# Patient Record
Sex: MALE | Race: WHITE | Employment: OTHER | URBAN - NONMETROPOLITAN AREA
[De-identification: names, ages, dates, MRNs, and addresses within clinical notes are randomized per-mention and may not be internally consistent; named-entity substitution may affect disease eponyms.]

---

## 2017-01-23 ENCOUNTER — OFFICE VISIT (OUTPATIENT)
Dept: PULMONOLOGY | Age: 57
End: 2017-01-23

## 2017-01-23 VITALS
BODY MASS INDEX: 34.66 KG/M2 | HEIGHT: 71 IN | HEART RATE: 85 BPM | DIASTOLIC BLOOD PRESSURE: 80 MMHG | OXYGEN SATURATION: 90 % | WEIGHT: 247.6 LBS | SYSTOLIC BLOOD PRESSURE: 100 MMHG

## 2017-01-23 DIAGNOSIS — Z99.89 OSA ON CPAP: Primary | ICD-10-CM

## 2017-01-23 DIAGNOSIS — G47.33 OSA ON CPAP: Primary | ICD-10-CM

## 2017-01-23 PROCEDURE — 99213 OFFICE O/P EST LOW 20 MIN: CPT | Performed by: INTERNAL MEDICINE

## 2017-01-31 ENCOUNTER — CARE COORDINATION (OUTPATIENT)
Dept: CARE COORDINATION | Age: 57
End: 2017-01-31

## 2017-02-06 ENCOUNTER — CARE COORDINATION (OUTPATIENT)
Dept: CARE COORDINATION | Age: 57
End: 2017-02-06

## 2017-02-06 ENCOUNTER — OFFICE VISIT (OUTPATIENT)
Dept: FAMILY MEDICINE CLINIC | Age: 57
End: 2017-02-06

## 2017-02-06 VITALS
RESPIRATION RATE: 14 BRPM | HEART RATE: 72 BPM | SYSTOLIC BLOOD PRESSURE: 132 MMHG | DIASTOLIC BLOOD PRESSURE: 74 MMHG | WEIGHT: 249.5 LBS | HEIGHT: 71 IN | BODY MASS INDEX: 34.93 KG/M2

## 2017-02-06 DIAGNOSIS — J01.20 ACUTE NON-RECURRENT ETHMOIDAL SINUSITIS: Primary | ICD-10-CM

## 2017-02-06 DIAGNOSIS — I10 ESSENTIAL HYPERTENSION: ICD-10-CM

## 2017-02-06 PROCEDURE — 99213 OFFICE O/P EST LOW 20 MIN: CPT | Performed by: FAMILY MEDICINE

## 2017-02-06 RX ORDER — CEPHALEXIN 500 MG/1
500 CAPSULE ORAL 3 TIMES DAILY
Qty: 30 CAPSULE | Refills: 0 | Status: SHIPPED | OUTPATIENT
Start: 2017-02-06 | End: 2017-02-16

## 2017-02-06 RX ORDER — IPRATROPIUM BROMIDE 42 UG/1
2 SPRAY, METERED NASAL 3 TIMES DAILY
Qty: 1 BOTTLE | Refills: 3 | Status: SHIPPED | OUTPATIENT
Start: 2017-02-06 | End: 2017-02-20

## 2017-02-06 ASSESSMENT — ENCOUNTER SYMPTOMS
COUGH: 1
EYE PAIN: 0
CHEST TIGHTNESS: 0
ABDOMINAL PAIN: 0
CONSTIPATION: 0
SHORTNESS OF BREATH: 0
NAUSEA: 0
BACK PAIN: 0
SORE THROAT: 0
TROUBLE SWALLOWING: 0
BLOOD IN STOOL: 0
SINUS PRESSURE: 1
HOARSE VOICE: 0

## 2017-02-20 ENCOUNTER — OFFICE VISIT (OUTPATIENT)
Dept: FAMILY MEDICINE CLINIC | Age: 57
End: 2017-02-20

## 2017-02-20 VITALS
WEIGHT: 250 LBS | SYSTOLIC BLOOD PRESSURE: 114 MMHG | HEIGHT: 71 IN | RESPIRATION RATE: 16 BRPM | BODY MASS INDEX: 35 KG/M2 | HEART RATE: 80 BPM | DIASTOLIC BLOOD PRESSURE: 64 MMHG

## 2017-02-20 DIAGNOSIS — Z12.5 PROSTATE CANCER SCREENING: ICD-10-CM

## 2017-02-20 DIAGNOSIS — R05.9 COUGH: ICD-10-CM

## 2017-02-20 DIAGNOSIS — J01.20 ACUTE NON-RECURRENT ETHMOIDAL SINUSITIS: ICD-10-CM

## 2017-02-20 PROCEDURE — 99213 OFFICE O/P EST LOW 20 MIN: CPT | Performed by: FAMILY MEDICINE

## 2017-02-20 RX ORDER — AMOXICILLIN AND CLAVULANATE POTASSIUM 875; 125 MG/1; MG/1
1 TABLET, FILM COATED ORAL 2 TIMES DAILY
Qty: 20 TABLET | Refills: 0 | Status: SHIPPED | OUTPATIENT
Start: 2017-02-20 | End: 2017-03-02

## 2017-02-20 ASSESSMENT — PATIENT HEALTH QUESTIONNAIRE - PHQ9
SUM OF ALL RESPONSES TO PHQ QUESTIONS 1-9: 0
1. LITTLE INTEREST OR PLEASURE IN DOING THINGS: 0
2. FEELING DOWN, DEPRESSED OR HOPELESS: 0
SUM OF ALL RESPONSES TO PHQ9 QUESTIONS 1 & 2: 0

## 2017-02-20 ASSESSMENT — ENCOUNTER SYMPTOMS
COUGH: 1
EYES NEGATIVE: 1
ABDOMINAL PAIN: 0
VOMITING: 0
CHEST TIGHTNESS: 0
BLOOD IN STOOL: 0
CONSTIPATION: 0
NAUSEA: 0
RHINORRHEA: 1
SORE THROAT: 1
DIARRHEA: 0
SHORTNESS OF BREATH: 0

## 2017-02-21 ENCOUNTER — TELEPHONE (OUTPATIENT)
Dept: FAMILY MEDICINE CLINIC | Age: 57
End: 2017-02-21

## 2017-03-31 ENCOUNTER — OFFICE VISIT (OUTPATIENT)
Dept: CARDIOLOGY | Age: 57
End: 2017-03-31

## 2017-03-31 VITALS
WEIGHT: 253 LBS | BODY MASS INDEX: 35.42 KG/M2 | DIASTOLIC BLOOD PRESSURE: 78 MMHG | HEART RATE: 80 BPM | HEIGHT: 71 IN | SYSTOLIC BLOOD PRESSURE: 126 MMHG

## 2017-03-31 DIAGNOSIS — I10 ESSENTIAL HYPERTENSION: ICD-10-CM

## 2017-03-31 DIAGNOSIS — I25.810 CORONARY ARTERY DISEASE INVOLVING CORONARY BYPASS GRAFT OF NATIVE HEART WITHOUT ANGINA PECTORIS: Primary | ICD-10-CM

## 2017-03-31 DIAGNOSIS — E78.01 FAMILIAL HYPERCHOLESTEROLEMIA: ICD-10-CM

## 2017-03-31 PROCEDURE — 99213 OFFICE O/P EST LOW 20 MIN: CPT | Performed by: NUCLEAR MEDICINE

## 2017-03-31 RX ORDER — BUMETANIDE 1 MG/1
1 TABLET ORAL DAILY
Qty: 90 TABLET | Refills: 3 | Status: SHIPPED | OUTPATIENT
Start: 2017-03-31 | End: 2017-12-12 | Stop reason: ALTCHOICE

## 2017-03-31 RX ORDER — BUMETANIDE 1 MG/1
1 TABLET ORAL DAILY
COMMUNITY
End: 2017-03-31 | Stop reason: SDUPTHER

## 2017-04-17 RX ORDER — ATORVASTATIN CALCIUM 40 MG/1
40 TABLET, FILM COATED ORAL NIGHTLY
Qty: 90 TABLET | Refills: 4 | Status: SHIPPED | OUTPATIENT
Start: 2017-04-17 | End: 2017-04-18 | Stop reason: SDUPTHER

## 2017-04-18 RX ORDER — ATORVASTATIN CALCIUM 40 MG/1
40 TABLET, FILM COATED ORAL NIGHTLY
Qty: 90 TABLET | Refills: 4 | Status: SHIPPED | OUTPATIENT
Start: 2017-04-18 | End: 2018-05-22 | Stop reason: SDUPTHER

## 2017-04-28 ENCOUNTER — OFFICE VISIT (OUTPATIENT)
Dept: FAMILY MEDICINE CLINIC | Age: 57
End: 2017-04-28

## 2017-04-28 VITALS
RESPIRATION RATE: 16 BRPM | HEART RATE: 64 BPM | WEIGHT: 257.4 LBS | DIASTOLIC BLOOD PRESSURE: 72 MMHG | SYSTOLIC BLOOD PRESSURE: 130 MMHG | BODY MASS INDEX: 36.03 KG/M2 | HEIGHT: 71 IN

## 2017-04-28 DIAGNOSIS — Z99.89 OBSTRUCTIVE SLEEP APNEA ON CPAP: ICD-10-CM

## 2017-04-28 DIAGNOSIS — M15.9 PRIMARY OSTEOARTHRITIS INVOLVING MULTIPLE JOINTS: ICD-10-CM

## 2017-04-28 DIAGNOSIS — I25.10 ASCVD (ARTERIOSCLEROTIC CARDIOVASCULAR DISEASE): ICD-10-CM

## 2017-04-28 DIAGNOSIS — E78.5 HYPERLIPIDEMIA, UNSPECIFIED HYPERLIPIDEMIA TYPE: ICD-10-CM

## 2017-04-28 DIAGNOSIS — Z13.9 SCREENING: ICD-10-CM

## 2017-04-28 DIAGNOSIS — I10 ESSENTIAL HYPERTENSION: Primary | ICD-10-CM

## 2017-04-28 DIAGNOSIS — K21.9 GASTROESOPHAGEAL REFLUX DISEASE, ESOPHAGITIS PRESENCE NOT SPECIFIED: ICD-10-CM

## 2017-04-28 DIAGNOSIS — G47.33 OBSTRUCTIVE SLEEP APNEA ON CPAP: ICD-10-CM

## 2017-04-28 PROCEDURE — 99213 OFFICE O/P EST LOW 20 MIN: CPT | Performed by: FAMILY MEDICINE

## 2017-04-28 RX ORDER — METOPROLOL SUCCINATE 25 MG/1
50 TABLET, EXTENDED RELEASE ORAL DAILY
Qty: 90 TABLET | Refills: 1 | Status: ON HOLD | OUTPATIENT
Start: 2017-04-28 | End: 2018-01-05 | Stop reason: ALTCHOICE

## 2017-04-28 RX ORDER — LISINOPRIL 10 MG/1
TABLET ORAL
Qty: 90 TABLET | Refills: 1 | Status: SHIPPED | OUTPATIENT
Start: 2017-04-28 | End: 2017-10-25 | Stop reason: SDUPTHER

## 2017-04-28 RX ORDER — OMEPRAZOLE 20 MG/1
20 CAPSULE, DELAYED RELEASE ORAL DAILY
Qty: 90 CAPSULE | Refills: 1 | Status: SHIPPED | OUTPATIENT
Start: 2017-04-28 | End: 2017-10-25 | Stop reason: SDUPTHER

## 2017-04-28 ASSESSMENT — ENCOUNTER SYMPTOMS
CONSTIPATION: 0
SHORTNESS OF BREATH: 0
DIARRHEA: 0
VOMITING: 0
BLOOD IN STOOL: 0
COUGH: 0
ABDOMINAL PAIN: 0
EYES NEGATIVE: 1
CHEST TIGHTNESS: 0
NAUSEA: 0

## 2017-05-26 ENCOUNTER — EMPLOYEE WELLNESS (OUTPATIENT)
Dept: OTHER | Age: 57
End: 2017-05-26

## 2017-05-26 LAB
CHOLESTEROL, TOTAL: 197 MG/DL (ref 0–199)
FASTING: YES
GLUCOSE BLD-MCNC: 124 MG/DL (ref 74–109)
HDLC SERPL-MCNC: 42 MG/DL (ref 40–90)
LDL CHOLESTEROL CALCULATED: 120 MG/DL
TRIGL SERPL-MCNC: 177 MG/DL (ref 0–199)

## 2017-06-02 ENCOUNTER — TELEPHONE (OUTPATIENT)
Dept: FAMILY MEDICINE CLINIC | Age: 57
End: 2017-06-02

## 2017-06-07 ENCOUNTER — OFFICE VISIT (OUTPATIENT)
Dept: FAMILY MEDICINE CLINIC | Age: 57
End: 2017-06-07

## 2017-06-07 VITALS
HEART RATE: 76 BPM | WEIGHT: 254.38 LBS | RESPIRATION RATE: 16 BRPM | DIASTOLIC BLOOD PRESSURE: 78 MMHG | SYSTOLIC BLOOD PRESSURE: 136 MMHG | BODY MASS INDEX: 35.61 KG/M2 | HEIGHT: 71 IN

## 2017-06-07 DIAGNOSIS — L72.9 INFECTED CYST OF SKIN: ICD-10-CM

## 2017-06-07 DIAGNOSIS — R63.5 WEIGHT GAIN: ICD-10-CM

## 2017-06-07 DIAGNOSIS — I10 ESSENTIAL HYPERTENSION: Primary | ICD-10-CM

## 2017-06-07 DIAGNOSIS — L08.9 INFECTED CYST OF SKIN: ICD-10-CM

## 2017-06-07 PROCEDURE — 99214 OFFICE O/P EST MOD 30 MIN: CPT | Performed by: FAMILY MEDICINE

## 2017-06-07 RX ORDER — AMOXICILLIN AND CLAVULANATE POTASSIUM 875; 125 MG/1; MG/1
1 TABLET, FILM COATED ORAL 2 TIMES DAILY
Qty: 20 TABLET | Refills: 0 | Status: SHIPPED | OUTPATIENT
Start: 2017-06-07 | End: 2017-06-17

## 2017-06-07 ASSESSMENT — ENCOUNTER SYMPTOMS
SHORTNESS OF BREATH: 0
EYE WATERING: 0
CONSTIPATION: 0
COUGH: 0
VOMITING: 0
NAUSEA: 0
CHEST TIGHTNESS: 0
BLURRED VISION: 0
VISUAL CHANGE: 0
EYE PAIN: 0
PHOTOPHOBIA: 1
BLOOD IN STOOL: 0
DIARRHEA: 0
EYE REDNESS: 0
ABDOMINAL PAIN: 0

## 2017-06-21 ENCOUNTER — OFFICE VISIT (OUTPATIENT)
Dept: FAMILY MEDICINE CLINIC | Age: 57
End: 2017-06-21

## 2017-06-21 VITALS
HEART RATE: 70 BPM | RESPIRATION RATE: 16 BRPM | BODY MASS INDEX: 35.48 KG/M2 | DIASTOLIC BLOOD PRESSURE: 82 MMHG | WEIGHT: 253.4 LBS | SYSTOLIC BLOOD PRESSURE: 124 MMHG | HEIGHT: 71 IN

## 2017-06-21 DIAGNOSIS — R73.9 HYPERGLYCEMIA: Primary | ICD-10-CM

## 2017-06-21 PROCEDURE — 99213 OFFICE O/P EST LOW 20 MIN: CPT | Performed by: FAMILY MEDICINE

## 2017-08-29 ASSESSMENT — ENCOUNTER SYMPTOMS
NAUSEA: 0
DIARRHEA: 0
SHORTNESS OF BREATH: 0
EYES NEGATIVE: 1
CONSTIPATION: 0
ABDOMINAL PAIN: 0
VOMITING: 0
CHEST TIGHTNESS: 0
BLOOD IN STOOL: 0
COUGH: 0

## 2017-09-01 ENCOUNTER — OFFICE VISIT (OUTPATIENT)
Dept: FAMILY MEDICINE CLINIC | Age: 57
End: 2017-09-01

## 2017-09-01 VITALS
BODY MASS INDEX: 35.59 KG/M2 | DIASTOLIC BLOOD PRESSURE: 74 MMHG | HEART RATE: 62 BPM | RESPIRATION RATE: 16 BRPM | WEIGHT: 254.2 LBS | HEIGHT: 71 IN | SYSTOLIC BLOOD PRESSURE: 116 MMHG

## 2017-09-01 DIAGNOSIS — E78.5 HYPERLIPIDEMIA, UNSPECIFIED HYPERLIPIDEMIA TYPE: ICD-10-CM

## 2017-09-01 DIAGNOSIS — I10 ESSENTIAL HYPERTENSION: Primary | ICD-10-CM

## 2017-09-01 DIAGNOSIS — K21.9 GASTROESOPHAGEAL REFLUX DISEASE, ESOPHAGITIS PRESENCE NOT SPECIFIED: ICD-10-CM

## 2017-09-01 DIAGNOSIS — G47.33 OBSTRUCTIVE SLEEP APNEA ON CPAP: ICD-10-CM

## 2017-09-01 DIAGNOSIS — R73.9 HYPERGLYCEMIA: ICD-10-CM

## 2017-09-01 DIAGNOSIS — Z99.89 OBSTRUCTIVE SLEEP APNEA ON CPAP: ICD-10-CM

## 2017-09-01 DIAGNOSIS — I25.10 CORONARY ARTERY DISEASE INVOLVING NATIVE CORONARY ARTERY OF NATIVE HEART WITHOUT ANGINA PECTORIS: ICD-10-CM

## 2017-09-01 PROCEDURE — 99213 OFFICE O/P EST LOW 20 MIN: CPT | Performed by: FAMILY MEDICINE

## 2017-09-01 ASSESSMENT — ENCOUNTER SYMPTOMS
VOMITING: 0
ABDOMINAL PAIN: 0
COUGH: 0
CHEST TIGHTNESS: 0
BLOOD IN STOOL: 0
CONSTIPATION: 0
EYES NEGATIVE: 1
NAUSEA: 0
DIARRHEA: 0
SHORTNESS OF BREATH: 0

## 2017-09-07 ENCOUNTER — HOSPITAL ENCOUNTER (OUTPATIENT)
Age: 57
Discharge: HOME | End: 2017-09-07
Payer: COMMERCIAL

## 2017-09-07 DIAGNOSIS — R73.9 HYPERGLYCEMIA: ICD-10-CM

## 2017-09-07 LAB
AVERAGE GLUCOSE: 132 MG/DL (ref 70–126)
GLUCOSE BLD-MCNC: 138 MG/DL (ref 70–108)
HBA1C MFR BLD: 6.4 % (ref 4.4–6.4)

## 2017-09-07 PROCEDURE — 83036 HEMOGLOBIN GLYCOSYLATED A1C: CPT

## 2017-09-07 PROCEDURE — 36415 COLL VENOUS BLD VENIPUNCTURE: CPT

## 2017-09-07 PROCEDURE — 82947 ASSAY GLUCOSE BLOOD QUANT: CPT

## 2017-09-27 ENCOUNTER — HOSPITAL ENCOUNTER (OUTPATIENT)
Dept: MRI IMAGING | Age: 57
Discharge: HOME | End: 2017-09-27
Payer: COMMERCIAL

## 2017-09-27 DIAGNOSIS — M25.511 ACUTE PAIN OF RIGHT SHOULDER: ICD-10-CM

## 2017-09-27 PROCEDURE — 73221 MRI JOINT UPR EXTREM W/O DYE: CPT

## 2017-10-19 ENCOUNTER — OFFICE VISIT (OUTPATIENT)
Dept: CARDIOLOGY CLINIC | Age: 57
End: 2017-10-19
Payer: COMMERCIAL

## 2017-10-19 VITALS
SYSTOLIC BLOOD PRESSURE: 128 MMHG | WEIGHT: 257 LBS | BODY MASS INDEX: 35.98 KG/M2 | DIASTOLIC BLOOD PRESSURE: 76 MMHG | HEIGHT: 71 IN | HEART RATE: 73 BPM

## 2017-10-19 DIAGNOSIS — I10 ESSENTIAL HYPERTENSION: Primary | ICD-10-CM

## 2017-10-19 DIAGNOSIS — E78.01 FAMILIAL HYPERCHOLESTEROLEMIA: ICD-10-CM

## 2017-10-19 DIAGNOSIS — R00.1 BRADYCARDIA: ICD-10-CM

## 2017-10-19 DIAGNOSIS — I25.10 CORONARY ARTERY DISEASE INVOLVING NATIVE CORONARY ARTERY OF NATIVE HEART WITHOUT ANGINA PECTORIS: ICD-10-CM

## 2017-10-19 PROCEDURE — 99213 OFFICE O/P EST LOW 20 MIN: CPT | Performed by: NUCLEAR MEDICINE

## 2017-10-19 PROCEDURE — 93000 ELECTROCARDIOGRAM COMPLETE: CPT | Performed by: NUCLEAR MEDICINE

## 2017-10-19 NOTE — PROGRESS NOTES
Pt here for cardiac clearance for right shoulder with Dr. Jeovany De Souza 11/19/17    Denies chest pain, dizziness  Does get fatigue and SOB on exertion

## 2017-10-19 NOTE — PROGRESS NOTES
[facility]  [facility]  [address] [facility]  [address] [address]  Dept: [phone]  Dept Fax: [phone]  Loc: [phone]    Visit Date: 10/19/2017    Senthil Rendon is a 59 y.o. male who presents today for:  Chief Complaint   Patient presents with    Check-Up     cardiac clearance    Coronary Artery Disease    Hypertension    Hyperlipidemia     CABg last year   No chest pain  Going for shoulder surgery   No chest pain  No changes in breathing  Active   Stable BP is stable     HPI:  HPI  Past Medical History:   Diagnosis Date    Addiction, opium ([address]     Coronary artery disease involving native coronary artery of native heart without angina pectoris     GERD (gastroesophageal reflux disease) 3/21/2012    Hernia     Hyperglycemia 09/09    Hyperlipidemia     Hypertension     Osteoarthritis 11/07    S/P CABG x 2 07/06/2016    Thumb injury 08/2015    Right thumb cut with table saw, no treatment needed    Unspecified sleep apnea       Past Surgical History:   Procedure Laterality Date    CARDIAC CATHETERIZATION  06/27/2016    CATARACT REMOVAL Right 2013    COLONOSCOPY  04/08    CORONARY ARTERY BYPASS GRAFT  07/06/2016    Double bypass, Dr. Vanessa Weinstein Right 02/2007    Neck    EYE SURGERY Right     Retinal surgery x 3    HERNIA REPAIR Right     Inguinal    TONSILLECTOMY  child    TOTAL KNEE ARTHROPLASTY Left 10/24/2016    Dr. Lizeth Benson     Family History   Problem Relation Age of Onset    Heart Disease Mother     Heart Disease Father     Cancer Father      colon/prostate    Colon Cancer Father     Prostate Cancer Father      Social History   Substance Use Topics    Smoking status: Former Smoker     Packs/day: 2.00     Years: 25.00     Types: Cigars     Quit date: 11/11/2012    Smokeless tobacco: Never Used    Alcohol use No      Comment: Quit      Current Outpatient Prescriptions   Medication Sig Dispense Refill    omeprazole deformities    Assessment:     1. Essential hypertension  EKG 12 Lead   2. Coronary artery disease involving native coronary artery of native heart without angina pectoris  EKG 12 Lead   3. Bradycardia  EKG 12 Lead   4. Familial hypercholesterolemia     cardiac stable for now  ECG in office was done today. I reviewed the ECG. No acute findings      Plan:  No Follow-up on file. Stable for surgery   Continue risk factor modification and medical management  Thank you for allowing me to participate in the care of your patient. Please don't hesitate to contact me regarding any further issues related to the patient care    Orders Placed:  Orders Placed This Encounter   Procedures    EKG 12 Lead     Order Specific Question:   Reason for Exam?     Answer: Other       Medications Prescribed:  No orders of the defined types were placed in this encounter. Discussed use, benefit, and side effects of prescribed medications. All patient questions answered. Pt voiced understanding. Instructed to continue current medications, diet and exercise. Continue risk factor modification and medical management. Patient agreed with treatment plan. Follow up as directed.     Electronically signed by Shilpa Adrian MD on 10/19/2017 at 3:46 PM

## 2017-10-25 RX ORDER — LISINOPRIL 10 MG/1
TABLET ORAL
Qty: 90 TABLET | Refills: 1 | Status: SHIPPED | OUTPATIENT
Start: 2017-10-25 | End: 2018-04-19 | Stop reason: SDUPTHER

## 2017-10-25 RX ORDER — OMEPRAZOLE 20 MG/1
20 CAPSULE, DELAYED RELEASE ORAL DAILY
Qty: 90 CAPSULE | Refills: 1 | Status: SHIPPED | OUTPATIENT
Start: 2017-10-25 | End: 2018-04-19 | Stop reason: SDUPTHER

## 2017-10-25 NOTE — TELEPHONE ENCOUNTER
Pt called and req 90 day refill:    Omeprazole 20 mg I po qd  Lisinopril 10 mg I po qd    Please send to :    [facility]

## 2017-10-30 ENCOUNTER — TELEPHONE (OUTPATIENT)
Dept: CARDIOLOGY CLINIC | Age: 57
End: 2017-10-30

## 2017-10-30 NOTE — TELEPHONE ENCOUNTER
Discussed PAD questionnaire with patient   He has c/o pain in his knees when walking  Declined PITER screening

## 2017-11-03 ENCOUNTER — OFFICE VISIT (OUTPATIENT)
Dept: FAMILY MEDICINE CLINIC | Age: 57
End: 2017-11-03

## 2017-11-03 VITALS
HEIGHT: 71 IN | SYSTOLIC BLOOD PRESSURE: 112 MMHG | BODY MASS INDEX: 36 KG/M2 | HEART RATE: 60 BPM | RESPIRATION RATE: 16 BRPM | WEIGHT: 257.13 LBS | DIASTOLIC BLOOD PRESSURE: 64 MMHG

## 2017-11-03 DIAGNOSIS — M54.5 ACUTE RIGHT-SIDED LOW BACK PAIN, WITH SCIATICA PRESENCE UNSPECIFIED: Primary | ICD-10-CM

## 2017-11-03 DIAGNOSIS — Z23 NEED FOR IMMUNIZATION AGAINST INFLUENZA: ICD-10-CM

## 2017-11-03 LAB
BACTERIA URINE, POC: ABNORMAL
BILIRUBIN URINE: 0 MG/DL
BLOOD, URINE: POSITIVE
CASTS URINE, POC: ABNORMAL
CLARITY: CLEAR
COLOR: YELLOW
CRYSTALS URINE, POC: ABNORMAL
EPI CELLS URINE, POC: ABNORMAL
GLUCOSE URINE: ABNORMAL
KETONES, URINE: POSITIVE
LEUKOCYTE EST, POC: ABNORMAL
NITRITE, URINE: NEGATIVE
PH UA: 6 (ref 4.5–8)
PROTEIN UA: POSITIVE
RBC URINE, POC: ABNORMAL
SPECIFIC GRAVITY UA: 1.01 (ref 1–1.03)
UROBILINOGEN, URINE: NORMAL
WBC URINE, POC: ABNORMAL
YEAST URINE, POC: ABNORMAL

## 2017-11-03 PROCEDURE — 99213 OFFICE O/P EST LOW 20 MIN: CPT | Performed by: FAMILY MEDICINE

## 2017-11-03 PROCEDURE — 90471 IMMUNIZATION ADMIN: CPT | Performed by: FAMILY MEDICINE

## 2017-11-03 PROCEDURE — 90688 IIV4 VACCINE SPLT 0.5 ML IM: CPT | Performed by: FAMILY MEDICINE

## 2017-11-03 PROCEDURE — 81000 URINALYSIS NONAUTO W/SCOPE: CPT | Performed by: FAMILY MEDICINE

## 2017-11-03 RX ORDER — MELOXICAM 15 MG/1
15 TABLET ORAL DAILY
Qty: 20 TABLET | Refills: 0 | Status: SHIPPED | OUTPATIENT
Start: 2017-11-03 | End: 2017-11-13

## 2017-11-03 ASSESSMENT — ENCOUNTER SYMPTOMS
BACK PAIN: 1
SHORTNESS OF BREATH: 0
CONSTIPATION: 0
SINUS PRESSURE: 0

## 2017-11-03 NOTE — PROGRESS NOTES
Administered Afluria Flu Vaccine, IM to right deltoid. Patient informed and educated on medication. Tolerated well.
(1.803 m), weight 257 lb 2 oz (116.6 kg). Results for orders placed or performed in visit on 11/03/17   POC URINE with Microscopic   Result Value Ref Range    Color, UA Yellow     Clarity, UA Clear Clear    Glucose, Ur neg     Bilirubin Urine 0 mg/dL    Ketones, Urine Positive     Specific Gravity, UA 1.010 1.005 - 1.030    Blood, Urine Positive     pH, UA 6 4.5 - 8.0    Protein, UA Positive (A) Negative    Nitrite, Urine Negative     Leukocytes, UA neg     Urobilinogen, Urine Normal     rbc urine, poc rare     wbc urine, poc none     bacteria urine, poc none     yeast urine, poc      casts urine, poc      epi cells urine, poc none     crystals urine, poc             Assessment:      1. Acute right-sided low back pain, with sciatica presence unspecified  POC URINE with Microscopic    meloxicam (MOBIC) 15 MG tablet   2.  Need for immunization against influenza             Plan:      Stop in for a follow up urine check in a week  Continue the heating pad as needed

## 2017-11-08 ENCOUNTER — NURSE ONLY (OUTPATIENT)
Dept: FAMILY MEDICINE CLINIC | Age: 57
End: 2017-11-08

## 2017-11-08 DIAGNOSIS — R35.0 URINARY FREQUENCY: Primary | ICD-10-CM

## 2017-11-09 LAB
APPEARANCE: CLEAR
BILIRUBIN: NEGATIVE
COLOR: YELLOW
GLUCOSE BLD-MCNC: NEGATIVE MG/DL
KETONES, URINE: NEGATIVE
LEUKOCYTE ESTERASE, URINE: NEGATIVE
NITRITE, URINE: NEGATIVE
OCCULT BLOOD,URINE: NEGATIVE
PH: 5.5 (ref 5–9)
PROTEIN, URINE: NEGATIVE
SP GRAVITY MISCELLANEOUS: 1.02 (ref 1–1.03)
UROBILINOGEN, URINE: NORMAL

## 2017-11-13 ENCOUNTER — OFFICE VISIT (OUTPATIENT)
Dept: FAMILY MEDICINE CLINIC | Age: 57
End: 2017-11-13

## 2017-11-13 VITALS
TEMPERATURE: 97.8 F | HEIGHT: 71 IN | RESPIRATION RATE: 16 BRPM | SYSTOLIC BLOOD PRESSURE: 132 MMHG | HEART RATE: 68 BPM | DIASTOLIC BLOOD PRESSURE: 88 MMHG | WEIGHT: 258.5 LBS | BODY MASS INDEX: 36.19 KG/M2

## 2017-11-13 DIAGNOSIS — R10.9 ACUTE RIGHT FLANK PAIN: Primary | ICD-10-CM

## 2017-11-13 PROCEDURE — 99214 OFFICE O/P EST MOD 30 MIN: CPT | Performed by: FAMILY MEDICINE

## 2017-11-13 ASSESSMENT — ENCOUNTER SYMPTOMS
CONSTIPATION: 0
BLOOD IN STOOL: 0
VOMITING: 0
BACK PAIN: 1
COUGH: 0
NAUSEA: 0
EYES NEGATIVE: 1
CHEST TIGHTNESS: 0
ABDOMINAL PAIN: 0
SHORTNESS OF BREATH: 0
DIARRHEA: 0

## 2017-11-13 NOTE — PROGRESS NOTES
Visit Date: 11/13/2017    Guillermina Mccain is a 59 y.o. male who presents today for:  Chief Complaint   Patient presents with    Back Pain     Right for about 2 weeks, today he states that the pain is making him kind of nauseated, no vomiting. HPI:     HPI    has a current medication list which includes the following prescription(s): omeprazole, lisinopril, metoprolol succinate, atorvastatin, bumetanide, aspirin, and fluticasone. No Known Allergies    Social History   Substance Use Topics    Smoking status: Former Smoker     Packs/day: 2.00     Years: 25.00     Types: Cigars     Quit date: 11/11/2012    Smokeless tobacco: Never Used    Alcohol use No      Comment: Quit       Past Medical History:   Diagnosis Date    Addiction, opium ([address]     Coronary artery disease involving native coronary artery of native heart without angina pectoris     GERD (gastroesophageal reflux disease) 3/21/2012    Hernia     Hyperglycemia 09/09    Hyperlipidemia     Hypertension     Osteoarthritis 11/07    S/P CABG x 2 07/06/2016    Thumb injury 08/2015    Right thumb cut with table saw, no treatment needed    Unspecified sleep apnea        Past Surgical History:   Procedure Laterality Date    CARDIAC CATHETERIZATION  06/27/2016    CATARACT REMOVAL Right 2013    COLONOSCOPY  04/08    CORONARY ARTERY BYPASS GRAFT  07/06/2016    Double bypass, Dr. Karlee Gutierrez Right 02/2007    Neck    EYE SURGERY Right     Retinal surgery x 3    HERNIA REPAIR Right     Inguinal    TONSILLECTOMY  child    TOTAL KNEE ARTHROPLASTY Left 10/24/2016    Dr. Sekou Byers       Family History   Problem Relation Age of Onset    Heart Disease Mother     Heart Disease Father     Cancer Father      colon/prostate    Colon Cancer Father     Prostate Cancer Father      Subjective:      Review of Systems   Constitutional: Positive for fever (he felt warm this AM ).  Negative for activity change, appetite change, chills and

## 2017-11-14 ENCOUNTER — HOSPITAL ENCOUNTER (OUTPATIENT)
Dept: CT IMAGING | Age: 57
Discharge: HOME | End: 2017-11-14
Payer: COMMERCIAL

## 2017-11-14 DIAGNOSIS — R10.9 ACUTE RIGHT FLANK PAIN: ICD-10-CM

## 2017-11-14 PROCEDURE — 74176 CT ABD & PELVIS W/O CONTRAST: CPT

## 2017-11-15 ENCOUNTER — TELEPHONE (OUTPATIENT)
Dept: FAMILY MEDICINE CLINIC | Age: 57
End: 2017-11-15

## 2017-11-22 ENCOUNTER — HOSPITAL ENCOUNTER (OUTPATIENT)
Dept: OCCUPATIONAL THERAPY | Age: 57
Setting detail: THERAPIES SERIES
Discharge: HOME | End: 2017-11-22
Payer: COMMERCIAL

## 2017-11-22 ENCOUNTER — OFFICE VISIT (OUTPATIENT)
Dept: FAMILY MEDICINE CLINIC | Age: 57
End: 2017-11-22

## 2017-11-22 VITALS
RESPIRATION RATE: 16 BRPM | WEIGHT: 252 LBS | SYSTOLIC BLOOD PRESSURE: 112 MMHG | HEART RATE: 76 BPM | HEIGHT: 71 IN | BODY MASS INDEX: 35.28 KG/M2 | DIASTOLIC BLOOD PRESSURE: 70 MMHG

## 2017-11-22 DIAGNOSIS — K82.8 GALLBLADDER SLUDGE: ICD-10-CM

## 2017-11-22 DIAGNOSIS — R60.0 BILATERAL LEG EDEMA: ICD-10-CM

## 2017-11-22 DIAGNOSIS — K76.0 FATTY LIVER: Primary | ICD-10-CM

## 2017-11-22 DIAGNOSIS — I10 ESSENTIAL HYPERTENSION: ICD-10-CM

## 2017-11-22 PROCEDURE — 97165 OT EVAL LOW COMPLEX 30 MIN: CPT

## 2017-11-22 PROCEDURE — G0283 ELEC STIM OTHER THAN WOUND: HCPCS

## 2017-11-22 PROCEDURE — 99213 OFFICE O/P EST LOW 20 MIN: CPT | Performed by: FAMILY MEDICINE

## 2017-11-22 PROCEDURE — 97110 THERAPEUTIC EXERCISES: CPT

## 2017-11-22 RX ORDER — POTASSIUM CHLORIDE 20 MEQ/1
20 TABLET, EXTENDED RELEASE ORAL DAILY
Qty: 30 TABLET | Refills: 1 | Status: SHIPPED | OUTPATIENT
Start: 2017-11-22 | End: 2018-01-21 | Stop reason: SDUPTHER

## 2017-11-22 RX ORDER — OXYCODONE HYDROCHLORIDE AND ACETAMINOPHEN 5; 325 MG/1; MG/1
1 TABLET ORAL EVERY 4 HOURS PRN
COMMUNITY
End: 2018-03-05 | Stop reason: ALTCHOICE

## 2017-11-22 ASSESSMENT — ENCOUNTER SYMPTOMS
CHEST TIGHTNESS: 0
COUGH: 0
VOMITING: 0
EYES NEGATIVE: 1
NAUSEA: 0
ABDOMINAL PAIN: 0
BLOOD IN STOOL: 0
SHORTNESS OF BREATH: 1
DIARRHEA: 0
CONSTIPATION: 0

## 2017-11-22 ASSESSMENT — PAIN DESCRIPTION - ORIENTATION: ORIENTATION: RIGHT

## 2017-11-22 ASSESSMENT — PAIN SCALES - GENERAL: PAINLEVEL_OUTOF10: 4

## 2017-11-22 ASSESSMENT — PAIN DESCRIPTION - LOCATION: LOCATION: SHOULDER

## 2017-11-22 NOTE — PROGRESS NOTES
without difficulty. OT G-codes  Functional Assessment Tool Used: Upper Extremity Functional Scale  Score: 19       Evaluation Complexity: Based on the findings of patient history, examination, clinical presentation, and decision making during this evaluation, this patient is of low complexity.     KEENA Stuart, OTR/L [id number]

## 2017-11-28 ENCOUNTER — HOSPITAL ENCOUNTER (OUTPATIENT)
Dept: OCCUPATIONAL THERAPY | Age: 57
Setting detail: THERAPIES SERIES
Discharge: HOME | End: 2017-11-28
Payer: COMMERCIAL

## 2017-11-28 ENCOUNTER — HOSPITAL ENCOUNTER (OUTPATIENT)
Age: 57
Discharge: HOME | End: 2017-11-28
Payer: COMMERCIAL

## 2017-11-28 DIAGNOSIS — I10 ESSENTIAL HYPERTENSION: ICD-10-CM

## 2017-11-28 DIAGNOSIS — K82.8 GALLBLADDER SLUDGE: ICD-10-CM

## 2017-11-28 DIAGNOSIS — K76.0 FATTY LIVER: ICD-10-CM

## 2017-11-28 LAB
ALBUMIN SERPL-MCNC: 4.5 G/DL (ref 3.5–5.1)
ALP BLD-CCNC: 100 U/L (ref 38–126)
ALT SERPL-CCNC: 47 U/L (ref 11–66)
ANION GAP SERPL CALCULATED.3IONS-SCNC: 15 MEQ/L (ref 8–16)
AST SERPL-CCNC: 26 U/L (ref 5–40)
BILIRUB SERPL-MCNC: 0.4 MG/DL (ref 0.3–1.2)
BUN BLDV-MCNC: 19 MG/DL (ref 7–22)
CALCIUM SERPL-MCNC: 9.9 MG/DL (ref 8.5–10.5)
CHLORIDE BLD-SCNC: 93 MEQ/L (ref 98–111)
CO2: 30 MEQ/L (ref 23–33)
CREAT SERPL-MCNC: 0.9 MG/DL (ref 0.4–1.2)
GFR SERPL CREATININE-BSD FRML MDRD: 86 ML/MIN/1.73M2
GLUCOSE BLD-MCNC: 115 MG/DL (ref 70–108)
POTASSIUM SERPL-SCNC: 4.8 MEQ/L (ref 3.5–5.2)
SODIUM BLD-SCNC: 138 MEQ/L (ref 135–145)
TOTAL PROTEIN: 7.7 G/DL (ref 6.1–8)

## 2017-11-28 PROCEDURE — 80053 COMPREHEN METABOLIC PANEL: CPT

## 2017-11-28 PROCEDURE — 97110 THERAPEUTIC EXERCISES: CPT

## 2017-11-28 PROCEDURE — 36415 COLL VENOUS BLD VENIPUNCTURE: CPT

## 2017-11-28 PROCEDURE — G0283 ELEC STIM OTHER THAN WOUND: HCPCS

## 2017-11-28 ASSESSMENT — PAIN DESCRIPTION - LOCATION: LOCATION: SHOULDER

## 2017-11-28 ASSESSMENT — PAIN SCALES - GENERAL: PAINLEVEL_OUTOF10: 2

## 2017-11-28 ASSESSMENT — PAIN DESCRIPTION - ORIENTATION: ORIENTATION: RIGHT

## 2017-11-28 NOTE — PROGRESS NOTES
[address] [address]  OUTPATIENT OCCUPATIONAL THERAPY  Daily Note  [address]    Time In: 0128  Time Out: 1600  Minutes: 45  Timed Code Treatment Minutes: 30 Minutes     Date: 2017  Patient Name: Wily Jain        CSN: [id number]   : 1960  (59 y.o.)  Gender: male   Referring Practitioner: Dr. Montana Borja  Diagnosis: M75.121, M75.41 R RCT, impingement, AC arthroplasty          General:  OT Visit Information  Onset Date: 17  OT Insurance Information: Medical [place] - no visit limit, modalities are covered  Total # of Visits to Date: 2  Certification Period Expiration Date: 18  Progress Note Counter: 2/10 for PN  Comments: return to referring physician 2017       Restrictions/Precautions:       Position Activity Restriction  Other position/activity restrictions: s/p on 17         Subjective:  Subjective: Patient reports he tolerated evaluation well, no questions regarding home exercise program.           Pain:  Patient Currently in Pain: Yes  Pain Assessment: 0-10  Pain Level: 2  Pain Location: Shoulder  Pain Orientation: Right       Objective:     Upper Extremity Function  UE AROM: Scapular pinches x10  UE PROM: Table slides with cues for passive motion only x10, pendulums side to side x10, supine PROM to R shoulder flexion and ER within protocol. UE AAROM: Supine distal ROM R elbow flexion/extension, wrist flexion/extension. Electrical Stimulation  Electrical Stimulation Location: Right, Shoulder  Electrical Stimulation Action: Right shoulder  Electrical Stimulation Parameters: IFC to R shoulder with cold pack over at end of session x15 minutes intensity 7. Electrical Stimulation Goals: Pain     Activity Tolerance: Additional Comments: Patient tolerated treatment well. Assessment:  Assessment: Patient is progressing towards goals. Patient Education:  Patient Education: Reviewed plan of care.      Plan:  Plan Comment: Continue per established POC.         KONSTANTIN QUINONES/HANS #[id number]

## 2017-12-01 ENCOUNTER — HOSPITAL ENCOUNTER (OUTPATIENT)
Dept: OCCUPATIONAL THERAPY | Age: 57
Setting detail: THERAPIES SERIES
Discharge: HOME | End: 2017-12-01
Payer: COMMERCIAL

## 2017-12-01 PROCEDURE — 97110 THERAPEUTIC EXERCISES: CPT

## 2017-12-01 PROCEDURE — G0283 ELEC STIM OTHER THAN WOUND: HCPCS

## 2017-12-01 ASSESSMENT — PAIN DESCRIPTION - ORIENTATION: ORIENTATION: RIGHT

## 2017-12-01 ASSESSMENT — PAIN DESCRIPTION - LOCATION: LOCATION: SHOULDER

## 2017-12-01 ASSESSMENT — PAIN SCALES - GENERAL: PAINLEVEL_OUTOF10: 1

## 2017-12-01 NOTE — PROGRESS NOTES
did take pain medication prior to coming to therapy. Blood pressure 173/49, after RB patient able to return to mat to complete ROM without issues. Assessment:  Assessment: Patient is progressing towards goals. Patient Education:  Patient Education: Reviewed next weeks schedule. Plan:  Plan Comment: Continue per established POC.         KONSTANTIN Wen [facility] QUINONES/L #[id number]

## 2017-12-05 ENCOUNTER — HOSPITAL ENCOUNTER (OUTPATIENT)
Dept: OCCUPATIONAL THERAPY | Age: 57
Setting detail: THERAPIES SERIES
Discharge: HOME | End: 2017-12-05
Payer: COMMERCIAL

## 2017-12-05 PROCEDURE — G0283 ELEC STIM OTHER THAN WOUND: HCPCS

## 2017-12-05 PROCEDURE — 97110 THERAPEUTIC EXERCISES: CPT

## 2017-12-05 ASSESSMENT — PAIN DESCRIPTION - LOCATION: LOCATION: SHOULDER

## 2017-12-05 ASSESSMENT — PAIN SCALES - GENERAL: PAINLEVEL_OUTOF10: 2

## 2017-12-05 ASSESSMENT — PAIN DESCRIPTION - ORIENTATION: ORIENTATION: RIGHT

## 2017-12-05 NOTE — PROGRESS NOTES
[address] [address]  OUTPATIENT OCCUPATIONAL THERAPY  Daily Note  [address]    Time In: 08  Time Out: 9655  Minutes: 40  Timed Code Treatment Minutes: 25 Minutes     Date: 2017  Patient Name: Kumar Bennett        CSN: [id number]   : 1960  (59 y.o.)  Gender: male   Referring Practitioner: Dr. Rome Goode  Diagnosis: M75.121, M75.41 R RCT, impingement, AC arthroplasty          General:  OT Visit Information  Onset Date: 17  OT Insurance Information: Medical [place] - no visit limit, modalities are covered  Total # of Visits to Date: 4  Certification Period Expiration Date: 18  Progress Note Counter: 4/10 for PN  Comments: return to referring physician in 4 weeks, unsure of date at this time. Restrictions/Precautions:       Position Activity Restriction  Other position/activity restrictions: s/p on 17 massive tear protocol         Subjective:  Subjective: Patient reports that he has not been feeling to bad, reports that sleeping is not going very well yet. Pain:  Patient Currently in Pain: Yes  Pain Assessment: 0-10  Pain Level: 2  Pain Location: Shoulder  Pain Orientation: Right       Objective:     Upper Extremity Function  UE AROM: 8  UE PROM: Table top flexion slides with hands clasped x10, pendulums side to side x10, supine PROM to R shoulder flexion and ER within protocol. UE AAROM: Supine distal ROM R elbow flexion/extension, wrist flexion/extension. Electrical Stimulation  Electrical Stimulation Action: Right shoulder  Electrical Stimulation Parameters: IFC to R shoulder with cold pack over at end of session x15 minutes intensity 7. Electrical Stimulation Goals: Pain          Activity Tolerance: Additional Comments: Patient tolerated treatment well. Moderate cues to relax and to avoid active use of the right arm. Assessment:  Assessment: Patient is progressing towards goals.      Patient Education:  Patient Education: No active use of R UE, allow the shoulder to relax and drop            Plan:  Current Treatment Recommendations: ROM  Plan Comment: Continue per established POC.    Specific instructions for Next Treatment: ROM as per protocol: s/p arthroscopic RCR, SAD, DCR, ED, follow protocol for massive RCR          Varsha Mcduffie Victoria Maria A, OTR/L [id number]

## 2017-12-08 ENCOUNTER — HOSPITAL ENCOUNTER (OUTPATIENT)
Dept: OCCUPATIONAL THERAPY | Age: 57
Setting detail: THERAPIES SERIES
Discharge: HOME | End: 2017-12-08
Payer: COMMERCIAL

## 2017-12-08 PROCEDURE — 97110 THERAPEUTIC EXERCISES: CPT

## 2017-12-08 PROCEDURE — G0283 ELEC STIM OTHER THAN WOUND: HCPCS

## 2017-12-08 ASSESSMENT — PAIN DESCRIPTION - LOCATION: LOCATION: SHOULDER

## 2017-12-08 ASSESSMENT — PAIN SCALES - GENERAL: PAINLEVEL_OUTOF10: 3

## 2017-12-08 ASSESSMENT — PAIN DESCRIPTION - ORIENTATION: ORIENTATION: RIGHT

## 2017-12-08 NOTE — PROGRESS NOTES
Tolerance: Additional Comments: Patient tolerated treatment well. Continues to requrie moderate cues to relax and to avoid active use of the right arm. Assessment:  Assessment: Patient is progressing towards goals. Patient Education:  Patient Education: no changes to HEP            Plan:  Current Treatment Recommendations: ROM  Plan Comment: Continue per established POC.    Specific instructions for Next Treatment: ROM as per protocol: s/p arthroscopic RCR, SAD, DCR, ED, follow protocol for massive RCR          Tay Menjivar, OTR/L [id number]

## 2017-12-11 DIAGNOSIS — I25.10 CORONARY ARTERY DISEASE INVOLVING NATIVE CORONARY ARTERY OF NATIVE HEART WITHOUT ANGINA PECTORIS: Primary | ICD-10-CM

## 2017-12-11 DIAGNOSIS — M79.89 SWELLING OF BOTH LOWER EXTREMITIES: ICD-10-CM

## 2017-12-12 ENCOUNTER — HOSPITAL ENCOUNTER (OUTPATIENT)
Dept: OCCUPATIONAL THERAPY | Age: 57
Setting detail: THERAPIES SERIES
Discharge: HOME | End: 2017-12-12
Payer: COMMERCIAL

## 2017-12-12 PROCEDURE — 97110 THERAPEUTIC EXERCISES: CPT

## 2017-12-12 RX ORDER — BUMETANIDE 2 MG/1
2 TABLET ORAL DAILY
COMMUNITY
End: 2017-12-12 | Stop reason: SDUPTHER

## 2017-12-12 RX ORDER — BUMETANIDE 2 MG/1
2 TABLET ORAL DAILY
Qty: 90 TABLET | Refills: 3 | Status: SHIPPED | OUTPATIENT
Start: 2017-12-12 | End: 2018-06-01 | Stop reason: SDUPTHER

## 2017-12-12 NOTE — TELEPHONE ENCOUNTER
Okay to do that   Needs a doppler to rule out DVT   If increasing bumex make sure he is on K supplement   BMP in 2 weeks

## 2017-12-12 NOTE — TELEPHONE ENCOUNTER
Patient's wife had called in stating that the he is more SOB. Patient is going to start increased dose of Bumex to see if it helps symptoms.

## 2017-12-12 NOTE — PROGRESS NOTES
[address] [facility]  OUTPATIENT OCCUPATIONAL THERAPY  Daily Note  [address]    Time In: 4771  Time Out: Robles Martinez.  Minutes: 27  Timed Code Treatment Minutes: 27 Minutes     Date: 2017  Patient Name: Senthil Rendon        CSN: [id number]   : 1960  (59 y.o.)  Gender: male   Referring Practitioner: Dr. Melvin Felty  Diagnosis: M75.121, M75.41 R RCT, impingement, AC arthroplasty          General:  OT Visit Information  Onset Date: 17  OT Insurance Information: Medical [place] - no visit limit, modalities are covered  Total # of Visits to Date: 6  Certification Period Expiration Date: 18  Progress Note Counter: 6/10 for PN       Restrictions/Precautions:       Position Activity Restriction  Other position/activity restrictions: s/p on 17 massive tear protocol         Subjective:  Subjective: Patient reports his shoulder seems to be getting better, is still being careful how he moves. No continuous pain. Pain:  Patient Currently in Pain: No (No pain at rest today. )       Objective:     Upper Extremity Function  UE AROM: Scapular pinches x10, supine elbow flexion/extension with elbow supported on towel roll thumb up x10.   UE PROM: Table top flexion slides with hands clasped x10, pendulums side to side x10, supine PROM to R shoulder flexion and ER within protocol. R shoulder ER with dowel clarisa and elbow at side x10.   UE AAROM: Forearm pronation/supination x10. Activity Tolerance: Additional Comments: Patient tolerated treatment well. Assessment:  Assessment: Patient is progressing towards goals. Patient Education:  Patient Education: Reviewed protocol and plan of care. Plan:  Plan Comment: Continue per established POC.         KONSTANTIN Wen [employer] QUINONES/L #[id number]

## 2017-12-13 ENCOUNTER — HOSPITAL ENCOUNTER (OUTPATIENT)
Dept: INTERVENTIONAL RADIOLOGY/VASCULAR | Age: 57
Discharge: HOME | End: 2017-12-13
Payer: COMMERCIAL

## 2017-12-13 DIAGNOSIS — I25.10 CORONARY ARTERY DISEASE INVOLVING NATIVE CORONARY ARTERY OF NATIVE HEART WITHOUT ANGINA PECTORIS: ICD-10-CM

## 2017-12-13 DIAGNOSIS — M79.89 SWELLING OF BOTH LOWER EXTREMITIES: ICD-10-CM

## 2017-12-13 PROCEDURE — 93970 EXTREMITY STUDY: CPT

## 2017-12-14 ENCOUNTER — TELEPHONE (OUTPATIENT)
Dept: CARDIOLOGY CLINIC | Age: 57
End: 2017-12-14

## 2017-12-14 ENCOUNTER — NURSE TRIAGE (OUTPATIENT)
Dept: ADMINISTRATIVE | Age: 57
End: 2017-12-14

## 2017-12-14 NOTE — TELEPHONE ENCOUNTER
Patient's wife called in stating patient is retaining fluid, is sob and tired all the time. Patient had shoulder surgery on 11- and has not had a lot of activity to make him sob. Any recommendations?

## 2017-12-14 NOTE — TELEPHONE ENCOUNTER
Wife, Anjali Pierce stated she talked to a nurse in Dr. King Handing office early today and was asked if Maren Trujillo had gain any weight. At the time she did not know his weight. She went home and weight him and wanted to let the office know he had a six pound weight gain in a week. Caller stated they are monitoring him very well, a doppler was done and labs are ordered for tomorrow. Caller declined triage stating that Dr. Lily Carranza, Cardiologist will call her tomorrow.

## 2017-12-14 NOTE — TELEPHONE ENCOUNTER
Tracie Aguilar called back to tell Carol that yes, Jenn Zarate has had some weight gain. She said approx. 8lbs in a week. I told her I would let the office know but due to the other symptoms she listed I also had her speak with the call a nurse.

## 2017-12-15 ENCOUNTER — HOSPITAL ENCOUNTER (OUTPATIENT)
Dept: OCCUPATIONAL THERAPY | Age: 57
Setting detail: THERAPIES SERIES
Discharge: HOME | End: 2017-12-15
Payer: COMMERCIAL

## 2017-12-15 ENCOUNTER — HOSPITAL ENCOUNTER (OUTPATIENT)
Age: 57
Discharge: HOME | End: 2017-12-15
Payer: COMMERCIAL

## 2017-12-15 ENCOUNTER — HOSPITAL ENCOUNTER (OUTPATIENT)
Dept: GENERAL RADIOLOGY | Age: 57
Discharge: HOME | End: 2017-12-15
Payer: COMMERCIAL

## 2017-12-15 ENCOUNTER — OFFICE VISIT (OUTPATIENT)
Dept: CARDIOLOGY CLINIC | Age: 57
End: 2017-12-15
Payer: COMMERCIAL

## 2017-12-15 VITALS
BODY MASS INDEX: 36.57 KG/M2 | HEIGHT: 71 IN | HEART RATE: 79 BPM | SYSTOLIC BLOOD PRESSURE: 132 MMHG | DIASTOLIC BLOOD PRESSURE: 80 MMHG | WEIGHT: 261.2 LBS

## 2017-12-15 DIAGNOSIS — I25.10 CORONARY ARTERY DISEASE INVOLVING NATIVE CORONARY ARTERY OF NATIVE HEART WITHOUT ANGINA PECTORIS: ICD-10-CM

## 2017-12-15 DIAGNOSIS — I25.10 CORONARY ARTERY DISEASE INVOLVING NATIVE CORONARY ARTERY OF NATIVE HEART WITHOUT ANGINA PECTORIS: Primary | ICD-10-CM

## 2017-12-15 DIAGNOSIS — I10 ESSENTIAL HYPERTENSION: ICD-10-CM

## 2017-12-15 DIAGNOSIS — E78.01 FAMILIAL HYPERCHOLESTEROLEMIA: ICD-10-CM

## 2017-12-15 DIAGNOSIS — R06.02 SOB (SHORTNESS OF BREATH): ICD-10-CM

## 2017-12-15 DIAGNOSIS — M79.89 SWELLING OF BOTH LOWER EXTREMITIES: ICD-10-CM

## 2017-12-15 LAB
ANION GAP SERPL CALCULATED.3IONS-SCNC: 17 MEQ/L (ref 8–16)
BUN BLDV-MCNC: 16 MG/DL (ref 7–22)
CALCIUM SERPL-MCNC: 10.1 MG/DL (ref 8.5–10.5)
CHLORIDE BLD-SCNC: 98 MEQ/L (ref 98–111)
CO2: 26 MEQ/L (ref 23–33)
CREAT SERPL-MCNC: 0.8 MG/DL (ref 0.4–1.2)
GFR SERPL CREATININE-BSD FRML MDRD: > 90 ML/MIN/1.73M2
GLUCOSE BLD-MCNC: 117 MG/DL (ref 70–108)
POTASSIUM SERPL-SCNC: 4.4 MEQ/L (ref 3.5–5.2)
SODIUM BLD-SCNC: 141 MEQ/L (ref 135–145)

## 2017-12-15 PROCEDURE — 93000 ELECTROCARDIOGRAM COMPLETE: CPT | Performed by: NUCLEAR MEDICINE

## 2017-12-15 PROCEDURE — 36415 COLL VENOUS BLD VENIPUNCTURE: CPT

## 2017-12-15 PROCEDURE — 97110 THERAPEUTIC EXERCISES: CPT

## 2017-12-15 PROCEDURE — 71020 XR CHEST STANDARD TWO VW: CPT

## 2017-12-15 PROCEDURE — 99214 OFFICE O/P EST MOD 30 MIN: CPT | Performed by: NUCLEAR MEDICINE

## 2017-12-15 PROCEDURE — 80048 BASIC METABOLIC PNL TOTAL CA: CPT

## 2017-12-15 NOTE — PROGRESS NOTES
11/11/2012    Smokeless tobacco: Never Used    Alcohol use No      Comment: Quit      Current Outpatient Prescriptions   Medication Sig Dispense Refill    bumetanide (BUMEX) 2 MG tablet Take 1 tablet by mouth daily 90 tablet 3    oxyCODONE-acetaminophen (PERCOCET) 5-325 MG per tablet Take 1 tablet by mouth every 4 hours as needed for Pain .  potassium chloride (KLOR-CON M) 20 MEQ extended release tablet Take 1 tablet by mouth daily 30 tablet 1    omeprazole (PRILOSEC) 20 MG delayed release capsule Take 1 capsule by mouth daily 90 capsule 1    lisinopril (PRINIVIL;ZESTRIL) 10 MG tablet TAKE 1 TABLET DAILY 90 tablet 1    metoprolol succinate (TOPROL XL) 25 MG extended release tablet Take 2 tablets by mouth daily (Patient taking differently: Take 25 mg by mouth daily ) 90 tablet 1    atorvastatin (LIPITOR) 40 MG tablet Take 1 tablet by mouth nightly 90 tablet 4    aspirin 81 MG tablet Take 81 mg by mouth daily      fluticasone (FLONASE) 50 MCG/ACT nasal spray 2 sprays by Nasal route daily as needed for Rhinitis or Allergies Apply daily to each nare 3 Bottle 1     No current facility-administered medications for this visit.       No Known Allergies  Health Maintenance   Topic Date Due    HIV screen  02/23/1973    Smoker: low dose lung CT screening  09/10/2017    Diabetes screen  09/07/2020    Lipid screen  06/19/2022    DTaP/Tdap/Td vaccine (2 - Td) 12/17/2022    Colon cancer screen colonoscopy  08/01/2023    Flu vaccine  Completed    Hepatitis C screen  Completed       Subjective:  Review of Systems  General:   No fever, no chills, more fatigue or weight loss  Pulmonary:    some more dyspnea, no wheezing  Cardiac:    Denies recent chest pain,   GI:     No nausea or vomiting, no abdominal pain  Neuro:     No dizziness or light headedness,   Musculoskeletal:  No recent active issues  Extremities:   No edema, good peripheral pulses      Objective:  Physical Exam  /80   Pulse 79   Ht 5' 11\"

## 2017-12-19 ENCOUNTER — HOSPITAL ENCOUNTER (OUTPATIENT)
Dept: NON INVASIVE DIAGNOSTICS | Age: 57
Discharge: HOME | End: 2017-12-19
Payer: COMMERCIAL

## 2017-12-19 ENCOUNTER — HOSPITAL ENCOUNTER (OUTPATIENT)
Dept: OCCUPATIONAL THERAPY | Age: 57
Setting detail: THERAPIES SERIES
Discharge: HOME | End: 2017-12-19
Payer: COMMERCIAL

## 2017-12-19 VITALS — HEIGHT: 71 IN | WEIGHT: 255 LBS | BODY MASS INDEX: 35.7 KG/M2

## 2017-12-19 DIAGNOSIS — R06.02 SOB (SHORTNESS OF BREATH): ICD-10-CM

## 2017-12-19 DIAGNOSIS — I25.10 CORONARY ARTERY DISEASE INVOLVING NATIVE CORONARY ARTERY OF NATIVE HEART WITHOUT ANGINA PECTORIS: ICD-10-CM

## 2017-12-19 PROCEDURE — 78452 HT MUSCLE IMAGE SPECT MULT: CPT

## 2017-12-19 PROCEDURE — 3430000000 HC RX DIAGNOSTIC RADIOPHARMACEUTICAL: Performed by: NUCLEAR MEDICINE

## 2017-12-19 PROCEDURE — 93017 CV STRESS TEST TRACING ONLY: CPT

## 2017-12-19 PROCEDURE — 97110 THERAPEUTIC EXERCISES: CPT

## 2017-12-19 PROCEDURE — 6360000002 HC RX W HCPCS

## 2017-12-19 PROCEDURE — A9500 TC99M SESTAMIBI: HCPCS | Performed by: NUCLEAR MEDICINE

## 2017-12-19 RX ADMIN — Medication 34.1 MILLICURIE: at 09:00

## 2017-12-19 RX ADMIN — Medication 10.4 MILLICURIE: at 07:54

## 2017-12-20 ENCOUNTER — TELEPHONE (OUTPATIENT)
Dept: CARDIOLOGY CLINIC | Age: 57
End: 2017-12-20

## 2017-12-20 DIAGNOSIS — R94.39 ABNORMAL STRESS TEST: Primary | ICD-10-CM

## 2017-12-20 DIAGNOSIS — I25.10 CORONARY ARTERY DISEASE INVOLVING NATIVE CORONARY ARTERY OF NATIVE HEART WITHOUT ANGINA PECTORIS: ICD-10-CM

## 2017-12-22 ENCOUNTER — HOSPITAL ENCOUNTER (OUTPATIENT)
Dept: OCCUPATIONAL THERAPY | Age: 57
Setting detail: THERAPIES SERIES
Discharge: HOME | End: 2017-12-22
Payer: COMMERCIAL

## 2017-12-22 PROCEDURE — 97110 THERAPEUTIC EXERCISES: CPT

## 2017-12-27 ENCOUNTER — HOSPITAL ENCOUNTER (OUTPATIENT)
Dept: OCCUPATIONAL THERAPY | Age: 57
Setting detail: THERAPIES SERIES
Discharge: HOME | End: 2017-12-27
Payer: COMMERCIAL

## 2017-12-27 PROCEDURE — 97110 THERAPEUTIC EXERCISES: CPT

## 2017-12-27 ASSESSMENT — PAIN DESCRIPTION - LOCATION: LOCATION: SHOULDER

## 2017-12-27 ASSESSMENT — PAIN DESCRIPTION - ORIENTATION: ORIENTATION: RIGHT

## 2017-12-27 ASSESSMENT — PAIN SCALES - GENERAL: PAINLEVEL_OUTOF10: 1

## 2017-12-27 NOTE — PROGRESS NOTES
[facility]  OUTPATIENT OCCUPATIONAL THERAPY  Progress Note  [address]    Time In: 1300  Time Out: 3245  Minutes: 45  Timed Code Treatment Minutes: 45 Minutes     Date: 2017  Patient Name: Kelly Burleson        CSN: [id number]   : 1960  (59 y.o.)  Gender: male   Referring Practitioner: Dr. Alena Kemp  Diagnosis: M75.121, M75.41 R RCT, impingement, AC arthroplasty          General:  OT Visit Information  Onset Date: 17  OT Insurance Information: Medical [place] - no visit limit, modalities are covered  Total # of Visits to Date: 10  Certification Period Expiration Date: 18  Progress Note Counter: PN completed 17  Comments: return to referring physician 2017. Restrictions/Precautions:       Position Activity Restriction  Other position/activity restrictions: s/p on 17 massive tear protocol         Subjective:  Subjective: Patient reports that his appointment with the referring physician went well. Brought in a script for additional 6 weeks of therapy, to continue to follow the protocol. Pain:  Patient Currently in Pain: Yes (No pain at rest. )  Pain Assessment: 0-10  Pain Level: 1  Pain Location: Shoulder  Pain Orientation: Right       Objective:     Upper Extremity Function  UE AROM: Sidelying scapular pinches and depression 10 reps with a 3 second hold. Gathered information for progress note:  150 degrees flexion, 150 degrees abduction, 63 degrees ER with the arm at the side and thumb tip 3 inches above the waistband for IR. UE PROM: pulleys in flexion with palm up 10 reps with a 10 second hold, supine PROM right shoulder all planes to tolerance, IR in scaption. Gathered information for progress note: 150 degrees flexion and 50 degrees ER  UE AAROM: supine dowel flexion from lap to overhead 10 reps. UE Strengthing: supine ceiling circles CW and CCW; supine bench press 10 reps.

## 2017-12-29 ENCOUNTER — HOSPITAL ENCOUNTER (OUTPATIENT)
Dept: OCCUPATIONAL THERAPY | Age: 57
Setting detail: THERAPIES SERIES
End: 2017-12-29
Payer: COMMERCIAL

## 2017-12-29 NOTE — PROGRESS NOTES
[facility]  OCCUPATIONAL THERAPY MISSED TREATMENT NOTE  [facility] OCCUPATIONAL THERAPY      Date: 2017  Patient Name: Fadi Tadeo        CSN: [id number]   : 1960  (59 y.o.)  Gender: male   Referring Practitioner: Dr. Ivis Wallis  Diagnosis: M75.121, M75.41 R RCT, impingement, AC arthroplasty    OT Visit Information  Canceled Appointment: 1    REASON FOR MISSED TREATMENT:  Patient called and cancelled his appointment this date, no reason provided.   Gustave Gowers, MOT, OTR/L [id number]

## 2018-01-02 ENCOUNTER — HOSPITAL ENCOUNTER (OUTPATIENT)
Dept: OCCUPATIONAL THERAPY | Age: 58
Setting detail: THERAPIES SERIES
Discharge: HOME | End: 2018-01-02
Payer: COMMERCIAL

## 2018-01-02 ENCOUNTER — TELEPHONE (OUTPATIENT)
Dept: CARDIOLOGY CLINIC | Age: 58
End: 2018-01-02

## 2018-01-02 NOTE — PROGRESS NOTES
[facility] 58  OCCUPATIONAL THERAPY MISSED TREATMENT NOTE  [facility]      Date: 2018  Patient Name: Karol Coe        CSN: [id number]   : 1960  (59 y.o.)  Gender: male           OT Visit Information  Canceled Appointment: 2    REASON FOR MISSED TREATMENT:  Cancelled due to illness. Attempted to call to make up this appointment for later in the week, however, reports he is scheduled for a heart cath on Friday but will call if he is able to make it on Friday morning.       KONSTANTIN QUINONES/L #[id number]

## 2018-01-02 NOTE — TELEPHONE ENCOUNTER
Pt left a message states that he is scheduled for a heart cath and echo on 1-5-18 this Friday. Over the holidays he picked up a cold or the flu. He is getting better when he keeps up with the medications, but states that he still has a fever. Please advise.

## 2018-01-03 ENCOUNTER — OFFICE VISIT (OUTPATIENT)
Dept: FAMILY MEDICINE CLINIC | Age: 58
End: 2018-01-03

## 2018-01-03 VITALS
DIASTOLIC BLOOD PRESSURE: 72 MMHG | RESPIRATION RATE: 16 BRPM | WEIGHT: 262.6 LBS | HEIGHT: 71 IN | HEART RATE: 80 BPM | SYSTOLIC BLOOD PRESSURE: 128 MMHG | BODY MASS INDEX: 36.76 KG/M2

## 2018-01-03 DIAGNOSIS — I10 ESSENTIAL HYPERTENSION: ICD-10-CM

## 2018-01-03 DIAGNOSIS — R73.9 HYPERGLYCEMIA: Primary | ICD-10-CM

## 2018-01-03 DIAGNOSIS — E78.5 HYPERLIPIDEMIA, UNSPECIFIED HYPERLIPIDEMIA TYPE: ICD-10-CM

## 2018-01-03 DIAGNOSIS — K21.9 GASTROESOPHAGEAL REFLUX DISEASE, ESOPHAGITIS PRESENCE NOT SPECIFIED: ICD-10-CM

## 2018-01-03 DIAGNOSIS — I25.10 CORONARY ARTERY DISEASE INVOLVING NATIVE CORONARY ARTERY OF NATIVE HEART WITHOUT ANGINA PECTORIS: ICD-10-CM

## 2018-01-03 LAB
GLUCOSE BLD-MCNC: 159 MG/DL
HBA1C MFR BLD: 6.7 %

## 2018-01-03 PROCEDURE — 99213 OFFICE O/P EST LOW 20 MIN: CPT | Performed by: FAMILY MEDICINE

## 2018-01-03 PROCEDURE — 83036 HEMOGLOBIN GLYCOSYLATED A1C: CPT | Performed by: FAMILY MEDICINE

## 2018-01-03 PROCEDURE — 82962 GLUCOSE BLOOD TEST: CPT | Performed by: FAMILY MEDICINE

## 2018-01-03 ASSESSMENT — ENCOUNTER SYMPTOMS
EYES NEGATIVE: 1
ABDOMINAL PAIN: 0
SHORTNESS OF BREATH: 1
DIARRHEA: 0
COUGH: 0
CHEST TIGHTNESS: 0
CONSTIPATION: 0
BLOOD IN STOOL: 0
VOMITING: 0
NAUSEA: 0

## 2018-01-03 NOTE — PROGRESS NOTES
Visit Date: 1/3/2018    Luisa Lennox is a 59 y.o. male who presents today for:  Chief Complaint   Patient presents with    Hypertension stable    Hyperlipidemia  He is having SOB with little activity and is having a cardiac cath this Friday. HPI:     HPI    has a current medication list which includes the following prescription(s): bumetanide, oxycodone-acetaminophen, potassium chloride, omeprazole, lisinopril, metoprolol succinate, atorvastatin, aspirin, and fluticasone.     No Known Allergies    Social History   Substance Use Topics    Smoking status: Former Smoker     Packs/day: 2.00     Years: 25.00     Types: Cigars     Quit date: 11/11/2012    Smokeless tobacco: Never Used    Alcohol use 0.0 oz/week      Comment: beer daily       Past Medical History:   Diagnosis Date    Coronary artery disease involving native coronary artery of native heart without angina pectoris     GERD (gastroesophageal reflux disease) 3/21/2012    Hernia     Hx of blood clots 1990's    right knee due to injury    Hx of cocaine abuse     Hyperglycemia 09/09    Hyperlipidemia     Hypertension     FAISAL on CPAP     Osteoarthritis 11/07    S/P CABG x 2 07/06/2016    Thumb injury 08/2015    Right thumb cut with table saw, no treatment needed    Unspecified sleep apnea        Past Surgical History:   Procedure Laterality Date    CARDIAC CATHETERIZATION  06/27/2016    CATARACT REMOVAL Right 2013    COLONOSCOPY  04/08    CORONARY ARTERY BYPASS GRAFT  07/06/2016    Double bypass, Dr. Jose Manuel Cummings Right 02/2007    Neck    EYE SURGERY Right     Retinal surgery x 3    HERNIA REPAIR Right     Inguinal    ROTATOR CUFF REPAIR Right 11/16/2017    TONSILLECTOMY  child    TOTAL KNEE ARTHROPLASTY Left 10/24/2016    Dr. Leonard Morgan       Family History   Problem Relation Age of Onset    Heart Disease Mother     Heart Disease Father     Cancer Father      colon/prostate    Colon Cancer Father     Prostate Expiration Date:   1/3/2019     Order Specific Question:   Is Patient Fasting?/# of Hours     Answer:   yes 12 hours    AST(SGOT) & ALT(SGPT)     Standing Status:   Future     Standing Expiration Date:   1/3/2019    POCT Glucose    POCT glycosylated hemoglobin (Hb A1C)     Lab Results   Component Value Date    LABA1C 6.7 01/03/2018    LABA1C 6.4 09/07/2017    LABA1C 6.2 12/31/2016     No results found for: Denton Raygoza  Results for POC orders placed in visit on 01/03/18   POCT glycosylated hemoglobin (Hb A1C)   Result Value Ref Range    Hemoglobin A1C 6.7 %   POCT Glucose   Result Value Ref Range    Glucose 159 mg/dL     Continue current medications. Continue to monitor blood sugars 1 times a day. Continue diabetic diet. Return in about 3 months (around 4/3/2018). Discussed use, benefit, and side effects of prescribed medications. All patient questions answered. Pt voiced understanding. Instructed to continue current medications, diet and exercise. Patient agreed with treatment plan.

## 2018-01-04 ENCOUNTER — HOSPITAL ENCOUNTER (OUTPATIENT)
Dept: OCCUPATIONAL THERAPY | Age: 58
Setting detail: THERAPIES SERIES
Discharge: HOME | End: 2018-01-04
Payer: COMMERCIAL

## 2018-01-04 ENCOUNTER — PREP FOR PROCEDURE (OUTPATIENT)
Dept: CARDIOLOGY | Age: 58
End: 2018-01-04

## 2018-01-04 PROCEDURE — 97110 THERAPEUTIC EXERCISES: CPT

## 2018-01-04 RX ORDER — DIPHENHYDRAMINE HYDROCHLORIDE 50 MG/ML
25 INJECTION INTRAMUSCULAR; INTRAVENOUS ONCE
Status: CANCELLED | OUTPATIENT
Start: 2018-01-04 | End: 2018-01-04

## 2018-01-04 RX ORDER — SODIUM CHLORIDE 9 MG/ML
INJECTION, SOLUTION INTRAVENOUS CONTINUOUS
Status: CANCELLED | OUTPATIENT
Start: 2018-01-04

## 2018-01-04 RX ORDER — SODIUM CHLORIDE 0.9 % (FLUSH) 0.9 %
10 SYRINGE (ML) INJECTION PRN
Status: CANCELLED | OUTPATIENT
Start: 2018-01-04

## 2018-01-04 RX ORDER — NITROGLYCERIN 0.4 MG/1
0.4 TABLET SUBLINGUAL EVERY 5 MIN PRN
Status: CANCELLED | OUTPATIENT
Start: 2018-01-04

## 2018-01-04 RX ORDER — SODIUM CHLORIDE 0.9 % (FLUSH) 0.9 %
10 SYRINGE (ML) INJECTION EVERY 12 HOURS SCHEDULED
Status: CANCELLED | OUTPATIENT
Start: 2018-01-04

## 2018-01-04 NOTE — PROGRESS NOTES
[address] [address]  OUTPATIENT OCCUPATIONAL THERAPY  Daily Note  [address]    Time In: 9702  Time Out: [address]  Minutes: 40  Timed Code Treatment Minutes: 40 Minutes     Date: 2018  Patient Name: Radha Roman        CSN: [id number]   : 1960  (59 y.o.)  Gender: male   Referring Practitioner: Dr. Shon Yepez  Diagnosis: M75.121, M75.41 R RCT, impingement, AC arthroplasty          General:  OT Visit Information  Onset Date: 17  OT Insurance Information: Medical [employer] - no visit limit, modalities are covered  Total # of Visits to Date: 6  Certification Period Expiration Date: 18  Canceled Appointment: 1  Progress Note Counter: PN completed 17, 1/10 for PN  Comments: return to referring physician 2018       Restrictions/Precautions:       Position Activity Restriction  Other position/activity restrictions: s/p on 17 massive tear protocol         Subjective:  Subjective: Patient is scheduled to have a heart cath and an echo tomorrow, is unsure if this will lead to open heart surgery. Reports his shoulder has been feeling well, \"has not done much\" HEP due to feeling ill the past few days. Pain:  Patient Currently in Pain: No       Objective:     Upper Extremity Function  UE AROM: Seated scapular pinches x10, shoulder depressors x10.   UE PROM: Pulleys for shoulder flexion to warm up, supine PROM to R shoulder flexion and ER to tolerance, IR in scapular plane. Supine dowel clarisa ER with elbow at side to tolerance. UE AAROM: Supine dowel clarisa chest press x10 reps, flexion 90 degrees to over head x10 reps. Activity Tolerance: Additional Comments: Patient tolerated treatment well. Assessment:  Assessment: Patient is progressing towards goals. Patient Education:  Patient Education: Discussed POC - patient is scheduled to have a heart cath on 2018, educated him to bring a written release to next scheduled appointment.

## 2018-01-05 ENCOUNTER — HOSPITAL ENCOUNTER (OUTPATIENT)
Age: 58
Discharge: HOME | End: 2018-01-05
Payer: COMMERCIAL

## 2018-01-05 ENCOUNTER — APPOINTMENT (OUTPATIENT)
Dept: CARDIAC CATH/INVASIVE PROCEDURES | Age: 58
End: 2018-01-05
Attending: NUCLEAR MEDICINE
Payer: COMMERCIAL

## 2018-01-05 ENCOUNTER — HOSPITAL ENCOUNTER (OUTPATIENT)
Dept: INPATIENT UNIT | Age: 58
Discharge: HOME | End: 2018-01-05
Attending: NUCLEAR MEDICINE | Admitting: NUCLEAR MEDICINE
Payer: COMMERCIAL

## 2018-01-05 VITALS
OXYGEN SATURATION: 94 % | HEART RATE: 64 BPM | WEIGHT: 262 LBS | HEIGHT: 71 IN | SYSTOLIC BLOOD PRESSURE: 138 MMHG | BODY MASS INDEX: 36.68 KG/M2 | DIASTOLIC BLOOD PRESSURE: 97 MMHG | TEMPERATURE: 97.8 F | RESPIRATION RATE: 21 BRPM

## 2018-01-05 DIAGNOSIS — E78.5 HYPERLIPIDEMIA, UNSPECIFIED HYPERLIPIDEMIA TYPE: ICD-10-CM

## 2018-01-05 LAB
ABO: NORMAL
ALT SERPL-CCNC: 59 U/L (ref 11–66)
ANION GAP SERPL CALCULATED.3IONS-SCNC: 14 MEQ/L (ref 8–16)
ANTIBODY SCREEN: NORMAL
AST SERPL-CCNC: 33 U/L (ref 5–40)
BUN BLDV-MCNC: 17 MG/DL (ref 7–22)
CALCIUM SERPL-MCNC: 9.4 MG/DL (ref 8.5–10.5)
CHLORIDE BLD-SCNC: 99 MEQ/L (ref 98–111)
CHOLESTEROL, TOTAL: 170 MG/DL (ref 100–199)
CHOLESTEROL, TOTAL: 177 MG/DL (ref 100–199)
CO2: 28 MEQ/L (ref 23–33)
CREAT SERPL-MCNC: 0.8 MG/DL (ref 0.4–1.2)
EKG ATRIAL RATE: 71 BPM
EKG P AXIS: -13 DEGREES
EKG P-R INTERVAL: 176 MS
EKG Q-T INTERVAL: 420 MS
EKG QRS DURATION: 102 MS
EKG QTC CALCULATION (BAZETT): 456 MS
EKG R AXIS: -10 DEGREES
EKG T AXIS: 19 DEGREES
EKG VENTRICULAR RATE: 71 BPM
GFR SERPL CREATININE-BSD FRML MDRD: > 90 ML/MIN/1.73M2
GLUCOSE BLD-MCNC: 148 MG/DL (ref 70–108)
HCT VFR BLD CALC: 48 % (ref 42–52)
HDLC SERPL-MCNC: 46 MG/DL
HDLC SERPL-MCNC: 48 MG/DL
HEMOGLOBIN: 16.1 GM/DL (ref 14–18)
INR BLD: 0.99 (ref 0.85–1.13)
LDL CHOLESTEROL CALCULATED: 79 MG/DL
LDL CHOLESTEROL CALCULATED: 89 MG/DL
MCH RBC QN AUTO: 31.6 PG (ref 27–31)
MCHC RBC AUTO-ENTMCNC: 33.6 GM/DL (ref 33–37)
MCV RBC AUTO: 93.9 FL (ref 80–94)
PDW BLD-RTO: 13.4 % (ref 11.5–14.5)
PLATELET # BLD: 220 THOU/MM3 (ref 130–400)
PMV BLD AUTO: 9.1 MCM (ref 7.4–10.4)
POTASSIUM SERPL-SCNC: 4 MEQ/L (ref 3.5–5.2)
RBC # BLD: 5.1 MILL/MM3 (ref 4.7–6.1)
RH FACTOR: NORMAL
SODIUM BLD-SCNC: 141 MEQ/L (ref 135–145)
TRIGL SERPL-MCNC: 209 MG/DL (ref 0–199)
TRIGL SERPL-MCNC: 215 MG/DL (ref 0–199)
WBC # BLD: 6.7 THOU/MM3 (ref 4.8–10.8)

## 2018-01-05 PROCEDURE — 86850 RBC ANTIBODY SCREEN: CPT

## 2018-01-05 PROCEDURE — 80048 BASIC METABOLIC PNL TOTAL CA: CPT

## 2018-01-05 PROCEDURE — 84460 ALANINE AMINO (ALT) (SGPT): CPT

## 2018-01-05 PROCEDURE — 93459 L HRT ART/GRFT ANGIO: CPT | Performed by: NUCLEAR MEDICINE

## 2018-01-05 PROCEDURE — C1769 GUIDE WIRE: HCPCS

## 2018-01-05 PROCEDURE — 86901 BLOOD TYPING SEROLOGIC RH(D): CPT

## 2018-01-05 PROCEDURE — 93005 ELECTROCARDIOGRAM TRACING: CPT

## 2018-01-05 PROCEDURE — 80061 LIPID PANEL: CPT

## 2018-01-05 PROCEDURE — C1894 INTRO/SHEATH, NON-LASER: HCPCS

## 2018-01-05 PROCEDURE — 36415 COLL VENOUS BLD VENIPUNCTURE: CPT

## 2018-01-05 PROCEDURE — 6360000002 HC RX W HCPCS

## 2018-01-05 PROCEDURE — 2580000003 HC RX 258: Performed by: PHYSICIAN ASSISTANT

## 2018-01-05 PROCEDURE — 2720000010 HC SURG SUPPLY STERILE

## 2018-01-05 PROCEDURE — C1725 CATH, TRANSLUMIN NON-LASER: HCPCS

## 2018-01-05 PROCEDURE — A6258 TRANSPARENT FILM >16<=48 IN: HCPCS

## 2018-01-05 PROCEDURE — 2500000003 HC RX 250 WO HCPCS

## 2018-01-05 PROCEDURE — 85027 COMPLETE CBC AUTOMATED: CPT

## 2018-01-05 PROCEDURE — 86900 BLOOD TYPING SEROLOGIC ABO: CPT

## 2018-01-05 PROCEDURE — 84450 TRANSFERASE (AST) (SGOT): CPT

## 2018-01-05 PROCEDURE — 2780000010 HC IMPLANT OTHER

## 2018-01-05 PROCEDURE — 85610 PROTHROMBIN TIME: CPT

## 2018-01-05 RX ORDER — SODIUM CHLORIDE 0.9 % (FLUSH) 0.9 %
10 SYRINGE (ML) INJECTION PRN
Status: DISCONTINUED | OUTPATIENT
Start: 2018-01-05 | End: 2018-01-05 | Stop reason: SDUPTHER

## 2018-01-05 RX ORDER — ISOSORBIDE MONONITRATE 30 MG/1
30 TABLET, EXTENDED RELEASE ORAL DAILY
Qty: 30 TABLET | Refills: 3 | Status: SHIPPED | OUTPATIENT
Start: 2018-01-05 | End: 2018-04-19 | Stop reason: SDUPTHER

## 2018-01-05 RX ORDER — SODIUM CHLORIDE 0.9 % (FLUSH) 0.9 %
10 SYRINGE (ML) INJECTION PRN
Status: DISCONTINUED | OUTPATIENT
Start: 2018-01-05 | End: 2018-01-05 | Stop reason: HOSPADM

## 2018-01-05 RX ORDER — METOPROLOL SUCCINATE 25 MG/1
25 TABLET, EXTENDED RELEASE ORAL DAILY
COMMUNITY
End: 2018-05-23 | Stop reason: SDUPTHER

## 2018-01-05 RX ORDER — SODIUM CHLORIDE 9 MG/ML
INJECTION, SOLUTION INTRAVENOUS CONTINUOUS
Status: DISCONTINUED | OUTPATIENT
Start: 2018-01-05 | End: 2018-01-05 | Stop reason: HOSPADM

## 2018-01-05 RX ORDER — SODIUM CHLORIDE 0.9 % (FLUSH) 0.9 %
10 SYRINGE (ML) INJECTION EVERY 12 HOURS SCHEDULED
Status: DISCONTINUED | OUTPATIENT
Start: 2018-01-05 | End: 2018-01-05 | Stop reason: HOSPADM

## 2018-01-05 RX ORDER — ONDANSETRON 2 MG/ML
4 INJECTION INTRAMUSCULAR; INTRAVENOUS EVERY 6 HOURS PRN
Status: DISCONTINUED | OUTPATIENT
Start: 2018-01-05 | End: 2018-01-05 | Stop reason: HOSPADM

## 2018-01-05 RX ORDER — ATROPINE SULFATE 0.4 MG/ML
0.5 AMPUL (ML) INJECTION
Status: DISCONTINUED | OUTPATIENT
Start: 2018-01-05 | End: 2018-01-05 | Stop reason: HOSPADM

## 2018-01-05 RX ORDER — SODIUM CHLORIDE 9 MG/ML
INJECTION, SOLUTION INTRAVENOUS CONTINUOUS
Status: DISCONTINUED | OUTPATIENT
Start: 2018-01-05 | End: 2018-01-05 | Stop reason: SDUPTHER

## 2018-01-05 RX ORDER — ACETAMINOPHEN 325 MG/1
650 TABLET ORAL EVERY 4 HOURS PRN
Status: DISCONTINUED | OUTPATIENT
Start: 2018-01-05 | End: 2018-01-05 | Stop reason: HOSPADM

## 2018-01-05 RX ADMIN — SODIUM CHLORIDE: 9 INJECTION, SOLUTION INTRAVENOUS at 11:54

## 2018-01-05 NOTE — CONSULTS
drug therapy use last 5 days  []No []Yes  Coumadin Use Last 5 Days []No []Yes  Other anticoagulant use last 5 days  []No []Yes    Current Facility-Administered Medications:     0.9 % sodium chloride infusion, , Intravenous, Continuous, [facility], Last Rate: 75 mL/hr at 01/05/18 1154    sodium chloride flush 0.9 % injection 10 mL, 10 mL, Intravenous, PRN, [facility]  Prior to Admission medications    Medication Sig Start Date End Date Taking? Authorizing Provider   metoprolol succinate (TOPROL XL) 25 MG extended release tablet Take 25 mg by mouth daily   Yes Historical Provider, MD   bumetanide (BUMEX) 2 MG tablet Take 1 tablet by mouth daily 12/12/17  Yes Richard Velázquez MD   oxyCODONE-acetaminophen (PERCOCET) 5-325 MG per tablet Take 1 tablet by mouth every 4 hours as needed for Pain .    Yes Historical Provider, MD   potassium chloride (KLOR-CON M) 20 MEQ extended release tablet Take 1 tablet by mouth daily 11/22/17  Yes Caitlyn Harvey MD   omeprazole (PRILOSEC) 20 MG delayed release capsule Take 1 capsule by mouth daily 10/25/17  Yes Caitlyn Harvey MD   lisinopril (PRINIVIL;ZESTRIL) 10 MG tablet TAKE 1 TABLET DAILY 10/25/17  Yes Caitlyn Harvey MD   atorvastatin (LIPITOR) 40 MG tablet Take 1 tablet by mouth nightly 4/18/17  Yes Richard Velázquez MD   aspirin 81 MG tablet Take 81 mg by mouth daily   Yes Historical Provider, MD   fluticasone (FLONASE) 50 MCG/ACT nasal spray 2 sprays by Nasal route daily as needed for Rhinitis or Allergies Apply daily to each nare 10/18/16  Yes Lisy Marrero MD     Additional information:       VITAL SIGNS   Vitals:    01/05/18 1110   BP: 129/72   Pulse: 82   Resp: 20   Temp: 97.8 °F (36.6 °C)   SpO2: 92%       PHYSICAL:   General: No acute distress  HEENT:  Unremarkable for age  Neck: without increased JVD, carotid pulses 2+ bilaterally without bruits  Heart: RRR, S1 & S2 WNL, S4 gallop, without murmurs or rubs    Lungs: Clear to

## 2018-01-06 NOTE — PLAN OF CARE
Problem: Discharge Planning:  Goal: Discharged to appropriate level of care  Discharged to appropriate level of care   Outcome: Completed Date Met: 01/05/18  Discharge to home    Problem: Tissue Perfusion - Peripheral, Altered:  Goal: Absence of hematoma at arterial access site  Absence of hematoma at arterial access site   Outcome: Completed Date Met: 01/05/18  No hematoma  Goal: Circulatory function of lower extremities is within specified parameters  Circulatory function of lower extremities is within specified parameters   Outcome: Completed Date Met: 01/05/18  Circulation checks good.

## 2018-01-06 NOTE — PROGRESS NOTES
1430  Care taken over from cath lab, right groin stable . Patient reinstructed on bedrest, instructed to keep legs straight, not to cross legs, not to lift head off of pillow, not to laugh hard, if coughs to guard site with hand, voices understanding, taking water without difficulty, menu given to patient. 1915 Discharge instructions given, voices understanding. 1930 Discharged per wheelchair, stable condition.

## 2018-01-08 ENCOUNTER — TELEPHONE (OUTPATIENT)
Dept: CARDIOLOGY CLINIC | Age: 58
End: 2018-01-08

## 2018-01-08 ENCOUNTER — HOSPITAL ENCOUNTER (OUTPATIENT)
Dept: OCCUPATIONAL THERAPY | Age: 58
Setting detail: THERAPIES SERIES
Discharge: HOME | End: 2018-01-08
Payer: COMMERCIAL

## 2018-01-08 DIAGNOSIS — I25.119 CORONARY ARTERY DISEASE INVOLVING NATIVE HEART WITH ANGINA PECTORIS, UNSPECIFIED VESSEL OR LESION TYPE (HCC): Primary | ICD-10-CM

## 2018-01-08 NOTE — PROGRESS NOTES
[facility]  OCCUPATIONAL THERAPY MISSED TREATMENT NOTE  [facility] OCCUPATIONAL THERAPY      Date: 2018  Patient Name: Benjamin Herrmann        CSN: [id number]   : 1960  (59 y.o.)  Gender: male              REASON FOR MISSED TREATMENT:  Cancelled due to illness. Patient cancelled today following doctor's orders to avoid driving after having a heart cath last week. Called and spoke with patient who reports approximately 50% of his arteries leading to his heart are blocked, is planning to have a stent put in possibly this week. Patient is waiting to hear from Dr. Yoselin Shook and plans to ask if he can return to therapy. Shipman notified patient he will need a release to return to therapy, would like to keep his appointment for Thursday at this time.       KONSTANTIN SHIPMAN/HANS #[id number]

## 2018-01-08 NOTE — TELEPHONE ENCOUNTER
Verbal Order- Please schedule patient for PCI with Dr. Ning Mendosa. Order given to scheduling. Please agree to verbal order.

## 2018-01-11 ENCOUNTER — APPOINTMENT (OUTPATIENT)
Dept: OCCUPATIONAL THERAPY | Age: 58
End: 2018-01-11
Payer: COMMERCIAL

## 2018-01-15 ENCOUNTER — HOSPITAL ENCOUNTER (OUTPATIENT)
Dept: INPATIENT UNIT | Age: 58
Discharge: HOME | End: 2018-01-15
Attending: INTERNAL MEDICINE | Admitting: INTERNAL MEDICINE
Payer: COMMERCIAL

## 2018-01-15 ENCOUNTER — APPOINTMENT (OUTPATIENT)
Dept: CARDIAC CATH/INVASIVE PROCEDURES | Age: 58
End: 2018-01-15
Attending: INTERNAL MEDICINE
Payer: COMMERCIAL

## 2018-01-15 ENCOUNTER — PREP FOR PROCEDURE (OUTPATIENT)
Dept: CARDIOLOGY | Age: 58
End: 2018-01-15

## 2018-01-15 VITALS
RESPIRATION RATE: 16 BRPM | HEART RATE: 78 BPM | SYSTOLIC BLOOD PRESSURE: 146 MMHG | OXYGEN SATURATION: 96 % | TEMPERATURE: 98 F | HEIGHT: 71 IN | WEIGHT: 250 LBS | DIASTOLIC BLOOD PRESSURE: 85 MMHG | BODY MASS INDEX: 35 KG/M2

## 2018-01-15 PROBLEM — Z98.61 S/P PTCA (PERCUTANEOUS TRANSLUMINAL CORONARY ANGIOPLASTY): Status: ACTIVE | Noted: 2018-01-15

## 2018-01-15 LAB
ABO: NORMAL
ALBUMIN SERPL-MCNC: 4.5 G/DL (ref 3.5–5.1)
ALP BLD-CCNC: 87 U/L (ref 38–126)
ALT SERPL-CCNC: 62 U/L (ref 11–66)
ANION GAP SERPL CALCULATED.3IONS-SCNC: 14 MEQ/L (ref 8–16)
ANTIBODY SCREEN: NORMAL
APTT: 26.6 SECONDS (ref 22–38)
AST SERPL-CCNC: 29 U/L (ref 5–40)
BILIRUB SERPL-MCNC: 0.3 MG/DL (ref 0.3–1.2)
BUN BLDV-MCNC: 20 MG/DL (ref 7–22)
CALCIUM SERPL-MCNC: 9.7 MG/DL (ref 8.5–10.5)
CHLORIDE BLD-SCNC: 100 MEQ/L (ref 98–111)
CO2: 28 MEQ/L (ref 23–33)
CREAT SERPL-MCNC: 0.8 MG/DL (ref 0.4–1.2)
EKG ATRIAL RATE: 60 BPM
EKG ATRIAL RATE: 72 BPM
EKG P AXIS: -18 DEGREES
EKG P AXIS: -22 DEGREES
EKG P-R INTERVAL: 168 MS
EKG P-R INTERVAL: 188 MS
EKG Q-T INTERVAL: 390 MS
EKG Q-T INTERVAL: 436 MS
EKG QRS DURATION: 96 MS
EKG QRS DURATION: 96 MS
EKG QTC CALCULATION (BAZETT): 427 MS
EKG QTC CALCULATION (BAZETT): 436 MS
EKG R AXIS: -11 DEGREES
EKG R AXIS: -20 DEGREES
EKG T AXIS: -7 DEGREES
EKG T AXIS: 3 DEGREES
EKG VENTRICULAR RATE: 60 BPM
EKG VENTRICULAR RATE: 72 BPM
GFR SERPL CREATININE-BSD FRML MDRD: > 90 ML/MIN/1.73M2
GLUCOSE BLD-MCNC: 131 MG/DL (ref 70–108)
HCT VFR BLD CALC: 47.9 % (ref 42–52)
HEMOGLOBIN: 16.5 GM/DL (ref 14–18)
INR BLD: 0.92 (ref 0.85–1.13)
MCH RBC QN AUTO: 33.1 PG (ref 27–31)
MCHC RBC AUTO-ENTMCNC: 34.4 GM/DL (ref 33–37)
MCV RBC AUTO: 96.3 FL (ref 80–94)
PDW BLD-RTO: 12.8 % (ref 11.5–14.5)
PLATELET # BLD: 296 THOU/MM3 (ref 130–400)
PMV BLD AUTO: 9.1 MCM (ref 7.4–10.4)
POTASSIUM SERPL-SCNC: 4.4 MEQ/L (ref 3.5–5.2)
RBC # BLD: 4.98 MILL/MM3 (ref 4.7–6.1)
RH FACTOR: NORMAL
SODIUM BLD-SCNC: 142 MEQ/L (ref 135–145)
TOTAL PROTEIN: 7.6 G/DL (ref 6.1–8)
WBC # BLD: 9.4 THOU/MM3 (ref 4.8–10.8)

## 2018-01-15 PROCEDURE — C1874 STENT, COATED/COV W/DEL SYS: HCPCS

## 2018-01-15 PROCEDURE — C1773 RET DEV, INSERTABLE: HCPCS

## 2018-01-15 PROCEDURE — C1725 CATH, TRANSLUMIN NON-LASER: HCPCS

## 2018-01-15 PROCEDURE — 80053 COMPREHEN METABOLIC PANEL: CPT

## 2018-01-15 PROCEDURE — 86850 RBC ANTIBODY SCREEN: CPT

## 2018-01-15 PROCEDURE — C1887 CATHETER, GUIDING: HCPCS

## 2018-01-15 PROCEDURE — 93005 ELECTROCARDIOGRAM TRACING: CPT

## 2018-01-15 PROCEDURE — 6370000000 HC RX 637 (ALT 250 FOR IP)

## 2018-01-15 PROCEDURE — 85730 THROMBOPLASTIN TIME PARTIAL: CPT

## 2018-01-15 PROCEDURE — 92928 PRQ TCAT PLMT NTRAC ST 1 LES: CPT | Performed by: INTERNAL MEDICINE

## 2018-01-15 PROCEDURE — 36415 COLL VENOUS BLD VENIPUNCTURE: CPT

## 2018-01-15 PROCEDURE — C1894 INTRO/SHEATH, NON-LASER: HCPCS

## 2018-01-15 PROCEDURE — 2500000003 HC RX 250 WO HCPCS

## 2018-01-15 PROCEDURE — 6360000002 HC RX W HCPCS

## 2018-01-15 PROCEDURE — 85027 COMPLETE CBC AUTOMATED: CPT

## 2018-01-15 PROCEDURE — 2780000010 HC IMPLANT OTHER

## 2018-01-15 PROCEDURE — C1769 GUIDE WIRE: HCPCS

## 2018-01-15 PROCEDURE — 86900 BLOOD TYPING SEROLOGIC ABO: CPT

## 2018-01-15 PROCEDURE — 85610 PROTHROMBIN TIME: CPT

## 2018-01-15 PROCEDURE — 2580000003 HC RX 258: Performed by: NURSE PRACTITIONER

## 2018-01-15 PROCEDURE — 86901 BLOOD TYPING SEROLOGIC RH(D): CPT

## 2018-01-15 RX ORDER — FENTANYL CITRATE 50 UG/ML
25 INJECTION, SOLUTION INTRAMUSCULAR; INTRAVENOUS
Status: DISCONTINUED | OUTPATIENT
Start: 2018-01-15 | End: 2018-01-15 | Stop reason: HOSPADM

## 2018-01-15 RX ORDER — ASPIRIN 325 MG
325 TABLET ORAL ONCE
Status: CANCELLED | OUTPATIENT
Start: 2018-01-15 | End: 2018-01-15

## 2018-01-15 RX ORDER — SODIUM CHLORIDE 0.9 % (FLUSH) 0.9 %
10 SYRINGE (ML) INJECTION PRN
Status: CANCELLED | OUTPATIENT
Start: 2018-01-15

## 2018-01-15 RX ORDER — ASPIRIN 81 MG/1
81 TABLET, CHEWABLE ORAL DAILY
Status: DISCONTINUED | OUTPATIENT
Start: 2018-01-15 | End: 2018-01-15 | Stop reason: CLARIF

## 2018-01-15 RX ORDER — SODIUM CHLORIDE 0.9 % (FLUSH) 0.9 %
10 SYRINGE (ML) INJECTION EVERY 12 HOURS SCHEDULED
Status: CANCELLED | OUTPATIENT
Start: 2018-01-15

## 2018-01-15 RX ORDER — SODIUM CHLORIDE 9 MG/ML
INJECTION, SOLUTION INTRAVENOUS CONTINUOUS
Status: CANCELLED | OUTPATIENT
Start: 2018-01-15

## 2018-01-15 RX ORDER — ATROPINE SULFATE 0.4 MG/ML
0.5 AMPUL (ML) INJECTION
Status: DISCONTINUED | OUTPATIENT
Start: 2018-01-15 | End: 2018-01-15 | Stop reason: HOSPADM

## 2018-01-15 RX ORDER — ASPIRIN 81 MG/1
81 TABLET, CHEWABLE ORAL DAILY
Qty: 30 TABLET | Refills: 3 | Status: SHIPPED | OUTPATIENT
Start: 2018-01-15 | End: 2018-11-05 | Stop reason: SDUPTHER

## 2018-01-15 RX ORDER — ACETAMINOPHEN 325 MG/1
650 TABLET ORAL EVERY 4 HOURS PRN
Status: DISCONTINUED | OUTPATIENT
Start: 2018-01-15 | End: 2018-01-15 | Stop reason: HOSPADM

## 2018-01-15 RX ORDER — DIPHENHYDRAMINE HYDROCHLORIDE 50 MG/ML
50 INJECTION INTRAMUSCULAR; INTRAVENOUS ONCE
Status: DISCONTINUED | OUTPATIENT
Start: 2018-01-15 | End: 2018-01-15 | Stop reason: HOSPADM

## 2018-01-15 RX ORDER — SODIUM CHLORIDE 9 MG/ML
INJECTION, SOLUTION INTRAVENOUS CONTINUOUS
Status: DISCONTINUED | OUTPATIENT
Start: 2018-01-15 | End: 2018-01-15 | Stop reason: HOSPADM

## 2018-01-15 RX ORDER — SODIUM CHLORIDE 0.9 % (FLUSH) 0.9 %
10 SYRINGE (ML) INJECTION EVERY 12 HOURS SCHEDULED
Status: DISCONTINUED | OUTPATIENT
Start: 2018-01-15 | End: 2018-01-15 | Stop reason: SDUPTHER

## 2018-01-15 RX ORDER — MIDAZOLAM HYDROCHLORIDE 1 MG/ML
2 INJECTION INTRAMUSCULAR; INTRAVENOUS
Status: DISCONTINUED | OUTPATIENT
Start: 2018-01-15 | End: 2018-01-15 | Stop reason: HOSPADM

## 2018-01-15 RX ORDER — ASPIRIN 81 MG/1
81 TABLET ORAL DAILY
Status: DISCONTINUED | OUTPATIENT
Start: 2018-01-16 | End: 2018-01-15 | Stop reason: HOSPADM

## 2018-01-15 RX ORDER — DIPHENHYDRAMINE HYDROCHLORIDE 50 MG/ML
50 INJECTION INTRAMUSCULAR; INTRAVENOUS ONCE
Status: CANCELLED | OUTPATIENT
Start: 2018-01-15 | End: 2018-01-15

## 2018-01-15 RX ORDER — SODIUM CHLORIDE 0.9 % (FLUSH) 0.9 %
10 SYRINGE (ML) INJECTION PRN
Status: DISCONTINUED | OUTPATIENT
Start: 2018-01-15 | End: 2018-01-15 | Stop reason: HOSPADM

## 2018-01-15 RX ORDER — ONDANSETRON 2 MG/ML
4 INJECTION INTRAMUSCULAR; INTRAVENOUS EVERY 6 HOURS PRN
Status: DISCONTINUED | OUTPATIENT
Start: 2018-01-15 | End: 2018-01-15 | Stop reason: HOSPADM

## 2018-01-15 RX ORDER — ASPIRIN 325 MG
325 TABLET ORAL ONCE
Status: DISCONTINUED | OUTPATIENT
Start: 2018-01-15 | End: 2018-01-15 | Stop reason: HOSPADM

## 2018-01-15 RX ORDER — SODIUM CHLORIDE 0.9 % (FLUSH) 0.9 %
10 SYRINGE (ML) INJECTION PRN
Status: DISCONTINUED | OUTPATIENT
Start: 2018-01-15 | End: 2018-01-15 | Stop reason: SDUPTHER

## 2018-01-15 RX ORDER — SODIUM CHLORIDE 0.9 % (FLUSH) 0.9 %
10 SYRINGE (ML) INJECTION EVERY 12 HOURS SCHEDULED
Status: DISCONTINUED | OUTPATIENT
Start: 2018-01-15 | End: 2018-01-15 | Stop reason: HOSPADM

## 2018-01-15 RX ORDER — SODIUM CHLORIDE 9 MG/ML
INJECTION, SOLUTION INTRAVENOUS CONTINUOUS
Status: DISCONTINUED | OUTPATIENT
Start: 2018-01-15 | End: 2018-01-15 | Stop reason: SDUPTHER

## 2018-01-15 RX ADMIN — SODIUM CHLORIDE: 9 INJECTION, SOLUTION INTRAVENOUS at 09:43

## 2018-01-15 NOTE — PROGRESS NOTES
Pt admitted to  2E ambulatory for PTCA. Pt NPO. Patient accompanied by wife. Vital signs obtained. Assessment and data collection initiated. Oriented to room. Policies and procedures for  explained   All questions answered with no further questions at this time. Right groin healed since last admission. No complaints.

## 2018-01-16 ENCOUNTER — APPOINTMENT (OUTPATIENT)
Dept: OCCUPATIONAL THERAPY | Age: 58
End: 2018-01-16
Payer: COMMERCIAL

## 2018-01-16 RX ORDER — NITROGLYCERIN 0.4 MG/1
0.4 TABLET SUBLINGUAL EVERY 5 MIN PRN
Qty: 25 TABLET | Refills: 3 | Status: SHIPPED | OUTPATIENT
Start: 2018-01-16 | End: 2019-06-04 | Stop reason: SDUPTHER

## 2018-01-16 NOTE — PLAN OF CARE
Problem: Discharge Planning:  Goal: Discharged to appropriate level of care  Discharged to appropriate level of care   2100 discharge instructions given to pt and spouse, both \"voiced understanding\"      Problem: Tissue Perfusion - Peripheral, Altered:  Goal: Absence of hematoma at arterial access site  Absence of hematoma at arterial access site   Right radial site looks good with no bleeding or hematoma

## 2018-01-18 ENCOUNTER — APPOINTMENT (OUTPATIENT)
Dept: OCCUPATIONAL THERAPY | Age: 58
End: 2018-01-18
Payer: COMMERCIAL

## 2018-01-22 ENCOUNTER — TELEPHONE (OUTPATIENT)
Dept: CARDIOLOGY CLINIC | Age: 58
End: 2018-01-22

## 2018-01-22 DIAGNOSIS — I25.10 CORONARY ARTERY DISEASE INVOLVING NATIVE CORONARY ARTERY OF NATIVE HEART WITHOUT ANGINA PECTORIS: ICD-10-CM

## 2018-01-22 DIAGNOSIS — I25.708 CORONARY ARTERY DISEASE OF BYPASS GRAFT OF NATIVE HEART WITH STABLE ANGINA PECTORIS (HCC): ICD-10-CM

## 2018-01-22 DIAGNOSIS — I10 ESSENTIAL HYPERTENSION: ICD-10-CM

## 2018-01-22 DIAGNOSIS — Z98.61 S/P PTCA (PERCUTANEOUS TRANSLUMINAL CORONARY ANGIOPLASTY): Primary | ICD-10-CM

## 2018-01-22 DIAGNOSIS — R06.02 SOB (SHORTNESS OF BREATH): ICD-10-CM

## 2018-01-22 DIAGNOSIS — Z95.1 S/P CABG X 2: ICD-10-CM

## 2018-01-22 RX ORDER — POTASSIUM CHLORIDE 20 MEQ/1
TABLET, EXTENDED RELEASE ORAL
Qty: 30 TABLET | Refills: 2 | Status: SHIPPED | OUTPATIENT
Start: 2018-01-22 | End: 2018-03-05 | Stop reason: ALTCHOICE

## 2018-01-22 NOTE — TELEPHONE ENCOUNTER
Date of last visit:  1/3/2018  Date of next visit:  4/4/2018    Requested Prescriptions     Pending Prescriptions Disp Refills    potassium chloride (KLOR-CON M) 20 MEQ extended release tablet [Pharmacy Med Name: POTASSIUM CHLORIDE ALEJANDRO ER 20 TBCR] 30 tablet 1     Sig: TAKE ONE TABLET BY MOUTH ONE TIME A DAY

## 2018-01-23 ENCOUNTER — APPOINTMENT (OUTPATIENT)
Dept: OCCUPATIONAL THERAPY | Age: 58
End: 2018-01-23
Payer: COMMERCIAL

## 2018-01-25 ENCOUNTER — HOSPITAL ENCOUNTER (OUTPATIENT)
Dept: OCCUPATIONAL THERAPY | Age: 58
Setting detail: THERAPIES SERIES
Discharge: HOME | End: 2018-01-25
Payer: COMMERCIAL

## 2018-01-25 PROCEDURE — 97110 THERAPEUTIC EXERCISES: CPT

## 2018-01-25 NOTE — PROGRESS NOTES
Patient has progressed nicely toward goals. Patient underwent stent placement on 1-15-17 and is resuming therapy this date. On last progress note dated 12-27-17 patient was able to demonstrate 150 degrees flexion, 150 degrees abduction, 63 degrees ER with the arm at the side and thumb tip 3 inches above the waistband for IR. This date, patient was able to demonstrate 158 degrees flexion, 160 degrees abduction, and thumb tip 4.5 inches above the waistband for IR. Decreased ROM for ER and IR limiting patient at this time. Plus, additional strengthening is required to return to previous functional level. Plan to continue to strengthen to tolerance and continue with ROM. Patient Education:  Patient Education: plan to add florina to HEP next visit if tolerated well today            Plan:  Times per week: 2x  Plan weeks: 4 weeks  Current Treatment Recommendations: ROM, Strengthening, Manual Therapy:  STM, Manual Therapy:  Jt Manip  Plan Comment: Continue with additional 6 weeks as prescribed with the script this date. Cert date expires 5-01-22. Specific instructions for Next Treatment: ROM as per protocol: s/p arthroscopic RCR, SAD, DCR, ED, follow protocol for massive RCR    Patient goals : to return to previously functional level    Short term goals  Time Frame for Short term goals: 4 weeks  Short term goal 1: Patient will increase right shoulder PROM greater than 160 degrees flexion, 65 degrees ER and 60 degrees IR to increase ease and ability to wash his back. GOAL  degrees flexion, 75 degrees ER in scaption, 85 degrees ER in abduction, 65 degrees IR   Short term goal 2: Patient will report pain with activity, including strengthening activity, no greater than 1/10 to increase ease and ability with tucking in his shirt. GOAL NOT MET reports that his pain increased to 2-3/10, just uncomfortable.    CONTINUE  Short term goal 3: Patinent will be independent with HEP to increase ease and ability to

## 2018-01-29 ENCOUNTER — OFFICE VISIT (OUTPATIENT)
Dept: PULMONOLOGY | Age: 58
End: 2018-01-29
Payer: COMMERCIAL

## 2018-01-29 VITALS
SYSTOLIC BLOOD PRESSURE: 108 MMHG | OXYGEN SATURATION: 94 % | BODY MASS INDEX: 36.65 KG/M2 | WEIGHT: 261.8 LBS | HEIGHT: 71 IN | DIASTOLIC BLOOD PRESSURE: 68 MMHG | HEART RATE: 81 BPM

## 2018-01-29 DIAGNOSIS — Z99.89 OBSTRUCTIVE SLEEP APNEA ON CPAP: Primary | ICD-10-CM

## 2018-01-29 DIAGNOSIS — G47.33 OBSTRUCTIVE SLEEP APNEA ON CPAP: Primary | ICD-10-CM

## 2018-01-29 DIAGNOSIS — Z98.61 S/P PTCA (PERCUTANEOUS TRANSLUMINAL CORONARY ANGIOPLASTY): ICD-10-CM

## 2018-01-29 PROCEDURE — 99213 OFFICE O/P EST LOW 20 MIN: CPT | Performed by: PHYSICIAN ASSISTANT

## 2018-01-29 NOTE — PROGRESS NOTES
Right thumb cut with table saw, no treatment needed    Unspecified sleep apnea        Past Surgical History:   Procedure Laterality Date    CARDIAC CATHETERIZATION  06/27/2016    CATARACT REMOVAL Right 2013    COLONOSCOPY  04/08    CORONARY ARTERY BYPASS GRAFT  07/06/2016    Double bypass, Dr. Michael Gastelum Right 02/2007    Neck    EYE SURGERY Right     Retinal surgery x 3    HERNIA REPAIR Right     Inguinal    ROTATOR CUFF REPAIR Right 11/16/2017    TONSILLECTOMY  child    TOTAL KNEE ARTHROPLASTY Left 10/24/2016    Dr. Shiva Jay       Social History   Substance Use Topics    Smoking status: Former Smoker     Packs/day: 2.00     Years: 25.00     Types: Cigars     Quit date: 11/11/2012    Smokeless tobacco: Never Used    Alcohol use 0.0 oz/week      Comment: beer daily       No Known Allergies    Current Outpatient Prescriptions   Medication Sig Dispense Refill    potassium chloride (KLOR-CON M) 20 MEQ extended release tablet TAKE ONE TABLET BY MOUTH ONE TIME A DAY 30 tablet 2    nitroGLYCERIN (NITROSTAT) 0.4 MG SL tablet Place 1 tablet under the tongue every 5 minutes as needed for Chest pain 25 tablet 3    ticagrelor (BRILINTA) 90 MG TABS tablet Take 1 tablet by mouth 2 times daily 60 tablet 5    aspirin 81 MG chewable tablet Take 1 tablet by mouth daily 30 tablet 3    metoprolol succinate (TOPROL XL) 25 MG extended release tablet Take 25 mg by mouth daily      isosorbide mononitrate (IMDUR) 30 MG extended release tablet Take 1 tablet by mouth daily 30 tablet 3    bumetanide (BUMEX) 2 MG tablet Take 1 tablet by mouth daily 90 tablet 3    oxyCODONE-acetaminophen (PERCOCET) 5-325 MG per tablet Take 1 tablet by mouth every 4 hours as needed for Pain .       omeprazole (PRILOSEC) 20 MG delayed release capsule Take 1 capsule by mouth daily 90 capsule 1    lisinopril (PRINIVIL;ZESTRIL) 10 MG tablet TAKE 1 TABLET DAILY 90 tablet 1    atorvastatin (LIPITOR) 40 MG tablet Take 1 tablet by mouth nightly 90 tablet 4    fluticasone (FLONASE) 50 MCG/ACT nasal spray 2 sprays by Nasal route daily as needed for Rhinitis or Allergies Apply daily to each nare 3 Bottle 1     No current facility-administered medications for this visit. Family History   Problem Relation Age of Onset    Heart Disease Mother     Heart Disease Father     Cancer Father      colon/prostate    Colon Cancer Father     Prostate Cancer Father         Review of Systems -   General ROS: gained 15 lbs over 1 year  ENT ROS: negative for - nasal congestion, oral lesions or sore throat  Hematological and Lymphatic ROS: negative  Endocrine ROS: negative  Respiratory ROS: +shortness of breath  Cardiovascular ROS: no chest pain   Gastrointestinal ROS: no abdominal pain, change in bowel habits, or black or bloody stools  Musculoskeletal ROS: negative  Neurological ROS: negative    Physical Exam:    BMI:  Body mass index is 36.51 kg/m². Wt Readings from Last 3 Encounters:   01/29/18 261 lb 12.8 oz (118.8 kg)   01/15/18 250 lb (113.4 kg)   01/05/18 262 lb (118.8 kg)     Vitals: /68   Pulse 81   Ht 5' 11\" (1.803 m)   Wt 261 lb 12.8 oz (118.8 kg)   SpO2 94% Comment: room air at rest  BMI 36.51 kg/m²       General appearance: alert and oriented to person, place and time, well-developed and well-nourished, in no acute distress  Nose: Nares normal. Septum midline. Mucosa normal. No drainage or sinus tenderness. Oropharynx:  negative  Lungs: clear to auscultation bilaterally  Heart: regular rate and rhythm, S1, S2 normal, no murmur, click, rub or gallop  Extremities: extremities normal, atraumatic, no cyanosis or edema  Neurologic: Mental status: Alert, oriented, thought content appropriate      ASSESSMENT/DIAGNOSIS    1. Obstructive sleep apnea on CPAP     2. S/P PTCA: 1/15/2018: Stent diagonal branch Xience 3.0 x 12 mm. Plan   Do you need any equipment today?  Yes update    - He  was advised to continue current

## 2018-01-30 ENCOUNTER — HOSPITAL ENCOUNTER (OUTPATIENT)
Dept: OCCUPATIONAL THERAPY | Age: 58
Setting detail: THERAPIES SERIES
Discharge: HOME | End: 2018-01-30
Payer: COMMERCIAL

## 2018-01-30 PROCEDURE — 97110 THERAPEUTIC EXERCISES: CPT

## 2018-01-30 NOTE — PROGRESS NOTES
[address] [address]  OUTPATIENT OCCUPATIONAL THERAPY  Daily Note  [address]    Time In: 1212  Time Out: 1540  Minutes: 42  Timed Code Treatment Minutes: 42 Minutes     Date: 2018  Patient Name: Christopher Mercado        CSN: [id number]   : 1960  (59 y.o.)  Gender: male   Referring Practitioner: Dr. Jose Mccarty  Diagnosis: M75.121, M75.41 R RCT, impingement, AC arthroplasty          General:  OT Visit Information  Onset Date: 17  OT Insurance Information: Medical [employer] - no visit limit, modalities are covered  Total # of Visits to Date: 15  Certification Period Expiration Date: 18  Progress Note Counter: PN completed 17, PN 18, 1/10 for PN  Comments: return to referring physician 2018       Restrictions/Precautions:       Position Activity Restriction  Other position/activity restrictions: s/p on 17 massive tear protocol         Subjective:  Subjective: Patient reports he tolerated return to therapy well, no increased soreness following last session. Pain:  Patient Currently in Pain: No       Objective:     Upper Extremity Function  UE AROM: Seated scapular pinches x10, shoulder depressors x10. Sidelying ER with elbow at side x10 reps. UE PROM: Pulleys for shoulder flexion to warm up, supine PROM to R shoulder flexion, ER, IR, and abduction to tolerance. Supine dowel clarisa ER in 45 degrees abduction x10 reps. UE AAROM: Supine dowel clarisa chest press x10, flexion 90 degrees to over head x10 reps thumb up. UE Stretching: Sidelying sleeper stretch 10 second hold x5 reps, IR towel stretch behind the back 10 second hold x5 reps. UE Strengthing: Red theraband florina x10 reps each. Activity Tolerance: Additional Comments: Patient tolerated treatment well. Assessment:  Assessment: Patient is progressing towards goals.      Patient Education:  Patient Education: plan to add florina to HEP next visit if tolerated well

## 2018-02-01 ENCOUNTER — HOSPITAL ENCOUNTER (OUTPATIENT)
Dept: CARDIAC REHAB | Age: 58
Setting detail: THERAPIES SERIES
Discharge: HOME | End: 2018-02-01
Payer: COMMERCIAL

## 2018-02-01 ENCOUNTER — HOSPITAL ENCOUNTER (OUTPATIENT)
Dept: OCCUPATIONAL THERAPY | Age: 58
Setting detail: THERAPIES SERIES
Discharge: HOME | End: 2018-02-01
Payer: COMMERCIAL

## 2018-02-01 VITALS — HEART RATE: 69 BPM | BODY MASS INDEX: 36.71 KG/M2 | OXYGEN SATURATION: 94 % | WEIGHT: 262.25 LBS | HEIGHT: 71 IN

## 2018-02-01 PROCEDURE — G0423 INTENS CARDIAC REHAB NO EXER: HCPCS

## 2018-02-01 PROCEDURE — G0422 INTENS CARDIAC REHAB W/EXERC: HCPCS

## 2018-02-01 PROCEDURE — 97110 THERAPEUTIC EXERCISES: CPT

## 2018-02-01 NOTE — PROGRESS NOTES
x10 reps each. Supine red theraband horizontal abduction/adduction 10 reps. Supine D2 flexion and extension 10 reps. Supine red theraband extension 10 reps. Activity Tolerance: Additional Comments: Patient tolerated treatment well. Assessment:  Assessment: Patient is progressing towards goals. No complaints with HEP    Patient Education:  Patient Education: added florina to HEP this date - handout provided            Plan:  Current Treatment Recommendations: ROM, Strengthening, Manual Therapy:  STM, Manual Therapy:  Jt Manip  Plan Comment: Continue with additional 6 weeks as prescribed with the script this date. Cert date expires 1-57-85.    Specific instructions for Next Treatment: ROM as per protocol: s/p arthroscopic RCR, SAD, DCR, ED, follow protocol for massive RCR          Tay Rascon, OTR/L [id number]

## 2018-02-01 NOTE — PLAN OF CARE
Prescription  (per physician & CR staff) Exercise Prescription  (per physician & CR staff) Exercise Prescription  (per physician & CR staff) Exercise Prescription  (per physician & CR staff) Exercise Prescription  (per physician & CR staff)   Cardiovascular Cardiovascular Cardiovascular Cardiovascular Cardiovascular   Mode:    [x] Treadmill (TM)  [x] Schwinn Airdyne (AD)  [x] Arms Ergometer (AE)  [x] NuStep  [] Elliptical (E) MODE:    [] Treadmill (TM)  [] Schwinn Airdyne (AD)  [] Arms Ergometer (AE)  [] NuStep  [] Elliptical (E) MODE:    [] Treadmill (TM)  [] Schwinn Airdyne (AD)  [] Arms Ergometer (AE)  [] NuStep  [] Elliptical (E) MODE:    [] Treadmill (TM)  [] Schwinn Airdyne (AD)  [] Arms Ergometer (AE)  [] NuStep  [] Elliptical (E) MODE:    [] Treadmill (TM)  [] Schwinn Airdyne (AD)  [] Arms Ergometer (AE)  [] NuStep  [] Elliptical (E)   Initial Workloads  TM: [email or web address] 2.2 METs  AD: 1.0 level = 2.2 METs  NS: 44  Bell= 2.2 METs  AE: 0.5 level = 1.6 METs Current Workloads  TM:  @ %=  METs  AD:  level =  METs  NS:   Bell=  METs  AE:  level =  METs Current Workloads  TM:  @ %=  METs  AD:  level =  METs  NS:   Bell=  METs  AE:  level =  METs Current Workloads  TM:  @ %=  METs  AD:  level =  METs  NS:   Bell=  METs  AE:  level =  METs Current Workloads  TM:  @ %=  METs  AD:  level =  METs  NS:   Bell=  METs  AE:  level =  METs     Frequency:    ICR: 3x/week  Home: 2-3x/wk Frequency:   ICR: 3x/week  Home: 3x/wk Frequency:  ICR: 3x/week  Home: 3-4x/wk Frequency:  ICR: 3x/week  Home: 3-4x/wk Frequency:  Li Schulte will continue exercise at  5-7 days/week   Duration:   Total aerobic exercise = 25 min    8 min/mode Duration:  Total aerobic exercises = ** min     **min/mode Duration:  Total aerobic exercises = ** min     **min/mode Duration:  Total aerobic exercises = ** min     **min/mode Duration:  Total erobic exercise =  60-90 min   Intensity:   MET Level = 2.1-2.2  RPE = 12-15 Intensity:  Max MET Level = **  RPE = Attend Nutrition Workshops  [] Attend 1:1   [] Attend Cooking Classes  [] ** Andrei achieved nutritional goals   [] Yes    [] No  If no, why? Use knowledge gained to continue Pritikin eating plan at home       Individual Cardiac Treatment Plan  Psychosocial  PSYCHOSOCIAL  ASSESSMENT/PLAN PSYCHOSOCIAL  REASSESSMENT PSYCHOSOCIAL   REASSESSMENT PSYCHOSOCIAL   REASSESSMENT PSYCHOSOCIAL  DISCHARGE/FOLLOW-UP   Stages of Change Stages of Change Stages of Change Stages of Change Stages of Change   [] Pre Contemplation  [x] Contemplation  [] Preparation  [] Action  [] Maintenance  [] Relapse [] Pre Contemplation  [] Contemplation  [] Preparation  [] Action  [] Maintenance  [] Relapse [] Pre Contemplation  [] Contemplation  [] Preparation  [] Action  [] Maintenance  [] Relapse [] Pre Contemplation  [] Contemplation  [] Preparation  [] Action  [] Maintenance  [] Relapse [] Pre Contemplation  [] Contemplation  [] Preparation  [] Action  [] Maintenance  [] Relapse   PSYCHOSOCIAL ASSESSMENT PSYCHOSOCIAL ASSESSMENT PSYCHOSOCIAL ASSESSMENT PSYCHOSOCIAL ASSESSMENT PSYCHOSOCIAL ASSESSMENT   Behavioral Outcomes Behavioral Outcomes Behavioral Outcomes Behavioral Outcomes Behavioral Outcomes   Tool Used:  Connor & Saucedo, Quality of Life Index, Cardiac Version IV  *Given to patient to complete. Tool Used:    Connor & Sheryl, Quality of Life Index, Cardiac Version IV   QOL Index Score: **    HF:**  S&E:**  P&S: **  Family: **   Tool Used:     Connor & Saucedo, Quality of Life Index, Cardiac Version IV  QOL Index Score: **    HF:**  S&E:**  P&S: **  Family: **   PHQ-9 score 6  Depression Severity  []Minimal  [x]Mild   []Moderate  []Moderately Severe  []Severe    PHQ-9 score **  Depression Severity  []Minimal  []Mild   []Moderate  []Moderately Severe []Severe   Does patient have Family Support?   [x] Yes      [] No  No signs of marital/family distress       Within the Past Month:  *Have you wished you were dead or wished you could go to sleep and not wake up? [] Yes      [x] No  *Have you had any thoughts of killing yourself? [] Yes      [x] No         Using a scale of 0-10, 0=none, 10=very:   Rate your depression: 3  Rate your anxiety:  3  Using a scale of 0-10, 0=none, 10=very:   Rate your depression: **  Rate your anxiety:  ** Using a scale of 0-10, 0=none, 10=very:   Rate your depression: **  Rate your anxiety:  ** Using a scale of 0-10, 0=none, 10=very:   Rate your depression: **  Rate your anxiety:  ** Using a scale of 0-10, 0=none, 10=very:   Rate your depression: **  Rate your anxiety:  **   Signs and Symptoms of Depression Present? [] Yes      [x] No   Signs and Symptoms of Depression Present? [] Yes      [] No  If yes, please explain:  ** Signs and Symptoms of Depression Present? [] Yes      [] No  If yes, please explain:  ** Signs and Symptoms of Depression Present? [] Yes      [] No  If yes, please explain:  ** Signs and Symptoms of Depression Present? [] Yes      [] No  If yes, please explain:  **   Signs and Symptoms of Anxiety Present? [] Yes      [x] No   Signs and Symptoms of Anxiety Present? [] Yes      [] No  If yes, please explain:  ** Signs and Symptoms of Anxiety Present? [] Yes      [] No  If yes, please explain:  ** Signs and Symptoms of Anxiety Present? [] Yes      [] No  If yes, please explain:  ** Signs and Symptoms of Anxiety Present? [] Yes      [] No  If yes, please explain:  **   [] Patient has poor eye contact   [] Flat affect present. [] Signs of anxiety, anger or hostility    [] Signs social isolation present.    []  Signs of alcohol or substance abuse       PSYCHOSOCIAL PLAN PSYCHOSOCIAL PLAN PSYCHOSOCIAL PLAN PSYCHOSOCIAL PLAN PSYCHOSOCIAL PLAN   *Interventions* *Interventions* *Interventions* *Interventions* *Interventions*   *Please check if needed  [] Psych Consult  [] Physician Referral  [x] Stress Management Skills *Please check if needed  [] Psych Consult  [] Physician Changes  [] Yes      [] No   Diabetes  Most Recent BS:  BS have been in range  [] Yes      [] No  Medication Changes  [] Yes      [] No     Diabetes  Most Recent BS:  BS have been in range  [] Yes      [] No  Medication Changes  [] Yes      [] No     Diabetes  Most Recent BS:  BS have been in range  [] Yes      [] No  Medication Changes  [] Yes      [] No       Tobacco Use  [] Current  [x] Former  [] Never    Years smoked: 28:    Date Quit: 2014    # cigarettes smoked/day: 2 pks  Smokeless Tobacco use:   [] Yes      [x] No  Uses mariajuana few times per week   Tobacco Use  Change in smoking status   [] Yes      [] No    Quit date: **   Tobacco Use  Change in smoking status   [] Yes      [] No    Quit date: **   Tobacco Use  Change in smoking status   [] Yes      [] No    Quit date: ** Tobacco Use  Change in smoking status   [] Yes      [] No    Quit date: **            Learning Barriers  Please select one:  [] Speech  [] Literacy  [] Hearing  [] Cognitive  [] Vision  [x] Ready to Learn Learning Barriers Addressed:   [] Yes      [] No   Learning Barriers Addressed:   [] Yes      [] No   Learning Barriers Addressed:  [] Yes      [] No Learning Barriers Addressed:  [] Yes      [] No     RISK FACTOR/EDUCATION PLAN RISK FACTOR/EDUCATION PLAN RISK FACTOR/EDUCATION PLAN RISK FACTOR/EDUCATION PLAN RISK FACTOR/EDUCATION PLAN   *Interventions* *Interventions* *Interventions* *Interventions* *Interventions*   Recommended Educational Videos    [x] Intake & The Pritikin Solution Program Overview  [x] Overview of The Pritikin Eating Plan  [x] Becoming A Pritikin   [x] Diseases of Our Time-Part 1  [x] Calorie Density  [x] Label Reading-Part 1  [x] Move It!   [x] Healthy Minds, Bodies, Hearts  [x] Dining Out-Part 1  [x] Heart Disease Risk Reduction  [x] Metabolic Syndrome & Belly Fat  [x] Facts on Fat  [x] Diseases of Our Times-Part 2  [x] Biology of Weight Control  [x] Biomechanical Limitations  [x] Nurtition Action Plan Other  Electronically signed by Robin Ng on 2/1/2018 at 3:15 PM   Physician Response    [x] Cardiac rehab is reasonably and medically necessary for continuous cardiac monitoring surveillance  of patient's cardiac activity  [x] Continue continuous telemerty monitoring and notify me with any concerns  [] Other     Physician Response      [x] Cardiac rehab is reasonably and medically necessary for continuous cardiac monitoring surveillance  of patient's cardiac activity  [x] Continue continuous telemerty monitoring and notify me with any concerns   [] Other     Physician Response    [x] Cardiac rehab is reasonably and medically necessary for continuous cardiac monitoring surveillance  of patient's cardiac activity  [x] Continue continuous telemerty monitoring and notify me with any concerns   [] Other

## 2018-02-06 ENCOUNTER — HOSPITAL ENCOUNTER (OUTPATIENT)
Dept: OCCUPATIONAL THERAPY | Age: 58
Setting detail: THERAPIES SERIES
Discharge: HOME | End: 2018-02-06
Payer: COMMERCIAL

## 2018-02-06 ENCOUNTER — HOSPITAL ENCOUNTER (OUTPATIENT)
Dept: CARDIAC REHAB | Age: 58
Setting detail: THERAPIES SERIES
Discharge: HOME | End: 2018-02-06
Payer: COMMERCIAL

## 2018-02-06 PROCEDURE — 97110 THERAPEUTIC EXERCISES: CPT

## 2018-02-06 PROCEDURE — G0423 INTENS CARDIAC REHAB NO EXER: HCPCS

## 2018-02-06 PROCEDURE — G0422 INTENS CARDIAC REHAB W/EXERC: HCPCS

## 2018-02-08 ENCOUNTER — HOSPITAL ENCOUNTER (OUTPATIENT)
Dept: OCCUPATIONAL THERAPY | Age: 58
Setting detail: THERAPIES SERIES
Discharge: HOME | End: 2018-02-08
Payer: COMMERCIAL

## 2018-02-08 ENCOUNTER — HOSPITAL ENCOUNTER (OUTPATIENT)
Dept: CARDIAC REHAB | Age: 58
Setting detail: THERAPIES SERIES
Discharge: HOME | End: 2018-02-08
Payer: COMMERCIAL

## 2018-02-08 PROCEDURE — 97110 THERAPEUTIC EXERCISES: CPT

## 2018-02-08 PROCEDURE — G0422 INTENS CARDIAC REHAB W/EXERC: HCPCS

## 2018-02-08 PROCEDURE — G0423 INTENS CARDIAC REHAB NO EXER: HCPCS

## 2018-02-08 NOTE — PROGRESS NOTES
inches above the waistband for IR  Long term goals  Time Frame for Long term goals : 12 weeks  Long term goal 1: Patient will shave his head without difficulty. GOAL MET   Long term goal 2: Patient will reach behind his back to pull up his pants without difficulty.   GOAL MET    OT G-codes  Functional Assessment Tool Used: Upper Extremity Functional Scale  Score: [address], [place], OTR/L 7967

## 2018-02-09 ENCOUNTER — HOSPITAL ENCOUNTER (OUTPATIENT)
Dept: CARDIAC REHAB | Age: 58
Setting detail: THERAPIES SERIES
Discharge: HOME | End: 2018-02-09
Payer: COMMERCIAL

## 2018-02-09 PROCEDURE — G0423 INTENS CARDIAC REHAB NO EXER: HCPCS

## 2018-02-09 PROCEDURE — G0422 INTENS CARDIAC REHAB W/EXERC: HCPCS

## 2018-02-13 ENCOUNTER — HOSPITAL ENCOUNTER (OUTPATIENT)
Dept: CARDIAC REHAB | Age: 58
Setting detail: THERAPIES SERIES
Discharge: HOME | End: 2018-02-13
Payer: COMMERCIAL

## 2018-02-13 PROCEDURE — G0422 INTENS CARDIAC REHAB W/EXERC: HCPCS

## 2018-02-13 PROCEDURE — G0423 INTENS CARDIAC REHAB NO EXER: HCPCS

## 2018-02-14 ENCOUNTER — HOSPITAL ENCOUNTER (OUTPATIENT)
Age: 58
Discharge: HOME | End: 2018-02-14
Payer: COMMERCIAL

## 2018-02-14 ENCOUNTER — OFFICE VISIT (OUTPATIENT)
Dept: CARDIOLOGY CLINIC | Age: 58
End: 2018-02-14
Payer: COMMERCIAL

## 2018-02-14 ENCOUNTER — TELEPHONE (OUTPATIENT)
Dept: CARDIOLOGY CLINIC | Age: 58
End: 2018-02-14

## 2018-02-14 VITALS
HEIGHT: 71 IN | SYSTOLIC BLOOD PRESSURE: 118 MMHG | DIASTOLIC BLOOD PRESSURE: 70 MMHG | HEART RATE: 73 BPM | WEIGHT: 260.4 LBS | BODY MASS INDEX: 36.46 KG/M2

## 2018-02-14 DIAGNOSIS — Z95.1 S/P CABG X 2: ICD-10-CM

## 2018-02-14 DIAGNOSIS — Z98.61 S/P PTCA (PERCUTANEOUS TRANSLUMINAL CORONARY ANGIOPLASTY): ICD-10-CM

## 2018-02-14 DIAGNOSIS — E78.00 PURE HYPERCHOLESTEROLEMIA: ICD-10-CM

## 2018-02-14 DIAGNOSIS — I25.10 CORONARY ARTERY DISEASE INVOLVING NATIVE CORONARY ARTERY OF NATIVE HEART WITHOUT ANGINA PECTORIS: ICD-10-CM

## 2018-02-14 DIAGNOSIS — I10 ESSENTIAL HYPERTENSION: ICD-10-CM

## 2018-02-14 DIAGNOSIS — Z98.61 S/P PTCA (PERCUTANEOUS TRANSLUMINAL CORONARY ANGIOPLASTY): Primary | ICD-10-CM

## 2018-02-14 LAB
ANION GAP SERPL CALCULATED.3IONS-SCNC: 14 MEQ/L (ref 8–16)
BUN BLDV-MCNC: 15 MG/DL (ref 7–22)
CALCIUM SERPL-MCNC: 9.5 MG/DL (ref 8.5–10.5)
CHLORIDE BLD-SCNC: 97 MEQ/L (ref 98–111)
CO2: 29 MEQ/L (ref 23–33)
CREAT SERPL-MCNC: 0.8 MG/DL (ref 0.4–1.2)
GFR SERPL CREATININE-BSD FRML MDRD: > 90 ML/MIN/1.73M2
GLUCOSE BLD-MCNC: 149 MG/DL (ref 70–108)
POTASSIUM SERPL-SCNC: 4.7 MEQ/L (ref 3.5–5.2)
SODIUM BLD-SCNC: 140 MEQ/L (ref 135–145)

## 2018-02-14 PROCEDURE — 36415 COLL VENOUS BLD VENIPUNCTURE: CPT

## 2018-02-14 PROCEDURE — 93000 ELECTROCARDIOGRAM COMPLETE: CPT | Performed by: PHYSICIAN ASSISTANT

## 2018-02-14 PROCEDURE — 99213 OFFICE O/P EST LOW 20 MIN: CPT | Performed by: PHYSICIAN ASSISTANT

## 2018-02-14 PROCEDURE — 80048 BASIC METABOLIC PNL TOTAL CA: CPT

## 2018-02-14 NOTE — PROGRESS NOTES
Prostate Cancer Father        Blood pressure 118/70, pulse 73, height 5' 10.98\" (1.803 m), weight 260 lb 6.4 oz (118.1 kg). General:   Well developed, well nourished  Lungs:   Clear to auscultation  Heart:    Normal S1 S2, No murmur, rubs, or gallops  Abdomen:   Soft, non tender, no organomegalies, positive bowel sounds  Extremities:   No edema, no cyanosis, good peripheral pulses  Neurological:   Awake, alert, oriented. No obvious focal deficits  Musculoskeletal:  No obvious deformities    EKG: NSR    Cardiac catheterization 11/15/2018  PCI of the diagonal    1. S/P PTCA: 1/15/2018: Stent diagonal branch Xience 3.0 x 12 mm. EKG 12 Lead    Basic Metabolic Panel   2. Coronary artery disease involving native coronary artery of native heart without angina pectoris     3. S/P CABG x 2     4. Essential hypertension     5. Pure hypercholesterolemia         Orders Placed This Encounter   Procedures    Basic Metabolic Panel     Standing Status:   Future     Number of Occurrences:   1     Standing Expiration Date:   2/14/2019    EKG 12 Lead     Order Specific Question:   Reason for Exam?     Answer: Other     Continue Dr Ant Cardoso current treatment plan    We will check a BMP. Continue same current medications and with constant vigilance to changes in symptoms and also any potential side effects. Return for care if become worse or seek medical attention immediately. The patient is educated on symptoms of heart disease that include chest pain and passing out, dizziness, etc and to report them if there is any change or go to emergency room.         Follow up with Dr Caryl Beatty as scheduled or sooner if needed  (Please note that portions of this note were completed with a voice recognition program.  Efforts were made to edit the dictation but occasionally words are mis-transcribed.)      Oriana Bermudez PA-C

## 2018-02-15 ENCOUNTER — HOSPITAL ENCOUNTER (OUTPATIENT)
Dept: CARDIAC REHAB | Age: 58
Setting detail: THERAPIES SERIES
Discharge: HOME | End: 2018-02-15
Payer: COMMERCIAL

## 2018-02-15 PROCEDURE — G0422 INTENS CARDIAC REHAB W/EXERC: HCPCS

## 2018-02-15 PROCEDURE — G0423 INTENS CARDIAC REHAB NO EXER: HCPCS

## 2018-02-16 ENCOUNTER — HOSPITAL ENCOUNTER (OUTPATIENT)
Dept: CARDIAC REHAB | Age: 58
Setting detail: THERAPIES SERIES
Discharge: HOME | End: 2018-02-16
Payer: COMMERCIAL

## 2018-02-16 PROCEDURE — G0422 INTENS CARDIAC REHAB W/EXERC: HCPCS

## 2018-02-16 PROCEDURE — G0423 INTENS CARDIAC REHAB NO EXER: HCPCS

## 2018-02-16 NOTE — PROGRESS NOTES
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 Samples were offered. Recipes were given and questions answered. The patient above was in the [employer] for 45 minutes.       Electronically signed by Rhonda Solorio on 2018 at 11:03 AM

## 2018-02-20 ENCOUNTER — HOSPITAL ENCOUNTER (OUTPATIENT)
Dept: CARDIAC REHAB | Age: 58
Setting detail: THERAPIES SERIES
Discharge: HOME | End: 2018-02-20
Payer: COMMERCIAL

## 2018-02-20 PROCEDURE — G0422 INTENS CARDIAC REHAB W/EXERC: HCPCS

## 2018-02-20 PROCEDURE — G0423 INTENS CARDIAC REHAB NO EXER: HCPCS

## 2018-02-20 NOTE — PROGRESS NOTES
Hospital Facility-Based Program  Phase 2 Cardiac Rehab Weekly Progress Report      Patient prescribed exercise:  9:00 class. 3 times per week in rehab, 1-4 times per week at home for the amount of sessions/weeks specified by insurance. Current Levels: Treadmill:  Mph/ % for   minutes, Schwinn Airdyne: Level 1.0 for 12 minutes, NuStep:  60 Bell for 12 minutes, UBE: Level 38W for 5 minutes. Progression Discussion: Maintain/Increase Aerobic exercise 6 minutes to work on endurance. Attempt to increase intensity by 5-20% for each modality this week. Try to increase intensities until Andrei rates the exercises a 13-17 on Anuj RPE.

## 2018-02-20 NOTE — PROGRESS NOTES
efforts and 1:1 ratio of sodium to calories to help limit sodium intake to <1300 mg sodium/day.   2. () Label Reading          3. () Menus      4. () Targeting Nutrition Priorities        5. () Fueling a Healthy Body    Linard Ganser was in the Workshop with the Dietitian for 35 minutes. The content was presented via PowerPoint, lecture, and patient participation based format. Patient voiced understanding.      Electronically signed by Hector Bundy RD, LD on 2/20/2018 at 9:50 AM

## 2018-02-22 ENCOUNTER — HOSPITAL ENCOUNTER (OUTPATIENT)
Dept: CARDIAC REHAB | Age: 58
Setting detail: THERAPIES SERIES
Discharge: HOME | End: 2018-02-22
Payer: COMMERCIAL

## 2018-02-22 PROCEDURE — G0422 INTENS CARDIAC REHAB W/EXERC: HCPCS

## 2018-02-22 PROCEDURE — G0423 INTENS CARDIAC REHAB NO EXER: HCPCS

## 2018-02-23 ENCOUNTER — HOSPITAL ENCOUNTER (OUTPATIENT)
Dept: CARDIAC REHAB | Age: 58
Setting detail: THERAPIES SERIES
Discharge: HOME | End: 2018-02-23
Payer: COMMERCIAL

## 2018-02-23 PROCEDURE — G0423 INTENS CARDIAC REHAB NO EXER: HCPCS

## 2018-02-23 PROCEDURE — G0422 INTENS CARDIAC REHAB W/EXERC: HCPCS

## 2018-02-26 ENCOUNTER — TELEPHONE (OUTPATIENT)
Dept: CARDIOLOGY CLINIC | Age: 58
End: 2018-02-26

## 2018-02-26 DIAGNOSIS — I25.10 CORONARY ARTERY DISEASE INVOLVING NATIVE CORONARY ARTERY OF NATIVE HEART WITHOUT ANGINA PECTORIS: Primary | ICD-10-CM

## 2018-02-26 NOTE — TELEPHONE ENCOUNTER
Pt called today saying he was offered a free calcium screening, MRI of heart  to be done at [address] [facility] state they need the ok from Dr. Adeline Brown   Please advise

## 2018-02-27 ENCOUNTER — HOSPITAL ENCOUNTER (OUTPATIENT)
Dept: CARDIAC REHAB | Age: 58
Setting detail: THERAPIES SERIES
Discharge: HOME | End: 2018-02-27
Payer: COMMERCIAL

## 2018-02-27 PROCEDURE — G0423 INTENS CARDIAC REHAB NO EXER: HCPCS

## 2018-02-27 PROCEDURE — G0422 INTENS CARDIAC REHAB W/EXERC: HCPCS

## 2018-03-01 ENCOUNTER — HOSPITAL ENCOUNTER (OUTPATIENT)
Dept: CARDIAC REHAB | Age: 58
Setting detail: THERAPIES SERIES
Discharge: HOME | End: 2018-03-01
Payer: COMMERCIAL

## 2018-03-01 PROCEDURE — G0423 INTENS CARDIAC REHAB NO EXER: HCPCS

## 2018-03-01 PROCEDURE — G0422 INTENS CARDIAC REHAB W/EXERC: HCPCS

## 2018-03-01 NOTE — PLAN OF CARE
Intensity:  Max MET Level = **  RPE = 12-15 Intensity:  Max MET Level = **  RPE = 12-15 Intensity:  Max MET Level = ** RPE = 12-15   Progression: increase aerobic activity up to 30 min over next 4 weeks by increasing time 2-3 min/week. Progression:  increase aerobic activity up to 30 min over next 4 weeks by increasing time 2-3 min/week. Progression:   Progression: Progression:  Increase time/intensity when RPE <13, and HR is in [facility]   [x] Yes      [] No  Upper and Lower body strength training 2x/wk    Wt: 2#       Reps:  8-15    *Increase wt. after completing 15 reps with an RPE of <12/13. [x] Yes      [] No  Upper and Lower body strength training 2x/wk    Wt: 4#       Reps:  8-15    *Increase wt. after completing 15 reps with an RPE of <12/13. [] Yes      [] No  Upper and Lower body strength training 2x/wk    Wt: **#       Reps:  8-15    *Increase wt. after completing 15 reps with an RPE of <12/13. [] Yes      [] No  Upper and Lower body strength training 2x/wk    Wt: **#       Reps:  8-15    *Increase wt. after completing 15 reps with an RPE of <12/13. Continue Strength Training at home   [] Exercise Log & Strength training handout given     Wt: **#       Reps:  8-15    *Increase wt. after completing 15 reps with an RPE of <12/13. Flexibility Flexibility Flexibility Flexibility Flexibility   [x] Yes      [] No  25 min session of Core Strength & Flexibility 1x/per week  Attends Core Strength & Flexibility   [x] Yes      [] No Attends Core Strength & Flexibility   [] Yes      [] No Attends Core Strength & Flexibility   [] Yes      [] No Continue Core Strength & Flexibility at home   [address] verbalizes planning to bike 3-4 days/week for 20-30 min on days not in rehab. Home Exercise  *Andrei verbalizes planning to  bike 3-4 days/week for 30 min on days not in rehab.  Jacey Connors 36 planning to ** ** days/week for ** min on days not in rehab. Home Exercise  *Andrei verbalizes planning to ** ** days/week for ** min on days not in rehab. Home Exercise  *Andrei verbalizes his/her plan to ** ** days/week for ** min @ **   *Education* *Education* *Education* *Education* *Education*   RPE Scale  [x] Yes      [] No  Exercise Safety  [x] Yes      [] No  Equipment Orientation  [x] Yes      [] No  S/S to Report  [x] Yes      [] No  Warm Up/Cool Down  [x] Yes      [] No  Home Exercise  [x] Yes      [] No    All Exercise Education Completed  [] Yes      [] No   *Goals* *Goals* *Goals* *Goals* *Goals*   Initial Exercise Goals Exercise Goals  Exercise Goals   Exercise Goals  Exercise Goals   Andrei plans to:  [x] Attend exercise sessions 3x/wk  [x] initiate home exercise 2-3x/wk for 10-20 min  [x] Increase 6 min walk distance by 10%  [] ** Andrei plans to:  [x] Attend exercise sessions 3x/wk  [x] continue home exercise 2-3x/wk for 20-30 min  [] ** Andrei's plans to:  [x] Attend exercise sessions 3x/wk  [x] continue home exercise 3-4x/wk for 30-45 min  [x] Determine plan of exercise following rehab  [] ** Andrei's plans to:  Allrenatemissael Ministerio achieved exercise goals?    Yes    [] No  If no, why?  **  [] Increased 6 min walk distance by 10%  [] Currently exercising 30-60 min/day, 5-7days/wk   [] Plans to continue exercise on own  [] Plans to join a local fitness center to continue exercise  [] Does not plan to continue to exercise after rehab   Return to ADL or Hobbies:  Dianelys Galdamez would like to improve strength and endurance so he is able to return to playing guitar,  hockey, walking dog Return to ADL or Hobbies:  Dianelys Galdamez would like to improve strength and endurance so he is able to return to playing guitar,  hockey, walking dog Return to ADL or Hobbies:  Dianelys Galdamez would like to improve strength and endurance so he/she is able to return to ** Return to ADL or Hobbies:  Dianelys Galdamez would like to PLAN NUTRITION PLAN   *Interventions* *Interventions* *Interventions* *Interventions* *Interventions*   Initial Survey given Goal Setting Discussion:   [x] Yes      [] No       Follow Up Survey Reviewed & Goals Updated:     Professional Referral  Please check if needed. [] Dietician Consult   [] Wt. Management Referral  [] Other:  Professional Referral  Please check if needed. [] Dietician Consult   [] Wt. Management Referral  [] Other: Professional Referral  Please check if needed. [] Dietician Consult   [] Wt. Management Referral  [] Other: Professional Referral  Please check if needed. [] Dietician Consult   [] Wt. Management Referral  [] Other: Professional Referral  Please check if needed. [] Dietician Consult   [] Wt. Management Referral  [] Other:   *Education* *Education* *Education* *Education* *Education*   Nutritional Education Recommended    [x] 1:1 Registered Dietitian    Workshops  [x] Label Reading   [x] Menu  [x] Targeting Nutrition Priorities  [x] Fueling a Healthy Body   Nutritional Education Attended/Date    [x] 1:1 Registered Dietitian - 2/20/18    Workshops  [x] Label Reading -2/6/18  [x] Menu-2/27/18 Nutritional Education Attended/Date Nutritional Education Attended/Date All Sessions Completed?     [] Yes  [] No   Cooking School    [x] 11 sessions recommended    Cooking School  Sessions Completed  []Adding Flavor  []Fast & Healthy     Breakfasts  []Salads & Dressings  []Soups & Simple     Sauces  []Simple Sides  []Appetizers &     Snacks  []Delicious Desserts  [x]Plant Proteins -2/9/18  [x]Fast Evening Meals -2/16/18  [x] Weekend Breakfasts -2/23/18  []Cook once, Eat       twice Cooking School  Sessions Completed [address]  Sessions Completed     [address]    # of sessions completed:  **   *Goals* *Goals* *Goals* *Goals* *Goals*   Andrei's nutritional goals are as follows:  Complete and return diet survey Andrei's nutritional goals are as follows:  [x] Attend Nutrition Workshops  [] Attend 1:1   [x] Attend Cooking Classes  [] ** Andrei's nutritional goals are as follows:  [] Attend Nutrition Workshops  [] Attend 1:1   [] Attend Cooking Classes  [] Complete and return diet survey  [] ** Andrei's nutritional goals are as follows:  [] Attend Nutrition Workshops  [] Attend 1:1   [] Attend Cooking Classes  [] ** Andrei achieved nutritional goals   [] Yes    [] No  If no, why? Use knowledge gained to continue Pritikin eating plan at home       Individual Cardiac Treatment Plan  Psychosocial  PSYCHOSOCIAL  ASSESSMENT/PLAN PSYCHOSOCIAL  REASSESSMENT PSYCHOSOCIAL   REASSESSMENT PSYCHOSOCIAL   REASSESSMENT PSYCHOSOCIAL  DISCHARGE/FOLLOW-UP   Stages of Change Stages of Change Stages of Change Stages of Change Stages of Change   [] Pre Contemplation  [x] Contemplation  [] Preparation  [] Action  [] Maintenance  [] Relapse [] Pre Contemplation  [] Contemplation  [x] Preparation  [] Action  [] Maintenance  [] Relapse [] Pre Contemplation  [] Contemplation  [] Preparation  [] Action  [] Maintenance  [] Relapse [] Pre Contemplation  [] Contemplation  [] Preparation  [] Action  [] Maintenance  [] Relapse [] Pre Contemplation  [] Contemplation  [] Preparation  [] Action  [] Maintenance  [] Relapse   PSYCHOSOCIAL ASSESSMENT PSYCHOSOCIAL ASSESSMENT PSYCHOSOCIAL ASSESSMENT PSYCHOSOCIAL ASSESSMENT PSYCHOSOCIAL ASSESSMENT   Behavioral Outcomes Behavioral Outcomes Behavioral Outcomes Behavioral Outcomes Behavioral Outcomes   Tool Used:  Connor & Sheryl, Quality of Life Index, Cardiac Version IV  *Given to patient to complete.  Tool Used:    Connor & Sheryl, Quality of Life Index, Cardiac Version IV  - na - pt did not return    QOL Index Score: **  HF:**  S&E:**  P&S: **  Family: **   Tool Used:     Connor & Sheryl, Quality of Life Index, Cardiac Version IV  QOL Index Score: **    HF:**  S&E:**  P&S: **  Family: **   PHQ-9 score 6  Depression Severity  []Minimal  [x]Mild []Moderate  []Moderately Severe  []Severe    PHQ-9 score **  Depression Severity  []Minimal  []Mild   []Moderate  []Moderately Severe []Severe   Does patient have Family Support? [x] Yes      [] No  No signs of marital/family distress       Within the Past Month:  *Have you wished you were dead or wished you could go to sleep and not wake up? [] Yes      [x] No  *Have you had any thoughts of killing yourself? [] Yes      [x] No         Using a scale of 0-10, 0=none, 10=very:   Rate your depression: 3  Rate your anxiety:  3  Using a scale of 0-10, 0=none, 10=very:   Rate your depression: 4  Rate your anxiety: 4 Using a scale of 0-10, 0=none, 10=very:   Rate your depression: **  Rate your anxiety:  ** Using a scale of 0-10, 0=none, 10=very:   Rate your depression: **  Rate your anxiety:  ** Using a scale of 0-10, 0=none, 10=very:   Rate your depression: **  Rate your anxiety:  **   Signs and Symptoms of Depression Present? [] Yes      [x] No   Signs and Symptoms of Depression Present? [] Yes      [x] No   Signs and Symptoms of Depression Present? [] Yes      [] No  If yes, please explain:  ** Signs and Symptoms of Depression Present? [] Yes      [] No  If yes, please explain:  ** Signs and Symptoms of Depression Present? [] Yes      [] No  If yes, please explain:  **   Signs and Symptoms of Anxiety Present? [] Yes      [x] No   Signs and Symptoms of Anxiety Present? [] Yes      [x] No   Signs and Symptoms of Anxiety Present? [] Yes      [] No  If yes, please explain:  ** Signs and Symptoms of Anxiety Present? [] Yes      [] No  If yes, please explain:  ** Signs and Symptoms of Anxiety Present? [] Yes      [] No  If yes, please explain:  **   [] Patient has poor eye contact   [] Flat affect present. [] Signs of anxiety, anger or hostility    [] Signs social isolation present.    []  Signs of alcohol or substance abuse       PSYCHOSOCIAL PLAN PSYCHOSOCIAL PLAN PSYCHOSOCIAL PLAN PSYCHOSOCIAL PLAN PSYCHOSOCIAL PLAN   *Interventions* *Interventions* *Interventions* *Interventions* *Interventions*   *Please check if needed  [] Psych Consult  [] Physician Referral  [x] Stress Management Skills *Please check if needed  [] Psych Consult  [] Physician Referral  [x] Stress Management Skills *Please check if needed  [] Psych Consult  [] Physician Referral  [] Stress Management Skills *Please check if needed  [] Psych Consult  [] Physician Referral  [] Stress Management Skills *Please check if needed  [] Psych Consult  [] Physician Referral  [] Stress Management Skills   Is patient currently taking anti-depressant or anti-anxiety medications? [] Yes      [x] No   Change in anti-depressant or anti-anxiety medications? [] Yes      [x] No   Change in anti-depressant or anti-anxiety medications? [] Yes      [] No  If yes, please list medications:  ** Change in anti-depressant or anti-anxiety medications? [] Yes      [] No  If yes, please list medications:  ** Change in anti-depressant or anti-anxiety medications?   [] Yes      [] No  If yes, please list medications:  **   *Education* *Education* *Education* *Education* *Education*   Healthy Mind-Set Workshops Recommended  [x] Stress & Health  [x] Taking Charge of Stress  [x] New Thoughts, New Behaviors  [x] Managing Moods & Relationships Healthy Mind-Set Workshops Attended/Date    [x] New Thoughts, New Behaviors - 2/13/18 Healthy Mind-Set Workshops Attended/Date Healthy Mind-Set Workshops Attended/Date Healthy Mind-Set Workshops  Completed  [] Yes      [] No   *Goals* *Goals* *Goals* *Goals* *Goals*   Andrei's psychosocial goals are as follows:  Complete HADS & Connor & Sheryl, Quality of Life Index, Cardiac Version IV Andrei's psychosocial goals are as follows:  [x] Attend Healthy Mind-Set Workshops  [x] Reduce depression symptom severity by 1 level  [] ** Andrei's psychosocial goals are as follows:  [] Attend Healthy Mind-Set Workshops  [] Changes:  [] Yes      [] No     Lipids  Medication Changes:  [] Yes      [] No     Lipids    TOTAL CHOL: **  HDL:  **  LDL:  **  TRIG:  **  Medication Changes:  [] Yes      [] No   Diabetes  [] Yes      [x] No   Diabetes  na   Diabetes  Most Recent BS:  BS have been in range  [] Yes      [] No  Medication Changes  [] Yes      [] No     Diabetes  Most Recent BS:  BS have been in range  [] Yes      [] No  Medication Changes  [] Yes      [] No     Diabetes  Most Recent BS:  BS have been in range  [] Yes      [] No  Medication Changes  [] Yes      [] No       Tobacco Use  [] Current  [x] Former  [] Never    Years smoked: 28:    Date Quit: 2014    # cigarettes smoked/day: 2 pks  Smokeless Tobacco use:   [] Yes      [x] No  Uses mariajuana few times per week   Tobacco Use  Change in smoking status   [] Yes      [x] No       Tobacco Use  Change in smoking status   [] Yes      [] No    Quit date: **   Tobacco Use  Change in smoking status   [] Yes      [] No    Quit date: ** Tobacco Use  Change in smoking status   [] Yes      [] No    Quit date: **            Learning Barriers  Please select one:  [] Speech  [] Literacy  [] Hearing  [] Cognitive  [] Vision  [x] Ready to Learn Learning Barriers Addressed:   [x] Yes      [] No   Learning Barriers Addressed:   [] Yes      [] No   Learning Barriers Addressed:  [] Yes      [] No Learning Barriers Addressed:  [] Yes      [] No     RISK FACTOR/EDUCATION PLAN RISK FACTOR/EDUCATION PLAN RISK FACTOR/EDUCATION PLAN RISK FACTOR/EDUCATION PLAN RISK FACTOR/EDUCATION PLAN   *Interventions* *Interventions* *Interventions* *Interventions* *Interventions*   Recommended Educational Videos    [x] Intake & The Pritikin Solution Program Overview  [x] Overview of The Pritikin Eating Plan  [x] Becoming A Pritikin   [x] Diseases of Our Time-Part 1  [x] Calorie Density  [x] Label Reading-Part 1  [x] Move It!   [x] Healthy Minds, Bodies, Hearts  [x] Dining Out-Part 1  [x] Heart Disease Risk How Our Thoughts Can Heal Our Hearts  [] Smoking Cessation  Nutrition  [] Planning Your Eating Strategy  [] Lable Reading- Part 2  [] Dining Out - Part 2  [] Targeting Your Nutrition Priorities  [] Fueling a Healthy Body  [] Menu Workshop  [employer] [] Breakfast & Snacks  [] [employer] Salads & Dressing  [] Dinner & Sides  [] Soups & Desserts   Additional Educational Videos Completed Additional Educational Videos Completed Additional Educational Videos Completed Additional Educational Videos Completed    [] Yes    [] No   *Goals* *Goals* *Goals* *Goals* *Goals*   Andrei's risk factor/education goals are as follows:    [x] Optimal BP <140/90  [] Blood Sugar <120  [x] Attend recommended video education sessions  [x] Takes medications as prescribed 100% of the time   [] ** Andrei's risk factor/education goals are as follows:    [x] Optimal BP <140/90  [] Blood Sugar <120  [x] Attend recommended video education sessions  [x] Takes medications as prescribed 100% of the time   [] ** Andrei's risk factor/education goals are as follows:    [x] Optimal BP <140/90  [] Blood Sugar <120  [x] Attend recommended video education sessions  [x] Takes medications as prescribed 100% of the time   [] ** Andrei's risk factor/education goals are as follows:    [x] Optimal BP <140/90  [] Blood Sugar <120  [x] Attend recommended video education sessions  [x] Takes medications as prescribed 100% of the time   [] ** Andrei achieved risk factor goals?   [] Yes    [] No  If no, why?  **     Monitored telemetry has revealed :NSR   Monitored telemetry has revealed NSR  [] documented arrhythmia at increasing workloads  [] associated symptoms  Monitored telemetry has revealed  [] documented arrhythmia at increasing workloads  [] associated symptoms ** Monitored telemetry has revealed **  [] documented arrhythmia at increasing workloads  [] associated symptoms ** Monitored telemetry has revealed **  [] documented arrhythmia at increasing workloads  [] associated symptoms **   Physician Response    [x] Cardiac rehab is reasonably and medically necessary for continuous cardiac monitoring surveillance  of patient's cardiac activity  [x] Initiate continuous telemerty monitoring and notify me with any concerns  [] Other  Electronically signed by Renetta Brandon on 2/1/2018 at 3:15 PM Physician Response    [x] Cardiac rehab is reasonably and medically necessary for continuous cardiac monitoring surveillance  of patient's cardiac activity  [x] Continue continuous telemerty monitoring and notify me with any concerns  [] Other     Physician Response      [x] Cardiac rehab is reasonably and medically necessary for continuous cardiac monitoring surveillance  of patient's cardiac activity  [x] Continue continuous telemerty monitoring and notify me with any concerns   [] Other     Physician Response    [x] Cardiac rehab is reasonably and medically necessary for continuous cardiac monitoring surveillance  of patient's cardiac activity  [x] Continue continuous telemerty monitoring and notify me with any concerns   [] Other        Cosigned by: Rajesh Urrutia DO at 2/1/2018  6:59 PM   Revision History      Date/Time User Provider Type Action   2/1/2018  6:59 PM Rajesh Urrutia DO Physician Cosign   2/1/2018  3:15 PM Renetta Brandon Exercise Physiologist Sign

## 2018-03-02 ENCOUNTER — HOSPITAL ENCOUNTER (OUTPATIENT)
Dept: CARDIAC REHAB | Age: 58
Setting detail: THERAPIES SERIES
Discharge: HOME | End: 2018-03-02
Payer: COMMERCIAL

## 2018-03-02 ENCOUNTER — TELEPHONE (OUTPATIENT)
Dept: CARDIOLOGY CLINIC | Age: 58
End: 2018-03-02

## 2018-03-02 DIAGNOSIS — R06.02 SOB (SHORTNESS OF BREATH): ICD-10-CM

## 2018-03-02 DIAGNOSIS — R63.5 WEIGHT GAIN: ICD-10-CM

## 2018-03-02 DIAGNOSIS — I25.708 CORONARY ARTERY DISEASE OF BYPASS GRAFT OF NATIVE HEART WITH STABLE ANGINA PECTORIS (HCC): Primary | ICD-10-CM

## 2018-03-02 PROCEDURE — G0423 INTENS CARDIAC REHAB NO EXER: HCPCS

## 2018-03-02 PROCEDURE — G0422 INTENS CARDIAC REHAB W/EXERC: HCPCS

## 2018-03-05 ENCOUNTER — HOSPITAL ENCOUNTER (OUTPATIENT)
Age: 58
Discharge: HOME | End: 2018-03-05
Payer: COMMERCIAL

## 2018-03-05 ENCOUNTER — OFFICE VISIT (OUTPATIENT)
Dept: CARDIOLOGY CLINIC | Age: 58
End: 2018-03-05
Payer: COMMERCIAL

## 2018-03-05 VITALS
BODY MASS INDEX: 36.96 KG/M2 | OXYGEN SATURATION: 94 % | HEIGHT: 71 IN | WEIGHT: 264 LBS | HEART RATE: 73 BPM | DIASTOLIC BLOOD PRESSURE: 78 MMHG | SYSTOLIC BLOOD PRESSURE: 126 MMHG

## 2018-03-05 DIAGNOSIS — I50.32 CHF (CONGESTIVE HEART FAILURE), NYHA CLASS II, CHRONIC, DIASTOLIC (HCC): Primary | ICD-10-CM

## 2018-03-05 DIAGNOSIS — I50.32 CHF (CONGESTIVE HEART FAILURE), NYHA CLASS II, CHRONIC, DIASTOLIC (HCC): ICD-10-CM

## 2018-03-05 LAB
HCT VFR BLD CALC: 45.7 % (ref 42–52)
HEMOGLOBIN: 15.7 GM/DL (ref 14–18)
MCH RBC QN AUTO: 33.2 PG (ref 27–31)
MCHC RBC AUTO-ENTMCNC: 34.4 GM/DL (ref 33–37)
MCV RBC AUTO: 96.3 FL (ref 80–94)
PDW BLD-RTO: 13.1 % (ref 11.5–14.5)
PLATELET # BLD: 229 THOU/MM3 (ref 130–400)
PMV BLD AUTO: 9.2 FL (ref 7.4–10.4)
RBC # BLD: 4.75 MILL/MM3 (ref 4.7–6.1)
WBC # BLD: 8.3 THOU/MM3 (ref 4.8–10.8)

## 2018-03-05 PROCEDURE — 36415 COLL VENOUS BLD VENIPUNCTURE: CPT

## 2018-03-05 PROCEDURE — 85027 COMPLETE CBC AUTOMATED: CPT

## 2018-03-05 PROCEDURE — 99213 OFFICE O/P EST LOW 20 MIN: CPT | Performed by: NURSE PRACTITIONER

## 2018-03-05 ASSESSMENT — ENCOUNTER SYMPTOMS
COUGH: 0
ABDOMINAL DISTENTION: 1
APNEA: 0
NAUSEA: 0
WHEEZING: 0
CHEST TIGHTNESS: 0
COLOR CHANGE: 0
SHORTNESS OF BREATH: 1
ABDOMINAL PAIN: 0

## 2018-03-05 NOTE — TELEPHONE ENCOUNTER
Patient notified orders placed. Joie notified. Requested to see patient today, appt made, patient notified.

## 2018-03-05 NOTE — PROGRESS NOTES
Heart Failure Clinic       Visit Date: 3/5/2018  Cardiologist:  Dr. Bob Prieto  Primary Care Physician: Dr. Sofía Duvall MD    Jenni Kraus is a 59 y.o. male who presents today for:  Chief Complaint   Patient presents with    Congestive Heart Failure     new patient        HPI:   Jenni Kraus is a 59 y.o. male who presents to the office for a new patient visit in the heart failure clinic. He has been noticing more SOB, weight gain and fatigue over the past week or so. He can not walk up a flight of stairs without feeling SOB. He is currently in cardiac rehab and does fatigue but can complete the exercises. He has changed the way he eats, limiting sodium and eating more whole foods. He denies cough, orthopnea. He sleeps in bed with 2 pillows. He drinks a beer a few nights a week. He drinks water, 5 or 6 bottles daily. He is compliant with taking his medications. He has seen some darker stools. He did see blood a few weeks ago but states he sometimes does this.      Patient has:  Last hospital admission related to Heart Failure:  > 1 year  Chest Pain: no  Worsening SOB/orthopnea/PND: yes see hpi   Edema: no  Any extra diuretic use since last visit: no  Weight gain: yes  Compliant checking home weight: yes  Fatigue: yes  Abdominal bloating: yes  Appetite: good  Difficulty sleeping: FAISAL compliant with CPAP  Cough: no  Compliant checking blood pressure: no  Any refills on CHF medications needed at this time: no    Past Medical History:   Diagnosis Date    Coronary artery disease involving native coronary artery of native heart without angina pectoris     GERD (gastroesophageal reflux disease) 3/21/2012    Hernia     Hx of blood clots 1990's    right knee due to injury    Hx of cocaine abuse     Hyperglycemia 09/09    Hyperlipidemia     Hypertension     FAISAL on CPAP     Osteoarthritis 11/07    S/P CABG x 2 07/06/2016    S/P PTCA: 1/15/2018: Stent diagonal branch Xience 3.0 x 12 mm. 1/15/2018 1/15/2018: Stent diagonal branch Xience 3.0 x 12 mm.  Dr. Jennifer Rees injury 08/2015    Right thumb cut with table saw, no treatment needed    Unspecified sleep apnea      Past Surgical History:   Procedure Laterality Date    CARDIAC CATHETERIZATION  06/27/2016    CATARACT REMOVAL Right 2013    COLONOSCOPY  04/08    CORONARY ARTERY BYPASS GRAFT  07/06/2016    Double bypass, Dr. Adriel Ascencio Right 02/2007    Neck    DIAGNOSTIC CARDIAC CATH LAB PROCEDURE      EYE SURGERY Right     Retinal surgery x 3    HERNIA REPAIR Right     Inguinal    PTCA  01/15/2018    ROTATOR CUFF REPAIR Right 11/16/2017    TONSILLECTOMY  child    TOTAL KNEE ARTHROPLASTY Left 10/24/2016    Dr. Екатерина Morales     Family History   Problem Relation Age of Onset    Heart Disease Mother     Heart Disease Father     Cancer Father      colon/prostate    Colon Cancer Father     Prostate Cancer Father      Social History   Substance Use Topics    Smoking status: Former Smoker     Packs/day: 2.00     Years: 25.00     Types: Cigars     Quit date: 11/11/2014    Smokeless tobacco: Never Used    Alcohol use 1.8 oz/week     3 Cans of beer per week     Current Outpatient Prescriptions   Medication Sig Dispense Refill    nitroGLYCERIN (NITROSTAT) 0.4 MG SL tablet Place 1 tablet under the tongue every 5 minutes as needed for Chest pain 25 tablet 3    ticagrelor (BRILINTA) 90 MG TABS tablet Take 1 tablet by mouth 2 times daily 60 tablet 5    aspirin 81 MG chewable tablet Take 1 tablet by mouth daily 30 tablet 3    metoprolol succinate (TOPROL XL) 25 MG extended release tablet Take 25 mg by mouth daily      isosorbide mononitrate (IMDUR) 30 MG extended release tablet Take 1 tablet by mouth daily 30 tablet 3    bumetanide (BUMEX) 2 MG tablet Take 1 tablet by mouth daily 90 tablet 3    omeprazole (PRILOSEC) 20 MG delayed release capsule Take 1 capsule by mouth daily 90 capsule 1    lisinopril (PRINIVIL;ZESTRIL) 10 MG tablet TAKE

## 2018-03-06 ENCOUNTER — HOSPITAL ENCOUNTER (OUTPATIENT)
Dept: CARDIAC REHAB | Age: 58
Setting detail: THERAPIES SERIES
Discharge: HOME | End: 2018-03-06
Payer: COMMERCIAL

## 2018-03-06 PROCEDURE — G0423 INTENS CARDIAC REHAB NO EXER: HCPCS

## 2018-03-06 PROCEDURE — G0422 INTENS CARDIAC REHAB W/EXERC: HCPCS

## 2018-03-06 NOTE — PROGRESS NOTES
Ria FARRIS.:  1960    Acct Number: [de-identified]   MRN:  [id number]                             [facility] HEALTHY MIND-SET WORKSHOP             Date: 3/6/2018    Session # __________    Alfredo Molina class covered:    ( )  Stress and Health Workshop:  [facility] patient will learn about the body's adaptive response that is triggered by a variety of stressors. Patient will gain insight into the toll that chronic stress takes on their health, both emotionally and physically. ( ) Taking Charge of Stress Workshop:  [facility] patient will learn and practice a variety of stress management techniques. Patient will be able to effectively apply coping mechanisms in perceived stressful situations. ( ) New Thoughts New Behaviors Workshop: [facility] patient will learn and practice techniques for developing effective health and lifestyle goals. Patient will be able to effectively apply the goal setting process learned to develop at least one new personal goal.     (x Managing Moods & Relationships Workshop:  [facility] patient will learn how emotional and chronic stress factors can impact their hearts. They will learn healthy ways to handle stress and utilize positive coping mechanisms. In addition, [facility] patient will learn ways to improve communication skills. Christiana Hills actively participated and verbalized understanding. Total time in the Healthy Mind-Set class was 60 minutes.     Electronically signed by TING Morel on 3/6/2018 at 12:24 PM

## 2018-03-06 NOTE — PROGRESS NOTES
Hospital Facility-Based Program  Phase 2 Cardiac Rehab Weekly Progress Report      Patient prescribed exercise:  9:00 class. 3 times per week in rehab, 1-4 times per week at home for the amount of sessions/weeks specified by insurance. Current Levels: Treadmill:  Mph/ % for   minutes, Schwinn Airdyne: Level 1.4 for 15 minutes, NuStep:  82 Bell for 15 minutes, UBE: Level 1.1 for 5 minutes. Progression Discussion: Maintain/Increase Aerobic exercise to work on endurance. Attempt to increase intensity by 5-20% for each modality this week. Try to increase intensities until Andrei rates the exercises a 13-17 on Anuj RPE.

## 2018-03-08 ENCOUNTER — HOSPITAL ENCOUNTER (OUTPATIENT)
Dept: CARDIAC REHAB | Age: 58
Setting detail: THERAPIES SERIES
Discharge: HOME | End: 2018-03-08
Payer: COMMERCIAL

## 2018-03-08 PROCEDURE — G0422 INTENS CARDIAC REHAB W/EXERC: HCPCS

## 2018-03-08 PROCEDURE — G0423 INTENS CARDIAC REHAB NO EXER: HCPCS

## 2018-03-09 ENCOUNTER — HOSPITAL ENCOUNTER (OUTPATIENT)
Dept: CARDIAC REHAB | Age: 58
Setting detail: THERAPIES SERIES
Discharge: HOME | End: 2018-03-09
Payer: COMMERCIAL

## 2018-03-09 PROCEDURE — G0422 INTENS CARDIAC REHAB W/EXERC: HCPCS

## 2018-03-09 PROCEDURE — G0423 INTENS CARDIAC REHAB NO EXER: HCPCS

## 2018-03-09 NOTE — PROGRESS NOTES
Viridiana FARRIS.:  1960  Acct Number: [de-identified]  MRN:  [id number]                         [facility] COOKING SCHOOL WORKSHOP             Date: 3/9/2018    Session # ________    [facility] class covered:      () Adding Flavor     () Fast Breakfasts     () Salads and Dressings     () Soups and Sauces     () Simple Sides     () Appetizers and Snacks     () Delicious Desserts     () Plant Based Proteins     () Fast Evening Meals     () Weekend Breakfasts     () Efficiency Cooking     (x) Meat Alternatives     Patients were shown how to choose, prep, and cook; substitutions and other options were given. Samples were offered. Recipes were given and questions answered. The patient above was in the [employer] for 45 minutes.       Electronically signed by Pacheco Martinez on 3/9/2018 at 11:21 AM

## 2018-03-13 ENCOUNTER — HOSPITAL ENCOUNTER (OUTPATIENT)
Dept: CARDIAC REHAB | Age: 58
Setting detail: THERAPIES SERIES
Discharge: HOME | End: 2018-03-13
Payer: COMMERCIAL

## 2018-03-13 PROCEDURE — G0423 INTENS CARDIAC REHAB NO EXER: HCPCS

## 2018-03-13 PROCEDURE — G0422 INTENS CARDIAC REHAB W/EXERC: HCPCS

## 2018-03-15 ENCOUNTER — APPOINTMENT (OUTPATIENT)
Dept: CARDIAC REHAB | Age: 58
End: 2018-03-15
Payer: COMMERCIAL

## 2018-03-15 ENCOUNTER — HOSPITAL ENCOUNTER (OUTPATIENT)
Dept: CARDIAC REHAB | Age: 58
Setting detail: THERAPIES SERIES
Discharge: HOME | End: 2018-03-15
Payer: COMMERCIAL

## 2018-03-16 ENCOUNTER — APPOINTMENT (OUTPATIENT)
Dept: CARDIAC REHAB | Age: 58
End: 2018-03-16
Payer: COMMERCIAL

## 2018-03-16 ENCOUNTER — HOSPITAL ENCOUNTER (OUTPATIENT)
Dept: CARDIAC REHAB | Age: 58
Setting detail: THERAPIES SERIES
End: 2018-03-16
Payer: COMMERCIAL

## 2018-03-20 ENCOUNTER — HOSPITAL ENCOUNTER (OUTPATIENT)
Dept: CARDIAC REHAB | Age: 58
Setting detail: THERAPIES SERIES
Discharge: HOME | End: 2018-03-20
Payer: COMMERCIAL

## 2018-03-20 VITALS — WEIGHT: 254 LBS | BODY MASS INDEX: 35.43 KG/M2

## 2018-03-20 PROCEDURE — G0422 INTENS CARDIAC REHAB W/EXERC: HCPCS

## 2018-03-20 PROCEDURE — G0423 INTENS CARDIAC REHAB NO EXER: HCPCS

## 2018-03-22 ENCOUNTER — HOSPITAL ENCOUNTER (OUTPATIENT)
Dept: CARDIAC REHAB | Age: 58
Setting detail: THERAPIES SERIES
Discharge: HOME | End: 2018-03-22
Payer: COMMERCIAL

## 2018-03-22 PROCEDURE — G0423 INTENS CARDIAC REHAB NO EXER: HCPCS

## 2018-03-22 PROCEDURE — G0422 INTENS CARDIAC REHAB W/EXERC: HCPCS

## 2018-03-22 NOTE — PROGRESS NOTES
Video Education Report - ICR/CR    Name:  Colleen Vizcarra     Date:  3/22/2018  MRN: [id number]     Session #:    Session Length: 45 min    Recommended Videos        []01 [employer] - Program Overview   34:22    []02 Overview of Pritikin Eating Plan   34:10    []03 Becoming a Pritikin    33:08     []04 Diseases of Our Time - Part 1   34:22    []05 Calorie Density     33:39   []06 Label Reading - Part 1    32:15   []07 Move it      32.54   []08 Healthy Minds, Bodies, Hearts   32:14   [x]09 Dining Out - Part 1    32:28   []10 Heart Disease Risk Reduction   76:18   []42 Metabolic Syndrome and Belly Fat  31:52   []12 Facts on Fat     35:29   []13 Diseases of Our Time - Part 2   33:07   []14 Biology of Weight Control   32:36   []15 Biomechanical Limitations   35:20   []16 Nutrition Action Plan    34:23      Comments:  Video completed,

## 2018-03-23 ENCOUNTER — HOSPITAL ENCOUNTER (OUTPATIENT)
Dept: CARDIAC REHAB | Age: 58
Setting detail: THERAPIES SERIES
Discharge: HOME | End: 2018-03-23
Payer: COMMERCIAL

## 2018-03-23 PROCEDURE — G0422 INTENS CARDIAC REHAB W/EXERC: HCPCS

## 2018-03-23 PROCEDURE — G0423 INTENS CARDIAC REHAB NO EXER: HCPCS

## 2018-03-27 ENCOUNTER — HOSPITAL ENCOUNTER (OUTPATIENT)
Dept: CARDIAC REHAB | Age: 58
Setting detail: THERAPIES SERIES
Discharge: HOME | End: 2018-03-27
Payer: COMMERCIAL

## 2018-03-27 PROCEDURE — G0423 INTENS CARDIAC REHAB NO EXER: HCPCS

## 2018-03-27 PROCEDURE — G0422 INTENS CARDIAC REHAB W/EXERC: HCPCS

## 2018-03-27 NOTE — PROGRESS NOTES
[facility]ospital Facility-Based Program  Phase 2 Cardiac Rehab Weekly Progress Report      Patient prescribed exercise:  9:00 class. 3 times per week in rehab, 1-4 times per week at home for the amount of sessions/weeks specified by insurance. Current Levels:  Schwinn Airdyne: Level 1.6 for 15 minutes, NuStep:  84 Bell for 15 minutes, UBE: Level 1.3 for 5 minutes. Progression Discussion: Maintain/Increase Aerobic exercise 15 minutes to work on endurance. Attempt to increase intensity by 5-20% for each modality this week. Try to increase intensities until Andrei rates the exercises a 13-17 on Anuj RPE.

## 2018-03-27 NOTE — PROGRESS NOTES
Colleen FARRIS.:  1960    Acct Number: [de-identified]   MRN:  [id number]                             [facility] HEALTHY MIND-SET WORKSHOP             Date: 3/27/2018    Session # __________    Sylvain Ureña class covered:    ( X )  Stress and Health Workshop:  [facility] patient will learn about the body's adaptive response that is triggered by a variety of stressors. Patient will gain insight into the toll that chronic stress takes on their health, both emotionally and physically. ( ) Taking Charge of Stress Workshop:  [facility] patient will learn and practice a variety of stress management techniques. Patient will be able to effectively apply coping mechanisms in perceived stressful situations. ( ) New Thoughts New Behaviors Workshop: [facility] patient will learn and practice techniques for developing effective health and lifestyle goals. Patient will be able to effectively apply the goal setting process learned to develop at least one new personal goal.     ( ) Managing Moods & Relationships Workshop:  [facility] patient will learn how emotional and chronic stress factors can impact their hearts. They will learn healthy ways to handle stress and utilize positive coping mechanisms. In addition, [facility] patient will learn ways to improve communication skills. Mango Rodriguez actively participated and verbalized understanding. Total time in the Healthy Mind-Set class was 60minutes.     Electronically signed by TING Rubio on 3/27/2018 at 12:00 PM

## 2018-03-29 ENCOUNTER — HOSPITAL ENCOUNTER (OUTPATIENT)
Dept: CARDIAC REHAB | Age: 58
Setting detail: THERAPIES SERIES
Discharge: HOME | End: 2018-03-29
Payer: COMMERCIAL

## 2018-03-29 PROCEDURE — G0422 INTENS CARDIAC REHAB W/EXERC: HCPCS

## 2018-03-29 PROCEDURE — G0423 INTENS CARDIAC REHAB NO EXER: HCPCS

## 2018-03-29 NOTE — PLAN OF CARE
[address] [place] [address] Facility-Based Program  Individualized Cardiac Treatment Plan    Patient Name:  Fadi Tadeo  :  1960  Age:  59 y.o. MRN:  [id number]  Diagnosis: PCI of Diag  Date of Event: 1/15/18   Physician:  Luis Watson  Next Office Visit:  ?  Date Entered Program: 18  Risk Stratifications: [] Low [x] Intermediate [] High  Allergies: No Known Allergies    Individual Cardiac Treatment Plan EXERCISE  INITIAL 30 DAY 60 DAY 90 DAY FINAL DAY   EXERCISE  ASSESSMENT/PLAN EXERCISE  REASSESSMENT EXERCISE   REASSESSMENT EXERCISE   REASSESSMENT EXERCISE  DISCHARGE/FOLLOW-UP   Date: 18 Date: 3/1/18 Date: 3/29/18 Date: Date:   Session #1 Session # 25 Session # 40 Session # ** Session # **  Last session completed on **   Stages of Change Stages of Change Stages of Change Stages of Change Stages of Change   [] Pre Contemplation  [x] Contemplation  [] Preparation  [] Action  [] Maintenance  [] Relapse [] Pre Contemplation  [] Contemplation  [x] Preparation  [] Action  [] Maintenance  [] Relapse [] Pre Contemplation  [] Contemplation  [] Preparation  [x] Action  [] Maintenance  [] Relapse [] Pre Contemplation  [] Contemplation  [] Preparation  [] Action  [] Maintenance  [] Relapse [] Pre Contemplation  [] Contemplation  [] Preparation  [] Action  [] Maintenance  [] Relapse   EXERCISE ASSESSMENT EXERCISE ASSESSMENT EXERCISE ASSESSMENT EXERCISE ASSESSMENT EXERCISE ASSESSMENT   6 Min Walk Test  Distance walked:   0.14 miles  739.2 ft.  2.1 METs  Max HR:86 BPM      RPE:  12    THR:  115-124  Rhythm:  NSR    6 Min Walk Test  Distance walked:   ** miles  ** ft  ** METs  Max HR:** BPM      RPE:  **  %Change ft= **    Rhythm:  **   DASI: 7.3 METS DASI: 7.25 METS DASI: 7.9 METS DASI: ** METS DASI: ** METS   Return to Work  [employer] on returning to work? [] Yes              [] No   [x] Disabled     [] Retired     Return to work:   Has Alessio Monte returned to work?  [] Yes    [x] No - disabled Return to work: Has Michelle Young returned to work? [] Yes    [x] No- disabled     Return to work: Has Michelle Young returned to work? [] Yes    [] No    Return to work date set? [] Yes, **    [] No    Michelle Young is doing ** at work. Return to work: Has Michelle Young returned to work? [] Yes    [] No    Return to work? [] Yes, **    [] No    *Required MET Level achieved for job duties? [] Yes    [] No   Orthopedic Limitations/  [x] Yes    [] No  If yes please list:  Knee pain, arthritis, shoulder surgery     Orthopedic Limitations  *If patient has orthopedic issue:   Actions/  accomodations needed to make Michelle Young successful : NuStep instead on TM  Orthopedic Limitations    NuStep instead on TM  Orthopedic Limitations   Orthopedic Limitations     Fall Risk  Fall risk assessed? [x] Yes      [] No    Balance Issues? [] Yes      [x] No     [] Walker [] Cane    [x] Safety issues reviewed      Fall Risk  *If patient is a fall risk, action needed to accommodate: na Fall Risk  NA Fall Risk Fall Risk   Home Exercise  [x] Yes    [] No  Type: bike  Frequency: 2 x   Duration: 20 min Home Exercise  [x] Yes    [] No  Type: bike  Frequency: 3x/wk  Duration: 30 min Home Exercise  [x] Yes    [] No  Type: bike, walking  Frequency: 3d/wk  Duration: 30mins Home Exercise  [] Yes    [] No  Type: **  Frequency: **  Duration: ** Home Exercise  [] Yes    [] No  Type: **  Frequency: **  Duration: **   Angina with Activity? [] Yes    [x] No  Angina Management: na Angina with Activity? [] Yes    [x] No  Angina Management: na Angina with Activity? [] Yes    [x] No  Angina Management: na Angina with Activity? [] Yes    [] No  Angina Management: ** Angina with Activity?   [] Yes    [] No  Angina Management: **   EXERCISE PLAN EXERCISE PLAN EXERCISE PLAN EXERCISE PLAN EXERCISE PLAN   *Interventions* *Interventions* *Interventions* *Interventions* *Interventions*   Exercise Prescription  (per physician & CR staff) School    # of sessions completed:  **   *Goals* *Goals* *Goals* *Goals* *Goals*   Andrei's nutritional goals are as follows:  Complete and return diet survey Andrei's nutritional goals are as follows:  [x] Attend Nutrition Workshops  [] Attend 1:1   [x] Attend Cooking Classes  [] ** Andrei's nutritional goals are as follows:  [x] Attend Nutrition Workshops  [] Attend 1:1   [x] Attend Cooking Classes  [x] Complete and return diet survey  [] ** Andrei's nutritional goals are as follows:  [] Attend Nutrition Workshops  [] Attend 1:1   [] Attend Cooking Classes  [] ** Andrei achieved nutritional goals   [] Yes    [] No  If no, why? Use knowledge gained to continue Pritikin eating plan at home       Individual Cardiac Treatment Plan  Psychosocial  PSYCHOSOCIAL  ASSESSMENT/PLAN PSYCHOSOCIAL  REASSESSMENT PSYCHOSOCIAL   REASSESSMENT PSYCHOSOCIAL   REASSESSMENT PSYCHOSOCIAL  DISCHARGE/FOLLOW-UP   Stages of Change Stages of Change Stages of Change Stages of Change Stages of Change   [] Pre Contemplation  [x] Contemplation  [] Preparation  [] Action  [] Maintenance  [] Relapse [] Pre Contemplation  [] Contemplation  [x] Preparation  [] Action  [] Maintenance  [] Relapse [] Pre Contemplation  [] Contemplation  [] Preparation  [x] Action  [] Maintenance  [] Relapse [] Pre Contemplation  [] Contemplation  [] Preparation  [] Action  [] Maintenance  [] Relapse [] Pre Contemplation  [] Contemplation  [] Preparation  [] Action  [] Maintenance  [] Relapse   PSYCHOSOCIAL ASSESSMENT PSYCHOSOCIAL ASSESSMENT PSYCHOSOCIAL ASSESSMENT PSYCHOSOCIAL ASSESSMENT PSYCHOSOCIAL ASSESSMENT   Behavioral Outcomes Behavioral Outcomes Behavioral Outcomes Behavioral Outcomes Behavioral Outcomes   Tool Used:  Connor & Sheryl, Quality of Life Index, Cardiac Version IV  *Given to patient to complete.  Tool Used:    Connor Saucedo, Quality of Life Index, Cardiac Version IV  - na - pt did not return    QOL Index Score: **  HF:**  S&E:**  P&S: **  Family: ** QOL Index Score: 20.95  HF:19.77  S&E:20.08  P&S: 20.14  Family: 26.2  Tool Used:     Connor Saucedo, Quality of Life Index, Cardiac Version IV  QOL Index Score: **    HF:**  S&E:**  P&S: **  Family: **   PHQ-9 score 6  Depression Severity  []Minimal  [x]Mild   []Moderate  []Moderately Severe  []Severe    PHQ-9 score **  Depression Severity  []Minimal  []Mild   []Moderate  []Moderately Severe []Severe   Does patient have Family Support? [x] Yes      [] No  No signs of marital/family distress       Within the Past Month:  *Have you wished you were dead or wished you could go to sleep and not wake up? [] Yes      [x] No  *Have you had any thoughts of killing yourself? [] Yes      [x] No         Using a scale of 0-10, 0=none, 10=very:   Rate your depression: 3  Rate your anxiety:  3  Using a scale of 0-10, 0=none, 10=very:   Rate your depression: 4  Rate your anxiety: 4 Using a scale of 0-10, 0=none, 10=very:   Rate your depression: 4  Rate your anxiety:  4 Using a scale of 0-10, 0=none, 10=very:   Rate your depression: **  Rate your anxiety:  ** Using a scale of 0-10, 0=none, 10=very:   Rate your depression: **  Rate your anxiety:  **   Signs and Symptoms of Depression Present? [] Yes      [x] No   Signs and Symptoms of Depression Present? [] Yes      [x] No   Signs and Symptoms of Depression Present? [] Yes      [x] No   Signs and Symptoms of Depression Present? [] Yes      [] No  If yes, please explain:  ** Signs and Symptoms of Depression Present? [] Yes      [] No  If yes, please explain:  **   Signs and Symptoms of Anxiety Present? [] Yes      [x] No   Signs and Symptoms of Anxiety Present? [] Yes      [x] No   Signs and Symptoms of Anxiety Present? [] Yes      [x] No   Signs and Symptoms of Anxiety Present? [] Yes      [] No  If yes, please explain:  ** Signs and Symptoms of Anxiety Present?     [] Yes      [] No  If yes, please explain:  **   [] Patient has poor eye contact   [] Flat affect present. [] Signs of anxiety, anger or hostility    [] Signs social isolation present. []  Signs of alcohol or substance abuse       PSYCHOSOCIAL PLAN PSYCHOSOCIAL PLAN PSYCHOSOCIAL PLAN PSYCHOSOCIAL PLAN PSYCHOSOCIAL PLAN   *Interventions* *Interventions* *Interventions* *Interventions* *Interventions*   *Please check if needed  [] Psych Consult  [] Physician Referral  [x] Stress Management Skills *Please check if needed  [] Psych Consult  [] Physician Referral  [x] Stress Management Skills *Please check if needed  [] Psych Consult  [] Physician Referral  [x] Stress Management Skills *Please check if needed  [] Psych Consult  [] Physician Referral  [] Stress Management Skills *Please check if needed  [] Psych Consult  [] Physician Referral  [] Stress Management Skills   Is patient currently taking anti-depressant or anti-anxiety medications? [] Yes      [x] No   Change in anti-depressant or anti-anxiety medications? [] Yes      [x] No   Change in anti-depressant or anti-anxiety medications? [] Yes      [x] No   Change in anti-depressant or anti-anxiety medications? [] Yes      [] No  If yes, please list medications:  ** Change in anti-depressant or anti-anxiety medications?   [] Yes      [] No  If yes, please list medications:  **   *Education* *Education* *Education* *Education* *Education*   Healthy Mind-Set Workshops Recommended  [x] Stress & Health  [x] Taking Charge of Stress  [x] New Thoughts, New Behaviors  [x] Managing Moods & Relationships Healthy Mind-Set Workshops Attended/Date    [x] New Thoughts, New Behaviors - 2/13/18 Healthy Mind-Set Workshops Attended/Date    [x] [place]- 3/27/18    [x] Managing Moods & Relationships- 3/6/18 Healthy Mind-Set Workshops Attended/Date Healthy Mind-Set Workshops  Completed  [] Yes      [] No   *Goals* *Goals* *Goals* *Goals* *Goals*   Andrei's psychosocial goals are as follows:  Complete HADS & Ferrcorrie & Saucedo, Quality of Life

## 2018-03-30 ENCOUNTER — HOSPITAL ENCOUNTER (OUTPATIENT)
Dept: CARDIAC REHAB | Age: 58
Setting detail: THERAPIES SERIES
Discharge: HOME | End: 2018-03-30
Payer: COMMERCIAL

## 2018-03-30 PROCEDURE — G0423 INTENS CARDIAC REHAB NO EXER: HCPCS

## 2018-03-30 PROCEDURE — G0422 INTENS CARDIAC REHAB W/EXERC: HCPCS

## 2018-03-30 NOTE — PROGRESS NOTES
Colleen FARRIS.:  1960  Acct Number: [de-identified]  MRN:  [id number]                      [facility] COOKING SCHOOL WORKSHOP             Date: 3/30/2018    Session # ________    Sylvain [facility] covered:      () Adding Flavor     () Fast Breakfasts     (x) Salads and Dressings     () Soups and Sauces     () Simple Sides     () Appetizers and Snacks     () Delicious Desserts     () Plant Based Proteins     () Fast Evening Meals     () Weekend Breakfasts     () Efficiency Cooking     () Meat Alternatives     Patients were shown how to choose, prep, and cook; substitutions and other options were given. Samples were offered. Recipes were given and questions answered. The patient above was in the [employer] for 45 minutes.       Electronically signed by Connie Modi on 3/30/2018 at 11:12 AM

## 2018-04-03 ENCOUNTER — HOSPITAL ENCOUNTER (OUTPATIENT)
Dept: CARDIAC REHAB | Age: 58
Setting detail: THERAPIES SERIES
Discharge: HOME | End: 2018-04-03
Payer: COMMERCIAL

## 2018-04-03 PROCEDURE — G0423 INTENS CARDIAC REHAB NO EXER: HCPCS

## 2018-04-03 PROCEDURE — G0422 INTENS CARDIAC REHAB W/EXERC: HCPCS

## 2018-04-05 ENCOUNTER — HOSPITAL ENCOUNTER (OUTPATIENT)
Dept: CARDIAC REHAB | Age: 58
Setting detail: THERAPIES SERIES
Discharge: HOME | End: 2018-04-05
Payer: COMMERCIAL

## 2018-04-05 PROCEDURE — G0422 INTENS CARDIAC REHAB W/EXERC: HCPCS

## 2018-04-05 PROCEDURE — G0423 INTENS CARDIAC REHAB NO EXER: HCPCS

## 2018-04-06 ENCOUNTER — APPOINTMENT (OUTPATIENT)
Dept: CARDIAC REHAB | Age: 58
End: 2018-04-06
Payer: COMMERCIAL

## 2018-04-06 ENCOUNTER — HOSPITAL ENCOUNTER (OUTPATIENT)
Dept: CARDIAC REHAB | Age: 58
Setting detail: THERAPIES SERIES
End: 2018-04-06
Payer: COMMERCIAL

## 2018-04-10 ENCOUNTER — HOSPITAL ENCOUNTER (OUTPATIENT)
Dept: CARDIAC REHAB | Age: 58
Setting detail: THERAPIES SERIES
Discharge: HOME | End: 2018-04-10
Payer: COMMERCIAL

## 2018-04-10 PROCEDURE — G0423 INTENS CARDIAC REHAB NO EXER: HCPCS

## 2018-04-10 PROCEDURE — G0422 INTENS CARDIAC REHAB W/EXERC: HCPCS

## 2018-04-11 RX ORDER — TICAGRELOR 90 MG/1
TABLET ORAL
Qty: 60 TABLET | Refills: 5 | Status: SHIPPED | OUTPATIENT
Start: 2018-04-11 | End: 2018-11-27 | Stop reason: SDUPTHER

## 2018-04-12 ENCOUNTER — HOSPITAL ENCOUNTER (OUTPATIENT)
Dept: CARDIAC REHAB | Age: 58
Setting detail: THERAPIES SERIES
Discharge: HOME | End: 2018-04-12
Payer: COMMERCIAL

## 2018-04-12 ENCOUNTER — OFFICE VISIT (OUTPATIENT)
Dept: PULMONOLOGY | Age: 58
End: 2018-04-12
Payer: COMMERCIAL

## 2018-04-12 VITALS
SYSTOLIC BLOOD PRESSURE: 98 MMHG | OXYGEN SATURATION: 94 % | WEIGHT: 246 LBS | BODY MASS INDEX: 34.44 KG/M2 | HEART RATE: 82 BPM | DIASTOLIC BLOOD PRESSURE: 68 MMHG | TEMPERATURE: 98.6 F | HEIGHT: 71 IN

## 2018-04-12 DIAGNOSIS — R06.02 SOB (SHORTNESS OF BREATH): Primary | ICD-10-CM

## 2018-04-12 PROCEDURE — G0423 INTENS CARDIAC REHAB NO EXER: HCPCS

## 2018-04-12 PROCEDURE — G0422 INTENS CARDIAC REHAB W/EXERC: HCPCS

## 2018-04-12 PROCEDURE — 99214 OFFICE O/P EST MOD 30 MIN: CPT | Performed by: INTERNAL MEDICINE

## 2018-04-12 PROCEDURE — 94618 PULMONARY STRESS TESTING: CPT | Performed by: INTERNAL MEDICINE

## 2018-04-12 RX ORDER — ALBUTEROL SULFATE 90 UG/1
2 AEROSOL, METERED RESPIRATORY (INHALATION) EVERY 6 HOURS PRN
Qty: 1 INHALER | Refills: 3 | Status: SHIPPED | OUTPATIENT
Start: 2018-04-12 | End: 2019-05-17 | Stop reason: SDUPTHER

## 2018-04-12 ASSESSMENT — ENCOUNTER SYMPTOMS
COUGH: 1
CHEST TIGHTNESS: 0
TROUBLE SWALLOWING: 0
ABDOMINAL PAIN: 0
FACIAL SWELLING: 0
SORE THROAT: 0
RECTAL PAIN: 0
ABDOMINAL DISTENTION: 0
WHEEZING: 1
VOICE CHANGE: 0
RHINORRHEA: 0
COLOR CHANGE: 0
CONSTIPATION: 0
SHORTNESS OF BREATH: 1

## 2018-04-13 ENCOUNTER — HOSPITAL ENCOUNTER (OUTPATIENT)
Dept: CARDIAC REHAB | Age: 58
Setting detail: THERAPIES SERIES
Discharge: HOME | End: 2018-04-13
Payer: COMMERCIAL

## 2018-04-13 PROCEDURE — G0423 INTENS CARDIAC REHAB NO EXER: HCPCS

## 2018-04-13 PROCEDURE — G0422 INTENS CARDIAC REHAB W/EXERC: HCPCS

## 2018-04-17 ENCOUNTER — HOSPITAL ENCOUNTER (OUTPATIENT)
Dept: CARDIAC REHAB | Age: 58
Setting detail: THERAPIES SERIES
Discharge: HOME | End: 2018-04-17
Payer: COMMERCIAL

## 2018-04-17 PROCEDURE — G0422 INTENS CARDIAC REHAB W/EXERC: HCPCS

## 2018-04-17 PROCEDURE — G0423 INTENS CARDIAC REHAB NO EXER: HCPCS

## 2018-04-19 ENCOUNTER — HOSPITAL ENCOUNTER (OUTPATIENT)
Dept: CARDIAC REHAB | Age: 58
Setting detail: THERAPIES SERIES
Discharge: HOME | End: 2018-04-19
Payer: COMMERCIAL

## 2018-04-19 PROCEDURE — G0423 INTENS CARDIAC REHAB NO EXER: HCPCS

## 2018-04-19 PROCEDURE — G0422 INTENS CARDIAC REHAB W/EXERC: HCPCS

## 2018-04-19 RX ORDER — ISOSORBIDE MONONITRATE 30 MG/1
TABLET, EXTENDED RELEASE ORAL
Qty: 30 TABLET | Refills: 2 | Status: SHIPPED | OUTPATIENT
Start: 2018-04-19 | End: 2018-07-07 | Stop reason: SDUPTHER

## 2018-04-20 ENCOUNTER — HOSPITAL ENCOUNTER (OUTPATIENT)
Dept: CARDIAC REHAB | Age: 58
Setting detail: THERAPIES SERIES
Discharge: HOME | End: 2018-04-20
Payer: COMMERCIAL

## 2018-04-20 PROCEDURE — G0422 INTENS CARDIAC REHAB W/EXERC: HCPCS

## 2018-04-20 PROCEDURE — G0423 INTENS CARDIAC REHAB NO EXER: HCPCS

## 2018-04-20 RX ORDER — POTASSIUM CHLORIDE 20 MEQ/1
TABLET, EXTENDED RELEASE ORAL
Qty: 90 TABLET | Refills: 1 | Status: SHIPPED | OUTPATIENT
Start: 2018-04-20 | End: 2018-06-14

## 2018-04-20 RX ORDER — LISINOPRIL 10 MG/1
TABLET ORAL
Qty: 90 TABLET | Refills: 1 | Status: SHIPPED | OUTPATIENT
Start: 2018-04-20 | End: 2018-08-13 | Stop reason: SDUPTHER

## 2018-04-20 RX ORDER — OMEPRAZOLE 20 MG/1
CAPSULE, DELAYED RELEASE ORAL
Qty: 90 CAPSULE | Refills: 1 | Status: SHIPPED | OUTPATIENT
Start: 2018-04-20 | End: 2018-10-03 | Stop reason: SDUPTHER

## 2018-04-24 ENCOUNTER — HOSPITAL ENCOUNTER (OUTPATIENT)
Dept: CARDIAC REHAB | Age: 58
Setting detail: THERAPIES SERIES
Discharge: HOME | End: 2018-04-24
Payer: COMMERCIAL

## 2018-04-24 PROCEDURE — G0422 INTENS CARDIAC REHAB W/EXERC: HCPCS

## 2018-04-24 PROCEDURE — G0423 INTENS CARDIAC REHAB NO EXER: HCPCS

## 2018-04-26 ENCOUNTER — APPOINTMENT (OUTPATIENT)
Dept: CARDIAC REHAB | Age: 58
End: 2018-04-26
Payer: COMMERCIAL

## 2018-04-26 ENCOUNTER — HOSPITAL ENCOUNTER (OUTPATIENT)
Dept: CARDIAC REHAB | Age: 58
Setting detail: THERAPIES SERIES
End: 2018-04-26
Payer: COMMERCIAL

## 2018-04-27 ENCOUNTER — HOSPITAL ENCOUNTER (OUTPATIENT)
Dept: CARDIAC REHAB | Age: 58
Setting detail: THERAPIES SERIES
Discharge: HOME | End: 2018-04-27
Payer: COMMERCIAL

## 2018-04-27 PROCEDURE — G0423 INTENS CARDIAC REHAB NO EXER: HCPCS

## 2018-04-27 PROCEDURE — G0422 INTENS CARDIAC REHAB W/EXERC: HCPCS

## 2018-05-01 ENCOUNTER — HOSPITAL ENCOUNTER (OUTPATIENT)
Dept: CARDIAC REHAB | Age: 58
Setting detail: THERAPIES SERIES
Discharge: HOME | End: 2018-05-01
Payer: COMMERCIAL

## 2018-05-01 PROCEDURE — G0422 INTENS CARDIAC REHAB W/EXERC: HCPCS

## 2018-05-01 PROCEDURE — G0423 INTENS CARDIAC REHAB NO EXER: HCPCS

## 2018-05-03 ENCOUNTER — HOSPITAL ENCOUNTER (OUTPATIENT)
Dept: CARDIAC REHAB | Age: 58
Setting detail: THERAPIES SERIES
Discharge: HOME | End: 2018-05-03
Payer: COMMERCIAL

## 2018-05-03 ENCOUNTER — APPOINTMENT (OUTPATIENT)
Dept: CARDIAC REHAB | Age: 58
End: 2018-05-03
Payer: COMMERCIAL

## 2018-05-03 PROCEDURE — G0422 INTENS CARDIAC REHAB W/EXERC: HCPCS

## 2018-05-03 PROCEDURE — G0423 INTENS CARDIAC REHAB NO EXER: HCPCS

## 2018-05-04 ENCOUNTER — HOSPITAL ENCOUNTER (OUTPATIENT)
Dept: CARDIAC REHAB | Age: 58
Setting detail: THERAPIES SERIES
Discharge: HOME | End: 2018-05-04
Payer: COMMERCIAL

## 2018-05-04 ENCOUNTER — APPOINTMENT (OUTPATIENT)
Dept: CARDIAC REHAB | Age: 58
End: 2018-05-04
Payer: COMMERCIAL

## 2018-05-04 PROCEDURE — G0423 INTENS CARDIAC REHAB NO EXER: HCPCS

## 2018-05-04 PROCEDURE — G0422 INTENS CARDIAC REHAB W/EXERC: HCPCS

## 2018-05-15 ENCOUNTER — HOSPITAL ENCOUNTER (OUTPATIENT)
Dept: PULMONOLOGY | Age: 58
Discharge: HOME | End: 2018-05-15
Payer: COMMERCIAL

## 2018-05-15 DIAGNOSIS — R06.02 SOB (SHORTNESS OF BREATH): ICD-10-CM

## 2018-05-15 PROCEDURE — 94729 DIFFUSING CAPACITY: CPT

## 2018-05-15 PROCEDURE — 94060 EVALUATION OF WHEEZING: CPT

## 2018-05-15 PROCEDURE — 94726 PLETHYSMOGRAPHY LUNG VOLUMES: CPT

## 2018-05-17 ENCOUNTER — OFFICE VISIT (OUTPATIENT)
Dept: CARDIOLOGY CLINIC | Age: 58
End: 2018-05-17
Payer: COMMERCIAL

## 2018-05-17 VITALS
HEART RATE: 73 BPM | OXYGEN SATURATION: 95 % | WEIGHT: 246 LBS | DIASTOLIC BLOOD PRESSURE: 75 MMHG | SYSTOLIC BLOOD PRESSURE: 120 MMHG | HEIGHT: 70 IN | BODY MASS INDEX: 35.22 KG/M2

## 2018-05-17 DIAGNOSIS — I50.32 CHF (CONGESTIVE HEART FAILURE), NYHA CLASS I, CHRONIC, DIASTOLIC (HCC): Primary | ICD-10-CM

## 2018-05-17 PROCEDURE — 99213 OFFICE O/P EST LOW 20 MIN: CPT | Performed by: NURSE PRACTITIONER

## 2018-05-17 ASSESSMENT — ENCOUNTER SYMPTOMS
COLOR CHANGE: 0
SHORTNESS OF BREATH: 0
ABDOMINAL PAIN: 0
WHEEZING: 0
ABDOMINAL DISTENTION: 0
CHEST TIGHTNESS: 0
APNEA: 0
COUGH: 0
NAUSEA: 0

## 2018-05-21 ENCOUNTER — OFFICE VISIT (OUTPATIENT)
Dept: PULMONOLOGY | Age: 58
End: 2018-05-21
Payer: COMMERCIAL

## 2018-05-21 VITALS
DIASTOLIC BLOOD PRESSURE: 62 MMHG | SYSTOLIC BLOOD PRESSURE: 102 MMHG | HEART RATE: 61 BPM | TEMPERATURE: 97.6 F | OXYGEN SATURATION: 96 % | BODY MASS INDEX: 34.38 KG/M2 | HEIGHT: 71 IN | WEIGHT: 245.6 LBS

## 2018-05-21 DIAGNOSIS — I25.10 CORONARY ARTERY DISEASE INVOLVING NATIVE HEART WITHOUT ANGINA PECTORIS, UNSPECIFIED VESSEL OR LESION TYPE: ICD-10-CM

## 2018-05-21 DIAGNOSIS — J44.9 CHRONIC OBSTRUCTIVE PULMONARY DISEASE, UNSPECIFIED COPD TYPE (HCC): Primary | ICD-10-CM

## 2018-05-21 DIAGNOSIS — Z99.89 OSA ON CPAP: ICD-10-CM

## 2018-05-21 DIAGNOSIS — R06.02 SHORTNESS OF BREATH: ICD-10-CM

## 2018-05-21 DIAGNOSIS — I10 ESSENTIAL HYPERTENSION: ICD-10-CM

## 2018-05-21 DIAGNOSIS — F17.211 NICOTINE DEPENDENCE, CIGARETTES, IN REMISSION: ICD-10-CM

## 2018-05-21 DIAGNOSIS — G47.33 OSA ON CPAP: ICD-10-CM

## 2018-05-21 PROCEDURE — 99214 OFFICE O/P EST MOD 30 MIN: CPT | Performed by: NURSE PRACTITIONER

## 2018-05-21 PROCEDURE — G0296 VISIT TO DETERM LDCT ELIG: HCPCS | Performed by: NURSE PRACTITIONER

## 2018-05-22 RX ORDER — ATORVASTATIN CALCIUM 40 MG/1
40 TABLET, FILM COATED ORAL NIGHTLY
Qty: 90 TABLET | Refills: 4 | Status: SHIPPED | OUTPATIENT
Start: 2018-05-22 | End: 2019-07-30 | Stop reason: SDUPTHER

## 2018-06-01 ENCOUNTER — OFFICE VISIT (OUTPATIENT)
Dept: FAMILY MEDICINE CLINIC | Age: 58
End: 2018-06-01

## 2018-06-01 VITALS
DIASTOLIC BLOOD PRESSURE: 68 MMHG | WEIGHT: 243 LBS | HEART RATE: 78 BPM | HEIGHT: 71 IN | RESPIRATION RATE: 16 BRPM | BODY MASS INDEX: 34.02 KG/M2 | SYSTOLIC BLOOD PRESSURE: 106 MMHG

## 2018-06-01 DIAGNOSIS — E11.9 DIABETES MELLITUS TYPE 2, DIET-CONTROLLED (HCC): ICD-10-CM

## 2018-06-01 DIAGNOSIS — I10 ESSENTIAL HYPERTENSION: ICD-10-CM

## 2018-06-01 DIAGNOSIS — I25.10 CORONARY ARTERY DISEASE INVOLVING NATIVE CORONARY ARTERY OF NATIVE HEART WITHOUT ANGINA PECTORIS: ICD-10-CM

## 2018-06-01 DIAGNOSIS — E78.5 HYPERLIPIDEMIA, UNSPECIFIED HYPERLIPIDEMIA TYPE: ICD-10-CM

## 2018-06-01 LAB
GLUCOSE BLD-MCNC: 125 MG/DL
HBA1C MFR BLD: 6.4 %

## 2018-06-01 PROCEDURE — 99213 OFFICE O/P EST LOW 20 MIN: CPT | Performed by: FAMILY MEDICINE

## 2018-06-01 PROCEDURE — 82962 GLUCOSE BLOOD TEST: CPT | Performed by: FAMILY MEDICINE

## 2018-06-01 PROCEDURE — 83036 HEMOGLOBIN GLYCOSYLATED A1C: CPT | Performed by: FAMILY MEDICINE

## 2018-06-01 RX ORDER — BUMETANIDE 2 MG/1
2 TABLET ORAL DAILY
Qty: 90 TABLET | Refills: 1 | Status: SHIPPED | OUTPATIENT
Start: 2018-06-01 | End: 2018-11-19 | Stop reason: SDUPTHER

## 2018-06-01 ASSESSMENT — ENCOUNTER SYMPTOMS
ABDOMINAL PAIN: 0
NAUSEA: 0
DIARRHEA: 0
CHEST TIGHTNESS: 0
BLOOD IN STOOL: 0
COUGH: 0
CONSTIPATION: 0
VOMITING: 0
SHORTNESS OF BREATH: 0
EYES NEGATIVE: 1
BACK PAIN: 1

## 2018-06-01 ASSESSMENT — PATIENT HEALTH QUESTIONNAIRE - PHQ9
SUM OF ALL RESPONSES TO PHQ9 QUESTIONS 1 & 2: 0
SUM OF ALL RESPONSES TO PHQ QUESTIONS 1-9: 0
1. LITTLE INTEREST OR PLEASURE IN DOING THINGS: 0
2. FEELING DOWN, DEPRESSED OR HOPELESS: 0

## 2018-06-12 ENCOUNTER — HOSPITAL ENCOUNTER (OUTPATIENT)
Age: 58
Discharge: HOME | End: 2018-06-12
Payer: COMMERCIAL

## 2018-06-12 DIAGNOSIS — I50.32 CHF (CONGESTIVE HEART FAILURE), NYHA CLASS I, CHRONIC, DIASTOLIC (HCC): ICD-10-CM

## 2018-06-12 DIAGNOSIS — I10 ESSENTIAL HYPERTENSION: ICD-10-CM

## 2018-06-12 DIAGNOSIS — E78.5 HYPERLIPIDEMIA, UNSPECIFIED HYPERLIPIDEMIA TYPE: ICD-10-CM

## 2018-06-12 LAB
ALBUMIN SERPL-MCNC: 4.5 G/DL (ref 3.5–5.1)
ALP BLD-CCNC: 78 U/L (ref 38–126)
ALT SERPL-CCNC: 32 U/L (ref 11–66)
ANION GAP SERPL CALCULATED.3IONS-SCNC: 15 MEQ/L (ref 8–16)
AST SERPL-CCNC: 22 U/L (ref 5–40)
BILIRUB SERPL-MCNC: 0.5 MG/DL (ref 0.3–1.2)
BUN BLDV-MCNC: 20 MG/DL (ref 7–22)
CALCIUM SERPL-MCNC: 9.4 MG/DL (ref 8.5–10.5)
CHLORIDE BLD-SCNC: 98 MEQ/L (ref 98–111)
CHOLESTEROL, TOTAL: 129 MG/DL (ref 100–199)
CO2: 24 MEQ/L (ref 23–33)
CREAT SERPL-MCNC: 0.8 MG/DL (ref 0.4–1.2)
GFR SERPL CREATININE-BSD FRML MDRD: > 90 ML/MIN/1.73M2
GLUCOSE BLD-MCNC: 134 MG/DL (ref 70–108)
HDLC SERPL-MCNC: 52 MG/DL
LDL CHOLESTEROL CALCULATED: 57 MG/DL
POTASSIUM SERPL-SCNC: 4.5 MEQ/L (ref 3.5–5.2)
SODIUM BLD-SCNC: 137 MEQ/L (ref 135–145)
TOTAL PROTEIN: 7.3 G/DL (ref 6.1–8)
TRIGL SERPL-MCNC: 101 MG/DL (ref 0–199)

## 2018-06-12 PROCEDURE — 80053 COMPREHEN METABOLIC PANEL: CPT

## 2018-06-12 PROCEDURE — 36415 COLL VENOUS BLD VENIPUNCTURE: CPT

## 2018-06-12 PROCEDURE — 80061 LIPID PANEL: CPT

## 2018-06-13 RX ORDER — TICAGRELOR 90 MG/1
TABLET ORAL
Qty: 60 TABLET | Refills: 5 | Status: SHIPPED | OUTPATIENT
Start: 2018-06-13 | End: 2018-06-14

## 2018-06-14 ENCOUNTER — OFFICE VISIT (OUTPATIENT)
Dept: CARDIOLOGY CLINIC | Age: 58
End: 2018-06-14
Payer: COMMERCIAL

## 2018-06-14 VITALS
DIASTOLIC BLOOD PRESSURE: 60 MMHG | WEIGHT: 241 LBS | SYSTOLIC BLOOD PRESSURE: 100 MMHG | HEART RATE: 72 BPM | BODY MASS INDEX: 33.74 KG/M2 | HEIGHT: 71 IN

## 2018-06-14 DIAGNOSIS — E78.01 FAMILIAL HYPERCHOLESTEROLEMIA: ICD-10-CM

## 2018-06-14 DIAGNOSIS — I25.810 CORONARY ARTERY DISEASE INVOLVING CORONARY BYPASS GRAFT OF NATIVE HEART WITHOUT ANGINA PECTORIS: Primary | ICD-10-CM

## 2018-06-14 DIAGNOSIS — I10 ESSENTIAL HYPERTENSION: ICD-10-CM

## 2018-06-14 PROCEDURE — 99213 OFFICE O/P EST LOW 20 MIN: CPT | Performed by: NUCLEAR MEDICINE

## 2018-06-18 ENCOUNTER — OFFICE VISIT (OUTPATIENT)
Dept: FAMILY MEDICINE CLINIC | Age: 58
End: 2018-06-18

## 2018-06-18 ENCOUNTER — TELEPHONE (OUTPATIENT)
Dept: FAMILY MEDICINE CLINIC | Age: 58
End: 2018-06-18

## 2018-06-18 ENCOUNTER — HOSPITAL ENCOUNTER (OUTPATIENT)
Age: 58
Discharge: HOME | End: 2018-06-18
Payer: COMMERCIAL

## 2018-06-18 ENCOUNTER — HOSPITAL ENCOUNTER (OUTPATIENT)
Dept: GENERAL RADIOLOGY | Age: 58
Discharge: HOME | End: 2018-06-18
Payer: COMMERCIAL

## 2018-06-18 VITALS
SYSTOLIC BLOOD PRESSURE: 124 MMHG | WEIGHT: 247 LBS | RESPIRATION RATE: 16 BRPM | BODY MASS INDEX: 34.58 KG/M2 | HEART RATE: 60 BPM | DIASTOLIC BLOOD PRESSURE: 72 MMHG | HEIGHT: 71 IN

## 2018-06-18 DIAGNOSIS — M54.50 CHRONIC RIGHT-SIDED LOW BACK PAIN WITHOUT SCIATICA: Primary | ICD-10-CM

## 2018-06-18 DIAGNOSIS — G89.29 CHRONIC RIGHT-SIDED LOW BACK PAIN WITHOUT SCIATICA: ICD-10-CM

## 2018-06-18 DIAGNOSIS — I10 ESSENTIAL HYPERTENSION: ICD-10-CM

## 2018-06-18 DIAGNOSIS — M51.36 DDD (DEGENERATIVE DISC DISEASE), LUMBAR: Primary | ICD-10-CM

## 2018-06-18 DIAGNOSIS — G89.29 CHRONIC RIGHT-SIDED LOW BACK PAIN WITHOUT SCIATICA: Primary | ICD-10-CM

## 2018-06-18 DIAGNOSIS — M54.50 CHRONIC RIGHT-SIDED LOW BACK PAIN WITHOUT SCIATICA: ICD-10-CM

## 2018-06-18 PROCEDURE — 72110 X-RAY EXAM L-2 SPINE 4/>VWS: CPT

## 2018-06-18 PROCEDURE — 99213 OFFICE O/P EST LOW 20 MIN: CPT | Performed by: FAMILY MEDICINE

## 2018-06-18 RX ORDER — ORPHENADRINE CITRATE 100 MG/1
100 TABLET, EXTENDED RELEASE ORAL 2 TIMES DAILY
Qty: 20 TABLET | Refills: 0 | Status: SHIPPED | OUTPATIENT
Start: 2018-06-18 | End: 2018-06-28

## 2018-06-18 ASSESSMENT — ENCOUNTER SYMPTOMS
BACK PAIN: 1
ABDOMINAL PAIN: 0
NAUSEA: 0
BLOOD IN STOOL: 0
VOMITING: 0
CONSTIPATION: 0
SHORTNESS OF BREATH: 0
COUGH: 0
EYES NEGATIVE: 1
CHEST TIGHTNESS: 0
DIARRHEA: 0

## 2018-06-21 ENCOUNTER — HOSPITAL ENCOUNTER (OUTPATIENT)
Dept: PHYSICAL THERAPY | Age: 58
Setting detail: THERAPIES SERIES
Discharge: HOME | End: 2018-06-21
Payer: COMMERCIAL

## 2018-06-21 PROCEDURE — 97110 THERAPEUTIC EXERCISES: CPT

## 2018-06-21 PROCEDURE — 97161 PT EVAL LOW COMPLEX 20 MIN: CPT

## 2018-06-21 ASSESSMENT — PAIN DESCRIPTION - DESCRIPTORS: DESCRIPTORS: STABBING

## 2018-06-21 ASSESSMENT — PAIN DESCRIPTION - LOCATION: LOCATION: BACK

## 2018-06-21 ASSESSMENT — PAIN SCALES - GENERAL: PAINLEVEL_OUTOF10: 3

## 2018-06-21 ASSESSMENT — PAIN DESCRIPTION - ORIENTATION: ORIENTATION: LOWER;RIGHT

## 2018-06-21 ASSESSMENT — PAIN DESCRIPTION - PAIN TYPE: TYPE: CHRONIC PAIN

## 2018-06-22 ENCOUNTER — HOSPITAL ENCOUNTER (OUTPATIENT)
Dept: PHYSICAL THERAPY | Age: 58
Setting detail: THERAPIES SERIES
Discharge: HOME | End: 2018-06-22
Payer: COMMERCIAL

## 2018-06-22 PROCEDURE — 97110 THERAPEUTIC EXERCISES: CPT

## 2018-06-22 ASSESSMENT — PAIN SCALES - GENERAL: PAINLEVEL_OUTOF10: 3

## 2018-06-22 ASSESSMENT — PAIN DESCRIPTION - PAIN TYPE: TYPE: CHRONIC PAIN

## 2018-06-22 ASSESSMENT — PAIN DESCRIPTION - ORIENTATION: ORIENTATION: RIGHT;LOWER

## 2018-06-22 ASSESSMENT — PAIN DESCRIPTION - LOCATION: LOCATION: BACK

## 2018-06-25 ENCOUNTER — HOSPITAL ENCOUNTER (OUTPATIENT)
Dept: PHYSICAL THERAPY | Age: 58
Setting detail: THERAPIES SERIES
Discharge: HOME | End: 2018-06-25
Payer: COMMERCIAL

## 2018-06-25 PROCEDURE — 97110 THERAPEUTIC EXERCISES: CPT

## 2018-06-25 ASSESSMENT — PAIN SCALES - GENERAL: PAINLEVEL_OUTOF10: 3

## 2018-06-25 ASSESSMENT — PAIN DESCRIPTION - PAIN TYPE: TYPE: CHRONIC PAIN

## 2018-06-25 ASSESSMENT — PAIN DESCRIPTION - LOCATION: LOCATION: BACK

## 2018-06-25 ASSESSMENT — PAIN DESCRIPTION - ORIENTATION: ORIENTATION: LOWER;RIGHT

## 2018-06-27 ENCOUNTER — NURSE TRIAGE (OUTPATIENT)
Dept: ADMINISTRATIVE | Age: 58
End: 2018-06-27

## 2018-06-28 ENCOUNTER — APPOINTMENT (OUTPATIENT)
Dept: PHYSICAL THERAPY | Age: 58
End: 2018-06-28
Payer: COMMERCIAL

## 2018-06-29 ENCOUNTER — HOSPITAL ENCOUNTER (OUTPATIENT)
Dept: PHYSICAL THERAPY | Age: 58
Setting detail: THERAPIES SERIES
Discharge: HOME | End: 2018-06-29
Payer: COMMERCIAL

## 2018-06-29 PROCEDURE — 97110 THERAPEUTIC EXERCISES: CPT

## 2018-06-29 ASSESSMENT — PAIN DESCRIPTION - PAIN TYPE: TYPE: CHRONIC PAIN

## 2018-06-29 ASSESSMENT — PAIN SCALES - GENERAL: PAINLEVEL_OUTOF10: 2

## 2018-06-29 ASSESSMENT — PAIN DESCRIPTION - LOCATION: LOCATION: BACK

## 2018-06-29 ASSESSMENT — PAIN DESCRIPTION - ORIENTATION: ORIENTATION: RIGHT;LOWER

## 2018-06-30 ENCOUNTER — HOSPITAL ENCOUNTER (EMERGENCY)
Age: 58
Discharge: HOME | End: 2018-06-30
Attending: FAMILY MEDICINE
Payer: COMMERCIAL

## 2018-06-30 ENCOUNTER — NURSE TRIAGE (OUTPATIENT)
Dept: ADMINISTRATIVE | Age: 58
End: 2018-06-30

## 2018-06-30 ENCOUNTER — APPOINTMENT (OUTPATIENT)
Dept: CT IMAGING | Age: 58
End: 2018-06-30
Payer: COMMERCIAL

## 2018-06-30 VITALS
SYSTOLIC BLOOD PRESSURE: 111 MMHG | TEMPERATURE: 98.7 F | HEART RATE: 67 BPM | OXYGEN SATURATION: 98 % | RESPIRATION RATE: 16 BRPM | DIASTOLIC BLOOD PRESSURE: 82 MMHG

## 2018-06-30 DIAGNOSIS — R10.31 RIGHT GROIN PAIN: Primary | ICD-10-CM

## 2018-06-30 LAB
ALBUMIN SERPL-MCNC: 4.5 G/DL (ref 3.5–5.1)
ALP BLD-CCNC: 76 U/L (ref 38–126)
ALT SERPL-CCNC: 28 U/L (ref 11–66)
ANION GAP SERPL CALCULATED.3IONS-SCNC: 14 MEQ/L (ref 8–16)
AST SERPL-CCNC: 25 U/L (ref 5–40)
BASOPHILS # BLD: 0.7 %
BASOPHILS ABSOLUTE: 0.1 THOU/MM3 (ref 0–0.1)
BILIRUB SERPL-MCNC: 0.6 MG/DL (ref 0.3–1.2)
BILIRUBIN DIRECT: < 0.2 MG/DL (ref 0–0.3)
BILIRUBIN URINE: NEGATIVE
BLOOD, URINE: NEGATIVE
BUN BLDV-MCNC: 19 MG/DL (ref 7–22)
CALCIUM SERPL-MCNC: 9.7 MG/DL (ref 8.5–10.5)
CHARACTER, URINE: CLEAR
CHLORIDE BLD-SCNC: 103 MEQ/L (ref 98–111)
CO2: 24 MEQ/L (ref 23–33)
COLOR: YELLOW
CREAT SERPL-MCNC: 0.9 MG/DL (ref 0.4–1.2)
EKG ATRIAL RATE: 65 BPM
EKG P AXIS: -10 DEGREES
EKG P-R INTERVAL: 168 MS
EKG Q-T INTERVAL: 454 MS
EKG QRS DURATION: 98 MS
EKG QTC CALCULATION (BAZETT): 472 MS
EKG R AXIS: -11 DEGREES
EKG T AXIS: 9 DEGREES
EKG VENTRICULAR RATE: 65 BPM
EOSINOPHIL # BLD: 0.7 %
EOSINOPHILS ABSOLUTE: 0.1 THOU/MM3 (ref 0–0.4)
ERYTHROCYTE [DISTWIDTH] IN BLOOD BY AUTOMATED COUNT: 12.5 % (ref 11.5–14.5)
ERYTHROCYTE [DISTWIDTH] IN BLOOD BY AUTOMATED COUNT: 43.3 FL (ref 35–45)
GFR SERPL CREATININE-BSD FRML MDRD: 86 ML/MIN/1.73M2
GLUCOSE BLD-MCNC: 109 MG/DL (ref 70–108)
GLUCOSE URINE: NEGATIVE MG/DL
HCT VFR BLD CALC: 45.5 % (ref 42–52)
HEMOGLOBIN: 16 GM/DL (ref 14–18)
IMMATURE GRANS (ABS): 0.05 THOU/MM3 (ref 0–0.07)
IMMATURE GRANULOCYTES: 0.5 %
KETONES, URINE: NEGATIVE
LEUKOCYTE ESTERASE, URINE: NEGATIVE
LIPASE: 25.5 U/L (ref 5.6–51.3)
LYMPHOCYTES # BLD: 17.9 %
LYMPHOCYTES ABSOLUTE: 1.8 THOU/MM3 (ref 1–4.8)
MCH RBC QN AUTO: 32.9 PG (ref 26–33)
MCHC RBC AUTO-ENTMCNC: 35.2 GM/DL (ref 32.2–35.5)
MCV RBC AUTO: 93.6 FL (ref 80–94)
MONOCYTES # BLD: 11.1 %
MONOCYTES ABSOLUTE: 1.1 THOU/MM3 (ref 0.4–1.3)
NITRITE, URINE: NEGATIVE
NUCLEATED RED BLOOD CELLS: 0 /100 WBC
OSMOLALITY CALCULATION: 284.1 MOSMOL/KG (ref 275–300)
PH UA: 6.5
PLATELET # BLD: 265 THOU/MM3 (ref 130–400)
PMV BLD AUTO: 10.6 FL (ref 9.4–12.4)
POTASSIUM REFLEX MAGNESIUM: 4.5 MEQ/L (ref 3.5–5.2)
PROTEIN UA: NEGATIVE
RBC # BLD: 4.86 MILL/MM3 (ref 4.7–6.1)
SEG NEUTROPHILS: 69.1 %
SEGMENTED NEUTROPHILS ABSOLUTE COUNT: 6.8 THOU/MM3 (ref 1.8–7.7)
SODIUM BLD-SCNC: 141 MEQ/L (ref 135–145)
SPECIFIC GRAVITY, URINE: > 1.03 (ref 1–1.03)
TOTAL PROTEIN: 7.3 G/DL (ref 6.1–8)
TROPONIN T: < 0.01 NG/ML
UROBILINOGEN, URINE: 0.2 EU/DL
WBC # BLD: 9.8 THOU/MM3 (ref 4.8–10.8)

## 2018-06-30 PROCEDURE — 83690 ASSAY OF LIPASE: CPT

## 2018-06-30 PROCEDURE — 80048 BASIC METABOLIC PNL TOTAL CA: CPT

## 2018-06-30 PROCEDURE — 93010 ELECTROCARDIOGRAM REPORT: CPT | Performed by: NUCLEAR MEDICINE

## 2018-06-30 PROCEDURE — 80076 HEPATIC FUNCTION PANEL: CPT

## 2018-06-30 PROCEDURE — 84484 ASSAY OF TROPONIN QUANT: CPT

## 2018-06-30 PROCEDURE — 36415 COLL VENOUS BLD VENIPUNCTURE: CPT

## 2018-06-30 PROCEDURE — 6360000004 HC RX CONTRAST MEDICATION: Performed by: FAMILY MEDICINE

## 2018-06-30 PROCEDURE — 85025 COMPLETE CBC W/AUTO DIFF WBC: CPT

## 2018-06-30 PROCEDURE — 74177 CT ABD & PELVIS W/CONTRAST: CPT

## 2018-06-30 PROCEDURE — 93005 ELECTROCARDIOGRAM TRACING: CPT | Performed by: FAMILY MEDICINE

## 2018-06-30 PROCEDURE — 99284 EMERGENCY DEPT VISIT MOD MDM: CPT

## 2018-06-30 PROCEDURE — 81003 URINALYSIS AUTO W/O SCOPE: CPT

## 2018-06-30 RX ORDER — IBUPROFEN 800 MG/1
800 TABLET ORAL EVERY 8 HOURS PRN
Qty: 30 TABLET | Refills: 0 | Status: SHIPPED | OUTPATIENT
Start: 2018-06-30 | End: 2019-04-19 | Stop reason: HOSPADM

## 2018-06-30 RX ADMIN — IOPAMIDOL 80 ML: 755 INJECTION, SOLUTION INTRAVENOUS at 12:43

## 2018-06-30 ASSESSMENT — ENCOUNTER SYMPTOMS
DIARRHEA: 0
WHEEZING: 0
BACK PAIN: 0
ABDOMINAL PAIN: 1
COUGH: 0
SORE THROAT: 0
VOMITING: 0
RHINORRHEA: 0
EYE DISCHARGE: 0
NAUSEA: 1
EYE REDNESS: 0
SHORTNESS OF BREATH: 0

## 2018-06-30 ASSESSMENT — PAIN DESCRIPTION - LOCATION: LOCATION: ABDOMEN

## 2018-06-30 ASSESSMENT — PAIN DESCRIPTION - PAIN TYPE: TYPE: ACUTE PAIN

## 2018-06-30 ASSESSMENT — PAIN SCALES - GENERAL: PAINLEVEL_OUTOF10: 4

## 2018-06-30 NOTE — ED PROVIDER NOTES
[facility]  eMERGENCY dEPARTMENT eNCOUnter          CHIEF COMPLAINT       Chief Complaint   Patient presents with    Abdominal Pain     RLQ pain x 'a long time'     Nausea       Nurses Notes reviewed and I agree except as noted in the HPI. HISTORY OF PRESENT ILLNESS    Andrew Lane is a 59 y.o. male who presents to the Emergency Department for the evaluation of abdominal pain. Patient states that he has been having right lower abdominal pain for \"a while. \" Patient states that he has been having some associated nausea as well for past few days. Denies vomiting. He states that he did have episode of loose stools two days ago but states that this is typical for him after consuming tacos the night before. He reports his pain to be moderate in severity without modifying factors or radiation of pain. He states that he has history of right inguinal hernia repair via Dr. Aayush Hassan. He denies any other abdominal surgeries. Patient denies any fevers, chills, vomiting. Patient denies any chest pain or shortness of breath. Denies urinary symptoms. Patient denies any further complaints at initial encounter. The HPI was provided by the patient. REVIEW OF SYSTEMS     Review of Systems   Constitutional: Negative for appetite change, chills, fatigue and fever. HENT: Negative for congestion, ear pain, rhinorrhea and sore throat. Eyes: Negative for discharge, redness and visual disturbance. Respiratory: Negative for cough, shortness of breath and wheezing. Cardiovascular: Negative for chest pain, palpitations and leg swelling. Gastrointestinal: Positive for abdominal pain and nausea. Negative for diarrhea and vomiting. Genitourinary: Negative for decreased urine volume, difficulty urinating, dysuria and hematuria. Musculoskeletal: Negative for arthralgias, back pain, joint swelling and neck pain. Skin: Negative for pallor and rash.    Allergic/Immunologic: Negative for

## 2018-07-02 ENCOUNTER — TELEPHONE (OUTPATIENT)
Dept: FAMILY MEDICINE CLINIC | Age: 58
End: 2018-07-02

## 2018-07-02 NOTE — TELEPHONE ENCOUNTER
Return call from Leah Aponte. Introduced self/role. Leah Aponte  is a 59 y.o. male who presented to the Emergency Department for the evaluation of abdominal pain. Leah Aponte states that he has been having right lower abdominal pain for \"a while. \" He  voiced he was having some associated nausea as well for past few days. Denies vomiting. Leah Aponte voiced he is doing okay, the pain comes and goes. The Ibuprofen 800 helps some. Reviewed medications with Leah Aponte. No concerns voiced. Education given on staying hydrated and drinking water given. Understanding voiced. Reminded ofr follow up appointment and to contact the office for any questions or concerns.

## 2018-07-03 ENCOUNTER — HOSPITAL ENCOUNTER (OUTPATIENT)
Dept: PHYSICAL THERAPY | Age: 58
Setting detail: THERAPIES SERIES
End: 2018-07-03
Payer: COMMERCIAL

## 2018-07-05 ENCOUNTER — OFFICE VISIT (OUTPATIENT)
Dept: FAMILY MEDICINE CLINIC | Age: 58
End: 2018-07-05

## 2018-07-05 ENCOUNTER — APPOINTMENT (OUTPATIENT)
Dept: PHYSICAL THERAPY | Age: 58
End: 2018-07-05
Payer: COMMERCIAL

## 2018-07-05 VITALS
HEART RATE: 72 BPM | RESPIRATION RATE: 14 BRPM | WEIGHT: 244.13 LBS | BODY MASS INDEX: 34.18 KG/M2 | DIASTOLIC BLOOD PRESSURE: 74 MMHG | SYSTOLIC BLOOD PRESSURE: 126 MMHG | HEIGHT: 71 IN

## 2018-07-05 DIAGNOSIS — M54.41 ACUTE RIGHT-SIDED LOW BACK PAIN WITH RIGHT-SIDED SCIATICA: Primary | ICD-10-CM

## 2018-07-05 PROCEDURE — 99213 OFFICE O/P EST LOW 20 MIN: CPT | Performed by: FAMILY MEDICINE

## 2018-07-05 RX ORDER — TIZANIDINE 4 MG/1
4 TABLET ORAL EVERY 8 HOURS PRN
Qty: 40 TABLET | Refills: 0 | Status: SHIPPED | OUTPATIENT
Start: 2018-07-05 | End: 2018-09-07 | Stop reason: SDUPTHER

## 2018-07-05 RX ORDER — TRAMADOL HYDROCHLORIDE 50 MG/1
50 TABLET ORAL EVERY 4 HOURS PRN
Qty: 42 TABLET | Refills: 0 | Status: SHIPPED | OUTPATIENT
Start: 2018-07-05 | End: 2018-07-12

## 2018-07-05 ASSESSMENT — ENCOUNTER SYMPTOMS
CONSTIPATION: 0
EYE PAIN: 0
TROUBLE SWALLOWING: 0
ABDOMINAL PAIN: 0
NAUSEA: 0
BACK PAIN: 1
SHORTNESS OF BREATH: 0
COUGH: 0
BLOOD IN STOOL: 0
SORE THROAT: 0
CHEST TIGHTNESS: 0

## 2018-07-05 NOTE — PROGRESS NOTES
Position: Sitting, Cuff Size: Medium Adult)   Pulse 72   Resp 14   Ht 5' 11\" (1.803 m)   Wt 244 lb 2 oz (110.7 kg)   BMI 34.05 kg/m²   Objective:   Physical Exam   Constitutional: He is oriented to person, place, and time. He appears well-developed and well-nourished. HENT:   Head: Normocephalic and atraumatic. Right Ear: External ear normal.   Left Ear: External ear normal.   Nose: Nose normal.   Mouth/Throat: Oropharynx is clear and moist.   Eyes: Conjunctivae and EOM are normal. Pupils are equal, round, and reactive to light. Fundi nl   Neck: Normal range of motion. Neck supple. No thyromegaly present. Cardiovascular: Normal rate, regular rhythm, normal heart sounds and intact distal pulses. Pulmonary/Chest: Effort normal and breath sounds normal. He has no wheezes. He has no rales. Abdominal: Soft. Bowel sounds are normal. He exhibits no mass. There is no tenderness. Musculoskeletal:        Right shoulder: He exhibits decreased range of motion, tenderness and spasm. Arms:  Lymphadenopathy:     He has no cervical adenopathy. Neurological: He is alert and oriented to person, place, and time. He has normal reflexes. No cranial nerve deficit. Skin: Skin is warm and dry. No rash noted. Psychiatric: He has a normal mood and affect. Nursing note and vitals reviewed. Assessment:       Diagnosis Orders   1.  Acute right-sided low back pain with right-sided sciatica           Plan:      Current Outpatient Prescriptions   Medication Sig Dispense Refill    ibuprofen (ADVIL;MOTRIN) 800 MG tablet Take 1 tablet by mouth every 8 hours as needed for Pain 30 tablet 0    bumetanide (BUMEX) 2 MG tablet Take 1 tablet by mouth daily 90 tablet 1    metoprolol succinate (TOPROL XL) 25 MG extended release tablet TAKE TWO TABLETS BY MOUTH DAILY 90 tablet 0    atorvastatin (LIPITOR) 40 MG tablet TAKE 1 TABLET BY MOUTH NIGHTLY 90 tablet 4    omeprazole (PRILOSEC) 20 MG delayed release capsule

## 2018-07-06 ENCOUNTER — HOSPITAL ENCOUNTER (OUTPATIENT)
Dept: PHYSICAL THERAPY | Age: 58
Setting detail: THERAPIES SERIES
Discharge: HOME | End: 2018-07-06
Payer: COMMERCIAL

## 2018-07-06 PROCEDURE — 97110 THERAPEUTIC EXERCISES: CPT

## 2018-07-06 ASSESSMENT — PAIN DESCRIPTION - LOCATION: LOCATION: BACK

## 2018-07-06 ASSESSMENT — PAIN DESCRIPTION - PAIN TYPE: TYPE: CHRONIC PAIN

## 2018-07-06 ASSESSMENT — PAIN SCALES - GENERAL: PAINLEVEL_OUTOF10: 3

## 2018-07-06 ASSESSMENT — PAIN DESCRIPTION - ORIENTATION: ORIENTATION: RIGHT;LOWER

## 2018-07-06 NOTE — PROGRESS NOTES
[place]     Time In: 0900  Time Out: 2526  Minutes: 43  Timed Code Treatment Minutes: 43 Minutes     Date: 2018  Patient Name: Dawayne Opitz,  Gender:  male        CSN: [id number]   : 1960  (59 y.o.)       Referring Practitioner: Lynnette Ferraro MD      Diagnosis: M51.36- DDD (degenerative disc disease), lumbar  Treatment Diagnosis: acute on chronic right lumbar pain, core weakness   Additional Pertinent Hx: COPD, arthritis, HTN. L TKA 2016, CABG 10/2016. General:  PT Visit Information  Onset Date: 18  PT Insurance Information: [facility] [place]- no visit limit, aquatics and modalities covered  Total # of Visits to Date: 5  Plan of Care/Certification Expiration Date: 18  Progress Note Counter:  for PN. Subjective:  Chart Reviewed: Yes  Patient assessed for rehabilitation services?: Yes  Response To Previous Treatment: Patient with no complaints from previous session. Family / Caregiver Present: No  Comments: Follow up with Dr. Moisés Fischer as needed. Subjective: I'm a littel sore today. I saw Dr. John Cai because Dr. Moisés Fischer was out of town.      Pain:  Patient Currently in Pain: Yes  Pain Level: 3  Pain Type: Chronic pain  Pain Location: Back  Pain Orientation: Right, Lower      Objective               Exercises  Exercise 2:On MHP at end> DKTC, B SKTC, hamstring, piriformis 3x15 sec hold  Exercise 4: Nustep S10 x5 min L5  Exercise 5: Standing heel/toe raises x15 , marches x15 , 3 way hip x10 yellow , squats x15 each with ab bracing  Exercise 6: 4' forward/ lateral step ups x 10 bilat with no UE support  each leg CC   Exercise 7: Alternating BOSU lunges x 10 bilat  Exercise 8: Hydro Hip Level 5 x 20 bilat and x 15 single leg   Exercise 9: Hydro Stick with bracing 4 directions x 10  Exercise 10: hydro chest L-5 x20 abd bracing          Activity Tolerance:  Activity Tolerance: Patient Tolerated treatment well  Activity Tolerance: No change in pain level at end of session. Assessment: Body structures, Functions, Activity limitations: Decreased strength, Decreased ROM  Assessment: Strtching and MHP end of session. Added yellow t band for 3 way hip and hydro chest for core strengthening. Discussed body mechanics and abd bracing at all times. Prognosis: Good  Discharge Recommendations: Continue to assess pending progress    Patient Education:  Patient Education: Continue with HEP. Plan:  Times per week: 2-3x  Plan weeks: 8 weeks  Current Treatment Recommendations: Strengthening, Manual Therapy - Soft Tissue Mobilization, ROM, Modalities, Aquatics, Home Exercise Program, Patient/Caregiver Education & Training  Plan Comment: Continue with current POC. Goals:  Patient goals : Decrease/abolish low back pain    Short term goals  Time Frame for Short term goals: 4 weeks  Short term goal 1: Pt will report <3/10 right lumbar pain at worst to tolerate prolonged sitting, getting out of vehicles and heavy lifting. Short term goal 2: Pt will demo good lumbar and hip flexibility without pain for symptom control when bending over prolonged periods of time to garden. Short term goal 3: Pt will improve right hamstring strength to 5/5 to assist with lifting and bending over safely. Short term goal 4: Pt will demo good core engagement without cues during therapy session to carry over to lifting tasks, household and volunteer activities and exercise.      Long term goals  Time Frame for Long term goals : 8 weeks  Long term goal 1: Pt will be independent and compliant with HEP to achieve above goals         Katelyn Ugarte  [id number]

## 2018-07-09 RX ORDER — METOPROLOL SUCCINATE 25 MG/1
TABLET, EXTENDED RELEASE ORAL
Qty: 90 TABLET | Refills: 0 | Status: SHIPPED | OUTPATIENT
Start: 2018-07-09 | End: 2018-08-06 | Stop reason: SDUPTHER

## 2018-07-09 RX ORDER — ISOSORBIDE MONONITRATE 30 MG/1
TABLET, EXTENDED RELEASE ORAL
Qty: 30 TABLET | Refills: 7 | Status: SHIPPED | OUTPATIENT
Start: 2018-07-09 | End: 2018-12-23 | Stop reason: SDUPTHER

## 2018-07-09 NOTE — TELEPHONE ENCOUNTER
Date of last visit:  6/18/2018  Date of next visit:  10/3/2018    Requested Prescriptions     Pending Prescriptions Disp Refills    metoprolol succinate (TOPROL XL) 25 MG extended release tablet [Pharmacy Med Name: metoprolol succ ER 25MG TAB] 90 tablet 0     Sig: TAKE TWO TABLETS BY MOUTH EVERY DAY

## 2018-07-10 ENCOUNTER — HOSPITAL ENCOUNTER (OUTPATIENT)
Dept: PHYSICAL THERAPY | Age: 58
Setting detail: THERAPIES SERIES
End: 2018-07-10
Payer: COMMERCIAL

## 2018-07-10 ASSESSMENT — PAIN SCALES - GENERAL: PAINLEVEL_OUTOF10: 1

## 2018-07-10 ASSESSMENT — PAIN DESCRIPTION - ORIENTATION: ORIENTATION: LOWER;RIGHT

## 2018-07-10 ASSESSMENT — PAIN DESCRIPTION - LOCATION: LOCATION: BACK

## 2018-07-12 ENCOUNTER — HOSPITAL ENCOUNTER (OUTPATIENT)
Dept: PHYSICAL THERAPY | Age: 58
Setting detail: THERAPIES SERIES
Discharge: HOME | End: 2018-07-12
Payer: COMMERCIAL

## 2018-07-12 PROCEDURE — 97110 THERAPEUTIC EXERCISES: CPT

## 2018-07-12 NOTE — PROGRESS NOTES
[facility]  PHYSICAL THERAPY MISSED TREATMENT NOTE  OUTPATIENT REHABILITATION    Date: 7/10/2018  Patient Name: Jameel Patel        MRN: [id number]   : 1960  (59 y.o.)  Gender: male   Referring Practitioner: Patti Payne MD  Diagnosis: M51.36- DDD (degenerative disc disease), lumbar    PT Visit Information  Canceled Appointment: 2    REASON FOR MISSED TREATMENT:  Cancelled d/t having  to attend. Rescheduled for 18. Kannan Shea DPT, #[id number]
has made good progress toward goals, meeting all but pain goal. Pt demos good core engagement with higher level activities and volunteer activities. No additional PT needed at this time. Pt took 5 min bathroom break during session. Prognosis: Good       Patient Education:  Patient Education: Continue with HEP-stretch daily, strengthen 3-4x/wk. Plan:  Plan Comment: Discharge POC. Goals:  Patient goals : Decrease/abolish low back pain    Short term goals  Time Frame for Short term goals: 4 weeks  Short term goal 1: Pt will report <3/10 right lumbar pain at worst to tolerate prolonged sitting, getting out of vehicles and heavy lifting. GOAL NOT MET- Pt verbalizes pain 4/10 at worst, with driving still being a problem. Short term goal 2: Pt will demo good lumbar and hip flexibility without pain for symptom control when bending over prolonged periods of time to garden. GOAL MET- Pt reaches to floor in standing with mild hamstring tightness and seated without difficulty, and demos good hip ER; however pain still present with prolonged bending over and gardening. Short term goal 3: Pt will improve right hamstring strength to 5/5 to assist with lifting and bending over safely. GOAL MET- R hamstring 5/5. Short term goal 4: Pt will demo good core engagement without cues during therapy session to carry over to lifting tasks, household and volunteer activities and exercise. GOAL MET- Pt demos good core engagment without cues during exercises and reports awareness with lifting and volunteer activities. Long term goals  Time Frame for Long term goals : 8 weeks  Long term goal 1: Pt will be independent and compliant with HEP to achieve above goals. GOAL MET- Pt verbalizes compliance with HEP daily and demos independence.           Sujatha Rodriguez, PT

## 2018-08-09 ENCOUNTER — OFFICE VISIT (OUTPATIENT)
Dept: FAMILY MEDICINE CLINIC | Age: 58
End: 2018-08-09

## 2018-08-09 VITALS
HEART RATE: 68 BPM | RESPIRATION RATE: 14 BRPM | SYSTOLIC BLOOD PRESSURE: 134 MMHG | DIASTOLIC BLOOD PRESSURE: 64 MMHG | WEIGHT: 243.38 LBS | HEIGHT: 71 IN | BODY MASS INDEX: 34.07 KG/M2

## 2018-08-09 DIAGNOSIS — F32.A ANXIETY AND DEPRESSION: Primary | ICD-10-CM

## 2018-08-09 DIAGNOSIS — F41.9 ANXIETY AND DEPRESSION: Primary | ICD-10-CM

## 2018-08-09 PROCEDURE — 99213 OFFICE O/P EST LOW 20 MIN: CPT | Performed by: FAMILY MEDICINE

## 2018-08-09 ASSESSMENT — ENCOUNTER SYMPTOMS
SHORTNESS OF BREATH: 0
SINUS PRESSURE: 0
CONSTIPATION: 0

## 2018-08-09 NOTE — PROGRESS NOTES
Subjective:      Patient ID: Myah Street is a 59 y.o. male. HPI  1. He is not sleeping well and more irritable and little drive. 2. He's concerned that the last time he felt this way he needed a stent. Review of Systems   Constitutional: Negative for fatigue. HENT: Negative for sinus pressure. Eyes: Negative for visual disturbance. Respiratory: Negative for shortness of breath. Cardiovascular: Negative for chest pain. Gastrointestinal: Negative for constipation. Genitourinary: Negative. Musculoskeletal: Positive for arthralgias. Skin: Negative for rash. Neurological: Negative for headaches. Psychiatric/Behavioral: Positive for sleep disturbance. Objective:   Physical Exam   Constitutional: He is oriented to person, place, and time. He appears well-developed and well-nourished. No distress. HENT:   Head: Normocephalic and atraumatic. Eyes: Conjunctivae are normal. No scleral icterus. Neck: No tracheal deviation present. Cardiovascular: Normal rate, regular rhythm and normal heart sounds. Pulmonary/Chest: Effort normal and breath sounds normal.   Musculoskeletal: He exhibits no edema. Neurological: He is alert and oriented to person, place, and time. Skin: Skin is warm and dry. Psychiatric: He has a normal mood and affect. His behavior is normal. Judgment and thought content normal.   Blood pressure 134/64, pulse 68, resp. rate 14, height 5' 11\" (1.803 m), weight 243 lb 6 oz (110.4 kg). Assessment:       Diagnosis Orders   1.  Anxiety and depression  sertraline (ZOLOFT) 50 MG tablet           Plan:      See Dr Leroy Arambula in 3 weeks        Court Stout MD

## 2018-08-13 ENCOUNTER — OFFICE VISIT (OUTPATIENT)
Dept: CARDIOLOGY CLINIC | Age: 58
End: 2018-08-13
Payer: COMMERCIAL

## 2018-08-13 VITALS
SYSTOLIC BLOOD PRESSURE: 124 MMHG | OXYGEN SATURATION: 94 % | HEIGHT: 71 IN | DIASTOLIC BLOOD PRESSURE: 81 MMHG | BODY MASS INDEX: 34.58 KG/M2 | WEIGHT: 247 LBS | HEART RATE: 67 BPM

## 2018-08-13 DIAGNOSIS — I50.32 CHF (CONGESTIVE HEART FAILURE), NYHA CLASS I, CHRONIC, DIASTOLIC (HCC): Primary | ICD-10-CM

## 2018-08-13 PROCEDURE — 99213 OFFICE O/P EST LOW 20 MIN: CPT | Performed by: NURSE PRACTITIONER

## 2018-08-13 RX ORDER — LISINOPRIL 10 MG/1
10 TABLET ORAL DAILY
Qty: 90 TABLET | Refills: 0
Start: 2018-08-13 | End: 2018-11-20 | Stop reason: SDUPTHER

## 2018-08-13 RX ORDER — LISINOPRIL 10 MG/1
20 TABLET ORAL DAILY
Qty: 90 TABLET | Refills: 1 | Status: SHIPPED | OUTPATIENT
Start: 2018-08-13 | End: 2018-08-13 | Stop reason: SDUPTHER

## 2018-08-13 ASSESSMENT — ENCOUNTER SYMPTOMS
CHEST TIGHTNESS: 0
ABDOMINAL PAIN: 0
SHORTNESS OF BREATH: 0
APNEA: 0
ABDOMINAL DISTENTION: 0
COLOR CHANGE: 0
COUGH: 0
NAUSEA: 0
WHEEZING: 0

## 2018-08-13 NOTE — PROGRESS NOTES
Subjective:      Patient ID: Buddy Leblanc is a 59 y.o. male.     HPI    Review of Systems    Objective:   Physical Exam    Assessment:            Plan:              ASHLEIGH Ramos - CNP

## 2018-08-13 NOTE — PROGRESS NOTES
Heart Failure Clinic       Visit Date: 8/13/2018  Cardiologist:  Dr. Isabell Ramos  Primary Care Physician: Dr. Adalberto Mcarthur MD    Madeleine Landaverde is a 59 y.o. male who presents today for:  Chief Complaint   Patient presents with    Congestive Heart Failure       HPI:   Madeleine Landaverde is a 59 y.o. male who presents to the office for a follow up visit in the heart failure clinic. He stopped drinking beer every night. He drinks 4-5 bottles of purvis a day and 1 cup of coffee. He can sleep in a bed with 2 pillows. He tries to monitor the sodium, uses salt substitute. He can perform ADLs without fatigue or SOB. He has been fatigued, lack of motivation. No SOB or edema. He was recently started on Zoloft. He has noticed he is more tired since he started this. Patient has:  Last hospital admission related to Heart Failure:  > 1 year  Chest Pain: no  Worsening SOB/orthopnea/PND: no  Edema: no  Any extra diuretic use: no  Weight gain: no  Compliant checking home weight: yes  Fatigue: no  Abdominal bloating: no  Appetite: good  Difficulty sleeping: FAISAL compliant with CPAP  Cough: no  Compliant checking blood pressure: no  Any refills on CHF medications needed at this time: no    Past Medical History:   Diagnosis Date    [facility]     [facility] [address]     Coronary artery disease involving native coronary artery of native heart without angina pectoris     Depression     Diabetes mellitus type 2, diet-controlled ([facility] [address] 6/1/2018    GERD (gastroesophageal reflux disease) 3/21/2012    Hernia     Hx of blood clots 1990's    right knee due to injury    Hx of cocaine abuse     Hyperglycemia 09/09    Hyperlipidemia     Hypertension     FAISAL on CPAP     Osteoarthritis 11/07    S/P CABG x 2 07/06/2016    S/P PTCA: 1/15/2018: Stent diagonal branch Xience 3.0 x 12 mm. 1/15/2018    1/15/2018: Stent diagonal branch Xience 3.0 x 12 mm.  Dr. Anupam Interiano Eyes: Pupils are equal, round, and reactive to light. Neck: Normal range of motion. No JVD present. Cardiovascular: Normal rate and normal heart sounds. No murmur heard. Pulmonary/Chest: Effort normal. No respiratory distress. He has no rales. Abdominal: Soft. He exhibits no distension. There is no tenderness. Musculoskeletal: He exhibits no edema or tenderness. Neurological: He is alert and oriented to person, place, and time. Skin: Skin is warm and dry. Psychiatric: He has a normal mood and affect. His behavior is normal.   Vitals reviewed. Wt Readings from Last 3 Encounters:   08/13/18 247 lb (112 kg)   08/09/18 243 lb 6 oz (110.4 kg)   07/05/18 244 lb 2 oz (110.7 kg)     BP Readings from Last 3 Encounters:   08/13/18 (!) 147/84   08/09/18 134/64   07/05/18 126/74     Pulse Readings from Last 3 Encounters:   08/13/18 67   08/09/18 68   07/05/18 72     Body mass index is 34.45 kg/m².     ECHO:   9/12/16  Summary   limited echo   Ejection fraction is visually estimated at 08%.   Systolic function was normal.      Signature      ----------------------------------------------------------------   Electronically signed by Pj Gallegos MD (Interpreting   [id number]) on 09/12/2016 at 04:09 PM   ----------------------------------------------------------------      Findings      Mitral Valve   The mitral valve was not well visualized .   Calcification of the mitral valve noted.      Aortic Valve   The aortic valve leaflets were not well visualized.   Aortic valve appears tricuspid.   Thickened aortic valve leaflets noted.      Tricuspid Valve   Tricuspid valve is structurally normal.      Pulmonic Valve   The pulmonic valve was not well visualized .      Left Atrium   Mildly dilated left atrium.      Left Ventricle   Ejection fraction is visually estimated at 19%.   Systolic function was normal.    Results reviewed:  BNP:   Lab Results   Component Value Date    BNP 31.5 02/12/2013     CBC:   Lab

## 2018-08-20 ENCOUNTER — TELEPHONE (OUTPATIENT)
Dept: FAMILY MEDICINE CLINIC | Age: 58
End: 2018-08-20

## 2018-08-20 DIAGNOSIS — F32.A DEPRESSION, UNSPECIFIED DEPRESSION TYPE: Primary | ICD-10-CM

## 2018-08-20 RX ORDER — ESCITALOPRAM OXALATE 10 MG/1
10 TABLET ORAL DAILY
Qty: 30 TABLET | Refills: 0 | Status: SHIPPED | OUTPATIENT
Start: 2018-08-20 | End: 2018-09-03 | Stop reason: SDUPTHER

## 2018-08-20 NOTE — TELEPHONE ENCOUNTER
Pt called stating that since starting the Zoloft he has a strange taste in mouth, headaches, and feels nauseated all the time. He is asking if something else needs to be called in. Send to [employer] [facility]    Please call pt to let him know if something else was called in.

## 2018-09-05 RX ORDER — UMECLIDINIUM BROMIDE AND VILANTEROL TRIFENATATE 62.5; 25 UG/1; UG/1
1 POWDER RESPIRATORY (INHALATION) DAILY
Qty: 60 EACH | Refills: 3 | Status: SHIPPED | OUTPATIENT
Start: 2018-09-05 | End: 2018-12-23 | Stop reason: SDUPTHER

## 2018-09-07 ENCOUNTER — OFFICE VISIT (OUTPATIENT)
Dept: FAMILY MEDICINE CLINIC | Age: 58
End: 2018-09-07

## 2018-09-07 VITALS
RESPIRATION RATE: 16 BRPM | WEIGHT: 246 LBS | HEIGHT: 71 IN | BODY MASS INDEX: 34.44 KG/M2 | DIASTOLIC BLOOD PRESSURE: 80 MMHG | SYSTOLIC BLOOD PRESSURE: 128 MMHG | HEART RATE: 56 BPM

## 2018-09-07 DIAGNOSIS — M54.41 ACUTE RIGHT-SIDED LOW BACK PAIN WITH RIGHT-SIDED SCIATICA: ICD-10-CM

## 2018-09-07 DIAGNOSIS — F32.A DEPRESSION, UNSPECIFIED DEPRESSION TYPE: ICD-10-CM

## 2018-09-07 DIAGNOSIS — M62.838 MUSCLE SPASM: ICD-10-CM

## 2018-09-07 DIAGNOSIS — I10 ESSENTIAL HYPERTENSION: Primary | ICD-10-CM

## 2018-09-07 PROCEDURE — 99213 OFFICE O/P EST LOW 20 MIN: CPT | Performed by: FAMILY MEDICINE

## 2018-09-07 RX ORDER — TIZANIDINE 4 MG/1
4 TABLET ORAL EVERY 8 HOURS PRN
Qty: 40 TABLET | Refills: 0 | Status: SHIPPED | OUTPATIENT
Start: 2018-09-07 | End: 2019-03-04 | Stop reason: ALTCHOICE

## 2018-09-07 RX ORDER — TRAMADOL HYDROCHLORIDE 50 MG/1
50 TABLET ORAL EVERY 4 HOURS PRN
Qty: 42 TABLET | Refills: 0 | Status: CANCELLED | OUTPATIENT
Start: 2018-09-07 | End: 2018-09-14

## 2018-09-07 ASSESSMENT — ENCOUNTER SYMPTOMS
VOMITING: 0
ABDOMINAL PAIN: 0
CHEST TIGHTNESS: 0
DIARRHEA: 0
BLOOD IN STOOL: 0
SHORTNESS OF BREATH: 0
CONSTIPATION: 0
NAUSEA: 0
EYES NEGATIVE: 1
COUGH: 0

## 2018-09-07 NOTE — PROGRESS NOTES
needed (back spasm) 40 tablet 0    ANORO ELLIPTA 62.5-25 MCG/INH AEPB inhaler INHALE 1 PUFF INTO THE LUNGS DAILY. 60 each 3    escitalopram (LEXAPRO) 10 MG tablet TAKE 1 TABLET BY MOUTH ONE TIME A DAY 30 tablet 2    lisinopril (PRINIVIL;ZESTRIL) 10 MG tablet Take 1 tablet by mouth daily 90 tablet 0    sertraline (ZOLOFT) 50 MG tablet Take 1 tablet by mouth daily 30 tablet 0    metoprolol succinate (TOPROL XL) 25 MG extended release tablet TAKE TWO TABLETS BY MOUTH EVERY DAY 90 tablet 0    isosorbide mononitrate (IMDUR) 30 MG extended release tablet TAKE ONE TABLET BY MOUTH ONE TIME A DAY 30 tablet 7    ibuprofen (ADVIL;MOTRIN) 800 MG tablet Take 1 tablet by mouth every 8 hours as needed for Pain 30 tablet 0    bumetanide (BUMEX) 2 MG tablet Take 1 tablet by mouth daily 90 tablet 1    atorvastatin (LIPITOR) 40 MG tablet TAKE 1 TABLET BY MOUTH NIGHTLY 90 tablet 4    omeprazole (PRILOSEC) 20 MG delayed release capsule TAKE ONE CAPSULE BY MOUTH ONE TIME A DAY 90 capsule 1    albuterol sulfate HFA (VENTOLIN HFA) 108 (90 Base) MCG/ACT inhaler Inhale 2 puffs into the lungs every 6 hours as needed for Wheezing 1 Inhaler 3    BRILINTA 90 MG TABS tablet TAKE ONE TABLET BY MOUTH TWICE A DAY 60 tablet 5    nitroGLYCERIN (NITROSTAT) 0.4 MG SL tablet Place 1 tablet under the tongue every 5 minutes as needed for Chest pain 25 tablet 3    aspirin 81 MG chewable tablet Take 1 tablet by mouth daily 30 tablet 3    fluticasone (FLONASE) 50 MCG/ACT nasal spray 2 sprays by Nasal route daily as needed for Rhinitis or Allergies Apply daily to each nare 3 Bottle 1     No current facility-administered medications for this visit. Orders Placed This Encounter   Procedures    Basic Metabolic Panel     Standing Status:   Future     Standing Expiration Date:   9/7/2019    Magnesium     Standing Status:   Future     Standing Expiration Date:   9/7/2019     Continue current medications.     Return if symptoms worsen or fail to improve. Discussed use, benefit, and side effects of prescribed medications. All patient questions answered. Pt voiced understanding. Instructed to continue current medications, diet and exercise. Patient agreed with treatment plan.

## 2018-09-21 ENCOUNTER — HOSPITAL ENCOUNTER (OUTPATIENT)
Age: 58
Discharge: HOME | End: 2018-09-21
Payer: COMMERCIAL

## 2018-09-21 DIAGNOSIS — M62.838 MUSCLE SPASM: ICD-10-CM

## 2018-09-21 LAB
ANION GAP SERPL CALCULATED.3IONS-SCNC: 18 MEQ/L (ref 8–16)
BUN BLDV-MCNC: 18 MG/DL (ref 7–22)
CALCIUM SERPL-MCNC: 9.4 MG/DL (ref 8.5–10.5)
CHLORIDE BLD-SCNC: 101 MEQ/L (ref 98–111)
CO2: 22 MEQ/L (ref 23–33)
CREAT SERPL-MCNC: 0.8 MG/DL (ref 0.4–1.2)
GFR SERPL CREATININE-BSD FRML MDRD: > 90 ML/MIN/1.73M2
GLUCOSE BLD-MCNC: 142 MG/DL (ref 70–108)
MAGNESIUM: 2.1 MG/DL (ref 1.6–2.4)
POTASSIUM SERPL-SCNC: 3.8 MEQ/L (ref 3.5–5.2)
SODIUM BLD-SCNC: 141 MEQ/L (ref 135–145)

## 2018-09-21 PROCEDURE — 80048 BASIC METABOLIC PNL TOTAL CA: CPT

## 2018-09-21 PROCEDURE — 83735 ASSAY OF MAGNESIUM: CPT

## 2018-09-21 PROCEDURE — 36415 COLL VENOUS BLD VENIPUNCTURE: CPT

## 2018-10-01 ENCOUNTER — CARE COORDINATION (OUTPATIENT)
Dept: CARE COORDINATION | Age: 58
End: 2018-10-01

## 2018-10-03 ENCOUNTER — OFFICE VISIT (OUTPATIENT)
Dept: FAMILY MEDICINE CLINIC | Age: 58
End: 2018-10-03

## 2018-10-03 VITALS
HEIGHT: 71 IN | WEIGHT: 250 LBS | DIASTOLIC BLOOD PRESSURE: 70 MMHG | HEART RATE: 62 BPM | BODY MASS INDEX: 35 KG/M2 | SYSTOLIC BLOOD PRESSURE: 124 MMHG | RESPIRATION RATE: 16 BRPM

## 2018-10-03 DIAGNOSIS — I10 ESSENTIAL HYPERTENSION: ICD-10-CM

## 2018-10-03 DIAGNOSIS — E11.9 DIABETES MELLITUS TYPE 2, DIET-CONTROLLED (HCC): Primary | ICD-10-CM

## 2018-10-03 DIAGNOSIS — F32.A DEPRESSION, UNSPECIFIED DEPRESSION TYPE: ICD-10-CM

## 2018-10-03 DIAGNOSIS — Z23 IMMUNIZATION DUE: ICD-10-CM

## 2018-10-03 LAB
CREATININE URINE POCT: NORMAL
GLUCOSE BLD-MCNC: 106 MG/DL
HBA1C MFR BLD: 6.2 %
MICROALBUMIN/CREAT 24H UR: 0 MG/G{CREAT}
MICROALBUMIN/CREAT UR-RTO: NORMAL

## 2018-10-03 PROCEDURE — 82044 UR ALBUMIN SEMIQUANTITATIVE: CPT | Performed by: FAMILY MEDICINE

## 2018-10-03 PROCEDURE — 90688 IIV4 VACCINE SPLT 0.5 ML IM: CPT | Performed by: FAMILY MEDICINE

## 2018-10-03 PROCEDURE — 90471 IMMUNIZATION ADMIN: CPT | Performed by: FAMILY MEDICINE

## 2018-10-03 PROCEDURE — 83036 HEMOGLOBIN GLYCOSYLATED A1C: CPT | Performed by: FAMILY MEDICINE

## 2018-10-03 PROCEDURE — 99213 OFFICE O/P EST LOW 20 MIN: CPT | Performed by: FAMILY MEDICINE

## 2018-10-03 PROCEDURE — 82962 GLUCOSE BLOOD TEST: CPT | Performed by: FAMILY MEDICINE

## 2018-10-03 RX ORDER — LISINOPRIL 10 MG/1
TABLET ORAL
Qty: 90 TABLET | Refills: 1 | Status: SHIPPED | OUTPATIENT
Start: 2018-10-03 | End: 2019-04-06 | Stop reason: SDUPTHER

## 2018-10-03 RX ORDER — BUPROPION HYDROCHLORIDE 150 MG/1
150 TABLET ORAL EVERY MORNING
Qty: 30 TABLET | Refills: 3 | Status: SHIPPED | OUTPATIENT
Start: 2018-10-03 | End: 2019-01-06 | Stop reason: SDUPTHER

## 2018-10-03 RX ORDER — OMEPRAZOLE 20 MG/1
CAPSULE, DELAYED RELEASE ORAL
Qty: 90 CAPSULE | Refills: 1 | Status: SHIPPED | OUTPATIENT
Start: 2018-10-03 | End: 2019-04-06 | Stop reason: SDUPTHER

## 2018-10-03 ASSESSMENT — ENCOUNTER SYMPTOMS
DIARRHEA: 0
COUGH: 0
BLOOD IN STOOL: 0
CHEST TIGHTNESS: 0
BACK PAIN: 1
VOMITING: 0
EYES NEGATIVE: 1
SHORTNESS OF BREATH: 0
ABDOMINAL PAIN: 0
CONSTIPATION: 0
NAUSEA: 0

## 2018-10-03 NOTE — PROGRESS NOTES
Visit Date: 10/3/2018    Campos Lin is a 59 y.o. male who presents today for:  Chief Complaint   Patient presents with    Hypertension    Diabetes blood sugars running in the 110-120's  He states he started to have bad thoughts since he started Lexapro, he states that last weeks he was thinking about how to kill his wife and he never had those thoughts, he had no plan it was just thought. His wife told him that he was more grumpy since he started Lexapro. He is having bad dreams and when he wakes up it is hard to separate from reality, he has to reorient himself. He has no suicidal thoughts or ideas. He states that his main problem is that he is having a hard time adjusting to his new life, not working as he did. He was a very hard worker and his work was very physical and he is having a hard time adjusting to that. He feels dissatisfied dealing with his own abilities since he had his medical problems. HPI:     HPI    has a current medication list which includes the following prescription(s): bupropion, tizanidine, anoro ellipta, lisinopril, metoprolol succinate, isosorbide mononitrate, ibuprofen, bumetanide, atorvastatin, omeprazole, albuterol sulfate hfa, brilinta, nitroglycerin, aspirin, and fluticasone.     No Known Allergies    Social History   Substance Use Topics    Smoking status: Former Smoker     Packs/day: 2.00     Years: 25.00     Types: Cigars     Quit date: 11/11/2014    Smokeless tobacco: Never Used    Alcohol use 1.8 oz/week     3 Cans of beer per week      Comment: daily 2-3 cans (stopped approximately 8/8/18)       Past Medical History:   Diagnosis Date    [facility]cong[facility]) ([facility])     [facility] obstructive [facility]) [facility] [address]     Coronary artery disease involving native coronary artery of native heart without angina pectoris     Depression     Diabetes mellitus type 2, diet-controlled ([facility] [address] 6/1/2018    GERD (gastroesophageal reflux disease) 3/21/2012    Hernia     Hx of blood clots 1990's    right knee due to injury    Hx of cocaine abuse     Hyperglycemia 09/09    Hyperlipidemia     Hypertension     FAISAL on CPAP     Osteoarthritis 11/07    S/P CABG x 2 07/06/2016    S/P PTCA: 1/15/2018: Stent diagonal branch Xience 3.0 x 12 mm. 1/15/2018    1/15/2018: Stent diagonal branch Xience 3.0 x 12 mm. Dr. Joycelyn Guaman injury 08/2015    Right thumb cut with table saw, no treatment needed    Unspecified sleep apnea        Past Surgical History:   Procedure Laterality Date    CARDIAC CATHETERIZATION  06/27/2016    CATARACT REMOVAL Right 2013    COLONOSCOPY  04/08    CORONARY ARTERY BYPASS GRAFT  07/06/2016    Double bypass, Dr. Fang Morgan Right 02/2007    Neck    DIAGNOSTIC CARDIAC CATH LAB PROCEDURE      EYE SURGERY Right     Retinal surgery x 3    HERNIA REPAIR Right     Inguinal    PTCA  01/15/2018    ROTATOR CUFF REPAIR Right 11/16/2017    TONSILLECTOMY  child    TOTAL KNEE ARTHROPLASTY Left 10/24/2016    Dr. Paloma Machado       Family History   Problem Relation Age of Onset    Heart Disease Mother     High Blood Pressure Mother     Obesity Mother     Heart Disease Father     Cancer Father         colon/prostate    Colon Cancer Father     Prostate Cancer Father      Subjective:      Review of Systems   Constitutional: Negative for activity change, appetite change, diaphoresis and fever. HENT: Negative. Eyes: Negative. Respiratory: Negative for cough, chest tightness and shortness of breath. Cardiovascular: Negative for chest pain, palpitations and leg swelling. Gastrointestinal: Negative for abdominal pain, blood in stool, constipation, diarrhea, nausea and vomiting. Genitourinary: Negative. Musculoskeletal: Positive for arthralgias and back pain. Skin: Negative. Negative for rash. Neurological: Negative. Negative for dizziness, syncope, weakness, light-headedness and headaches.    Psychiatric/Behavioral:

## 2018-10-03 NOTE — TELEPHONE ENCOUNTER
Date of last visit:  10/3/2018  Date of next visit:      Requested Prescriptions     Pending Prescriptions Disp Refills    lisinopril (PRINIVIL;ZESTRIL) 10 MG tablet [Pharmacy Med Name: LISINOPRIL 10MG TABS] 90 tablet 1     Sig: TAKE ONE TABLET BY MOUTH ONE TIME A DAY    omeprazole (PRILOSEC) 20 MG delayed release capsule [Pharmacy Med Name: OMEPRAZOLE 20MG CPDR] 90 capsule 1     Sig: TAKE ONE CAPSULE BY MOUTH ONE TIME A DAY

## 2018-10-08 ENCOUNTER — TELEPHONE (OUTPATIENT)
Dept: FAMILY MEDICINE CLINIC | Age: 58
End: 2018-10-08

## 2018-10-29 ENCOUNTER — TELEPHONE (OUTPATIENT)
Dept: CARDIOLOGY CLINIC | Age: 58
End: 2018-10-29

## 2018-10-29 DIAGNOSIS — I50.32 CHF (CONGESTIVE HEART FAILURE), NYHA CLASS II, CHRONIC, DIASTOLIC (HCC): Primary | ICD-10-CM

## 2018-10-29 RX ORDER — METOPROLOL SUCCINATE 25 MG/1
TABLET, EXTENDED RELEASE ORAL
Qty: 90 TABLET | Refills: 1 | Status: SHIPPED | OUTPATIENT
Start: 2018-10-29 | End: 2018-11-20 | Stop reason: SDUPTHER

## 2018-10-29 NOTE — TELEPHONE ENCOUNTER
Wife called in and added he is sweating all time and SOB is all time.  He was carving pumpkins and had SOB and he was just sitting on the couch and sweating

## 2018-10-31 ENCOUNTER — OFFICE VISIT (OUTPATIENT)
Dept: FAMILY MEDICINE CLINIC | Age: 58
End: 2018-10-31

## 2018-10-31 VITALS
DIASTOLIC BLOOD PRESSURE: 64 MMHG | HEART RATE: 62 BPM | WEIGHT: 258.2 LBS | HEIGHT: 71 IN | RESPIRATION RATE: 16 BRPM | BODY MASS INDEX: 36.15 KG/M2 | SYSTOLIC BLOOD PRESSURE: 120 MMHG

## 2018-10-31 DIAGNOSIS — I10 ESSENTIAL HYPERTENSION: ICD-10-CM

## 2018-10-31 DIAGNOSIS — E11.9 DIABETES MELLITUS TYPE 2, DIET-CONTROLLED (HCC): Primary | ICD-10-CM

## 2018-10-31 DIAGNOSIS — F32.A DEPRESSION, UNSPECIFIED DEPRESSION TYPE: ICD-10-CM

## 2018-10-31 PROCEDURE — 99213 OFFICE O/P EST LOW 20 MIN: CPT | Performed by: FAMILY MEDICINE

## 2018-10-31 ASSESSMENT — ENCOUNTER SYMPTOMS
CONSTIPATION: 0
EYES NEGATIVE: 1
BLOOD IN STOOL: 0
NAUSEA: 0
COUGH: 0
DIARRHEA: 0
SHORTNESS OF BREATH: 0
VOMITING: 0
ABDOMINAL PAIN: 0
CHEST TIGHTNESS: 0

## 2018-10-31 NOTE — PROGRESS NOTES
tablet 1    atorvastatin (LIPITOR) 40 MG tablet TAKE 1 TABLET BY MOUTH NIGHTLY 90 tablet 4    albuterol sulfate HFA (VENTOLIN HFA) 108 (90 Base) MCG/ACT inhaler Inhale 2 puffs into the lungs every 6 hours as needed for Wheezing 1 Inhaler 3    BRILINTA 90 MG TABS tablet TAKE ONE TABLET BY MOUTH TWICE A DAY 60 tablet 5    nitroGLYCERIN (NITROSTAT) 0.4 MG SL tablet Place 1 tablet under the tongue every 5 minutes as needed for Chest pain 25 tablet 3    aspirin 81 MG chewable tablet Take 1 tablet by mouth daily 30 tablet 3    fluticasone (FLONASE) 50 MCG/ACT nasal spray 2 sprays by Nasal route daily as needed for Rhinitis or Allergies Apply daily to each nare 3 Bottle 1     No current facility-administered medications for this visit. No orders of the defined types were placed in this encounter. Lab Results   Component Value Date    LABA1C 6.2 10/03/2018    LABA1C 6.4 06/01/2018    LABA1C 6.7 01/03/2018     Discussed use, benefit, and side effects of prescribed medications. All patient questions answered. Pt voiced understanding. Instructed to continue current medications, ADA diet and exercise. Patient agreed with treatment plan. Return in about 4 months (around 2/28/2019) for HTN.

## 2018-11-05 RX ORDER — ASPIRIN 81 MG/1
TABLET, CHEWABLE ORAL
Qty: 30 TABLET | Refills: 3 | Status: SHIPPED | OUTPATIENT
Start: 2018-11-05 | End: 2019-01-20 | Stop reason: SDUPTHER

## 2018-11-12 ENCOUNTER — HOSPITAL ENCOUNTER (OUTPATIENT)
Age: 58
Discharge: HOME | End: 2018-11-12
Payer: COMMERCIAL

## 2018-11-12 DIAGNOSIS — I50.32 CHF (CONGESTIVE HEART FAILURE), NYHA CLASS II, CHRONIC, DIASTOLIC (HCC): ICD-10-CM

## 2018-11-12 LAB
ANION GAP SERPL CALCULATED.3IONS-SCNC: 15 MEQ/L (ref 8–16)
BUN BLDV-MCNC: 19 MG/DL (ref 7–22)
CALCIUM SERPL-MCNC: 9.6 MG/DL (ref 8.5–10.5)
CHLORIDE BLD-SCNC: 100 MEQ/L (ref 98–111)
CO2: 25 MEQ/L (ref 23–33)
CREAT SERPL-MCNC: 1 MG/DL (ref 0.4–1.2)
GFR SERPL CREATININE-BSD FRML MDRD: 76 ML/MIN/1.73M2
GLUCOSE BLD-MCNC: 132 MG/DL (ref 70–108)
POTASSIUM SERPL-SCNC: 4.4 MEQ/L (ref 3.5–5.2)
SODIUM BLD-SCNC: 140 MEQ/L (ref 135–145)

## 2018-11-12 PROCEDURE — 36415 COLL VENOUS BLD VENIPUNCTURE: CPT

## 2018-11-12 PROCEDURE — 80048 BASIC METABOLIC PNL TOTAL CA: CPT

## 2018-11-19 RX ORDER — FLUTICASONE PROPIONATE 50 MCG
2 SPRAY, SUSPENSION (ML) NASAL DAILY PRN
Qty: 3 BOTTLE | Refills: 1 | Status: SHIPPED | OUTPATIENT
Start: 2018-11-19 | End: 2019-05-06 | Stop reason: SDUPTHER

## 2018-11-19 RX ORDER — BUMETANIDE 2 MG/1
2 TABLET ORAL DAILY
Qty: 90 TABLET | Refills: 1 | Status: SHIPPED | OUTPATIENT
Start: 2018-11-19 | End: 2018-11-20 | Stop reason: SDUPTHER

## 2018-11-19 NOTE — TELEPHONE ENCOUNTER
Date of last visit:  10/31/2018  Date of next visit:  11/19/2018    Requested Prescriptions     Pending Prescriptions Disp Refills    bumetanide (BUMEX) 2 MG tablet [Pharmacy Med Name: BUMETANIDE 2MG TABS] 90 tablet 1     Sig: TAKE 1 TABLET BY MOUTH DAILY.

## 2018-11-20 ENCOUNTER — OFFICE VISIT (OUTPATIENT)
Dept: CARDIOLOGY CLINIC | Age: 58
End: 2018-11-20
Payer: COMMERCIAL

## 2018-11-20 VITALS
WEIGHT: 265 LBS | BODY MASS INDEX: 37.1 KG/M2 | HEART RATE: 71 BPM | DIASTOLIC BLOOD PRESSURE: 78 MMHG | HEIGHT: 71 IN | OXYGEN SATURATION: 99 % | SYSTOLIC BLOOD PRESSURE: 122 MMHG

## 2018-11-20 DIAGNOSIS — I50.32 CHF (CONGESTIVE HEART FAILURE), NYHA CLASS II, CHRONIC, DIASTOLIC (HCC): Primary | ICD-10-CM

## 2018-11-20 PROCEDURE — 99213 OFFICE O/P EST LOW 20 MIN: CPT | Performed by: NURSE PRACTITIONER

## 2018-11-20 RX ORDER — BUMETANIDE 2 MG/1
TABLET ORAL
Qty: 135 TABLET | Refills: 2 | Status: SHIPPED | OUTPATIENT
Start: 2018-11-20 | End: 2018-12-31 | Stop reason: SDUPTHER

## 2018-11-20 ASSESSMENT — ENCOUNTER SYMPTOMS
CHEST TIGHTNESS: 0
COLOR CHANGE: 0
ABDOMINAL PAIN: 0
SHORTNESS OF BREATH: 1
ABDOMINAL DISTENTION: 0
NAUSEA: 0
WHEEZING: 0
APNEA: 0
COUGH: 0

## 2018-11-20 NOTE — PROGRESS NOTES
Positive for arthralgias (knee). Negative for gait problem. Skin: Negative for color change. Neurological: Negative for dizziness, numbness and headaches. Psychiatric/Behavioral: Negative for agitation, confusion and sleep disturbance. The patient is not nervous/anxious. OBJECTIVE:   Today's Vitals:  /78 (Site: Left Upper Arm)   Pulse 71   Ht 5' 11\" (1.803 m)   Wt 265 lb (120.2 kg)   SpO2 99%   BMI 36.96 kg/m²     Physical Exam   Constitutional: He is oriented to person, place, and time. He appears well-developed and well-nourished. HENT:   Head: Normocephalic and atraumatic. Eyes: Pupils are equal, round, and reactive to light. Neck: Normal range of motion. No JVD present. Cardiovascular: Normal rate and normal heart sounds. No murmur heard. Pulmonary/Chest: Effort normal. No respiratory distress. He has no rales. Abdominal: Soft. He exhibits no distension. There is no tenderness. Musculoskeletal: He exhibits no edema or tenderness. Neurological: He is alert and oriented to person, place, and time. Skin: Skin is warm and dry. Psychiatric: He has a normal mood and affect. His behavior is normal.   Vitals reviewed. Wt Readings from Last 3 Encounters:   11/20/18 265 lb (120.2 kg)   10/31/18 258 lb 3.2 oz (117.1 kg)   10/03/18 250 lb (113.4 kg)     BP Readings from Last 3 Encounters:   11/20/18 122/78   10/31/18 120/64   10/03/18 124/70     Pulse Readings from Last 3 Encounters:   11/20/18 71   10/31/18 62   10/03/18 62     Body mass index is 36.96 kg/m².     ECHO:   9/12/16  Summary   limited echo   Ejection fraction is visually estimated at 22%.   Systolic function was normal.      Signature      ----------------------------------------------------------------   Electronically signed by Michael De La Rosa MD (Interpreting   [id number]) on 09/12/2016 at 04:09 PM   ----------------------------------------------------------------      Findings      Mitral Valve   The

## 2018-11-27 ENCOUNTER — HOSPITAL ENCOUNTER (OUTPATIENT)
Dept: NON INVASIVE DIAGNOSTICS | Age: 58
Discharge: HOME | End: 2018-11-27
Payer: COMMERCIAL

## 2018-11-27 DIAGNOSIS — I50.32 CHF (CONGESTIVE HEART FAILURE), NYHA CLASS II, CHRONIC, DIASTOLIC (HCC): ICD-10-CM

## 2018-11-27 DIAGNOSIS — F32.A DEPRESSION, UNSPECIFIED DEPRESSION TYPE: ICD-10-CM

## 2018-11-27 LAB
LV EF: 50 %
LVEF MODALITY: NORMAL

## 2018-11-27 PROCEDURE — 93306 TTE W/DOPPLER COMPLETE: CPT

## 2018-11-27 RX ORDER — TICAGRELOR 90 MG/1
TABLET ORAL
Qty: 60 TABLET | Refills: 5 | Status: SHIPPED | OUTPATIENT
Start: 2018-11-27 | End: 2019-05-06 | Stop reason: SDUPTHER

## 2018-11-28 RX ORDER — ESCITALOPRAM OXALATE 10 MG/1
TABLET ORAL
Qty: 30 TABLET | Refills: 2 | Status: SHIPPED | OUTPATIENT
Start: 2018-11-28 | End: 2019-02-13 | Stop reason: SDUPTHER

## 2018-11-30 ENCOUNTER — OFFICE VISIT (OUTPATIENT)
Dept: FAMILY MEDICINE CLINIC | Age: 58
End: 2018-11-30

## 2018-11-30 VITALS
SYSTOLIC BLOOD PRESSURE: 138 MMHG | HEART RATE: 64 BPM | TEMPERATURE: 98.3 F | WEIGHT: 263.5 LBS | HEIGHT: 71 IN | DIASTOLIC BLOOD PRESSURE: 78 MMHG | BODY MASS INDEX: 36.89 KG/M2 | RESPIRATION RATE: 14 BRPM

## 2018-11-30 DIAGNOSIS — J02.9 ACUTE PHARYNGITIS, UNSPECIFIED ETIOLOGY: Primary | ICD-10-CM

## 2018-11-30 PROCEDURE — 99213 OFFICE O/P EST LOW 20 MIN: CPT | Performed by: FAMILY MEDICINE

## 2018-11-30 RX ORDER — AMOXICILLIN AND CLAVULANATE POTASSIUM 875; 125 MG/1; MG/1
1 TABLET, FILM COATED ORAL 2 TIMES DAILY
Qty: 20 TABLET | Refills: 0 | Status: SHIPPED | OUTPATIENT
Start: 2018-11-30 | End: 2018-12-10

## 2018-11-30 ASSESSMENT — ENCOUNTER SYMPTOMS
SORE THROAT: 1
VOMITING: 0
SHORTNESS OF BREATH: 0
BLOOD IN STOOL: 0
COUGH: 1
NAUSEA: 0
CONSTIPATION: 0
DIARRHEA: 0
ABDOMINAL PAIN: 0
CHEST TIGHTNESS: 0
EYES NEGATIVE: 1

## 2018-11-30 NOTE — PROGRESS NOTES
CATHETERIZATION  06/27/2016    CATARACT REMOVAL Right 2013    COLONOSCOPY  04/08    CORONARY ARTERY BYPASS GRAFT  07/06/2016    Double bypass, Dr. Jesus Lemus Right 02/2007    Neck    DIAGNOSTIC CARDIAC CATH LAB PROCEDURE      EYE SURGERY Right     Retinal surgery x 3    HERNIA REPAIR Right     Inguinal    PTCA  01/15/2018    ROTATOR CUFF REPAIR Right 11/16/2017    TONSILLECTOMY  child    TOTAL KNEE ARTHROPLASTY Left 10/24/2016    Dr. Subramanian Grand       Family History   Problem Relation Age of Onset    Heart Disease Mother     High Blood Pressure Mother     Obesity Mother     Heart Disease Father     Cancer Father         colon/prostate    Colon Cancer Father     Prostate Cancer Father      Subjective:     Review of Systems   Constitutional: Negative for activity change, appetite change, diaphoresis and fever. HENT: Positive for sore throat. Eyes: Negative. Respiratory: Positive for cough (some thick phlegm). Negative for chest tightness and shortness of breath. Cardiovascular: Negative for chest pain, palpitations and leg swelling. Gastrointestinal: Negative for abdominal pain, blood in stool, constipation, diarrhea, nausea and vomiting. Genitourinary: Negative. Musculoskeletal: Negative. Skin: Negative. Negative for rash. Neurological: Positive for headaches. Negative for dizziness, syncope, weakness and light-headedness. Psychiatric/Behavioral: Negative. Objective:   /78 (Site: Right Upper Arm, Position: Sitting, Cuff Size: Large Adult)   Pulse 64   Temp 98.3 °F (36.8 °C)   Resp 14   Ht 5' 11\" (1.803 m)   Wt 263 lb 8 oz (119.5 kg)   BMI 36.75 kg/m²   Wt Readings from Last 3 Encounters:   11/30/18 263 lb 8 oz (119.5 kg)   11/20/18 265 lb (120.2 kg)   10/31/18 258 lb 3.2 oz (117.1 kg)     Physical Exam   Constitutional: He is oriented to person, place, and time. He appears well-developed and well-nourished. No distress.    HENT:   Head: Normocephalic and atraumatic. Nose: Nose normal. Right sinus exhibits no frontal sinus tenderness. Left sinus exhibits no maxillary sinus tenderness and no frontal sinus tenderness. Mouth/Throat: Posterior oropharyngeal erythema present. No oropharyngeal exudate, posterior oropharyngeal edema or tonsillar abscesses. He has some wax bilaterally. Eyes: Pupils are equal, round, and reactive to light. Conjunctivae and EOM are normal. Right eye exhibits no discharge. Left eye exhibits no discharge. No scleral icterus. Neck: Normal range of motion. Neck supple. No JVD present. No thyromegaly present. Cardiovascular: Normal rate, regular rhythm and normal heart sounds. No murmur heard. Pulmonary/Chest: Effort normal and breath sounds normal. No respiratory distress. He has no wheezes. He has no rhonchi. He has no rales. Abdominal: Soft. Bowel sounds are normal. He exhibits no distension and no mass. There is no hepatosplenomegaly. There is no tenderness. There is no rebound and no guarding. Musculoskeletal: Normal range of motion. He exhibits no edema. Lymphadenopathy:     He has no cervical adenopathy. Neurological: He is alert and oriented to person, place, and time. Skin: Skin is warm and dry. No rash noted. He is not diaphoretic. Psychiatric: He has a normal mood and affect. His behavior is normal.   Nursing note and vitals reviewed. Assessment:       Diagnosis Orders   1.  Acute pharyngitis, unspecified etiology         Plan:      Requested Prescriptions     Signed Prescriptions Disp Refills    amoxicillin-clavulanate (AUGMENTIN) 875-125 MG per tablet 20 tablet 0     Sig: Take 1 tablet by mouth 2 times daily for 10 days     Current Outpatient Prescriptions   Medication Sig Dispense Refill    amoxicillin-clavulanate (AUGMENTIN) 875-125 MG per tablet Take 1 tablet by mouth 2 times daily for 10 days 20 tablet 0    escitalopram (LEXAPRO) 10 MG tablet TAKE 1 TABLET BY MOUTH ONE TIME A DAY questionsanswered. Pt voiced understanding. Instructed to continue current medications,diet and exercise. Patient agreed with treatment plan.

## 2018-12-19 ENCOUNTER — OFFICE VISIT (OUTPATIENT)
Dept: FAMILY MEDICINE CLINIC | Age: 58
End: 2018-12-19

## 2018-12-19 ENCOUNTER — TELEPHONE (OUTPATIENT)
Dept: FAMILY MEDICINE CLINIC | Age: 58
End: 2018-12-19

## 2018-12-19 VITALS
SYSTOLIC BLOOD PRESSURE: 128 MMHG | RESPIRATION RATE: 14 BRPM | HEIGHT: 71 IN | BODY MASS INDEX: 37.01 KG/M2 | HEART RATE: 80 BPM | DIASTOLIC BLOOD PRESSURE: 70 MMHG | WEIGHT: 264.38 LBS | TEMPERATURE: 97.3 F

## 2018-12-19 DIAGNOSIS — I10 ESSENTIAL HYPERTENSION: Primary | ICD-10-CM

## 2018-12-19 DIAGNOSIS — J02.9 ACUTE PHARYNGITIS, UNSPECIFIED ETIOLOGY: ICD-10-CM

## 2018-12-19 PROCEDURE — 99213 OFFICE O/P EST LOW 20 MIN: CPT | Performed by: FAMILY MEDICINE

## 2018-12-19 RX ORDER — CEFADROXIL 500 MG/1
500 CAPSULE ORAL 2 TIMES DAILY
Qty: 20 CAPSULE | Refills: 0 | Status: SHIPPED | OUTPATIENT
Start: 2018-12-19 | End: 2018-12-29

## 2018-12-19 ASSESSMENT — ENCOUNTER SYMPTOMS
CHEST TIGHTNESS: 0
COUGH: 0
CONSTIPATION: 0
BLOOD IN STOOL: 0
DIARRHEA: 0
NAUSEA: 0
EYES NEGATIVE: 1
SORE THROAT: 1
ABDOMINAL PAIN: 0
SHORTNESS OF BREATH: 0
VOMITING: 0

## 2018-12-24 RX ORDER — ISOSORBIDE MONONITRATE 30 MG/1
TABLET, EXTENDED RELEASE ORAL
Qty: 30 TABLET | Refills: 3 | Status: SHIPPED | OUTPATIENT
Start: 2018-12-24 | End: 2019-12-18 | Stop reason: SDUPTHER

## 2018-12-24 RX ORDER — UMECLIDINIUM BROMIDE AND VILANTEROL TRIFENATATE 62.5; 25 UG/1; UG/1
1 POWDER RESPIRATORY (INHALATION) DAILY
Qty: 1 EACH | Refills: 11 | Status: SHIPPED | OUTPATIENT
Start: 2018-12-24 | End: 2019-01-07

## 2018-12-26 ENCOUNTER — HOSPITAL ENCOUNTER (OUTPATIENT)
Age: 58
Discharge: HOME | End: 2018-12-26
Payer: COMMERCIAL

## 2018-12-26 DIAGNOSIS — I50.32 CHF (CONGESTIVE HEART FAILURE), NYHA CLASS II, CHRONIC, DIASTOLIC (HCC): ICD-10-CM

## 2018-12-26 LAB
ANION GAP SERPL CALCULATED.3IONS-SCNC: 19 MEQ/L (ref 8–16)
BUN BLDV-MCNC: 14 MG/DL (ref 7–22)
CALCIUM SERPL-MCNC: 9.4 MG/DL (ref 8.5–10.5)
CHLORIDE BLD-SCNC: 102 MEQ/L (ref 98–111)
CO2: 20 MEQ/L (ref 23–33)
CREAT SERPL-MCNC: 0.8 MG/DL (ref 0.4–1.2)
GFR SERPL CREATININE-BSD FRML MDRD: > 90 ML/MIN/1.73M2
GLUCOSE BLD-MCNC: 131 MG/DL (ref 70–108)
POTASSIUM SERPL-SCNC: 3.8 MEQ/L (ref 3.5–5.2)
SODIUM BLD-SCNC: 141 MEQ/L (ref 135–145)

## 2018-12-26 PROCEDURE — 36415 COLL VENOUS BLD VENIPUNCTURE: CPT

## 2018-12-26 PROCEDURE — 80048 BASIC METABOLIC PNL TOTAL CA: CPT

## 2018-12-31 ENCOUNTER — OFFICE VISIT (OUTPATIENT)
Dept: CARDIOLOGY CLINIC | Age: 58
End: 2018-12-31
Payer: COMMERCIAL

## 2018-12-31 VITALS
DIASTOLIC BLOOD PRESSURE: 75 MMHG | WEIGHT: 263 LBS | HEART RATE: 51 BPM | BODY MASS INDEX: 36.82 KG/M2 | OXYGEN SATURATION: 95 % | SYSTOLIC BLOOD PRESSURE: 145 MMHG | HEIGHT: 71 IN

## 2018-12-31 DIAGNOSIS — I50.32 CHF (CONGESTIVE HEART FAILURE), NYHA CLASS II, CHRONIC, DIASTOLIC (HCC): Primary | ICD-10-CM

## 2018-12-31 PROCEDURE — 99213 OFFICE O/P EST LOW 20 MIN: CPT | Performed by: NURSE PRACTITIONER

## 2018-12-31 RX ORDER — BUMETANIDE 2 MG/1
TABLET ORAL
Qty: 270 TABLET | Refills: 1 | Status: SHIPPED | OUTPATIENT
Start: 2018-12-31 | End: 2019-04-02 | Stop reason: SDUPTHER

## 2018-12-31 NOTE — PROGRESS NOTES
confusion and sleep disturbance. The patient is not nervous/anxious. OBJECTIVE:   Today's Vitals:  BP (!) 145/75   Pulse 51   Ht 5' 11\" (1.803 m)   Wt 263 lb (119.3 kg)   SpO2 95%   BMI 36.68 kg/m²     Physical Exam   Constitutional: He is oriented to person, place, and time. He appears well-developed and well-nourished. HENT:   Head: Normocephalic and atraumatic. Eyes: Pupils are equal, round, and reactive to light. Neck: Normal range of motion. No JVD present. Cardiovascular: Normal rate and normal heart sounds. No murmur heard. Pulmonary/Chest: Effort normal. No respiratory distress. He has no rales. Abdominal: Soft. He exhibits no distension. There is no tenderness. Musculoskeletal: He exhibits no edema or tenderness. Neurological: He is alert and oriented to person, place, and time. Skin: Skin is warm and dry. Psychiatric: He has a normal mood and affect. His behavior is normal.   Vitals reviewed. Wt Readings from Last 3 Encounters:   12/31/18 263 lb (119.3 kg)   12/19/18 264 lb 6 oz (119.9 kg)   11/30/18 263 lb 8 oz (119.5 kg)     BP Readings from Last 3 Encounters:   12/31/18 (!) 145/75   12/19/18 128/70   11/30/18 138/78     Pulse Readings from Last 3 Encounters:   12/31/18 51   12/19/18 80   11/30/18 64     Body mass index is 36.68 kg/m². ECHO:   11/27/18   Summary   Technically difficult examination.   Ejection fraction is visually estimated at 50%.   Overall left ventricular function is normal.      Signature      ----------------------------------------------------------------   Electronically signed by Srinath Martins MD (Interpreting   physician) on 11/28/2018 at 07:35 AM   ----------------------------------------------------------------      Findings      Mitral Valve   The mitral valve structure was normal with normal leaflet separation.   DOPPLER: The transmitral velocity was within the normal range with no   evidence for mitral stenosis.  There was no

## 2019-01-06 DIAGNOSIS — F32.A DEPRESSION, UNSPECIFIED DEPRESSION TYPE: ICD-10-CM

## 2019-01-07 RX ORDER — BUPROPION HYDROCHLORIDE 150 MG/1
TABLET ORAL
Qty: 30 TABLET | Refills: 3 | Status: SHIPPED | OUTPATIENT
Start: 2019-01-07 | End: 2019-05-06 | Stop reason: SDUPTHER

## 2019-01-17 ENCOUNTER — OFFICE VISIT (OUTPATIENT)
Dept: CARDIOLOGY CLINIC | Age: 59
End: 2019-01-17
Payer: COMMERCIAL

## 2019-01-17 VITALS
WEIGHT: 270 LBS | HEART RATE: 72 BPM | BODY MASS INDEX: 37.8 KG/M2 | HEIGHT: 71 IN | DIASTOLIC BLOOD PRESSURE: 72 MMHG | SYSTOLIC BLOOD PRESSURE: 120 MMHG

## 2019-01-17 DIAGNOSIS — I25.810 CORONARY ARTERY DISEASE INVOLVING CORONARY BYPASS GRAFT OF NATIVE HEART WITHOUT ANGINA PECTORIS: ICD-10-CM

## 2019-01-17 DIAGNOSIS — E78.01 FAMILIAL HYPERCHOLESTEROLEMIA: ICD-10-CM

## 2019-01-17 DIAGNOSIS — I10 ESSENTIAL HYPERTENSION: Primary | ICD-10-CM

## 2019-01-17 PROCEDURE — 99214 OFFICE O/P EST MOD 30 MIN: CPT | Performed by: NUCLEAR MEDICINE

## 2019-01-21 RX ORDER — ASPIRIN 81 MG/1
TABLET, CHEWABLE ORAL
Qty: 30 TABLET | Refills: 11 | Status: SHIPPED | OUTPATIENT
Start: 2019-01-21 | End: 2019-12-30 | Stop reason: SDUPTHER

## 2019-01-22 DIAGNOSIS — F32.A DEPRESSION, UNSPECIFIED DEPRESSION TYPE: ICD-10-CM

## 2019-01-23 RX ORDER — BUPROPION HYDROCHLORIDE 150 MG/1
TABLET ORAL
Qty: 30 TABLET | Refills: 3 | Status: SHIPPED | OUTPATIENT
Start: 2019-01-23 | End: 2019-03-04

## 2019-01-28 ENCOUNTER — OFFICE VISIT (OUTPATIENT)
Dept: PULMONOLOGY | Age: 59
End: 2019-01-28
Payer: COMMERCIAL

## 2019-01-28 VITALS
HEART RATE: 75 BPM | WEIGHT: 254.6 LBS | BODY MASS INDEX: 35.64 KG/M2 | OXYGEN SATURATION: 95 % | DIASTOLIC BLOOD PRESSURE: 80 MMHG | RESPIRATION RATE: 20 BRPM | SYSTOLIC BLOOD PRESSURE: 110 MMHG | HEIGHT: 71 IN

## 2019-01-28 DIAGNOSIS — G47.33 OBSTRUCTIVE SLEEP APNEA ON CPAP: Primary | ICD-10-CM

## 2019-01-28 DIAGNOSIS — J44.9 CHRONIC OBSTRUCTIVE PULMONARY DISEASE, UNSPECIFIED COPD TYPE (HCC): ICD-10-CM

## 2019-01-28 DIAGNOSIS — Z99.89 OBSTRUCTIVE SLEEP APNEA ON CPAP: Primary | ICD-10-CM

## 2019-01-28 DIAGNOSIS — E66.9 OBESITY (BMI 30-39.9): ICD-10-CM

## 2019-01-28 PROCEDURE — 99213 OFFICE O/P EST LOW 20 MIN: CPT | Performed by: PHYSICIAN ASSISTANT

## 2019-01-28 ASSESSMENT — ENCOUNTER SYMPTOMS
CHEST TIGHTNESS: 0
BACK PAIN: 0
EYES NEGATIVE: 1
ALLERGIC/IMMUNOLOGIC NEGATIVE: 1
WHEEZING: 0
STRIDOR: 0
NAUSEA: 0
DIARRHEA: 0
COUGH: 0
SHORTNESS OF BREATH: 0

## 2019-02-13 DIAGNOSIS — F32.A DEPRESSION, UNSPECIFIED DEPRESSION TYPE: ICD-10-CM

## 2019-02-13 RX ORDER — ESCITALOPRAM OXALATE 10 MG/1
TABLET ORAL
Qty: 30 TABLET | Refills: 2 | Status: SHIPPED | OUTPATIENT
Start: 2019-02-13 | End: 2019-05-06 | Stop reason: SDUPTHER

## 2019-03-04 ENCOUNTER — OFFICE VISIT (OUTPATIENT)
Dept: FAMILY MEDICINE CLINIC | Age: 59
End: 2019-03-04

## 2019-03-04 VITALS
SYSTOLIC BLOOD PRESSURE: 120 MMHG | WEIGHT: 267.8 LBS | HEART RATE: 74 BPM | BODY MASS INDEX: 37.49 KG/M2 | RESPIRATION RATE: 16 BRPM | HEIGHT: 71 IN | DIASTOLIC BLOOD PRESSURE: 78 MMHG

## 2019-03-04 DIAGNOSIS — E11.9 DIABETES MELLITUS TYPE 2, DIET-CONTROLLED (HCC): Primary | ICD-10-CM

## 2019-03-04 DIAGNOSIS — G25.81 RLS (RESTLESS LEGS SYNDROME): ICD-10-CM

## 2019-03-04 DIAGNOSIS — I10 ESSENTIAL HYPERTENSION: ICD-10-CM

## 2019-03-04 DIAGNOSIS — E78.5 HYPERLIPIDEMIA, UNSPECIFIED HYPERLIPIDEMIA TYPE: ICD-10-CM

## 2019-03-04 LAB
GLUCOSE BLD-MCNC: 192 MG/DL
HBA1C MFR BLD: 7 %

## 2019-03-04 PROCEDURE — 82962 GLUCOSE BLOOD TEST: CPT | Performed by: FAMILY MEDICINE

## 2019-03-04 PROCEDURE — 83036 HEMOGLOBIN GLYCOSYLATED A1C: CPT | Performed by: FAMILY MEDICINE

## 2019-03-04 PROCEDURE — 99213 OFFICE O/P EST LOW 20 MIN: CPT | Performed by: FAMILY MEDICINE

## 2019-03-04 RX ORDER — PRAMIPEXOLE DIHYDROCHLORIDE 0.12 MG/1
0.12 TABLET ORAL NIGHTLY
Qty: 90 TABLET | Refills: 1 | Status: SHIPPED | OUTPATIENT
Start: 2019-03-04 | End: 2019-08-29 | Stop reason: SDUPTHER

## 2019-03-04 ASSESSMENT — PATIENT HEALTH QUESTIONNAIRE - PHQ9
SUM OF ALL RESPONSES TO PHQ9 QUESTIONS 1 & 2: 0
1. LITTLE INTEREST OR PLEASURE IN DOING THINGS: 0
SUM OF ALL RESPONSES TO PHQ QUESTIONS 1-9: 0
2. FEELING DOWN, DEPRESSED OR HOPELESS: 0
SUM OF ALL RESPONSES TO PHQ QUESTIONS 1-9: 0

## 2019-03-04 ASSESSMENT — ENCOUNTER SYMPTOMS
SHORTNESS OF BREATH: 0
EYES NEGATIVE: 1
VOMITING: 0
NAUSEA: 0
CONSTIPATION: 0
BLOOD IN STOOL: 0
CHEST TIGHTNESS: 0
DIARRHEA: 0
ABDOMINAL PAIN: 0
COUGH: 0

## 2019-03-21 RX ORDER — METOPROLOL SUCCINATE 25 MG/1
TABLET, EXTENDED RELEASE ORAL
Qty: 90 TABLET | Refills: 0 | Status: SHIPPED | OUTPATIENT
Start: 2019-03-21 | End: 2019-05-06 | Stop reason: SDUPTHER

## 2019-03-27 ENCOUNTER — HOSPITAL ENCOUNTER (OUTPATIENT)
Age: 59
Discharge: HOME | End: 2019-03-27
Payer: COMMERCIAL

## 2019-03-27 DIAGNOSIS — E78.5 HYPERLIPIDEMIA, UNSPECIFIED HYPERLIPIDEMIA TYPE: ICD-10-CM

## 2019-03-27 DIAGNOSIS — I10 ESSENTIAL HYPERTENSION: ICD-10-CM

## 2019-03-27 LAB
ALBUMIN SERPL-MCNC: 4.2 G/DL (ref 3.5–5.1)
ALP BLD-CCNC: 74 U/L (ref 38–126)
ALT SERPL-CCNC: 48 U/L (ref 11–66)
ANION GAP SERPL CALCULATED.3IONS-SCNC: 15 MEQ/L (ref 8–16)
AST SERPL-CCNC: 26 U/L (ref 5–40)
BILIRUB SERPL-MCNC: 0.5 MG/DL (ref 0.3–1.2)
BUN BLDV-MCNC: 16 MG/DL (ref 7–22)
CALCIUM SERPL-MCNC: 9.5 MG/DL (ref 8.5–10.5)
CHLORIDE BLD-SCNC: 100 MEQ/L (ref 98–111)
CHOLESTEROL, TOTAL: 131 MG/DL (ref 100–199)
CO2: 25 MEQ/L (ref 23–33)
CREAT SERPL-MCNC: 0.9 MG/DL (ref 0.4–1.2)
GFR SERPL CREATININE-BSD FRML MDRD: 86 ML/MIN/1.73M2
GLUCOSE BLD-MCNC: 144 MG/DL (ref 70–108)
HDLC SERPL-MCNC: 47 MG/DL
LDL CHOLESTEROL CALCULATED: 49 MG/DL
POTASSIUM SERPL-SCNC: 4.2 MEQ/L (ref 3.5–5.2)
SODIUM BLD-SCNC: 140 MEQ/L (ref 135–145)
TOTAL PROTEIN: 7.2 G/DL (ref 6.1–8)
TRIGL SERPL-MCNC: 173 MG/DL (ref 0–199)

## 2019-03-27 PROCEDURE — 80061 LIPID PANEL: CPT

## 2019-03-27 PROCEDURE — 80053 COMPREHEN METABOLIC PANEL: CPT

## 2019-03-27 PROCEDURE — 36415 COLL VENOUS BLD VENIPUNCTURE: CPT

## 2019-04-02 ENCOUNTER — OFFICE VISIT (OUTPATIENT)
Dept: CARDIOLOGY CLINIC | Age: 59
End: 2019-04-02
Payer: COMMERCIAL

## 2019-04-02 VITALS
WEIGHT: 265 LBS | SYSTOLIC BLOOD PRESSURE: 118 MMHG | OXYGEN SATURATION: 94 % | HEART RATE: 80 BPM | DIASTOLIC BLOOD PRESSURE: 64 MMHG | BODY MASS INDEX: 37.1 KG/M2 | HEIGHT: 71 IN

## 2019-04-02 DIAGNOSIS — I50.32 CHF (CONGESTIVE HEART FAILURE), NYHA CLASS II, CHRONIC, DIASTOLIC (HCC): Primary | ICD-10-CM

## 2019-04-02 PROCEDURE — 99213 OFFICE O/P EST LOW 20 MIN: CPT | Performed by: NURSE PRACTITIONER

## 2019-04-02 RX ORDER — BUMETANIDE 2 MG/1
2 TABLET ORAL DAILY
Qty: 180 TABLET | Refills: 3
Start: 2019-04-02 | End: 2020-04-15 | Stop reason: SDUPTHER

## 2019-04-02 RX ORDER — BUMETANIDE 2 MG/1
2 TABLET ORAL 2 TIMES DAILY
Qty: 180 TABLET | Refills: 3 | Status: SHIPPED | OUTPATIENT
Start: 2019-04-02 | End: 2019-04-02

## 2019-04-02 ASSESSMENT — ENCOUNTER SYMPTOMS
CHEST TIGHTNESS: 0
APNEA: 0
SHORTNESS OF BREATH: 0
WHEEZING: 0
ABDOMINAL DISTENTION: 0
COLOR CHANGE: 0
NAUSEA: 0
COUGH: 0
ABDOMINAL PAIN: 0

## 2019-04-02 NOTE — PATIENT INSTRUCTIONS
Continue:  · Continue current medications  · Daily weights and record  · Fluid restriction of 2 Liters per day  · Limit sodium in diet to around 9036-4325 mg/day  · Monitor BP  · Activity as tolerated     Call the [facility] for any of the following symptoms: [phone]  Weight gain of 3 pounds in 1 day or 5 pounds in 1 week  Increased shortness of breath  Shortness of breath while laying down  Cough  Chest pain  Swelling in feet, ankles or legs  Tenderness or bloating in the abdomen  Fatigue   Decreased appetite or nausea   Confusion

## 2019-04-02 NOTE — PROGRESS NOTES
[facility]       Visit Date: 4/2/2019  Cardiologist:  Dr. Mary Mehta  Primary Care Physician: Dr. Yana Espinoza MD    Adin Henriquez is a 62 y.o. male who presents today for:  Chief Complaint   Patient presents with    Congestive Heart Failure       HPI:   Adin Henriquez is a 62 y.o. male who presents to the office for a follow up visit in the heart failure clinic. Hx HTN, CAD PCI stent, CABG, FAISAL, COPD, DM, ETOH (now drinks 2-3 beers a day). He did not have his knee replaced as he was going to, has family events going on. His weight is unchanged. No LE edema. He has been eating out more. He tires with activity but can perform ADLs without SOB or fatigue. He sleeps in a bed. Patient has:  Last hospital admission related to Heart Failure:  > 1 year   Chest Pain: no  Worsening SOB/orthopnea/PND: no  Edema: no  Any extra diuretic use: no  Weight gain: no  Compliant checking home weight: yes  Fatigue: yes at times  Abdominal bloating: no  Appetite: good  Difficulty sleeping: FAISAL compliant   Cough: no  Compliant checking blood pressure: no  Any refills on CHF medications needed at this time: no    Past Medical History:   Diagnosis Date    CHF (congestive heart failure) (HCC)     [facility] [address]     Coronary artery disease involving native coronary artery of native heart without angina pectoris     Depression     Diabetes mellitus type 2, diet-controlled ([facility] [address] 6/1/2018    GERD (gastroesophageal reflux disease) 3/21/2012    Hernia     Hx of blood clots 1990's    right knee due to injury    Hx of cocaine abuse     Hyperglycemia 09/09    Hyperlipidemia     Hypertension     FAISAL on CPAP     Osteoarthritis 11/07    S/P CABG x 2 07/06/2016    S/P PTCA: 1/15/2018: Stent diagonal branch Xience 3.0 x 12 mm. 1/15/2018    1/15/2018: Stent diagonal branch Xience 3.0 x 12 mm.  Dr. Stewart Last injury 08/2015    Right thumb cut with table saw, no treatment needed    Unspecified sleep apnea      Past Surgical History:   Procedure Laterality Date    CARDIAC CATHETERIZATION  2016    CATARACT REMOVAL Right     COLONOSCOPY      CORONARY ARTERY BYPASS GRAFT  2016    Double bypass, Dr. Shahrzad Senior Right 2007    Neck    DIAGNOSTIC CARDIAC CATH LAB PROCEDURE      EYE SURGERY Right     Retinal surgery x 3    HERNIA REPAIR Right     Inguinal    PTCA  01/15/2018    ROTATOR CUFF REPAIR Right 2017    TONSILLECTOMY  child    TOTAL KNEE ARTHROPLASTY Left 10/24/2016    Dr. Diogenes López     Family History   Problem Relation Age of Onset    Heart Disease Mother     High Blood Pressure Mother     Obesity Mother     Heart Disease Father     Cancer Father         colon/prostate    Colon Cancer Father     Prostate Cancer Father      Social History     Tobacco Use    Smoking status: Former Smoker     Packs/day: 2.00     Years: 25.00     Pack years: 50.00     Types: Cigars     Last attempt to quit: 2014     Years since quittin.3    Smokeless tobacco: Never Used   Substance Use Topics    Alcohol use: Yes     Alcohol/week: 1.8 oz     Types: 3 Cans of beer per week     Comment: daily 2-3 cans (stopped approximately 18)     Current Outpatient Medications   Medication Sig Dispense Refill    bumetanide (BUMEX) 2 MG tablet Take 1 tablet by mouth daily Take 2 mg AM - 1 mg  tablet 3    metoprolol succinate (TOPROL XL) 25 MG extended release tablet TAKE 2 TABLETS BY MOUTH EVERY DAY.  90 tablet 0    pramipexole (MIRAPEX) 0.125 MG tablet Take 1 tablet by mouth nightly 90 tablet 1    aspirin 81 MG chewable tablet CHEW AND SWALLOW 1 TABLET BY MOUTH ONE TIME A DAY 30 tablet 11    ANORO ELLIPTA 62.5-25 MCG/INH AEPB inhaler INHALE 1 PUFF INTO THE LUNGS DAILY 1 each 5    buPROPion (WELLBUTRIN XL) 150 MG extended release tablet TAKE ONE TABLET BY MOUTH EVERY MORNING 30 tablet 3    isosorbide mononitrate (IMDUR) 30 MG extended release tablet TAKE 1 TABLET BY MOUTH ONCE DAILY. 30 tablet 3    BRILINTA 90 MG TABS tablet TAKE 1 TABLET BY MOUTH TWICE DAILY. 60 tablet 5    fluticasone (FLONASE) 50 MCG/ACT nasal spray 2 sprays by Nasal route daily as needed for Rhinitis or Allergies Apply daily to each nare 3 Bottle 1    lisinopril (PRINIVIL;ZESTRIL) 10 MG tablet TAKE ONE TABLET BY MOUTH ONE TIME A DAY 90 tablet 1    omeprazole (PRILOSEC) 20 MG delayed release capsule TAKE ONE CAPSULE BY MOUTH ONE TIME A DAY 90 capsule 1    ibuprofen (ADVIL;MOTRIN) 800 MG tablet Take 1 tablet by mouth every 8 hours as needed for Pain 30 tablet 0    atorvastatin (LIPITOR) 40 MG tablet TAKE 1 TABLET BY MOUTH NIGHTLY 90 tablet 4    albuterol sulfate HFA (VENTOLIN HFA) 108 (90 Base) MCG/ACT inhaler Inhale 2 puffs into the lungs every 6 hours as needed for Wheezing 1 Inhaler 3    nitroGLYCERIN (NITROSTAT) 0.4 MG SL tablet Place 1 tablet under the tongue every 5 minutes as needed for Chest pain 25 tablet 3    escitalopram (LEXAPRO) 10 MG tablet TAKE 1 TABLET BY MOUTH ONE TIME A DAY 30 tablet 2     No current facility-administered medications for this visit. Allergies   Allergen Reactions    Zoloft [Sertraline Hcl] Other (See Comments)     Headaches, sweats, bad dreams       SUBJECTIVE:   Review of Systems   Constitutional: Positive for fatigue. Negative for activity change, appetite change and fever. HENT: Negative for congestion. Respiratory: Negative for apnea, cough, chest tightness, shortness of breath and wheezing. Cardiovascular: Negative for chest pain, palpitations and leg swelling. Gastrointestinal: Negative for abdominal distention, abdominal pain and nausea. Genitourinary: Negative for difficulty urinating and dysuria. Musculoskeletal: Negative for arthralgias and gait problem. Skin: Negative for color change. Neurological: Negative for dizziness, numbness and headaches.    Psychiatric/Behavioral: Negative for agitation, confusion and sleep disturbance. The patient is not nervous/anxious. OBJECTIVE:   Today's Vitals:  /64 (Site: Left Upper Arm)   Pulse 80   Ht 5' 11\" (1.803 m)   Wt 265 lb (120.2 kg)   SpO2 94%   BMI 36.96 kg/m²     Physical Exam    Wt Readings from Last 3 Encounters:   04/02/19 265 lb (120.2 kg)   03/04/19 267 lb 12.8 oz (121.5 kg)   01/28/19 254 lb 9.6 oz (115.5 kg)     BP Readings from Last 3 Encounters:   04/02/19 118/64   03/04/19 120/78   01/28/19 110/80     Pulse Readings from Last 3 Encounters:   04/02/19 80   03/04/19 74   01/28/19 75     Body mass index is 36.96 kg/m². ECHO:   11/27/18   Summary   Technically difficult examination.   Ejection fraction is visually estimated at 50%.   Overall left ventricular function is normal.      Signature      ----------------------------------------------------------------   Electronically signed by Laureen Mabry MD (Interpreting   physician) on 11/28/2018 at 07:35 AM   ----------------------------------------------------------------      Findings      Mitral Valve   The mitral valve structure was normal with normal leaflet separation.   DOPPLER: The transmitral velocity was within the normal range with no   evidence for mitral stenosis.  There was no evidence of mitral   regurgitation.      Aortic Valve   The aortic valve leaflets were not well visualized.   Aortic valve appears tricuspid.      Tricuspid Valve   Tricuspid valve was not well visualized.   Trivial tricuspid regurgitation visualized.      Pulmonic Valve   The pulmonic valve was not well visualized .      Left Atrium   Left atrial size was normal.      Left Ventricle   Ejection fraction is visually estimated at 50%.   Overall left ventricular function is normal.      Right Atrium   Right atrial size was normal.      Right Ventricle   The right ventricular size was normal with normal systolic function and   wall thickness.      Pericardial Effusion   The pericardium was daily.  HF Zones reviewed. · Daily weights  · Fluid restriction of 2 Liters per day  · Limit sodium in diet to around 5911-5710 mg/day  · Monitor BP  · Activity as tolerated     Patient was instructed to call the [address] for changes in the following symptoms:   Weight gain of 3 pounds in 1 day or 5 pounds in 1 week  Increased shortness of breath  Shortness of breath while laying down  Cough  Chest pain  Swelling in feet, ankles or legs  Tenderness or bloating in the abdomen  Fatigue   Decreased appetite or nausea   Confusion      Return in about 3 months (around 7/2/2019). or sooner if needed     Patient given educational materials - see patient instructions. We discussed the importance of weighing oneself and recording daily. We also discussed the importance of a lowsodium diet, higher sodium foods to avoid and better low sodium food options. Discussed use, benefit, and side effects of prescribed medications. All patient questions answered. Patient verbalizes understanding of plan of care using teach back method, and is agreeable to the treatment plan.        Electronicallysigned by ASHLEIGH Friedman CNP on 4/2/2019 at 12:52 PM

## 2019-04-08 RX ORDER — LISINOPRIL 10 MG/1
TABLET ORAL
Qty: 90 TABLET | Refills: 0 | Status: SHIPPED | OUTPATIENT
Start: 2019-04-08 | End: 2019-07-02 | Stop reason: SDUPTHER

## 2019-04-08 RX ORDER — OMEPRAZOLE 20 MG/1
CAPSULE, DELAYED RELEASE ORAL
Qty: 90 CAPSULE | Refills: 0 | Status: SHIPPED | OUTPATIENT
Start: 2019-04-08 | End: 2019-07-02 | Stop reason: SDUPTHER

## 2019-04-08 RX ORDER — METOPROLOL SUCCINATE 25 MG/1
TABLET, EXTENDED RELEASE ORAL
Qty: 90 TABLET | Refills: 0 | Status: SHIPPED | OUTPATIENT
Start: 2019-04-08 | End: 2019-09-04

## 2019-04-08 NOTE — TELEPHONE ENCOUNTER
The pharmacy is  requesting a refill of the below medication which has been pended for you:     Requested Prescriptions     Pending Prescriptions Disp Refills    lisinopril (PRINIVIL;ZESTRIL) 10 MG tablet [Pharmacy Med Name: LISINOPRIL 10MG TABS] 90 tablet 1     Sig: TAKE 1 TABLET BY MOUTH ONE TIME A DAY    omeprazole (PRILOSEC) 20 MG delayed release capsule [Pharmacy Med Name: OMEPRAZOLE 20MG CPDR] 90 capsule 1     Sig: TAKE 1 CAPSULE BY MOUTH ONE TIME A DAY    metoprolol succinate (TOPROL XL) 25 MG extended release tablet [Pharmacy Med Name: METOPROLOL SUCCINATE ER 25MG TB24] 90 tablet 0     Sig: TAKE 2 TABLETS BY MOUTH EVERY DAY.        Last Appointment Date: 3/4/2019  Next Appointment Date: 6/5/2019    Allergies   Allergen Reactions    Zoloft [Sertraline Hcl] Other (See Comments)     Headaches, sweats, bad dreams

## 2019-04-19 ENCOUNTER — HOSPITAL ENCOUNTER (EMERGENCY)
Age: 59
Discharge: HOME | End: 2019-04-19
Payer: COMMERCIAL

## 2019-04-19 VITALS
SYSTOLIC BLOOD PRESSURE: 130 MMHG | HEART RATE: 77 BPM | TEMPERATURE: 97.4 F | RESPIRATION RATE: 16 BRPM | HEIGHT: 71 IN | OXYGEN SATURATION: 95 % | WEIGHT: 250 LBS | BODY MASS INDEX: 35 KG/M2 | DIASTOLIC BLOOD PRESSURE: 74 MMHG

## 2019-04-19 DIAGNOSIS — K62.5 RECTAL BLEEDING: Primary | ICD-10-CM

## 2019-04-19 LAB
BASOPHILS # BLD: 1 %
BASOPHILS ABSOLUTE: 0.1 THOU/MM3 (ref 0–0.1)
BUN, WHOLE BLOOD: 21 MG/DL (ref 8–26)
CHLORIDE, WHOLE BLOOD: 104 MEQ/L (ref 98–109)
CREATININE, WHOLE BLOOD: 1 MG/DL (ref 0.5–1.2)
EOSINOPHIL # BLD: 0.9 %
EOSINOPHILS ABSOLUTE: 0.1 THOU/MM3 (ref 0–0.4)
GFR, ESTIMATED: 81 ML/MIN/1.73M2
GLUCOSE, WHOLE BLOOD: 151 MG/DL (ref 70–108)
HCT VFR BLD CALC: 50.9 % (ref 42–52)
HEMOCCULT STL QL: POSITIVE
HEMOGLOBIN: 17.7 GM/DL (ref 14–18)
IMMATURE GRANS (ABS): 0.08 THOU/MM3 (ref 0–0.07)
IMMATURE GRANULOCYTES: 0.7 %
LYMPHOCYTES # BLD: 21.5 %
LYMPHOCYTES ABSOLUTE: 2.1 THOU/MM3 (ref 1–4.8)
MCH RBC QN AUTO: 34.2 PG (ref 27–31)
MCHC RBC AUTO-ENTMCNC: 34.8 GM/DL (ref 33–37)
MCV RBC AUTO: 98 FL (ref 80–94)
MONOCYTES # BLD: 11.2 %
MONOCYTES ABSOLUTE: 1.1 THOU/MM3 (ref 0.4–1.3)
NUCLEATED RED BLOOD CELLS: 0 /100 WBC
PDW BLD-RTO: 13.1 % (ref 11.5–14.5)
PLATELET # BLD: 258 THOU/MM3 (ref 130–400)
PMV BLD AUTO: 11.3 FL (ref 7.4–10.4)
POTASSIUM, WHOLE BLOOD: 4.6 MEQ/L (ref 3.5–4.9)
RBC # BLD: 5.18 MILL/MM3 (ref 4.7–6.1)
SEG NEUTROPHILS: 64.7 %
SEGMENTED NEUTROPHILS ABSOLUTE COUNT: 6.4 THOU/MM3 (ref 1.8–7.7)
SODIUM, WHOLE BLOOD: 141 MEQ/L (ref 138–146)
TOTAL CO2, VENOUS: 26 MEQ/L (ref 23–33)
WBC # BLD: 9.9 THOU/MM3 (ref 4.8–10.8)

## 2019-04-19 PROCEDURE — 36415 COLL VENOUS BLD VENIPUNCTURE: CPT

## 2019-04-19 PROCEDURE — 84520 ASSAY OF UREA NITROGEN: CPT

## 2019-04-19 PROCEDURE — 82947 ASSAY GLUCOSE BLOOD QUANT: CPT

## 2019-04-19 PROCEDURE — 82565 ASSAY OF CREATININE: CPT

## 2019-04-19 PROCEDURE — 82272 OCCULT BLD FECES 1-3 TESTS: CPT

## 2019-04-19 PROCEDURE — 80051 ELECTROLYTE PANEL: CPT

## 2019-04-19 PROCEDURE — 99214 OFFICE O/P EST MOD 30 MIN: CPT | Performed by: NURSE PRACTITIONER

## 2019-04-19 PROCEDURE — 85025 COMPLETE CBC W/AUTO DIFF WBC: CPT

## 2019-04-19 PROCEDURE — 99214 OFFICE O/P EST MOD 30 MIN: CPT

## 2019-04-19 ASSESSMENT — ENCOUNTER SYMPTOMS
HEMATOCHEZIA: 1
HEMATEMESIS: 0
ABDOMINAL DISTENTION: 0
CONSTIPATION: 0
NAUSEA: 0
BACK PAIN: 0
ABDOMINAL PAIN: 0
BLOOD IN STOOL: 0
VOMITING: 0
DIARRHEA: 0
RECTAL PAIN: 0
ANAL BLEEDING: 1
SHORTNESS OF BREATH: 0

## 2019-04-19 NOTE — ED NOTES
Pt discharged. Discharge assessments completed. No changes. All discharge education and information given. Pt walked out per self in stable condition.      Julio Cesar Green, MIREILLE  00/67/61 [id number]

## 2019-04-19 NOTE — ED NOTES
To [facility] with complaints of blood in stool. States it has been going on for about 1 year, but was very rare. In past 2 weeks has significantly increased. States that his last colonoscopy was about 3 years ago and was told at that time he had hemorrhoids.       Jose Carlos Gunderson RN  04/19/19 [id number]

## 2019-04-19 NOTE — ED PROVIDER NOTES
(07/06/2016); Rotator cuff repair (Right, 11/16/2017); Diagnostic Cardiac Cath Lab Procedure; and Percutaneous Transluminal Coronary Angio (01/15/2018). CURRENT MEDICATIONS       Discharge Medication List as of 4/19/2019  1:38 PM      CONTINUE these medications which have NOT CHANGED    Details   lisinopril (PRINIVIL;ZESTRIL) 10 MG tablet TAKE 1 TABLET BY MOUTH ONE TIME A DAY, Disp-90 tablet, R-0Normal      omeprazole (PRILOSEC) 20 MG delayed release capsule TAKE 1 CAPSULE BY MOUTH ONE TIME A DAY, Disp-90 capsule, R-0Normal      !! metoprolol succinate (TOPROL XL) 25 MG extended release tablet TAKE 2 TABLETS BY MOUTH EVERY DAY., Disp-90 tablet, R-0Normal      bumetanide (BUMEX) 2 MG tablet Take 1 tablet by mouth daily Take 2 mg AM - 1 mg PM, Disp-180 tablet, R-3NO PRINT      !! metoprolol succinate (TOPROL XL) 25 MG extended release tablet TAKE 2 TABLETS BY MOUTH EVERY DAY., Disp-90 tablet, R-0Normal      pramipexole (MIRAPEX) 0.125 MG tablet Take 1 tablet by mouth nightly, Disp-90 tablet, R-1Normal      escitalopram (LEXAPRO) 10 MG tablet TAKE 1 TABLET BY MOUTH ONE TIME A DAY, Disp-30 tablet, R-2Normal      aspirin 81 MG chewable tablet CHEW AND SWALLOW 1 TABLET BY MOUTH ONE TIME A DAY, Disp-30 tablet, R-11Normal      ANORO ELLIPTA 62.5-25 MCG/INH AEPB inhaler INHALE 1 PUFF INTO THE LUNGS DAILY, Disp-1 each, R-5Normal      buPROPion (WELLBUTRIN XL) 150 MG extended release tablet TAKE ONE TABLET BY MOUTH EVERY MORNING, Disp-30 tablet, R-3Normal      isosorbide mononitrate (IMDUR) 30 MG extended release tablet TAKE 1 TABLET BY MOUTH ONCE DAILY. , Disp-30 tablet, R-3Normal      BRILINTA 90 MG TABS tablet TAKE 1 TABLET BY MOUTH TWICE DAILY. , Disp-60 tablet, R-5Normal      fluticasone (FLONASE) 50 MCG/ACT nasal spray 2 sprays by Nasal route daily as needed for Rhinitis or Allergies Apply daily to each nare, Disp-3 Bottle, R-1Normal      atorvastatin (LIPITOR) 40 MG tablet TAKE 1 TABLET BY MOUTH NIGHTLY, Disp-90 tablet, R-4Normal      albuterol sulfate HFA (VENTOLIN HFA) 108 (90 Base) MCG/ACT inhaler Inhale 2 puffs into the lungs every 6 hours as needed for Wheezing, Disp-1 Inhaler, R-3Normal      nitroGLYCERIN (NITROSTAT) 0.4 MG SL tablet Place 1 tablet under the tongue every 5 minutes as needed for Chest pain, Disp-25 tablet, R-3Normal       !! - Potential duplicate medications found. Please discuss with provider. ALLERGIES     Patient is is allergic to zoloft [sertraline hcl]. FAMILY HISTORY     Patient's family history includes Cancer in his father; Juan Bitters in his father; Heart Disease in his father and mother; High Blood Pressure in his mother; Obesity in his mother; Prostate Cancer in his father. SOCIAL HISTORY     Patient  reports that he quit smoking about 4 years ago. His smoking use included cigars. He has a 50.00 pack-year smoking history. He has never used smokeless tobacco. He reports that he drinks about 1.8 oz of alcohol per week. He reports that he has current or past drug history. Drug: Marijuana. Frequency: 5.00 times per week. PHYSICAL EXAM     ED TRIAGE VITALS  BP: 130/74, Temp: 97.4 °F (36.3 °C), Pulse: 77, Resp: 16, SpO2: 95 %  Physical Exam   Constitutional: He is oriented to person, place, and time. Vital signs are normal. He appears well-developed and well-nourished. Non-toxic appearance. He does not have a sickly appearance. He does not appear ill. No distress. HENT:   Head: Normocephalic and atraumatic. Right Ear: Hearing and external ear normal.   Left Ear: Hearing and external ear normal.   Nose: Nose normal.   Mouth/Throat: Uvula is midline, oropharynx is clear and moist and mucous membranes are normal.   Neck: Normal range of motion. Neck supple. Cardiovascular: Normal rate, S1 normal, S2 normal and normal heart sounds. Exam reveals no gallop, no S3, no S4, no distant heart sounds and no friction rub. No murmur heard.   Pulmonary/Chest: Effort normal and breath sounds normal. No stridor. No respiratory distress. He has no decreased breath sounds. He has no wheezes. He has no rhonchi. He has no rales. Abdominal: Soft. Normal appearance and bowel sounds are normal. He exhibits no distension, no ascites and no mass. There is no tenderness. Genitourinary: Rectal exam shows guaiac positive stool (positive). Rectal exam shows no external hemorrhoid (none noted on exam), no internal hemorrhoid (no hemorrhoids palpated on exam), no fissure, no mass, no tenderness and anal tone normal.   Genitourinary Comments: No active rectal bleeding during exam.  Donnie Austin RN present during exam.  Patient was prepped , laying on right side. Lymphadenopathy:     He has no cervical adenopathy. Neurological: He is alert and oriented to person, place, and time. Skin: Skin is warm, dry and intact. No bruising, no ecchymosis, no petechiae and no rash (none noted to exposed surfaces ) noted. He is not diaphoretic. No cyanosis. No pallor. Psychiatric: He has a normal mood and affect. Nursing note and vitals reviewed.       DIAGNOSTIC RESULTS   Labs:  Results for orders placed or performed during the hospital encounter of 04/19/19   Blood occult stool screen #1   Result Value Ref Range    OCCULT BLOOD FECAL POSITIVE NEGATIVE   CBC Auto Differential   Result Value Ref Range    WBC 9.9 4.8 - 10.8 thou/mm3    RBC 5.18 4.70 - 6.10 mill/mm3    Hemoglobin 17.7 14.0 - 18.0 gm/dl    Hematocrit 50.9 42.0 - 52.0 %    MCV 98 (H) 80 - 94 fL    MCH 34.2 (H) 27.0 - 31.0 pg    MCHC 34.8 33.0 - 37.0 gm/dl    RDW 13.1 11.5 - 14.5 %    Platelets 666 032 - 844 thou/mm3    MPV 11.3 (H) 7.4 - 10.4 fL   POC Basic Metabol Panel without Calcium   Result Value Ref Range    Sodium, Whole Blood 141 138 - 146 meq/l    Potassium, Whole Blood 4.6 3.5 - 4.9 meq/l    Chloride, Whole Blood 104 98 - 109 meq/l    Total CO2, Venous 26 23 - 33 meq/l    Glucose, Whole Blood 151 (H) 70 - 108 mg/dl    BUN, WHOLE BLOOD 21 8 - 26 mg/dl    CREATININE, WHOLE BLOOD 1.0 0.5 - 1.2 mg/dl   Differential   Result Value Ref Range    Seg Neutrophils 64.7 %    Lymphocytes 21.5 %    Monocytes 11.2 %    Eosinophils 0.9 %    Basophils 1.0 %    Immature Granulocytes 0.7 %    Segs Absolute 6.4 1.8 - 7.7 thou/mm3    Lymphocytes # 2.1 1.0 - 4.8 thou/mm3    Monocytes # 1.1 0.4 - 1.3 thou/mm3    Eosinophils # 0.1 0.0 - 0.4 thou/mm3    Basophils # 0.1 0.0 - 0.1 thou/mm3    Immature Grans (Abs) 0.08 (H) 0.00 - 0.07 thou/mm3    nRBC 0 /100 wbc   Glomerular Filtration Rate, Estimated   Result Value Ref Range    GFR, Estimated 81 ml/min/1.73m2       IMAGING:    URGENT CARE COURSE:     Vitals:    04/19/19 1259 04/19/19 1300   BP:  130/74   Pulse:  77   Resp: 16    Temp: 97.4 °F (36.3 °C)    TempSrc: Temporal    SpO2:  95%   Weight: 250 lb (113.4 kg)    Height: 5' 11\" (1.803 m)        Medications - No data to display  PROCEDURES:  None  FINAL IMPRESSION       1. Rectal bleeding        DISPOSITION/PLAN   DISPOSITION    Vitals signs are normal.  Heart rate is regular at 77. Blood pressure is 130/74. He is afebrile and nontoxic in appearance  Occult #1 positive today during encounter  No masses, lesions, erythema to rectum  CBC unremarkable  BMP glucose 151 he is diabetic  Attempted to call Dr. Carmelo Victor office to schedule follow up ( patient has seen in the past) no answer  Patient to call office on Monday for appointment, or go to ER for worsening or increase in rectal bleeding, abdominal pain, fever of 101 or higher, shortness of breath or dizziness or any new or worsening symptoms  Denies any dyspepsia however did report changes in size or character of stools over last couple weeks. No abdominal pain on exam. Soft , no distention. PATIENT REFERRED TO:  Francisca Baird MD  [address] [facility] [address]  [phone]    Call on 4/22/2019  For appointment     Patient instructed to follow up with PCP.   If symptoms worsen, become severe or new symptoms develop patient instructedto go to the emergency room immediately. DISCHARGE MEDICATIONS:  Discharge Medication List as of 4/19/2019  1:38 PM        Discharge Medication List as of 4/19/2019  1:38 PM          Patient giveneducational materials - see patient instructions. Discussed use, benefit, and side effects of prescribed medications. All patient questions answered. Pt voiced understanding. Reviewed health maintenance. Patient agreedwith treatment plan. Follow up as directed.      ASHLEGIH Murphy - ASHLEIGH Peña CNP  04/24/19 [address] [place] [address] ASHLEIGH Humphries - KAUSHAL  04/24/19 [id number]

## 2019-04-22 ENCOUNTER — TELEPHONE (OUTPATIENT)
Dept: FAMILY MEDICINE CLINIC | Age: 59
End: 2019-04-22

## 2019-04-22 NOTE — TELEPHONE ENCOUNTER
Please let the patient know that if he still bleeding and he feels lightheaded he needs to go back to ER.

## 2019-04-22 NOTE — TELEPHONE ENCOUNTER
Jayesh Martinez called stating that Sudhir Vivas was seen at Urgent Care for GI bleeding and they referred him to Dr Lena Alarcon. He called Dr. Marleni Uribe office this morning and they gave him an appt Thursday. She is concerned because he is light headed and still bleeding. She doesn't think he told the office that information. Advised her to call them herself and let them know and see if they can't move that appointment up.

## 2019-05-06 DIAGNOSIS — F32.A DEPRESSION, UNSPECIFIED DEPRESSION TYPE: ICD-10-CM

## 2019-05-06 RX ORDER — METOPROLOL SUCCINATE 25 MG/1
TABLET, EXTENDED RELEASE ORAL
Qty: 90 TABLET | Refills: 0 | Status: SHIPPED | OUTPATIENT
Start: 2019-05-06 | End: 2019-05-17 | Stop reason: SDUPTHER

## 2019-05-06 RX ORDER — TICAGRELOR 90 MG/1
TABLET ORAL
Qty: 60 TABLET | Refills: 5 | Status: SHIPPED | OUTPATIENT
Start: 2019-05-06 | End: 2019-10-12 | Stop reason: SDUPTHER

## 2019-05-06 RX ORDER — BUPROPION HYDROCHLORIDE 150 MG/1
TABLET ORAL
Qty: 30 TABLET | Refills: 3 | Status: SHIPPED | OUTPATIENT
Start: 2019-05-06 | End: 2019-05-08 | Stop reason: DRUGHIGH

## 2019-05-06 RX ORDER — FLUTICASONE PROPIONATE 50 MCG
SPRAY, SUSPENSION (ML) NASAL
Qty: 48 G | Refills: 1 | Status: SHIPPED | OUTPATIENT
Start: 2019-05-06 | End: 2019-10-12 | Stop reason: SDUPTHER

## 2019-05-06 RX ORDER — ESCITALOPRAM OXALATE 10 MG/1
TABLET ORAL
Qty: 30 TABLET | Refills: 2 | Status: SHIPPED | OUTPATIENT
Start: 2019-05-06 | End: 2019-05-08 | Stop reason: DRUGHIGH

## 2019-05-08 ENCOUNTER — HOSPITAL ENCOUNTER (OUTPATIENT)
Age: 59
Discharge: HOME | End: 2019-05-08
Payer: COMMERCIAL

## 2019-05-08 ENCOUNTER — TELEPHONE (OUTPATIENT)
Dept: FAMILY MEDICINE CLINIC | Age: 59
End: 2019-05-08

## 2019-05-08 ENCOUNTER — HOSPITAL ENCOUNTER (OUTPATIENT)
Dept: GENERAL RADIOLOGY | Age: 59
Discharge: HOME | End: 2019-05-08
Payer: COMMERCIAL

## 2019-05-08 ENCOUNTER — OFFICE VISIT (OUTPATIENT)
Dept: FAMILY MEDICINE CLINIC | Age: 59
End: 2019-05-08

## 2019-05-08 VITALS
BODY MASS INDEX: 37.1 KG/M2 | DIASTOLIC BLOOD PRESSURE: 70 MMHG | SYSTOLIC BLOOD PRESSURE: 110 MMHG | RESPIRATION RATE: 16 BRPM | WEIGHT: 265 LBS | HEIGHT: 71 IN | HEART RATE: 64 BPM

## 2019-05-08 DIAGNOSIS — M79.671 RIGHT FOOT PAIN: ICD-10-CM

## 2019-05-08 DIAGNOSIS — F41.9 ANXIETY AND DEPRESSION: ICD-10-CM

## 2019-05-08 DIAGNOSIS — I10 ESSENTIAL HYPERTENSION: ICD-10-CM

## 2019-05-08 DIAGNOSIS — M79.671 RIGHT FOOT PAIN: Primary | ICD-10-CM

## 2019-05-08 DIAGNOSIS — F32.A ANXIETY AND DEPRESSION: ICD-10-CM

## 2019-05-08 LAB — URIC ACID: 11.5 MG/DL (ref 3.7–7)

## 2019-05-08 PROCEDURE — 73630 X-RAY EXAM OF FOOT: CPT

## 2019-05-08 PROCEDURE — 36415 COLL VENOUS BLD VENIPUNCTURE: CPT

## 2019-05-08 PROCEDURE — 84550 ASSAY OF BLOOD/URIC ACID: CPT

## 2019-05-08 PROCEDURE — 99213 OFFICE O/P EST LOW 20 MIN: CPT | Performed by: FAMILY MEDICINE

## 2019-05-08 RX ORDER — BUPROPION HYDROCHLORIDE 300 MG/1
300 TABLET ORAL EVERY MORNING
Qty: 30 TABLET | Refills: 3 | Status: SHIPPED | OUTPATIENT
Start: 2019-05-08 | End: 2019-07-31 | Stop reason: SDUPTHER

## 2019-05-08 ASSESSMENT — ENCOUNTER SYMPTOMS
CHEST TIGHTNESS: 0
COUGH: 0
VOMITING: 0
ABDOMINAL PAIN: 0
BLOOD IN STOOL: 0
DIARRHEA: 0
SHORTNESS OF BREATH: 0
NAUSEA: 0
CONSTIPATION: 0
EYES NEGATIVE: 1
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
## 2019-05-08 NOTE — PROGRESS NOTES
BP Readings from Last 2 Encounters:   04/19/19 130/74   04/02/19 118/64     Hemoglobin A1C (%)   Date Value   03/04/2019 7.0     LDL Calculated (mg/dL)   Date Value   03/27/2019 49              Tobacco use:  Patient  reports that he quit smoking about 4 years ago. His smoking use included cigars. He has a 50.00 pack-year smoking history. He has never used smokeless tobacco.    If Smoker - Cessation materials given? NA    Is Daily aspirin on medication list?:  Yes    Diabetic retinal exam done? No   If yes, document in Health Maintenance. Monofilament placed on counter? No    Shoes and socks removed? Yes    BP taken with correct size cuff? Yes   Repeated if > 140/90 NA     Is patient taking any medications for diabetes    Yes   If yes, see medication list    Is the patient reporting any side effects of diabetic medications? No    Microalbumin performed if applicable? NA      Is patient taking any over the counter medications    Yes   If yes, see medication list      Patient Self-Management Goal for Chronic Condition  Goal: I will take all medications as prescribed by my doctor, and I will call the office if I am having any medication problems. Barriers to success: none  Plan for overcoming my barriers: N/A     Confidence: 10/10  Date goal set: 5/8/19  Date goal attained:     Have you seen any other physician or provider since your last visit no    Have you had any other diagnostic tests since your last visit? no    Have you changed or stopped any medications since your last visit including any over-the-counter medicines, vitamins, or herbal medicines? no     Are you taking all your prescribed medications?  Yes    If NO, why?

## 2019-05-09 RX ORDER — COLCHICINE 0.6 MG/1
0.6 TABLET ORAL 2 TIMES DAILY
Qty: 6 TABLET | Refills: 0 | Status: SHIPPED | OUTPATIENT
Start: 2019-05-09 | End: 2019-06-05 | Stop reason: ALTCHOICE

## 2019-05-09 RX ORDER — PREDNISONE 10 MG/1
TABLET ORAL
Qty: 16 TABLET | Refills: 0 | Status: SHIPPED | OUTPATIENT
Start: 2019-05-09 | End: 2019-05-17 | Stop reason: ALTCHOICE

## 2019-05-09 NOTE — TELEPHONE ENCOUNTER
Result Notes for Uric Acid     Notes recorded by Monae Martinez MD on 5/8/2019 at 3:08 PM EDT  Please let him know that his uric acid is elevated and we need to start him on colchicine 0.6 mg by mouth twice a day for 3 days and also will do a Medrol Dosepak.   Need to follow a low purine diet (decreased red meat among other things) please let him know if you need us to send him a low purine diet or if he prefers to look it up on line

## 2019-05-09 NOTE — TELEPHONE ENCOUNTER
Pt informed of result note. Req RX to [facility] today to help sooner. Please call pt once meds ordered today.

## 2019-05-13 ENCOUNTER — HOSPITAL ENCOUNTER (OUTPATIENT)
Dept: CT IMAGING | Age: 59
Discharge: HOME | End: 2019-05-13
Payer: COMMERCIAL

## 2019-05-13 DIAGNOSIS — F17.211 NICOTINE DEPENDENCE, CIGARETTES, IN REMISSION: ICD-10-CM

## 2019-05-13 PROCEDURE — G0297 LDCT FOR LUNG CA SCREEN: HCPCS

## 2019-05-17 ENCOUNTER — OFFICE VISIT (OUTPATIENT)
Dept: PULMONOLOGY | Age: 59
End: 2019-05-17
Payer: COMMERCIAL

## 2019-05-17 VITALS
HEART RATE: 69 BPM | TEMPERATURE: 97.5 F | HEIGHT: 71 IN | DIASTOLIC BLOOD PRESSURE: 62 MMHG | OXYGEN SATURATION: 95 % | SYSTOLIC BLOOD PRESSURE: 108 MMHG | WEIGHT: 268 LBS | BODY MASS INDEX: 37.52 KG/M2

## 2019-05-17 DIAGNOSIS — Z99.89 OSA ON CPAP: ICD-10-CM

## 2019-05-17 DIAGNOSIS — I50.32 CHRONIC DIASTOLIC HEART FAILURE (HCC): ICD-10-CM

## 2019-05-17 DIAGNOSIS — J43.2 CENTRILOBULAR EMPHYSEMA (HCC): Primary | ICD-10-CM

## 2019-05-17 DIAGNOSIS — G47.33 OSA ON CPAP: ICD-10-CM

## 2019-05-17 DIAGNOSIS — E66.01 CLASS 2 SEVERE OBESITY DUE TO EXCESS CALORIES WITH SERIOUS COMORBIDITY AND BODY MASS INDEX (BMI) OF 37.0 TO 37.9 IN ADULT (HCC): ICD-10-CM

## 2019-05-17 DIAGNOSIS — Z87.891 PERSONAL HISTORY OF TOBACCO USE: ICD-10-CM

## 2019-05-17 DIAGNOSIS — G25.81 RLS (RESTLESS LEGS SYNDROME): ICD-10-CM

## 2019-05-17 DIAGNOSIS — J44.9 MODERATE COPD (CHRONIC OBSTRUCTIVE PULMONARY DISEASE) (HCC): ICD-10-CM

## 2019-05-17 DIAGNOSIS — I10 BENIGN ESSENTIAL HTN: ICD-10-CM

## 2019-05-17 DIAGNOSIS — J30.2 SEASONAL ALLERGIC RHINITIS, UNSPECIFIED TRIGGER: ICD-10-CM

## 2019-05-17 PROBLEM — E66.812 CLASS 2 SEVERE OBESITY DUE TO EXCESS CALORIES WITH SERIOUS COMORBIDITY AND BODY MASS INDEX (BMI) OF 37.0 TO 37.9 IN ADULT: Status: ACTIVE | Noted: 2019-05-17

## 2019-05-17 PROCEDURE — G0296 VISIT TO DETERM LDCT ELIG: HCPCS | Performed by: NURSE PRACTITIONER

## 2019-05-17 PROCEDURE — 99214 OFFICE O/P EST MOD 30 MIN: CPT | Performed by: NURSE PRACTITIONER

## 2019-05-17 RX ORDER — ALBUTEROL SULFATE 90 UG/1
2 AEROSOL, METERED RESPIRATORY (INHALATION) EVERY 6 HOURS PRN
Qty: 1 INHALER | Refills: 3 | Status: SHIPPED | OUTPATIENT
Start: 2019-05-17 | End: 2020-12-18 | Stop reason: SDUPTHER

## 2019-05-17 NOTE — PROGRESS NOTES
SURGICAL HISTORY:  Past Surgical History:   Procedure Laterality Date    CARDIAC CATHETERIZATION  2016    CATARACT REMOVAL Right     COLONOSCOPY      CORONARY ARTERY BYPASS GRAFT  2016    Double bypass, Dr. Khris Gottlieb Right 2007    Neck    DIAGNOSTIC CARDIAC CATH LAB PROCEDURE      EYE SURGERY Right     Retinal surgery x 3    HERNIA REPAIR Right     Inguinal    PTCA  01/15/2018    ROTATOR CUFF REPAIR Right 2017    TONSILLECTOMY  child    TOTAL KNEE ARTHROPLASTY Left 10/24/2016    Dr. Goldy Cosme HISTORY:  Social History     Tobacco Use    Smoking status: Former Smoker     Packs/day: 2.00     Years: 25.00     Pack years: 50.00     Types: Cigars     Last attempt to quit: 2014     Years since quittin.5    Smokeless tobacco: Never Used   Substance Use Topics    Alcohol use:  Yes     Alcohol/week: 1.8 oz     Types: 3 Cans of beer per week     Comment: daily 2-3 cans (stopped approximately 18)    Drug use: Yes     Frequency: 5.0 times per week     Types: Marijuana     Comment: former drug user, addiction treatment at [facility]     ALLERGIES:  Allergies   Allergen Reactions    Zoloft [Sertraline Hcl] Other (See Comments)     Headaches, sweats, bad dreams     FAMILY HISTORY:  Family History   Problem Relation Age of Onset    Heart Disease Mother     High Blood Pressure Mother     Obesity Mother     Heart Disease Father     Cancer Father         colon/prostate    Colon Cancer Father     Prostate Cancer Father      CURRENT MEDICATIONS:  Current Outpatient Medications   Medication Sig Dispense Refill    albuterol sulfate HFA (VENTOLIN HFA) 108 (90 Base) MCG/ACT inhaler Inhale 2 puffs into the lungs every 6 hours as needed for Wheezing 1 Inhaler 3    colchicine (COLCRYS) 0.6 MG tablet Take 1 tablet by mouth 2 times daily 6 tablet 0    buPROPion (WELLBUTRIN XL) 300 MG extended release tablet Take 1 tablet by mouth every morning 30 tablet 3  fluticasone (FLONASE) 50 MCG/ACT nasal spray USE 2 SPRAYS IN EACH NOSTRIL DAILY AS NEEDED FOR RHINITIS OR ALLERGIES 48 g 1    BRILINTA 90 MG TABS tablet TAKE 1 TABLET BY MOUTH 2 TIMES A DAY 60 tablet 5    lisinopril (PRINIVIL;ZESTRIL) 10 MG tablet TAKE 1 TABLET BY MOUTH ONE TIME A DAY 90 tablet 0    omeprazole (PRILOSEC) 20 MG delayed release capsule TAKE 1 CAPSULE BY MOUTH ONE TIME A DAY 90 capsule 0    metoprolol succinate (TOPROL XL) 25 MG extended release tablet TAKE 2 TABLETS BY MOUTH EVERY DAY. 90 tablet 0    bumetanide (BUMEX) 2 MG tablet Take 1 tablet by mouth daily Take 2 mg AM - 1 mg  tablet 3    pramipexole (MIRAPEX) 0.125 MG tablet Take 1 tablet by mouth nightly 90 tablet 1    aspirin 81 MG chewable tablet CHEW AND SWALLOW 1 TABLET BY MOUTH ONE TIME A DAY 30 tablet 11    ANORO ELLIPTA 62.5-25 MCG/INH AEPB inhaler INHALE 1 PUFF INTO THE LUNGS DAILY 1 each 5    isosorbide mononitrate (IMDUR) 30 MG extended release tablet TAKE 1 TABLET BY MOUTH ONCE DAILY. 30 tablet 3    atorvastatin (LIPITOR) 40 MG tablet TAKE 1 TABLET BY MOUTH NIGHTLY 90 tablet 4    nitroGLYCERIN (NITROSTAT) 0.4 MG SL tablet Place 1 tablet under the tongue every 5 minutes as needed for Chest pain 25 tablet 3     No current facility-administered medications for this visit. Maria Esther CARABALLO   General/Constitutional: No recent loss of weight or appetite changes. No fever or chills. HENT: Negative. Eyes: Negative. Upper respiratory tract: No nasal stuffiness or post nasal drip. Lower respiratory tract/ lungs: Frequent cough with minimal sputum production. No hemoptysis. Cardiovascular: No palpitations or chest pain. Gastrointestinal: No nausea or vomiting. Neurological: No focal neurologiacal weakness. Extremities: No edema. Musculoskeletal: No complaints. Genitourinary: No complaints. Hematological: Negative. Psychiatric/Behavioral: Negative. Skin: No itching.      Physical exam   /62 (Site: Right Upper Arm, Position: Sitting, Cuff Size: Large Adult)   Pulse 69   Temp 97.5 °F (36.4 °C) (Oral)   Ht 5' 11\" (1.803 m)   Wt 268 lb (121.6 kg)   SpO2 95%   BMI 37.38 kg/m²      Physical Exam   Constitutional: Patient appears moderately built and moderately nourished. In no acute distress. Head: Normocephalic and atraumatic. Mouth/Throat: Oropharynx is clear and moist.  No oral thrush. Neck: Neck supple. No tracheal deviation present. Cardiovascular: Regular rate, regular rhythm, S1 and S2 with no murmur. No peripheral edema  Pulmonary/Chest: Normal effort with clear breath sounds. No stridor. No respiratory distress. Abdominal: Soft. No distension or tenderness to palpation. Musculoskeletal: Moves all extremities. Lymphadenopathy:  No cervical adenopathy. Neurological: Patient is alert and oriented to person, place, and time. No focal deficits. Skin: Warm and dry. No clubbing or cyanosis. Test results   Lung Nodule Screening     [x] Qualifies    [] Does not qualify   [] Declined   [x] Completed       COMPARISON: CT chest 9/10/2016.       FINDINGS:   LUNGS NODULES:   There is scarring/atelectasis at the left lung base. There are no suspicious pulmonary masses or nodules.       LYMPHADENOPATHY:   There are no pathologically enlarged lymph nodes.       OTHER (LUNGS/MEDIASTINUM/MUSCULOSKELETAL/ABDOMEN):   There is eventration of the left hemidiaphragm. Atelectasis is seen at the left lung base.       There are moderate upper lobe predominant centrilobular emphysematous changes.       The heart is mildly enlarged. There are coronary artery calcifications. There are postsurgical changes from previous CABG with median sternotomy wires and clips in the mediastinum.       No pleural effusion or pneumothorax.           Impression   1. There are no suspicious masses or nodules within the lung fields. 2. There are no pathologically enlarged lymph nodes. 3. Moderate centrilobular emphysematous changes. 4. LUNGRADS ASSESSMENT VALUE: 1                           Assessment      Diagnosis Orders   1. Centrilobular emphysema (HCC)  Alpha-1-Antitrypsin    Spirometry With Bronchodilator   2. Moderate COPD (chronic obstructive pulmonary disease) ([facility] [facility]     3. Personal history of tobacco use  VA VISIT TO DISCUSS LUNG CA SCREEN W LDCT    CT Lung Screening (Annual)   4. Seasonal allergic rhinitis, unspecified trigger     5. FAISAL on CPAP     6. RLS (restless legs syndrome)     7. Class 2 severe obesity due to excess calories with serious comorbidity and body mass index (BMI) of 37.0 to 37.9 in adult ([facility]     8. Chronic diastolic heart failure ([facility] [facility]     9. Benign essential HTN           Plan   Continue Anoro and DEBRA PRN. Continue Flonase. A1A. He has had PNA vaccine but not clear which one or when. He will follow up with Dr. Nathan Todd. Continue CPAP and follow up with Scott Muhammad as scheduled. Continue Mirapex. He follows with Cardiology closely. Annual CT Lung screening until 15 years smoke free (2029). Will see Caitlyn Jose back in: 1 year    Electronically signed by ASHLEIGH Jain CNP on 5/17/2019 at 8:15 AM'    Low Dose CT (LDCT) Lung Screening criteria met   Age 50-69   Pack year smoking >30   Still smoking or less than 15 year since quit   No sign or symptoms of lung cancer   > 11 months since last LDCT     Risks and benefits of lung cancer screening with LDCT scans discussed:    Significance of positive screen - False-positive LDCT results often occur. 95% of all positive results do not lead to a diagnosis of cancer. Usually further imaging can resolve most false-positive results; however, some patients may require invasive procedures. Over diagnosis risk - 10% to 12% of screen-detected lung cancer cases are over diagnosed--that is, the cancer would not have been detected in the patient's lifetime without the screening.     Need for follow up screens annually to continue lung cancer screening effectiveness     Risks associated with radiation from annual LDCT- Radiation exposure is about the same as for a mammogram, which is about 1/3 of the annual background radiation exposure from everyday life. Starting screening at age 54 is not likely to increase cancer risk from radiation exposure. Patients with comorbidities resulting in life expectancy of < 10 years, or that would preclude treatment of an abnormality identified on CT, should not be screened due to lack of benefit.     To obtain maximal benefit from this screening, smoking cessation and long-term abstinence from smoking is critical

## 2019-06-05 ENCOUNTER — NURSE ONLY (OUTPATIENT)
Dept: LAB | Age: 59
End: 2019-06-05

## 2019-06-05 ENCOUNTER — OFFICE VISIT (OUTPATIENT)
Dept: FAMILY MEDICINE CLINIC | Age: 59
End: 2019-06-05

## 2019-06-05 VITALS
DIASTOLIC BLOOD PRESSURE: 72 MMHG | BODY MASS INDEX: 37.02 KG/M2 | RESPIRATION RATE: 18 BRPM | SYSTOLIC BLOOD PRESSURE: 126 MMHG | WEIGHT: 264.4 LBS | HEIGHT: 71 IN | HEART RATE: 78 BPM

## 2019-06-05 DIAGNOSIS — Z23 IMMUNIZATION DUE: ICD-10-CM

## 2019-06-05 DIAGNOSIS — Z12.5 PROSTATE CANCER SCREENING: ICD-10-CM

## 2019-06-05 DIAGNOSIS — Z00.00 WELLNESS EXAMINATION: ICD-10-CM

## 2019-06-05 PROBLEM — E11.9 DIET-CONTROLLED DIABETES MELLITUS (HCC): Status: ACTIVE | Noted: 2019-06-05

## 2019-06-05 LAB
BASOPHILS # BLD: 0.6 %
BASOPHILS ABSOLUTE: 0 THOU/MM3 (ref 0–0.1)
EOSINOPHIL # BLD: 1.8 %
EOSINOPHILS ABSOLUTE: 0.1 THOU/MM3 (ref 0–0.4)
ERYTHROCYTE [DISTWIDTH] IN BLOOD BY AUTOMATED COUNT: 12.5 % (ref 11.5–14.5)
ERYTHROCYTE [DISTWIDTH] IN BLOOD BY AUTOMATED COUNT: 44.9 FL (ref 35–45)
HCT VFR BLD CALC: 44.4 % (ref 42–52)
HEMOGLOBIN: 15.1 GM/DL (ref 14–18)
IMMATURE GRANS (ABS): 0.05 THOU/MM3 (ref 0–0.07)
IMMATURE GRANULOCYTES: 0.6 %
LYMPHOCYTES # BLD: 21.4 %
LYMPHOCYTES ABSOLUTE: 1.8 THOU/MM3 (ref 1–4.8)
MCH RBC QN AUTO: 33.5 PG (ref 26–33)
MCHC RBC AUTO-ENTMCNC: 34 GM/DL (ref 32.2–35.5)
MCV RBC AUTO: 98.4 FL (ref 80–94)
MONOCYTES # BLD: 12.5 %
MONOCYTES ABSOLUTE: 1 THOU/MM3 (ref 0.4–1.3)
NUCLEATED RED BLOOD CELLS: 0 /100 WBC
PLATELET # BLD: 217 THOU/MM3 (ref 130–400)
PMV BLD AUTO: 10.9 FL (ref 9.4–12.4)
PROSTATE SPECIFIC ANTIGEN: 2.24 NG/ML (ref 0–1)
RBC # BLD: 4.51 MILL/MM3 (ref 4.7–6.1)
SEG NEUTROPHILS: 63.1 %
SEGMENTED NEUTROPHILS ABSOLUTE COUNT: 5.2 THOU/MM3 (ref 1.8–7.7)
WBC # BLD: 8.3 THOU/MM3 (ref 4.8–10.8)

## 2019-06-05 PROCEDURE — 90732 PPSV23 VACC 2 YRS+ SUBQ/IM: CPT | Performed by: FAMILY MEDICINE

## 2019-06-05 PROCEDURE — 90471 IMMUNIZATION ADMIN: CPT | Performed by: FAMILY MEDICINE

## 2019-06-05 PROCEDURE — 99396 PREV VISIT EST AGE 40-64: CPT | Performed by: FAMILY MEDICINE

## 2019-06-05 RX ORDER — NITROGLYCERIN 0.4 MG/1
0.4 TABLET SUBLINGUAL EVERY 5 MIN PRN
Qty: 25 TABLET | Refills: 3 | Status: SHIPPED | OUTPATIENT
Start: 2019-06-05 | End: 2020-07-27 | Stop reason: SDUPTHER

## 2019-06-05 ASSESSMENT — ENCOUNTER SYMPTOMS
DIARRHEA: 0
SHORTNESS OF BREATH: 0
ABDOMINAL PAIN: 0
CHEST TIGHTNESS: 0
NAUSEA: 0
EYES NEGATIVE: 1
CONSTIPATION: 0
VOMITING: 0
COUGH: 0
BLOOD IN STOOL: 0

## 2019-06-05 NOTE — PROGRESS NOTES
Date: 2019    Trent Rey is a 62 y.o. male who presents today for:  Chief Complaint   Patient presents with   [facility]  He had a colonoscopy in 19. He had his prostate checked during the exam by Dr Sunny Slaughter. HPI:     HPI    has a current medication list which includes the following prescription(s): albuterol sulfate hfa, bupropion, fluticasone, brilinta, lisinopril, omeprazole, metoprolol succinate, bumetanide, aspirin, anoro ellipta, isosorbide mononitrate, atorvastatin, nitroglycerin, and pramipexole. Allergies   Allergen Reactions    Zoloft [Sertraline Hcl] Other (See Comments)     Headaches, sweats, bad dreams       Social History     Tobacco Use    Smoking status: Former Smoker     Packs/day: 2.00     Years: 25.00     Pack years: 50.00     Types: Cigars     Last attempt to quit: 2014     Years since quittin.5    Smokeless tobacco: Never Used   Substance Use Topics    Alcohol use: Yes     Alcohol/week: 1.8 oz     Types: 3 Cans of beer per week     Comment: daily 2-3 cans (stopped approximately 18)    Drug use: Yes     Frequency: 5.0 times per week     Types: Marijuana     Comment: former drug user, addiction treatment at [facility]       Past Medical History:   Diagnosis Date    [facility]congestive [facility] failure) [facility]     COPD (chronic obstructive pulmonary disease) ([facility]     Coronary artery disease involving native coronary artery of native heart without angina pectoris     Depression     Diabetes mellitus type 2, diet-controlled ([facility] 2018    GERD (gastroesophageal reflux disease) 3/21/2012    Hernia     Hx of blood clots     right knee due to injury    Hx of cocaine abuse     Hyperglycemia     Hyperlipidemia     Hypertension     FAISAL on CPAP     Osteoarthritis     S/P CABG x 2 2016    S/P PTCA: 1/15/2018: Stent diagonal branch Xience 3.0 x 12 mm. 1/15/2018    1/15/2018: Stent diagonal branch Xience 3.0 x 12 mm.  Dr. Correa Dose  Thumb injury 08/2015    Right thumb cut with table saw, no treatment needed       Past Surgical History:   Procedure Laterality Date    CARDIAC CATHETERIZATION  06/27/2016    CATARACT REMOVAL Right 2013    COLONOSCOPY  04/08    CORONARY ARTERY BYPASS GRAFT  07/06/2016    Double bypass, Dr. Marika Toro Right 02/2007    Neck    DIAGNOSTIC CARDIAC CATH LAB PROCEDURE      EYE SURGERY Right     Retinal surgery x 3    HERNIA REPAIR Right     Inguinal    PTCA  01/15/2018    ROTATOR CUFF REPAIR Right 11/16/2017    TONSILLECTOMY  child    TOTAL KNEE ARTHROPLASTY Left 10/24/2016    Dr. Rankin [facility]       Family History   Problem Relation Age of Onset    Heart Disease Mother     High Blood Pressure Mother     Obesity Mother     Heart Disease Father     Cancer Father         colon/prostate    Colon Cancer Father     Prostate Cancer Father      Subjective:     Review of Systems   Constitutional: Negative for activity change, appetite change, diaphoresis and fever. HENT: Negative. Eyes: Negative. Respiratory: Negative for cough, chest tightness and shortness of breath. Cardiovascular: Negative for chest pain, palpitations and leg swelling. Gastrointestinal: Negative for abdominal pain, blood in stool, constipation, diarrhea, nausea and vomiting. Genitourinary: Negative. Musculoskeletal: Positive for arthralgias. Skin: Negative. Negative for rash. Neurological: Negative. Negative for dizziness, syncope, weakness, light-headedness and headaches. Psychiatric/Behavioral: Negative.        :   /72 (Site: Left Upper Arm, Position: Sitting, Cuff Size: Large Adult)   Pulse 78   Resp 18   Ht 5' 11\" (1.803 m)   Wt 264 lb 6.4 oz (119.9 kg)   BMI 36.88 kg/m²   Wt Readings from Last 3 Encounters:   06/05/19 264 lb 6.4 oz (119.9 kg)   05/17/19 268 lb (121.6 kg)   05/08/19 265 lb (120.2 kg)     Physical Exam   Constitutional: He is oriented to person, place, and time.  He appears well-developed and well-nourished. No distress. HENT:   Head: Normocephalic and atraumatic. Eyes: Pupils are equal, round, and reactive to light. Conjunctivae and EOM are normal. Right eye exhibits no discharge. Left eye exhibits no discharge. No scleral icterus. Neck: Normal range of motion. Neck supple. No JVD present. No thyromegaly present. Cardiovascular: Normal rate, regular rhythm and normal heart sounds. No murmur heard. Pulmonary/Chest: Effort normal and breath sounds normal. No respiratory distress. He has no wheezes. He has no rhonchi. He has no rales. Abdominal: Soft. Bowel sounds are normal. He exhibits no distension and no mass. There is no hepatosplenomegaly. There is no tenderness. There is no rebound and no guarding. Musculoskeletal: Normal range of motion. He exhibits no edema. Lymphadenopathy:     He has no cervical adenopathy. Neurological: He is alert and oriented to person, place, and time. Skin: Skin is warm and dry. No rash noted. He is not diaphoretic. Psychiatric: He has a normal mood and affect. His behavior is normal.   Nursing note and vitals reviewed.    :       Diagnosis Orders   1. Wellness examination     2. Prostate cancer screening  Psa screening   3.  Immunization due  Pneumococcal polysaccharide vaccine 23-valent greater than or equal to 3yo subcutaneous/IM       :      Requested Prescriptions      No prescriptions requested or ordered in this encounter     Current Outpatient Medications   Medication Sig Dispense Refill    albuterol sulfate HFA (VENTOLIN HFA) 108 (90 Base) MCG/ACT inhaler Inhale 2 puffs into the lungs every 6 hours as needed for Wheezing 1 Inhaler 3    buPROPion (WELLBUTRIN XL) 300 MG extended release tablet Take 1 tablet by mouth every morning 30 tablet 3    fluticasone (FLONASE) 50 MCG/ACT nasal spray USE 2 SPRAYS IN EACH NOSTRIL DAILY AS NEEDED FOR RHINITIS OR ALLERGIES 48 g 1    BRILINTA 90 MG TABS tablet TAKE 1 TABLET BY MOUTH 2 TIMES A DAY 60 tablet 5    lisinopril (PRINIVIL;ZESTRIL) 10 MG tablet TAKE 1 TABLET BY MOUTH ONE TIME A DAY 90 tablet 0    omeprazole (PRILOSEC) 20 MG delayed release capsule TAKE 1 CAPSULE BY MOUTH ONE TIME A DAY 90 capsule 0    metoprolol succinate (TOPROL XL) 25 MG extended release tablet TAKE 2 TABLETS BY MOUTH EVERY DAY. 90 tablet 0    bumetanide (BUMEX) 2 MG tablet Take 1 tablet by mouth daily Take 2 mg AM - 1 mg  tablet 3    aspirin 81 MG chewable tablet CHEW AND SWALLOW 1 TABLET BY MOUTH ONE TIME A DAY 30 tablet 11    ANORO ELLIPTA 62.5-25 MCG/INH AEPB inhaler INHALE 1 PUFF INTO THE LUNGS DAILY 1 each 5    isosorbide mononitrate (IMDUR) 30 MG extended release tablet TAKE 1 TABLET BY MOUTH ONCE DAILY. 30 tablet 3    atorvastatin (LIPITOR) 40 MG tablet TAKE 1 TABLET BY MOUTH NIGHTLY 90 tablet 4    nitroGLYCERIN (NITROSTAT) 0.4 MG SL tablet Place 1 tablet under the tongue every 5 minutes as needed for Chest pain 25 tablet 3    pramipexole (MIRAPEX) 0.125 MG tablet Take 1 tablet by mouth nightly 90 tablet 1     No current facility-administered medications for this visit. Orders Placed This Encounter   Procedures    Pneumococcal polysaccharide vaccine 23-valent greater than or equal to 3yo subcutaneous/IM    Psa screening     Standing Status:   Future     Standing Expiration Date:   6/4/2020       Continue current medications. No follow-ups on file. Discussed use, benefit, and side effects of prescribed medications. All patient questions answered. Pt voiced understanding. Instructed to continue current medications,diet and exercise. Patient agreed with treatment plan.

## 2019-06-05 NOTE — PROGRESS NOTES
Immunizations     Name Date Dose Route    Pneumococcal Polysaccharide ([id number]) 6/5/2019 0.5 mL Intramuscular    Site: Deltoid- Right    Lot: [id number]    NDC: 4509-8023-54

## 2019-06-10 ENCOUNTER — TELEPHONE (OUTPATIENT)
Dept: FAMILY MEDICINE CLINIC | Age: 59
End: 2019-06-10

## 2019-06-10 RX ORDER — COLCHICINE 0.6 MG/1
0.6 TABLET ORAL 2 TIMES DAILY
Qty: 6 TABLET | Refills: 0 | Status: SHIPPED | OUTPATIENT
Start: 2019-06-10 | End: 2019-07-26 | Stop reason: ALTCHOICE

## 2019-06-10 RX ORDER — PREDNISONE 10 MG/1
TABLET ORAL
Qty: 16 TABLET | Refills: 0 | Status: SHIPPED | OUTPATIENT
Start: 2019-06-10 | End: 2019-07-26 | Stop reason: ALTCHOICE

## 2019-06-10 NOTE — TELEPHONE ENCOUNTER
Patient called stating he is having a flare up of gout in the right foot and ankle just like the last time. Stated it's not as bad as the last time. Req the same thing called to [address] [place] [address] pharmacy. Dr Gris Mcduffie called in Colchicine and Prednisone 5/9/19.  Izabela López can be reached at [phone]

## 2019-07-02 RX ORDER — OMEPRAZOLE 20 MG/1
CAPSULE, DELAYED RELEASE ORAL
Qty: 30 CAPSULE | Refills: 0 | Status: SHIPPED | OUTPATIENT
Start: 2019-07-02 | End: 2019-08-20 | Stop reason: SDUPTHER

## 2019-07-02 RX ORDER — LISINOPRIL 10 MG/1
TABLET ORAL
Qty: 30 TABLET | Refills: 0 | Status: SHIPPED | OUTPATIENT
Start: 2019-07-02 | End: 2019-12-12 | Stop reason: DRUGHIGH

## 2019-07-16 ENCOUNTER — CARE COORDINATION (OUTPATIENT)
Dept: CASE MANAGEMENT | Age: 59
End: 2019-07-16

## 2019-07-16 NOTE — CARE COORDINATION
Name: Reyna Leone    ### Patient Details  YOB: 1960  MRN: [id number]    ### Encounter Details  Encounter ID: [id number]  Arrival Date: N/A  Discharge Date: N/A    ### Related interaction  CHF-COPD-Diabetes High Touch UA (CHF-COPD-Diabetes High Touch UA) ([email or web address]    ### Required Interventions and Feedback     Call Status         *Call Status:     Patient Reached (edited by Gaby Weinstein on 07/16/2019 09:29 AM EDT)    Additional Call Status Details[de-identified]     Called and spoke to patient. he states he is doing well. he would like to go to [facility]. Changes in [facility].  (edited by Gaby Weinstein on 07/16/2019 09:32 AM EDT)    Enrolled in epic. Unengaged Details         Additional Actions Taken[de-identified]     Stay enrolled 3 days a week. (edited by Gaby Weinstein on 07/16/2019 09:33 AM EDT)     Opt Out    Patient's status following call? *Unengaged in UA Call Status :     Opt In  (selected by TRIP on 07/16/2019 09:32 AM EDT)    Filemon Willis.  Anthony Proctor  / [facility]

## 2019-07-25 ENCOUNTER — TELEPHONE (OUTPATIENT)
Dept: FAMILY MEDICINE CLINIC | Age: 59
End: 2019-07-25

## 2019-07-25 RX ORDER — PREDNISONE 10 MG/1
TABLET ORAL
Qty: 16 TABLET | Refills: 0 | Status: CANCELLED | OUTPATIENT
Start: 2019-07-25

## 2019-07-25 NOTE — TELEPHONE ENCOUNTER
Pt called now having left hand knuckle swelling and pain in 2 fingers. Pt feels his gout may be spreading and req RX.     [facility]

## 2019-07-26 ENCOUNTER — HOSPITAL ENCOUNTER (EMERGENCY)
Age: 59
Discharge: HOME | End: 2019-07-26
Payer: COMMERCIAL

## 2019-07-26 ENCOUNTER — APPOINTMENT (OUTPATIENT)
Dept: GENERAL RADIOLOGY | Age: 59
End: 2019-07-26
Payer: COMMERCIAL

## 2019-07-26 VITALS
HEIGHT: 71 IN | TEMPERATURE: 97.8 F | BODY MASS INDEX: 36.4 KG/M2 | DIASTOLIC BLOOD PRESSURE: 79 MMHG | OXYGEN SATURATION: 95 % | HEART RATE: 70 BPM | RESPIRATION RATE: 17 BRPM | WEIGHT: 260 LBS | SYSTOLIC BLOOD PRESSURE: 144 MMHG

## 2019-07-26 DIAGNOSIS — M10.9 ACUTE GOUT OF LEFT HAND, UNSPECIFIED CAUSE: Primary | ICD-10-CM

## 2019-07-26 LAB
ALBUMIN SERPL-MCNC: 3.8 G/DL (ref 3.5–5.1)
ALP BLD-CCNC: 82 U/L (ref 38–126)
ALT SERPL-CCNC: 55 U/L (ref 11–66)
ANION GAP SERPL CALCULATED.3IONS-SCNC: 15 MEQ/L (ref 8–16)
AST SERPL-CCNC: 36 U/L (ref 5–40)
BASOPHILS # BLD: 0.3 %
BASOPHILS ABSOLUTE: 0 THOU/MM3 (ref 0–0.1)
BILIRUB SERPL-MCNC: 0.2 MG/DL (ref 0.3–1.2)
BILIRUBIN DIRECT: < 0.2 MG/DL (ref 0–0.3)
BUN BLDV-MCNC: 17 MG/DL (ref 7–22)
C-REACTIVE PROTEIN: 0.75 MG/DL (ref 0–1)
CALCIUM SERPL-MCNC: 9.1 MG/DL (ref 8.5–10.5)
CHLORIDE BLD-SCNC: 97 MEQ/L (ref 98–111)
CO2: 23 MEQ/L (ref 23–33)
CREAT SERPL-MCNC: 1 MG/DL (ref 0.4–1.2)
EOSINOPHIL # BLD: 1.3 %
EOSINOPHILS ABSOLUTE: 0.1 THOU/MM3 (ref 0–0.4)
ERYTHROCYTE [DISTWIDTH] IN BLOOD BY AUTOMATED COUNT: 13.2 % (ref 11.5–14.5)
ERYTHROCYTE [DISTWIDTH] IN BLOOD BY AUTOMATED COUNT: 50 FL (ref 35–45)
GFR SERPL CREATININE-BSD FRML MDRD: 76 ML/MIN/1.73M2
GLUCOSE BLD-MCNC: 166 MG/DL (ref 70–108)
HCT VFR BLD CALC: 42.4 % (ref 42–52)
HEMOGLOBIN: 14.2 GM/DL (ref 14–18)
IMMATURE GRANS (ABS): 0.06 THOU/MM3 (ref 0–0.07)
IMMATURE GRANULOCYTES: 0.6 %
LYMPHOCYTES # BLD: 14.3 %
LYMPHOCYTES ABSOLUTE: 1.5 THOU/MM3 (ref 1–4.8)
MCH RBC QN AUTO: 34.5 PG (ref 26–33)
MCHC RBC AUTO-ENTMCNC: 33.5 GM/DL (ref 32.2–35.5)
MCV RBC AUTO: 102.9 FL (ref 80–94)
MONOCYTES # BLD: 12 %
MONOCYTES ABSOLUTE: 1.2 THOU/MM3 (ref 0.4–1.3)
NUCLEATED RED BLOOD CELLS: 0 /100 WBC
OSMOLALITY CALCULATION: 275.4 MOSMOL/KG (ref 275–300)
PLATELET # BLD: 218 THOU/MM3 (ref 130–400)
PMV BLD AUTO: 10.9 FL (ref 9.4–12.4)
POTASSIUM SERPL-SCNC: 3.8 MEQ/L (ref 3.5–5.2)
RBC # BLD: 4.12 MILL/MM3 (ref 4.7–6.1)
SEDIMENTATION RATE, ERYTHROCYTE: 3 MM/HR (ref 0–10)
SEG NEUTROPHILS: 71.5 %
SEGMENTED NEUTROPHILS ABSOLUTE COUNT: 7.3 THOU/MM3 (ref 1.8–7.7)
SODIUM BLD-SCNC: 135 MEQ/L (ref 135–145)
TOTAL PROTEIN: 6.1 G/DL (ref 6.1–8)
URIC ACID: 10.8 MG/DL (ref 3.7–7)
WBC # BLD: 10.2 THOU/MM3 (ref 4.8–10.8)

## 2019-07-26 PROCEDURE — 73130 X-RAY EXAM OF HAND: CPT

## 2019-07-26 PROCEDURE — 85025 COMPLETE CBC W/AUTO DIFF WBC: CPT

## 2019-07-26 PROCEDURE — 85651 RBC SED RATE NONAUTOMATED: CPT

## 2019-07-26 PROCEDURE — 36415 COLL VENOUS BLD VENIPUNCTURE: CPT

## 2019-07-26 PROCEDURE — 6360000002 HC RX W HCPCS: Performed by: PHYSICIAN ASSISTANT

## 2019-07-26 PROCEDURE — 84550 ASSAY OF BLOOD/URIC ACID: CPT

## 2019-07-26 PROCEDURE — 82248 BILIRUBIN DIRECT: CPT

## 2019-07-26 PROCEDURE — 99284 EMERGENCY DEPT VISIT MOD MDM: CPT

## 2019-07-26 PROCEDURE — 80053 COMPREHEN METABOLIC PANEL: CPT

## 2019-07-26 PROCEDURE — 86140 C-REACTIVE PROTEIN: CPT

## 2019-07-26 PROCEDURE — 96374 THER/PROPH/DIAG INJ IV PUSH: CPT

## 2019-07-26 RX ORDER — KETOROLAC TROMETHAMINE 30 MG/ML
60 INJECTION, SOLUTION INTRAMUSCULAR; INTRAVENOUS ONCE
Status: DISCONTINUED | OUTPATIENT
Start: 2019-07-26 | End: 2019-07-26 | Stop reason: HOSPADM

## 2019-07-26 RX ORDER — COLCHICINE 0.6 MG/1
TABLET ORAL
Qty: 3 TABLET | Refills: 0 | Status: SHIPPED | OUTPATIENT
Start: 2019-07-26 | End: 2019-07-30

## 2019-07-26 RX ORDER — HYDROCODONE BITARTRATE AND ACETAMINOPHEN 5; 325 MG/1; MG/1
1 TABLET ORAL EVERY 6 HOURS PRN
Qty: 12 TABLET | Refills: 0 | Status: SHIPPED | OUTPATIENT
Start: 2019-07-26 | End: 2019-07-29

## 2019-07-26 RX ORDER — KETOROLAC TROMETHAMINE 30 MG/ML
30 INJECTION, SOLUTION INTRAMUSCULAR; INTRAVENOUS ONCE
Status: COMPLETED | OUTPATIENT
Start: 2019-07-26 | End: 2019-07-26

## 2019-07-26 RX ORDER — PREDNISONE 10 MG/1
TABLET ORAL
Qty: 20 TABLET | Refills: 0 | Status: SHIPPED | OUTPATIENT
Start: 2019-07-26 | End: 2019-08-05

## 2019-07-26 RX ADMIN — KETOROLAC TROMETHAMINE 30 MG: 30 INJECTION, SOLUTION INTRAMUSCULAR at 06:29

## 2019-07-26 ASSESSMENT — PAIN DESCRIPTION - PAIN TYPE
TYPE: CHRONIC PAIN
TYPE: ACUTE PAIN

## 2019-07-26 ASSESSMENT — ENCOUNTER SYMPTOMS
BACK PAIN: 0
ABDOMINAL PAIN: 0
VOMITING: 0
DIARRHEA: 0
NAUSEA: 0

## 2019-07-26 ASSESSMENT — PAIN DESCRIPTION - ORIENTATION
ORIENTATION: LEFT
ORIENTATION: LEFT

## 2019-07-26 ASSESSMENT — PAIN SCALES - GENERAL
PAINLEVEL_OUTOF10: 10
PAINLEVEL_OUTOF10: 6
PAINLEVEL_OUTOF10: 10

## 2019-07-26 ASSESSMENT — PAIN DESCRIPTION - FREQUENCY
FREQUENCY: CONTINUOUS
FREQUENCY: CONTINUOUS

## 2019-07-26 ASSESSMENT — PAIN DESCRIPTION - LOCATION
LOCATION: HAND
LOCATION: HAND

## 2019-07-26 ASSESSMENT — PAIN DESCRIPTION - DESCRIPTORS
DESCRIPTORS: SHARP
DESCRIPTORS: SHARP

## 2019-07-26 NOTE — ED PROVIDER NOTES
imaging following my exam of the patient. Labs revealed a uric acid of 10.3. An XR of the left hand revealed a multifocal IP joint DJD and first MCP joint DJD with no erosive changes to suggest gout. Results of labs and imaging were discussed with the patient and I informed him of my proposed course of treatment, to which he was amenable. During the course of the patient's stay, he was treated with toradol. The patient was then discharged home in stable condition with prescriptions for norco, colcrys, and deltasone. The patient will follow up with his PCP in two days. At this time, I answered all questions that were asked and advised the patient to return to the ED upon the onset of any new or worsening symptoms. CRITICAL CARE:   None     CONSULTS:  None    PROCEDURES:  None    FINAL IMPRESSION      1. Acute gout of left hand, unspecified cause          DISPOSITION/PLAN   Discharge    PATIENT REFERRED TO:  Nate Gutierrez MD  [address] [place] [address] [facility]  [phone]    In 2 days        DISCHARGE MEDICATIONS:  Discharge Medication List as of 7/26/2019  7:20 AM      START taking these medications    Details   HYDROcodone-acetaminophen (NORCO) 5-325 MG per tablet Take 1 tablet by mouth every 6 hours as needed for Pain for up to 3 days. Intended supply: 3 days. Take lowest dose possible to manage pain, Disp-12 tablet, R-0Print      colchicine (COLCRYS) 0.6 MG tablet Take two tabs now and repeat in 1 tab in 2 hours if no improvement, Disp-3 tablet, R-0Print      predniSONE (DELTASONE) 10 MG tablet Take 4 tablets by mouth once daily for 5 days, Disp-20 tablet, R-0Print             (Please note that portions of this note were completed with a voice recognition program.  Efforts were made to edit the dictations but occasionally words are mis-transcribed.)    The patient was given an opportunity to see the Emergency Attending.  The patient voiced understanding that I was a Mid-LevelProvider and was in

## 2019-07-29 ENCOUNTER — TELEPHONE (OUTPATIENT)
Dept: FAMILY MEDICINE CLINIC | Age: 59
End: 2019-07-29

## 2019-07-30 ENCOUNTER — OFFICE VISIT (OUTPATIENT)
Dept: CARDIOLOGY CLINIC | Age: 59
End: 2019-07-30
Payer: COMMERCIAL

## 2019-07-30 VITALS
WEIGHT: 259 LBS | HEIGHT: 71 IN | OXYGEN SATURATION: 93 % | BODY MASS INDEX: 36.26 KG/M2 | DIASTOLIC BLOOD PRESSURE: 70 MMHG | SYSTOLIC BLOOD PRESSURE: 118 MMHG | HEART RATE: 90 BPM

## 2019-07-30 DIAGNOSIS — I50.32 CHF (CONGESTIVE HEART FAILURE), NYHA CLASS II, CHRONIC, DIASTOLIC (HCC): Primary | ICD-10-CM

## 2019-07-30 PROCEDURE — 99213 OFFICE O/P EST LOW 20 MIN: CPT | Performed by: NURSE PRACTITIONER

## 2019-07-30 ASSESSMENT — ENCOUNTER SYMPTOMS
SHORTNESS OF BREATH: 0
CHEST TIGHTNESS: 0
ABDOMINAL PAIN: 0
WHEEZING: 0
ABDOMINAL DISTENTION: 0
APNEA: 0
COLOR CHANGE: 0
NAUSEA: 0
COUGH: 0

## 2019-07-30 NOTE — PROGRESS NOTES
Wt 259 lb (117.5 kg)   SpO2 93%   BMI 36.12 kg/m²     Physical Exam   Constitutional: He is oriented to person, place, and time. He appears well-developed and well-nourished. HENT:   Head: Normocephalic and atraumatic. Eyes: Pupils are equal, round, and reactive to light. Neck: Normal range of motion. No JVD present. Cardiovascular: Normal rate and normal heart sounds. No murmur heard. Pulmonary/Chest: Effort normal. No respiratory distress. He has no rales. Abdominal: Soft. He exhibits no distension. There is no tenderness. Musculoskeletal: He exhibits no edema or tenderness. Neurological: He is alert and oriented to person, place, and time. Skin: Skin is warm and dry. Psychiatric: He has a normal mood and affect. His behavior is normal.   Vitals reviewed. Wt Readings from Last 3 Encounters:   07/30/19 259 lb (117.5 kg)   07/26/19 260 lb (117.9 kg)   06/05/19 264 lb 6.4 oz (119.9 kg)     BP Readings from Last 3 Encounters:   07/30/19 118/70   07/26/19 (!) 144/79   06/05/19 126/72     Pulse Readings from Last 3 Encounters:   07/30/19 90   07/26/19 70   06/05/19 78     Body mass index is 36.12 kg/m². ECHO:   11/27/18   Summary   Technically difficult examination.   Ejection fraction is visually estimated at 50%.   Overall left ventricular function is normal.      Signature      ----------------------------------------------------------------   Electronically signed by Leyla Corbett MD (Interpreting   physician) on 11/28/2018 at 07:35 AM   ----------------------------------------------------------------      Findings      Mitral Valve   The mitral valve structure was normal with normal leaflet separation.   DOPPLER: The transmitral velocity was within the normal range with no   evidence for mitral stenosis.  There was no evidence of mitral   regurgitation.      Aortic Valve   The aortic valve leaflets were not well visualized.   Aortic valve appears tricuspid.      Tricuspid

## 2019-07-31 ENCOUNTER — CARE COORDINATION (OUTPATIENT)
Dept: CASE MANAGEMENT | Age: 59
End: 2019-07-31

## 2019-07-31 DIAGNOSIS — F32.A ANXIETY AND DEPRESSION: ICD-10-CM

## 2019-07-31 DIAGNOSIS — F41.9 ANXIETY AND DEPRESSION: ICD-10-CM

## 2019-07-31 RX ORDER — BUPROPION HYDROCHLORIDE 300 MG/1
300 TABLET ORAL EVERY MORNING
Qty: 30 TABLET | Refills: 3 | Status: SHIPPED | OUTPATIENT
Start: 2019-07-31 | End: 2019-11-20

## 2019-07-31 NOTE — CARE COORDINATION
Care Transition  ED Follow up Call    Attempted to reach patient for Care Transitions ED follow up. Left message to return call. Contact information provided.      FU appts/Provider:    Future Appointments   Date Time Provider Christofer Baron   8/5/2019 10:10 AM MD SIVAKUMAR Joseph Can[place]   9/19/2019  7:30 AM [facility]  [place]   10/9/2019  8:00 AM MD SIVAKUMAR Joseph Canary [place]   12/16/2019 11:30 AM ASHLEIGH Grande CNP SRPX CHF P - [place]   1/29/2020  8:30 AM Shailesh Potter PA-C Pulm Med [facility] VIOLETA DICK  [place]   5/14/2020  9:00 AM STR CT IMAGING RM1  [facility] STRZ OUT EXP STR Radiolog   5/14/2020  9:30 AM STR PULMONARY FUNCTION ROOM 1 STRZ PFT None   5/18/2020  8:30 AM ASHLEIGH Newsome CNP Pulm Med [facility] [place], [place]  [phone]

## 2019-07-31 NOTE — TELEPHONE ENCOUNTER
The pharmacy is requesting a refill of the below medication which has been pended for you:     Requested Prescriptions     Pending Prescriptions Disp Refills    buPROPion (WELLBUTRIN XL) 300 MG extended release tablet [Pharmacy Med Name: BUPROPION HCL ER (XL) 300MG TB24] 30 tablet 3     Sig: TAKE 1 TABLET BY MOUTH EVERY MORNING.        Last Appointment Date: 6/5/2019  Next Appointment Date: 8/5/2019    Allergies   Allergen Reactions    Zoloft [Sertraline Hcl] Other (See Comments)     Headaches, sweats, bad dreams

## 2019-08-05 ENCOUNTER — CARE COORDINATION (OUTPATIENT)
Dept: CASE MANAGEMENT | Age: 59
End: 2019-08-05

## 2019-08-08 ENCOUNTER — TELEPHONE (OUTPATIENT)
Dept: FAMILY MEDICINE CLINIC | Age: 59
End: 2019-08-08

## 2019-08-08 ENCOUNTER — OFFICE VISIT (OUTPATIENT)
Dept: FAMILY MEDICINE CLINIC | Age: 59
End: 2019-08-08

## 2019-08-08 VITALS
HEART RATE: 76 BPM | SYSTOLIC BLOOD PRESSURE: 102 MMHG | DIASTOLIC BLOOD PRESSURE: 62 MMHG | BODY MASS INDEX: 36.57 KG/M2 | WEIGHT: 261.2 LBS | RESPIRATION RATE: 18 BRPM | HEIGHT: 71 IN

## 2019-08-08 DIAGNOSIS — I10 ESSENTIAL HYPERTENSION: ICD-10-CM

## 2019-08-08 DIAGNOSIS — R25.2 CRAMPS OF LEFT LOWER EXTREMITY: ICD-10-CM

## 2019-08-08 DIAGNOSIS — M15.9 PRIMARY OSTEOARTHRITIS INVOLVING MULTIPLE JOINTS: ICD-10-CM

## 2019-08-08 DIAGNOSIS — M10.00 ACUTE IDIOPATHIC GOUT, UNSPECIFIED SITE: Primary | ICD-10-CM

## 2019-08-08 DIAGNOSIS — E11.9 DIABETES MELLITUS TYPE 2, DIET-CONTROLLED (HCC): ICD-10-CM

## 2019-08-08 DIAGNOSIS — J44.9 CHRONIC OBSTRUCTIVE PULMONARY DISEASE, UNSPECIFIED COPD TYPE (HCC): ICD-10-CM

## 2019-08-08 PROCEDURE — 99214 OFFICE O/P EST MOD 30 MIN: CPT | Performed by: EMERGENCY MEDICINE

## 2019-08-08 RX ORDER — FEBUXOSTAT 40 MG/1
40 TABLET, FILM COATED ORAL DAILY
Qty: 90 TABLET | Refills: 1 | Status: SHIPPED | OUTPATIENT
Start: 2019-08-08 | End: 2019-08-08

## 2019-08-08 RX ORDER — ALLOPURINOL 100 MG/1
100 TABLET ORAL DAILY
Qty: 90 TABLET | Refills: 1 | Status: SHIPPED | OUTPATIENT
Start: 2019-08-08 | End: 2019-10-03 | Stop reason: DRUGHIGH

## 2019-08-08 ASSESSMENT — ENCOUNTER SYMPTOMS
WHEEZING: 0
CONSTIPATION: 0
SHORTNESS OF BREATH: 0
TROUBLE SWALLOWING: 0
SORE THROAT: 0
ABDOMINAL PAIN: 0
CHEST TIGHTNESS: 0
NAUSEA: 0
RHINORRHEA: 0
SINUS PRESSURE: 0
VOICE CHANGE: 0
BACK PAIN: 0
COUGH: 0
VOMITING: 0
DIARRHEA: 0

## 2019-08-09 ENCOUNTER — NURSE ONLY (OUTPATIENT)
Dept: LAB | Age: 59
End: 2019-08-09

## 2019-08-09 DIAGNOSIS — R25.2 CRAMPS OF LEFT LOWER EXTREMITY: ICD-10-CM

## 2019-08-09 DIAGNOSIS — I10 ESSENTIAL HYPERTENSION: ICD-10-CM

## 2019-08-09 LAB
ALT SERPL-CCNC: 71 U/L (ref 11–66)
AST SERPL-CCNC: 41 U/L (ref 5–40)
CHOLESTEROL, TOTAL: 165 MG/DL (ref 100–199)
FOLATE: 13.6 NG/ML (ref 4.8–24.2)
HDLC SERPL-MCNC: 44 MG/DL
LDL CHOLESTEROL CALCULATED: 85 MG/DL
MAGNESIUM: 2 MG/DL (ref 1.6–2.4)
TRIGL SERPL-MCNC: 181 MG/DL (ref 0–199)
VITAMIN B-12: 453 PG/ML (ref 211–911)

## 2019-08-16 ENCOUNTER — TELEPHONE (OUTPATIENT)
Dept: FAMILY MEDICINE CLINIC | Age: 59
End: 2019-08-16

## 2019-08-20 ENCOUNTER — TELEPHONE (OUTPATIENT)
Dept: FAMILY MEDICINE CLINIC | Age: 59
End: 2019-08-20

## 2019-08-20 NOTE — TELEPHONE ENCOUNTER
Date of last visit:  8/9/2018  Date of next visit:  11/8/2019    Requested Prescriptions     Pending Prescriptions Disp Refills    omeprazole (PRILOSEC) 20 MG delayed release capsule [Pharmacy Med Name: OMEPRAZOLE 20MG CPDR] 30 capsule 0     Sig: TAKE 1 CAPSULE BY MOUTH ONCE DAILY.

## 2019-08-21 RX ORDER — OMEPRAZOLE 20 MG/1
CAPSULE, DELAYED RELEASE ORAL
Qty: 30 CAPSULE | Refills: 0 | Status: SHIPPED | OUTPATIENT
Start: 2019-08-21 | End: 2019-08-21 | Stop reason: SDUPTHER

## 2019-08-21 RX ORDER — OMEPRAZOLE 20 MG/1
CAPSULE, DELAYED RELEASE ORAL
Qty: 30 CAPSULE | Refills: 1 | Status: SHIPPED | OUTPATIENT
Start: 2019-08-21 | End: 2019-09-29 | Stop reason: SDUPTHER

## 2019-08-21 NOTE — TELEPHONE ENCOUNTER
Patient saw Dr Ashley Guzman 8/8 and was advised to return in 3 months. He has an appointment for November 8th. Will probably need another RX sent in.

## 2019-08-29 ENCOUNTER — OFFICE VISIT (OUTPATIENT)
Dept: FAMILY MEDICINE CLINIC | Age: 59
End: 2019-08-29

## 2019-08-29 VITALS
WEIGHT: 262.4 LBS | RESPIRATION RATE: 16 BRPM | SYSTOLIC BLOOD PRESSURE: 132 MMHG | DIASTOLIC BLOOD PRESSURE: 80 MMHG | HEIGHT: 71 IN | HEART RATE: 80 BPM | BODY MASS INDEX: 36.73 KG/M2

## 2019-08-29 DIAGNOSIS — E78.5 HYPERLIPIDEMIA, UNSPECIFIED HYPERLIPIDEMIA TYPE: ICD-10-CM

## 2019-08-29 DIAGNOSIS — G25.81 RLS (RESTLESS LEGS SYNDROME): ICD-10-CM

## 2019-08-29 DIAGNOSIS — E11.9 DIABETES MELLITUS TYPE 2, DIET-CONTROLLED (HCC): ICD-10-CM

## 2019-08-29 DIAGNOSIS — M10.00 ACUTE IDIOPATHIC GOUT, UNSPECIFIED SITE: Primary | ICD-10-CM

## 2019-08-29 DIAGNOSIS — J44.9 CHRONIC OBSTRUCTIVE PULMONARY DISEASE, UNSPECIFIED COPD TYPE (HCC): ICD-10-CM

## 2019-08-29 LAB
CHP ED QC CHECK: ABNORMAL
GLUCOSE BLD-MCNC: 120 MG/DL
HBA1C MFR BLD: 7.4 %

## 2019-08-29 PROCEDURE — 83036 HEMOGLOBIN GLYCOSYLATED A1C: CPT | Performed by: EMERGENCY MEDICINE

## 2019-08-29 PROCEDURE — 82962 GLUCOSE BLOOD TEST: CPT | Performed by: EMERGENCY MEDICINE

## 2019-08-29 PROCEDURE — 99213 OFFICE O/P EST LOW 20 MIN: CPT | Performed by: EMERGENCY MEDICINE

## 2019-08-29 RX ORDER — PREDNISONE 10 MG/1
TABLET ORAL
Qty: 21 TABLET | Refills: 0 | Status: SHIPPED | OUTPATIENT
Start: 2019-08-29 | End: 2019-09-12 | Stop reason: ALTCHOICE

## 2019-08-29 RX ORDER — PREDNISONE 10 MG/1
TABLET ORAL
Qty: 21 TABLET | Refills: 0 | Status: SHIPPED | OUTPATIENT
Start: 2019-08-29 | End: 2019-08-29 | Stop reason: SDUPTHER

## 2019-08-29 ASSESSMENT — ENCOUNTER SYMPTOMS
CHEST TIGHTNESS: 0
SINUS PRESSURE: 0
BACK PAIN: 0
VOICE CHANGE: 0
TROUBLE SWALLOWING: 0
SORE THROAT: 0
VOMITING: 0
SHORTNESS OF BREATH: 0
ABDOMINAL PAIN: 0
NAUSEA: 0
RHINORRHEA: 0
COUGH: 0
WHEEZING: 0
DIARRHEA: 0
CONSTIPATION: 0

## 2019-08-29 NOTE — PROGRESS NOTES
Visit Date: 8/29/2019    Subjective:    Franci Liu is a 62 y.o.male who presents today for:  Chief Complaint   Patient presents with    Gout         HPI:     HPI     Left hand still very sensitive and painful swollen very sensitive. Seen ER Rx Prednisone and Colchicine      CurrentHome Medications:  Current Outpatient Medications   Medication Sig Dispense Refill    predniSONE (DELTASONE) 10 MG tablet 2 tablets 2 times a day for 3 days then 1  Tablet 2 times a day for 3 days, then 1 tablet daily till gone 21 tablet 0    omeprazole (PRILOSEC) 20 MG delayed release capsule TAKE 1 CAPSULE BY MOUTH ONCE DAILY. 30 capsule 1    allopurinol (ZYLOPRIM) 100 MG tablet Take 1 tablet by mouth daily 90 tablet 1    buPROPion (WELLBUTRIN XL) 300 MG extended release tablet TAKE 1 TABLET BY MOUTH EVERY MORNING. 30 tablet 3    atorvastatin (LIPITOR) 40 MG tablet TAKE 1 TABLET BY MOUTH NIGHTLY. 90 tablet 3    lisinopril (PRINIVIL;ZESTRIL) 10 MG tablet TAKE 1 TABLET BY MOUTH ONE TIME A DAY 30 tablet 0    nitroGLYCERIN (NITROSTAT) 0.4 MG SL tablet Place 1 tablet under the tongue every 5 minutes as needed for Chest pain 25 tablet 3    albuterol sulfate HFA (VENTOLIN HFA) 108 (90 Base) MCG/ACT inhaler Inhale 2 puffs into the lungs every 6 hours as needed for Wheezing 1 Inhaler 3    fluticasone (FLONASE) 50 MCG/ACT nasal spray USE 2 SPRAYS IN EACH NOSTRIL DAILY AS NEEDED FOR RHINITIS OR ALLERGIES 48 g 1    BRILINTA 90 MG TABS tablet TAKE 1 TABLET BY MOUTH 2 TIMES A DAY 60 tablet 5    metoprolol succinate (TOPROL XL) 25 MG extended release tablet TAKE 2 TABLETS BY MOUTH EVERY DAY.  90 tablet 0    bumetanide (BUMEX) 2 MG tablet Take 1 tablet by mouth daily Take 2 mg AM - 1 mg  tablet 3    pramipexole (MIRAPEX) 0.125 MG tablet Take 1 tablet by mouth nightly 90 tablet 1    aspirin 81 MG chewable tablet CHEW AND SWALLOW 1 TABLET BY MOUTH ONE TIME A DAY 30 tablet 11    ANORO ELLIPTA 62.5-25 MCG/INH AEPB inhaler supple. No JVD present. No thyromegaly present. Cardiovascular: Normal rate, regular rhythm and intact distal pulses. Exam reveals no friction rub. No murmur heard. Pulmonary/Chest: Effort normal and breath sounds normal. He has no wheezes. He has no rales. He exhibits no tenderness. Abdominal: Soft. Bowel sounds are normal. He exhibits no mass. There is no tenderness. Musculoskeletal: He exhibits tenderness (.left second and third MCP joint is slightly swollen and slightly red and tender). He exhibits no edema. Lymphadenopathy:     He has no cervical adenopathy. Neurological: He is alert and oriented to person, place, and time. Skin: No rash noted. Vitals reviewed. Assessment:         Diagnosis Orders   1. Acute idiopathic gout, unspecified site     2. Diabetes mellitus type 2, diet-controlled (HCC)  POCT glycosylated hemoglobin (Hb A1C)    POCT Glucose   3. Hyperlipidemia, unspecified hyperlipidemia type     4. Chronic obstructive pulmonary disease, unspecified COPD type ([facility]         Plan:      Medications Prescribed:  Orders Placed This Encounter   Medications    DISCONTD: predniSONE (DELTASONE) 10 MG tablet     Si tablets 2 times a day for 3 days then 1  Tablet 2 times a day for 3 days, then 1 tablet daily till gone     Dispense:  21 tablet     Refill:  0    predniSONE (DELTASONE) 10 MG tablet     Si tablets 2 times a day for 3 days then 1  Tablet 2 times a day for 3 days, then 1 tablet daily till gone     Dispense:  21 tablet     Refill:  0     Orders Placed:  Orders Placed This Encounter   Procedures    POCT glycosylated hemoglobin (Hb A1C)    POCT Glucose     Lab Results   Component Value Date    LABA1C 7.4 2019    LABA1C 7.0 2019    LABA1C 6.2 10/03/2018     Lab Results   Component Value Date    LDLCALC 85 2019    CREATININE 1.0 2019     Apply warm compress,  low purine diet     Return if symptoms worsen or fail to improve.      Discussed use, benefit, and side effects of prescribedmedications. All patient questions answered. Pt voiced understanding. Instructedto continue current medications, diet and exercise. Patient agreed with treatmentplan.

## 2019-08-30 RX ORDER — PRAMIPEXOLE DIHYDROCHLORIDE 0.12 MG/1
0.12 TABLET ORAL NIGHTLY
Qty: 90 TABLET | Refills: 0 | Status: SHIPPED | OUTPATIENT
Start: 2019-08-30 | End: 2019-11-20 | Stop reason: SDUPTHER

## 2019-09-03 NOTE — TELEPHONE ENCOUNTER
Date of last visit:  6/5/2019  Date of next visit:  11/8/2019    Requested Prescriptions     Pending Prescriptions Disp Refills    metoprolol succinate (TOPROL XL) 25 MG extended release tablet [Pharmacy Med Name: METOPROLOL SUCCINATE ER 25MG TB24] 90 tablet 0     Sig: TAKE 2 TABLETS BY MOUTH EVERY DAY.

## 2019-09-04 RX ORDER — METOPROLOL SUCCINATE 25 MG/1
TABLET, EXTENDED RELEASE ORAL
Qty: 90 TABLET | Refills: 0 | Status: SHIPPED | OUTPATIENT
Start: 2019-09-04 | End: 2019-09-29 | Stop reason: SDUPTHER

## 2019-09-12 ENCOUNTER — OFFICE VISIT (OUTPATIENT)
Dept: FAMILY MEDICINE CLINIC | Age: 59
End: 2019-09-12

## 2019-09-12 VITALS
RESPIRATION RATE: 16 BRPM | BODY MASS INDEX: 36.79 KG/M2 | DIASTOLIC BLOOD PRESSURE: 80 MMHG | WEIGHT: 262.8 LBS | HEART RATE: 72 BPM | SYSTOLIC BLOOD PRESSURE: 130 MMHG | HEIGHT: 71 IN

## 2019-09-12 DIAGNOSIS — M54.2 NECK AND SHOULDER PAIN: ICD-10-CM

## 2019-09-12 DIAGNOSIS — M25.519 NECK AND SHOULDER PAIN: ICD-10-CM

## 2019-09-12 DIAGNOSIS — M10.00 ACUTE IDIOPATHIC GOUT, UNSPECIFIED SITE: ICD-10-CM

## 2019-09-12 DIAGNOSIS — S46.812A TRAPEZIUS MUSCLE STRAIN, LEFT, INITIAL ENCOUNTER: Primary | ICD-10-CM

## 2019-09-12 PROCEDURE — 99213 OFFICE O/P EST LOW 20 MIN: CPT | Performed by: EMERGENCY MEDICINE

## 2019-09-12 RX ORDER — TIZANIDINE 4 MG/1
4 TABLET ORAL 3 TIMES DAILY
Qty: 24 TABLET | Refills: 0 | Status: SHIPPED | OUTPATIENT
Start: 2019-09-12 | End: 2019-10-03

## 2019-09-12 ASSESSMENT — ENCOUNTER SYMPTOMS
CONSTIPATION: 0
VOMITING: 0
COUGH: 0
TROUBLE SWALLOWING: 0
SORE THROAT: 0
BACK PAIN: 1
WHEEZING: 0
NAUSEA: 0
SINUS PRESSURE: 0
DIARRHEA: 0
ABDOMINAL PAIN: 0
CHEST TIGHTNESS: 0
RHINORRHEA: 0
VOICE CHANGE: 0
SHORTNESS OF BREATH: 0

## 2019-09-16 ENCOUNTER — TELEPHONE (OUTPATIENT)
Dept: FAMILY MEDICINE CLINIC | Age: 59
End: 2019-09-16

## 2019-09-16 RX ORDER — ETODOLAC 400 MG/1
400 TABLET, FILM COATED ORAL 2 TIMES DAILY
Qty: 60 TABLET | Refills: 0 | Status: SHIPPED | OUTPATIENT
Start: 2019-09-16 | End: 2019-09-29 | Stop reason: SDUPTHER

## 2019-09-19 ENCOUNTER — OFFICE VISIT (OUTPATIENT)
Dept: CARDIOLOGY CLINIC | Age: 59
End: 2019-09-19
Payer: COMMERCIAL

## 2019-09-19 VITALS
DIASTOLIC BLOOD PRESSURE: 82 MMHG | HEART RATE: 68 BPM | HEIGHT: 71 IN | SYSTOLIC BLOOD PRESSURE: 122 MMHG | WEIGHT: 268 LBS | BODY MASS INDEX: 37.52 KG/M2

## 2019-09-19 DIAGNOSIS — I25.810 CORONARY ARTERY DISEASE INVOLVING CORONARY BYPASS GRAFT OF NATIVE HEART WITHOUT ANGINA PECTORIS: Primary | ICD-10-CM

## 2019-09-19 DIAGNOSIS — I10 ESSENTIAL HYPERTENSION: ICD-10-CM

## 2019-09-19 DIAGNOSIS — E78.01 FAMILIAL HYPERCHOLESTEROLEMIA: ICD-10-CM

## 2019-09-19 PROCEDURE — 99213 OFFICE O/P EST LOW 20 MIN: CPT | Performed by: NUCLEAR MEDICINE

## 2019-09-19 PROCEDURE — 93000 ELECTROCARDIOGRAM COMPLETE: CPT | Performed by: NUCLEAR MEDICINE

## 2019-09-19 NOTE — PROGRESS NOTES
auscultation  Heart:    Normal S1 S2, Slight murmur. no rubs, no gallops  Abdomen:   Soft, non tender, no organomegalies, positive bowel sounds  Extremities:   No edema, no cyanosis, good peripheral pulses  Neurological:   Awake, alert, oriented. No obvious focal deficits  Musculoskelatal:  No obvious deformities    Assessment:      Diagnosis Orders   1. Coronary artery disease involving coronary bypass graft of native heart without angina pectoris  EKG 12 Lead   2. Essential hypertension     3. Familial hypercholesterolemia     as above  Fair other wise  ,ECG in office was done today. I reviewed the ECG. No acute findings      Plan:  No follow-ups on file. As above  Continue risk factor modification and medical management  Thank you for allowing me to participate in the care of your patient. Please don't hesitate to contact me regarding any further issues related to the patient care    Orders Placed:  Orders Placed This Encounter   Procedures    EKG 12 Lead     Order Specific Question:   Reason for Exam?     Answer: Other       Medications Prescribed:  No orders of the defined types were placed in this encounter. Discussed use, benefit, and side effects of prescribed medications. All patient questions answered. Pt voicedunderstanding. Instructed to continue current medications, diet and exercise. Continue risk factor modification and medical management. Patient agreed with treatment plan. Follow up as directed.     Electronically signedby Chau Bowen MD on 9/19/2019 at 7:38 AM

## 2019-09-27 ENCOUNTER — CARE COORDINATION (OUTPATIENT)
Dept: CASE MANAGEMENT | Age: 59
End: 2019-09-27

## 2019-09-30 ENCOUNTER — HOSPITAL ENCOUNTER (OUTPATIENT)
Dept: PHYSICAL THERAPY | Age: 59
Setting detail: THERAPIES SERIES
Discharge: HOME | End: 2019-09-30
Payer: COMMERCIAL

## 2019-09-30 PROCEDURE — 97110 THERAPEUTIC EXERCISES: CPT

## 2019-09-30 PROCEDURE — 97162 PT EVAL MOD COMPLEX 30 MIN: CPT

## 2019-09-30 RX ORDER — OMEPRAZOLE 20 MG/1
CAPSULE, DELAYED RELEASE ORAL
Qty: 30 CAPSULE | Refills: 1 | Status: SHIPPED | OUTPATIENT
Start: 2019-09-30 | End: 2019-10-14

## 2019-09-30 RX ORDER — METOPROLOL SUCCINATE 25 MG/1
TABLET, EXTENDED RELEASE ORAL
Qty: 90 TABLET | Refills: 0 | Status: SHIPPED | OUTPATIENT
Start: 2019-09-30 | End: 2019-10-14

## 2019-09-30 RX ORDER — ETODOLAC 400 MG/1
TABLET, FILM COATED ORAL
Qty: 60 TABLET | Refills: 0 | Status: SHIPPED | OUTPATIENT
Start: 2019-09-30 | End: 2019-10-14

## 2019-09-30 ASSESSMENT — PAIN DESCRIPTION - DESCRIPTORS: DESCRIPTORS: CONSTANT;NUMBNESS

## 2019-09-30 ASSESSMENT — PAIN SCALES - GENERAL: PAINLEVEL_OUTOF10: 6

## 2019-09-30 ASSESSMENT — PAIN DESCRIPTION - ORIENTATION: ORIENTATION: LEFT

## 2019-09-30 ASSESSMENT — PAIN DESCRIPTION - LOCATION: LOCATION: NECK;SHOULDER

## 2019-10-03 ENCOUNTER — OFFICE VISIT (OUTPATIENT)
Dept: FAMILY MEDICINE CLINIC | Age: 59
End: 2019-10-03

## 2019-10-03 ENCOUNTER — TELEPHONE (OUTPATIENT)
Dept: FAMILY MEDICINE CLINIC | Age: 59
End: 2019-10-03

## 2019-10-03 ENCOUNTER — APPOINTMENT (OUTPATIENT)
Dept: PHYSICAL THERAPY | Age: 59
End: 2019-10-03
Payer: COMMERCIAL

## 2019-10-03 VITALS
RESPIRATION RATE: 16 BRPM | SYSTOLIC BLOOD PRESSURE: 124 MMHG | DIASTOLIC BLOOD PRESSURE: 72 MMHG | HEART RATE: 76 BPM | BODY MASS INDEX: 37.38 KG/M2 | WEIGHT: 267 LBS | HEIGHT: 71 IN

## 2019-10-03 DIAGNOSIS — M10.00 IDIOPATHIC GOUT, UNSPECIFIED CHRONICITY, UNSPECIFIED SITE: Primary | ICD-10-CM

## 2019-10-03 DIAGNOSIS — I10 ESSENTIAL HYPERTENSION: ICD-10-CM

## 2019-10-03 DIAGNOSIS — E11.9 DIABETES MELLITUS TYPE 2, DIET-CONTROLLED (HCC): ICD-10-CM

## 2019-10-03 PROCEDURE — 99213 OFFICE O/P EST LOW 20 MIN: CPT | Performed by: EMERGENCY MEDICINE

## 2019-10-03 RX ORDER — PREDNISONE 10 MG/1
TABLET ORAL
Qty: 16 TABLET | Refills: 0 | Status: SHIPPED | OUTPATIENT
Start: 2019-10-03 | End: 2019-11-08 | Stop reason: ALTCHOICE

## 2019-10-03 RX ORDER — ALLOPURINOL 300 MG/1
300 TABLET ORAL DAILY
Qty: 90 TABLET | Refills: 1 | Status: SHIPPED | OUTPATIENT
Start: 2019-10-03 | End: 2020-04-03

## 2019-10-03 ASSESSMENT — ENCOUNTER SYMPTOMS
SORE THROAT: 0
ABDOMINAL PAIN: 0
RHINORRHEA: 0
TROUBLE SWALLOWING: 0
BACK PAIN: 0
NAUSEA: 0
CONSTIPATION: 0
DIARRHEA: 0
VOICE CHANGE: 0
COUGH: 0
WHEEZING: 0
CHEST TIGHTNESS: 0
SINUS PRESSURE: 0
VOMITING: 0
SHORTNESS OF BREATH: 0

## 2019-10-08 ENCOUNTER — TELEPHONE (OUTPATIENT)
Dept: FAMILY MEDICINE CLINIC | Age: 59
End: 2019-10-08

## 2019-10-08 ENCOUNTER — HOSPITAL ENCOUNTER (OUTPATIENT)
Dept: PHYSICAL THERAPY | Age: 59
Setting detail: THERAPIES SERIES
Discharge: HOME | End: 2019-10-08
Payer: COMMERCIAL

## 2019-10-08 PROCEDURE — 97110 THERAPEUTIC EXERCISES: CPT

## 2019-10-08 PROCEDURE — 97035 APP MDLTY 1+ULTRASOUND EA 15: CPT

## 2019-10-08 ASSESSMENT — PAIN SCALES - GENERAL: PAINLEVEL_OUTOF10: 4

## 2019-10-10 ENCOUNTER — HOSPITAL ENCOUNTER (OUTPATIENT)
Dept: PHYSICAL THERAPY | Age: 59
Setting detail: THERAPIES SERIES
Discharge: HOME | End: 2019-10-10
Payer: COMMERCIAL

## 2019-10-10 PROCEDURE — 97035 APP MDLTY 1+ULTRASOUND EA 15: CPT

## 2019-10-10 PROCEDURE — 97110 THERAPEUTIC EXERCISES: CPT

## 2019-10-10 ASSESSMENT — PAIN SCALES - GENERAL: PAINLEVEL_OUTOF10: 2

## 2019-10-14 RX ORDER — FLUTICASONE PROPIONATE 50 MCG
SPRAY, SUSPENSION (ML) NASAL
Qty: 48 G | Refills: 0 | Status: SHIPPED | OUTPATIENT
Start: 2019-10-14 | End: 2020-12-17 | Stop reason: SDUPTHER

## 2019-10-14 RX ORDER — TICAGRELOR 90 MG/1
TABLET ORAL
Qty: 180 TABLET | Refills: 4 | Status: SHIPPED | OUTPATIENT
Start: 2019-10-14 | End: 2020-11-04

## 2019-10-14 RX ORDER — OMEPRAZOLE 20 MG/1
CAPSULE, DELAYED RELEASE ORAL
Qty: 30 CAPSULE | Refills: 0 | Status: SHIPPED | OUTPATIENT
Start: 2019-10-14 | End: 2020-02-17

## 2019-10-14 RX ORDER — METOPROLOL SUCCINATE 25 MG/1
25 TABLET, EXTENDED RELEASE ORAL DAILY
Qty: 90 TABLET | Refills: 1 | OUTPATIENT
Start: 2019-10-14

## 2019-10-14 RX ORDER — ETODOLAC 400 MG/1
TABLET, FILM COATED ORAL
Qty: 60 TABLET | Refills: 0 | Status: SHIPPED | OUTPATIENT
Start: 2019-10-14 | End: 2019-10-28

## 2019-10-14 RX ORDER — FLUTICASONE PROPIONATE 50 MCG
2 SPRAY, SUSPENSION (ML) NASAL DAILY
Qty: 48 G | Refills: 1 | OUTPATIENT
Start: 2019-10-14

## 2019-10-14 RX ORDER — METOPROLOL SUCCINATE 25 MG/1
TABLET, EXTENDED RELEASE ORAL
Qty: 90 TABLET | Refills: 0 | Status: SHIPPED | OUTPATIENT
Start: 2019-10-14 | End: 2019-11-20

## 2019-10-14 RX ORDER — OMEPRAZOLE 20 MG/1
CAPSULE, DELAYED RELEASE ORAL
Qty: 30 CAPSULE | Refills: 1 | OUTPATIENT
Start: 2019-10-14

## 2019-10-14 RX ORDER — ETODOLAC 400 MG/1
400 TABLET, FILM COATED ORAL 2 TIMES DAILY
Qty: 60 TABLET | Refills: 1 | OUTPATIENT
Start: 2019-10-14

## 2019-10-15 ENCOUNTER — APPOINTMENT (OUTPATIENT)
Dept: PHYSICAL THERAPY | Age: 59
End: 2019-10-15
Payer: COMMERCIAL

## 2019-10-15 ENCOUNTER — NURSE ONLY (OUTPATIENT)
Dept: LAB | Age: 59
End: 2019-10-15

## 2019-10-15 LAB
BASOPHILS # BLD: 1 %
BASOPHILS ABSOLUTE: 0.1 THOU/MM3 (ref 0–0.1)
EOSINOPHIL # BLD: 1.4 %
EOSINOPHILS ABSOLUTE: 0.1 THOU/MM3 (ref 0–0.4)
ERYTHROCYTE [DISTWIDTH] IN BLOOD BY AUTOMATED COUNT: 12.5 % (ref 11.5–14.5)
ERYTHROCYTE [DISTWIDTH] IN BLOOD BY AUTOMATED COUNT: 46.8 FL (ref 35–45)
HCT VFR BLD CALC: 48.5 % (ref 42–52)
HEMOGLOBIN: 16 GM/DL (ref 14–18)
IMMATURE GRANS (ABS): 0.06 THOU/MM3 (ref 0–0.07)
IMMATURE GRANULOCYTES: 0.7 %
LYMPHOCYTES # BLD: 18.5 %
LYMPHOCYTES ABSOLUTE: 1.7 THOU/MM3 (ref 1–4.8)
MCH RBC QN AUTO: 33.2 PG (ref 26–33)
MCHC RBC AUTO-ENTMCNC: 33 GM/DL (ref 32.2–35.5)
MCV RBC AUTO: 100.6 FL (ref 80–94)
MONOCYTES # BLD: 8.5 %
MONOCYTES ABSOLUTE: 0.8 THOU/MM3 (ref 0.4–1.3)
NUCLEATED RED BLOOD CELLS: 0 /100 WBC
PLATELET # BLD: 244 THOU/MM3 (ref 130–400)
PMV BLD AUTO: 11 FL (ref 9.4–12.4)
RBC # BLD: 4.82 MILL/MM3 (ref 4.7–6.1)
SEG NEUTROPHILS: 69.9 %
SEGMENTED NEUTROPHILS ABSOLUTE COUNT: 6.3 THOU/MM3 (ref 1.8–7.7)
WBC # BLD: 9 THOU/MM3 (ref 4.8–10.8)

## 2019-10-17 ENCOUNTER — HOSPITAL ENCOUNTER (OUTPATIENT)
Dept: PHYSICAL THERAPY | Age: 59
Setting detail: THERAPIES SERIES
Discharge: HOME | End: 2019-10-17
Payer: COMMERCIAL

## 2019-10-22 ENCOUNTER — HOSPITAL ENCOUNTER (OUTPATIENT)
Dept: PHYSICAL THERAPY | Age: 59
Setting detail: THERAPIES SERIES
Discharge: HOME | End: 2019-10-22
Payer: COMMERCIAL

## 2019-10-24 ENCOUNTER — HOSPITAL ENCOUNTER (OUTPATIENT)
Dept: PHYSICAL THERAPY | Age: 59
Setting detail: THERAPIES SERIES
Discharge: HOME | End: 2019-10-24
Payer: COMMERCIAL

## 2019-10-24 PROCEDURE — 97110 THERAPEUTIC EXERCISES: CPT

## 2019-10-28 RX ORDER — TICAGRELOR 90 MG/1
TABLET ORAL
Qty: 60 TABLET | Refills: 11 | Status: SHIPPED | OUTPATIENT
Start: 2019-10-28 | End: 2019-12-16

## 2019-10-28 RX ORDER — ETODOLAC 400 MG/1
TABLET, FILM COATED ORAL
Qty: 60 TABLET | Refills: 0 | Status: SHIPPED | OUTPATIENT
Start: 2019-10-28 | End: 2019-11-08 | Stop reason: ALTCHOICE

## 2019-10-29 ENCOUNTER — APPOINTMENT (OUTPATIENT)
Dept: PHYSICAL THERAPY | Age: 59
End: 2019-10-29
Payer: COMMERCIAL

## 2019-10-30 ENCOUNTER — HOSPITAL ENCOUNTER (OUTPATIENT)
Dept: PHYSICAL THERAPY | Age: 59
Setting detail: THERAPIES SERIES
Discharge: HOME | End: 2019-10-30
Payer: COMMERCIAL

## 2019-10-31 ENCOUNTER — APPOINTMENT (OUTPATIENT)
Dept: PHYSICAL THERAPY | Age: 59
End: 2019-10-31
Payer: COMMERCIAL

## 2019-11-05 ENCOUNTER — HOSPITAL ENCOUNTER (OUTPATIENT)
Dept: PHYSICAL THERAPY | Age: 59
Setting detail: THERAPIES SERIES
End: 2019-11-05
Payer: COMMERCIAL

## 2019-11-08 ENCOUNTER — HOSPITAL ENCOUNTER (OUTPATIENT)
Age: 59
Discharge: HOME | End: 2019-11-08
Payer: COMMERCIAL

## 2019-11-08 ENCOUNTER — OFFICE VISIT (OUTPATIENT)
Dept: FAMILY MEDICINE CLINIC | Age: 59
End: 2019-11-08

## 2019-11-08 ENCOUNTER — HOSPITAL ENCOUNTER (OUTPATIENT)
Dept: CT IMAGING | Age: 59
Discharge: HOME | End: 2019-11-08
Payer: COMMERCIAL

## 2019-11-08 ENCOUNTER — TELEPHONE (OUTPATIENT)
Dept: FAMILY MEDICINE CLINIC | Age: 59
End: 2019-11-08

## 2019-11-08 VITALS
DIASTOLIC BLOOD PRESSURE: 86 MMHG | WEIGHT: 265.6 LBS | RESPIRATION RATE: 20 BRPM | BODY MASS INDEX: 37.18 KG/M2 | TEMPERATURE: 98 F | HEART RATE: 66 BPM | SYSTOLIC BLOOD PRESSURE: 114 MMHG | HEIGHT: 71 IN

## 2019-11-08 DIAGNOSIS — E78.5 HYPERLIPIDEMIA, UNSPECIFIED HYPERLIPIDEMIA TYPE: ICD-10-CM

## 2019-11-08 DIAGNOSIS — R89.9 ABNORMAL LABORATORY TEST RESULT: Primary | ICD-10-CM

## 2019-11-08 DIAGNOSIS — E11.9 DIABETES MELLITUS TYPE 2, DIET-CONTROLLED (HCC): ICD-10-CM

## 2019-11-08 DIAGNOSIS — I10 ESSENTIAL HYPERTENSION: ICD-10-CM

## 2019-11-08 DIAGNOSIS — R55 SYNCOPE, UNSPECIFIED SYNCOPE TYPE: ICD-10-CM

## 2019-11-08 DIAGNOSIS — R55 SYNCOPE, UNSPECIFIED SYNCOPE TYPE: Primary | ICD-10-CM

## 2019-11-08 DIAGNOSIS — M10.00 IDIOPATHIC GOUT, UNSPECIFIED CHRONICITY, UNSPECIFIED SITE: ICD-10-CM

## 2019-11-08 LAB
ALBUMIN SERPL-MCNC: 4.3 G/DL (ref 3.5–5.1)
ALP BLD-CCNC: 104 U/L (ref 38–126)
ALT SERPL-CCNC: 48 U/L (ref 11–66)
ANION GAP SERPL CALCULATED.3IONS-SCNC: 17 MEQ/L (ref 8–16)
AST SERPL-CCNC: 32 U/L (ref 5–40)
BASOPHILS # BLD: 1 %
BASOPHILS ABSOLUTE: 0.1 THOU/MM3 (ref 0–0.1)
BILIRUB SERPL-MCNC: 0.7 MG/DL (ref 0.3–1.2)
BUN BLDV-MCNC: 25 MG/DL (ref 7–22)
CALCIUM SERPL-MCNC: 9.6 MG/DL (ref 8.5–10.5)
CHLORIDE BLD-SCNC: 104 MEQ/L (ref 98–111)
CO2: 24 MEQ/L (ref 23–33)
CREAT SERPL-MCNC: 1.5 MG/DL (ref 0.4–1.2)
EKG ATRIAL RATE: 59 BPM
EKG P AXIS: -23 DEGREES
EKG P-R INTERVAL: 170 MS
EKG Q-T INTERVAL: 462 MS
EKG QRS DURATION: 100 MS
EKG QTC CALCULATION (BAZETT): 457 MS
EKG R AXIS: -2 DEGREES
EKG T AXIS: 69 DEGREES
EKG VENTRICULAR RATE: 59 BPM
EOSINOPHIL # BLD: 1.4 %
EOSINOPHILS ABSOLUTE: 0.2 THOU/MM3 (ref 0–0.4)
ERYTHROCYTE [DISTWIDTH] IN BLOOD BY AUTOMATED COUNT: 13.3 % (ref 11.5–14.5)
ERYTHROCYTE [DISTWIDTH] IN BLOOD BY AUTOMATED COUNT: 50.2 FL (ref 35–45)
GFR SERPL CREATININE-BSD FRML MDRD: 47 ML/MIN/1.73M2
GLUCOSE BLD-MCNC: 157 MG/DL (ref 70–108)
HCT VFR BLD CALC: 50.7 % (ref 42–52)
HEMOGLOBIN: 16.6 GM/DL (ref 14–18)
IMMATURE GRANS (ABS): 0.15 THOU/MM3 (ref 0–0.07)
IMMATURE GRANULOCYTES: 1.2 %
LYMPHOCYTES # BLD: 16.2 %
LYMPHOCYTES ABSOLUTE: 2 THOU/MM3 (ref 1–4.8)
MCH RBC QN AUTO: 33.2 PG (ref 26–33)
MCHC RBC AUTO-ENTMCNC: 32.7 GM/DL (ref 32.2–35.5)
MCV RBC AUTO: 101.4 FL (ref 80–94)
MONOCYTES # BLD: 10.5 %
MONOCYTES ABSOLUTE: 1.3 THOU/MM3 (ref 0.4–1.3)
NUCLEATED RED BLOOD CELLS: 0 /100 WBC
PLATELET # BLD: 289 THOU/MM3 (ref 130–400)
PMV BLD AUTO: 10.6 FL (ref 9.4–12.4)
POTASSIUM SERPL-SCNC: 4.2 MEQ/L (ref 3.5–5.2)
RBC # BLD: 5 MILL/MM3 (ref 4.7–6.1)
SEG NEUTROPHILS: 69.7 %
SEGMENTED NEUTROPHILS ABSOLUTE COUNT: 8.8 THOU/MM3 (ref 1.8–7.7)
SODIUM BLD-SCNC: 145 MEQ/L (ref 135–145)
TOTAL PROTEIN: 7.7 G/DL (ref 6.1–8)
TSH SERPL DL<=0.05 MIU/L-ACNC: 2.33 UIU/ML (ref 0.4–4.2)
WBC # BLD: 12.6 THOU/MM3 (ref 4.8–10.8)

## 2019-11-08 PROCEDURE — 99215 OFFICE O/P EST HI 40 MIN: CPT | Performed by: FAMILY MEDICINE

## 2019-11-08 PROCEDURE — 70450 CT HEAD/BRAIN W/O DYE: CPT

## 2019-11-08 PROCEDURE — 84443 ASSAY THYROID STIM HORMONE: CPT

## 2019-11-08 PROCEDURE — 80053 COMPREHEN METABOLIC PANEL: CPT

## 2019-11-08 PROCEDURE — 36415 COLL VENOUS BLD VENIPUNCTURE: CPT

## 2019-11-08 PROCEDURE — 93010 ELECTROCARDIOGRAM REPORT: CPT | Performed by: NUCLEAR MEDICINE

## 2019-11-08 PROCEDURE — 93005 ELECTROCARDIOGRAM TRACING: CPT

## 2019-11-08 PROCEDURE — 85025 COMPLETE CBC W/AUTO DIFF WBC: CPT

## 2019-11-08 RX ORDER — ETODOLAC 400 MG/1
400 TABLET, FILM COATED ORAL DAILY
Qty: 60 TABLET | Refills: 3 | Status: CANCELLED | OUTPATIENT
Start: 2019-11-08

## 2019-11-08 ASSESSMENT — ENCOUNTER SYMPTOMS
CHEST TIGHTNESS: 0
ABDOMINAL PAIN: 0
DIARRHEA: 0
VOMITING: 0
NAUSEA: 0
SINUS PRESSURE: 0
EYES NEGATIVE: 1
SHORTNESS OF BREATH: 0
SINUS PAIN: 0
SORE THROAT: 1
CONSTIPATION: 0
BLOOD IN STOOL: 0
COUGH: 1

## 2019-11-11 ENCOUNTER — NURSE ONLY (OUTPATIENT)
Dept: LAB | Age: 59
End: 2019-11-11

## 2019-11-11 ENCOUNTER — HOSPITAL ENCOUNTER (OUTPATIENT)
Dept: INTERVENTIONAL RADIOLOGY/VASCULAR | Age: 59
Discharge: HOME | End: 2019-11-11
Payer: COMMERCIAL

## 2019-11-11 ENCOUNTER — HOSPITAL ENCOUNTER (OUTPATIENT)
Dept: NON INVASIVE DIAGNOSTICS | Age: 59
Discharge: HOME | End: 2019-11-11
Payer: COMMERCIAL

## 2019-11-11 DIAGNOSIS — R89.9 ABNORMAL LABORATORY TEST RESULT: ICD-10-CM

## 2019-11-11 DIAGNOSIS — R55 SYNCOPE, UNSPECIFIED SYNCOPE TYPE: ICD-10-CM

## 2019-11-11 LAB
ANION GAP SERPL CALCULATED.3IONS-SCNC: 16 MEQ/L (ref 8–16)
BASOPHILS # BLD: 1.1 %
BASOPHILS ABSOLUTE: 0.1 THOU/MM3 (ref 0–0.1)
BUN BLDV-MCNC: 16 MG/DL (ref 7–22)
CALCIUM SERPL-MCNC: 9.9 MG/DL (ref 8.5–10.5)
CHLORIDE BLD-SCNC: 100 MEQ/L (ref 98–111)
CO2: 25 MEQ/L (ref 23–33)
CREAT SERPL-MCNC: 1 MG/DL (ref 0.4–1.2)
EOSINOPHIL # BLD: 1.2 %
EOSINOPHILS ABSOLUTE: 0.1 THOU/MM3 (ref 0–0.4)
ERYTHROCYTE [DISTWIDTH] IN BLOOD BY AUTOMATED COUNT: 13.1 % (ref 11.5–14.5)
ERYTHROCYTE [DISTWIDTH] IN BLOOD BY AUTOMATED COUNT: 48.2 FL (ref 35–45)
GFR SERPL CREATININE-BSD FRML MDRD: 76 ML/MIN/1.73M2
GLUCOSE BLD-MCNC: 195 MG/DL (ref 70–108)
HCT VFR BLD CALC: 50.5 % (ref 42–52)
HEMOGLOBIN: 17 GM/DL (ref 14–18)
IMMATURE GRANS (ABS): 0.09 THOU/MM3 (ref 0–0.07)
IMMATURE GRANULOCYTES: 0.9 %
LV EF: 55 %
LVEF MODALITY: NORMAL
LYMPHOCYTES # BLD: 21.1 %
LYMPHOCYTES ABSOLUTE: 2.2 THOU/MM3 (ref 1–4.8)
MCH RBC QN AUTO: 33.7 PG (ref 26–33)
MCHC RBC AUTO-ENTMCNC: 33.7 GM/DL (ref 32.2–35.5)
MCV RBC AUTO: 100.2 FL (ref 80–94)
MONOCYTES # BLD: 9.3 %
MONOCYTES ABSOLUTE: 1 THOU/MM3 (ref 0.4–1.3)
NUCLEATED RED BLOOD CELLS: 0 /100 WBC
PLATELET # BLD: 285 THOU/MM3 (ref 130–400)
PMV BLD AUTO: 10.7 FL (ref 9.4–12.4)
POTASSIUM SERPL-SCNC: 4.3 MEQ/L (ref 3.5–5.2)
RBC # BLD: 5.04 MILL/MM3 (ref 4.7–6.1)
SEG NEUTROPHILS: 66.4 %
SEGMENTED NEUTROPHILS ABSOLUTE COUNT: 6.9 THOU/MM3 (ref 1.8–7.7)
SODIUM BLD-SCNC: 141 MEQ/L (ref 135–145)
WBC # BLD: 10.4 THOU/MM3 (ref 4.8–10.8)

## 2019-11-11 PROCEDURE — 93880 EXTRACRANIAL BILAT STUDY: CPT

## 2019-11-11 PROCEDURE — 93306 TTE W/DOPPLER COMPLETE: CPT

## 2019-11-12 ENCOUNTER — HOSPITAL ENCOUNTER (OUTPATIENT)
Dept: NEUROLOGY | Age: 59
Discharge: HOME | End: 2019-11-12
Payer: COMMERCIAL

## 2019-11-12 ENCOUNTER — HOSPITAL ENCOUNTER (OUTPATIENT)
Dept: PHYSICAL THERAPY | Age: 59
Setting detail: THERAPIES SERIES
Discharge: HOME | End: 2019-11-12
Payer: COMMERCIAL

## 2019-11-12 DIAGNOSIS — R55 SYNCOPE, UNSPECIFIED SYNCOPE TYPE: ICD-10-CM

## 2019-11-12 PROCEDURE — 95819 EEG AWAKE AND ASLEEP: CPT

## 2019-11-12 PROCEDURE — 97110 THERAPEUTIC EXERCISES: CPT

## 2019-11-13 ENCOUNTER — HOSPITAL ENCOUNTER (OUTPATIENT)
Dept: NON INVASIVE DIAGNOSTICS | Age: 59
Discharge: HOME | End: 2019-11-13
Payer: COMMERCIAL

## 2019-11-13 ENCOUNTER — OFFICE VISIT (OUTPATIENT)
Dept: CARDIOLOGY CLINIC | Age: 59
End: 2019-11-13
Payer: COMMERCIAL

## 2019-11-13 VITALS
HEIGHT: 71 IN | SYSTOLIC BLOOD PRESSURE: 127 MMHG | WEIGHT: 264.6 LBS | BODY MASS INDEX: 37.04 KG/M2 | DIASTOLIC BLOOD PRESSURE: 81 MMHG | HEART RATE: 71 BPM

## 2019-11-13 DIAGNOSIS — Z95.1 S/P CABG (CORONARY ARTERY BYPASS GRAFT): ICD-10-CM

## 2019-11-13 DIAGNOSIS — R55 SYNCOPE, UNSPECIFIED SYNCOPE TYPE: Primary | ICD-10-CM

## 2019-11-13 DIAGNOSIS — R55 SYNCOPE, UNSPECIFIED SYNCOPE TYPE: ICD-10-CM

## 2019-11-13 DIAGNOSIS — I25.10 CORONARY ARTERY DISEASE INVOLVING NATIVE CORONARY ARTERY OF NATIVE HEART WITHOUT ANGINA PECTORIS: ICD-10-CM

## 2019-11-13 DIAGNOSIS — Z95.1 S/P CABG X 2: ICD-10-CM

## 2019-11-13 DIAGNOSIS — I10 ESSENTIAL HYPERTENSION: ICD-10-CM

## 2019-11-13 PROCEDURE — 93270 REMOTE 30 DAY ECG REV/REPORT: CPT

## 2019-11-13 PROCEDURE — 99213 OFFICE O/P EST LOW 20 MIN: CPT | Performed by: PHYSICIAN ASSISTANT

## 2019-11-15 ENCOUNTER — TELEPHONE (OUTPATIENT)
Dept: CARDIOLOGY CLINIC | Age: 59
End: 2019-11-15

## 2019-11-15 ENCOUNTER — HOSPITAL ENCOUNTER (OUTPATIENT)
Age: 59
Discharge: HOME | End: 2019-11-15
Payer: COMMERCIAL

## 2019-11-15 ENCOUNTER — OFFICE VISIT (OUTPATIENT)
Dept: FAMILY MEDICINE CLINIC | Age: 59
End: 2019-11-15

## 2019-11-15 ENCOUNTER — HOSPITAL ENCOUNTER (OUTPATIENT)
Dept: GENERAL RADIOLOGY | Age: 59
Discharge: HOME | End: 2019-11-15
Payer: COMMERCIAL

## 2019-11-15 VITALS
SYSTOLIC BLOOD PRESSURE: 132 MMHG | DIASTOLIC BLOOD PRESSURE: 82 MMHG | WEIGHT: 269.6 LBS | RESPIRATION RATE: 16 BRPM | BODY MASS INDEX: 37.74 KG/M2 | HEIGHT: 71 IN | HEART RATE: 72 BPM

## 2019-11-15 DIAGNOSIS — R61 EXCESSIVE SWEATING: ICD-10-CM

## 2019-11-15 DIAGNOSIS — I10 ESSENTIAL HYPERTENSION: ICD-10-CM

## 2019-11-15 DIAGNOSIS — R55 SYNCOPE, UNSPECIFIED SYNCOPE TYPE: ICD-10-CM

## 2019-11-15 DIAGNOSIS — E11.9 DIABETES MELLITUS TYPE 2, DIET-CONTROLLED (HCC): ICD-10-CM

## 2019-11-15 DIAGNOSIS — I25.10 CORONARY ARTERY DISEASE INVOLVING NATIVE CORONARY ARTERY OF NATIVE HEART WITHOUT ANGINA PECTORIS: ICD-10-CM

## 2019-11-15 DIAGNOSIS — R55 SYNCOPE, UNSPECIFIED SYNCOPE TYPE: Primary | ICD-10-CM

## 2019-11-15 DIAGNOSIS — R05.9 COUGH: Primary | ICD-10-CM

## 2019-11-15 DIAGNOSIS — R05.9 COUGH: ICD-10-CM

## 2019-11-15 LAB
CHP ED QC CHECK: ABNORMAL
GLUCOSE BLD-MCNC: 187 MG/DL

## 2019-11-15 PROCEDURE — 82962 GLUCOSE BLOOD TEST: CPT | Performed by: FAMILY MEDICINE

## 2019-11-15 PROCEDURE — 99213 OFFICE O/P EST LOW 20 MIN: CPT | Performed by: FAMILY MEDICINE

## 2019-11-15 PROCEDURE — 71046 X-RAY EXAM CHEST 2 VIEWS: CPT

## 2019-11-15 RX ORDER — CEFUROXIME AXETIL 500 MG/1
500 TABLET ORAL 2 TIMES DAILY
Qty: 20 TABLET | Refills: 0 | Status: SHIPPED | OUTPATIENT
Start: 2019-11-15 | End: 2019-11-25

## 2019-11-15 ASSESSMENT — ENCOUNTER SYMPTOMS
CHEST TIGHTNESS: 0
ABDOMINAL PAIN: 0
SHORTNESS OF BREATH: 0
EYE PAIN: 0
VOMITING: 0
NAUSEA: 0
PHOTOPHOBIA: 0
BLOOD IN STOOL: 0
EYES NEGATIVE: 1
DIARRHEA: 0
CONSTIPATION: 0
COUGH: 1

## 2019-11-20 RX ORDER — UMECLIDINIUM BROMIDE AND VILANTEROL TRIFENATATE 62.5; 25 UG/1; UG/1
1 POWDER RESPIRATORY (INHALATION) DAILY
Qty: 1 EACH | Refills: 11 | Status: SHIPPED | OUTPATIENT
Start: 2019-11-20 | End: 2020-08-27

## 2019-11-27 ENCOUNTER — TELEPHONE (OUTPATIENT)
Dept: FAMILY MEDICINE CLINIC | Age: 59
End: 2019-11-27

## 2019-11-27 RX ORDER — PREDNISONE 10 MG/1
TABLET ORAL
Qty: 16 TABLET | Refills: 0 | Status: SHIPPED | OUTPATIENT
Start: 2019-11-27 | End: 2019-12-16

## 2019-11-29 ENCOUNTER — OFFICE VISIT (OUTPATIENT)
Dept: FAMILY MEDICINE CLINIC | Age: 59
End: 2019-11-29

## 2019-11-29 VITALS
HEART RATE: 78 BPM | BODY MASS INDEX: 37.87 KG/M2 | RESPIRATION RATE: 16 BRPM | DIASTOLIC BLOOD PRESSURE: 78 MMHG | WEIGHT: 270.5 LBS | SYSTOLIC BLOOD PRESSURE: 128 MMHG | HEIGHT: 71 IN

## 2019-11-29 DIAGNOSIS — R55 SYNCOPE, UNSPECIFIED SYNCOPE TYPE: ICD-10-CM

## 2019-11-29 DIAGNOSIS — I10 ESSENTIAL HYPERTENSION: Primary | ICD-10-CM

## 2019-11-29 DIAGNOSIS — M10.9 ACUTE GOUT OF MULTIPLE SITES, UNSPECIFIED CAUSE: ICD-10-CM

## 2019-11-29 PROCEDURE — 99213 OFFICE O/P EST LOW 20 MIN: CPT | Performed by: FAMILY MEDICINE

## 2019-11-29 ASSESSMENT — ENCOUNTER SYMPTOMS
COUGH: 0
EYES NEGATIVE: 1
BLOOD IN STOOL: 0
DIARRHEA: 0
CHEST TIGHTNESS: 0
VOMITING: 0
NAUSEA: 0
BACK PAIN: 1
ABDOMINAL PAIN: 0
CONSTIPATION: 0
SHORTNESS OF BREATH: 0

## 2019-12-10 ENCOUNTER — HOSPITAL ENCOUNTER (OUTPATIENT)
Dept: NON INVASIVE DIAGNOSTICS | Age: 59
Discharge: HOME | End: 2019-12-10
Payer: COMMERCIAL

## 2019-12-10 DIAGNOSIS — I25.10 CORONARY ARTERY DISEASE INVOLVING NATIVE CORONARY ARTERY OF NATIVE HEART WITHOUT ANGINA PECTORIS: ICD-10-CM

## 2019-12-10 DIAGNOSIS — R55 SYNCOPE, UNSPECIFIED SYNCOPE TYPE: ICD-10-CM

## 2019-12-10 PROCEDURE — 2709999900 HC NON-CHARGEABLE SUPPLY

## 2019-12-10 PROCEDURE — 93660 TILT TABLE EVALUATION: CPT | Performed by: NUCLEAR MEDICINE

## 2019-12-11 ENCOUNTER — TELEPHONE (OUTPATIENT)
Dept: CARDIOLOGY CLINIC | Age: 59
End: 2019-12-11

## 2019-12-12 RX ORDER — LISINOPRIL 5 MG/1
5 TABLET ORAL DAILY
COMMUNITY
End: 2019-12-16 | Stop reason: SDUPTHER

## 2019-12-16 ENCOUNTER — OFFICE VISIT (OUTPATIENT)
Dept: CARDIOLOGY CLINIC | Age: 59
End: 2019-12-16
Payer: COMMERCIAL

## 2019-12-16 VITALS
BODY MASS INDEX: 37.66 KG/M2 | HEART RATE: 82 BPM | OXYGEN SATURATION: 92 % | HEIGHT: 71 IN | DIASTOLIC BLOOD PRESSURE: 78 MMHG | WEIGHT: 269 LBS | SYSTOLIC BLOOD PRESSURE: 128 MMHG

## 2019-12-16 DIAGNOSIS — Z95.1 S/P CABG (CORONARY ARTERY BYPASS GRAFT): ICD-10-CM

## 2019-12-16 DIAGNOSIS — I10 ESSENTIAL HYPERTENSION: ICD-10-CM

## 2019-12-16 DIAGNOSIS — I50.32 CHF (CONGESTIVE HEART FAILURE), NYHA CLASS II, CHRONIC, DIASTOLIC (HCC): Primary | ICD-10-CM

## 2019-12-16 PROCEDURE — 99213 OFFICE O/P EST LOW 20 MIN: CPT | Performed by: NURSE PRACTITIONER

## 2019-12-16 RX ORDER — METOPROLOL SUCCINATE 25 MG/1
TABLET, EXTENDED RELEASE ORAL
Qty: 180 TABLET | Refills: 1 | Status: SHIPPED | OUTPATIENT
Start: 2019-12-16 | End: 2020-07-08

## 2019-12-16 RX ORDER — LISINOPRIL 5 MG/1
5 TABLET ORAL DAILY
Qty: 90 TABLET | Refills: 3 | Status: SHIPPED | OUTPATIENT
Start: 2019-12-16 | End: 2020-03-09 | Stop reason: SDUPTHER

## 2019-12-16 RX ORDER — ETODOLAC 400 MG/1
TABLET, FILM COATED ORAL
Qty: 180 TABLET | Refills: 1 | Status: SHIPPED | OUTPATIENT
Start: 2019-12-16 | End: 2019-12-20

## 2019-12-16 ASSESSMENT — ENCOUNTER SYMPTOMS
CHEST TIGHTNESS: 0
COLOR CHANGE: 0
APNEA: 0
SHORTNESS OF BREATH: 0
ABDOMINAL PAIN: 0
NAUSEA: 0
WHEEZING: 0
ABDOMINAL DISTENTION: 0
COUGH: 0

## 2019-12-18 ENCOUNTER — OFFICE VISIT (OUTPATIENT)
Dept: CARDIOLOGY CLINIC | Age: 59
End: 2019-12-18
Payer: COMMERCIAL

## 2019-12-18 VITALS
WEIGHT: 273.5 LBS | HEIGHT: 71 IN | HEART RATE: 90 BPM | BODY MASS INDEX: 38.29 KG/M2 | DIASTOLIC BLOOD PRESSURE: 78 MMHG | SYSTOLIC BLOOD PRESSURE: 136 MMHG

## 2019-12-18 DIAGNOSIS — I25.810 CORONARY ARTERY DISEASE INVOLVING CORONARY BYPASS GRAFT OF NATIVE HEART WITHOUT ANGINA PECTORIS: ICD-10-CM

## 2019-12-18 DIAGNOSIS — I10 ESSENTIAL HYPERTENSION: Primary | ICD-10-CM

## 2019-12-18 DIAGNOSIS — E78.01 FAMILIAL HYPERCHOLESTEROLEMIA: ICD-10-CM

## 2019-12-18 PROCEDURE — 99213 OFFICE O/P EST LOW 20 MIN: CPT | Performed by: NUCLEAR MEDICINE

## 2019-12-18 RX ORDER — ISOSORBIDE MONONITRATE 30 MG/1
TABLET, EXTENDED RELEASE ORAL
Qty: 30 TABLET | Refills: 3 | Status: SHIPPED | OUTPATIENT
Start: 2019-12-18 | End: 2020-03-12

## 2019-12-24 RX ORDER — ETODOLAC 400 MG/1
TABLET, FILM COATED ORAL
Qty: 60 TABLET | Refills: 0 | Status: SHIPPED | OUTPATIENT
Start: 2019-12-24 | End: 2020-01-08

## 2019-12-30 RX ORDER — ASPIRIN 81 MG/1
TABLET, CHEWABLE ORAL
Qty: 30 TABLET | Refills: 3 | Status: SHIPPED | OUTPATIENT
Start: 2019-12-30 | End: 2020-02-17

## 2020-01-08 ENCOUNTER — OFFICE VISIT (OUTPATIENT)
Dept: FAMILY MEDICINE CLINIC | Age: 60
End: 2020-01-08

## 2020-01-08 VITALS
RESPIRATION RATE: 16 BRPM | BODY MASS INDEX: 37.45 KG/M2 | WEIGHT: 267.5 LBS | HEIGHT: 71 IN | SYSTOLIC BLOOD PRESSURE: 112 MMHG | HEART RATE: 84 BPM | DIASTOLIC BLOOD PRESSURE: 64 MMHG

## 2020-01-08 LAB
CHP ED QC CHECK: ABNORMAL
CREATININE URINE POCT: NEGATIVE
GLUCOSE BLD-MCNC: 190 MG/DL
HBA1C MFR BLD: 7 %
MICROALBUMIN/CREAT 24H UR: NEGATIVE MG/G{CREAT}
MICROALBUMIN/CREAT UR-RTO: NORMAL

## 2020-01-08 PROCEDURE — 83036 HEMOGLOBIN GLYCOSYLATED A1C: CPT | Performed by: FAMILY MEDICINE

## 2020-01-08 PROCEDURE — 99213 OFFICE O/P EST LOW 20 MIN: CPT | Performed by: FAMILY MEDICINE

## 2020-01-08 PROCEDURE — 82044 UR ALBUMIN SEMIQUANTITATIVE: CPT | Performed by: FAMILY MEDICINE

## 2020-01-08 PROCEDURE — 82962 GLUCOSE BLOOD TEST: CPT | Performed by: FAMILY MEDICINE

## 2020-01-08 RX ORDER — BLOOD-GLUCOSE METER
EACH MISCELLANEOUS
COMMUNITY
Start: 2019-11-26

## 2020-01-08 ASSESSMENT — ENCOUNTER SYMPTOMS
DIARRHEA: 0
VOMITING: 0
COUGH: 0
NAUSEA: 0
CHEST TIGHTNESS: 0
EYES NEGATIVE: 1
SHORTNESS OF BREATH: 0
BLOOD IN STOOL: 0
ABDOMINAL PAIN: 0
CONSTIPATION: 0

## 2020-01-08 ASSESSMENT — PATIENT HEALTH QUESTIONNAIRE - PHQ9
SUM OF ALL RESPONSES TO PHQ QUESTIONS 1-9: 0
SUM OF ALL RESPONSES TO PHQ QUESTIONS 1-9: 0
1. LITTLE INTEREST OR PLEASURE IN DOING THINGS: 0
SUM OF ALL RESPONSES TO PHQ9 QUESTIONS 1 & 2: 0
2. FEELING DOWN, DEPRESSED OR HOPELESS: 0

## 2020-01-08 NOTE — PROGRESS NOTES
Date: 1/8/2020    Carmen Hamilton is a 62 y.o. male who presents today for:  Chief Complaint   Patient presents with    Check-Up    Diabetes    Hypertension he states that cardiology figured out that his passing out episodes were secondary to drop in his B/P and now that his meds are adjusted he is not having any of those.  Hyperlipidemia       HPI:     Subjective:    Carmen Hamilton is here for follow up of elevated cholesterol, elevated blood pressure, and diabetes. Compliance with treatment has been good. Patient denies muscle pain associated with his medications. He is not exercising and is adherent to a low-salt diet. Blood pressure is well controlled at home. Cardiac symptoms: none. Patient denies: chest pain, chest pressure/discomfort, dyspnea, exertional chest pressure/discomfort, irregular heart beat, lower extremity edema, near-syncope, palpitations and syncope. Cardiovascular risk factors: advanced age (older than 54 for men, 72 for women), diabetes mellitus, dyslipidemia, family history of premature cardiovascular disease, hypertension, male gender and obesity (BMI >= 30 kg/m2). Use of agents associated with hypertension: none. History of target organ damage: ASCVD. Current diabetic symptoms include: none. Patient denies foot ulcerations, polydipsia, polyuria and vomiting. Evaluation to date has included: fasting blood sugar, fasting lipid panel, hemoglobin A1C and microalbuminuria. Home sugars: BGs range between 120 and 170. Current treatments: diet. has a current medication list which includes the following prescription(s): prodigy autocode blood glucose, metformin, aspirin, isosorbide mononitrate, metoprolol succinate, lisinopril, bupropion, anoro ellipta, pramipexole, blood glucose test strips, fluticasone, omeprazole, brilinta, allopurinol, atorvastatin, nitroglycerin, albuterol sulfate hfa, and bumetanide.     Allergies   Allergen Reactions    Zoloft [Sertraline Hcl] Other (See History   Problem Relation Age of Onset    Heart Disease Mother     High Blood Pressure Mother     Obesity Mother     Heart Disease Father     Cancer Father         colon/prostate    Colon Cancer Father     Prostate Cancer Father      Subjective:     Review of Systems   Constitutional: Negative for activity change, appetite change, diaphoresis and fever. HENT: Negative. Eyes: Negative. Respiratory: Negative for cough, chest tightness and shortness of breath. Cardiovascular: Negative for chest pain, palpitations and leg swelling. Gastrointestinal: Negative for abdominal pain, blood in stool, constipation, diarrhea, nausea and vomiting. Genitourinary: Negative. Musculoskeletal: Negative. Skin: Negative. Negative for rash. Neurological: Negative. Negative for dizziness, syncope, weakness, light-headedness and headaches. Psychiatric/Behavioral: Negative.        :   /64 (Site: Right Upper Arm, Position: Sitting, Cuff Size: Large Adult)   Pulse 84   Resp 16   Ht 5' 11\" (1.803 m)   Wt 267 lb 8 oz (121.3 kg)   BMI 37.31 kg/m²   Wt Readings from Last 3 Encounters:   01/08/20 267 lb 8 oz (121.3 kg)   12/18/19 273 lb 8 oz (124.1 kg)   12/16/19 269 lb (122 kg)     Physical Exam  Vitals signs and nursing note reviewed. Constitutional:       General: He is not in acute distress. Appearance: He is well-developed. He is not diaphoretic. HENT:      Head: Normocephalic and atraumatic. Eyes:      General: No scleral icterus. Right eye: No discharge. Left eye: No discharge. Conjunctiva/sclera: Conjunctivae normal.      Pupils: Pupils are equal, round, and reactive to light. Neck:      Musculoskeletal: Normal range of motion and neck supple. Thyroid: No thyromegaly. Vascular: No JVD. Cardiovascular:      Rate and Rhythm: Normal rate and regular rhythm. Heart sounds: Normal heart sounds. No murmur.    Pulmonary:      Effort: Pulmonary effort is sugar DX E11.9 100 each 1    fluticasone (FLONASE) 50 MCG/ACT nasal spray USE 2 SPRAYS IN EACH NOSTRIL DAILY AS NEEDED FOR RHINITIS OR ALLERGIES 48 g 0    omeprazole (PRILOSEC) 20 MG delayed release capsule TAKE 1 CAPSULE BY MOUTH ONCE DAILY. 30 capsule 0    BRILINTA 90 MG TABS tablet TAKE 1 TABLET BY MOUTH 2 TIMES A  tablet 4    allopurinol (ZYLOPRIM) 300 MG tablet Take 1 tablet by mouth daily 90 tablet 1    atorvastatin (LIPITOR) 40 MG tablet TAKE 1 TABLET BY MOUTH NIGHTLY. 90 tablet 3    nitroGLYCERIN (NITROSTAT) 0.4 MG SL tablet Place 1 tablet under the tongue every 5 minutes as needed for Chest pain 25 tablet 3    albuterol sulfate HFA (VENTOLIN HFA) 108 (90 Base) MCG/ACT inhaler Inhale 2 puffs into the lungs every 6 hours as needed for Wheezing 1 Inhaler 3    bumetanide (BUMEX) 2 MG tablet Take 1 tablet by mouth daily Take 2 mg AM - 1 mg  tablet 3     No current facility-administered medications for this visit. Orders Placed This Encounter   Procedures    Basic Metabolic Panel     Standing Status:   Future     Standing Expiration Date:   1/7/2021    POCT Glucose    POCT glycosylated hemoglobin (Hb A1C)    POCT microalbumin     Lab Results   Component Value Date    LABA1C 7.0 (A) 01/08/2020    LABA1C 7.4 08/29/2019    LABA1C 7.0 03/04/2019     No results found for: Elton Kenny  Results for POC orders placed in visit on 01/08/20   POCT microalbumin   Result Value Ref Range    Microalb, Ur Negative     Creatinine Ur POCT Negative     Microalbumin Creatinine Ratio     POCT glycosylated hemoglobin (Hb A1C)   Result Value Ref Range    Hemoglobin A1C 7.0 (A) %   POCT Glucose   Result Value Ref Range    Glucose 190 (A) mg/dL    QC OK? He states that he just ate breakfast.    Will start Metformin if his kidney function is ok. He will have a BMP and call to make sure kidney function is ok before he starts Metformin. Continue to monitor blood sugars 1 times a day.  Keep log of

## 2020-01-20 ENCOUNTER — NURSE ONLY (OUTPATIENT)
Dept: LAB | Age: 60
End: 2020-01-20

## 2020-01-20 LAB
ANION GAP SERPL CALCULATED.3IONS-SCNC: 13 MEQ/L (ref 8–16)
BUN BLDV-MCNC: 14 MG/DL (ref 7–22)
CALCIUM SERPL-MCNC: 9.5 MG/DL (ref 8.5–10.5)
CHLORIDE BLD-SCNC: 98 MEQ/L (ref 98–111)
CO2: 26 MEQ/L (ref 23–33)
CREAT SERPL-MCNC: 1 MG/DL (ref 0.4–1.2)
GFR SERPL CREATININE-BSD FRML MDRD: 76 ML/MIN/1.73M2
GLUCOSE BLD-MCNC: 141 MG/DL (ref 70–108)
POTASSIUM SERPL-SCNC: 4.1 MEQ/L (ref 3.5–5.2)
SODIUM BLD-SCNC: 137 MEQ/L (ref 135–145)

## 2020-01-29 ENCOUNTER — OFFICE VISIT (OUTPATIENT)
Dept: PULMONOLOGY | Age: 60
End: 2020-01-29
Payer: COMMERCIAL

## 2020-01-29 VITALS
RESPIRATION RATE: 18 BRPM | HEIGHT: 71 IN | WEIGHT: 271.2 LBS | OXYGEN SATURATION: 98 % | BODY MASS INDEX: 37.97 KG/M2 | SYSTOLIC BLOOD PRESSURE: 120 MMHG | HEART RATE: 72 BPM | DIASTOLIC BLOOD PRESSURE: 84 MMHG

## 2020-01-29 PROCEDURE — 99213 OFFICE O/P EST LOW 20 MIN: CPT | Performed by: PHYSICIAN ASSISTANT

## 2020-01-29 ASSESSMENT — ENCOUNTER SYMPTOMS
COUGH: 0
CHEST TIGHTNESS: 0
WHEEZING: 0
SHORTNESS OF BREATH: 0
ALLERGIC/IMMUNOLOGIC NEGATIVE: 1
STRIDOR: 0
BACK PAIN: 0
EYES NEGATIVE: 1
NAUSEA: 0
DIARRHEA: 0

## 2020-01-29 NOTE — PROGRESS NOTES
[facility]      Karla Aguirre         [id number]  1/29/2020   Chief Complaint   Patient presents with    1 Year Follow Up     Faisal 1 yr f/u with download        Pt of Dr. Pilar Wu    PAP Download:   Original or initial AHI: 12.3     Date of initial study: 4-5-13      Compliant  90%     Noncompliant 10 %     PAP Type C PAP     Level   11.0cmH2O   Avg Hrs/Day 7 hrs 9 min 8 sec  AHI: 2.1   Recorded compliance dates , 12-29-19 to 1-  Machine/Mfg: Respironics Interface: Nasal    Provider:    _[email or web address]           Weston Reining        __ [employer]    __ Linford Lower            __[facility]   __[place]:       __Other    Neck Size: 18 in   Mallampati IV  ESS: 4      Here is a scan of the most recent download:        Presentation:   Johnathan Ramirez presents for sleep medicine follow up for obstructive sleep apnea  Since the last visit, Johnathan Ramirez is doing well with PAP. He feels rested. He is sleeping well most nights. He is on Mirapex for RLS    Equipment issues: The pressure is  acceptable, the mask is acceptable     Sleep issues:  Do you feel better? Yes  More rested? Yes   Better concentration? yes    Progress History:   Since last visit any new medical issues? Yes syncope secondary to meds  New ER or hospitlal visits? No  Any new or changes in medicines? No  Any new sleep medicines?  No        Past Medical History:   Diagnosis Date    CHF (congestive heart failure) (HCC)     COPD (chronic obstructive pulmonary disease) ([address]     Coronary artery disease involving native coronary artery of native heart without angina pectoris     Depression     Diabetes mellitus type 2, diet-controlled ([address] 6/1/2018    GERD (gastroesophageal reflux disease) 3/21/2012    Hernia     Hx of blood clots 1990's    right knee due to injury    Hx of cocaine abuse ([address]     Hyperglycemia 09/09    Hyperlipidemia     Hypertension     FAISAL on CPAP     Osteoarthritis 11/07    S/P CABG x 2 2016    S/P PTCA: 1/15/2018: Stent diagonal branch Xience 3.0 x 12 mm. 1/15/2018    1/15/2018: Stent diagonal branch Xience 3.0 x 12 mm. Dr. Alfaro Manner injury 2015    Right thumb cut with table saw, no treatment needed       Past Surgical History:   Procedure Laterality Date    CARDIAC CATHETERIZATION  2016    CATARACT REMOVAL Right     COLONOSCOPY      CORONARY ARTERY BYPASS GRAFT  2016    Double bypass, Dr. Rin Kwna Right 2007    Neck    DIAGNOSTIC CARDIAC CATH LAB PROCEDURE      EYE SURGERY Right     Retinal surgery x 3    HERNIA REPAIR Right     Inguinal    PTCA  01/15/2018    ROTATOR CUFF REPAIR Right 2017    TONSILLECTOMY  child    TOTAL KNEE ARTHROPLASTY Left 10/24/2016    Dr. Patt Rodríguez       Social History     Tobacco Use    Smoking status: Former Smoker     Packs/day: 2.00     Years: 25.00     Pack years: 50.00     Types: Cigars     Last attempt to quit: 2014     Years since quittin.2    Smokeless tobacco: Never Used   Substance Use Topics    Alcohol use: Yes     Alcohol/week: 3.0 standard drinks     Types: 3 Cans of beer per week     Comment: 1 every day    Drug use: Yes     Frequency: 5.0 times per week     Types: Marijuana     Comment: former drug user, addiction treatment at [address] [place] [address] Zoloft [Sertraline Hcl] Other (See Comments)     Headaches, sweats, bad dreams       Current Outpatient Medications   Medication Sig Dispense Refill    Blood Glucose Monitoring Suppl (PRODIGY AUTOCODE BLOOD GLUCOSE) w/Device KIT       metFORMIN (GLUCOPHAGE) 500 MG tablet Take 1 tablet by mouth daily (with breakfast) 30 tablet 5    aspirin 81 MG chewable tablet CHEW AND SWALLOW 1 TABLET BY MOUTH ONE TIME A DAY 30 tablet 3    isosorbide mononitrate (IMDUR) 30 MG extended release tablet TAKE 1 TABLET BY MOUTH ONCE DAILY.  30 tablet 3    metoprolol succinate (TOPROL XL) 25 MG extended release tablet TAKE TWO TABLETS BY MOUTH EVERY  tablet 1    lisinopril (PRINIVIL;ZESTRIL) 5 MG tablet Take 1 tablet by mouth daily 90 tablet 3    buPROPion (WELLBUTRIN XL) 300 MG extended release tablet TAKE ONE TABLET BY MOUTH EVERY MORNING 90 tablet 1    ANORO ELLIPTA 62.5-25 MCG/INH AEPB inhaler INHALE 1 PUFF INTO THE LUNGS DAILY 1 each 11    pramipexole (MIRAPEX) 0.125 MG tablet TAKE 1 TABLET BY MOUTH NIGHTLY 90 tablet 1    blood glucose test strips (ONE TOUCH ULTRA TEST) strip Pt test sugar once a day to monitor blood sugar DX E11.9 100 each 1    fluticasone (FLONASE) 50 MCG/ACT nasal spray USE 2 SPRAYS IN EACH NOSTRIL DAILY AS NEEDED FOR RHINITIS OR ALLERGIES 48 g 0    omeprazole (PRILOSEC) 20 MG delayed release capsule TAKE 1 CAPSULE BY MOUTH ONCE DAILY. 30 capsule 0    BRILINTA 90 MG TABS tablet TAKE 1 TABLET BY MOUTH 2 TIMES A  tablet 4    allopurinol (ZYLOPRIM) 300 MG tablet Take 1 tablet by mouth daily 90 tablet 1    atorvastatin (LIPITOR) 40 MG tablet TAKE 1 TABLET BY MOUTH NIGHTLY. 90 tablet 3    nitroGLYCERIN (NITROSTAT) 0.4 MG SL tablet Place 1 tablet under the tongue every 5 minutes as needed for Chest pain 25 tablet 3    albuterol sulfate HFA (VENTOLIN HFA) 108 (90 Base) MCG/ACT inhaler Inhale 2 puffs into the lungs every 6 hours as needed for Wheezing 1 Inhaler 3    bumetanide (BUMEX) 2 MG tablet Take 1 tablet by mouth daily Take 2 mg AM - 1 mg  tablet 3     No current facility-administered medications for this visit. Family History   Problem Relation Age of Onset    Heart Disease Mother     High Blood Pressure Mother     Obesity Mother     Heart Disease Father     Cancer Father         colon/prostate    Colon Cancer Father     Prostate Cancer Father         Review of Systems -   Review of Systems   Constitutional: Negative for activity change, appetite change, chills and fever. HENT: Negative for congestion and postnasal drip. Eyes: Negative.     Respiratory: Negative for cough, chest tightness, shortness of breath, wheezing and stridor. Cardiovascular: Negative for chest pain and leg swelling. Gastrointestinal: Negative for diarrhea and nausea. Endocrine: Negative. Genitourinary: Negative. Musculoskeletal: Negative. Negative for arthralgias and back pain. Skin: Negative. Allergic/Immunologic: Negative. Neurological: Negative. Negative for dizziness and light-headedness. Psychiatric/Behavioral: Negative. All other systems reviewed and are negative. Physical Exam:    BMI:  Body mass index is 37.82 kg/m². Wt Readings from Last 3 Encounters:   01/29/20 271 lb 3.2 oz (123 kg)   01/08/20 267 lb 8 oz (121.3 kg)   12/18/19 273 lb 8 oz (124.1 kg)     Weight stable / unchanged  Vitals: /84 (Site: Left Upper Arm, Position: Sitting, Cuff Size: Medium Adult)   Pulse 72   Resp 18   Ht 5' 11\" (1.803 m)   Wt 271 lb 3.2 oz (123 kg)   SpO2 98% Comment: on RA  BMI 37.82 kg/m²       Physical Exam  Constitutional:       Appearance: He is well-developed. HENT:      Head: Normocephalic and atraumatic. Right Ear: External ear normal.      Left Ear: External ear normal.   Eyes:      Conjunctiva/sclera: Conjunctivae normal.      Pupils: Pupils are equal, round, and reactive to light. Neck:      Musculoskeletal: Normal range of motion and neck supple. Cardiovascular:      Rate and Rhythm: Normal rate and regular rhythm. Heart sounds: Normal heart sounds. Pulmonary:      Effort: Pulmonary effort is normal.      Breath sounds: Normal breath sounds. Musculoskeletal: Normal range of motion. Skin:     General: Skin is warm and dry. Neurological:      Mental Status: He is alert and oriented to person, place, and time. Psychiatric:         Behavior: Behavior normal.         Thought Content: Thought content normal.         Judgment: Judgment normal.           ASSESSMENT/DIAGNOSIS     Diagnosis Orders   1. FAISAL on CPAP     2. Obesity (BMI 30-39.9)     3. RLS (restless legs syndrome)              Plan   Do you need any equipment today? Yes update supplies  - Continue Mirapex for RLS  - He  was advised to continue current positive airway pressure therapy with above described pressure. - He  advised to keep good compliance with current recommended pressure to get optimal results and clinical improvement  - Recommend 7-9 hours of sleep with PAP  - He was advised to call [employer] company regarding supplies if needed.   -He call my office for earlier appointment if needed for worsening of sleep symptoms.   - He was instructed on weight loss  - Mayte Gillis was educated about my impression and plan. Patient verbalizesunderstanding.   We will see Darion Bush back in: 1 year with download    Information added by my medical assistant/LPN was reviewed today         Yvan Loera PA-C, MPAS  1/29/2020

## 2020-02-07 ENCOUNTER — TELEPHONE (OUTPATIENT)
Dept: FAMILY MEDICINE CLINIC | Age: 60
End: 2020-02-07

## 2020-02-07 NOTE — TELEPHONE ENCOUNTER
Pt called said that he had blood test recently and was told to check with us about those results and if he should start the Metformin that he was given.     Pt can be reached at [phone]

## 2020-02-17 RX ORDER — ASPIRIN 81 MG/1
TABLET, CHEWABLE ORAL
Qty: 30 TABLET | Refills: 4 | Status: SHIPPED | OUTPATIENT
Start: 2020-02-17 | End: 2020-06-03

## 2020-03-09 ENCOUNTER — OFFICE VISIT (OUTPATIENT)
Dept: FAMILY MEDICINE CLINIC | Age: 60
End: 2020-03-09

## 2020-03-09 VITALS
HEIGHT: 71 IN | SYSTOLIC BLOOD PRESSURE: 126 MMHG | DIASTOLIC BLOOD PRESSURE: 70 MMHG | BODY MASS INDEX: 38.05 KG/M2 | HEART RATE: 72 BPM | WEIGHT: 271.8 LBS | RESPIRATION RATE: 16 BRPM

## 2020-03-09 PROCEDURE — 99214 OFFICE O/P EST MOD 30 MIN: CPT | Performed by: FAMILY MEDICINE

## 2020-03-09 RX ORDER — LISINOPRIL 5 MG/1
5 TABLET ORAL DAILY
Qty: 90 TABLET | Refills: 1 | Status: SHIPPED
Start: 2020-03-09 | End: 2020-03-16 | Stop reason: SINTOL

## 2020-03-09 ASSESSMENT — ENCOUNTER SYMPTOMS
COUGH: 0
DIARRHEA: 0
VOMITING: 0
CHEST TIGHTNESS: 0
NAUSEA: 0
CONSTIPATION: 0
SHORTNESS OF BREATH: 0
BLOOD IN STOOL: 0
ABDOMINAL PAIN: 0
EYES NEGATIVE: 1

## 2020-03-09 NOTE — PROGRESS NOTES
Date: 3/9/2020    Josr Arenas is a 56 y.o. male who presents today for:  Chief Complaint   Patient presents with   [facility] Other     He is concerned about having low immunity. HPI:     HPI    has a current medication list which includes the following prescription(s): metformin, lisinopril, omeprazole, aspirin, prodigy autocode blood glucose, isosorbide mononitrate, metoprolol succinate, bupropion, anoro ellipta, pramipexole, blood glucose test strips, fluticasone, brilinta, allopurinol, atorvastatin, nitroglycerin, albuterol sulfate hfa, and bumetanide. Allergies   Allergen Reactions    Zoloft [Sertraline Hcl] Other (See Comments)     Headaches, sweats, bad dreams       Social History     Tobacco Use    Smoking status: Former Smoker     Packs/day: 2.00     Years: 25.00     Pack years: 50.00     Types: Cigars     Last attempt to quit: 2014     Years since quittin.3    Smokeless tobacco: Never Used   Substance Use Topics    Alcohol use: Yes     Alcohol/week: 3.0 standard drinks     Types: 3 Cans of beer per week     Comment: 1 every day    Drug use: Yes     Frequency: 5.0 times per week     Types: Marijuana     Comment: former drug user, addiction treatment at [facility]       Past Medical History:   Diagnosis Date    [facility]estive [facility] failure) [facility]     COPD (chronic obstructive pulmonary disease) [facility]     Coronary artery disease involving native coronary artery of native heart without angina pectoris     Depression     Diabetes mellitus type 2, diet-controlled ([address] 2018    GERD (gastroesophageal reflux disease) 3/21/2012    Hernia     Hx of blood clots     right knee due to injury    Hx of cocaine abuse ([facility]     Hyperglycemia     Hyperlipidemia     Hypertension     FAISAL on CPAP     Osteoarthritis     S/P CABG x 2 2016    S/P PTCA: 1/15/2018: Stent diagonal branch Xience 3.0 x 12 mm. 1/15/2018    1/15/2018: Stent diagonal branch Xience 3.0 x 12 mm.   [address]    Thumb injury 08/2015    Right thumb cut with table saw, no treatment needed       Past Surgical History:   Procedure Laterality Date    CARDIAC CATHETERIZATION  06/27/2016    CATARACT REMOVAL Right 2013    COLONOSCOPY  04/08    CORONARY ARTERY BYPASS GRAFT  07/06/2016    Double bypass, Dr. Osman Reid Right 02/2007    Neck    DIAGNOSTIC CARDIAC CATH LAB PROCEDURE      EYE SURGERY Right     Retinal surgery x 3    HERNIA REPAIR Right     Inguinal    PTCA  01/15/2018    ROTATOR CUFF REPAIR Right 11/16/2017    TONSILLECTOMY  child    TOTAL KNEE ARTHROPLASTY Left 10/24/2016    Dr. Alba Marie       Family History   Problem Relation Age of Onset    Heart Disease Mother     High Blood Pressure Mother     Obesity Mother     Heart Disease Father     Cancer Father         colon/prostate    Colon Cancer Father     Prostate Cancer Father      Subjective:     Review of Systems   Constitutional: Negative for activity change, appetite change, diaphoresis and fever. He states that when he is around the grandchildren if they are sick he gets sick shortly after. HENT: Negative. Eyes: Negative. Respiratory: Negative for cough, chest tightness and shortness of breath. Cardiovascular: Negative for chest pain, palpitations and leg swelling. Gastrointestinal: Negative for abdominal pain, blood in stool, constipation, diarrhea, nausea and vomiting. Genitourinary: Negative. Musculoskeletal: Negative. Skin: Negative. Negative for rash. Neurological: Negative. Negative for dizziness, syncope, weakness, light-headedness and headaches. Psychiatric/Behavioral: Positive for sleep disturbance (sleeping a lot more than usual for about a couple of months. ).  Negative for suicidal ideas.       :   /70 (Site: Right Upper Arm, Position: Sitting, Cuff Size: Large Adult)   Pulse 72   Resp 16   Ht 5' 11\" (1.803 m)   Wt 271 lb 12.8 oz (123.3 kg)   BMI 37.91 kg/m²   Wt Readings from Last 3 Encounters:   03/09/20 271 lb 12.8 oz (123.3 kg)   01/29/20 271 lb 3.2 oz (123 kg)   01/08/20 267 lb 8 oz (121.3 kg)     Physical Exam  Vitals signs and nursing note reviewed. Constitutional:       General: He is not in acute distress. Appearance: He is well-developed. He is not diaphoretic. HENT:      Head: Normocephalic and atraumatic. Eyes:      General: No scleral icterus. Right eye: No discharge. Left eye: No discharge. Conjunctiva/sclera: Conjunctivae normal.      Pupils: Pupils are equal, round, and reactive to light. Neck:      Musculoskeletal: Normal range of motion and neck supple. Thyroid: No thyromegaly. Vascular: No JVD. Cardiovascular:      Rate and Rhythm: Normal rate and regular rhythm. Heart sounds: Normal heart sounds. No murmur. Pulmonary:      Effort: Pulmonary effort is normal. No respiratory distress. Breath sounds: Normal breath sounds. No wheezing, rhonchi or rales. Abdominal:      General: Bowel sounds are normal. There is no distension. Palpations: Abdomen is soft. There is no mass. Tenderness: There is no abdominal tenderness. There is no guarding or rebound. Musculoskeletal: Normal range of motion. Lymphadenopathy:      Cervical: No cervical adenopathy. Skin:     General: Skin is warm and dry. Findings: No rash. Neurological:      Mental Status: He is alert and oriented to person, place, and time. Psychiatric:         Behavior: Behavior normal.       :       Diagnosis Orders   1. Other fatigue  Comprehensive Metabolic Panel    CBC Auto Differential    TSH with Reflex    Vitamin B12    Folate    Magnesium   2. Diabetes mellitus type 2, diet-controlled ([facility] [facility]     3. Essential hypertension     4. CHF (congestive heart failure), NYHA class II, chronic, diastolic (HCC)     5. Gastroesophageal reflux disease, esophagitis presence not specified     6.  Chronic obstructive pulmonary disease, unspecified COPD type ([facility]     7. Class 2 severe obesity due to excess calories with serious comorbidity and body mass index (BMI) of 37.0 to 37.9 in adult ([facility]     8. Coronary artery disease involving native coronary artery of native heart without angina pectoris         :      Requested Prescriptions     Signed Prescriptions Disp Refills    metFORMIN (GLUCOPHAGE) 500 MG tablet 90 tablet 1     Sig: Take 1 tablet by mouth daily (with breakfast)    lisinopril (PRINIVIL;ZESTRIL) 5 MG tablet 90 tablet 1     Sig: Take 1 tablet by mouth daily     Current Outpatient Medications   Medication Sig Dispense Refill    metFORMIN (GLUCOPHAGE) 500 MG tablet Take 1 tablet by mouth daily (with breakfast) 90 tablet 1    lisinopril (PRINIVIL;ZESTRIL) 5 MG tablet Take 1 tablet by mouth daily 90 tablet 1    omeprazole (PRILOSEC) 20 MG delayed release capsule TAKE 1 CAPSULE BY MOUTH ONE TIME A DAY 30 capsule 2    aspirin (ASPIRIN LOW DOSE) 81 MG chewable tablet CHEW AND SWALLOW 1 TABLET BY MOUTH ONE TIME A DAY 30 tablet 4    Blood Glucose Monitoring Suppl (PRODIGY AUTOCODE BLOOD GLUCOSE) w/Device KIT       isosorbide mononitrate (IMDUR) 30 MG extended release tablet TAKE 1 TABLET BY MOUTH ONCE DAILY.  30 tablet 3    metoprolol succinate (TOPROL XL) 25 MG extended release tablet TAKE TWO TABLETS BY MOUTH EVERY  tablet 1    buPROPion (WELLBUTRIN XL) 300 MG extended release tablet TAKE ONE TABLET BY MOUTH EVERY MORNING 90 tablet 1    ANORO ELLIPTA 62.5-25 MCG/INH AEPB inhaler INHALE 1 PUFF INTO THE LUNGS DAILY 1 each 11    pramipexole (MIRAPEX) 0.125 MG tablet TAKE 1 TABLET BY MOUTH NIGHTLY 90 tablet 1    blood glucose test strips (ONE TOUCH ULTRA TEST) strip Pt test sugar once a day to monitor blood sugar DX E11.9 100 each 1    fluticasone (FLONASE) 50 MCG/ACT nasal spray USE 2 SPRAYS IN EACH NOSTRIL DAILY AS NEEDED FOR RHINITIS OR ALLERGIES 48 g 0    BRILINTA 90 MG TABS tablet TAKE 1 TABLET BY MOUTH 2 TIMES A DAY 180 tablet 4    allopurinol (ZYLOPRIM) 300 MG tablet Take 1 tablet by mouth daily 90 tablet 1    atorvastatin (LIPITOR) 40 MG tablet TAKE 1 TABLET BY MOUTH NIGHTLY. 90 tablet 3    nitroGLYCERIN (NITROSTAT) 0.4 MG SL tablet Place 1 tablet under the tongue every 5 minutes as needed for Chest pain 25 tablet 3    albuterol sulfate HFA (VENTOLIN HFA) 108 (90 Base) MCG/ACT inhaler Inhale 2 puffs into the lungs every 6 hours as needed for Wheezing 1 Inhaler 3    bumetanide (BUMEX) 2 MG tablet Take 1 tablet by mouth daily Take 2 mg AM - 1 mg  tablet 3     No current facility-administered medications for this visit. Orders Placed This Encounter   Procedures    Comprehensive Metabolic Panel     Standing Status:   Future     Standing Expiration Date:   3/9/2021    CBC Auto Differential     Standing Status:   Future     Standing Expiration Date:   3/9/2021    TSH with Reflex     Standing Status:   Future     Standing Expiration Date:   3/9/2021    Vitamin B12     Standing Status:   Future     Standing Expiration Date:   3/9/2021    Folate     Standing Status:   Future     Standing Expiration Date:   3/9/2021    Magnesium     Standing Status:   Future     Standing Expiration Date:   3/9/2021     He states that he is drinking about a couple of glasses of wine twice a week. He states that doing that helped his gout. Continue current medications. Return in about 1 week (around 3/16/2020) for fatigue. Discussed use, benefit, and side effects of prescribed medications. All patient questions answered. Pt voiced understanding. Instructed to continue current medications,diet and exercise. Patient agreed with treatment plan.

## 2020-03-10 ENCOUNTER — NURSE ONLY (OUTPATIENT)
Dept: LAB | Age: 60
End: 2020-03-10

## 2020-03-10 LAB
ALBUMIN SERPL-MCNC: 4.6 G/DL (ref 3.5–5.1)
ALP BLD-CCNC: 94 U/L (ref 38–126)
ALT SERPL-CCNC: 57 U/L (ref 11–66)
ANION GAP SERPL CALCULATED.3IONS-SCNC: 18 MEQ/L (ref 8–16)
AST SERPL-CCNC: 41 U/L (ref 5–40)
BASOPHILS # BLD: 1.2 %
BASOPHILS ABSOLUTE: 0.1 THOU/MM3 (ref 0–0.1)
BILIRUB SERPL-MCNC: 0.6 MG/DL (ref 0.3–1.2)
BUN BLDV-MCNC: 12 MG/DL (ref 7–22)
CALCIUM SERPL-MCNC: 9.8 MG/DL (ref 8.5–10.5)
CHLORIDE BLD-SCNC: 99 MEQ/L (ref 98–111)
CO2: 24 MEQ/L (ref 23–33)
CREAT SERPL-MCNC: 1 MG/DL (ref 0.4–1.2)
EOSINOPHIL # BLD: 0.9 %
EOSINOPHILS ABSOLUTE: 0.1 THOU/MM3 (ref 0–0.4)
ERYTHROCYTE [DISTWIDTH] IN BLOOD BY AUTOMATED COUNT: 13.1 % (ref 11.5–14.5)
ERYTHROCYTE [DISTWIDTH] IN BLOOD BY AUTOMATED COUNT: 48.3 FL (ref 35–45)
FOLATE: 6.9 NG/ML (ref 4.8–24.2)
GFR SERPL CREATININE-BSD FRML MDRD: 76 ML/MIN/1.73M2
GLUCOSE BLD-MCNC: 151 MG/DL (ref 70–108)
HCT VFR BLD CALC: 50.9 % (ref 42–52)
HEMOGLOBIN: 16.8 GM/DL (ref 14–18)
IMMATURE GRANS (ABS): 0.04 THOU/MM3 (ref 0–0.07)
IMMATURE GRANULOCYTES: 0.5 %
LYMPHOCYTES # BLD: 25.5 %
LYMPHOCYTES ABSOLUTE: 2.2 THOU/MM3 (ref 1–4.8)
MAGNESIUM: 2 MG/DL (ref 1.6–2.4)
MCH RBC QN AUTO: 33.3 PG (ref 26–33)
MCHC RBC AUTO-ENTMCNC: 33 GM/DL (ref 32.2–35.5)
MCV RBC AUTO: 101 FL (ref 80–94)
MONOCYTES # BLD: 10.7 %
MONOCYTES ABSOLUTE: 0.9 THOU/MM3 (ref 0.4–1.3)
NUCLEATED RED BLOOD CELLS: 0 /100 WBC
PLATELET # BLD: 240 THOU/MM3 (ref 130–400)
PMV BLD AUTO: 11.5 FL (ref 9.4–12.4)
POTASSIUM SERPL-SCNC: 4.1 MEQ/L (ref 3.5–5.2)
RBC # BLD: 5.04 MILL/MM3 (ref 4.7–6.1)
SEG NEUTROPHILS: 61.2 %
SEGMENTED NEUTROPHILS ABSOLUTE COUNT: 5.2 THOU/MM3 (ref 1.8–7.7)
SODIUM BLD-SCNC: 141 MEQ/L (ref 135–145)
TOTAL PROTEIN: 7.2 G/DL (ref 6.1–8)
TSH SERPL DL<=0.05 MIU/L-ACNC: 1.79 UIU/ML (ref 0.4–4.2)
VITAMIN B-12: 500 PG/ML (ref 211–911)
WBC # BLD: 8.5 THOU/MM3 (ref 4.8–10.8)

## 2020-03-12 RX ORDER — ISOSORBIDE MONONITRATE 30 MG/1
TABLET, EXTENDED RELEASE ORAL
Qty: 30 TABLET | Refills: 3 | Status: SHIPPED | OUTPATIENT
Start: 2020-03-12 | End: 2020-06-03

## 2020-03-16 ENCOUNTER — OFFICE VISIT (OUTPATIENT)
Dept: FAMILY MEDICINE CLINIC | Age: 60
End: 2020-03-16

## 2020-03-16 VITALS
RESPIRATION RATE: 16 BRPM | HEART RATE: 68 BPM | BODY MASS INDEX: 37.41 KG/M2 | DIASTOLIC BLOOD PRESSURE: 70 MMHG | HEIGHT: 71 IN | WEIGHT: 267.25 LBS | SYSTOLIC BLOOD PRESSURE: 120 MMHG

## 2020-03-16 PROCEDURE — 99213 OFFICE O/P EST LOW 20 MIN: CPT | Performed by: FAMILY MEDICINE

## 2020-03-16 RX ORDER — LOSARTAN POTASSIUM 25 MG/1
25 TABLET ORAL DAILY
Qty: 30 TABLET | Refills: 5 | Status: SHIPPED | OUTPATIENT
Start: 2020-03-16 | End: 2020-07-08

## 2020-03-16 ASSESSMENT — ENCOUNTER SYMPTOMS
CHEST TIGHTNESS: 0
BLOOD IN STOOL: 0
COUGH: 1
ABDOMINAL PAIN: 0
CONSTIPATION: 0
EYES NEGATIVE: 1
NAUSEA: 0
VOMITING: 0
SHORTNESS OF BREATH: 0
DIARRHEA: 0

## 2020-03-17 ENCOUNTER — HOSPITAL ENCOUNTER (OUTPATIENT)
Dept: GENERAL RADIOLOGY | Age: 60
Discharge: HOME | End: 2020-03-17
Payer: COMMERCIAL

## 2020-03-17 ENCOUNTER — HOSPITAL ENCOUNTER (OUTPATIENT)
Age: 60
Discharge: HOME | End: 2020-03-17
Payer: COMMERCIAL

## 2020-03-17 PROCEDURE — 71046 X-RAY EXAM CHEST 2 VIEWS: CPT

## 2020-03-18 ENCOUNTER — TELEPHONE (OUTPATIENT)
Dept: FAMILY MEDICINE CLINIC | Age: 60
End: 2020-03-18

## 2020-03-18 NOTE — TELEPHONE ENCOUNTER
----- Message from Benjy Pereira MD sent at 3/18/2020  2:22 PM EDT -----  Please let patient know that his chest x-ray showed a questionable abnormality on his left lung. It may be just a technical difference but radiologist could not  tell so will get a CT of chest to further evaluate.

## 2020-03-24 ENCOUNTER — HOSPITAL ENCOUNTER (OUTPATIENT)
Dept: CT IMAGING | Age: 60
Discharge: HOME | End: 2020-03-24
Payer: COMMERCIAL

## 2020-03-24 ENCOUNTER — TELEPHONE (OUTPATIENT)
Dept: FAMILY MEDICINE CLINIC | Age: 60
End: 2020-03-24

## 2020-03-24 ENCOUNTER — PATIENT MESSAGE (OUTPATIENT)
Dept: PULMONOLOGY | Age: 60
End: 2020-03-24

## 2020-03-24 PROCEDURE — 71260 CT THORAX DX C+: CPT

## 2020-03-24 PROCEDURE — 6360000004 HC RX CONTRAST MEDICATION: Performed by: FAMILY MEDICINE

## 2020-03-24 RX ADMIN — IOPAMIDOL 85 ML: 755 INJECTION, SOLUTION INTRAVENOUS at 07:45

## 2020-03-24 NOTE — TELEPHONE ENCOUNTER
Patrick Apt informed by Phone of CT results. Pt confirmed that he would follow up with his lung doctor regarding the scarring on the left lung as instructed by Dr. Reynaldo Ledbetter.

## 2020-03-25 NOTE — TELEPHONE ENCOUNTER
This patient is scheduled for CT chest and Spirometry in May. Both need changed to HRCT and full PFT, do sooner if possible with follow up.

## 2020-03-25 NOTE — TELEPHONE ENCOUNTER
From: Mallory Castelan  To: Andrés Speaker, APRN - CNP  Sent: 3/24/2020 7:10 PM EDT  Subject: Non-Urgent Medical Question    Patti Michelle had an abnormal chest xray, which led to a CT Chest. Could you please review this CT? Do you need to see him sooner? Can the scheduled Lung CT, May 15th be cancelled? I don't understand the CT. Patti Michelle is always short of breath. He can not walk out to his [place] without being completely out of breath. He stops many times a day to catch his breath. You can call me at [phone]. You can respond by my chart or if you would rather discuss this in person I can take PTO and come to his next appt.   Thank you for your time,  Von Koch

## 2020-03-31 ENCOUNTER — TELEPHONE (OUTPATIENT)
Dept: FAMILY MEDICINE CLINIC | Age: 60
End: 2020-03-31

## 2020-03-31 NOTE — TELEPHONE ENCOUNTER
Patient called with complaints of:    Cough: No   SOB: Yes   Runny Nose: Yes   Sore Throat: Yes   Headache: Yes   Body Aches: No   Fever: Yes   Fatigue:  Yes    - Exposure to the Flu: No   - Travel History: No   - Duration of Symptoms: started about 6 days ago    Please send RX to: [id number] (before 5:30)    Patient informed if symptoms do not improve and/or  worsen then patient needs to go to Urgent Care or ER. Said he would not know how to do a virtual visit.

## 2020-04-14 ENCOUNTER — TELEPHONE (OUTPATIENT)
Dept: CARDIOLOGY CLINIC | Age: 60
End: 2020-04-14

## 2020-04-14 NOTE — TELEPHONE ENCOUNTER
Patient unable to do virtual visit  Uses [email or web address] with desktop at home  Appointment r/s    Reviewed the HF zones   Instructed when to call the office,  Stressed important to take daily weights. Patient verbalized understanding.

## 2020-04-15 RX ORDER — BUMETANIDE 2 MG/1
2 TABLET ORAL DAILY
Qty: 135 TABLET | Refills: 3 | Status: SHIPPED | OUTPATIENT
Start: 2020-04-15 | End: 2020-12-17 | Stop reason: SDUPTHER

## 2020-04-30 RX ORDER — PRAMIPEXOLE DIHYDROCHLORIDE 0.12 MG/1
TABLET ORAL
Qty: 90 TABLET | Refills: 1 | Status: SHIPPED | OUTPATIENT
Start: 2020-04-30 | End: 2020-11-04

## 2020-04-30 RX ORDER — BUPROPION HYDROCHLORIDE 300 MG/1
TABLET ORAL
Qty: 90 TABLET | Refills: 1 | Status: SHIPPED | OUTPATIENT
Start: 2020-04-30 | End: 2020-08-28 | Stop reason: SDUPTHER

## 2020-04-30 RX ORDER — BLOOD SUGAR DIAGNOSTIC
STRIP MISCELLANEOUS
Qty: 100 EACH | Refills: 1 | Status: SHIPPED | OUTPATIENT
Start: 2020-04-30 | End: 2020-11-04

## 2020-04-30 NOTE — TELEPHONE ENCOUNTER
Date of last visit:  3/16/2020  Date of next visit:  5/20/2020    Requested Prescriptions     Pending Prescriptions Disp Refills    blood glucose test strips (PRODIGY NO CODING BLOOD GLUC) strip [Pharmacy Med Name: PRODIGY NO CODING BLOOD GLUC  STRP] 100 each 1     Sig: USE TO TEST BLOOD GLUCLOSE ONE TIME DAILY    pramipexole (MIRAPEX) 0.125 MG tablet [Pharmacy Med Name: PRAMIPEXOLE 0.125MG TAB DIHYDROCHLOR] 90 tablet 1     Sig: TAKE 1 TABLET BY MOUTH ONCE DAILY AT NIGHT    buPROPion (WELLBUTRIN XL) 300 MG extended release tablet [Pharmacy Med Name: BUPROPION HCL ER (XL) 300MG TB24] 90 tablet 1     Sig: TAKE 1 TABLET BY MOUTH ONE TIME A DAY IN THE MORNING

## 2020-06-03 ENCOUNTER — OFFICE VISIT (OUTPATIENT)
Dept: CARDIOLOGY CLINIC | Age: 60
End: 2020-06-03
Payer: COMMERCIAL

## 2020-06-03 ENCOUNTER — OFFICE VISIT (OUTPATIENT)
Dept: FAMILY MEDICINE CLINIC | Age: 60
End: 2020-06-03

## 2020-06-03 ENCOUNTER — TELEPHONE (OUTPATIENT)
Dept: CARDIOLOGY CLINIC | Age: 60
End: 2020-06-03

## 2020-06-03 VITALS
DIASTOLIC BLOOD PRESSURE: 70 MMHG | WEIGHT: 269 LBS | HEART RATE: 68 BPM | SYSTOLIC BLOOD PRESSURE: 120 MMHG | BODY MASS INDEX: 37.66 KG/M2 | OXYGEN SATURATION: 93 % | HEIGHT: 71 IN

## 2020-06-03 VITALS
RESPIRATION RATE: 16 BRPM | HEART RATE: 76 BPM | DIASTOLIC BLOOD PRESSURE: 68 MMHG | WEIGHT: 267.38 LBS | TEMPERATURE: 98.4 F | SYSTOLIC BLOOD PRESSURE: 114 MMHG | HEIGHT: 71 IN | BODY MASS INDEX: 37.43 KG/M2

## 2020-06-03 LAB
ANION GAP SERPL CALCULATED.3IONS-SCNC: 16 MEQ/L (ref 8–16)
BUN BLDV-MCNC: 13 MG/DL (ref 7–22)
CALCIUM SERPL-MCNC: 9.9 MG/DL (ref 8.5–10.5)
CHLORIDE BLD-SCNC: 104 MEQ/L (ref 98–111)
CHP ED QC CHECK: ABNORMAL
CO2: 25 MEQ/L (ref 23–33)
CREAT SERPL-MCNC: 0.9 MG/DL (ref 0.4–1.2)
GFR SERPL CREATININE-BSD FRML MDRD: 85 ML/MIN/1.73M2
GLUCOSE BLD-MCNC: 174 MG/DL
GLUCOSE BLD-MCNC: 177 MG/DL (ref 70–108)
HBA1C MFR BLD: 7.2 %
POTASSIUM SERPL-SCNC: 3.8 MEQ/L (ref 3.5–5.2)
SODIUM BLD-SCNC: 145 MEQ/L (ref 135–145)

## 2020-06-03 PROCEDURE — 36415 COLL VENOUS BLD VENIPUNCTURE: CPT | Performed by: NURSE PRACTITIONER

## 2020-06-03 PROCEDURE — 82962 GLUCOSE BLOOD TEST: CPT | Performed by: FAMILY MEDICINE

## 2020-06-03 PROCEDURE — 99213 OFFICE O/P EST LOW 20 MIN: CPT | Performed by: NURSE PRACTITIONER

## 2020-06-03 PROCEDURE — 99214 OFFICE O/P EST MOD 30 MIN: CPT | Performed by: FAMILY MEDICINE

## 2020-06-03 PROCEDURE — 83036 HEMOGLOBIN GLYCOSYLATED A1C: CPT | Performed by: FAMILY MEDICINE

## 2020-06-03 RX ORDER — ASPIRIN 81 MG/1
TABLET, CHEWABLE ORAL
Qty: 30 TABLET | Refills: 4 | Status: SHIPPED | OUTPATIENT
Start: 2020-06-03 | End: 2020-08-27

## 2020-06-03 RX ORDER — ISOSORBIDE MONONITRATE 30 MG/1
TABLET, EXTENDED RELEASE ORAL
Qty: 30 TABLET | Refills: 3 | Status: SHIPPED | OUTPATIENT
Start: 2020-06-03 | End: 2020-08-27

## 2020-06-03 ASSESSMENT — ENCOUNTER SYMPTOMS
SHORTNESS OF BREATH: 0
APNEA: 0
DIARRHEA: 0
CHEST TIGHTNESS: 0
COUGH: 0
ABDOMINAL PAIN: 0
CHEST TIGHTNESS: 0
VOMITING: 0
CONSTIPATION: 0
COUGH: 0
EYES NEGATIVE: 1
ABDOMINAL PAIN: 0
BLOOD IN STOOL: 0
NAUSEA: 0
SHORTNESS OF BREATH: 0
NAUSEA: 0
WHEEZING: 0
ABDOMINAL DISTENTION: 0
COLOR CHANGE: 0

## 2020-06-03 NOTE — PROGRESS NOTES
Date: 6/3/2020    Ember Harris is a 56 y.o. male who presents today for:  Chief Complaint   Patient presents with   [place] Check-Up he states that he is living with his mother to help her. He is not drinking every day, he states that he drinks every so often.  Diabetes       HPI:     Subjective:    Ember Harris is here for follow up of elevated cholesterol, elevated blood pressure, and diabetes. Compliance with treatment has been good. Patient denies muscle pain associated with his medications. He is not exercising and is adherent to a low-salt diet. Blood pressure is well controlled at home. Cardiac symptoms: none. Patient denies: chest pain, chest pressure/discomfort, claudication, dyspnea, exertional chest pressure/discomfort, fatigue, irregular heart beat, lower extremity edema, near-syncope, orthopnea, palpitations and paroxysmal nocturnal dyspnea. Cardiovascular risk factors: advanced age (older than 54 for men, 72 for women), diabetes mellitus, dyslipidemia, hypertension, male gender and obesity (BMI >= 30 kg/m2). Use of agents associated with hypertension: none. History of target organ damage: ASCVD. Current diabetic symptoms include: none. Patient denies foot ulcerations, polydipsia and polyuria. Evaluation to date has included: fasting blood sugar, fasting lipid panel, hemoglobin A1C and microalbuminuria. Home sugars: BGs range between 140's in the AM. Current treatments: Metformin. Last dilated eye exam he is due, needs to make appt.       has a current medication list which includes the following prescription(s): isosorbide mononitrate, aspirin, prodigy no coding blood gluc, pramipexole, bupropion, bumetanide, allopurinol, losartan, omeprazole, metformin, prodigy autocode blood glucose, metoprolol succinate, anoro ellipta, fluticasone, brilinta, atorvastatin, nitroglycerin, and albuterol sulfate hfa.     Allergies   Allergen Reactions    Zoloft [Sertraline Hcl] Other (See Comments) Eyes:      General: No scleral icterus. Right eye: No discharge. Left eye: No discharge. Conjunctiva/sclera: Conjunctivae normal.      Pupils: Pupils are equal, round, and reactive to light. Neck:      Musculoskeletal: Normal range of motion and neck supple. Thyroid: No thyromegaly. Vascular: No JVD. Cardiovascular:      Rate and Rhythm: Normal rate and regular rhythm. Heart sounds: Normal heart sounds. No murmur. Pulmonary:      Effort: Pulmonary effort is normal. No respiratory distress. Breath sounds: Normal breath sounds. No wheezing, rhonchi or rales. Abdominal:      General: Bowel sounds are normal. There is no distension. Palpations: Abdomen is soft. There is no mass. Tenderness: There is no abdominal tenderness. There is no guarding or rebound. Musculoskeletal: Normal range of motion. Comments: Normal strength and range of motion of toes, feet, and ankles bilaterally. No joint deformity. No cyanosis or clubbing. 100% sensation at all test points. Able to feel monofilament at 0.4mm bilaterally. Dorsalis pedis pulses intact bilaterally. Capillary refill at the toes was less than 2 seconds. Light touch sensation intact bilaterally. Hair growth present on feet and toes bilaterally. No skin breakdown, erythema, rub spots, blisters, scaling, or ulcers. No calluses or corns. Lymphadenopathy:      Cervical: No cervical adenopathy. Skin:     General: Skin is warm and dry. Findings: No rash. Neurological:      Mental Status: He is alert and oriented to person, place, and time. Psychiatric:         Behavior: Behavior normal.       :       Diagnosis Orders   1. Diabetes mellitus type 2, diet-controlled (HCC)  POCT Glucose    POCT glycosylated hemoglobin (Hb A1C)     DIABETES FOOT EXAM   2. Essential hypertension     3. Hyperlipidemia, unspecified hyperlipidemia type     4.  CHF (congestive heart failure), NYHA class II, chronic, diastolic

## 2020-06-03 NOTE — PROGRESS NOTES
Date: 6/3/2020    Christine Sultana is a 56 y.o. male who presents today for:  Chief Complaint   Patient presents with    Check-Up    Diabetes       HPI:     Subjective:    Christine Sultana is here for follow up of elevated cholesterol, elevated blood pressure, and diabetes. Compliance with treatment has been good. Patient denies muscle pain associated with his medications. He is not exercising and is adherent to a low-salt diet. Blood pressure is not monitored at home. Cardiac symptoms: none. Patient denies: chest pain, chest pressure/discomfort, claudication, dyspnea, exertional chest pressure/discomfort, fatigue, irregular heart beat, lower extremity edema, near-syncope, orthopnea and palpitations. Cardiovascular risk factors: advanced age (older than 54 for men, 72 for women), diabetes mellitus, dyslipidemia, hypertension, male gender and obesity (BMI >= 30 kg/m2). Use of agents associated with hypertension: none. History of target organ damage: ASCVD. Current diabetic symptoms include: none. Patient denies foot ulcerations, polydipsia and polyuria. Evaluation to date has included: fasting blood sugar, fasting lipid panel, hemoglobin A1C and microalbuminuria. Home sugars: no log of blood sugars today. has a current medication list which includes the following prescription(s): isosorbide mononitrate, aspirin, prodigy no coding blood gluc, pramipexole, bupropion, bumetanide, allopurinol, losartan, omeprazole, metformin, prodigy autocode blood glucose, metoprolol succinate, anoro ellipta, fluticasone, brilinta, atorvastatin, nitroglycerin, and albuterol sulfate hfa.     Allergies   Allergen Reactions    Zoloft [Sertraline Hcl] Other (See Comments)     Headaches, sweats, bad dreams       Social History     Tobacco Use    Smoking status: Former Smoker     Packs/day: 2.00     Years: 25.00     Pack years: 50.00     Types: Cigars     Last attempt to quit: 2014     Years since quittin.5    Smokeless tobacco: Never Used   Substance Use Topics    Alcohol use: Yes     Alcohol/week: 3.0 standard drinks     Types: 3 Cans of beer per week     Comment: 1 every day    Drug use: Yes     Frequency: 5.0 times per week     Types: Marijuana     Comment: former drug user, addiction treatment at [address] [place] [address]       Past Medical History:   Diagnosis Date    [facility] ([facility]estive [facility]) [facility]     [facility]     Coronary artery disease involving native coronary artery of native heart without angina pectoris     Depression     Diabetes mellitus type 2, diet-controlled ([facility] 6/1/2018    GERD (gastroesophageal reflux disease) 3/21/2012    Hernia     Hx of blood clots 1990's    right knee due to injury    [facility]     Hyperglycemia 09/09    Hyperlipidemia     Hypertension     FAISAL on CPAP     Osteoarthritis 11/07    S/P CABG x 2 07/06/2016    S/P PTCA: 1/15/2018: Stent diagonal branch Xience 3.0 x 12 mm. 1/15/2018    1/15/2018: Stent diagonal branch Xience 3.0 x 12 mm.  Dr. Otto Deal injury 08/2015    Right thumb cut with table saw, no treatment needed       Past Surgical History:   Procedure Laterality Date    CARDIAC CATHETERIZATION  06/27/2016    CATARACT REMOVAL Right 2013    COLONOSCOPY  04/08    CORONARY ARTERY BYPASS GRAFT  07/06/2016    Double bypass, Dr. Beck Mems Right 02/2007    Neck    DIAGNOSTIC CARDIAC CATH LAB PROCEDURE      EYE SURGERY Right     Retinal surgery x 3    HERNIA REPAIR Right     Inguinal    PTCA  01/15/2018    ROTATOR CUFF REPAIR Right 11/16/2017    TONSILLECTOMY  child    TOTAL KNEE ARTHROPLASTY Left 10/24/2016    Dr. Beck Garza       Family History   Problem Relation Age of Onset    Heart Disease Mother     High Blood Pressure Mother     Obesity Mother     Heart Disease Father     Cancer Father         colon/prostate    Colon Cancer Father     Prostate Cancer Father      Subjective:     Review of

## 2020-06-03 NOTE — PROGRESS NOTES
DAILY. 90 tablet 0    losartan (COZAAR) 25 MG tablet Take 1 tablet by mouth daily 30 tablet 5    omeprazole (PRILOSEC) 20 MG delayed release capsule TAKE 1 CAPSULE BY MOUTH ONE TIME A DAY 30 capsule 3    metFORMIN (GLUCOPHAGE) 500 MG tablet Take 1 tablet by mouth daily (with breakfast) 90 tablet 1    metoprolol succinate (TOPROL XL) 25 MG extended release tablet TAKE TWO TABLETS BY MOUTH EVERY  tablet 1    ANORO ELLIPTA 62.5-25 MCG/INH AEPB inhaler INHALE 1 PUFF INTO THE LUNGS DAILY 1 each 11    fluticasone (FLONASE) 50 MCG/ACT nasal spray USE 2 SPRAYS IN EACH NOSTRIL DAILY AS NEEDED FOR RHINITIS OR ALLERGIES 48 g 0    BRILINTA 90 MG TABS tablet TAKE 1 TABLET BY MOUTH 2 TIMES A  tablet 4    atorvastatin (LIPITOR) 40 MG tablet TAKE 1 TABLET BY MOUTH NIGHTLY. 90 tablet 3    albuterol sulfate HFA (VENTOLIN HFA) 108 (90 Base) MCG/ACT inhaler Inhale 2 puffs into the lungs every 6 hours as needed for Wheezing 1 Inhaler 3    blood glucose test strips (PRODIGY NO CODING BLOOD GLUC) strip USE TO TEST BLOOD GLUCLOSE ONE TIME DAILY 100 each 1    Blood Glucose Monitoring Suppl (PRODIGY AUTOCODE BLOOD GLUCOSE) w/Device KIT       nitroGLYCERIN (NITROSTAT) 0.4 MG SL tablet Place 1 tablet under the tongue every 5 minutes as needed for Chest pain 25 tablet 3     No current facility-administered medications for this visit. Allergies   Allergen Reactions    Zoloft [Sertraline Hcl] Other (See Comments)     Headaches, sweats, bad dreams       SUBJECTIVE:   Review of Systems   Constitutional: Negative for activity change, appetite change, fatigue and fever. HENT: Negative for congestion. Respiratory: Negative for apnea, cough, chest tightness, shortness of breath and wheezing. Cardiovascular: Negative for chest pain, palpitations and leg swelling. Gastrointestinal: Negative for abdominal distention, abdominal pain and nausea. Genitourinary: Negative for difficulty urinating and dysuria. is normal.   Mildly dilated left atrium. Signature      ----------------------------------------------------------------   Electronically signed by Purvi Gleason MD (Interpreting   physician) on 11/11/2019 at 04:25 PM   ----------------------------------------------------------------      Findings      Mitral Valve   The mitral valve structure was normal with normal leaflet separation. DOPPLER: The transmitral velocity was within the normal range with no   evidence for mitral stenosis. There was no evidence of mitral   regurgitation. Aortic Valve   The aortic valve leaflets were not well visualized. Aortic valve appears tricuspid. Tricuspid Valve   Trivial tricuspid regurgitation visualized. Pulmonic Valve   The pulmonic valve was not well visualized . Trivial pulmonic regurgitation visualized. Left Atrium   Mildly dilated left atrium. Left Ventricle   Ejection fraction is visually estimated at 55%. Overall left ventricular function is normal.      Right Atrium   Right atrial size was normal.      Right Ventricle   The right ventricular size was normal with normal systolic function and   wall thickness. Pericardial Effusion   The pericardium was normal in appearance with no evidence of a pericardial   effusion. Pleural Effusion   No evidence of pleural effusion. Aorta / Great Vessels   -Aortic root dimension within normal limits.   -The Pulmonary artery is within normal limits. -IVC size is within normal limits with normal respiratory phasic changes.       Results reviewed:  BNP:   Lab Results   Component Value Date    BNP 31.5 02/12/2013     CBC:   Lab Results   Component Value Date    WBC 8.5 03/10/2020    RBC 5.04 03/10/2020    HGB 16.8 03/10/2020    HCT 50.9 03/10/2020     03/10/2020     CMP:    Lab Results   Component Value Date     06/03/2020    K 3.8 06/03/2020    K 4.5 06/30/2018     06/03/2020    CO2 25 06/03/2020    BUN 13

## 2020-06-03 NOTE — PROGRESS NOTES
Venipuncture obtained from [facility]. Patient tolerated procedure without complications or complaints. I, Chivo Albert MD,  performed the initial face to face bedside interview with this patient regarding history of present illness, review of symptoms and relevant past medical, social and family history.  I completed an independent physical examination.    The history, relevant review of systems, past medical and surgical history, medical decision making, and physical examination was documented by the scribe in my presence and I attest to the accuracy of the documentation.

## 2020-06-11 ENCOUNTER — TELEPHONE (OUTPATIENT)
Dept: PULMONOLOGY | Age: 60
End: 2020-06-11

## 2020-06-11 ENCOUNTER — NURSE TRIAGE (OUTPATIENT)
Dept: OTHER | Facility: CLINIC | Age: 60
End: 2020-06-11

## 2020-06-16 ENCOUNTER — HOSPITAL ENCOUNTER (OUTPATIENT)
Age: 60
Discharge: HOME | End: 2020-06-16
Payer: COMMERCIAL

## 2020-06-16 PROCEDURE — U0003 INFECTIOUS AGENT DETECTION BY NUCLEIC ACID (DNA OR RNA); SEVERE ACUTE RESPIRATORY SYNDROME CORONAVIRUS 2 (SARS-COV-2) (CORONAVIRUS DISEASE [COVID-19]), AMPLIFIED PROBE TECHNIQUE, MAKING USE OF HIGH THROUGHPUT TECHNOLOGIES AS DESCRIBED BY CMS-2020-01-R: HCPCS

## 2020-06-16 PROCEDURE — U0002 COVID-19 LAB TEST NON-CDC: HCPCS

## 2020-06-17 ENCOUNTER — HOSPITAL ENCOUNTER (OUTPATIENT)
Dept: CT IMAGING | Age: 60
Discharge: HOME | End: 2020-06-17
Payer: COMMERCIAL

## 2020-06-17 LAB
PERFORMING LAB: NORMAL
REPORT: NORMAL
SARS-COV-2: NOT DETECTED

## 2020-06-17 PROCEDURE — 71250 CT THORAX DX C-: CPT

## 2020-06-18 ENCOUNTER — OFFICE VISIT (OUTPATIENT)
Dept: CARDIOLOGY CLINIC | Age: 60
End: 2020-06-18
Payer: COMMERCIAL

## 2020-06-18 VITALS
DIASTOLIC BLOOD PRESSURE: 74 MMHG | BODY MASS INDEX: 37.3 KG/M2 | HEIGHT: 71 IN | SYSTOLIC BLOOD PRESSURE: 122 MMHG | HEART RATE: 68 BPM | WEIGHT: 266.4 LBS

## 2020-06-18 PROCEDURE — 99213 OFFICE O/P EST LOW 20 MIN: CPT | Performed by: NUCLEAR MEDICINE

## 2020-06-18 NOTE — PROGRESS NOTES
edema, no obvious claudication       Objective:  Physical Exam  /74   Pulse 68   Ht 5' 11\" (1.803 m)   Wt 266 lb 6.4 oz (120.8 kg)   BMI 37.16 kg/m²   General:   Well developed, well nourished  Lungs:   Clear to auscultation  Heart:    Normal S1 S2, Slight murmur. no rubs, no gallops  Abdomen:   Soft, non tender, no organomegalies, positive bowel sounds  Extremities:   No edema, no cyanosis, good peripheral pulses  Neurological:   Awake, alert, oriented. No obvious focal deficits  Musculoskelatal:  No obvious deformities    Assessment:      Diagnosis Orders   1. Coronary artery disease involving coronary bypass graft of native heart without angina pectoris     2. Essential hypertension     3. Familial hypercholesterolemia     as above  No new issues  Cardiac fair     Plan:  No follow-ups on file. As above  Continue risk factor modification and medical management  Thank you for allowing me to participate in the care of your patient. Please don't hesitate to contact me regarding any further issues related to the patient care    Orders Placed:  No orders of the defined types were placed in this encounter. Medications Prescribed:  No orders of the defined types were placed in this encounter. Discussed use, benefit, and side effects of prescribed medications. All patient questions answered. Pt voicedunderstanding. Instructed to continue current medications, diet and exercise. Continue risk factor modification and medical management. Patient agreed with treatment plan. Follow up as directed.     Electronically signedby Irving Talley MD on 6/18/2020 at 12:05 PM

## 2020-06-19 ENCOUNTER — HOSPITAL ENCOUNTER (OUTPATIENT)
Dept: PULMONOLOGY | Age: 60
Discharge: HOME | End: 2020-06-19
Payer: COMMERCIAL

## 2020-06-19 PROCEDURE — 94726 PLETHYSMOGRAPHY LUNG VOLUMES: CPT

## 2020-06-19 PROCEDURE — 94729 DIFFUSING CAPACITY: CPT

## 2020-06-19 PROCEDURE — 94010 BREATHING CAPACITY TEST: CPT

## 2020-06-19 NOTE — PROGRESS NOTES
Prescreening performed prior to testing. The following symptons may indicate COVID-19 infection:        One of the following criteria:   Temperature taken, patient temperature was 97.5 F. Fever greater 100.0 F -no  Cough -  no  New onset shortness of breath -no  New onset difficulty breathing -no        And/or   Two or more of the following criteria:  Muscle aches -no  Headache -no  Sore throat -no  New onset loss of smell/taste -no  New onset diarrhea -no    Patient's screening was negative. PFT will be performed.

## 2020-06-25 ENCOUNTER — OFFICE VISIT (OUTPATIENT)
Dept: PULMONOLOGY | Age: 60
End: 2020-06-25
Payer: COMMERCIAL

## 2020-06-25 VITALS
DIASTOLIC BLOOD PRESSURE: 74 MMHG | BODY MASS INDEX: 37.66 KG/M2 | TEMPERATURE: 97.1 F | HEART RATE: 75 BPM | HEIGHT: 71 IN | OXYGEN SATURATION: 96 % | SYSTOLIC BLOOD PRESSURE: 120 MMHG | WEIGHT: 269 LBS

## 2020-06-25 PROCEDURE — 99214 OFFICE O/P EST MOD 30 MIN: CPT | Performed by: NURSE PRACTITIONER

## 2020-06-25 PROCEDURE — 94618 PULMONARY STRESS TESTING: CPT | Performed by: NURSE PRACTITIONER

## 2020-06-25 NOTE — PATIENT INSTRUCTIONS
Patient Education        Bronchiectasis: Care Instructions  Your Care Instructions  Bronchiectasis (say \"xsxdu-kka-GEZ-tuh-cynthia\") is a lung problem in which the breathing tubes are stretched and become larger. The buildup of mucus causes the stretching and can lead to swelling and infections. You may find it harder to breathe and cough up mucus out of your lungs. Some people are born with it. Other people get it because of another health problem, usually cystic fibrosis or a lung infection such as pneumonia or tuberculosis. Symptoms are often different for everyone. But a cough that brings up mucus, or sputum, is common. The condition is usually treated with antibiotics, medicines to relax the airways (bronchodilators), and medicines to make it easier to cough up mucus (expectorants). Follow-up care is a key part of your treatment and safety. Be sure to make and go to all appointments, and call your doctor if you are having problems. It's also a good idea to know your test results and keep a list of the medicines you take. How can you care for yourself at home? · Take your antibiotics as directed. Do not stop taking them just because you feel better. You need to take the full course of antibiotics. · Take your medicines exactly as prescribed. Call your doctor if you think you are having a problem with your medicine. · If you have cystic fibrosis, follow your treatment plan. · If you or your child has bronchiectasis, follow directions from your doctor or respiratory therapist for moving your or your child's body into different positions to help drain fluid. This is called postural drainage, and it helps to ease breathing and prevent infections. · You also may do chest percussion on your child. This is strong clapping of the chest with a cupped hand to vibrate the airways in the lungs. The vibration helps your child cough up mucus. You may see a respiratory therapist to learn how to do chest percussion.   · Use

## 2020-06-25 NOTE — PROGRESS NOTES
atorvastatin (LIPITOR) 40 MG tablet TAKE 1 TABLET BY MOUTH NIGHTLY. 90 tablet 3    nitroGLYCERIN (NITROSTAT) 0.4 MG SL tablet Place 1 tablet under the tongue every 5 minutes as needed for Chest pain 25 tablet 3    albuterol sulfate HFA (VENTOLIN HFA) 108 (90 Base) MCG/ACT inhaler Inhale 2 puffs into the lungs every 6 hours as needed for Wheezing 1 Inhaler 3     No current facility-administered medications for this visit. Serena CARABALLO   General/Constitutional: No recent loss of weight or appetite changes. No fever or chills. HENT: Negative. Eyes: Negative. Upper respiratory tract: No nasal stuffiness or post nasal drip. Lower respiratory tract/ lungs: No cough or sputum production. No hemoptysis. Cardiovascular: No palpitations or chest pain. Gastrointestinal: No nausea or vomiting. Neurological: No focal neurologiacal weakness. Extremities: No edema. Musculoskeletal: No complaints. Genitourinary: No complaints. Hematological: Negative. Psychiatric/Behavioral: Negative. Skin: No itching. Physical exam   /74 (Site: Left Upper Arm, Position: Sitting, Cuff Size: Medium Adult)   Pulse 75   Temp 97.1 °F (36.2 °C)   Ht 5' 11\" (1.803 m)   Wt 269 lb (122 kg)   SpO2 96% Comment: on RA  BMI 37.52 kg/m²      Constitutional: Patient appears moderately built and moderately nourished in no acute distress. Head: Normocephalic and atraumatic. Mouth/Throat: Oropharynx is clear and moist. No oral thrush. Eyes: Conjunctivae are normal. Pupils are equal, round, and reactive to light. No scleral icterus. Neck: Neck supple. No JVD or tracheal deviation present. Cardiovascular: Regular rate, regular rhythm, S1 and S2 with no murmur. No peripheral edema. Pulmonary/Chest: Normal effort with clear breath sounds. No wheezing, rales or rhonchi. Normal AP diameter. No stridor. No respiratory distress. Abdominal: Soft. Bowel sounds audible. No distension or tenderness to palpation.   Musculoskeletal: Moves all extremities. Lymphadenopathy: No cervical adenopathy. Neurological: Patient is alert and oriented to person, place, and time. No focal deficits. Skin: Skin is warm and dry. No clubbing, no cyanosis. Test results   Lung Nodule Screening     [x] Qualifies    []Does not qualify   [] Declined    [x] Completed    6/17/20                                             3/24/20                                5/13/19       COMPARISON: CT scan 3/24/2020.       TECHNIQUE: Multiple axial 5 mm images of the thorax and upper abdomen were obtained without administration of intravenous contrast material. Multiple high-resolution 1.25 mm images of the lung parenchyma were also obtained. Reformatted sagittal and    coronal images were also provided for interpretation. Supine and prone images were obtained.       ALL CT SCANS AT THIS FACILITY use dose modulation, iterative reconstruction, and/or weight-based dosing when appropriate to reduce radiation dose to as low as reasonably achievable.           FINDINGS:        The central airway is patent.       The heart is mildly enlarged. There are coronary artery calcifications.       Some atherosclerosis is seen in the thoracic aorta. There is no aneurysmal dilatation.       Median sternotomy wires are in place.       There are no pathologically enlarged lymph nodes in the mediastinum, hilar or axillary regions.       Fibrotic scarring is again seen at the left lung base. There is mild chronic shift of the mediastinal structures toward the left side. There is elevation of the left hemidiaphragm.       Mild centrilobular emphysematous changes are seen.       Mild changes of bronchiectasis are seen extending of the bilateral lower lobes, worse on the left side. This persists on prone imaging.       The liver is diffusely hypoattenuated which is likely related to fatty infiltration.       Degenerative changes are seen in the spine.  No acute fractures.           Impression  IMPRESSION:    1. Mild changes of bronchiectasis are seen in the high resolution images extending to the bilateral lower lobes, worse on the left side. 2. Mild centrilobular emphysematous changes of the lungs bilaterally.             CT chest 3/24/20  COMPARISON: 5/13/2019       TECHNIQUE: Multiple axial 5 millimeter images of the thorax and upper abdomen were obtained following the administration of intravenous contrast material (ISOVUE). ALL CT SCANS AT THIS FACILITY use dose modulation, iterative reconstruction, and/or    weight-based dosing when appropriate to reduce radiation dose to as low as reasonably achievable.           FINDINGS:        Upper abdomen: Hepatic steatosis.       Mediastinum and amando: No abnormally enlarged hilar or mediastinal lymph nodes are seen.       Bones: Moderate degenerative spondylosis throughout the thoracic spine. Metallic sternotomy sutures from prior surgery. Prior surgery right shoulder. No evidence for acute fracture or bone destruction. Mild scoliotic curvatures in the thoracic spine.       Lungs: Moderate fibrotic scarring left lung base, unchanged from prior study. Mild chronic shift of heart and mediastinal structures to the left. Moderate chronic elevation left hemidiaphragm. No acute infiltrates or effusions are seen. Mild groundglass    infiltrate-like appearance right midlung posteriorly, likely mild atelectasis or interstitial fibrosis.         Impression   1. Mild atelectasis/interstitial fibrosis right midlung posteriorly. Note that there is no evidence for left hilar mass or adenopathy. 2. Moderate chronic fibrotic stranding left lung base with chronic elevation left hemidiaphragm and shift of heart and mediastinal structures to the left. I wonder if there has been prior partial left lung resection.  Clinical correlation recommended.           3/17/20                                                                       11/15/19        COMPARISON: 11/15/2019       TECHNIQUE: PA and lateral views the chest.       FINDINGS:       As on the prior examination there is blurring and indistinctness of the left lower chest wall.       The heart size is normal.  The mediastinum is not widened.  Pleural parenchymal scarring left base. Left hilar prominence underlying adenopathy is not excludable. The pulmonary vascularity is normal. Diffuse degenerative changes throughout the thoracic    spine. Midline sternotomy from presumed prior CABG. Right shoulder surgery.                     Impression   Stable appearance of the chest with the exception of some prominence of the left hilum this may be a technical difference. Pleural and parenchymal scarring left lung with elevation left hemidiaphragm.                              Assessment      Diagnosis Orders   1. Stage 3 severe COPD by GOLD classification ([facility])  6 Minute Walk Test    6 Minute Walk Test    CT CHEST HIGH RESOLUTION    Full PFT Study With Bronchodilator   2. Bronchiectasis without complication ([address]  CT CHEST HIGH RESOLUTION    Full PFT Study With Bronchodilator   3. Decreased diffusion capacity  Full PFT Study With Bronchodilator   4. FAISAL on CPAP     5. Allergic rhinitis, unspecified seasonality, unspecified trigger           Plan         Six Minute Walk Test  Marlys Solorzano 1960    Six minute walk test done in my office today by my medical assistant. Andrei's oxygen saturation at rest on room air was 95%. His oxygen saturation remained 90% on room air with exertion. He did not qualify for oxygen. He ambulated 540 feet with mild SOB, no events. Resting heart rate was 76 and 102 upon completion of the walk. 6 minute walk is unchanged from 2 years ago. Reviewed PFT and CT findings with Dorrine Bars. There are no acute findings but is educated on Bronchiectasis, symptoms to monitor and to call for. PFT does show decline in obstruction, TLC and DLCO. Continue Anoro and DEBRA PRN.   Continue

## 2020-07-08 ENCOUNTER — VIRTUAL VISIT (OUTPATIENT)
Dept: PSYCHIATRY | Age: 60
End: 2020-07-08
Payer: COMMERCIAL

## 2020-07-08 PROCEDURE — 99203 OFFICE O/P NEW LOW 30 MIN: CPT | Performed by: PHYSICIAN ASSISTANT

## 2020-07-08 RX ORDER — ATORVASTATIN CALCIUM 40 MG/1
TABLET, FILM COATED ORAL
Qty: 90 TABLET | Refills: 3 | Status: SHIPPED | OUTPATIENT
Start: 2020-07-08 | End: 2020-09-04

## 2020-07-08 NOTE — PROGRESS NOTES
[facility]  Psychiatric Assessment       CHIEF COMPLAINT:  Worsening depression and anxiety    History obtained from:  patient, electronic medical record    HISTORY OF PRESENT ILLNESS:    Eliezer Booker is a 56 y.o. male with significant history of anxiety and depression who presents to the office today as a new patient to establish care. He reports \"its been a rough past 2-3 years. My health has been declining a bit. Saba gained a lot of weight. I cant work anymore so I retired. I had a double bypass. Knee operation. Stents in my heart. Im having a little trouble adjusting to my reduced capacities. My ability to not accomplish as much as I used to. I am a recovering cocaine addict for the past 30 years. I do drink alcohol. I have a couple of beers or a glass of wine every other day. Saba been trying to exercise but with my bad knee and COPD I haven't been able to get much accomplished as far as weight loss. Im looking into getting a gastric sleeve done. \" He reports it still has to get it approved by his cardiologist and pulmonologist. He reports on top of everything him and his wife's are having difficulties. He states when they first got , she was quiet. As she got older, she developed a loud voice and always has an opinion. He feels that him and his wife cannot have a conversation for a few minutes without them arguing. He misses them being a couple. He reports well over a year ago was when he first noticed his depression. He reports it started after he was not able to accomplish things due to his worsening medical issues. He reports he talked to Dr. Harrison Felix at that time and she started him on Zoloft which caused him to have homicidal thoughts. He states he is now on Wellbutrin which helped him to quit smoking. He states he is able to tolerate things better on the Wellbutrin. Dr Harrison Fleix recently increased his dosage.  He feels his depression has gotten better recently with the increase in his Wellbutrin dosage. He states that this current episode has been going on and off for over a year. He reports he doesn't sleep for long periods of time. He might sleep 2 hours at a time. He states he gets 8 hours a night but \"I get it in chunks. \" He reports he has dreams sometimes and when he wakes up he is talking. He states that is new for him. He also has been having nightmares about being back in the [employer]. He reports his appetite has been pretty good. He states he has not seen an increase or decrease in his appetite. He feels he has not had a problem with motivation but is limited due to his physical limitations. He reports his energy is not great. He states he gets out of breath easily but is able to do most things in little chunks. He reports he has no problem with attention and concentration. He enjoys reading and has been able to focus on his reading. He endorses anhedonia. 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 He states he is not able to skate anymore because of his knees. \"It sucked the shine out of it. \" He reports he feels worthless occasionally after arguing with his wife, Fran Madison. He feels hopeless at helpless at times \"when I realize theres things I can never do anymore. \" Denies recent suicidal ideation. He feels that he noticed his anxiety was a problem about 1-2 years ago. He reports he worries about money a lot. He states he gets social security disability but now makes as much as he does in a month that he did in a week when he was working. He states that's been the most friction between him and his wife. He reports he feels tense and on edge for most days. He states he has a temper and is easily set off by things. He has not noticed an increase in irritability lately. He states he has small panic attacks occassionally. He reports they last a minute or two. He reports the last panic attack was today.  He reports he feels \"numb, like electronic feedback\" during these panic attacks. He states it takes a moment to catch his breath and it subsides. He reports he has a history of anxiety and depression. He is currently on Wellbutrin which is prescribed to him by his PCP, Dr. Lisa Cherry. He reports good medication compliance. Denies side effects. Denies past suicide attempts or inpatient psychiatric admissions. He states that he has been sober from cocaine for the past 30 years. Prior to that, he was using cocaine and other substances since he was about 4years old. He reports at 13years old, he was selling marijuana and cocaine. He states he completed 5 years of 12-step programs in the past. 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 He states he was younger than 8 when he started drinking alcohol. He reports when he was in the [employer] from [age] years old he started black out drinking. He states that's the first time he started drinking heavily. He reports he continued to drink and use drugs heavily for years after coming home from the [employer]. He reports he would wake up almost everyday, snort a couple of lines of cocaine and drink liquor. He reports he hasn't done a lot of heavy drinking for the past couple of years. He reports currently he drinks about 4 beers or mixed drinks every other day for the most part. He has not been blacking out recently from drinking. He reports he does not get shakes when he stops drinking. He states over 25 years ago he went through the detox program with [facility]. Denies past withdrawals. He reports he went through both NA and AA in the past.     He denies current suicidal ideation. Denies hallucinations. Denies symptoms of annamarie/hypomania. No evidence of delusions or overt psychosis on examination. PSYCHIATRIC HISTORY:      Outpatient treatment: Currently sees his PCP, Dr. Lisa Cherry for psychotropic medication management.  He states he has seen a marriage counselor in the past with his wife. Suicide Attempts: no  Inpatient Psychiatric Admission: no    Past Psychiatric Meds:   Zoloft  Lexapro  Vistaril    Adverse reactions from psychotropic medications:      Zoloft- homicidal thoughts, headaches, sweats, bad dreams      Lifetime Psychiatric Review of Systems     ·    Obsessions and Compulsions: no    ·    Monalisa or Hypomania: no  ·    Hallucinations: no  ·    Panic Attacks:  yes   ·    Delusions:  no  ·    Phobias: no   ·   Trauma: yes       Past Medical History:        Diagnosis Date    [facility]     [facility]     Coronary artery disease involving native coronary artery of native heart without angina pectoris     Depression     Diabetes mellitus type 2, diet-controlled ([facility] 6/1/2018    GERD (gastroesophageal reflux disease) 3/21/2012    Hernia     Hx of blood clots 1990's    right knee due to injury    Hx of cocaine abuse ([facility]     Hyperglycemia 09/09    Hyperlipidemia     Hypertension     FAISAL on CPAP     Osteoarthritis 11/07    S/P CABG x 2 07/06/2016    S/P PTCA: 1/15/2018: Stent diagonal branch Xience 3.0 x 12 mm. 1/15/2018    1/15/2018: Stent diagonal branch Xience 3.0 x 12 mm.  Dr. Re Meade injury 08/2015    Right thumb cut with table saw, no treatment needed       Past Surgical History:        Procedure Laterality Date    CARDIAC CATHETERIZATION  06/27/2016    CATARACT REMOVAL Right 2013    COLONOSCOPY  04/08    CORONARY ARTERY BYPASS GRAFT  07/06/2016    Double bypass, Dr. Ethel Celeste Right 02/2007    Neck    DIAGNOSTIC CARDIAC CATH LAB PROCEDURE      EYE SURGERY Right     Retinal surgery x 3    HERNIA REPAIR Right     Inguinal    PTCA  01/15/2018    ROTATOR CUFF REPAIR Right 11/16/2017    TONSILLECTOMY  child    TOTAL KNEE ARTHROPLASTY Left 10/24/2016    Dr. Paresh Avilez       Current Meds    Current Outpatient Medications:   metoprolol succinate (TOPROL XL) 25 MG extended release tablet, TAKE 2 TABLETS BY MOUTH DAILY, Disp: 180 tablet, Rfl: 0    atorvastatin (LIPITOR) 40 MG tablet, TAKE ONE TABLET BY MOUTH EVERY EVENING, Disp: 90 tablet, Rfl: 3    losartan (COZAAR) 25 MG tablet, TAKE 1 TABLET BY MOUTH ONE TIME A DAY, Disp: 90 tablet, Rfl: 0    isosorbide mononitrate (IMDUR) 30 MG extended release tablet, TAKE 1 TABLET BY MOUTH ONE TIME A DAY, Disp: 30 tablet, Rfl: 3    aspirin 81 MG chewable tablet, CHEW AND SWALLOW 1 TABLET BY MOUTH ONE TIME A DAY, Disp: 30 tablet, Rfl: 4    blood glucose test strips (PRODIGY NO CODING BLOOD GLUC) strip, USE TO TEST BLOOD GLUCLOSE ONE TIME DAILY, Disp: 100 each, Rfl: 1    pramipexole (MIRAPEX) 0.125 MG tablet, TAKE 1 TABLET BY MOUTH ONCE DAILY AT NIGHT, Disp: 90 tablet, Rfl: 1    buPROPion (WELLBUTRIN XL) 300 MG extended release tablet, TAKE 1 TABLET BY MOUTH ONE TIME A DAY IN THE MORNING, Disp: 90 tablet, Rfl: 1    bumetanide (BUMEX) 2 MG tablet, Take 1 tablet by mouth daily Take 2 mg AM - 1 mg PM, Disp: 135 tablet, Rfl: 3    allopurinol (ZYLOPRIM) 300 MG tablet, TAKE 1 TABLET BY MOUTH ONCE DAILY. , Disp: 90 tablet, Rfl: 0    omeprazole (PRILOSEC) 20 MG delayed release capsule, TAKE 1 CAPSULE BY MOUTH ONE TIME A DAY, Disp: 30 capsule, Rfl: 3    metFORMIN (GLUCOPHAGE) 500 MG tablet, Take 1 tablet by mouth daily (with breakfast), Disp: 90 tablet, Rfl: 1    Blood Glucose Monitoring Suppl (PRODIGY AUTOCODE BLOOD GLUCOSE) w/Device KIT, , Disp: , Rfl:     ANORO ELLIPTA 62.5-25 MCG/INH AEPB inhaler, INHALE 1 PUFF INTO THE LUNGS DAILY, Disp: 1 each, Rfl: 11    fluticasone (FLONASE) 50 MCG/ACT nasal spray, USE 2 SPRAYS IN EACH NOSTRIL DAILY AS NEEDED FOR RHINITIS OR ALLERGIES, Disp: 48 g, Rfl: 0    BRILINTA 90 MG TABS tablet, TAKE 1 TABLET BY MOUTH 2 TIMES A DAY, Disp: 180 tablet, Rfl: 4    nitroGLYCERIN (NITROSTAT) 0.4 MG SL tablet, Place 1 tablet under the tongue every 5 minutes as needed coherent  Thought content:   Denies suicidal ideations   Denies  homicidal ideations               Denies  hallucinations   Denies delusions  Cognition:  In tact  Memory: age appropriate  Insight and judgement: fair  Medication side effects:  denies      DSM-5 DIAGNOSIS:    Major depressive disorder, recurrent, moderate  Generalized anxiety disorder      PLAN    1. Will refer to Arianne Momaria guadalupe with IOP at [facility] for one to one therapy  2. Will follow up in 4 weeks, sooner PRN    Electronically signed by [facility] S[facility]IE on 7/8/2020 at 10:54 AM Time Spent 65 minutes    I have discussed with the patient risks, benefits, side effects, and alternatives (and relevant risks, benefits, and side effects related to alternatives or not receiving care), and likelihood of the success. Patient instructed to seek emergency help via ED or calling 911 should symptoms become severe, or if thoughts to harm self or others develop. Patient stated clear understanding and is agreeable to treatment plan. Jimi Richmond is a 56 y.o. male being evaluated by a Virtual Visit (video visit) encounter to address concerns as mentioned above. A caregiver was present when appropriate. Due to this being a TeleHealth encounter (During [place] [address] public [facility] emergency), evaluation of the following organ systems was limited: Vitals/Constitutional/EENT/Resp/CV/GI//MS/Neuro/Skin/Heme-Lymph-Imm. Pursuant to the emergency declaration under the [address] [place] [address], [address] [place] [address] authority and the [employer] Act, this Virtual Visit was conducted with patient's (and/or legal guardian's) consent, to reduce the patient's risk of exposure to COVID-19 and provide necessary medical care.   The patient (and/or legal guardian) has also been advised to contact this office for worsening conditions or problems, and seek emergency medical treatment and/or call 911 if deemed necessary. Patient identification was verified at the start of the visit: Yes    Total time spent for this encounter: 65 minutes    Services were provided through a video synchronous discussion virtually to substitute for in-person clinic visit. Patient and provider were located at their individual homes. --Charity Robles PA-C on 7/8/2020 at 11:05 AM    An electronic signature was used to authenticate this note.

## 2020-07-09 ENCOUNTER — TELEPHONE (OUTPATIENT)
Dept: CARDIOLOGY CLINIC | Age: 60
End: 2020-07-09

## 2020-07-09 ENCOUNTER — TELEPHONE (OUTPATIENT)
Dept: PULMONOLOGY | Age: 60
End: 2020-07-09

## 2020-07-22 ENCOUNTER — HOSPITAL ENCOUNTER (OUTPATIENT)
Dept: PSYCHIATRY | Age: 60
Setting detail: THERAPIES SERIES
Discharge: HOME | End: 2020-07-22
Payer: COMMERCIAL

## 2020-07-22 PROCEDURE — 90834 PSYTX W PT 45 MINUTES: CPT

## 2020-07-22 NOTE — PROGRESS NOTES
(video visit) encounter to address concerns as mentioned above. A caregiver was present when appropriate. Due to this being a TeleHealth encounter (During XADPP-62 public health emergency), Pursuant to the emergency declaration under the [facility], [address] [place] [address] authority and the [employer] and [employer] Act, this Virtual Visit was conducted with patient's (and/or legal guardian's) consent, to reduce the patient's risk of exposure to COVID-19 and provide necessary medical care. The patient (and/or legal guardian) has also been advised to contact this facility for worsening conditions or problem and seek emergency medical treatment and/or call 911 if deemed necessary. Services were provided through a video synchronous discussion virtually to substitute for in-person visit.      Al Storey [facility]

## 2020-07-25 ENCOUNTER — NURSE TRIAGE (OUTPATIENT)
Dept: OTHER | Facility: CLINIC | Age: 60
End: 2020-07-25

## 2020-07-25 NOTE — TELEPHONE ENCOUNTER
Reason for Disposition   [1] SEVERE neck pain (e.g., excruciating, unable to do any normal activities) AND [2] not improved after 2 hours of pain medicine    Answer Assessment - Initial Assessment Questions  1. ONSET: \"When did the pain begin? \"       Awoke with it  2. LOCATION: \"Where does it hurt? \"       Base of neck  3. PATTERN \"Does the pain come and go, or has it been constant since it started? \"       *No Answer*  4. SEVERITY: \"How bad is the pain? \"  (Scale 1-10; or mild, moderate, severe)    - MILD (1-3): doesn't interfere with normal activities     - MODERATE (4-7): interferes with normal activities or awakens from sleep     - SEVERE (8-10):  excruciating pain, unable to do any normal activities      7  5. RADIATION: \"Does the pain go anywhere else, shoot into your arms? \"      No radiation  6. CORD SYMPTOMS: \"Any weakness or numbness of the arms or legs? \"      *No Answer*  7. CAUSE: \"What do you think is causing the neck pain? \"      *No Answer*  8. NECK OVERUSE: Reuben Shonkin recent activities that involved turning or twisting the neck? \"      *No Answer*  9. OTHER SYMPTOMS: \"Do you have any other symptoms? \" (e.g., headache, fever, chest pain, difficulty breathing, neck swelling)      *No Answer*  10. PREGNANCY: \"Is there any chance you are pregnant? \" \"When was your last menstrual period? \"        *No Answer*    Protocols used: NECK PAIN OR STIFFNESS-ADULT-    Pt complaining of neck pain, requesting clearance for ER visit, weekend PCP not open

## 2020-07-26 ENCOUNTER — APPOINTMENT (OUTPATIENT)
Dept: GENERAL RADIOLOGY | Age: 60
End: 2020-07-26
Payer: COMMERCIAL

## 2020-07-26 ENCOUNTER — HOSPITAL ENCOUNTER (EMERGENCY)
Age: 60
Discharge: HOME | End: 2020-07-26
Attending: EMERGENCY MEDICINE
Payer: COMMERCIAL

## 2020-07-26 VITALS
WEIGHT: 270 LBS | BODY MASS INDEX: 37.8 KG/M2 | HEART RATE: 61 BPM | HEIGHT: 71 IN | OXYGEN SATURATION: 93 % | DIASTOLIC BLOOD PRESSURE: 89 MMHG | SYSTOLIC BLOOD PRESSURE: 144 MMHG | RESPIRATION RATE: 18 BRPM | TEMPERATURE: 98.4 F

## 2020-07-26 LAB
ANION GAP SERPL CALCULATED.3IONS-SCNC: 13 MEQ/L (ref 8–16)
BASOPHILS # BLD: 1 %
BASOPHILS ABSOLUTE: 0.1 THOU/MM3 (ref 0–0.1)
BUN BLDV-MCNC: 12 MG/DL (ref 7–22)
CALCIUM SERPL-MCNC: 9.2 MG/DL (ref 8.5–10.5)
CHLORIDE BLD-SCNC: 101 MEQ/L (ref 98–111)
CO2: 23 MEQ/L (ref 23–33)
CREAT SERPL-MCNC: 0.8 MG/DL (ref 0.4–1.2)
EOSINOPHIL # BLD: 1.7 %
EOSINOPHILS ABSOLUTE: 0.1 THOU/MM3 (ref 0–0.4)
ERYTHROCYTE [DISTWIDTH] IN BLOOD BY AUTOMATED COUNT: 13.4 % (ref 11.5–14.5)
ERYTHROCYTE [DISTWIDTH] IN BLOOD BY AUTOMATED COUNT: 51.9 FL (ref 35–45)
GFR SERPL CREATININE-BSD FRML MDRD: > 90 ML/MIN/1.73M2
GLUCOSE BLD-MCNC: 157 MG/DL (ref 70–108)
HCT VFR BLD CALC: 44.9 % (ref 42–52)
HEMOGLOBIN: 14.8 GM/DL (ref 14–18)
IMMATURE GRANS (ABS): 0.05 THOU/MM3 (ref 0–0.07)
IMMATURE GRANULOCYTES: 0.7 %
LYMPHOCYTES # BLD: 25.7 %
LYMPHOCYTES ABSOLUTE: 2 THOU/MM3 (ref 1–4.8)
MCH RBC QN AUTO: 34.4 PG (ref 26–33)
MCHC RBC AUTO-ENTMCNC: 33 GM/DL (ref 32.2–35.5)
MCV RBC AUTO: 104.4 FL (ref 80–94)
MONOCYTES # BLD: 11.9 %
MONOCYTES ABSOLUTE: 0.9 THOU/MM3 (ref 0.4–1.3)
NUCLEATED RED BLOOD CELLS: 0 /100 WBC
OSMOLALITY CALCULATION: 276.8 MOSMOL/KG (ref 275–300)
PLATELET # BLD: 209 THOU/MM3 (ref 130–400)
PMV BLD AUTO: 10.9 FL (ref 9.4–12.4)
POTASSIUM REFLEX MAGNESIUM: 3.7 MEQ/L (ref 3.5–5.2)
RBC # BLD: 4.3 MILL/MM3 (ref 4.7–6.1)
SEG NEUTROPHILS: 59 %
SEGMENTED NEUTROPHILS ABSOLUTE COUNT: 4.5 THOU/MM3 (ref 1.8–7.7)
SODIUM BLD-SCNC: 137 MEQ/L (ref 135–145)
WBC # BLD: 7.7 THOU/MM3 (ref 4.8–10.8)

## 2020-07-26 PROCEDURE — 99284 EMERGENCY DEPT VISIT MOD MDM: CPT

## 2020-07-26 PROCEDURE — 85025 COMPLETE CBC W/AUTO DIFF WBC: CPT

## 2020-07-26 PROCEDURE — 80048 BASIC METABOLIC PNL TOTAL CA: CPT

## 2020-07-26 PROCEDURE — 6360000002 HC RX W HCPCS: Performed by: STUDENT IN AN ORGANIZED HEALTH CARE EDUCATION/TRAINING PROGRAM

## 2020-07-26 PROCEDURE — 36415 COLL VENOUS BLD VENIPUNCTURE: CPT

## 2020-07-26 PROCEDURE — 72040 X-RAY EXAM NECK SPINE 2-3 VW: CPT

## 2020-07-26 PROCEDURE — 99283 EMERGENCY DEPT VISIT LOW MDM: CPT

## 2020-07-26 PROCEDURE — 96372 THER/PROPH/DIAG INJ SC/IM: CPT

## 2020-07-26 RX ORDER — KETOROLAC TROMETHAMINE 30 MG/ML
15 INJECTION, SOLUTION INTRAMUSCULAR; INTRAVENOUS ONCE
Status: COMPLETED | OUTPATIENT
Start: 2020-07-26 | End: 2020-07-26

## 2020-07-26 RX ADMIN — KETOROLAC TROMETHAMINE 15 MG: 30 INJECTION, SOLUTION INTRAMUSCULAR at 01:42

## 2020-07-26 ASSESSMENT — ENCOUNTER SYMPTOMS
ABDOMINAL PAIN: 0
RHINORRHEA: 0
VOMITING: 0
SORE THROAT: 0
NAUSEA: 0
COUGH: 0
WHEEZING: 0
BLOOD IN STOOL: 0
SHORTNESS OF BREATH: 0

## 2020-07-26 ASSESSMENT — PAIN SCALES - GENERAL
PAINLEVEL_OUTOF10: 7
PAINLEVEL_OUTOF10: 5
PAINLEVEL_OUTOF10: 7

## 2020-07-26 ASSESSMENT — PAIN DESCRIPTION - DESCRIPTORS: DESCRIPTORS: STABBING

## 2020-07-26 ASSESSMENT — PAIN DESCRIPTION - PAIN TYPE: TYPE: CHRONIC PAIN

## 2020-07-26 ASSESSMENT — PAIN DESCRIPTION - ORIENTATION: ORIENTATION: LEFT

## 2020-07-26 ASSESSMENT — PAIN DESCRIPTION - LOCATION: LOCATION: NECK

## 2020-07-26 NOTE — ED NOTES
Discharge instructions discussed and explained with Pt. Pt. Verbalized understanding and has no further questions or needs at this time.           Rose Argueta RN  07/26/20 2531

## 2020-07-26 NOTE — ED PROVIDER NOTES
[place] ENCOUNTER          Pt Name: Brant Carbajal  MRN: [id number]  [place] 1960  Date of evaluation: 7/26/2020  Treating Resident Physician: Bharat Schulte MD  Supervising Physician: Abel Samuels MD    [address] [place] [address]       Chief Complaint   Patient presents with    Neck Pain     History obtained from the patient. HISTORY OF PRESENT ILLNESS    HPI  Brant Carbajal is a 56 y.o. male who presents to the emergency department for evaluation of pinched nerve on the left side of his neck. Patient states that around 4 AM he woke up from pain. Patient describes the pain as someone sticking ice pick in his neck. Patient states that the pain  is constant, 7/10, and does not radiate down his arm. Patient states that he gets these regularly and that he usually receives relief from Tylenol. Patient states that Tylenol did not give him any relief this time. He states that they usually last for a day and suddenly go away. Patient states that he follows-up with physicians at [facility] for his pinched nerve. Patient states that his shoulder feels numb. Patient denies fever, fatigue, chills, diaphoresis. Patient denies chest pain, palpitations, leg swelling. Patient denies abdominal pain, diarrhea, constipation, nausea, vomiting. The patient has no other acute complaints at this time. REVIEW OF SYSTEMS   Review of Systems   Constitutional: Negative for chills, diaphoresis, fatigue and fever. HENT: Negative for postnasal drip, rhinorrhea and sore throat. Respiratory: Negative for cough, shortness of breath and wheezing. Cardiovascular: Negative for chest pain and palpitations. Gastrointestinal: Negative for abdominal pain, blood in stool, nausea and vomiting. Genitourinary: Negative for difficulty urinating, dysuria and frequency. Musculoskeletal: Positive for neck pain. Neurological: Positive for numbness.  Negative for dizziness, 3    isosorbide mononitrate (IMDUR) 30 MG extended release tablet, TAKE 1 TABLET BY MOUTH ONE TIME A DAY, Disp: 30 tablet, Rfl: 3    aspirin 81 MG chewable tablet, CHEW AND SWALLOW 1 TABLET BY MOUTH ONE TIME A DAY, Disp: 30 tablet, Rfl: 4    pramipexole (MIRAPEX) 0.125 MG tablet, TAKE 1 TABLET BY MOUTH ONCE DAILY AT NIGHT, Disp: 90 tablet, Rfl: 1    buPROPion (WELLBUTRIN XL) 300 MG extended release tablet, TAKE 1 TABLET BY MOUTH ONE TIME A DAY IN THE MORNING, Disp: 90 tablet, Rfl: 1    bumetanide (BUMEX) 2 MG tablet, Take 1 tablet by mouth daily Take 2 mg AM - 1 mg PM, Disp: 135 tablet, Rfl: 3    allopurinol (ZYLOPRIM) 300 MG tablet, TAKE 1 TABLET BY MOUTH ONCE DAILY. , Disp: 90 tablet, Rfl: 0    omeprazole (PRILOSEC) 20 MG delayed release capsule, TAKE 1 CAPSULE BY MOUTH ONE TIME A DAY, Disp: 30 capsule, Rfl: 3    metFORMIN (GLUCOPHAGE) 500 MG tablet, Take 1 tablet by mouth daily (with breakfast), Disp: 90 tablet, Rfl: 1    ANORO ELLIPTA 62.5-25 MCG/INH AEPB inhaler, INHALE 1 PUFF INTO THE LUNGS DAILY, Disp: 1 each, Rfl: 11    fluticasone (FLONASE) 50 MCG/ACT nasal spray, USE 2 SPRAYS IN EACH NOSTRIL DAILY AS NEEDED FOR RHINITIS OR ALLERGIES, Disp: 48 g, Rfl: 0    BRILINTA 90 MG TABS tablet, TAKE 1 TABLET BY MOUTH 2 TIMES A DAY, Disp: 180 tablet, Rfl: 4    nitroGLYCERIN (NITROSTAT) 0.4 MG SL tablet, Place 1 tablet under the tongue every 5 minutes as needed for Chest pain, Disp: 25 tablet, Rfl: 3    albuterol sulfate HFA (VENTOLIN HFA) 108 (90 Base) MCG/ACT inhaler, Inhale 2 puffs into the lungs every 6 hours as needed for Wheezing, Disp: 1 Inhaler, Rfl: 3    blood glucose test strips (PRODIGY NO CODING BLOOD GLUC) strip, USE TO TEST BLOOD GLUCLOSE ONE TIME DAILY, Disp: 100 each, Rfl: 1    Blood Glucose Monitoring Suppl (PRODIGY AUTOCODE BLOOD GLUCOSE) w/Device KIT, , Disp: , Rfl:       SOCIAL HISTORY     Social History     Social History Narrative    Not on file     Social History     Tobacco Use    Smoking status: Former Smoker     Packs/day: 2.00     Years: 25.00     Pack years: 50.00     Types: Cigars     Last attempt to quit: 2014     Years since quittin.7    Smokeless tobacco: Never Used   Substance Use Topics    Alcohol use: Yes     Alcohol/week: 3.0 standard drinks     Types: 3 Cans of beer per week     Comment: 1 every day    Drug use: Yes     Frequency: 5.0 times per week     Types: Marijuana     Comment: former drug user, addiction treatment at [place] [address]   Allergen Reactions    Zoloft [Sertraline Hcl] Other (See Comments)     Headaches, sweats, bad dreams         FAMILY HISTORY     Family History   Problem Relation Age of Onset    Heart Disease Mother     High Blood Pressure Mother     Obesity Mother     Heart Disease Father     Cancer Father         colon/prostate    Colon Cancer Father     Prostate Cancer Father            PHYSICAL EXAM     ED Triage Vitals [20 0056]   BP Temp Temp Source Pulse Resp SpO2 Height Weight   (!) 179/94 98.4 °F (36.9 °C) Oral 68 18 94 % 5' 11\" (1.803 m) 270 lb (122.5 kg)         Additional Vital Signs:  Vitals:    20 0158   BP: (!) 144/89   Pulse: 61   Resp: 18   Temp:    SpO2: 93%       Physical Exam  Constitutional:       Appearance: He is obese. HENT:      Head: Normocephalic and atraumatic. Right Ear: External ear normal.      Left Ear: External ear normal.   Eyes:      General: No scleral icterus. Right eye: No discharge. Left eye: No discharge. Neck:      Musculoskeletal: Normal range of motion. Pain with movement present. Comments: Pain on neck extension and on rotation to the left  Cardiovascular:      Rate and Rhythm: Normal rate. Heart sounds: Normal heart sounds. Pulmonary:      Effort: Pulmonary effort is normal.   Musculoskeletal: Normal range of motion. Neurological:      Mental Status: He is alert and oriented to person, place, and time.    Psychiatric: Behavior: Behavior normal.           MEDICAL DECISION MAKING   Initial Assessment: This is a 56 y.o. male who presents to the emergency department for evaluation of pinched nerve on the left side of his neck. Patient reported relief of his pain after toradol injection. He stated that he was happy to be discharged. I reported x-ray findings to patient and advised to follow-up with his PCP and his physician at [facility]. Strict return precautions and follow up instructions were discussed with the patient prior to discharge, with which the patient agrees. ED RESULTS   Laboratory results:  Labs Reviewed   CBC WITH AUTO DIFFERENTIAL - Abnormal; Notable for the following components:       Result Value    RBC 4.30 (*)     .4 (*)     MCH 34.4 (*)     RDW-SD 51.9 (*)     All other components within normal limits   BASIC METABOLIC PANEL W/ REFLEX TO MG FOR LOW K - Abnormal; Notable for the following components:    Glucose 157 (*)     All other components within normal limits   ANION GAP   OSMOLALITY   GLOMERULAR FILTRATION RATE, ESTIMATED       Radiologic studies results:  XR CERVICAL SPINE (2-3 VIEWS)   Final Result    No fracture or subluxation. Multilevel degenerative disc disease and DJD. Straightening of the cervical spine. **This report has been created using voice recognition software. It may contain minor errors which are inherent in voice recognition technology. **      Final report electronically signed by Dr. Bruce Gaona on 7/26/2020 2:23 AM          ED Medications administered this visit:   Medications   ketorolac (TORADOL) injection 15 mg (15 mg Intramuscular Given 7/26/20 0142)         ED COURSE      Strict return precautions and follow up instructions were discussed with the patient prior to discharge, with which the patient agrees.       MEDICATION CHANGES     New Prescriptions    No medications on file         FINAL DISPOSITION     Final diagnoses:   Pinched nerve in neck Condition: condition: stable  Dispo: Discharge to home      This transcription was electronically signed. Parts of this transcriptions may have been dictated by use of voice recognition software and electronically transcribed, and parts may have been transcribed with the assistance of an ED scribe. The transcription may contain errors not detected in proofreading. Please refer to my supervising physician's documentation if my documentation differs.   Electronically signed by Scott Ricks MD on 7/26/20 at 1:42 AM EDT        Scott Ricks MD  Resident  07/26/20 4045

## 2020-07-26 NOTE — ED NOTES
Pt arrives to the ED for chronic neck pain is intermittent. Pt states he woke up yesterday morning with the pain and tried tylenol. Pt states he has been seen before for the pain and states he no longer has \"the cushioning\" between vertebrae. Pt rates his pain a 7/10 at this time. Pt respirations are even and slightly labored. Pt has COPD and states he is usually SOB. Pt vitals are stable. Pt has no other complaints.        Kori Cano RN  07/26/20 0105       Kori Cano RN  07/26/20 0206

## 2020-07-26 NOTE — ED PROVIDER NOTES
Pt Name: Bj Snowden  MRN: [id number]  YOB: 1960  Date of evaluation: 7/26/2020  Supervising Physician: John Trammell MD    I personally saw and examined the patient. I have reviewed and agreed with the resident physician's findings including all diagnostic interpretations and treatment plans as written. Please see resident physician's chart for details. I was present for the key portions of any procedures performed and the inclusive time noted in any critical care statement. Briefly this is a 56 y.o. male present to ED c/o Neck Pain    Patient stated this was a chronic issue. Patient had cervical radiculopathy. Around 4 AM today he woke up with increasing pain to left side of the neck. Pain was sharp constant and does not radiate. No neck injury.     PHYSICIAN EXAM  VITALS  Vitals:    07/26/20 0056 07/26/20 0158   BP: (!) 179/94 (!) 144/89   Pulse: 68 61   Resp: 18 18   Temp: 98.4 °F (36.9 °C)    TempSrc: Oral    SpO2: 94% 93%   Weight: 270 lb (122.5 kg)    Height: 5' 11\" (1.803 m)        LABS  Results for orders placed or performed during the hospital encounter of 07/26/20   CBC Auto Differential   Result Value Ref Range    WBC 7.7 4.8 - 10.8 thou/mm3    RBC 4.30 (L) 4.70 - 6.10 mill/mm3    Hemoglobin 14.8 14.0 - 18.0 gm/dl    Hematocrit 44.9 42.0 - 52.0 %    .4 (H) 80.0 - 94.0 fL    MCH 34.4 (H) 26.0 - 33.0 pg    MCHC 33.0 32.2 - 35.5 gm/dl    RDW-CV 13.4 11.5 - 14.5 %    RDW-SD 51.9 (H) 35.0 - 45.0 fL    Platelets 631 788 - 814 thou/mm3    MPV 10.9 9.4 - 12.4 fL    Seg Neutrophils 59.0 %    Lymphocytes 25.7 %    Monocytes 11.9 %    Eosinophils 1.7 %    Basophils 1.0 %    Immature Granulocytes 0.7 %    Segs Absolute 4.5 1.8 - 7.7 thou/mm3    Lymphocytes Absolute 2.0 1.0 - 4.8 thou/mm3    Monocytes Absolute 0.9 0.4 - 1.3 thou/mm3    Eosinophils Absolute 0.1 0.0 - 0.4 thou/mm3    Basophils Absolute 0.1 0.0 - 0.1 thou/mm3    Immature Grans (Abs) 0.05 0.00 - 0.07 thou/mm3    nRBC 0 /100

## 2020-07-27 ENCOUNTER — OFFICE VISIT (OUTPATIENT)
Dept: FAMILY MEDICINE CLINIC | Age: 60
End: 2020-07-27

## 2020-07-27 VITALS
WEIGHT: 272.13 LBS | RESPIRATION RATE: 18 BRPM | HEART RATE: 76 BPM | SYSTOLIC BLOOD PRESSURE: 114 MMHG | BODY MASS INDEX: 38.1 KG/M2 | DIASTOLIC BLOOD PRESSURE: 72 MMHG | TEMPERATURE: 96.4 F | HEIGHT: 71 IN

## 2020-07-27 PROCEDURE — 99213 OFFICE O/P EST LOW 20 MIN: CPT | Performed by: FAMILY MEDICINE

## 2020-07-27 RX ORDER — NITROGLYCERIN 0.4 MG/1
0.4 TABLET SUBLINGUAL EVERY 5 MIN PRN
Qty: 25 TABLET | Refills: 3 | Status: SHIPPED | OUTPATIENT
Start: 2020-07-27 | End: 2020-12-18 | Stop reason: SDUPTHER

## 2020-07-27 RX ORDER — TIZANIDINE 4 MG/1
4 TABLET ORAL EVERY 8 HOURS PRN
Qty: 30 TABLET | Refills: 0 | Status: SHIPPED | OUTPATIENT
Start: 2020-07-27 | End: 2020-09-04

## 2020-07-27 ASSESSMENT — ENCOUNTER SYMPTOMS
ABDOMINAL PAIN: 0
CHEST TIGHTNESS: 0
DIARRHEA: 0
NAUSEA: 0
BACK PAIN: 1
CONSTIPATION: 0
EYES NEGATIVE: 1
SHORTNESS OF BREATH: 0
VOMITING: 0
BLOOD IN STOOL: 0
COUGH: 0

## 2020-07-27 NOTE — PROGRESS NOTES
Date: 2020    Eliezer Booker is a 56 y.o. male who presents today for:  Chief Complaint   Patient presents with   [facility] ED Follow-up he went to ER because the pain got too bad and he just couldn't deal with it. He states that he felt like an ice pick in his neck on his left side. Pain goes down to his shoulder and his left shoulder gets numb on and off. HPI:     HPI    has a current medication list which includes the following prescription(s): nitroglycerin, tizanidine, metoprolol succinate, losartan, atorvastatin, isosorbide mononitrate, aspirin, prodigy no coding blood gluc, pramipexole, bupropion, bumetanide, allopurinol, omeprazole, metformin, prodigy autocode blood glucose, anoro ellipta, fluticasone, brilinta, and albuterol sulfate hfa. Allergies   Allergen Reactions    Zoloft [Sertraline Hcl] Other (See Comments)     Headaches, sweats, bad dreams       Social History     Tobacco Use    Smoking status: Former Smoker     Packs/day: 2.00     Years: 25.00     Pack years: 50.00     Types: Cigars     Last attempt to quit: 2014     Years since quittin.7    Smokeless tobacco: Never Used   Substance Use Topics    Alcohol use:  Yes     Alcohol/week: 3.0 standard drinks     Types: 3 Cans of beer per week     Comment: 1 every day    Drug use: Yes     Frequency: 5.0 times per week     Types: Marijuana     Comment: former drug user, addiction treatment at [facility]       Past Medical History:   Diagnosis Date    CHF (congestive heart failure) ([address]     COPD [facility] obstructive [facility] disease[facility]     Coronary artery disease involving native coronary artery of native heart without angina pectoris     Depression     Diabetes mellitus type 2, diet-controlled ([address] 2018    GERD (gastroesophageal reflux disease) 3/21/2012    Hernia     Hx of blood clots     right knee due to injury    Hx of cocaine abuse ([address]     Hyperglycemia     Hyperlipidemia     Hypertension     FAISAL on CPAP     Osteoarthritis 11/07    S/P CABG x 2 07/06/2016    S/P PTCA: 1/15/2018: Stent diagonal branch Xience 3.0 x 12 mm. 1/15/2018    1/15/2018: Stent diagonal branch Xience 3.0 x 12 mm. Dr. Rodriguez Antis injury 08/2015    Right thumb cut with table saw, no treatment needed       Past Surgical History:   Procedure Laterality Date    CARDIAC CATHETERIZATION  06/27/2016    CATARACT REMOVAL Right 2013    COLONOSCOPY  04/08    CORONARY ARTERY BYPASS GRAFT  07/06/2016    Double bypass, Dr. Toshia Bermudez Right 02/2007    Neck    DIAGNOSTIC CARDIAC CATH LAB PROCEDURE      EYE SURGERY Right     Retinal surgery x 3    HERNIA REPAIR Right     Inguinal    PTCA  01/15/2018    ROTATOR CUFF REPAIR Right 11/16/2017    TONSILLECTOMY  child    TOTAL KNEE ARTHROPLASTY Left 10/24/2016    Dr. Rosio Antonio       Family History   Problem Relation Age of Onset    Heart Disease Mother     High Blood Pressure Mother     Obesity Mother     Heart Disease Father     Cancer Father         colon/prostate    Colon Cancer Father     Prostate Cancer Father      Subjective:     Review of Systems   Constitutional: Negative for activity change, appetite change, diaphoresis and fever. HENT: Negative. Eyes: Negative. Respiratory: Negative for cough, chest tightness and shortness of breath. Cardiovascular: Negative for chest pain, palpitations and leg swelling. Gastrointestinal: Negative for abdominal pain, blood in stool, constipation, diarrhea, nausea and vomiting. Genitourinary: Negative. Musculoskeletal: Positive for arthralgias, back pain and neck pain. Skin: Negative. Negative for rash. Neurological: Positive for numbness. Negative for dizziness, syncope, weakness, light-headedness and headaches (left arm).    Psychiatric/Behavioral: Negative.        :   /72 (Site: Right Upper Arm, Position: Sitting, Cuff Size: Large Adult)   Pulse 76   Temp 96.4 °F (35.8 °C) (Skin)   Resp 18 Ht 5' 11\" (1.803 m)   Wt 272 lb 2 oz (123.4 kg)   BMI 37.95 kg/m²   Wt Readings from Last 3 Encounters:   07/27/20 272 lb 2 oz (123.4 kg)   07/26/20 270 lb (122.5 kg)   06/25/20 269 lb (122 kg)     Physical Exam  Vitals signs and nursing note reviewed. Constitutional:       General: He is not in acute distress. Appearance: He is well-developed. He is not diaphoretic. HENT:      Head: Normocephalic and atraumatic. Eyes:      General: No scleral icterus. Right eye: No discharge. Left eye: No discharge. Conjunctiva/sclera: Conjunctivae normal.      Pupils: Pupils are equal, round, and reactive to light. Neck:      Musculoskeletal: Normal range of motion and neck supple. Thyroid: No thyromegaly. Vascular: No JVD. Cardiovascular:      Rate and Rhythm: Normal rate and regular rhythm. Heart sounds: Normal heart sounds. No murmur. Pulmonary:      Effort: Pulmonary effort is normal. No respiratory distress. Breath sounds: Normal breath sounds. No wheezing, rhonchi or rales. Abdominal:      General: Bowel sounds are normal. There is no distension. Palpations: Abdomen is soft. There is no mass. Tenderness: There is no abdominal tenderness. There is no guarding or rebound. Musculoskeletal:      Cervical back: He exhibits decreased range of motion (he has pain with extension and lateral rotation), pain and spasm. He exhibits no tenderness and no laceration. Lymphadenopathy:      Cervical: No cervical adenopathy. Skin:     General: Skin is warm and dry. Findings: No rash. Neurological:      Mental Status: He is alert and oriented to person, place, and time. Psychiatric:         Behavior: Behavior normal.       :       Diagnosis Orders   1. Essential hypertension     2.  DDD (degenerative disc disease), cervical         :      Requested Prescriptions     Signed Prescriptions Disp Refills    nitroGLYCERIN (NITROSTAT) 0.4 MG SL tablet 25 tablet 3 Sig: Place 1 tablet under the tongue every 5 minutes as needed for Chest pain    tiZANidine (ZANAFLEX) 4 MG tablet 30 tablet 0     Sig: Take 1 tablet by mouth every 8 hours as needed (neck pain and spasm)     Current Outpatient Medications   Medication Sig Dispense Refill    nitroGLYCERIN (NITROSTAT) 0.4 MG SL tablet Place 1 tablet under the tongue every 5 minutes as needed for Chest pain 25 tablet 3    tiZANidine (ZANAFLEX) 4 MG tablet Take 1 tablet by mouth every 8 hours as needed (neck pain and spasm) 30 tablet 0    metoprolol succinate (TOPROL XL) 25 MG extended release tablet TAKE 2 TABLETS BY MOUTH DAILY 180 tablet 0    losartan (COZAAR) 25 MG tablet TAKE 1 TABLET BY MOUTH ONE TIME A DAY 90 tablet 0    atorvastatin (LIPITOR) 40 MG tablet TAKE ONE TABLET BY MOUTH EVERY EVENING 90 tablet 3    isosorbide mononitrate (IMDUR) 30 MG extended release tablet TAKE 1 TABLET BY MOUTH ONE TIME A DAY 30 tablet 3    aspirin 81 MG chewable tablet CHEW AND SWALLOW 1 TABLET BY MOUTH ONE TIME A DAY 30 tablet 4    blood glucose test strips (PRODIGY NO CODING BLOOD GLUC) strip USE TO TEST BLOOD GLUCLOSE ONE TIME DAILY 100 each 1    pramipexole (MIRAPEX) 0.125 MG tablet TAKE 1 TABLET BY MOUTH ONCE DAILY AT NIGHT 90 tablet 1    buPROPion (WELLBUTRIN XL) 300 MG extended release tablet TAKE 1 TABLET BY MOUTH ONE TIME A DAY IN THE MORNING 90 tablet 1    bumetanide (BUMEX) 2 MG tablet Take 1 tablet by mouth daily Take 2 mg AM - 1 mg  tablet 3    allopurinol (ZYLOPRIM) 300 MG tablet TAKE 1 TABLET BY MOUTH ONCE DAILY.  90 tablet 0    omeprazole (PRILOSEC) 20 MG delayed release capsule TAKE 1 CAPSULE BY MOUTH ONE TIME A DAY 30 capsule 3    metFORMIN (GLUCOPHAGE) 500 MG tablet Take 1 tablet by mouth daily (with breakfast) 90 tablet 1    Blood Glucose Monitoring Suppl (PRODIGY AUTOCODE BLOOD GLUCOSE) w/Device KIT       ANORO ELLIPTA 62.5-25 MCG/INH AEPB inhaler INHALE 1 PUFF INTO THE LUNGS DAILY 1 each 11    fluticasone (FLONASE) 50 MCG/ACT nasal spray USE 2 SPRAYS IN EACH NOSTRIL DAILY AS NEEDED FOR RHINITIS OR ALLERGIES 48 g 0    BRILINTA 90 MG TABS tablet TAKE 1 TABLET BY MOUTH 2 TIMES A  tablet 4    albuterol sulfate HFA (VENTOLIN HFA) 108 (90 Base) MCG/ACT inhaler Inhale 2 puffs into the lungs every 6 hours as needed for Wheezing 1 Inhaler 3     No current facility-administered medications for this visit. No orders of the defined types were placed in this encounter. He needs to go back to [facility]. Will try muscle relaxants. Continue current medications. Return in about 4 months (around 11/27/2020), or if symptoms worsen or fail to improve, for HTN. Discussed use, benefit, and side effects of prescribed medications. All patient questions answered. Pt voiced understanding. Instructed to continue current medications,diet and exercise. Patient agreed with treatment plan.

## 2020-07-29 ENCOUNTER — HOSPITAL ENCOUNTER (OUTPATIENT)
Dept: PSYCHIATRY | Age: 60
Setting detail: THERAPIES SERIES
Discharge: HOME | End: 2020-07-29
Payer: COMMERCIAL

## 2020-07-29 PROCEDURE — 90834 PSYTX W PT 45 MINUTES: CPT

## 2020-07-29 NOTE — PROGRESS NOTES
Date of service: 7/29/2020  Sheri Profit of service: Therapeutic behavioral and/or psychosocial rehabilitation services provided via interactive videoconferencing  Type of service: psychotherapy  Client site (street address and [facility]):  [place] [address], [address] for session: Patient - Caffie Portal, and Clinician - Cindy Soriano [facility] site: [facility] [place]  [facility]    Consent:  Client and/or health care decision maker was informed of potential risks of receiving services via tele health not limited to but including:    -Clinical aspects of receiving treatment services via interactive videoconferencing    -Security considerations when receiving treatment services via interactive videoconferencing    -Confidentiality for individual and group counseling  Client and/or health care decision maker is aware that they may receive a bill for this tele health service, depending on insurance coverage, and has provided verbal consent to proceed. Documentation:  CSSR-S screening completed and documented in Epic. I communicated with the client and/or health care decision maker about his current stressors. Details of discussion: Patient discussed several health issues that have affected his family recently, and how he is currently spending time watching his grand children to help out. Patient discussed the continued stress and lack of meaningful conversation in his marriage. Processed with patient what he would like to see happen with his relationship to his wife Xenia Resendiz. I affirm this is a client-initiated episode with an established client. Time session started: 11:00am  Time session concluded: 11:45am    Client evaluated by a Virtual Visit (video visit) encounter to address concerns as mentioned above. A caregiver was present when appropriate.  Due to this being a TeleHealth encounter (During ZJTPF-33 public health emergency), Pursuant to the emergency declaration under the [address] and the [employer] Act, [facility] waiver authority and the Coronavirus Preparedness and [employer] Act, this Virtual Visit was conducted with patient's (and/or legal guardian's) consent, to reduce the patient's risk of exposure to COVID-19 and provide necessary medical care. The patient (and/or legal guardian) has also been advised to contact this facility for worsening conditions or problem and seek emergency medical treatment and/or call 911 if deemed necessary. Services were provided through a video synchronous discussion virtually to substitute for in-person visit.      Giovanni Sanchez [facility]

## 2020-08-03 RX ORDER — OMEPRAZOLE 20 MG/1
CAPSULE, DELAYED RELEASE ORAL
Qty: 30 CAPSULE | Refills: 3 | Status: SHIPPED | OUTPATIENT
Start: 2020-08-03 | End: 2020-11-04

## 2020-08-03 NOTE — TELEPHONE ENCOUNTER
Date of last visit:  7/27/2020  Date of next visit:  11/30/2020    Requested Prescriptions     Pending Prescriptions Disp Refills    omeprazole (PRILOSEC) 20 MG delayed release capsule [Pharmacy Med Name: OMEPRAZOLE 20MG CPDR] 30 capsule 3     Sig: TAKE 1 CAPSULE BY MOUTH ONE TIME A DAY

## 2020-08-05 ENCOUNTER — HOSPITAL ENCOUNTER (OUTPATIENT)
Dept: PSYCHIATRY | Age: 60
Setting detail: THERAPIES SERIES
Discharge: HOME | End: 2020-08-05
Payer: COMMERCIAL

## 2020-08-05 PROCEDURE — 90834 PSYTX W PT 45 MINUTES: CPT

## 2020-08-05 NOTE — PROGRESS NOTES
Date of service: 8/5/2020  [facility] of service: Therapeutic behavioral and/or psychosocial rehabilitation services provided via interactive videoconferencing  Type of service: psychotherapy  Client site (street address and [facility]):   [place] [address], [address] for session:  Patient - Dalton Corrales, and Clinician - Janice Rodriguez [address] site: [facility] [place]  [facility]    Consent:  Client and/or health care decision maker was informed of potential risks of receiving services via tele health not limited to but including:    -Clinical aspects of receiving treatment services via interactive videoconferencing    -Security considerations when receiving treatment services via interactive videoconferencing    -Confidentiality for individual and group counseling  Client and/or health care decision maker is aware that they may receive a bill for this tele health service, depending on insurance coverage, and has provided verbal consent to proceed. Documentation:  CSSR-S screening completed and documented in Epic. I communicated with the client and/or health care decision maker about his current stressors. Details of discussion: Patient discussed the recent death of his aunt and his son's niece and how he has been busy watching his grand kids so that his son could help with the passing of his niece. Patient reported that these deaths did not affect him terribly and he has taken on an \"it is what it is\" attitude which has helped him grieve his aunt's death. Patient also discussed an upsetting text conversation with his wife during which he offered to come help her because of her sciatica acting up and she told him not to come over and to \"help himself\". Patient reported that he bluntly asked his wife if there was a chance of working on their marriage and she did not respond after that.  Patient discussed seeing the possibility that his wife does not want to work on their marriage and that he would like an answer either way. Patient stated that he is better at talking about his thoughts and feelings through writing and thinks he will write her a letter. I affirm this is a client-initiated episode with an established client. Time session started: 11:00am  Time session concluded: 11:45am    Client evaluated by a Virtual Visit (video visit) encounter to address concerns as mentioned above. A caregiver was present when appropriate. Due to this being a TeleHealth encounter (During YFNQQ-25 public health emergency), Pursuant to the emergency declaration under the [address] [place] [address], [address] [place] [address] authority and the [employer] and [employer] Act, this Virtual Visit was conducted with patient's (and/or legal guardian's) consent, to reduce the patient's risk of exposure to COVID-19 and provide necessary medical care. The patient (and/or legal guardian) has also been advised to contact this facility for worsening conditions or problem and seek emergency medical treatment and/or call 911 if deemed necessary. Services were provided through a video synchronous discussion virtually to substitute for in-person visit.      Arianne Jurado, [facility] [place]

## 2020-08-12 ENCOUNTER — HOSPITAL ENCOUNTER (OUTPATIENT)
Dept: PSYCHIATRY | Age: 60
Setting detail: THERAPIES SERIES
Discharge: HOME | End: 2020-08-12
Payer: COMMERCIAL

## 2020-08-12 PROCEDURE — 90834 PSYTX W PT 45 MINUTES: CPT

## 2020-08-12 NOTE — PROGRESS NOTES
Date of service: 8/12/2020  Leif Hall of service: Therapeutic behavioral and/or psychosocial rehabilitation services provided via interactive videoconferencing  Type of service: psychotherapy  Client site (street address and [facility]): [address] for session: Patient - Chiquita Gallegos, and Clinician - Emy Villalta, [address] site: [address], [facility], [place]    Consent:  Client and/or health care decision maker was informed of potential risks of receiving services via tele health not limited to but including:    -Clinical aspects of receiving treatment services via interactive videoconferencing    -Security considerations when receiving treatment services via interactive videoconferencing    -Confidentiality for individual and group counseling  Client and/or health care decision maker is aware that they may receive a bill for this tele health service, depending on insurance coverage, and has provided verbal consent to proceed. Documentation:  CSSR-S screening completed and documented in Epic. I communicated with the client and/or health care decision maker about his depression. Details of discussion: Patient discussed feeling overwhelmed after having a conversation with his wife about their relationship in which she said that if he wanted an immediate answer on whether or not they could work on their marriage the answer would be \"no\". Patient reported that he is also ill right now so he has been staying home and \"laying low\" which has helped him to not go out and \"do anything crazy\" like drinking heavily. I affirm this is a client-initiated episode with an established client. Time session started: 11:00am  Time session concluded: 11:45am    Client evaluated by a Virtual Visit (video visit) encounter to address concerns as mentioned above. A caregiver was present when appropriate.  Due to this being a TeleHealth encounter (During [facility]- public health emergency), Pursuant to the emergency declaration under the [address] [facility], [address] [place] [address] authority and the [employer] and [employer] Act, this Virtual Visit was conducted with patient's (and/or legal guardian's) consent, to reduce the patient's risk of exposure to COVID-19 and provide necessary medical care. The patient (and/or legal guardian) has also been advised to contact this facility for worsening conditions or problem and seek emergency medical treatment and/or call 911 if deemed necessary. Services were provided through a video synchronous discussion virtually to substitute for in-person visit.      Duy Richardson

## 2020-08-19 ENCOUNTER — HOSPITAL ENCOUNTER (OUTPATIENT)
Dept: PSYCHIATRY | Age: 60
Setting detail: THERAPIES SERIES
Discharge: HOME | End: 2020-08-19
Payer: COMMERCIAL

## 2020-08-19 PROCEDURE — 90834 PSYTX W PT 45 MINUTES: CPT

## 2020-08-19 NOTE — PROGRESS NOTES
Date of service: 8/19/2020  Loli Elizabeth of service: Therapeutic behavioral and/or psychosocial rehabilitation services provided via interactive videoconferencing  Type of service: psychotherapy  Client site (street address and [facility]):  [address] [place] for session: Patient - Hira Wagner, and Clinician - Oswaldo [facility], [address] site: [facility] [place] [facility]    Consent:  Client and/or health care decision maker was informed of potential risks of receiving services via tele health not limited to but including:    -Clinical aspects of receiving treatment services via interactive videoconferencing    -Security considerations when receiving treatment services via interactive videoconferencing    -Confidentiality for individual and group counseling  Client and/or health care decision maker is aware that they may receive a bill for this tele health service, depending on insurance coverage, and has provided verbal consent to proceed. Documentation:  CSSR-S screening completed and documented in Epic. I communicated with the client and/or health care decision maker about his current stressors. Details of discussion: Patient reported that he has been feeling ill and is currently staying at his son's house to watch his dog while he is gone. Patient discussed having another conversation with his estranged wife during which she confirmed that she has no desire for them to reconcile and remain . Patient reported that he is \"over\" feeling angry and is trying not to be resentful of his wife. Patient discussed feeling that his kids were taking her side when they heard the news but in hindsight recognized it was not \"as bad\" as he thought at the time. Patient reported he is still drinking in the evenings but that as long as he is not going out he feels that this is okay.  Patient reported that he is hopeful about the future and has been thinking about different opportunities that will be available to him. I affirm this is a client-initiated episode with an established client. Time session started: 11:00am  Time session concluded: 11:45am    Client evaluated by a Virtual Visit (video visit) encounter to address concerns as mentioned above. A caregiver was present when appropriate. Due to this being a TeleHealth encounter (During [facility]), Pursuant to the emergency declaration under the [address] [place] [address] and the [employer] and [employer] Act, this Virtual Visit was conducted with patient's (and/or legal guardian's) consent, to reduce the patient's risk of exposure to COVID-19 and provide necessary medical care. The patient (and/or legal guardian) has also been advised to contact this facility for worsening conditions or problem and seek emergency medical treatment and/or call 911 if deemed necessary. Services were provided through a video synchronous discussion virtually to substitute for in-person visit.      Ramesh Pantoja, [facility] [place]

## 2020-08-26 ENCOUNTER — HOSPITAL ENCOUNTER (OUTPATIENT)
Dept: PSYCHIATRY | Age: 60
Setting detail: THERAPIES SERIES
Discharge: HOME | End: 2020-08-26
Payer: COMMERCIAL

## 2020-08-26 PROCEDURE — 90834 PSYTX W PT 45 MINUTES: CPT

## 2020-08-26 NOTE — PROGRESS NOTES
Act, 1135 waiver authority and the Coronavirus Preparedness and Response Supplemental Appropriations Act, this Virtual Visit was conducted with patient's (and/or legal guardian's) consent, to reduce the patient's risk of exposure to COVID-19 and provide necessary medical care. The patient (and/or legal guardian) has also been advised to contact this facility for worsening conditions or problem and seek emergency medical treatment and/or call 911 if deemed necessary. Services were provided through a video synchronous discussion virtually to substitute for in-person visit.      Jose Hills, [facility] [place]

## 2020-08-27 RX ORDER — ASPIRIN 81 MG/1
TABLET, CHEWABLE ORAL
Qty: 30 TABLET | Refills: 4 | Status: SHIPPED | OUTPATIENT
Start: 2020-08-27 | End: 2020-11-04

## 2020-08-27 RX ORDER — ISOSORBIDE MONONITRATE 30 MG/1
TABLET, EXTENDED RELEASE ORAL
Qty: 30 TABLET | Refills: 3 | Status: SHIPPED | OUTPATIENT
Start: 2020-08-27 | End: 2020-11-04

## 2020-08-27 RX ORDER — UMECLIDINIUM BROMIDE AND VILANTEROL TRIFENATATE 62.5; 25 UG/1; UG/1
POWDER RESPIRATORY (INHALATION)
Qty: 1 EACH | Refills: 11 | Status: SHIPPED | OUTPATIENT
Start: 2020-08-27 | End: 2020-12-17 | Stop reason: SDUPTHER

## 2020-08-27 NOTE — TELEPHONE ENCOUNTER
Date of last visit:  7/27/2020  Date of next visit:  11/30/2020    Requested Prescriptions     Pending Prescriptions Disp Refills    metFORMIN (GLUCOPHAGE) 500 MG tablet [Pharmacy Med Name: METFORMIN HCL 500MG TABS] 90 tablet 1     Sig: TAKE 1 TABLET BY MOUTH ONCE DAILY WITH BREAKFAST.

## 2020-08-28 ENCOUNTER — VIRTUAL VISIT (OUTPATIENT)
Dept: PSYCHIATRY | Age: 60
End: 2020-08-28
Payer: COMMERCIAL

## 2020-08-28 PROBLEM — F33.1 MAJOR DEPRESSIVE DISORDER, RECURRENT EPISODE, MODERATE (HCC): Status: ACTIVE | Noted: 2020-08-28

## 2020-08-28 PROBLEM — F41.1 GAD (GENERALIZED ANXIETY DISORDER): Status: ACTIVE | Noted: 2020-08-28

## 2020-08-28 PROCEDURE — 90833 PSYTX W PT W E/M 30 MIN: CPT | Performed by: PHYSICIAN ASSISTANT

## 2020-08-28 PROCEDURE — 99213 OFFICE O/P EST LOW 20 MIN: CPT | Performed by: PHYSICIAN ASSISTANT

## 2020-08-28 RX ORDER — BUPROPION HYDROCHLORIDE 300 MG/1
300 TABLET ORAL EVERY MORNING
Qty: 90 TABLET | Refills: 1 | Status: SHIPPED | OUTPATIENT
Start: 2020-08-28 | End: 2020-11-04

## 2020-08-28 NOTE — PROGRESS NOTES
[facility]   Progress Note     Chief Complaint   Patient presents with    Depression    Anxiety    Follow-up          SUBJECTIVE:    Iris Quach is a 56 y.o. male who presents via telehealth video for a follow-up appointment for depression and anxiety. Was last seen on 07/08/2020    He reports that he has been busy since our last visit. He states that his wife has no plans to get back together with him and is planning on moving out. He states that he wants her to be happy. He is not sure what he is going to do about their house. He is considering renting it or moving back in but is on a fixed income. He states his mood has been coming and going. His depression is still there with what everything going on but it is not too overwhelming. He has been working on cutting back on his drinking. He has been drinking 1-2 beers night while watching hockey. He reports his sleep has improved. He has been sleeping in longer chunks. He is working on suppressing his appetite because he potentially has a gastric sleeve surgery in the future but has to go on a supervised diet for 90 days before he can get it. His motivation has been better. He has been doing projects for his mom around her house. His energy level is still poor throughout the day because he is deconditioned du to his medical issues. He continues to feel hopeless and helpless at times but he is hopeful for the future. He states he can see himself on the other side after everything with his wife is settled. He states his anxiety is still pretty high because he feels that a lot of his stressors especially with his wife are out of his control. He states his panic attacks have not been overwhelming. He reports a lot of his panic attacks are associated with medications, specifically Bumex. He states he gets feelings of tightness in his gut and feels off balance after he takes it.    He states a few weeks ago he watched his brother's dog and house for 8 days and felt it was really good to get away from everything going on. He has good family support. He states his mother and sister have been trying to help him out but he wants to do things on his own. He has been seeing [facility] for therapy. Feels it has been going well. He has been seeing her once a week but asked to see her twice a week while he is going through all of this. He feels the Wellbutrin is working well for him. He feels it has been been able to help him stay calm and less irritable. Denies recent and current suicidal ideation   Denies hallucinations  No evidence of delusions or overt psychosis on examination    OBJECTIVE  Level of consciousness:  Within normal limits  Appearance: Street clothes, seated on couch, with good grooming  Behavior/Motor: No abnormalities noted  Attitude toward examiner:  Cooperative, attentive, good eye contact  Speech:  Normal rate and volume  Mood:  Dysthymic  Affect:  reactive  Thought processes:  linear, goal directed and coherent  Thought content:  denies homicidal ideation  Suicidal Ideation:  denies suicidal ideation  Delusions:  no evidence of delusions  Perceptual Disturbance:  denies any perceptual disturbance  Cognition:  In tact  Memory: age appropriate  Insight & Judgement: fair  Medication side effects:  denies       Medications     Current Outpatient Medications:     buPROPion (WELLBUTRIN XL) 300 MG extended release tablet, Take 1 tablet by mouth every morning, Disp: 90 tablet, Rfl: 1    metFORMIN (GLUCOPHAGE) 500 MG tablet, TAKE 1 TABLET BY MOUTH ONCE DAILY WITH BREAKFAST., Disp: 90 tablet, Rfl: 1    aspirin 81 MG chewable tablet, CHEW AND SWALLOW 1 TABLET BY MOUTH ONCE DAILY. , Disp: 30 tablet, Rfl: 4    isosorbide mononitrate (IMDUR) 30 MG extended release tablet, TAKE 1 TABLET BY MOUTH ONCE DAILY. , Disp: 30 tablet, Rfl: 3    ANORO ELLIPTA 62.5-25 MCG/INH AEPB inhaler, INHALE 1 PUFF INTO THE LUNGS ONE TIME DAILY, Disp: 1 each, Rfl: 11    omeprazole (PRILOSEC) 20 MG delayed release capsule, TAKE 1 CAPSULE BY MOUTH ONE TIME A DAY, Disp: 30 capsule, Rfl: 3    nitroGLYCERIN (NITROSTAT) 0.4 MG SL tablet, Place 1 tablet under the tongue every 5 minutes as needed for Chest pain, Disp: 25 tablet, Rfl: 3    tiZANidine (ZANAFLEX) 4 MG tablet, Take 1 tablet by mouth every 8 hours as needed (neck pain and spasm), Disp: 30 tablet, Rfl: 0    metoprolol succinate (TOPROL XL) 25 MG extended release tablet, TAKE 2 TABLETS BY MOUTH DAILY, Disp: 180 tablet, Rfl: 0    losartan (COZAAR) 25 MG tablet, TAKE 1 TABLET BY MOUTH ONE TIME A DAY, Disp: 90 tablet, Rfl: 0    atorvastatin (LIPITOR) 40 MG tablet, TAKE ONE TABLET BY MOUTH EVERY EVENING, Disp: 90 tablet, Rfl: 3    blood glucose test strips (PRODIGY NO CODING BLOOD GLUC) strip, USE TO TEST BLOOD GLUCLOSE ONE TIME DAILY, Disp: 100 each, Rfl: 1    pramipexole (MIRAPEX) 0.125 MG tablet, TAKE 1 TABLET BY MOUTH ONCE DAILY AT NIGHT, Disp: 90 tablet, Rfl: 1    bumetanide (BUMEX) 2 MG tablet, Take 1 tablet by mouth daily Take 2 mg AM - 1 mg PM, Disp: 135 tablet, Rfl: 3    allopurinol (ZYLOPRIM) 300 MG tablet, TAKE 1 TABLET BY MOUTH ONCE DAILY. , Disp: 90 tablet, Rfl: 0    Blood Glucose Monitoring Suppl (PRODIGY AUTOCODE BLOOD GLUCOSE) w/Device KIT, , Disp: , Rfl:     fluticasone (FLONASE) 50 MCG/ACT nasal spray, USE 2 SPRAYS IN EACH NOSTRIL DAILY AS NEEDED FOR RHINITIS OR ALLERGIES, Disp: 48 g, Rfl: 0    BRILINTA 90 MG TABS tablet, TAKE 1 TABLET BY MOUTH 2 TIMES A DAY, Disp: 180 tablet, Rfl: 4    albuterol sulfate HFA (VENTOLIN HFA) 108 (90 Base) MCG/ACT inhaler, Inhale 2 puffs into the lungs every 6 hours as needed for Wheezing, Disp: 1 Inhaler, Rfl: 3       ASSESSMENT     Major depressive disorder, recurrent, moderate  Generalized anxiety disorder    PLAN   1. Will refill Wellbutrin 300mg daily for 90 days with 1 refill  2. Advised patient to continue to see Parker Pena for therapy. Follow up 4 weeks, sooner PRN    Electronically signed by Latonia Hughes PA-C on 8/28/2020 at 4:48 PM    More than 16 mins of the session was spent doing Supportive psychotherapy. Session lasted for 30 mins. Shabnam Trivedi is a 56 y.o. male being evaluated by a Virtual Visit (video visit) encounter to address concerns as mentioned above. A caregiver was present when appropriate. Due to this being a TeleHealth encounter (During [facility]-29 public health emergency), evaluation of the following organ systems was limited: Vitals/Constitutional/EENT/Resp/CV/GI//MS/Neuro/Skin/Heme-Lymph-Imm. Pursuant to the emergency declaration under the [address] [place] [address], [address] [place] [address] authority and the [employer] and [employer] Act, this Virtual Visit was conducted with patient's (and/or legal guardian's) consent, to reduce the patient's risk of exposure to COVID-19 and provide necessary medical care. The patient (and/or legal guardian) has also been advised to contact this office for worsening conditions or problems, and seek emergency medical treatment and/or call 911 if deemed necessary. Patient identification was verified at the start of the visit: Yes    Total time spent for this encounter: 30 minutes    Services were provided through a video synchronous discussion virtually to substitute for in-person clinic visit. Patient and provider were located at their individual homes. --Latonia Hughes PA-C on 8/28/2020 at 4:48 PM    An electronic signature was used to authenticate this note.

## 2020-09-02 ENCOUNTER — HOSPITAL ENCOUNTER (OUTPATIENT)
Dept: PSYCHIATRY | Age: 60
Setting detail: THERAPIES SERIES
Discharge: HOME | End: 2020-09-02
Payer: COMMERCIAL

## 2020-09-02 PROCEDURE — 90834 PSYTX W PT 45 MINUTES: CPT

## 2020-09-02 NOTE — PROGRESS NOTES
Date of service: 9/2/2020  [facility] of service: Therapeutic behavioral and/or psychosocial rehabilitation services provided via interactive videoconferencing  Type of service: psychotherapy  Client site (street address and [facility]):  Sergio Gilbert, Kishor Zambrano  Present for session:  Patient - Saul Fuller  Originating site[facility]du[facility]    Consent:  Client and/or health care decision maker was informed of potential risks of receiving services via tele health not limited to but including:    -Clinical aspects of receiving treatment services via interactive videoconferencing    -Security considerations when receiving treatment services via interactive videoconferencing    -Confidentiality for individual and group counseling  Client and/or health care decision maker is aware that they may receive a bill for this tele health service, depending on insurance coverage, and has provided verbal consent to proceed. Documentation:  CSSR-S screening completed and documented in Epic. I communicated with the client and/or health care decision maker about his substance abuse. Details of discussion: Patient reported that he was arrested over the weekend and charged after an incident at his house following a drinking episode. Patient reported that he had kathy at his son's house and his estranged wife was there; the situation became \"verbally tense\" and patient felt like he was being ostracized. Patient reported that he went to his home that he shares with his wife, instead of his mother's house where he has been staying, and drank a pint of alcohol. Patient reported that when his wife came home they proceeded to have an argument and she called their sons. Patient stated that the sons came over and someone called the police because they showed up as well. Processed with patient his decision to begin drinking and how he was hurt that someone in his family called the .  Patient discussed having a sit down meeting planned this evening with his wife and his eldest son to \"get some questions answered\" regarding their relationship. I affirm this is a client-initiated episode with an established client. Time session started: 11:00am  Time session concluded: 11:45am    Client evaluated by a Virtual Visit (video visit) encounter to address concerns as mentioned above. A caregiver was present when appropriate. Due to this being a TeleHealth encounter (During [facility] public health emergency), Pursuant to the emergency declaration under the [address] [place] [address], [address] [place] [address] authority and the [employer] and [employer] Act, this Virtual Visit was conducted with patient's (and/or legal guardian's) consent, to reduce the patient's risk of exposure to COVID-19 and provide necessary medical care. The patient (and/or legal guardian) has also been advised to contact this facility for worsening conditions or problem and seek emergency medical treatment and/or call 911 if deemed necessary. Services were provided through a video synchronous discussion virtually to substitute for in-person visit.      [facility]very [facility]

## 2020-09-04 ENCOUNTER — OFFICE VISIT (OUTPATIENT)
Dept: BARIATRICS/WEIGHT MGMT | Age: 60
End: 2020-09-04
Payer: COMMERCIAL

## 2020-09-04 VITALS
RESPIRATION RATE: 18 BRPM | TEMPERATURE: 98.1 F | HEIGHT: 71 IN | HEART RATE: 84 BPM | BODY MASS INDEX: 37.69 KG/M2 | WEIGHT: 269.2 LBS | DIASTOLIC BLOOD PRESSURE: 80 MMHG | SYSTOLIC BLOOD PRESSURE: 112 MMHG

## 2020-09-04 PROCEDURE — 3051F HG A1C>EQUAL 7.0%<8.0%: CPT | Performed by: SURGERY

## 2020-09-04 PROCEDURE — 99203 OFFICE O/P NEW LOW 30 MIN: CPT | Performed by: SURGERY

## 2020-09-06 ASSESSMENT — ENCOUNTER SYMPTOMS
EYE REDNESS: 0
EYE DISCHARGE: 0
ANAL BLEEDING: 0
RHINORRHEA: 0
VOMITING: 0
SINUS PRESSURE: 0
PHOTOPHOBIA: 0
BLOOD IN STOOL: 0
COLOR CHANGE: 0
CHEST TIGHTNESS: 0
COUGH: 0
CHOKING: 0
ABDOMINAL DISTENTION: 0
TROUBLE SWALLOWING: 0
SORE THROAT: 0
DIARRHEA: 0
SHORTNESS OF BREATH: 1
RECTAL PAIN: 0
FACIAL SWELLING: 0
ALLERGIC/IMMUNOLOGIC NEGATIVE: 1
EYE PAIN: 0
NAUSEA: 0
VOICE CHANGE: 0
APNEA: 0
CONSTIPATION: 0
ABDOMINAL PAIN: 0
EYE ITCHING: 0
BACK PAIN: 0
WHEEZING: 0
STRIDOR: 0

## 2020-09-06 NOTE — PROGRESS NOTES
Subjective:      Patient ID: Irving Dakins is a 56 y.o. male. Chief Complaint   Patient presents with    Bariatric, Initial Visit     New Patient 3 month req      HPI  Humberto Maradiaga is a 18-year-old male who presents for initial evaluation secondary to obesity. BMI 37. Current weight 269 pounds. He has multiple comorbid conditions including diabetes mellitus and sleep apnea. He has underwent cardiac bypass in 2016. Has some CHF. He admits he has tried to lose weight before with improving his nutrition but has never been able to do this long-term. He admits he is in need of help to further weight loss so as to try and improve some of his medical problems as he has many. Admits he does not do a daily or weekly physical activity regimen. He admits that increased weight is affecting him not only physically but also socially and mentally. He feels he needs to be able to get back and be active but cannot do this because of the excess weight. He is fearful of dying early because of being overweight and his multiple medical problems. Wife has a history of gastric bypass. Denies any current chest or abdominal pain. No hematochezia or melena. No new urinary complaints. He admits that if he is not able to lose enough adequate excess body weight with medical management only then he would like to proceed with a sleeve gastrectomy. Review of Systems   Constitutional: Positive for appetite change. Negative for activity change, chills, diaphoresis, fatigue, fever and unexpected weight change. HENT: Negative for congestion, dental problem, drooling, ear discharge, ear pain, facial swelling, hearing loss, mouth sores, nosebleeds, postnasal drip, rhinorrhea, sinus pressure, sneezing, sore throat, tinnitus, trouble swallowing and voice change. Eyes: Negative for photophobia, pain, discharge, redness, itching and visual disturbance. Respiratory: Positive for shortness of breath.  Negative for apnea, cough, choking, chest tightness, wheezing and stridor. Cardiovascular: Negative for chest pain, palpitations and leg swelling. Gastrointestinal: Negative for abdominal distention, abdominal pain, anal bleeding, blood in stool, constipation, diarrhea, nausea, rectal pain and vomiting. Endocrine: Negative. Genitourinary: Negative for decreased urine volume, difficulty urinating, discharge, dysuria, enuresis, flank pain, frequency, genital sores, hematuria, penile pain, penile swelling, scrotal swelling, testicular pain and urgency. Musculoskeletal: Negative for arthralgias, back pain, gait problem, joint swelling, myalgias, neck pain and neck stiffness. Skin: Negative for color change, pallor, rash and wound. Allergic/Immunologic: Negative. Neurological: Negative for dizziness, tremors, seizures, syncope, facial asymmetry, speech difficulty, weakness, light-headedness, numbness and headaches. Hematological: Negative for adenopathy. Bruises/bleeds easily. Psychiatric/Behavioral: Positive for dysphoric mood and sleep disturbance. Negative for agitation, behavioral problems, confusion, decreased concentration, hallucinations, self-injury and suicidal ideas. The patient is nervous/anxious. The patient is not hyperactive.         Past Medical History:   Diagnosis Date    CHF [facility]congestive heart failure) ([facility]     COPD (chronic obstructive pulmonary disease) ([facility]     Coronary artery disease involving native coronary artery of native heart without angina pectoris     Depression     Diabetes mellitus type 2, diet-controlled ([facility] 6/1/2018    GERD (gastroesophageal reflux disease) 3/21/2012    Hernia     Hx of blood clots 1990's    right knee due to injury    Hx of cocaine abuse ([facility]     Hyperglycemia 09/09    Hyperlipidemia     Hypertension     FAISAL on CPAP     Osteoarthritis 11/07    S/P CABG x 2 07/06/2016    S/P PTCA: 1/15/2018: Stent diagonal branch Xience 3.0 x 12 mm. 1/15/2018    1/15/2018: Stent diagonal branch Xience 3.0 x 12 mm. Dr. Meek Body injury 08/2015    Right thumb cut with table saw, no treatment needed       Past Surgical History:   Procedure Laterality Date    CARDIAC CATHETERIZATION  06/27/2016    CATARACT REMOVAL Right 2013    COLONOSCOPY  04/08    CORONARY ARTERY BYPASS GRAFT  07/06/2016    Double bypass, Dr. Abigail Aragon Right 02/2007    Neck    DIAGNOSTIC CARDIAC CATH LAB PROCEDURE      EYE SURGERY Right     Retinal surgery x 3    HERNIA REPAIR Right     Inguinal    PTCA  01/15/2018    ROTATOR CUFF REPAIR Right 11/16/2017    TONSILLECTOMY  child    TOTAL KNEE ARTHROPLASTY Left 10/24/2016    Dr. Alexys Jones       Current Outpatient Medications   Medication Sig Dispense Refill    metFORMIN (GLUCOPHAGE) 500 MG tablet TAKE 1 TABLET BY MOUTH ONCE DAILY WITH BREAKFAST. 90 tablet 1    buPROPion (WELLBUTRIN XL) 300 MG extended release tablet Take 1 tablet by mouth every morning 90 tablet 1    aspirin 81 MG chewable tablet CHEW AND SWALLOW 1 TABLET BY MOUTH ONCE DAILY. 30 tablet 4    isosorbide mononitrate (IMDUR) 30 MG extended release tablet TAKE 1 TABLET BY MOUTH ONCE DAILY.  30 tablet 3    ANORO ELLIPTA 62.5-25 MCG/INH AEPB inhaler INHALE 1 PUFF INTO THE LUNGS ONE TIME DAILY 1 each 11    omeprazole (PRILOSEC) 20 MG delayed release capsule TAKE 1 CAPSULE BY MOUTH ONE TIME A DAY 30 capsule 3    nitroGLYCERIN (NITROSTAT) 0.4 MG SL tablet Place 1 tablet under the tongue every 5 minutes as needed for Chest pain 25 tablet 3    metoprolol succinate (TOPROL XL) 25 MG extended release tablet TAKE 2 TABLETS BY MOUTH DAILY 180 tablet 0    losartan (COZAAR) 25 MG tablet TAKE 1 TABLET BY MOUTH ONE TIME A DAY 90 tablet 0    blood glucose test strips (PRODIGY NO CODING BLOOD GLUC) strip USE TO TEST BLOOD GLUCLOSE ONE TIME DAILY 100 each 1    pramipexole (MIRAPEX) 0.125 MG tablet TAKE 1 TABLET BY MOUTH ONCE DAILY AT NIGHT 90 tablet 1    bumetanide (BUMEX) 2 MG tablet Take 1 tablet by mouth daily Take 2 mg AM - 1 mg  tablet 3    allopurinol (ZYLOPRIM) 300 MG tablet TAKE 1 TABLET BY MOUTH ONCE DAILY. 90 tablet 0    Blood Glucose Monitoring Suppl (PRODIGY AUTOCODE BLOOD GLUCOSE) w/Device KIT       fluticasone (FLONASE) 50 MCG/ACT nasal spray USE 2 SPRAYS IN EACH NOSTRIL DAILY AS NEEDED FOR RHINITIS OR ALLERGIES 48 g 0    BRILINTA 90 MG TABS tablet TAKE 1 TABLET BY MOUTH 2 TIMES A  tablet 4    albuterol sulfate HFA (VENTOLIN HFA) 108 (90 Base) MCG/ACT inhaler Inhale 2 puffs into the lungs every 6 hours as needed for Wheezing 1 Inhaler 3     No current facility-administered medications for this visit. Allergies   Allergen Reactions    Zoloft [Sertraline Hcl] Other (See Comments)     Headaches, sweats, bad dreams       Family History   Problem Relation Age of Onset    Heart Disease Mother     High Blood Pressure Mother     Obesity Mother     Diabetes Mother     Heart Disease Father     Cancer Father         colon/prostate    Colon Cancer Father     Prostate Cancer Father     Arthritis Father     Diabetes Brother        Social History     Socioeconomic History    Marital status:      Spouse name: Not on file    Number of children: Not on file    Years of education: Not on file    Highest education level: Not on file   Occupational History    Not on file   Social Needs    Financial resource strain: Not on file    Food insecurity     Worry: Not on file     Inability: Not on file    Transportation needs     Medical: Not on file     Non-medical: Not on file   Tobacco Use    Smoking status: Former Smoker     Packs/day: 2.00     Years: 25.00     Pack years: 50.00     Types: Cigars     Last attempt to quit: 2014     Years since quittin.8    Smokeless tobacco: Never Used   Substance and Sexual Activity    Alcohol use:  Yes     Alcohol/week: 3.0 standard drinks     Types: 3 Cans of beer per week     Comment: 1 every day    Drug use: Yes     Frequency: 5.0 times per week     Types: Marijuana     Comment: smokes pot once sushma while . former drug user, addiction treatment at [facility]    Sexual activity: Yes     Partners: Female   Lifestyle    Physical activity     Days per week: Not on file     Minutes per session: Not on file    Stress: Not on file   Relationships    Social connections     Talks on phone: Not on file     Gets together: Not on file     Attends [employer] service: Not on file     Active member of club or organization: Not on file     Attends meetings of clubs or organizations: Not on file     Relationship status: Not on file    Intimate partner violence     Fear of current or ex partner: Not on file     Emotionally abused: Not on file     Physically abused: Not on file     Forced sexual activity: Not on file   Other Topics Concern    Not on file   Social History Narrative    Not on file     Vitals:    09/04/20 1140   BP: 112/80   Pulse: 84   Resp: 18   Temp: 98.1 °F (36.7 °C)     Body mass index is 37.55 kg/m². Objective:   Physical Exam  Constitutional:       General: He is not in acute distress. Appearance: He is well-developed. He is not ill-appearing, toxic-appearing or diaphoretic. HENT:      Head: Normocephalic and atraumatic. Not macrocephalic and not microcephalic. No raccoon eyes, Benjamin's sign, abrasion, contusion, right periorbital erythema, left periorbital erythema or laceration. Right Ear: External ear normal.      Left Ear: External ear normal.      Nose: Nose normal.      Mouth/Throat:      Pharynx: No oropharyngeal exudate. Eyes:      General: No scleral icterus. Right eye: No discharge. Left eye: No discharge. Conjunctiva/sclera: Conjunctivae normal.      Pupils: Pupils are equal, round, and reactive to light. Neck:      Musculoskeletal: Full passive range of motion without pain, normal range of motion and neck supple. Thyroid: No thyroid mass or thyromegaly.  07/26/2020    MPV 10.9 07/26/2020    RBCMORP NORMAL 09/11/2016    SEGSPCT 59.0 07/26/2020    LABLYMP 25.7 07/26/2020    MONOPCT 11.9 07/26/2020    LABEOS 1.7 07/26/2020    BASO 1.0 07/26/2020    NRBC 0 07/26/2020    SEGSABS 4.5 07/26/2020    LYMPHSABS 2.0 07/26/2020    MONOSABS 0.9 07/26/2020    EOSABS 0.1 07/26/2020    BASOSABS 0.1 07/26/2020      BMP/CMP   Lab Results   Component Value Date    GLUCOSE 157 07/26/2020    GLUCOSE 115 02/04/2012    CREATININE 0.8 07/26/2020    BUN 12 07/26/2020     07/26/2020    K 3.7 07/26/2020     07/26/2020    CO2 23 07/26/2020    CALCIUM 9.2 07/26/2020    AST 41 03/10/2020    ALKPHOS 94 03/10/2020    PROT 7.2 03/10/2020    LABALBU 4.6 03/10/2020    LABALBU 4.5 02/04/2012    BILITOT 0.6 03/10/2020    BILITOT NEGATIVE 11/08/2017    ALT 57 03/10/2020      PREALBUMIN   Lab Results   Component Value Date    PREALBUMIN 27.4 07/01/2016      VITAMIN B12   Lab Results   Component Value Date    VIXHZOUB28 500 03/10/2020      VITAMIN D   No results found for: VITD25   PTH  No results found for: IPTH  VITAMIN B1/ THIAMINE   No results found for: HQLD4QHCKTC   LIPID SCREEN (FASTING)   Lab Results   Component Value Date    CHOL 165 08/09/2019    TRIG 181 08/09/2019    HDL 44 08/09/2019    LDLCALC 85 08/09/2019   ,   HGA1C (T2DM ONLY)   Lab Results   Component Value Date    LABA1C 7.2 06/03/2020    AVGG 132 09/07/2017      TSH   Lab Results   Component Value Date    TSH 1.790 03/10/2020      IRON   No results found for: IRON   TIBC  No results found for: TIBC  FERRITIN  No results found for: FERRITIN  VITAMIN A  No results found for: RETINOL  NICOTINE  No results found for: NMET  UDS  No results found for: UDP  PSA  No results found for: LABPSA  GFR  Lab Results   Component Value Date    LABGLOM >90 07/26/2020     DEXA  No results found for this or any previous visit.     Imaging - none    Patient Active Problem List   Diagnosis    GERD (gastroesophageal reflux disease)  HTN (hypertension)    Osteoarthritis    Drug abuse in remission ([facility]    Obstructive sleep apnea on CPAP    Coronary artery disease involving native coronary artery of native heart without angina pectoris    S/P CABG x 2    Aftercare following joint replacement surgery    Bradycardia    S/P PTCA: 1/15/2018: Stent diagonal branch Xience 3.0 x 12 mm.  Chronic obstructive pulmonary disease (HCC)    Diabetes mellitus type 2, diet-controlled (HCC)    Obesity (BMI 30-39. 9)    Chronic diastolic heart failure (HCC)    Class 2 severe obesity due to excess calories with serious comorbidity and body mass index (BMI) of 37.0 to 37.9 in adult [facility])    Major depressive disorder, recurrent episode, moderate (HCC)    GABY (generalized anxiety disorder)     Assessment:      1. Obesity (BMI 37)  2. Sleep apnea  3. COPD  4. Diabetes mellitus  5. Anxiety  6. Depression  7. Hypercholesterolemia  8. Hypertension  9. GERD  10. Coronary disease  . CHF      Plan:      1. Long discussion about the pros and cons of weight loss surgery. The risks benefits and alternatives to laparoscopic adjustable band, gastric sleeve and gastric Casandra-en-Y bypass were discussed in detail. The pros and cons of robotic assisted, laparoscopic and open techniques were discussed. 2.  Behavior modification discussed in detail in regards to dietary habits. 3.  Nutritional education occurred during visit. Will set up a consultation/evaluation with dietitian for further evaluation. 4.  Options for medical management of morbid obesity discussed. 5.  Improvement in fitness/exercise discussed with patient and the need for this with/without surgery. 6.  Obtain medical necessity letter from PCP as needed. 7.  Follow-up in one month at weight management program at [address] [place] [address]. 8.  Signs and symptoms reviewed with patient that would be concerning and need him to return to office for re-evaluation.  Patient states he will call if he has questions or concerns. 9. Multivitamin  10. Psychology evaluation  11. EGD prior to any surgical intervention  12. Encouraged support groups  13. Encouraged Naturally Slim/Rev It Up  14. Discussed the pros and cons along with possible side effects/complications of weight loss medications. All questions answered. Patient states that if he is not able to lose enough adequate excess body weight with medical management only then he would be like to proceed with surgical intervention such as sleeve gastrectomy/gastric bypass for further weight loss. More than 30 minutes spent with patient today. Greater than 50% of the time was involved counseling, educaton and coordinating care.             Cecile Weinstein MD

## 2020-09-09 ENCOUNTER — HOSPITAL ENCOUNTER (OUTPATIENT)
Dept: PSYCHIATRY | Age: 60
Setting detail: THERAPIES SERIES
Discharge: HOME | End: 2020-09-09
Payer: COMMERCIAL

## 2020-09-09 PROCEDURE — 90834 PSYTX W PT 45 MINUTES: CPT

## 2020-09-09 NOTE — PROGRESS NOTES
Date of service: 9/9/2020  Kortney Corey of service: Therapeutic behavioral and/or psychosocial rehabilitation services provided via interactive videoconferencing  Type of service:  psychotherapy  Client site (street address and city):  [address], [address]  Present for session: Patient - Alyse Ramos  Originating site: [facility] [place], [address] [facility]    Consent:  Client and/or health care decision maker was informed of potential risks of receiving services via tele health not limited to but including:    -Clinical aspects of receiving treatment services via interactive videoconferencing    -Security considerations when receiving treatment services via interactive videoconferencing    -Confidentiality for individual and group counseling  Client and/or health care decision maker is aware that they may receive a bill for this tele health service, depending on insurance coverage, and has provided verbal consent to proceed. Documentation:  CSSR-S screening completed and documented in Epic. I communicated with the client and/or health care decision maker about his depression. Details of discussion: Patient discussed the sadness that he has been experiencing since his wife moved out of their home and got her own apartment. Patient reported that he had continued to drink after his arrest but set a date to stop drinking and so far has been sober for the past two days. Processed with patient his motivation for sobriety and what supports he has in place for when he is triggered by the situation with his wife. I affirm this is a client-initiated episode with an established client. Time session started: 11:00am  Time session concluded: 11:45am    Client evaluated by a Virtual Visit (video visit) encounter to address concerns as mentioned above. A caregiver was present when appropriate.  Due to this being a TeleHealth encounter (During WTCGW-19 public health emergency), Pursuant to the emergency declaration under the [facility] [address] waiver authority and the [employer] and [employer] Act, this Virtual Visit was conducted with patient's (and/or legal guardian's) consent, to reduce the patient's risk of exposure to COVID-19 and provide necessary medical care. The patient (and/or legal guardian) has also been advised to contact this facility for worsening conditions or problem and seek emergency medical treatment and/or call 911 if deemed necessary. Services were provided through a video synchronous discussion virtually to substitute for in-person visit.      Al Storey, [facility]

## 2020-09-16 ENCOUNTER — HOSPITAL ENCOUNTER (OUTPATIENT)
Dept: PSYCHIATRY | Age: 60
Setting detail: THERAPIES SERIES
Discharge: HOME | End: 2020-09-16
Payer: COMMERCIAL

## 2020-09-16 PROCEDURE — 90834 PSYTX W PT 45 MINUTES: CPT

## 2020-09-16 NOTE — PROGRESS NOTES
Date of service: 9/16/2020  [facility] Luis Fernando of service: Therapeutic behavioral and/or psychosocial rehabilitation services provided via interactive videoconferencing  Type of service: psychotherapy  Client site (street address and city):  [address], [facility]   Present for session: Patient - Holden Neely  Originating site: [facility] [place], [facility], [place]    Consent:  Client and/or health care decision maker was informed of potential risks of receiving services via tele health not limited to but including:    -Clinical aspects of receiving treatment services via interactive videoconferencing    -Security considerations when receiving treatment services via interactive videoconferencing    -Confidentiality for individual and group counseling  Client and/or health care decision maker is aware that they may receive a bill for this tele health service, depending on insurance coverage, and has provided verbal consent to proceed. Documentation:  CSSR-S screening completed and documented in Epic. I communicated with the client and/or health care decision maker about his depression. Details of discussion: Patient discussed having a relapse and \"slamming beers\" last night when he felt triggered by his wife being at the house to get some things while he was with their sons. Processed with patient how he is not as \"over\" his relationship with his wife as he likes to think he is. Discussed with patient motivation to regain sobriety and what barriers he sees to stopping his drinking. I affirm this is a client-initiated episode with an established client. Time session started: 11:00am  Time session concluded: 11:45am    Client evaluated by a Virtual Visit (video visit) encounter to address concerns as mentioned above. A caregiver was present when appropriate.  Due to this being a TeleHealth encounter (During JMJGO-37 public health emergency), Pursuant to the emergency declaration under the [employer] and the National Emergencies Act, [facility] waiver authority and the [employer] and [employer] Act, this Virtual Visit was conducted with patient's (and/or legal guardian's) consent, to reduce the patient's risk of exposure to COVID-19 and provide necessary medical care. The patient (and/or legal guardian) has also been advised to contact this facility for worsening conditions or problem and seek emergency medical treatment and/or call 911 if deemed necessary. Services were provided through a video synchronous discussion virtually to substitute for in-person visit.      Arianne Jurado, [facility]

## 2020-09-23 ENCOUNTER — HOSPITAL ENCOUNTER (OUTPATIENT)
Dept: PSYCHIATRY | Age: 60
Setting detail: THERAPIES SERIES
Discharge: HOME | End: 2020-09-23
Payer: COMMERCIAL

## 2020-09-23 PROCEDURE — 90834 PSYTX W PT 45 MINUTES: CPT

## 2020-09-23 NOTE — PROGRESS NOTES
Date of service: 9/23/2020  [place] of service: Therapeutic behavioral and/or psychosocial rehabilitation services provided via interactive videoconferencing  Type of service: psychotherapy  Client site (street address and city):  [address], [place] [address]  Present for session: Patient - Laverne Mckinley site: [place], [place] [facility] [facility]    Consent:  Client and/or health care decision maker was informed of potential risks of receiving services via tele health not limited to but including:    -Clinical aspects of receiving treatment services via interactive videoconferencing    -Security considerations when receiving treatment services via interactive videoconferencing    -Confidentiality for individual and group counseling  Client and/or health care decision maker is aware that they may receive a bill for this tele health service, depending on insurance coverage, and has provided verbal consent to proceed. Documentation:  CSSR-S screening completed and documented in Epic. I communicated with the client and/or health care decision maker about his depression. Details of discussion: Patient discussed his continued substance abuse and barriers to moving forward with sobriety and medical concerns. Processed with patient his denial regarding his drinking and being stuck in the stage of being aware that it is a problem but not being willing to stop yet. Patient also discussed rationalizing his behavior while drunk and the drinking. I affirm this is a client-initiated episode with an established client. Time session started: 11:00am  Time session concluded: 11:45am    Client evaluated by a Virtual Visit (video visit) encounter to address concerns as mentioned above. A caregiver was present when appropriate.  Due to this being a TeleHealth encounter (During RWYOG-22 public health emergency), Pursuant to the emergency declaration under the [address] [facility] waiver authority and the [employer] and [employer] Act, this Virtual Visit was conducted with patient's (and/or legal guardian's) consent, to reduce the patient's risk of exposure to COVID-19 and provide necessary medical care. The patient (and/or legal guardian) has also been advised to contact this facility for worsening conditions or problem and seek emergency medical treatment and/or call 911 if deemed necessary. Services were provided through a video synchronous discussion virtually to substitute for in-person visit.      Joe Aguila [facility]

## 2020-09-30 ENCOUNTER — HOSPITAL ENCOUNTER (OUTPATIENT)
Dept: PSYCHIATRY | Age: 60
Setting detail: THERAPIES SERIES
Discharge: HOME | End: 2020-09-30
Payer: COMMERCIAL

## 2020-09-30 PROCEDURE — 90834 PSYTX W PT 45 MINUTES: CPT

## 2020-09-30 NOTE — PROGRESS NOTES
Date of service: 9/30/2020  Tayla Judd of service: Therapeutic behavioral and/or psychosocial rehabilitation services provided via interactive videoconferencing  Type of service: psychotherapy  Client site (street address and city):  [address], [address]  Present for session: Patient- Junella Bosworth; Clinician- Maria Díaz  Originating site: [facility] Shaye [place], [address]""    Consent:  Client and/or health care decision maker was informed of potential risks of receiving services via tele health not limited to but including:    -Clinical aspects of receiving treatment services via interactive videoconferencing    -Security considerations when receiving treatment services via interactive videoconferencing    -Confidentiality for individual and group counseling  Client and/or health care decision maker is aware that they may receive a bill for this tele health service, depending on insurance coverage, and has provided verbal consent to proceed. Documentation:  CSSR-S screening completed and documented in Epic. I communicated with the client and/or health care decision maker about his depression. Details of discussion: Patient discussed reasons for seeking out psychotherapy including his separation from his wife, his alcohol use, and feeling isolated from his family and friends. Patient reported he is still drinking heavily and has been trying to adjust to his separation. Patient reports he wishes to stop drinking and feels that if he is able to control his drinking then he can reconnect with his wife and can have surgery to shrink his stomach. I affirm this is a client-initiated episode with an established client. Time session started: 11:00am  Time session concluded: 11:45am    Client evaluated by a Virtual Visit (video visit) encounter to address concerns as mentioned above. A caregiver was present when appropriate.  Due to this being a TeleHealth encounter (During SIDTP-27 public health emergency), Pursuant to the emergency declaration under the [address] [facility], [address] [place] [address] authority and the [employer] and [employer] Act, this Virtual Visit was conducted with patient's (and/or legal guardian's) consent, to reduce the patient's risk of exposure to COVID-19 and provide necessary medical care. The patient (and/or legal guardian) has also been advised to contact this facility for worsening conditions or problem and seek emergency medical treatment and/or call 911 if deemed necessary. Services were provided through a video synchronous discussion virtually to substitute for in-person visit.      Ludwig Garza CT

## 2020-10-02 RX ORDER — LOSARTAN POTASSIUM 25 MG/1
TABLET ORAL
Qty: 90 TABLET | Refills: 0 | Status: SHIPPED | OUTPATIENT
Start: 2020-10-02 | End: 2020-11-04

## 2020-10-02 RX ORDER — METOPROLOL SUCCINATE 25 MG/1
TABLET, EXTENDED RELEASE ORAL
Qty: 180 TABLET | Refills: 0 | Status: SHIPPED | OUTPATIENT
Start: 2020-10-02 | End: 2020-11-04

## 2020-10-02 RX ORDER — ALLOPURINOL 300 MG/1
TABLET ORAL
Qty: 90 TABLET | Refills: 0 | Status: SHIPPED | OUTPATIENT
Start: 2020-10-02 | End: 2020-12-07

## 2020-10-07 ENCOUNTER — HOSPITAL ENCOUNTER (OUTPATIENT)
Dept: PSYCHIATRY | Age: 60
Setting detail: THERAPIES SERIES
Discharge: HOME | End: 2020-10-07
Payer: COMMERCIAL

## 2020-10-07 PROCEDURE — 90834 PSYTX W PT 45 MINUTES: CPT

## 2020-10-07 NOTE — PROGRESS NOTES
Date of service: 10/7/2020  Ludin Alicia of service: Therapeutic behavioral and/or psychosocial rehabilitation services provided via interactive videoconferencing  Type of service: psychotherapy  Client site (street address and city):  [facility] [address] [place]  Present for session: Patient- Cherise Han; Clinician- Adin Carlisle  Originating site: [address] [place], [place] [address] [address]    Consent:  Client and/or health care decision maker was informed of potential risks of receiving services via tele health not limited to but including:    -Clinical aspects of receiving treatment services via interactive videoconferencing    -Security considerations when receiving treatment services via interactive videoconferencing    -Confidentiality for individual and group counseling  Client and/or health care decision maker is aware that they may receive a bill for this tele health service, depending on insurance coverage, and has provided verbal consent to proceed. Documentation:  CSSR-S screening completed and documented in Epic. I communicated with the client and/or health care decision maker about his depression. Details of discussion: Patient discussed feeling like he is beginning to adjust to his separation of his wife. Patient reported he would like to begin to work on himself and stop drinking soon. Processed with patient that he wants to stop so he will have better quality of life and that he has the ability to stop when he is fully ready to. Patient reported his stop date is Monday, October 19th. I affirm this is a client-initiated episode with an established client. Time session started: 11:00am  Time session concluded: 11:45am    Client evaluated by a Virtual Visit (video visit) encounter to address concerns as mentioned above. A caregiver was present when appropriate.  Due to this being a TeleHealth encounter (During CKXOU-62 public health emergency), Pursuant to the emergency declaration under the [address] and the [address] [place] [facility] [employer] authority and the Coronavirus Preparedness and [employer] Act, this Virtual Visit was conducted with patient's (and/or legal guardian's) consent, to reduce the patient's risk of exposure to COVID-19 and provide necessary medical care. The patient (and/or legal guardian) has also been advised to contact this facility for worsening conditions or problem and seek emergency medical treatment and/or call 911 if deemed necessary. Services were provided through a video synchronous discussion virtually to substitute for in-person visit.      Adin Carlisle CT

## 2020-10-14 ENCOUNTER — HOSPITAL ENCOUNTER (OUTPATIENT)
Dept: PSYCHIATRY | Age: 60
Setting detail: THERAPIES SERIES
Discharge: HOME | End: 2020-10-14
Payer: COMMERCIAL

## 2020-10-14 PROCEDURE — 90834 PSYTX W PT 45 MINUTES: CPT

## 2020-10-14 NOTE — PROGRESS NOTES
Date of service: 10/14/2020  Michael López of service: Therapeutic behavioral and/or psychosocial rehabilitation services provided via interactive videoconferencing  Type of service:  psychotherapy  Client site (street address and city):  [address] [place]  Present for session: Patient- Hoke Cable; Clinician- Fernando Workman  Originating site: [facility] [place] [facility]    Consent:  Client and/or health care decision maker was informed of potential risks of receiving services via tele health not limited to but including:    -Clinical aspects of receiving treatment services via interactive videoconferencing    -Security considerations when receiving treatment services via interactive videoconferencing    -Confidentiality for individual and group counseling  Client and/or health care decision maker is aware that they may receive a bill for this tele health service, depending on insurance coverage, and has provided verbal consent to proceed. Documentation:  CSSR-S screening completed and documented in Epic. I communicated with the client and/or health care decision maker about his depression. Details of discussion: Patient discussed his frustration over his separation from his wife. Patient reported \"it has been a rough week\". Patient reported feeling like he has been portrayed as the bad christina to his family, angry at the slow process of getting his surgery done, and saddened that he will soon have to tell his mother to stop driving for her own safety. Processed with patient the upcoming IOP program and how he may find some comfort going through the program.      I affirm this is a client-initiated episode with an established client. Time session started: 11:00 am  Time session concluded: 11:45 am    Client evaluated by a Virtual Visit (video visit) encounter to address concerns as mentioned above. A caregiver was present when appropriate.  Due to this being a TeleHealth encounter (During JCUXX-07 public health emergency), Pursuant to the emergency declaration under the [address] [facility], [address] [place] [address] authority and the [employer] and [employer] Act, this Virtual Visit was conducted with patient's (and/or legal guardian's) consent, to reduce the patient's risk of exposure to COVID-19 and provide necessary medical care. The patient (and/or legal guardian) has also been advised to contact this facility for worsening conditions or problem and seek emergency medical treatment and/or call 911 if deemed necessary. Services were provided through a video synchronous discussion virtually to substitute for in-person visit.      Cassandra León CT

## 2020-10-23 ENCOUNTER — HOSPITAL ENCOUNTER (OUTPATIENT)
Dept: PSYCHIATRY | Age: 60
Setting detail: THERAPIES SERIES
Discharge: HOME | End: 2020-10-23
Payer: COMMERCIAL

## 2020-10-29 ENCOUNTER — VIRTUAL VISIT (OUTPATIENT)
Dept: PSYCHIATRY | Age: 60
End: 2020-10-29
Payer: COMMERCIAL

## 2020-10-29 PROCEDURE — 99213 OFFICE O/P EST LOW 20 MIN: CPT | Performed by: PHYSICIAN ASSISTANT

## 2020-10-29 PROCEDURE — 90833 PSYTX W PT W E/M 30 MIN: CPT | Performed by: PHYSICIAN ASSISTANT

## 2020-10-29 RX ORDER — ATORVASTATIN CALCIUM 40 MG/1
40 TABLET, FILM COATED ORAL DAILY
COMMUNITY
Start: 2020-10-01 | End: 2020-12-17 | Stop reason: SDUPTHER

## 2020-10-29 NOTE — PROGRESS NOTES
staying asleep. He states he has been sleeping in his recliner because he has been having trouble breathing laying flat on his water bed. He reports his appetite has been good. He is currently doing the keto diet. He states he has lost some weight recently because his pants are more loose. His motivation has been somewhat poor due to his recent illness. His energy level is still poor throughout the day because he is currently sick and is deconditioned from his medical issues. He continues to feel hopeless and helpless at times but he is overall hopeful for the future. He states his anxiety comes and goes like the depression. He reports initially when him and his wife  it was worse. He states \"Im not suffering any more anxiety than most people. \"    He states his panic attacks have not been bad recently. He has good family support. He states his mother and sister have been trying to help him out but he feels he needs to do things on his own. He has been seeing Tanika Berg for therapy. He was supposed to start IOP on 10/19/2020 but could not start. He feels the Wellbutrin is working well for him. He feels it has been been able to help him to \"take the rough edge off of my personality. If I lose something it doesn't drive me insane. \"   Denies recent and current suicidal ideation   Denies hallucinations  No evidence of delusions or overt psychosis on examination       OBJECTIVE  Level of consciousness:  Within normal limits  Appearance: unable to assess  Behavior/Motor: unable to assess  Attitude toward examiner:  Cooperative, attentive  Speech:  Normal rate and volume   Mood:  \"Okay\"   Affect:  mood congruent  Thought processes:  linear, goal directed and coherent  Thought content:  denies homicidal ideation  Suicidal Ideation:  denies suicidal ideation  Delusions:  no evidence of delusions  Perceptual Disturbance:  denies any perceptual disturbance  Cognition:  In tact  Memory: age appropriate  Insight & Judgement: fair  Medication side effects:  denies       Medications       Current Outpatient Medications:     metoprolol succinate (TOPROL XL) 25 MG extended release tablet, TAKE 2 TABLETS BY MOUTH DAILY. , Disp: 180 tablet, Rfl: 0    allopurinol (ZYLOPRIM) 300 MG tablet, TAKE 1 TABLET BY MOUTH ONCE DAILY. , Disp: 90 tablet, Rfl: 0    losartan (COZAAR) 25 MG tablet, TAKE 1 TABLET BY MOUTH ONE TIME A DAY., Disp: 90 tablet, Rfl: 0    metFORMIN (GLUCOPHAGE) 500 MG tablet, TAKE 1 TABLET BY MOUTH ONCE DAILY WITH BREAKFAST., Disp: 90 tablet, Rfl: 1    buPROPion (WELLBUTRIN XL) 300 MG extended release tablet, Take 1 tablet by mouth every morning, Disp: 90 tablet, Rfl: 1    aspirin 81 MG chewable tablet, CHEW AND SWALLOW 1 TABLET BY MOUTH ONCE DAILY. , Disp: 30 tablet, Rfl: 4    isosorbide mononitrate (IMDUR) 30 MG extended release tablet, TAKE 1 TABLET BY MOUTH ONCE DAILY. , Disp: 30 tablet, Rfl: 3    ANORO ELLIPTA 62.5-25 MCG/INH AEPB inhaler, INHALE 1 PUFF INTO THE LUNGS ONE TIME DAILY, Disp: 1 each, Rfl: 11    omeprazole (PRILOSEC) 20 MG delayed release capsule, TAKE 1 CAPSULE BY MOUTH ONE TIME A DAY, Disp: 30 capsule, Rfl: 3    nitroGLYCERIN (NITROSTAT) 0.4 MG SL tablet, Place 1 tablet under the tongue every 5 minutes as needed for Chest pain, Disp: 25 tablet, Rfl: 3    blood glucose test strips (PRODIGY NO CODING BLOOD GLUC) strip, USE TO TEST BLOOD GLUCLOSE ONE TIME DAILY, Disp: 100 each, Rfl: 1    pramipexole (MIRAPEX) 0.125 MG tablet, TAKE 1 TABLET BY MOUTH ONCE DAILY AT NIGHT, Disp: 90 tablet, Rfl: 1    bumetanide (BUMEX) 2 MG tablet, Take 1 tablet by mouth daily Take 2 mg AM - 1 mg PM, Disp: 135 tablet, Rfl: 3    Blood Glucose Monitoring Suppl (PRODIGY AUTOCODE BLOOD GLUCOSE) w/Device KIT, , Disp: , Rfl:     fluticasone (FLONASE) 50 MCG/ACT nasal spray, USE 2 SPRAYS IN EACH NOSTRIL DAILY AS NEEDED FOR RHINITIS OR ALLERGIES, Disp: 48 g, Rfl: 0    BRILINTA 90 MG TABS tablet, TAKE 1 TABLET BY MOUTH 2 TIMES A DAY, Disp: 180 tablet, Rfl: 4    albuterol sulfate HFA (VENTOLIN HFA) 108 (90 Base) MCG/ACT inhaler, Inhale 2 puffs into the lungs every 6 hours as needed for Wheezing, Disp: 1 Inhaler, Rfl: 3       ASSESSMENT     Major depressive disorder, recurrent, moderate  Generalized anxiety disorder    PLAN   1. Will continue current medications as prescribed  2. He is planning to start IOP at [facility] with Koby Juarez on Monday, 11/02/2020. Follow up 4 weeks, sooner PRN    Electronically signed by Marlo Bobby PA-C on 10/29/2020 at 1:03 PM    More than 16 mins of the session was spent doing Supportive psychotherapy. Session lasted for 30 mins. I affirm this is a Patient Initiated Episode with a Patient who has not had a related appointment within my department in the past 7 days or scheduled within the next 24 hours.     Patient identification was verified at the start of the visit: Yes    Total Time: minutes: 21-30 minutes    Note: not billable if this call serves to triage the patient into an appointment for the relevant concern    Electronically signed by Marlo Bobby PA-C on 10/29/20 at 1:08 PM EDT

## 2020-11-01 ENCOUNTER — NURSE TRIAGE (OUTPATIENT)
Dept: OTHER | Facility: CLINIC | Age: 60
End: 2020-11-01

## 2020-11-02 ENCOUNTER — HOSPITAL ENCOUNTER (EMERGENCY)
Age: 60
Discharge: HOME | End: 2020-11-02
Attending: FAMILY MEDICINE
Payer: COMMERCIAL

## 2020-11-02 ENCOUNTER — HOSPITAL ENCOUNTER (OUTPATIENT)
Dept: PSYCHIATRY | Age: 60
Setting detail: THERAPIES SERIES
Discharge: HOME | End: 2020-11-02
Payer: COMMERCIAL

## 2020-11-02 VITALS
HEART RATE: 63 BPM | SYSTOLIC BLOOD PRESSURE: 144 MMHG | WEIGHT: 260 LBS | TEMPERATURE: 97.9 F | RESPIRATION RATE: 21 BRPM | HEIGHT: 71 IN | DIASTOLIC BLOOD PRESSURE: 83 MMHG | OXYGEN SATURATION: 95 % | BODY MASS INDEX: 36.4 KG/M2

## 2020-11-02 LAB
EKG ATRIAL RATE: 60 BPM
EKG P AXIS: -11 DEGREES
EKG P-R INTERVAL: 190 MS
EKG Q-T INTERVAL: 454 MS
EKG QRS DURATION: 94 MS
EKG QTC CALCULATION (BAZETT): 454 MS
EKG R AXIS: 21 DEGREES
EKG T AXIS: 27 DEGREES
EKG VENTRICULAR RATE: 60 BPM
FLU A ANTIGEN: NEGATIVE
FLU B ANTIGEN: NEGATIVE
GROUP A STREP CULTURE, REFLEX: NEGATIVE
REFLEX THROAT C + S: NORMAL

## 2020-11-02 PROCEDURE — 93005 ELECTROCARDIOGRAM TRACING: CPT | Performed by: STUDENT IN AN ORGANIZED HEALTH CARE EDUCATION/TRAINING PROGRAM

## 2020-11-02 PROCEDURE — 87880 STREP A ASSAY W/OPTIC: CPT

## 2020-11-02 PROCEDURE — 99284 EMERGENCY DEPT VISIT MOD MDM: CPT

## 2020-11-02 PROCEDURE — U0003 INFECTIOUS AGENT DETECTION BY NUCLEIC ACID (DNA OR RNA); SEVERE ACUTE RESPIRATORY SYNDROME CORONAVIRUS 2 (SARS-COV-2) (CORONAVIRUS DISEASE [COVID-19]), AMPLIFIED PROBE TECHNIQUE, MAKING USE OF HIGH THROUGHPUT TECHNOLOGIES AS DESCRIBED BY CMS-2020-01-R: HCPCS

## 2020-11-02 PROCEDURE — 87804 INFLUENZA ASSAY W/OPTIC: CPT

## 2020-11-02 PROCEDURE — 93010 ELECTROCARDIOGRAM REPORT: CPT | Performed by: INTERNAL MEDICINE

## 2020-11-02 PROCEDURE — 87070 CULTURE OTHR SPECIMN AEROBIC: CPT

## 2020-11-02 ASSESSMENT — PAIN DESCRIPTION - LOCATION: LOCATION: THROAT

## 2020-11-02 ASSESSMENT — ENCOUNTER SYMPTOMS
SORE THROAT: 1
VOMITING: 0
DIARRHEA: 1
VOICE CHANGE: 1
EYE PAIN: 0
BACK PAIN: 0
COUGH: 1
SHORTNESS OF BREATH: 1
ABDOMINAL PAIN: 0
RHINORRHEA: 1
NAUSEA: 1

## 2020-11-02 ASSESSMENT — PAIN DESCRIPTION - PAIN TYPE: TYPE: ACUTE PAIN

## 2020-11-02 ASSESSMENT — PAIN DESCRIPTION - ONSET: ONSET: GRADUAL

## 2020-11-02 ASSESSMENT — PAIN DESCRIPTION - DESCRIPTORS: DESCRIPTORS: SORE

## 2020-11-02 ASSESSMENT — PAIN DESCRIPTION - FREQUENCY: FREQUENCY: CONTINUOUS

## 2020-11-02 ASSESSMENT — PAIN SCALES - GENERAL: PAINLEVEL_OUTOF10: 3

## 2020-11-02 ASSESSMENT — PAIN DESCRIPTION - PROGRESSION: CLINICAL_PROGRESSION: NOT CHANGED

## 2020-11-02 NOTE — TELEPHONE ENCOUNTER
Reason for Disposition   MILD difficulty breathing (e.g., minimal/no SOB at rest, SOB with walking, pulse <100)   HIGH RISK patient (e.g., age > 59 years, diabetes, heart or lung disease, weak immune system)    Answer Assessment - Initial Assessment Questions  1. COVID-19 DIAGNOSIS: \"Who made your Coronavirus (COVID-19) diagnosis? \" \"Was it confirmed by a positive lab test?\" If not diagnosed by a HCP, ask \"Are there lots of cases (community spread) where you live? \" (See public health department website, if unsure)       [facility]     2. ONSET: \"When did the COVID-19 symptoms start? \"        3 sundays     3. WORST SYMPTOM: \"What is your worst symptom? \" (e.g., cough, fever, shortness of breath, muscle aches)      Sore throat    4. COUGH: \"Do you have a cough? \" If so, ask: \"How bad is the cough? \"        Yes, every once and a while    5. FEVER: \"Do you have a fever? \" If so, ask: \"What is your temperature, how was it measured, and when did it start?\"         6. RESPIRATORY STATUS: \"Describe your breathing? \" (e.g., shortness of breath, wheezing, unable to speak)        Shortness of breath     7. BETTER-SAME-WORSE: Pasquale Canes you getting better, staying the same or getting worse compared to yesterday? \"  If getting worse, ask, \"In what way? \"      Worse- throat is more sore, voice is more gone     8. HIGH RISK DISEASE: \"Do you have any chronic medical problems? \" (e.g., asthma, heart or lung disease, weak immune system, etc.)       COPD, Hypertension    10. OTHER SYMPTOMS: \"Do you have any other symptoms? \"  (e.g., chills, fatigue, headache, loss of smell or taste, muscle pain, sore throat)          Sore throat, headache    Caller stated he has been sick for a couple of weeks and then he developed a sore throat, phlegm in his throat, stuffy ears. Caller stated his has a history of COPD and is more short of breath with activity. Caller was informed to follow up with his PCP and to call back should his symptoms worsen and agreed.  Caller stated his PCP told him on Friday to be evaluated at the ED.     Protocols used: CORONAVIRUS (COVID-19) DIAGNOSED OR SUSPECTED-ADULT-AH

## 2020-11-02 NOTE — TELEPHONE ENCOUNTER
Please let him know that he needs to stay home until results are available. And to let us know is he gets worse.

## 2020-11-02 NOTE — ED NOTES
Patient lying in bed with blankets watching tv at this time. Patient respirations are regular and unlabored. Patient appears to be in no current distress. Patient VSS. Call light is within reach. Patient expresses no needs at this time.        Yared Sorenson RN  11/02/20 2303

## 2020-11-02 NOTE — TELEPHONE ENCOUNTER
Pt stated he is having a sore throat, headache, and runny nose. Said it started 2 wks ago and the severity is intermittent.

## 2020-11-02 NOTE — ED NOTES
COVID swab, Flu swab and Strep swab collected and sent to lab at this time.      Keith Tracey RN  11/02/20 [id number]

## 2020-11-02 NOTE — ED PROVIDER NOTES
[place] ENCOUNTER          Pt Name: Ira Franklin  MRN: [id number]  [place] 1960  Date of evaluation: 11/2/2020  Treating Resident Physician: Constanza Long MD  Supervising Physician: Dr. Rj Khan MD    [address] [place] [address]       Chief Complaint   Patient presents with    Pharyngitis    Cough     History obtained from the patient. HISTORY OF PRESENT ILLNESS    HPI  Ira Franklin is a 56 y.o. male who presents to the emergency department for evaluation of URI symptoms. Patient states for approximately 2 weeks he has had cough, hoarseness, sore throat, headache, congestion, increased phlegm production. Patient also mentions a few episodes of diarrhea, nausea, and an upset stomach. Patient mentions subjective fever of 100  Patient also mentions mildly worsening of shortness of breath compared to his baseline shortness of breath from COPD. Patient states he was urged by family members to come in due to symptoms. Patient was sitting comfortably reading a book upon initial examination. Patient was initially placed on 2 L nasal cannula by nursing staff due to shortness of breath. Examination I decreased oxygen level to 0 and patient was saturating fine at 95% on room air. Patient denies any known exposure to COVID-19. Patient has a past medical history that includes: COPD, diabetes, hypertension, CHF, coronary artery disease, obstructive sleep apnea    Patient is on Brilinta for history of stenting    Patient Denies smoking. Patient Admits drinking alcohol \"a couple beers a day \". Patient Admits recreational drug use consisting of marijuana occasionally. .    Patient denies any new chills, chest pain, abdominal pain, constipation, leg swelling, dysuria. The patient has no other acute complaints at this time. REVIEW OF SYSTEMS   Review of Systems   Constitutional: Positive for fatigue and fever.  Negative for chills. HENT: Positive for congestion, rhinorrhea, sore throat and voice change. Negative for ear pain. Eyes: Negative for pain. Respiratory: Positive for cough and shortness of breath. Cardiovascular: Negative for chest pain, palpitations and leg swelling. Gastrointestinal: Positive for diarrhea and nausea. Negative for abdominal pain and vomiting. Genitourinary: Negative for dysuria. Musculoskeletal: Negative for back pain and neck pain. Skin: Negative for wound. Neurological: Positive for headaches. Psychiatric/Behavioral: Negative for confusion. PAST MEDICAL AND SURGICAL HISTORY     Past Medical History:   Diagnosis Date    [facility] failure[facility]     COPD [facility]chronic obstructive pulmonary disease) [facility]     Coronary artery disease involving native coronary artery of native heart without angina pectoris     Depression     Diabetes mellitus type 2, diet-controlled ([facility] 6/1/2018    GERD (gastroesophageal reflux disease) 3/21/2012    Hernia     Hx of blood clots 1990's    right knee due to injury    Hx of cocaine abuse ([facility]     Hyperglycemia 09/09    Hyperlipidemia     Hypertension     FAISAL on CPAP     Osteoarthritis 11/07    S/P CABG x 2 07/06/2016    S/P PTCA: 1/15/2018: Stent diagonal branch Xience 3.0 x 12 mm. 1/15/2018    1/15/2018: Stent diagonal branch Xience 3.0 x 12 mm.  Dr. Yuliya Luque injury 08/2015    Right thumb cut with table saw, no treatment needed     Past Surgical History:   Procedure Laterality Date    CARDIAC CATHETERIZATION  06/27/2016    CATARACT REMOVAL Right 2013    COLONOSCOPY  04/08    CORONARY ARTERY BYPASS GRAFT  07/06/2016    Double bypass, Dr. Car Coppola Right 02/2007    Neck    DIAGNOSTIC CARDIAC CATH LAB PROCEDURE      EYE SURGERY Right     Retinal surgery x 3    HERNIA REPAIR Right     Inguinal    PTCA  01/15/2018    ROTATOR CUFF REPAIR Right 11/16/2017    TONSILLECTOMY  child    TOTAL KNEE ARTHROPLASTY Left 10/24/2016    Dr. Jeanna Baeza   No current facility-administered medications for this encounter. Current Outpatient Medications:     atorvastatin (LIPITOR) 40 MG tablet, Take 40 mg by mouth daily, Disp: , Rfl:     metoprolol succinate (TOPROL XL) 25 MG extended release tablet, TAKE 2 TABLETS BY MOUTH DAILY. , Disp: 180 tablet, Rfl: 0    allopurinol (ZYLOPRIM) 300 MG tablet, TAKE 1 TABLET BY MOUTH ONCE DAILY. , Disp: 90 tablet, Rfl: 0    losartan (COZAAR) 25 MG tablet, TAKE 1 TABLET BY MOUTH ONE TIME A DAY., Disp: 90 tablet, Rfl: 0    metFORMIN (GLUCOPHAGE) 500 MG tablet, TAKE 1 TABLET BY MOUTH ONCE DAILY WITH BREAKFAST., Disp: 90 tablet, Rfl: 1    buPROPion (WELLBUTRIN XL) 300 MG extended release tablet, Take 1 tablet by mouth every morning, Disp: 90 tablet, Rfl: 1    aspirin 81 MG chewable tablet, CHEW AND SWALLOW 1 TABLET BY MOUTH ONCE DAILY. , Disp: 30 tablet, Rfl: 4    isosorbide mononitrate (IMDUR) 30 MG extended release tablet, TAKE 1 TABLET BY MOUTH ONCE DAILY. , Disp: 30 tablet, Rfl: 3    ANORO ELLIPTA 62.5-25 MCG/INH AEPB inhaler, INHALE 1 PUFF INTO THE LUNGS ONE TIME DAILY, Disp: 1 each, Rfl: 11    omeprazole (PRILOSEC) 20 MG delayed release capsule, TAKE 1 CAPSULE BY MOUTH ONE TIME A DAY, Disp: 30 capsule, Rfl: 3    nitroGLYCERIN (NITROSTAT) 0.4 MG SL tablet, Place 1 tablet under the tongue every 5 minutes as needed for Chest pain, Disp: 25 tablet, Rfl: 3    blood glucose test strips (PRODIGY NO CODING BLOOD GLUC) strip, USE TO TEST BLOOD GLUCLOSE ONE TIME DAILY, Disp: 100 each, Rfl: 1    pramipexole (MIRAPEX) 0.125 MG tablet, TAKE 1 TABLET BY MOUTH ONCE DAILY AT NIGHT, Disp: 90 tablet, Rfl: 1    bumetanide (BUMEX) 2 MG tablet, Take 1 tablet by mouth daily Take 2 mg AM - 1 mg PM, Disp: 135 tablet, Rfl: 3    Blood Glucose Monitoring Suppl (PRODIGY AUTOCODE BLOOD GLUCOSE) w/Device KIT, , Disp: , Rfl:     fluticasone (FLONASE) 50 MCG/ACT nasal spray, USE 2 SPRAYS IN EACH NOSTRIL DAILY AS NEEDED FOR RHINITIS OR ALLERGIES, Disp: 48 g, Rfl: 0    BRILINTA 90 MG TABS tablet, TAKE 1 TABLET BY MOUTH 2 TIMES A DAY, Disp: 180 tablet, Rfl: 4    albuterol sulfate HFA (VENTOLIN HFA) 108 (90 Base) MCG/ACT inhaler, Inhale 2 puffs into the lungs every 6 hours as needed for Wheezing, Disp: 1 Inhaler, Rfl: 3      SOCIAL HISTORY     Social History     Social History Narrative    Not on file     Social History     Tobacco Use    Smoking status: Former Smoker     Packs/day: 2.00     Years: 25.00     Pack years: 50.00     Types: Cigars     Last attempt to quit: 2014     Years since quittin.9    Smokeless tobacco: Never Used   Substance Use Topics    Alcohol use: Yes     Alcohol/week: 3.0 standard drinks     Types: 3 Cans of beer per week     Comment: 1 every day    Drug use: Yes     Frequency: 5.0 times per week     Types: Marijuana     Comment: smokes pot once sushma while . former drug user, addiction treatment at [address] [place] [address] Zoloft [Sertraline Hcl] Other (See Comments)     Headaches, sweats, bad dreams         FAMILY HISTORY     Family History   Problem Relation Age of Onset    Heart Disease Mother     High Blood Pressure Mother     Obesity Mother     Diabetes Mother     Heart Disease Father     Cancer Father         colon/prostate    Colon Cancer Father     Prostate Cancer Father     Arthritis Father     Diabetes Brother          PREVIOUS RECORDS   Previous records reviewed: Patient was seen most recently in the emergency department on 2020 for pinched nerve in. PHYSICAL EXAM     ED Triage Vitals [20 0832]   BP Temp Temp Source Pulse Resp SpO2 Height Weight   (!) 156/95 97.9 °F (36.6 °C) Oral 58 28 (!) 89 % 5' 11\" (1.803 m) 260 lb (117.9 kg)     Initial vital signs and nursing assessment reviewed and vitals are/show: Hypertensive. Pulsoximetry is normal per my interpretation.     Additional Vital Signs:  Vitals:    11/02/20 1030   BP: (!) 144/83   Pulse: 63   Resp: 21   Temp:    SpO2: 95%       Physical Exam  Constitutional:       General: He is not in acute distress. Appearance: He is well-developed. He is obese. He is not ill-appearing, toxic-appearing or diaphoretic. Comments: Resting comfortably reading a book upon initial examination   HENT:      Head: Normocephalic and atraumatic. Right Ear: Ear canal normal.      Left Ear: Ear canal normal.      Ears:      Comments: Limited due to bilateral significant cerumen impaction     Nose: No congestion or rhinorrhea. Mouth/Throat:      Pharynx: No oropharyngeal exudate or posterior oropharyngeal erythema. Comments: View diminished due to patient habitus  Eyes:      General: No scleral icterus. Right eye: No discharge. Left eye: No discharge. Neck:      Musculoskeletal: Normal range of motion and neck supple. Cardiovascular:      Rate and Rhythm: Normal rate and regular rhythm. Heart sounds: Normal heart sounds. No murmur. No friction rub. No gallop. Pulmonary:      Effort: Pulmonary effort is normal. No respiratory distress. Breath sounds: Normal breath sounds. No wheezing, rhonchi or rales. Chest:      Chest wall: No tenderness. Abdominal:      General: Abdomen is flat. There is no distension. Palpations: Abdomen is soft. Tenderness: There is no abdominal tenderness. There is no guarding or rebound. Skin:     General: Skin is warm and dry. Neurological:      Mental Status: He is alert and oriented to person, place, and time. Mental status is at baseline. Psychiatric:         Mood and Affect: Mood normal.         Behavior: Behavior normal.         Thought Content: Thought content normal.         Judgment: Judgment normal.             MEDICAL DECISION MAKING   Initial Assessment:   URI versus Covid versus influenza versus GAS    Plan:     Labs:  Influenza swab, strep swab, Covid outpatient swab  Imaging:      Discharge with counseling regarding Covid isolation        ED RESULTS   Laboratory results:  Labs Reviewed   RAPID INFLUENZA A/B ANTIGENS   CULTURE, THROAT    Narrative:     Source: Specimen not received       Site:           Current Antibiotics:   GROUP A STREP, REFLEX   COVID-19 AMBULATORY       Radiologic studies results:  No orders to display       ED Medications administered this visit: Medications - No data to display      ED COURSE     ED Course as of Nov 02 1045   Mon Nov 02, 2020   1030 REFLEX THROAT C + S: INDICATED [CR]   1030 GROUP A STREP CULTURE, REFLEX: Negative [CR]   1030 REFLEX THROAT C + S: INDICATED [CR]      ED Course User Index  [CR] Jennifer Murguia MD        Strict return precautions and follow up instructions were discussed with the patient prior to discharge, with which the patient agrees. MEDICATION CHANGES     New Prescriptions    No medications on file         FINAL DISPOSITION     Final diagnoses:   Acute upper respiratory infection     Condition: condition: stable  Dispo: Discharge to home      This transcription was electronically signed. Parts of this transcriptions may have been dictated by use of voice recognition software and electronically transcribed, and parts may have been transcribed with the assistance of an ED scribe. The transcription may contain errors not detected in proofreading. Please refer to my supervising physician's documentation if my documentation differs.     Electronically Signed: Jennifer Murguia, 11/02/20, 10:45 AM       Jennifer Murguia MD  Resident  11/02/20 0192

## 2020-11-02 NOTE — ED NOTES
Patient lying in bed with blankets watching tv at this time. Patient respirations are regular and unlabored. Patient appears to be in no current distress. Patient VSS. Call light is within reach. Patient expresses no needs at this time.      Bunnie Hammans, RN  11/02/20 0948

## 2020-11-02 NOTE — ED NOTES
Pt to the ED via self. Patient presents with complaints of sore throat and cough. Patient states that he has had over the last weekend and it has been getting to the point where he has severe dyspnea upon exertion. 2L NC applied to patient. EKG done, IV inserted. Patient is alert and oriented x 4. Respirations are regular and unlabored. Patient provided blanket. Call light within reach.      Bunnie Hammans, RN  11/02/20 4261

## 2020-11-03 ENCOUNTER — CARE COORDINATION (OUTPATIENT)
Dept: OTHER | Facility: CLINIC | Age: 60
End: 2020-11-03

## 2020-11-03 LAB — SARS-COV-2: NOT DETECTED

## 2020-11-03 ASSESSMENT — ENCOUNTER SYMPTOMS: DYSPNEA ASSOCIATED WITH: EXERTION

## 2020-11-03 NOTE — CARE COORDINATION
[facility]  Trans[facility] ED Follow Up Call    11/3/2020    Patient: Layla Pablo Patient : 1960   MRN: [id number]  Reason for Admission: URI  Discharge Date: 20      Care Transitions ED Follow Up    Care Transitions Interventions     Other Services:  Completed (Comment: LOOP enrollment)   Schedule Follow Up Appointment with Physician:  Declined  Patient-reported symptoms:  Shortness of Breath, Cough, Other (Comment: Sore throat)   Did you call your PCP prior to going to the ED?:  Yes - Advised to go to the ED   Do you understand what to report and when to return?:  Yes   Are you following your discharge instructions?:  Yes   Do you have all of your prescriptions and are they filled?:  Yes         Patient DME:  Zach pérez        Patient contacted regarding COVID-19 Symptoms. Discussed COVID-19 related testing which was pending at this time. Test results were pending. Patient informed of results, if available? Pending    Care Transition Nurse/ Ambulatory Care Manager contacted the patient by telephone to perform post discharge assessment. Call within 2 business days of discharge: Yes. Verified name and  with patient as identifiers. Provided introduction to self, and explanation of the CTN/ACM role, and reason for call due to risk factors for infection and/or exposure to COVID-19. Symptoms reviewed with patient who verbalized the following symptoms: fever, cough, shortness of breath, nausea, diarrhea and sore throat and Headache    Pt states he feels about the same. He continues to have a cough, sore throat, and headache. He states a few days a go he had nausea and some diarrhea, but that has resolved. He denies fever at this time, but states he had a low grade temp last week. He is aware of isolation precautions and isolating until he has his test results back. Due to no new or worsening symptoms encounter was not routed to provider for escalation. Discussed follow-up appointments. If no appointment was previously scheduled, appointment scheduling offered: Yes patient declined at this time. [facility] follow up appointment(s):   Future Appointments   Date Time Provider Christofer Baron   11/30/2020 11:00 AM Des Elaine MD [facility]   12/3/2020  1:00 PM Allyson Haywood, APRN - CNP SRPX CHF P - [place]   1/29/2021 11:45 AM Michelle Leos PA-C Pulm Med [facility] - [address] [facility]   6/18/2021  1:00 PM STR CT IMAGING RM1  OP [facility] STRZ OUT EXP STR Radiolog   6/18/2021  1:30 PM STR PULMONARY FUNCTION ROOM 1 STRZ PFT [address] [facility] HOD   6/24/2021 12:00 PM Brenda Seo MD SRPX Heart [facility] - [address] [facility]   6/25/2021 11:00 AM Karen Armando APRN - CNP Pulm Med [facility] - [address] [facility]     Non-[facility] follow up appointment(s): NONE       Advance Care Planning:   Does patient have an Advance Directive:  not on file. Patient has following risk factors of: heart failure, COPD and diabetes. CTN/ACM reviewed discharge instructions, medical action plan and red flags such as increased shortness of breath, increasing fever and signs of decompensation with patient who verbalized understanding. Discussed exposure protocols and quarantine with CDC Guidelines What to do if you are sick with coronavirus disease 2019.  Patient was given an opportunity for questions and concerns. The patient agrees to contact the [employer] exposure line [phone], local [facility] department [place] [facility]: ([phone]) and PCP office for questions related to their healthcare. CTN/ACM provided contact information for future needs. Reviewed and educated patient on any new and changed medications related to discharge diagnosis     Patient/family/caregiver given information for [employer] Loop and agrees to enroll yes  Patient's preferred e-mail: [email or web address]  Patient's preferred phone number: [phone]  Based on Loop alert triggers, patient will be contacted by nurse care manager for worsening symptoms.     Plan for follow-up call in 3-5 days based on severity of symptoms and risk factors.

## 2020-11-04 LAB — THROAT/NOSE CULTURE: NORMAL

## 2020-11-04 RX ORDER — ASPIRIN 81 MG/1
TABLET, CHEWABLE ORAL
Qty: 30 TABLET | Refills: 6 | Status: SHIPPED | OUTPATIENT
Start: 2020-11-04 | End: 2020-12-17 | Stop reason: SDUPTHER

## 2020-11-04 RX ORDER — ISOSORBIDE MONONITRATE 30 MG/1
TABLET, EXTENDED RELEASE ORAL
Qty: 30 TABLET | Refills: 6 | Status: SHIPPED | OUTPATIENT
Start: 2020-11-04 | End: 2020-12-17 | Stop reason: SDUPTHER

## 2020-11-04 RX ORDER — TICAGRELOR 90 MG/1
TABLET ORAL
Qty: 60 TABLET | Refills: 6 | Status: SHIPPED | OUTPATIENT
Start: 2020-11-04 | End: 2020-12-17 | Stop reason: SDUPTHER

## 2020-11-05 ENCOUNTER — TELEPHONE (OUTPATIENT)
Dept: FAMILY MEDICINE CLINIC | Age: 60
End: 2020-11-05

## 2020-11-05 ENCOUNTER — CARE COORDINATION (OUTPATIENT)
Dept: OTHER | Facility: CLINIC | Age: 60
End: 2020-11-05

## 2020-11-05 RX ORDER — AZITHROMYCIN 250 MG/1
TABLET, FILM COATED ORAL
Qty: 1 PACKET | Refills: 0 | Status: SHIPPED | OUTPATIENT
Start: 2020-11-05 | End: 2020-11-15

## 2020-11-05 NOTE — CARE COORDINATION
[address] [facility] ED Follow Up Call    2020    Patient: Devon Boas Patient : 1960   MRN: [id number]  Reason for Admission: URI/ COVID- neg  Discharge Date: 20    Patient contacted regarding COVID-19 risk and screening. Discussed COVID-19 related testing which was available at this time. Test results were negative. Patient informed of results, if available? Yes. Care Transition Nurse/ Ambulatory Care Manager contacted the patient by telephone to perform follow-up assessment. Verified name and  with patient as identifiers. Patient has following risk factors of: COPD. Symptoms reviewed with patient who verbalized the following symptoms: cough and congestion, and headache. Pt states he is not feeling much better. He states he usually is not sick this long as he feels he has been sick for about 3 weeks. He states he did speak with his PCP yesterday but did not schedule an appt. He states he is still quarantining for precaution. Discussed scheduling a phone or virtual visits as he he is still not feeling well. He is concerned abut possibly getting pneumonia with his health history and COPD. Advised pt to reach to PCP today so he does not go into the weekend and get worse. Pt verbalized understanding and is agreeable. Due to ongoing symptoms encounter was routed to provider. Plan for follow-up call in 3-5 days based on severity of symptoms and risk factors. Care Transitions ED Follow Up    Care Transitions Interventions     Other Services:  Completed (Comment: LOOP enrollment)   Schedule Follow Up Appointment with Physician:  Completed  Do you have any ongoing symptoms?:  Yes   Onset of Patient-reported symptoms:   In the past 7 days   Patient-reported symptoms:  Cough, Congestion, Other (Comment: Headache)   Do you have a copy of your discharge instructions?:  Yes   Do you understand what to report and when to return?:  Yes   Are you following your discharge instructions?:  Yes   Do you have all of your prescriptions and are they filled?:  Yes   Have you scheduled your follow up appointment?:  No   Were you discharged with any Home Care or Post Acute Services or do you currently have any active services?:  No         Patient DME:  [employer]

## 2020-11-06 RX ORDER — AZITHROMYCIN 250 MG/1
TABLET, FILM COATED ORAL
Qty: 1 PACKET | Refills: 0 | Status: CANCELLED | OUTPATIENT
Start: 2020-11-06 | End: 2020-11-16

## 2020-11-06 NOTE — CARE COORDINATION
Please let him know that will send a Rx for him for antibiotic. Please verify what pharmacy. Also we need a chest x-ray.

## 2020-11-06 NOTE — TELEPHONE ENCOUNTER
Pt stated he does not have the means to do a vv. But he will call if he isnt any better or worse on Monday.

## 2020-11-09 ENCOUNTER — CARE COORDINATION (OUTPATIENT)
Dept: OTHER | Facility: CLINIC | Age: 60
End: 2020-11-09

## 2020-11-10 NOTE — CARE COORDINATION
[address] [facility] ED Follow Up Call    11/10/2020    Patient: Mauro Mccord Patient : 1960   MRN: [id number]  Reason for Admission: URI/ COVID neg  Discharge Date: 20        Care Transitions ED Follow Up    Care Transitions Interventions     Other Services:  Completed (Comment: LOOP enrollment)   Do you have any ongoing symptoms?:  Yes   Onset of Patient-reported symptoms:  Yesterday   Patient-reported symptoms:  Fatigue, Diaphoresis   Do you have a copy of your discharge instructions?:  Yes   Do you understand what to report and when to return?:  Yes   Are you following your discharge instructions?:  Yes   Do you have all of your prescriptions and are they filled?:  Yes   Have you scheduled your follow up appointment?:  Yes   How are you going to get to your appointment?:  Car - drive self   Were you discharged with any Home Care or Post Acute Services or do you currently have any active services?:  No         Patient DME:  Zach pérez        Ambulatory Care Manager (ACM) contacted the patient by telephone to follow up on progress, discuss new issues or concerns, and reinforce/ provide pt education. Verified name and  with patient as identifiers. URI- Pt states he did get all of his prescriptions filled and taking as directed. He is feeling better, but states since yesterday he is having episodes of profuse sweating. He states that it has happened at night, but he is getting these episodes during the day also. He has checked his BG and BP when it happens. He does admit that his BG was up last time he tested at 240. He states he is taking his medications as directed, but will try to monitor his diet better. He is to get a CXR, but has not gotten it yet as he having new roof put on his house and has to be there while they are working. He is hoping to go this afternoon to get it completed. Encouraged pt toget this completed as soon as possible since he is still having symptoms.  Pt verbalized understanding and is agreeable. Interventions:    Counseling provided at today's visit on: Self monitoring compliance: SBGM  Red Flag symptoms to report  Medication compliance  Provider follow up appointment compliance  Ordered diagnostic testing compliance  Utilization of appropriate level of care    Education Provided on importance and benefits of:  Symptoms of Hypo/Hyperglycemia and how to treat  Meal Planning  Blood glucose monitoring- purpose, monitoring frequency, and compliance and Blood Pressure monitoring- Purpose, goal range, and compliance    Plan:  - Continue Telephonic follow up to discuss:  Symptom management  Provider follow up appointment compliance  Ordered diagnostic testing compliance  Utilization of appropriate level of care  Goal Progress    Patient verbalized understanding and is agreeable.

## 2020-11-11 ENCOUNTER — HOSPITAL ENCOUNTER (OUTPATIENT)
Dept: GENERAL RADIOLOGY | Age: 60
Discharge: HOME | End: 2020-11-11
Payer: COMMERCIAL

## 2020-11-11 ENCOUNTER — HOSPITAL ENCOUNTER (OUTPATIENT)
Age: 60
Discharge: HOME | End: 2020-11-11
Payer: COMMERCIAL

## 2020-11-11 PROCEDURE — 71046 X-RAY EXAM CHEST 2 VIEWS: CPT

## 2020-11-18 ENCOUNTER — CARE COORDINATION (OUTPATIENT)
Dept: OTHER | Facility: CLINIC | Age: 60
End: 2020-11-18

## 2020-11-18 NOTE — CARE COORDINATION
[address] [facility] ED Follow Up Call    2020    Patient: Nadine Townsend Patient : 1960   MRN: [id number]  Reason for Admission: URI  Discharge Date: 20    Summary: ACM contacted patient by telephone to follow up on progress, reinforce previous education/ provide patient education, and discuss any new issues or concerns. HIPAA compliant message left requesting a return phone call at patients convenience to discuss. Will continue to follow.           Care Transitions ED Follow Up    Care Transitions Interventions     Other Services:  Completed (Comment: LOOP enrollment)   Do you have all of your prescriptions and are they filled?:  Yes         Patient DME:  [employer] cane

## 2020-11-24 ENCOUNTER — TELEPHONE (OUTPATIENT)
Dept: FAMILY MEDICINE CLINIC | Age: 60
End: 2020-11-24

## 2020-11-25 ENCOUNTER — VIRTUAL VISIT (OUTPATIENT)
Dept: PSYCHIATRY | Age: 60
End: 2020-11-25
Payer: COMMERCIAL

## 2020-11-25 PROCEDURE — 99213 OFFICE O/P EST LOW 20 MIN: CPT | Performed by: PHYSICIAN ASSISTANT

## 2020-11-25 PROCEDURE — 90833 PSYTX W PT W E/M 30 MIN: CPT | Performed by: PHYSICIAN ASSISTANT

## 2020-11-25 RX ORDER — TRAZODONE HYDROCHLORIDE 50 MG/1
TABLET ORAL
Qty: 30 TABLET | Refills: 1 | Status: SHIPPED | OUTPATIENT
Start: 2020-11-25 | End: 2020-12-18 | Stop reason: SDUPTHER

## 2020-11-25 NOTE — PROGRESS NOTES
Jose Lyon is a 56 y.o. male evaluated via telephone on 11/25/2020. Consent:  He and/or health care decision maker is aware that that he may receive a bill for this telephone service, depending on his insurance coverage, and has provided verbal consent to proceed: Yes      Documentation:  I communicated with the patient and/or health care decision maker about anxiety, alcohol abuse and depression  Details of this discussion including any medical advice provided:       I affirm this is a Patient Initiated Episode with a Patient who has not had a related appointment within my department in the past 7 days or scheduled within the next 24 hours. Patient identification was verified at the start of the visit: Yes    Total Time: minutes: 21-30 minutes    Note: not billable if this call serves to triage the patient into an appointment for the relevant concern    [facility]   Progress Note     Chief Complaint   Patient presents with    Follow-up    Anxiety    Depression          SUBJECTIVE:    Jose Lyon is a 56 y.o. male who presents via phone for a follow-up appointment for depression and anxiety. Was last seen on 10/29/2020. He reports he has been feeling up and down since our last visit. He states \"I had a couple little bumps in the road. Im still struggling with my separation from Justin Horton. Every time I think I got a handle on myself something sets me off again. I have been attending a few [place] [id number] meetings. \" He states its nice to be around some people that have similar problems but \"I haven't been successful about not drinking as I have been going to the meetings. Its a struggle. \"   He feels his mood has been coming and going. He states he has been having more not so good days. Still has anxiety and plenty of depression still hanging around. \"Id love to stabilize a little bit. \"   He is still cooking meals for profit for others.  He states he has to cook meals because he only had $30 left after a month from just his income. He still wants to have a civil relationship with Marcus Chou but she will only come around to the house to get her things when he is not there. He has been paying more attention and cleaning more in hopes that she will forgive him. He reports he has been kicking around the idea of them being back together but has been enjoying being able to have his home the way he wants it to be. He feels he is teetering with his emotions. He goes from feeling really good to not feeling really good. He has days where he cant sleep at all and cant get out of bed. Will have days where everything is fine. His depression comes and goes. He states he has been feeling lonely which has depressed him. He is looking forward to having thanksgiving with his family tomorrow and Saturday. When asked about recent suicidal ideation, he reports he has been thinking about  \"when Im gone, when Im dead. \" because of what he is going through. Denies any thoughts of wanting to be dead. Denies any suicidal ideation with plan or intent. He states he has been trying to work on himself recently. He states he has been taking more care in his appearance because \"I think its a reflection of how I feel about myself. \"   He states he has been drinking every 3-4 days. He states when he does drink, he can put down half a bottle of tequila or a 12 pack of beer. He does drink to the point where he gets \"numb\" about once a week. Denies drinking to the point where he blacks out. He states he has been going back to drinking tequila because beer has grains and flares up his gout. He states with his drinking he is the only person that can help himself. He feels he has been having a lot more crying spells recently. He reports his sleep has not been better now that he is back in his water bed. He states last night he slept from 10-4 and then 4-9 this morning.  He was previously sleeping in his chair and was having trouble staying asleep. He was getting 6-8 hours of sleep over a 3 day period. I discussed with him about adding Trazodone 25-50mg nightly PRN for sleep. He reports his appetite has been good. He states he has lost 15 lbs with the keto diet ad is happy about that. He is going to make a decision about his weight loss surgery soon. Motivation has been poor. He feels he is not moving fast on much of anything these days. His energy level is still poor throughout the day because he was recently sick with a respiratory infection. He is also deconditioned from his medical issues especially COPD. He continues to feel hopeless and helpless at times but he is somewhat hopeful for the future. He states his anxiety comes and goes like his depression but is not as prominent as his depresssion. When asked about recent panic attacks, he states he called Abel Castañeda recently because he was really shook up. He has not been seeing Steffi Taylor for therapy. He was supposed to be involved with IOP but then got sick. He states he was sick for almost 5 weeks. He is also not connected with [facility] anymore because he was supposed to start IOP. I discussed the importance of counseling for treatment of his mental diagnoses. I advised him that I can have the [facility] office staff send him a resource packet for counseling services. He agreed to this. He feels the Wellbutrin is working well for him. He feels he is a more patient person on it than he was before he started taking it.    Denies current suicidal ideation   Denies hallucinations  No evidence of delusions or overt psychosis on examination    OBJECTIVE  Level of consciousness:  Within normal limits  Appearance: unable to assess  Behavior/Motor: No abnormalities noted  Attitude toward examiner:  Cooperative, attentive  Speech:  Normal rate and volume  Mood:  \"Okay\"  Affect:  mood congruent  Thought processes:  linear, goal directed and coherent  Thought content:  denies homicidal ideation  Suicidal Ideation:  Passive; denies current suicidal ideation  Delusions:  no evidence of delusions  Perceptual Disturbance:  denies any perceptual disturbance  Cognition:  In tact  Memory: age appropriate  Insight & Judgement: fair  Medication side effects:  denies       Medications       Current Outpatient Medications:     omeprazole (PRILOSEC) 20 MG delayed release capsule, TAKE 1 CAPSULE BY MOUTH ONE TIME A DAY, Disp: 90 capsule, Rfl: 0    losartan (COZAAR) 25 MG tablet, TAKE 1 TABLET BY MOUTH DAILY, Disp: 90 tablet, Rfl: 0    metoprolol succinate (TOPROL XL) 25 MG extended release tablet, TAKE 2 TABLETS BY MOUTH DAILY, Disp: 180 tablet, Rfl: 0    isosorbide mononitrate (IMDUR) 30 MG extended release tablet, TAKE 1 TABLET BY MOUTH ONCE DAILY. , Disp: 30 tablet, Rfl: 6    aspirin 81 MG chewable tablet, CHEW AND SWALLOW 1 TABLET BY MOUTH ONCE DAILY. , Disp: 30 tablet, Rfl: 6    blood glucose test strips (PRODIGY NO CODING BLOOD GLUC) strip, USE ONE TIME DAILY TO TEST BLOOD GLUCOSE LEVELS ., Disp: 100 each, Rfl: 0    buPROPion (WELLBUTRIN XL) 300 MG extended release tablet, TAKE ONE TABLET BY MOUTH EVERY MORNING, Disp: 90 tablet, Rfl: 0    BRILINTA 90 MG TABS tablet, TAKE 1 TABLET BY MOUTH 2 TIMES A DAY, Disp: 60 tablet, Rfl: 6    pramipexole (MIRAPEX) 0.125 MG tablet, TAKE ONE TABLET BY MOUTH EVERY EVENING, Disp: 90 tablet, Rfl: 0    atorvastatin (LIPITOR) 40 MG tablet, Take 40 mg by mouth daily, Disp: , Rfl:     allopurinol (ZYLOPRIM) 300 MG tablet, TAKE 1 TABLET BY MOUTH ONCE DAILY. , Disp: 90 tablet, Rfl: 0    metFORMIN (GLUCOPHAGE) 500 MG tablet, TAKE 1 TABLET BY MOUTH ONCE DAILY WITH BREAKFAST., Disp: 90 tablet, Rfl: 1    ANORO ELLIPTA 62.5-25 MCG/INH AEPB inhaler, INHALE 1 PUFF INTO THE LUNGS ONE TIME DAILY, Disp: 1 each, Rfl: 11    nitroGLYCERIN (NITROSTAT) 0.4 MG SL tablet, Place 1 tablet under the tongue every 5 minutes as needed for Chest pain, Disp: 25 tablet, Rfl: 3    bumetanide (BUMEX) 2 MG tablet, Take 1 tablet by mouth daily Take 2 mg AM - 1 mg PM, Disp: 135 tablet, Rfl: 3    Blood Glucose Monitoring Suppl (PRODIGY AUTOCODE BLOOD GLUCOSE) w/Device KIT, , Disp: , Rfl:     fluticasone (FLONASE) 50 MCG/ACT nasal spray, USE 2 SPRAYS IN EACH NOSTRIL DAILY AS NEEDED FOR RHINITIS OR ALLERGIES, Disp: 48 g, Rfl: 0    albuterol sulfate HFA (VENTOLIN HFA) 108 (90 Base) MCG/ACT inhaler, Inhale 2 puffs into the lungs every 6 hours as needed for Wheezing, Disp: 1 Inhaler, Rfl: 3       ASSESSMENT     Major depressive disorder, recurrent, moderate  Generalized anxiety disorder  Alcohol use disorder, moderate  Polysubstance abuse, in remission    PLAN   1. Will add Trazodone 50mg PRN for sleep. I advised him to take 0.5-1 tablets as needed for sleep. I told him to take 0.5 tablets if 1 tablet is too sedating. He verbalized understanding and agreed to the plan. Will prescribe 30 50mg tablets  For up to a 30 day supply with 1 refill. 2. Will send him a resource packet for counseling services  Follow up 4 weeks, sooner PRN    Electronically signed by Virginia Du PA-C on 11/25/2020 at 1:26 PM    More than 16 mins of the session was spent doing Supportive psychotherapy. Session lasted for 30 mins.

## 2020-11-30 ENCOUNTER — OFFICE VISIT (OUTPATIENT)
Dept: FAMILY MEDICINE CLINIC | Age: 60
End: 2020-11-30

## 2020-11-30 VITALS
HEIGHT: 71 IN | BODY MASS INDEX: 38.52 KG/M2 | WEIGHT: 275.13 LBS | SYSTOLIC BLOOD PRESSURE: 128 MMHG | HEART RATE: 68 BPM | RESPIRATION RATE: 16 BRPM | TEMPERATURE: 97.2 F | DIASTOLIC BLOOD PRESSURE: 78 MMHG

## 2020-11-30 LAB
CHP ED QC CHECK: ABNORMAL
GLUCOSE BLD-MCNC: 233 MG/DL
HBA1C MFR BLD: 7.9 %

## 2020-11-30 PROCEDURE — 90471 IMMUNIZATION ADMIN: CPT | Performed by: FAMILY MEDICINE

## 2020-11-30 PROCEDURE — 90688 IIV4 VACCINE SPLT 0.5 ML IM: CPT | Performed by: FAMILY MEDICINE

## 2020-11-30 PROCEDURE — 99213 OFFICE O/P EST LOW 20 MIN: CPT | Performed by: FAMILY MEDICINE

## 2020-11-30 PROCEDURE — 82962 GLUCOSE BLOOD TEST: CPT | Performed by: FAMILY MEDICINE

## 2020-11-30 PROCEDURE — 83036 HEMOGLOBIN GLYCOSYLATED A1C: CPT | Performed by: FAMILY MEDICINE

## 2020-11-30 ASSESSMENT — ENCOUNTER SYMPTOMS
DIARRHEA: 0
CHEST TIGHTNESS: 0
EYES NEGATIVE: 1
CONSTIPATION: 0
SHORTNESS OF BREATH: 0
COUGH: 0
NAUSEA: 0
ABDOMINAL PAIN: 0
BLOOD IN STOOL: 0
VOMITING: 0

## 2020-11-30 NOTE — PROGRESS NOTES
Date: 11/30/2020    Estuardo Elizabeth is a 56 y.o. male who presents today for:  Chief Complaint   Patient presents with   Angelito Beckwith he states that he  from his wife and he is living at his house. He is going to [facility] and he is following up with psychiatrist also.  Diabetes     Estuardo Elizabeth is here for follow up of elevated cholesterol, elevated blood pressure, and diabetes. Compliance with treatment has been good. Patient denies muscle pain associated with his medications. He is not exercising and is adherent to a low-salt diet. Blood pressure is well controlled at home. Cardiac symptoms: none. Patient denies: chest pain, chest pressure/discomfort, claudication, dyspnea, exertional chest pressure/discomfort, fatigue, irregular heart beat, lower extremity edema, near-syncope, orthopnea, palpitations and paroxysmal nocturnal dyspnea. Cardiovascular risk factors: advanced age (older than 54 for men, 72 for women), diabetes mellitus, dyslipidemia, hypertension, male gender and obesity (BMI >= 30 kg/m2). Use of agents associated with hypertension: none. History of target organ damage: ASCVD. Current diabetic symptoms include: none. Patient denies foot ulcerations, polydipsia and polyuria. Evaluation to date has included: fasting blood sugar, fasting lipid panel, hemoglobin A1C and microalbuminuria. Home sugars: BGs range between 140-200 in the AM. Current treatments: Metformin. Last dilated eye exam he is due, needs to make appt.      HPI:     HPI    has a current medication list which includes the following prescription(s): dapagliflozin, trazodone, omeprazole, losartan, metoprolol succinate, isosorbide mononitrate, aspirin, prodigy no coding blood gluc, bupropion, brilinta, pramipexole, atorvastatin, allopurinol, metformin, anoro ellipta, nitroglycerin, bumetanide, prodigy autocode blood glucose, fluticasone, and albuterol sulfate hfa.     Allergies   Allergen Reactions    Zoloft [Sertraline Hcl] Other (See Comments)     Headaches, sweats, bad dreams       Social History     Tobacco Use    Smoking status: Former Smoker     Packs/day: 2.00     Years: 25.00     Pack years: 50.00     Types: Cigars     Last attempt to quit: 2014     Years since quittin.0    Smokeless tobacco: Never Used   Substance Use Topics    Alcohol use: Yes     Alcohol/week: 3.0 standard drinks     Types: 3 Cans of beer per week     Comment: 1 every day    Drug use: Yes     Frequency: 5.0 times per week     Types: Marijuana     Comment: smokes pot once sushma while . former drug user, addiction treatment at [facility]       Past Medical History:   Diagnosis Date    [facility] [facility]     COPD (chronic obstructive pulmonary disease) [facility] [address] [facility]     Coronary artery disease involving native coronary artery of native heart without angina pectoris     Depression     Diabetes mellitus type 2, diet-controlled ([facility] [address] [facility] 2018    GERD (gastroesophageal reflux disease) 3/21/2012    Hernia     Hx of blood clots     right knee due to injury    Hx of cocaine abuse ([facility] [address] [facility]     Hyperglycemia     Hyperlipidemia     Hypertension     FAISAL on CPAP     Osteoarthritis     S/P CABG x 2 2016    S/P PTCA: 1/15/2018: Stent diagonal branch Xience 3.0 x 12 mm. 1/15/2018    1/15/2018: Stent diagonal branch Xience 3.0 x 12 mm.  Dr. Tamra Napier injury 2015    Right thumb cut with table saw, no treatment needed       Past Surgical History:   Procedure Laterality Date    CARDIAC CATHETERIZATION  2016    CATARACT REMOVAL Right     COLONOSCOPY      CORONARY ARTERY BYPASS GRAFT  2016    Double bypass, Dr. Thom Howard Right 2007    Neck    DIAGNOSTIC CARDIAC CATH LAB PROCEDURE      EYE SURGERY Right     Retinal surgery x 3    HERNIA REPAIR Right     Inguinal    PTCA  01/15/2018    ROTATOR CUFF REPAIR Right 2017    TONSILLECTOMY  child    TOTAL KNEE ARTHROPLASTY Left 10/24/2016    Dr. Antonia Watson       Family History   Problem Relation Age of Onset    Heart Disease Mother     High Blood Pressure Mother     Obesity Mother     Diabetes Mother     Heart Disease Father     Cancer Father         colon/prostate    Colon Cancer Father     Prostate Cancer Father     Arthritis Father     Diabetes Brother      Subjective:     Review of Systems   Constitutional: Negative for activity change, appetite change, diaphoresis and fever. HENT: Negative. Eyes: Negative. Respiratory: Negative for cough, chest tightness and shortness of breath. Cardiovascular: Negative for chest pain, palpitations and leg swelling. Gastrointestinal: Negative for abdominal pain, blood in stool, constipation, diarrhea, nausea and vomiting. Genitourinary: Negative. Musculoskeletal: Positive for arthralgias. Skin: Negative. Negative for rash. Neurological: Negative. Negative for dizziness, syncope, weakness, light-headedness and headaches. Psychiatric/Behavioral: Negative.        :   /78 (Site: Left Upper Arm, Position: Sitting, Cuff Size: Large Adult)   Pulse 68   Temp 97.2 °F (36.2 °C) (Skin)   Resp 16   Ht 5' 11\" (1.803 m)   Wt 275 lb 2 oz (124.8 kg)   BMI 38.37 kg/m²   Wt Readings from Last 3 Encounters:   11/30/20 275 lb 2 oz (124.8 kg)   11/02/20 260 lb (117.9 kg)   09/04/20 269 lb 3.2 oz (122.1 kg)     Physical Exam  Vitals signs and nursing note reviewed. Constitutional:       General: He is not in acute distress. Appearance: He is well-developed. He is not diaphoretic. HENT:      Head: Normocephalic and atraumatic. Eyes:      General: No scleral icterus. Right eye: No discharge. Left eye: No discharge. Conjunctiva/sclera: Conjunctivae normal.      Pupils: Pupils are equal, round, and reactive to light. Neck:      Musculoskeletal: Normal range of motion and neck supple. Thyroid: No thyromegaly. Vascular: No JVD. Cardiovascular:      Rate and Rhythm: Normal rate and regular rhythm. Heart sounds: Normal heart sounds. No murmur. Pulmonary:      Effort: Pulmonary effort is normal. No respiratory distress. Breath sounds: Normal breath sounds. No wheezing, rhonchi or rales. Abdominal:      General: Bowel sounds are normal. There is no distension. Palpations: Abdomen is soft. There is no mass. Tenderness: There is no abdominal tenderness. There is no guarding or rebound. Musculoskeletal: Normal range of motion. Lymphadenopathy:      Cervical: No cervical adenopathy. Skin:     General: Skin is warm and dry. Findings: No rash. Neurological:      Mental Status: He is alert and oriented to person, place, and time. Psychiatric:         Behavior: Behavior normal.       :       Diagnosis Orders   1. Diabetes mellitus type 2, diet-controlled (HCC)  POCT glycosylated hemoglobin (Hb A1C)    POCT Glucose   2. Essential hypertension  Comprehensive Metabolic Panel    CBC Auto Differential   3. Coronary artery disease involving native coronary artery of native heart without angina pectoris     4. Hyperlipidemia, unspecified hyperlipidemia type  Lipid Panel   5. Need for influenza vaccination  INFLUENZA, QUADV, 0.5ML, 6 MO AND OLDER, IM, MDV, (Quinton Tripathi)   6. Screening for prostate cancer  Psa screening   7.  Screening for HIV (human immunodeficiency virus)  HIV Screen       :      Current Outpatient Medications   Medication Sig Dispense Refill    dapagliflozin (FARXIGA) 5 MG tablet Take 1 tablet by mouth every morning 90 tablet 1    traZODone (DESYREL) 50 MG tablet Take 0.5-1 tablet at night as needed for sleep 30 tablet 1    omeprazole (PRILOSEC) 20 MG delayed release capsule TAKE 1 CAPSULE BY MOUTH ONE TIME A DAY 90 capsule 0    losartan (COZAAR) 25 MG tablet TAKE 1 TABLET BY MOUTH DAILY 90 tablet 0    metoprolol succinate (TOPROL XL) 25 MG extended release tablet TAKE 2 TABLETS BY MOUTH DAILY Expiration Date:   11/30/2021    Lipid Panel     Standing Status:   Future     Standing Expiration Date:   11/30/2021     Order Specific Question:   Is Patient Fasting?/# of Hours     Answer:   yes 12 hours    Psa screening     Standing Status:   Future     Standing Expiration Date:   11/30/2021    HIV Screen     Standing Status:   Future     Standing Expiration Date:   11/30/2021    POCT glycosylated hemoglobin (Hb A1C)    POCT Glucose     Lab Results   Component Value Date    LABA1C 7.9 (A) 11/30/2020    LABA1C 7.2 (A) 06/03/2020    LABA1C 7.0 (A) 01/08/2020     No results found for: Suzanne Ridley  Results for POC orders placed in visit on 11/30/20   POCT glycosylated hemoglobin (Hb A1C)   Result Value Ref Range    Hemoglobin A1C 7.9 (A) %   POCT Glucose   Result Value Ref Range    Glucose 233 (A) mg/dL    QC OK? Continue to monitor blood sugars 1 times a day. Keep log of blood sugars and bring with you to the next appointment. Discussed use, benefit, and side effects of prescribed medications. All patient questions answered. Pt voiced understanding. Instructed to continue current medications, ADA diet and exercise. Patient agreed with treatment plan. Return in about 3 months (around 2/28/2021).

## 2020-11-30 NOTE — PROGRESS NOTES
Immunizations Administered     Name Date Dose Route    Influenza, Quadv, IM, (6 mo and older Fluzone, Flulaval, Fluarix and 3 yrs and older Afluria) 11/30/2020 0.5 mL Intramuscular    Site: Deltoid- Right    Lot: [id number]    NDC: 39384-643-72

## 2020-12-02 ENCOUNTER — CARE COORDINATION (OUTPATIENT)
Dept: OTHER | Facility: CLINIC | Age: 60
End: 2020-12-02

## 2020-12-02 NOTE — CARE COORDINATION
You Patient resolved from the Care Transitions episode on 12/2/20. Discussed COVID-19 related testing which was available at this time. Test results were negative. Patient informed of results, if available? Yes    Patient/family has been provided the following resources and education related to COVID-19:                         Signs, symptoms and red flags related to COVID-19            CDC exposure and quarantine guidelines            [facility] exposure contact - [phone]            Contact for their local Department of Health                 Patient currently reports that the following symptoms have improved:  fatigue and shortness of breath     No further outreach scheduled with this CTN/ACM. Episode of Care resolved. Patient has this CTN/ACM contact information if future needs arise.

## 2020-12-07 RX ORDER — ALLOPURINOL 300 MG/1
TABLET ORAL
Qty: 90 TABLET | Refills: 0 | Status: SHIPPED | OUTPATIENT
Start: 2020-12-07 | End: 2020-12-17 | Stop reason: SDUPTHER

## 2020-12-07 NOTE — TELEPHONE ENCOUNTER
Date of last visit:  11/30/2020  Date of next visit:  3/5/2021    Requested Prescriptions     Pending Prescriptions Disp Refills    allopurinol (ZYLOPRIM) 300 MG tablet [Pharmacy Med Name: ALLOPURINOL 300MG TABS] 90 tablet 0     Sig: TAKE 1 TABLET BY MOUTH ONCE DAILY.

## 2020-12-17 NOTE — TELEPHONE ENCOUNTER
Pt is switching to a mail in pharmacy. Date of last visit:  11/30/2020  Date of next visit:  3/5/2021    Requested Prescriptions     Pending Prescriptions Disp Refills    losartan (COZAAR) 25 MG tablet 90 tablet 1     Sig: TAKE 1 TABLET BY MOUTH DAILY    albuterol sulfate HFA (VENTOLIN HFA) 108 (90 Base) MCG/ACT inhaler 1 Inhaler 3     Sig: Inhale 2 puffs into the lungs every 6 hours as needed for Wheezing    allopurinol (ZYLOPRIM) 300 MG tablet 90 tablet 1     Sig: TAKE 1 TABLET BY MOUTH ONCE DAILY.  umeclidinium-vilanterol (ANORO ELLIPTA) 62.5-25 MCG/INH AEPB inhaler 1 each 11     Sig: INHALE 1 PUFF INTO THE LUNGS ONE TIME DAILY    aspirin 81 MG chewable tablet 90 tablet 1     Sig: CHEW AND SWALLOW 1 TABLET BY MOUTH ONCE DAILY.  atorvastatin (LIPITOR) 40 MG tablet 90 tablet 1     Sig: Take 1 tablet by mouth daily    bumetanide (BUMEX) 2 MG tablet 135 tablet 1     Sig: Take 1 tablet by mouth daily Take 2 mg AM - 1 mg PM    ticagrelor (BRILINTA) 90 MG TABS tablet 180 tablet 1     Sig: TAKE 1 TABLET BY MOUTH 2 TIMES A DAY    buPROPion (WELLBUTRIN XL) 300 MG extended release tablet 90 tablet 1     Sig: TAKE ONE TABLET BY MOUTH EVERY MORNING    dapagliflozin (FARXIGA) 5 MG tablet 90 tablet 1     Sig: Take 1 tablet by mouth every morning    fluticasone (FLONASE) 50 MCG/ACT nasal spray 48 g 0     Sig: USE 2 SPRAYS IN EACH NOSTRIL DAILY AS NEEDED FOR RHINITIS OR ALLERGIES    isosorbide mononitrate (IMDUR) 30 MG extended release tablet 90 tablet 1     Sig: TAKE 1 TABLET BY MOUTH ONCE DAILY.  metFORMIN (GLUCOPHAGE) 500 MG tablet 90 tablet 1     Sig: TAKE 1 TABLET BY MOUTH ONCE DAILY WITH BREAKFAST.     metoprolol succinate (TOPROL XL) 25 MG extended release tablet 180 tablet 1     Sig: TAKE 2 TABLETS BY MOUTH DAILY    nitroGLYCERIN (NITROSTAT) 0.4 MG SL tablet 25 tablet 3     Sig: Place 1 tablet under the tongue every 5 minutes as needed for Chest pain  omeprazole (PRILOSEC) 20 MG delayed release capsule 90 capsule 1     Sig: TAKE 1 CAPSULE BY MOUTH ONE TIME A DAY    pramipexole (MIRAPEX) 0.125 MG tablet 90 tablet 1     Sig: TAKE ONE TABLET BY MOUTH EVERY EVENING    traZODone (DESYREL) 50 MG tablet 30 tablet 1     Sig: Take 0.5-1 tablet at night as needed for sleep

## 2020-12-18 RX ORDER — OMEPRAZOLE 20 MG/1
CAPSULE, DELAYED RELEASE ORAL
Qty: 90 CAPSULE | Refills: 1 | Status: SHIPPED | OUTPATIENT
Start: 2020-12-18 | End: 2021-01-29

## 2020-12-18 RX ORDER — BUPROPION HYDROCHLORIDE 300 MG/1
TABLET ORAL
Qty: 90 TABLET | Refills: 1 | Status: SHIPPED | OUTPATIENT
Start: 2020-12-18 | End: 2021-09-17

## 2020-12-18 RX ORDER — PRAMIPEXOLE DIHYDROCHLORIDE 0.12 MG/1
TABLET ORAL
Qty: 90 TABLET | Refills: 1 | Status: SHIPPED | OUTPATIENT
Start: 2020-12-18 | End: 2021-01-18

## 2020-12-18 RX ORDER — UMECLIDINIUM BROMIDE AND VILANTEROL TRIFENATATE 62.5; 25 UG/1; UG/1
POWDER RESPIRATORY (INHALATION)
Qty: 1 EACH | Refills: 5 | Status: SHIPPED | OUTPATIENT
Start: 2020-12-18 | End: 2021-08-06 | Stop reason: SDUPTHER

## 2020-12-18 RX ORDER — ALLOPURINOL 300 MG/1
TABLET ORAL
Qty: 90 TABLET | Refills: 1 | Status: SHIPPED | OUTPATIENT
Start: 2020-12-18 | End: 2021-01-04

## 2020-12-18 RX ORDER — BUMETANIDE 2 MG/1
2 TABLET ORAL DAILY
Qty: 135 TABLET | Refills: 1 | Status: SHIPPED | OUTPATIENT
Start: 2020-12-18 | End: 2021-07-12

## 2020-12-18 RX ORDER — ISOSORBIDE MONONITRATE 30 MG/1
TABLET, EXTENDED RELEASE ORAL
Qty: 90 TABLET | Refills: 1 | Status: SHIPPED | OUTPATIENT
Start: 2020-12-18 | End: 2021-05-03

## 2020-12-18 RX ORDER — FLUTICASONE PROPIONATE 50 MCG
SPRAY, SUSPENSION (ML) NASAL
Qty: 48 G | Refills: 5 | Status: SHIPPED | OUTPATIENT
Start: 2020-12-18 | End: 2021-12-22

## 2020-12-18 RX ORDER — ASPIRIN 81 MG/1
TABLET, CHEWABLE ORAL
Qty: 90 TABLET | Refills: 1 | Status: SHIPPED | OUTPATIENT
Start: 2020-12-18 | End: 2021-09-14

## 2020-12-18 RX ORDER — NITROGLYCERIN 0.4 MG/1
0.4 TABLET SUBLINGUAL EVERY 5 MIN PRN
Qty: 25 TABLET | Refills: 3 | Status: SHIPPED | OUTPATIENT
Start: 2020-12-18

## 2020-12-18 RX ORDER — ALBUTEROL SULFATE 90 UG/1
2 AEROSOL, METERED RESPIRATORY (INHALATION) EVERY 6 HOURS PRN
Qty: 1 INHALER | Refills: 3 | Status: SHIPPED | OUTPATIENT
Start: 2020-12-18 | End: 2021-06-09

## 2020-12-18 RX ORDER — TRAZODONE HYDROCHLORIDE 50 MG/1
TABLET ORAL
Qty: 30 TABLET | Refills: 1 | Status: SHIPPED | OUTPATIENT
Start: 2020-12-18 | End: 2021-04-14

## 2020-12-18 RX ORDER — METOPROLOL SUCCINATE 25 MG/1
TABLET, EXTENDED RELEASE ORAL
Qty: 180 TABLET | Refills: 1 | Status: SHIPPED | OUTPATIENT
Start: 2020-12-18 | End: 2021-01-29

## 2020-12-18 RX ORDER — LOSARTAN POTASSIUM 25 MG/1
TABLET ORAL
Qty: 90 TABLET | Refills: 1 | Status: SHIPPED | OUTPATIENT
Start: 2020-12-18 | End: 2021-07-12

## 2020-12-18 RX ORDER — ATORVASTATIN CALCIUM 40 MG/1
40 TABLET, FILM COATED ORAL DAILY
Qty: 90 TABLET | Refills: 1 | Status: SHIPPED | OUTPATIENT
Start: 2020-12-18 | End: 2021-07-12

## 2021-01-04 RX ORDER — ALLOPURINOL 300 MG/1
TABLET ORAL
Qty: 90 TABLET | Refills: 0 | Status: SHIPPED | OUTPATIENT
Start: 2021-01-04 | End: 2021-07-12

## 2021-01-04 RX ORDER — BLOOD SUGAR DIAGNOSTIC
STRIP MISCELLANEOUS
Qty: 100 EACH | Refills: 0 | Status: SHIPPED | OUTPATIENT
Start: 2021-01-04

## 2021-01-04 NOTE — TELEPHONE ENCOUNTER
Date of last visit:  11/30/2020  Date of next visit:  3/5/2021    Requested Prescriptions     Pending Prescriptions Disp Refills    blood glucose test strips (PRODIGY NO CODING BLOOD GLUC) strip [Pharmacy Med Name: PRODIGY NO CODING BLOOD GLUC  STRP] 100 each 0     Sig: USE TO TEST BLOOD GLUCOSE ONCE DAILY.

## 2021-01-14 ENCOUNTER — OFFICE VISIT (OUTPATIENT)
Dept: CARDIOLOGY CLINIC | Age: 61
End: 2021-01-14
Payer: COMMERCIAL

## 2021-01-14 VITALS
DIASTOLIC BLOOD PRESSURE: 96 MMHG | SYSTOLIC BLOOD PRESSURE: 138 MMHG | HEIGHT: 71 IN | HEART RATE: 68 BPM | OXYGEN SATURATION: 95 % | BODY MASS INDEX: 37.66 KG/M2 | WEIGHT: 269 LBS

## 2021-01-14 DIAGNOSIS — I50.32 CHF (CONGESTIVE HEART FAILURE), NYHA CLASS II, CHRONIC, DIASTOLIC (HCC): Primary | ICD-10-CM

## 2021-01-14 DIAGNOSIS — I25.810 CORONARY ARTERY DISEASE INVOLVING CORONARY BYPASS GRAFT OF NATIVE HEART WITHOUT ANGINA PECTORIS: ICD-10-CM

## 2021-01-14 DIAGNOSIS — I10 ESSENTIAL HYPERTENSION: ICD-10-CM

## 2021-01-14 DIAGNOSIS — Z95.1 S/P CABG (CORONARY ARTERY BYPASS GRAFT): ICD-10-CM

## 2021-01-14 PROCEDURE — 99214 OFFICE O/P EST MOD 30 MIN: CPT | Performed by: NURSE PRACTITIONER

## 2021-01-14 ASSESSMENT — ENCOUNTER SYMPTOMS
COUGH: 0
ABDOMINAL PAIN: 0
SHORTNESS OF BREATH: 0
APNEA: 0
NAUSEA: 0
CHEST TIGHTNESS: 0
ABDOMINAL DISTENTION: 0
WHEEZING: 0
COLOR CHANGE: 0

## 2021-01-14 NOTE — PATIENT INSTRUCTIONS
Follow-up care is a key part of your treatment and safety. Be sure to make and go to all appointments, and call your doctor if you are having problems. It's also a good idea to know your test results and keep a list of the medicines you take. How can you care for yourself at home? Read food labels  · Read labels on cans and food packages. The labels tell you how much sodium is in each serving. Make sure that you look at the serving size. If you eat more than the serving size, you have eaten more sodium. · Food labels also tell you the Percent Daily Value for sodium. Choose products with low Percent Daily Values for sodium. · Be aware that sodium can come in forms other than salt, including monosodium glutamate (MSG), sodium citrate, and sodium bicarbonate (baking soda). MSG is often added to Asian food. When you eat out, you can sometimes ask for food without MSG or added salt. Buy low-sodium foods  · Buy foods that are labeled \"unsalted\" (no salt added), \"sodium-free\" (less than 5 mg of sodium per serving), or \"low-sodium\" (less than 140 mg of sodium per serving). Foods labeled \"reduced-sodium\" and \"light sodium\" may still have too much sodium. Be sure to read the label to see how much sodium you are getting. · Buy fresh vegetables, or frozen vegetables without added sauces. Buy low-sodium versions of canned vegetables, soups, and other canned goods. Prepare low-sodium meals  · Cut back on the amount of salt you use in cooking. This will help you adjust to the taste. Do not add salt after cooking. One teaspoon of salt has about 2,300 mg of sodium. · Take the salt shaker off the table. · Flavor your food with garlic, lemon juice, onion, vinegar, herbs, and spices. Do not use soy sauce, lite soy sauce, steak sauce, onion salt, garlic salt, celery salt, mustard, or ketchup on your food. · Use low-sodium salad dressings, sauces, and ketchup. Or make your own salad dressings and sauces without adding salt. · Use less salt (or none) when recipes call for it. You can often use half the salt a recipe calls for without losing flavor. Other foods such as rice, pasta, and grains do not need added salt. · Rinse canned vegetables, and cook them in fresh water. This removes somebut not allof the salt. · Avoid water that is naturally high in sodium or that has been treated with water softeners, which add sodium. Call your local water company to find out the sodium content of your water supply. If you buy bottled water, read the label and choose a sodium-free brand. Avoid high-sodium foods  · Avoid eating:  ? Smoked, cured, salted, and canned meat, fish, and poultry. ? Ham, reddy, hot dogs, and luncheon meats. ? Regular, hard, and processed cheese and regular peanut butter. ? Crackers with salted tops, and other salted snack foods such as pretzels, chips, and salted popcorn. ? Frozen prepared meals, unless labeled low-sodium. ? Canned and dried soups, broths, and bouillon, unless labeled sodium-free or low-sodium. ? Canned vegetables, unless labeled sodium-free or low-sodium. ? [place] fries, pizza, tacos, and other fast foods. ? Pickles, olives, ketchup, and other condiments, especially soy sauce, unless labeled sodium-free or low-sodium.

## 2021-01-14 NOTE — PROGRESS NOTES
[facility]       Visit Date: 1/14/2021  Cardiologist:  Dr. Cayetano Bardales  Primary Care Physician: Dr. Armida Pérez MD    Susan Castaneda is a 56 y.o. male who presents today for:  Chief Complaint   Patient presents with    Congestive Heart Failure       HPI: Obtained from patient and chart    Susan Castaneda is a 56 y.o. male who presents to the office for a follow up visit in the heart failure clinic. Hx HTN, CAD PCI stent, CABG, HFpEF 55%, FAISAL, DM, ETOH. He recetnly  from his wife. He has been attending [place] [address] meetings. He does drink once a week. He has no swelling. Sleeps in a bed, denies orthopnea. He does take an extra 1 mg of Bumex 1-2 days a week. Activity is limited with activity due to his knee. His Lisinopril was changed to Losartan d/t cough which has resolved. Accompanied by: self  Last hospital admission related to Heart Failure:  > 1 year  Chest Pain: no  Worsening SOB: no  Worsening Orthopnea/PND: no  Edema: no  Any extra diuretic use: no  Weight gain: no  Fatigue: no  Abdominal bloating: no  Appetite: good  FAISAL: compliant with CPAP  Cough: no  Compliant checking home weight: no  Compliant checking blood pressure: no      Past Medical History:   Diagnosis Date    CHF (congestive heart failure) ([facility])     COPD (chronic obstructive pulmonary disease) ([facility])     Coronary artery disease involving native coronary artery of native heart without angina pectoris     Depression     Diabetes mellitus type 2, diet-controlled ([facility] [address] 6/1/2018    GERD (gastroesophageal reflux disease) 3/21/2012    Hernia     Hx of blood clots 1990's    right knee due to injury    Hx of cocaine abuse ([facility] [address]     Hyperglycemia 09/09    Hyperlipidemia     Hypertension     FAISAL on CPAP     Osteoarthritis 11/07    S/P CABG x 2 07/06/2016    S/P PTCA: 1/15/2018: Stent diagonal branch Xience 3.0 x 12 mm. 1/15/2018    1/15/2018: Stent diagonal branch Xience 3.0 x 12 mm.  Dr. Anita Cesar  Thumb injury 2015    Right thumb cut with table saw, no treatment needed     Past Surgical History:   Procedure Laterality Date    CARDIAC CATHETERIZATION  2016    CATARACT REMOVAL Right     COLONOSCOPY      CORONARY ARTERY BYPASS GRAFT  2016    Double bypass, Dr. Marianela Downs Right 2007    Neck    DIAGNOSTIC CARDIAC CATH LAB PROCEDURE      EYE SURGERY Right     Retinal surgery x 3    HERNIA REPAIR Right     Inguinal    PTCA  01/15/2018    ROTATOR CUFF REPAIR Right 2017    TONSILLECTOMY  child    TOTAL KNEE ARTHROPLASTY Left 10/24/2016    Dr. Silvestre Scott     Family History   Problem Relation Age of Onset    Heart Disease Mother     High Blood Pressure Mother     Obesity Mother     Diabetes Mother     Heart Disease Father     Cancer Father         colon/prostate    Colon Cancer Father     Prostate Cancer Father     Arthritis Father     Diabetes Brother      Social History     Tobacco Use    Smoking status: Former Smoker     Packs/day: 2.00     Years: 25.00     Pack years: 50.00     Types: Cigars     Quit date: 2014     Years since quittin.1    Smokeless tobacco: Never Used   Substance Use Topics    Alcohol use: Yes     Alcohol/week: 3.0 standard drinks     Types: 3 Cans of beer per week     Comment: 1 every day     Current Outpatient Medications   Medication Sig Dispense Refill    allopurinol (ZYLOPRIM) 300 MG tablet TAKE 1 TABLET BY MOUTH ONCE DAILY. 90 tablet 0    blood glucose test strips (PRODIGY NO CODING BLOOD GLUC) strip USE TO TEST BLOOD GLUCOSE ONCE DAILY.  100 each 0    losartan (COZAAR) 25 MG tablet TAKE 1 TABLET BY MOUTH DAILY 90 tablet 1    albuterol sulfate HFA (VENTOLIN HFA) 108 (90 Base) MCG/ACT inhaler Inhale 2 puffs into the lungs every 6 hours as needed for Wheezing 1 Inhaler 3    umeclidinium-vilanterol (ANORO ELLIPTA) 62.5-25 MCG/INH AEPB inhaler INHALE 1 PUFF INTO THE LUNGS ONE TIME DAILY 1 each 5  aspirin 81 MG chewable tablet CHEW AND SWALLOW 1 TABLET BY MOUTH ONCE DAILY. 90 tablet 1    atorvastatin (LIPITOR) 40 MG tablet Take 1 tablet by mouth daily 90 tablet 1    bumetanide (BUMEX) 2 MG tablet Take 1 tablet by mouth daily Take 2 mg AM - 1 mg  tablet 1    ticagrelor (BRILINTA) 90 MG TABS tablet TAKE 1 TABLET BY MOUTH 2 TIMES A  tablet 1    buPROPion (WELLBUTRIN XL) 300 MG extended release tablet TAKE ONE TABLET BY MOUTH EVERY MORNING 90 tablet 1    dapagliflozin (FARXIGA) 5 MG tablet Take 1 tablet by mouth every morning 90 tablet 1    fluticasone (FLONASE) 50 MCG/ACT nasal spray USE 2 SPRAYS IN EACH NOSTRIL DAILY AS NEEDED FOR RHINITIS OR ALLERGIES 48 g 5    isosorbide mononitrate (IMDUR) 30 MG extended release tablet TAKE 1 TABLET BY MOUTH ONCE DAILY. 90 tablet 1    metFORMIN (GLUCOPHAGE) 500 MG tablet TAKE 1 TABLET BY MOUTH ONCE DAILY WITH BREAKFAST. 90 tablet 1    metoprolol succinate (TOPROL XL) 25 MG extended release tablet TAKE 2 TABLETS BY MOUTH DAILY 180 tablet 1    nitroGLYCERIN (NITROSTAT) 0.4 MG SL tablet Place 1 tablet under the tongue every 5 minutes as needed for Chest pain 25 tablet 3    omeprazole (PRILOSEC) 20 MG delayed release capsule TAKE 1 CAPSULE BY MOUTH ONE TIME A DAY 90 capsule 1    pramipexole (MIRAPEX) 0.125 MG tablet TAKE ONE TABLET BY MOUTH EVERY EVENING 90 tablet 1    traZODone (DESYREL) 50 MG tablet Take 0.5-1 tablet at night as needed for sleep 30 tablet 1    Blood Glucose Monitoring Suppl (PRODIGY AUTOCODE BLOOD GLUCOSE) w/Device KIT        No current facility-administered medications for this visit. Allergies   Allergen Reactions    Zoloft [Sertraline Hcl] Other (See Comments)     Headaches, sweats, bad dreams       SUBJECTIVE:   Review of Systems   Constitutional: Negative for activity change, appetite change, fatigue and fever. HENT: Negative for congestion. Respiratory: Negative for apnea, cough, chest tightness, shortness of breath and wheezing. Cardiovascular: Negative for chest pain, palpitations and leg swelling. Gastrointestinal: Negative for abdominal distention, abdominal pain and nausea. Genitourinary: Negative for difficulty urinating and dysuria. Musculoskeletal: Negative for arthralgias and gait problem. Skin: Negative for color change. Neurological: Negative for dizziness, numbness and headaches. Psychiatric/Behavioral: Negative for agitation, confusion and sleep disturbance. The patient is not nervous/anxious. OBJECTIVE:   Today's Vitals:  BP (!) 138/96   Pulse 68   Ht 5' 11\" (1.803 m)   Wt 269 lb (122 kg)   SpO2 95%   BMI 37.52 kg/m²     Physical Exam  Vitals signs reviewed. Constitutional:       Appearance: He is well-developed. HENT:      Head: Normocephalic and atraumatic. Eyes:      Pupils: Pupils are equal, round, and reactive to light. Neck:      Musculoskeletal: Normal range of motion. Vascular: No JVD. Cardiovascular:      Rate and Rhythm: Normal rate and regular rhythm. Heart sounds: Normal heart sounds. No murmur. Pulmonary:      Effort: Pulmonary effort is normal. No respiratory distress. Breath sounds: No rales. Abdominal:      General: There is no distension. Palpations: Abdomen is soft. Tenderness: There is no abdominal tenderness. Musculoskeletal:         General: No tenderness. Right lower leg: No edema. Left lower leg: No edema. Skin:     General: Skin is warm and dry. Capillary Refill: Capillary refill takes less than 2 seconds. Neurological:      Mental Status: He is alert and oriented to person, place, and time.    Psychiatric:         Mood and Affect: Mood normal.         Behavior: Behavior normal.         Wt Readings from Last 3 Encounters:   01/14/21 269 lb (122 kg)   11/30/20 275 lb 2 oz (124.8 kg)   11/02/20 260 lb (117.9 kg) BP Readings from Last 3 Encounters:   01/14/21 (!) 138/96   11/30/20 128/78   11/02/20 (!) 144/83     Pulse Readings from Last 3 Encounters:   01/14/21 68   11/30/20 68   11/02/20 63     Body mass index is 37.52 kg/m². ECHO:   11/11/19  Summary   Ejection fraction is visually estimated at 55%. Overall left ventricular function is normal.   Mildly dilated left atrium. Signature      ----------------------------------------------------------------   Electronically signed by Sreedhar Garrison MD (Interpreting   physician) on 11/11/2019 at 04:25 PM   ----------------------------------------------------------------      Findings      Mitral Valve   The mitral valve structure was normal with normal leaflet separation. DOPPLER: The transmitral velocity was within the normal range with no   evidence for mitral stenosis. There was no evidence of mitral   regurgitation. Aortic Valve   The aortic valve leaflets were not well visualized. Aortic valve appears tricuspid. Tricuspid Valve   Trivial tricuspid regurgitation visualized. Pulmonic Valve   The pulmonic valve was not well visualized . Trivial pulmonic regurgitation visualized. Left Atrium   Mildly dilated left atrium. Left Ventricle   Ejection fraction is visually estimated at 55%. Overall left ventricular function is normal.      Right Atrium   Right atrial size was normal.      Right Ventricle   The right ventricular size was normal with normal systolic function and   wall thickness. Pericardial Effusion   The pericardium was normal in appearance with no evidence of a pericardial   effusion. Pleural Effusion   No evidence of pleural effusion. Aorta / Great Vessels   -Aortic root dimension within normal limits.   -The Pulmonary artery is within normal limits. -IVC size is within normal limits with normal respiratory phasic changes.          Results reviewed:  BNP:   Lab Results   Component Value Date BNP 31.5 02/12/2013     CBC:   Lab Results   Component Value Date    WBC 7.7 07/26/2020    RBC 4.30 07/26/2020    HGB 14.8 07/26/2020    HCT 44.9 07/26/2020     07/26/2020     CMP:    Lab Results   Component Value Date     07/26/2020    K 3.7 07/26/2020     07/26/2020    CO2 23 07/26/2020    BUN 12 07/26/2020    CREATININE 0.8 07/26/2020    LABGLOM >90 07/26/2020    GLUCOSE 233 11/30/2020    GLUCOSE 115 02/04/2012    CALCIUM 9.2 07/26/2020     Hepatic Function Panel:    Lab Results   Component Value Date    ALKPHOS 94 03/10/2020    ALT 57 03/10/2020    AST 41 03/10/2020    PROT 7.2 03/10/2020    BILITOT 0.6 03/10/2020    BILITOT NEGATIVE 11/08/2017    BILIDIR <0.2 07/26/2019    LABALBU 4.6 03/10/2020    LABALBU 4.5 02/04/2012     Magnesium:    Lab Results   Component Value Date    MG 2.0 03/10/2020     PT/INR:    Lab Results   Component Value Date    INR 0.92 01/15/2018     Lipids:    Lab Results   Component Value Date    TRIG 181 08/09/2019    HDL 44 08/09/2019    LDLCALC 85 08/09/2019       ASSESSMENT AND PLAN:   The patient's condition/symptoms are Stable      Diagnosis Orders   1. CHF (congestive heart failure), NYHA class II, chronic, diastolic ([facility] [address]     2. Essential hypertension  Hepatic Function Panel   3. Coronary artery disease involving coronary bypass graft of native heart without angina pectoris  Hepatic Function Panel   4.  S/P CABG (coronary artery bypass graft)  Hepatic Function Panel       Plan:  · GDMT   · ACE/ARB/ARNi: Losartan 25 mg daily  · Beta Blocker: Toprol 50 mg daily  · Aldosterone antagonist: no  · Diuretic/Potassium: Bumex 2 mg daily -  He does   · Hydralazine/Nitrate: Imdur 30 mg daily  · Other: Lipitor, ASA 81 mg, Brilinta, Farxiga · No signs of fluid overload. BP was elevated on arrival, did come down but diastolic was still elevated. He is going to monitor the BP at home and let me know if remains elevated. He has labs ordered for his PCP soon, will also add a Liver panel. He has been trying the \"Keto\" diet. · HF Zones reviewed. · Daily weights and record  · Fluid restriction of 2 Liters per day (64 oz)   · Limit sodium in diet to 7981-3361 mg/day  · Healthier food options were discussed   · Monitor BP daily 1 hour after meds are taken  · Increase activity as tolerated     Patient was instructed to call the [facility] [place] for changes in the following symptoms:   ? Weight gain of 3 pounds in 1 day or 5 pounds in 1 week  ? Increased shortness of breath  ? Shortness of breath while laying down  ? Cough  ? Chest pain  ? Swelling in feet, ankles or legs  ? Tenderness or bloating in the abdomen  ? Fatigue   ? Decreased appetite or feeling \"full\"  ? Nausea   ? Confusion      Return in about 6 months (around 7/14/2021). or sooner if needed     Patient given educational materials - see patient instructions. We discussed the importance of weighing oneself and recording daily. We also discussed the importance of a low sodium diet, higher sodium foods to avoid and appropriate low sodium food choices. Discussed use, benefit, and side effects of prescribed medications. All patient questions answered. Patient verbalizes understanding of plan of care using teach back method, and is agreeable to the treatment plan. Greater then 35 minutes of time was spent reviewing the chart and educating the patient about HF, medications, diet, exercise, and discussing the plan of care. I personally spent more then 50% of the appt time face to face with the patient counseling/coordinating patient's care.       Electronicallysigned by ASHLEIGH Gunn CNP on 1/14/2021 at 3:41 PM

## 2021-01-15 ENCOUNTER — HOSPITAL ENCOUNTER (OUTPATIENT)
Dept: PSYCHIATRY | Age: 61
Setting detail: THERAPIES SERIES
Discharge: HOME | End: 2021-01-15
Payer: COMMERCIAL

## 2021-01-15 NOTE — PROGRESS NOTES
Patient arrived to start IOP, however due to low census groups were cancelled for the day. Patient declined 1:1 session and opted to instead begin groups on Monday.

## 2021-01-18 ENCOUNTER — HOSPITAL ENCOUNTER (OUTPATIENT)
Dept: PSYCHIATRY | Age: 61
Setting detail: THERAPIES SERIES
Discharge: HOME | End: 2021-01-18
Payer: COMMERCIAL

## 2021-01-18 PROCEDURE — 90853 GROUP PSYCHOTHERAPY: CPT

## 2021-01-18 NOTE — GROUP NOTE
Group Therapy Note   Date: 1/18/2021   Group Start Time: 10:00 AM   Group End Time: 11:00 AM   Group Topic: Cognitive Skills   [facility] Therapy Note     Attendees: 3     Patient's Goal: To explore the topic of forgiveness. Notes: Patient discussed quotes related to the topic and participated in the ice breaker and group journal exercises. Patient was able to identify coping skill to use for the day. Status After Intervention: Improved     Participation Level:  Active Listener and Interactive     Participation Quality: Appropriate, Attentive, Sharing and Supportive     Speech: normal     Thought Process/Content: Logical     Affective Functioning: Congruent     Mood: euthymic     Level of consciousness: Alert, Oriented x4 and Attentive     Response to Learning: Able to verbalize current knowledge/experience, Capable of insight and Progressing to goal     Endings: None Reported     Modes of Intervention: Education, Support and Socialization     Discipline Responsible: /Counselor     Signature: Samuel Caicedo [facility] [place]

## 2021-01-18 NOTE — GROUP NOTE
Group Therapy Note    Date: 1/18/2021    Group Start Time: 11:15 AM  Group End Time: 12:15 PM  Group Topic: Relapse Prevention    [id number]  Therapy Note    Attendees: 3         Patient's Goal:  To reflect on self forgiveness. Notes:  Patient participated in activity of writing a self directed apology letter and writing a letter about his hopes and dreams directed to his future self. Status After Intervention:  Improved    Participation Level:  Active Listener and Interactive    Participation Quality: Appropriate, Attentive, Sharing and Supportive      Speech:  normal      Thought Process/Content: Logical      Affective Functioning: Congruent      Mood: euthymic      Level of consciousness:  Alert, Oriented x4 and Attentive      Response to Learning: Able to verbalize current knowledge/experience, Capable of insight and Progressing to goal      Endings: None Reported    Modes of Intervention: Education, Support, Socialization and Activity      Discipline Responsible: /Counselor      Signature:  Miranda Turner, [facility] [place]

## 2021-01-21 ENCOUNTER — HOSPITAL ENCOUNTER (OUTPATIENT)
Dept: PSYCHIATRY | Age: 61
Setting detail: THERAPIES SERIES
Discharge: HOME | End: 2021-01-21
Payer: COMMERCIAL

## 2021-01-21 PROCEDURE — 90853 GROUP PSYCHOTHERAPY: CPT

## 2021-01-21 NOTE — PROGRESS NOTES
Group Therapy Note    Date: 1/21/2021  Start Time: 11:00am  End Time:  12:00pm  Number of Participants: 6    Type of Group: Relapse Prevention      Notes:  Patient was present for group. Explored issues pertaining to relapse prevention. Peers discussed being grateful and focusing on positive aspects of life. Patient expressed today would of been his 43rd Anniversary with his wife. They are currently . Patient processed challenges as it related to being  from his wife and dealing with issues of drug and alcohol use. Patient planned on going to an AA Group directly after IOP. Patient expressed making a focused effort on remaining grateful for the remainder of the day. Status After Intervention:  Improved    Participation Level:  Active Listener and Interactive    Participation Quality: Appropriate, Attentive, Sharing and Supportive      Speech:  normal      Thought Process/Content: Logical  Linear      Affective Functioning: Congruent      Mood: euthymic      Level of consciousness:  Alert, Oriented x4 and Attentive      Response to Learning: Able to verbalize current knowledge/experience, Able to verbalize/acknowledge new learning, Able to retain information, Capable of insight, Able to change behavior and Progressing to goal      Endings: None Reported    Modes of Intervention: Education, Support, Socialization, Exploration, Clarifying and Problem-solving      Discipline Responsible: /Counselor      Signature:  TING Rico

## 2021-01-25 ENCOUNTER — HOSPITAL ENCOUNTER (OUTPATIENT)
Dept: PSYCHIATRY | Age: 61
Setting detail: THERAPIES SERIES
Discharge: HOME | End: 2021-01-25
Payer: COMMERCIAL

## 2021-01-25 PROCEDURE — 90853 GROUP PSYCHOTHERAPY: CPT

## 2021-01-25 NOTE — PROGRESS NOTES
Group Therapy Note    Date: 1/25/2021  Start Time:1015  End Time:  1100  Number of Participants: 4    Type of Group: Cognitive skills group      Notes:  Pt is present for group with active participation. Peers discussed issues relating to recovery and identified ways to challenge old habits of thinking which prevent recovery and wellness. Status After Intervention:  Improved    Participation Level:  Active Listener and Interactive    Participation Quality: Appropriate, Attentive, Sharing and Supportive      Speech:  normal      Thought Process/Content: Logical  Linear      Affective Functioning: Congruent      Mood: euthymic      Level of consciousness:  Alert, Oriented x4 and Attentive      Response to Learning: Able to verbalize current knowledge/experience, Able to verbalize/acknowledge new learning, Able to retain information, Capable of insight, Able to change behavior and Progressing to goal      Endings: None Reported    Modes of Intervention: Education, Support, Socialization, Exploration, Clarifying and Problem-solving      Discipline Responsible: /Counselor      Signature:  TING Estrada

## 2021-01-25 NOTE — PROGRESS NOTES
Group Therapy Note    Date: 1/25/2021  Start [id number]  End Time:  1000  Number of Participants: 4    Type of Group: Psychotherapy      Notes:  Pt is present for group with active participation. Pt completed his check in and shared the events of the weekend. Shared how he is managing his sobriety and his  Daily participation in [place]. The 1230 meeting his his home group. Is now 2 weeks sober. Shared how he is coping with the separation from his spouse due to his alcoholism. Introduce self to a new peer. Relates well with his peers. Status After Intervention:  Improved    Participation Level:  Active Listener and Interactive    Participation Quality: Appropriate, Attentive, Sharing and Supportive      Speech:  normal      Thought Process/Content: Logical  Linear      Affective Functioning: Congruent      Mood: euthymic      Level of consciousness:  Alert, Oriented x4 and Attentive      Response to Learning: Able to verbalize current knowledge/experience, Able to verbalize/acknowledge new learning, Able to retain information, Capable of insight, Able to change behavior and Progressing to goal      Endings: None Reported    Modes of Intervention: Education, Support, Socialization, Exploration, Clarifying and Problem-solving      Discipline Responsible: /Counselor      Signature:  TING Joseph

## 2021-01-25 NOTE — PROGRESS NOTES
Group Therapy Note    Date: 1/25/2021  Start Time:1015  End Time:  1100  Number of Participants: 4    Type of Group: Relapse Prevention group      Notes:  Pt is present for group with active participation. Peers completed an exercise which ask them to identify how the following areas, help to promote recovery and wellness. Connections, Commitments, control and challenges. Pt emphasized being humble and giving up control of what he can not control. Spoke of how the 12 steps has helped him in the past to modify character traits. Status After Intervention:  Improved    Participation Level:  Active Listener and Interactive    Participation Quality: Appropriate, Attentive, Sharing and Supportive      Speech:  normal      Thought Process/Content: Logical  Linear      Affective Functioning: Congruent      Mood: euthymic      Level of consciousness:  Alert, Oriented x4 and Attentive      Response to Learning: Able to verbalize current knowledge/experience, Able to verbalize/acknowledge new learning, Able to retain information, Capable of insight, Able to change behavior and Progressing to goal      Endings: None Reported    Modes of Intervention: Education, Support, Socialization, Exploration, Clarifying and Problem-solving      Discipline Responsible: /Counselor      Signature:  TING Ledesma

## 2021-01-28 ENCOUNTER — HOSPITAL ENCOUNTER (OUTPATIENT)
Dept: PSYCHIATRY | Age: 61
Setting detail: THERAPIES SERIES
Discharge: HOME | End: 2021-01-28
Payer: COMMERCIAL

## 2021-01-28 PROCEDURE — 90853 GROUP PSYCHOTHERAPY: CPT

## 2021-01-28 NOTE — PROGRESS NOTES
Group Therapy Note    Date: 1/28/2021  Start Time: 11:15am  End Time:  12:15pm  Number of Participants: 5    Type of Group: Relapse Prevention      Notes:  Patient engaged in conversation with peers regarding relapse prevention. Patient plan on continuing to attend group, AA Meetings, following the 12 steps to recovery, remain sober of drugs/alcohol and relying on his spiritually to assist.  Pt has also started coaching again. Status After Intervention:  Improved    Participation Level:  Active Listener and Interactive    Participation Quality: Appropriate, Attentive, Sharing and Supportive      Speech:  normal      Thought Process/Content: Logical  Linear      Affective Functioning: Congruent      Mood: euthymic      Level of consciousness:  Alert, Oriented x4 and Attentive      Response to Learning: Able to verbalize current knowledge/experience, Able to verbalize/acknowledge new learning, Able to retain information, Capable of insight and Able to change behavior      Endings: None Reported    Modes of Intervention: Education, Support, Socialization, Exploration, Clarifying and Problem-solving      Discipline Responsible: /Counselor      Signature:  TING Addison

## 2021-01-28 NOTE — PROGRESS NOTES
Group Therapy Note    Date: 1/28/2021  Start Time: 10:00am  End Time:  11:00am  Number of Participants: 5    Type of Group: Cognitive Skills      Notes:  Patient present and engaged in conversation with peers regarding progression and purpose. Patient noted recent progress as it relates to future goals. Patient recently completed several tasks that he put off for a while. Patient informed members of there group that he has been staying positive and encouraged the same. Status After Intervention:  Improved    Participation Level:  Active Listener and Interactive    Participation Quality: Appropriate, Attentive, Sharing and Supportive      Speech:  normal      Thought Process/Content: Logical  Linear      Affective Functioning: Congruent      Mood: euthymic      Level of consciousness:  Alert, Oriented x4 and Attentive      Response to Learning: Able to verbalize current knowledge/experience, Able to verbalize/acknowledge new learning, Able to retain information, Capable of insight and Able to change behavior      Endings: None Reported    Modes of Intervention: Education, Support, Socialization, Exploration, Clarifying and Problem-solving      Discipline Responsible: /Counselor      Signature:  TING Love

## 2021-01-28 NOTE — PROGRESS NOTES
Group Therapy Note    Date: 1/28/2021  Start Time: 8:30am  End Time:  9:45am  Number of Participants: 5    Type of Group: Psychotherapy        Notes:  Patient attended and participated in group therapy. Patient participated in discussion with peers about anxiety and depression. Patient disclosed that he has been keeping busy by attend group therapy, AA Meetings 1-2 times per day, visiting with his grandchildren and his mother. Patient became emotional regarding the continued separation from his wife whom patient continues to love and want to be with. Patient however state his wife will not talk to him. Status After Intervention:  Improved    Participation Level:  Active Listener and Interactive    Participation Quality: Appropriate, Attentive, Sharing and Supportive      Speech:  normal      Thought Process/Content: Logical  Linear      Affective Functioning: Congruent      Mood: euthymic      Level of consciousness:  Alert, Oriented x4 and Attentive      Response to Learning: Able to verbalize current knowledge/experience, Able to verbalize/acknowledge new learning, Able to retain information, Capable of insight and Able to change behavior      Endings: None Reported    Modes of Intervention: Education, Support, Socialization, Exploration, Clarifying and Problem-solving      Discipline Responsible: /Counselor      Signature:  TING Farrell

## 2021-02-01 RX ORDER — OMEPRAZOLE 20 MG/1
CAPSULE, DELAYED RELEASE ORAL
Qty: 90 CAPSULE | Refills: 1 | Status: SHIPPED | OUTPATIENT
Start: 2021-02-01 | End: 2021-02-22

## 2021-02-01 NOTE — TELEPHONE ENCOUNTER
Date of last visit:  11/30/2020  Date of next visit:  3/5/2021    Requested Prescriptions     Pending Prescriptions Disp Refills    omeprazole (PRILOSEC) 20 MG delayed release capsule [Pharmacy Med Name: OMEPRAZOLE 20MG CPDR] 90 capsule 1     Sig: TAKE 1 CAPSULE BY MOUTH ONCE DAILY.

## 2021-02-05 ENCOUNTER — HOSPITAL ENCOUNTER (OUTPATIENT)
Dept: PSYCHIATRY | Age: 61
Setting detail: THERAPIES SERIES
Discharge: HOME | End: 2021-02-05
Payer: MEDICARE

## 2021-02-05 PROCEDURE — 90853 GROUP PSYCHOTHERAPY: CPT

## 2021-02-05 NOTE — PROGRESS NOTES
Group Therapy Note    Date: 2/5/2021  Start Time: 200  End Time:  9589  Number of Participants: 4    Type of Group: Relapse Prevention      Notes:  Pt is present with active participation. [facility] facilitated a art therapy activity in which participants used the outline of a heart to design, draw or color, the interest and values which most reflect the individual.     Status After Intervention:  Improved    Participation Level:  Active Listener and Interactive    Participation Quality: Appropriate, Attentive, Sharing and Supportive      Speech:  normal      Thought Process/Content: Logical  Linear      Affective Functioning: Congruent      Mood: euthymic      Level of consciousness:  Alert, Oriented x4 and Attentive      Response to Learning: Able to verbalize current knowledge/experience, Able to verbalize/acknowledge new learning, Able to retain information, Capable of insight, Able to change behavior and Progressing to goal      Endings: None Reported    Modes of Intervention: Education, Support, Socialization, Exploration, Clarifying, Problem-solving and Activity      Discipline Responsible: /Counselor      Signature:  TING Nicholson

## 2021-02-05 NOTE — PROGRESS NOTES
Group Therapy Note    Date: 2/5/2021  Start Time: 8:30am  End Time:  9:45am  Number of Participants: 4    Type of Group: Psychotherapy        Notes:  Patient present and interactive with other group members. Patient related to issues presented by the group including anxiety and depression. Patient reportedly was sober for 21 days however relapsed on marijuana and alcohol on 1/30/21. Patient expressed disappointment in himself \"by not being able to control my actions\". Patient met with his sobriety  the next day and plan to take things one day at a time and put more effort in continuing the steps of AA. Pt has remained sober since 1/31/21. Status After Intervention:  Improved    Participation Level:  Active Listener and Interactive    Participation Quality: Appropriate, Attentive, Sharing and Supportive      Speech:  normal      Thought Process/Content: Logical  Linear      Affective Functioning: Congruent      Mood: euthymic      Level of consciousness:  Alert, Oriented x4 and Attentive      Response to Learning: Able to verbalize current knowledge/experience, Able to verbalize/acknowledge new learning, Able to retain information, Capable of insight and Able to change behavior      Endings: None Reported    Modes of Intervention: Education, Support, Socialization, Exploration, Clarifying and Problem-solving      Discipline Responsible: /Counselor      Signature:  TING Rico

## 2021-02-05 NOTE — PROGRESS NOTES
Group Therapy Note    Date: 2/5/2021  Start Time: 10:00am  End Time:  11:00am  Number of Participants: 4    Type of Group: Cognitive Skills        Notes:  Patient present and interactive with peers as they discussed ways of facing situations that cause an increase anxiety and/or depression. Patient reportedly has been facing situations that he has put off for some time. Pt reportedly has been \"adulting\" by assuring he had an insurance policy in place with appropriate beneficiaries among other things. Patient plan to continue being honest with himself and taking things one day at a time. Pt is very supportive of a peer who had taken a significant personal risk in what was shared. Status After Intervention:  Improved    Participation Level:  Active Listener and Interactive    Participation Quality: Appropriate, Attentive, Sharing and Supportive      Speech:  normal      Thought Process/Content: Logical  Linear      Affective Functioning: Congruent      Mood: euthymic      Level of consciousness:  Alert, Oriented x4 and Attentive      Response to Learning: Able to verbalize current knowledge/experience, Able to verbalize/acknowledge new learning, Able to retain information, Capable of insight and Able to change behavior      Endings: None Reported    Modes of Intervention: Education, Support, Socialization, Exploration, Clarifying and Problem-solving      Discipline Responsible: /Counselor      Signature:  TING Reynoso

## 2021-02-08 ENCOUNTER — HOSPITAL ENCOUNTER (OUTPATIENT)
Dept: PSYCHIATRY | Age: 61
Setting detail: THERAPIES SERIES
Discharge: HOME | End: 2021-02-08
Payer: MEDICARE

## 2021-02-08 PROCEDURE — 90853 GROUP PSYCHOTHERAPY: CPT

## 2021-02-08 NOTE — PROGRESS NOTES
Group Therapy Note    Date: 2/8/2021  Start Time: 1115  End Time:  1200  Number of Participants: 4    Type of Group: Relapse Prevention      Notes:  Pt is present for group. Peers discussed issues related to personal spiritual beliefs and how those beliefs may help to support recovery. Thy also discussed spiritual conflicts which may also contribute to the relapse process. Pt shared his concern for a peer, offering support and encouragement to seek help. Status After Intervention:  Unchnaged    Participation Level: Active Listener and Interactive    Participation Quality: Appropriate, Attentive, Sharing and Supportive      Speech:  normal      Thought Process/Content: Logical  Linear      Affective Functioning: Congruent      Mood: Euthymic, concerned      Level of consciousness:  Alert, Oriented x4 and Attentive      Response to Learning: Able to verbalize current knowledge/experience, Able to verbalize/acknowledge new learning, Able to retain information, Capable of insight, Able to change behavior and Progressing to goal      Endings: Suicidal ideation reported.      Modes of Intervention: Education, Support, Socialization, Exploration, Clarifying and Problem-solving      Discipline Responsible: /Counselor      Signature:  TING Khan

## 2021-02-08 NOTE — PROGRESS NOTES
Group Therapy Note    Date: 2/8/2021  Start Time: 0830  End Time:  0945  Number of Participants: 4    Type of Group: Psychotherapy      Notes:  Pt is present for group. Shared the events of the weekend and what he is doing to maintain his sobriety. Pt shared visiting with his granddaughter yesterday and discussed the [place] that he felt. Pt offers support and identification for a peer who is having difficulty with depression and suicidal thoughts. Status After Intervention:  Improved    Participation Level: Active Listener and Interactive    Participation Quality: Appropriate, Attentive, Sharing and Supportive      Speech:  normal      Thought Process/Content: Logical  Linear      Affective Functioning: Congruent      Mood: dysphoric, concerned. Level of consciousness:  Alert, Oriented x4 and Attentive      Response to Learning: Able to verbalize current knowledge/experience, Able to verbalize/acknowledge new learning, Able to retain information, Capable of insight, Able to change behavior and Progressing to goal      Endings: Suicidal ideation reported.      Modes of Intervention: Education, Support, Socialization, Exploration, Clarifying and Problem-solving      Discipline Responsible: /Counselor      Signature:  Andra Collet, LSW

## 2021-02-08 NOTE — PROGRESS NOTES
Group Therapy Note    Date: 2/8/2021  Start Time: 10:00am  End Time:  11:00am  Number of Participants: 4    Type of Group: Cognitive Skills        Notes:  Patient is present and interactive with peers. Safety and support discussed. Patient report struggling with having the \"desire\" to drink alcohol although maintains his sobriety. Patient challenges himself by going to the grocery store \"and not buying alcohol\". Patient also walks his dogs, contacts his AA Sponsor regularly, visits with family at least once a week,  [place], attend [facility] as well as AA meetings. Status After Intervention:  Improved    Participation Level:  Active Listener and Interactive    Participation Quality: Appropriate, Attentive, Sharing and Supportive      Speech:  normal      Thought Process/Content: Logical  Linear      Affective Functioning: Congruent      Mood: euthymic      Level of consciousness:  Alert, Oriented x4 and Attentive      Response to Learning: Able to verbalize current knowledge/experience, Able to verbalize/acknowledge new learning, Able to retain information, Capable of insight and Able to change behavior      Endings: None Reported    Modes of Intervention: Education, Support, Socialization, Exploration, Clarifying and Problem-solving      Discipline Responsible: /Counselor      Signature:  TING Whitt

## 2021-02-11 ENCOUNTER — HOSPITAL ENCOUNTER (OUTPATIENT)
Dept: PSYCHIATRY | Age: 61
Setting detail: THERAPIES SERIES
Discharge: HOME | End: 2021-02-11
Payer: MEDICARE

## 2021-02-11 PROCEDURE — 90853 GROUP PSYCHOTHERAPY: CPT

## 2021-02-11 NOTE — PROGRESS NOTES
Group Therapy Note    Date: 2/11/2021  Start Time: 0830  End Time:  1000  Number of Participants: 6    Type of Group: Psychotherapy      Notes:  Pt is present for group and introduced self to a new IOP member. Pt shared his history of alcoholism and identified with common experiences with respect to taking care of others and not himself. Pt offers support and feedback to the new group member. Pt spoke of the importance of he attending AA and associating with other sober people. Pt discussed the dangers of his own thinking errors. Pt also discussed continued hurt over the end of his marriage due to his alcoholism. Pt reports continued abstinence from alcohol since his last relapse. Status After Intervention:  Improved    Participation Level:  Active Listener and Interactive    Participation Quality: Appropriate, Attentive, Sharing and Supportive      Speech:  normal      Thought Process/Content: Logical  Linear      Affective Functioning: Congruent      Mood: euthymic      Level of consciousness:  Alert, Oriented x4 and Attentive      Response to Learning: Able to verbalize current knowledge/experience, Able to verbalize/acknowledge new learning, Able to retain information, Capable of insight, Able to change behavior and Progressing to goal      Endings: None Reported    Modes of Intervention: Education, Support, Socialization, Exploration, Clarifying and Problem-solving      Discipline Responsible: /Counselor      Signature:  TING Khan

## 2021-02-11 NOTE — PROGRESS NOTES
Group Therapy Note    Date: 2/11/2021  Start Time: 1115  End Time:  2323  Number of Participants: 6    Type of Group: Relapse prevention      Notes:  Pt was present with active participation as Outpatient Kay Mcelroy, facilitated a group related to spirituality and recovery. Status After Intervention:  Improved    Participation Level:  Active Listener and Interactive    Participation Quality: Appropriate, Attentive, Sharing and Supportive      Speech:  normal      Thought Process/Content: Logical  Linear      Affective Functioning: Congruent      Mood: euthymic      Level of consciousness:  Alert, Oriented x4 and Attentive      Response to Learning: Able to verbalize current knowledge/experience, Able to verbalize/acknowledge new learning, Able to retain information, Capable of insight, Able to change behavior and Progressing to goal      Endings: None Reported    Modes of Intervention: Education, Support, Socialization, Exploration, Clarifying, Problem-solving and Activity      Discipline Responsible: /Counselor      Signature:  TING Delacruz

## 2021-02-11 NOTE — PROGRESS NOTES
Group Therapy Note    Date: 2/11/2021  Start Time: 1000  End Time:  1100  Number of Participants: 6    Type of Group: Cognitive Skills      Notes:  Pt was present with active participation as Outpatient Charise Aase, facilitated a group related to spirituality. Status After Intervention:  Improved    Participation Level:  Active Listener and Interactive    Participation Quality: Appropriate, Attentive, Sharing and Supportive      Speech:  normal      Thought Process/Content: Logical  Linear      Affective Functioning: Congruent      Mood: euthymic      Level of consciousness:  Alert, Oriented x4 and Attentive      Response to Learning: Able to verbalize current knowledge/experience, Able to verbalize/acknowledge new learning, Able to retain information, Capable of insight, Able to change behavior and Progressing to goal      Endings: None Reported    Modes of Intervention: Education, Support, Socialization, Exploration, Clarifying, Problem-solving and Activity      Discipline Responsible: /Counselor      Signature:  TING Crespo

## 2021-02-12 ENCOUNTER — HOSPITAL ENCOUNTER (OUTPATIENT)
Dept: PSYCHIATRY | Age: 61
Setting detail: THERAPIES SERIES
Discharge: HOME | End: 2021-02-12
Payer: MEDICARE

## 2021-02-12 ENCOUNTER — NURSE ONLY (OUTPATIENT)
Dept: LAB | Age: 61
End: 2021-02-12

## 2021-02-12 DIAGNOSIS — I25.810 CORONARY ARTERY DISEASE INVOLVING CORONARY BYPASS GRAFT OF NATIVE HEART WITHOUT ANGINA PECTORIS: ICD-10-CM

## 2021-02-12 DIAGNOSIS — Z95.1 S/P CABG (CORONARY ARTERY BYPASS GRAFT): ICD-10-CM

## 2021-02-12 DIAGNOSIS — Z11.4 SCREENING FOR HIV (HUMAN IMMUNODEFICIENCY VIRUS): ICD-10-CM

## 2021-02-12 DIAGNOSIS — Z12.5 SCREENING FOR PROSTATE CANCER: ICD-10-CM

## 2021-02-12 DIAGNOSIS — E78.5 HYPERLIPIDEMIA, UNSPECIFIED HYPERLIPIDEMIA TYPE: ICD-10-CM

## 2021-02-12 DIAGNOSIS — I10 ESSENTIAL HYPERTENSION: ICD-10-CM

## 2021-02-12 LAB
ALBUMIN SERPL-MCNC: 4.6 G/DL (ref 3.5–5.1)
ALP BLD-CCNC: 108 U/L (ref 38–126)
ALT SERPL-CCNC: 65 U/L (ref 11–66)
ANION GAP SERPL CALCULATED.3IONS-SCNC: 14 MEQ/L (ref 8–16)
AST SERPL-CCNC: 42 U/L (ref 5–40)
BASOPHILS # BLD: 1.2 %
BASOPHILS ABSOLUTE: 0.1 THOU/MM3 (ref 0–0.1)
BILIRUB SERPL-MCNC: 0.5 MG/DL (ref 0.3–1.2)
BILIRUBIN DIRECT: < 0.2 MG/DL (ref 0–0.3)
BUN BLDV-MCNC: 17 MG/DL (ref 7–22)
CALCIUM SERPL-MCNC: 9.6 MG/DL (ref 8.5–10.5)
CHLORIDE BLD-SCNC: 97 MEQ/L (ref 98–111)
CHOLESTEROL, TOTAL: 155 MG/DL (ref 100–199)
CO2: 29 MEQ/L (ref 23–33)
CREAT SERPL-MCNC: 1 MG/DL (ref 0.4–1.2)
EOSINOPHIL # BLD: 0.9 %
EOSINOPHILS ABSOLUTE: 0.1 THOU/MM3 (ref 0–0.4)
ERYTHROCYTE [DISTWIDTH] IN BLOOD BY AUTOMATED COUNT: 13.1 % (ref 11.5–14.5)
ERYTHROCYTE [DISTWIDTH] IN BLOOD BY AUTOMATED COUNT: 49.2 FL (ref 35–45)
GFR SERPL CREATININE-BSD FRML MDRD: 75 ML/MIN/1.73M2
GLUCOSE BLD-MCNC: 169 MG/DL (ref 70–108)
HCT VFR BLD CALC: 52.6 % (ref 42–52)
HDLC SERPL-MCNC: 55 MG/DL
HEMOGLOBIN: 17.9 GM/DL (ref 14–18)
IMMATURE GRANS (ABS): 0.03 THOU/MM3 (ref 0–0.07)
IMMATURE GRANULOCYTES: 0.3 %
LDL CHOLESTEROL CALCULATED: 72 MG/DL
LYMPHOCYTES # BLD: 24.7 %
LYMPHOCYTES ABSOLUTE: 2.3 THOU/MM3 (ref 1–4.8)
MCH RBC QN AUTO: 34.6 PG (ref 26–33)
MCHC RBC AUTO-ENTMCNC: 34 GM/DL (ref 32.2–35.5)
MCV RBC AUTO: 101.5 FL (ref 80–94)
MONOCYTES # BLD: 11.4 %
MONOCYTES ABSOLUTE: 1.1 THOU/MM3 (ref 0.4–1.3)
NUCLEATED RED BLOOD CELLS: 0 /100 WBC
PLATELET # BLD: 261 THOU/MM3 (ref 130–400)
PMV BLD AUTO: 11.3 FL (ref 9.4–12.4)
POTASSIUM SERPL-SCNC: 4.3 MEQ/L (ref 3.5–5.2)
PROSTATE SPECIFIC ANTIGEN: 3.08 NG/ML (ref 0–1)
RBC # BLD: 5.18 MILL/MM3 (ref 4.7–6.1)
SEG NEUTROPHILS: 61.5 %
SEGMENTED NEUTROPHILS ABSOLUTE COUNT: 5.8 THOU/MM3 (ref 1.8–7.7)
SODIUM BLD-SCNC: 140 MEQ/L (ref 135–145)
TOTAL PROTEIN: 7.9 G/DL (ref 6.1–8)
TRIGL SERPL-MCNC: 138 MG/DL (ref 0–199)
WBC # BLD: 9.5 THOU/MM3 (ref 4.8–10.8)

## 2021-02-13 LAB — HIV AG/AB: NONREACTIVE

## 2021-02-15 ENCOUNTER — HOSPITAL ENCOUNTER (OUTPATIENT)
Dept: PSYCHIATRY | Age: 61
Setting detail: THERAPIES SERIES
Discharge: HOME | End: 2021-02-15
Payer: MEDICARE

## 2021-02-15 NOTE — PROGRESS NOTES
Telephone contact-Pt called and left a message at 0900 this morning. He sated that he had over slept and will be to [facility] on Thursday.

## 2021-02-18 ENCOUNTER — HOSPITAL ENCOUNTER (OUTPATIENT)
Dept: PSYCHIATRY | Age: 61
Setting detail: THERAPIES SERIES
Discharge: HOME | End: 2021-02-18
Payer: MEDICARE

## 2021-02-18 PROCEDURE — 90853 GROUP PSYCHOTHERAPY: CPT

## 2021-02-18 NOTE — PROGRESS NOTES
Group Therapy Note    Date: 2/18/2021  Start Time: 1015  End Time:  1115  Number of Participants: 6    Type of Group: Psychotherapy      Notes:  Pt introduced self to a new peer and shared his history of alcohol and drug addiction with past recovery followed by relapse. Pt discussed the benefit of the group and similar experience with he attending daily AA. Shared continued sobriety. Status After Intervention:  Improved    Participation Level:  Active Listener and Interactive    Participation Quality: Appropriate, Attentive, Sharing and Supportive      Speech:  normal      Thought Process/Content: Logical  Linear      Affective Functioning: Congruent      Mood: euthymic      Level of consciousness:  Alert, Oriented x4 and Attentive      Response to Learning: Able to verbalize current knowledge/experience, Able to verbalize/acknowledge new learning, Able to retain information, Capable of insight, Able to change behavior and Progressing to goal      Endings: None Reported    Modes of Intervention: Education, Support, Socialization, Exploration, Clarifying and Problem-solving      Discipline Responsible: /Counselor      Signature:  TING Moeller

## 2021-02-18 NOTE — PROGRESS NOTES
Group Therapy Note    Date: 2/18/2021  Start Time: 3084  End Time:  1100  Number of Participants: 6    Type of Group: Cognitive skills group      Notes:  Pt is present as group examined the dynamics of health and unhealthy family systems. Identified various roles and interactions with in the family as the family system becomes dysfunctional in nature. Discussed the don't talk, feel, trust and touch rules which are learned both spoken and unspoken. Peers discussed how those rules contribute to present day difficulties. Pt shared that after his first time in [facility] 28 years ago, He noticed how his parents began to change in response to his personal change and recovery. Status After Intervention:  Improved    Participation Level:  Active Listener and Interactive    Participation Quality: Appropriate, Attentive, Sharing and Supportive      Speech:  normal      Thought Process/Content: Logical  Linear      Affective Functioning: Congruent      Mood: dysphoric      Level of consciousness:  Alert, Oriented x4 and Attentive      Response to Learning: Able to verbalize current knowledge/experience, Able to verbalize/acknowledge new learning, Able to retain information, Capable of insight, Able to change behavior and Progressing to goal      Endings: None Reported    Modes of Intervention: Education, Support, Socialization, Exploration, Clarifying and Problem-solving      Discipline Responsible: /Counselor      Signature:  Alexis Seip, LSW

## 2021-02-19 ENCOUNTER — TELEPHONE (OUTPATIENT)
Dept: FAMILY MEDICINE CLINIC | Age: 61
End: 2021-02-19

## 2021-02-19 ENCOUNTER — HOSPITAL ENCOUNTER (OUTPATIENT)
Dept: PSYCHIATRY | Age: 61
Setting detail: THERAPIES SERIES
Discharge: HOME | End: 2021-02-19
Payer: MEDICARE

## 2021-02-19 PROCEDURE — 90853 GROUP PSYCHOTHERAPY: CPT

## 2021-02-19 NOTE — PROGRESS NOTES
Group Therapy Note    Date: 2/19/2021  Start Time: 1015  End Time:  1100  Number of Participants: 6    Type of Group: Cognitive Skills      Notes:  Pt is present with active interaction. Pt offered support and encouragement, to a peer who had an arraignment for charges which occurred while he was active in his drug addiction. Pt offered identification based on his own personal experience with addiction and recovery. Pt spoke of the benefit of AA participation and other recovery activities to assist in his sobriety. Status After Intervention:  Improved    Participation Level:  Active Listener and Interactive    Participation Quality: Appropriate, Attentive, Sharing and Supportive      Speech:  normal      Thought Process/Content: Logical  Linear      Affective Functioning: Congruent      Mood: euthymic      Level of consciousness:  Alert, Oriented x4 and Attentive      Response to Learning: Able to verbalize current knowledge/experience, Able to verbalize/acknowledge new learning, Able to retain information, Capable of insight, Able to change behavior and Progressing to goal      Endings: None Reported    Modes of Intervention: Education, Support, Socialization, Exploration, Clarifying and Problem-solving      Discipline Responsible: /Counselor      Signature:  TING Sheldon

## 2021-02-19 NOTE — TELEPHONE ENCOUNTER
----- Message from Therese Clemente MD sent at 2/19/2021  6:37 AM EST -----  Call labs  Are stable and hiv  negative

## 2021-02-19 NOTE — PROGRESS NOTES
Group Therapy Note    Date: 2/19/2021  Start Time: 1115  End Time:  1215  Number of Participants: 6    Type of Group: Relapse Prevention      Notes:  Pt is present with active interaction. Pt participated in a art therapy activity which was facilitated by[address]. Status After Intervention:  Improved    Participation Level:  Active Listener and Interactive    Participation Quality: Appropriate, Attentive, Sharing and Supportive      Speech:  normal      Thought Process/Content: Logical  Linear      Affective Functioning: Congruent      Mood: euthymic      Level of consciousness:  Alert, Oriented x4 and Attentive      Response to Learning: Able to verbalize current knowledge/experience, Able to verbalize/acknowledge new learning, Able to retain information, Capable of insight, Able to change behavior and Progressing to goal      Endings: None Reported    Modes of Intervention: Education, Support, Socialization, Exploration, Clarifying and Problem-solving      Discipline Responsible: /Counselor      Signature:  TING Gonzales

## 2021-02-19 NOTE — PROGRESS NOTES
Group Therapy Note    Date: 2/19/2021  Start Time: 0830  End Time:  1000  Number of Participants: 6    Type of Group: Psychotherapy      Notes:  Pt introduced self to a new to the [facility] peer who discharged from the inpatient unit on Thursday. Pt introduced self to the peer and shared his history of alcoholism and related issues. Offers support and encouragement for his peers willingness to be honest about her addiction. Pt reports continued sobriety with AA support. Status After Intervention:  Improved    Participation Level:  Active Listener and Interactive    Participation Quality: Appropriate, Attentive, Sharing and Supportive      Speech:  normal      Thought Process/Content: Logical  Linear      Affective Functioning: Congruent      Mood: Euthymic      Level of consciousness:  Alert, Oriented x4 and Attentive      Response to Learning: Able to verbalize current knowledge/experience, Able to verbalize/acknowledge new learning, Able to retain information, Capable of insight, Able to change behavior and Progressing to goal      Endings: None Reported    Modes of Intervention: Education, Support, Socialization, Exploration, Clarifying and Problem-solving      Discipline Responsible: /Counselor      Signature:  TING Beach

## 2021-02-22 RX ORDER — OMEPRAZOLE 20 MG/1
CAPSULE, DELAYED RELEASE ORAL
Qty: 30 CAPSULE | Refills: 3 | Status: SHIPPED | OUTPATIENT
Start: 2021-02-22 | End: 2021-02-23

## 2021-02-22 NOTE — TELEPHONE ENCOUNTER
Date of last visit:  11/30/2020  Date of next visit:  3/5/2021    Requested Prescriptions     Pending Prescriptions Disp Refills    omeprazole (PRILOSEC) 20 MG delayed release capsule [Pharmacy Med Name: OMEPRAZOLE 20MG CPDR] 30 capsule 3     Sig: TAKE 1 CAPSULE BY MOUTH ONE TIME A DAY

## 2021-02-23 NOTE — TELEPHONE ENCOUNTER
Date of last visit:  11/30/2020  Date of next visit:  3/5/2021    Requested Prescriptions     Pending Prescriptions Disp Refills    omeprazole (PRILOSEC) 20 MG delayed release capsule [Pharmacy Med Name: OMEPRAZOLE 20MG CPDR] 30 capsule 1     Sig: TAKE 1 CAPSULE BY MOUTH ONE TIME A DAY

## 2021-02-24 RX ORDER — OMEPRAZOLE 20 MG/1
CAPSULE, DELAYED RELEASE ORAL
Qty: 30 CAPSULE | Refills: 1 | Status: SHIPPED | OUTPATIENT
Start: 2021-02-24 | End: 2021-07-12

## 2021-02-26 ENCOUNTER — HOSPITAL ENCOUNTER (OUTPATIENT)
Dept: PSYCHIATRY | Age: 61
Setting detail: THERAPIES SERIES
Discharge: HOME | End: 2021-02-26
Payer: MEDICARE

## 2021-02-26 PROCEDURE — 90853 GROUP PSYCHOTHERAPY: CPT

## 2021-02-26 NOTE — PROGRESS NOTES
Group Therapy Note    Date: 2/26/2021  Start Time: 0830  End Time:  0945  Number of Participants: 6    Type of Group: Psychotherapy      Notes:  Pt is present for group and reports continued sobriety. Pt shared going to an [place] [id number] meeting last night for support because he continues to find it difficult to cope with the ending of his 37 year marriage. Pt hared how he had picked up his wedding ring after having made into another piece of jewelry and how that was a trigger for him. Receptive of peer support. Status After Intervention:  Improved    Participation Level:  Active Listener and Interactive    Participation Quality: Appropriate, Attentive, Sharing and Supportive      Speech:  normal      Thought Process/Content: Logical  Linear      Affective Functioning: Congruent      Mood: dysphoric      Level of consciousness:  Alert, Oriented x4 and Attentive      Response to Learning: Able to verbalize current knowledge/experience, Able to verbalize/acknowledge new learning, Able to retain information, Capable of insight, Able to change behavior and Progressing to goal      Endings: None Reported    Modes of Intervention: Education, Support, Socialization, Exploration, Clarifying and Problem-solving      Discipline Responsible: /Counselor      Signature:  TING Palmer

## 2021-02-26 NOTE — PROGRESS NOTES
Group Therapy Note    Date: 2/26/2021  Start Time: 1115  End Time:  6379  Number of Participants: 6    Type of Group: Relapse prevention      Notes:  Pt is present for group. Peers selected from a list, slogans which they found meaningful and discussed why it had meaning to them. Pt identified, One day at a time. He also commented on Let go and let God but acknowledged he continues to work on his acceptance and letting go of issues. Status After Intervention:  Improved    Participation Level:  Active Listener and Interactive    Participation Quality: Appropriate, Attentive, Sharing and Supportive      Speech:  normal      Thought Process/Content: Logical  Linear      Affective Functioning: Congruent      Mood: euphoric      Level of consciousness:  Alert, Oriented x4 and Attentive      Response to Learning: Able to verbalize current knowledge/experience, Able to verbalize/acknowledge new learning, Able to retain information, Capable of insight, Able to change behavior and Progressing to goal      Endings: None Reported    Modes of Intervention: Education, Support, Socialization, Exploration, Clarifying and Problem-solving      Discipline Responsible: /Counselor      Signature:  TING Gaines

## 2021-02-26 NOTE — PROGRESS NOTES
Group Therapy Note    Date: 2/26/2021  Start Time: 1000  End Time:  1100  Number of Participants: 6    Type of Group: Cognitive skills group       Notes:  Pt is present for group. Peers discussed the topic of loss and were provided education on the stages of grief. Peers identified the grief process, not only to death and or divorce, but also the feeling of loss with major life style changes which support recovery. Status After Intervention:  Improved    Participation Level:  Active Listener and Interactive    Participation Quality: Appropriate, Attentive, Sharing and Supportive      Speech:  normal      Thought Process/Content: Logical  Linear      Affective Functioning: Congruent      Mood: euphoric      Level of consciousness:  Alert, Oriented x4 and Attentive      Response to Learning: Able to verbalize current knowledge/experience, Able to verbalize/acknowledge new learning, Able to retain information, Capable of insight, Able to change behavior and Progressing to goal      Endings: None Reported    Modes of Intervention: Education, Support, Socialization, Exploration, Clarifying and Problem-solving      Discipline Responsible: /Counselor      Signature:  TING Yancey

## 2021-03-01 ENCOUNTER — HOSPITAL ENCOUNTER (OUTPATIENT)
Dept: PSYCHIATRY | Age: 61
Setting detail: THERAPIES SERIES
Discharge: HOME | End: 2021-03-01
Payer: MEDICARE

## 2021-03-01 PROCEDURE — 90853 GROUP PSYCHOTHERAPY: CPT

## 2021-03-01 NOTE — PROGRESS NOTES
Group Therapy Note    Date: 3/1/2021  Start Time: 10:00am  End Time:  11:00am  Number of Participants: 7    Type of Group: Cognitive Skills      Notes:  Patient present and participating in group. Patient and peers discussed stategies to cope with loss and regret. Patient discussed a history of many regrets however has since forgiven himself to move forward. Patient continues to be  from his wife and has had his wedding ring altered into an earring which he now wears. Patient continues to maintain his sobriety and report being one month sober. Status After Intervention:  Improved    Participation Level:  Active Listener and Interactive    Participation Quality: Appropriate, Attentive, Sharing and Supportive      Speech:  normal      Thought Process/Content: Logical  Linear      Affective Functioning: Congruent      Mood: euthymic      Level of consciousness:  Alert, Oriented x4 and Attentive      Response to Learning: Able to verbalize current knowledge/experience, Able to verbalize/acknowledge new learning, Able to retain information, Capable of insight and Able to change behavior      Endings: None Reported    Modes of Intervention: Education, Support, Socialization, Exploration, Clarifying and Problem-solving      Discipline Responsible: /Counselor      Signature:  TING Sheldon

## 2021-03-01 NOTE — PROGRESS NOTES
Group Therapy Note    Date: 3/1/2021  Start Time: 11:15am  End Time:  12:15pm  Number of Participants: 7    Type of Group: Relapse Prevention      Notes:  Patient present and participating in group. Patient discussed strategies to aid in the prevention of relapse. Continues to participate in [place] [id number] meetings and look forward to going to out to eat with other participants on Saturday's. Patient additional relapse prevention strategies include visiting his mother and caring for his dogs. Status After Intervention:  Improved    Participation Level:  Active Listener and Interactive    Participation Quality: Appropriate, Attentive, Sharing and Supportive      Speech:  normal      Thought Process/Content: Logical  Linear      Affective Functioning: Congruent      Mood: euthymic      Level of consciousness:  Alert, Oriented x4 and Attentive      Response to Learning: Able to verbalize current knowledge/experience, Able to verbalize/acknowledge new learning, Able to retain information, Capable of insight and Able to change behavior      Endings: None Reported    Modes of Intervention: Education, Support, Socialization, Exploration, Clarifying and Problem-solving      Discipline Responsible: /Counselor      Signature:  TING Wolfe

## 2021-03-01 NOTE — PROGRESS NOTES
Group Therapy Note    Date: 3/1/2021  Start Time: 0830  End Time:  1000  Number of Participants: 7    Type of Group: Psychotherapy    Notes:  Pt is present for group. Reports 30 days of sobriety. Pt discussed the events of the weekend and seems to have better processed the angry feelings he felt on Friday concerning his marriage. Pt is supportive of peers offering feedback and suggestions. Status After Intervention:  Unchanged    Participation Level:  Active Listener and Interactive    Participation Quality: Appropriate, Attentive and Sharing      Speech:  normal      Thought Process/Content: Logical  Linear      Affective Functioning: Congruent      Mood: Euthymic      Level of consciousness:  Alert, Oriented x4 and Attentive      Response to Learning: Able to verbalize current knowledge/experience, Able to verbalize/acknowledge new learning, Able to retain information, Capable of insight, Able to change behavior and Progressing to goal      Endings: None Reported    Modes of Intervention: Education, Support, Socialization, Exploration, Clarifying and Problem-solving      Discipline Responsible: /Counselor      Signature:  TING Sharp

## 2021-03-04 ENCOUNTER — HOSPITAL ENCOUNTER (OUTPATIENT)
Dept: PSYCHIATRY | Age: 61
Setting detail: THERAPIES SERIES
Discharge: HOME | End: 2021-03-04
Payer: MEDICARE

## 2021-03-04 NOTE — TELEPHONE ENCOUNTER
PATIENT CALLED INQUIRING IF COMPLETING DOPPLER SAME DAY AS RECEIVING COVID VACCINE WITH INTERFERE WITH EACH OTHER? BOTH APPT ARE SCHEDULED FOR 03/26/21.    PATIENT CAN BE CONTACTED [phone] WITH FURTHER CLARIFICATION   Pt informed. Dr Mauricio Mcclendon sent in an ATB yesterday. He will try to get the CXR tomorrow.

## 2021-03-05 NOTE — PROGRESS NOTES
Pt telephoned at [phone] on 3/5/21. The message stated that he was still not feeling well and is considering going to the hospital as he is having difficulty breathing.

## 2021-03-08 ENCOUNTER — HOSPITAL ENCOUNTER (OUTPATIENT)
Dept: PSYCHIATRY | Age: 61
Setting detail: THERAPIES SERIES
Discharge: HOME | End: 2021-03-08
Payer: MEDICARE

## 2021-03-08 NOTE — PROGRESS NOTES
Telephone contact [phone]-Pt telephoned to share that he still is feeling unwell. He is improved. Pt reports going to see his physician on Friday. Pt reports having a pulmonary infection. He intends to be to programming on Thursday.

## 2021-03-15 ENCOUNTER — HOSPITAL ENCOUNTER (OUTPATIENT)
Dept: PSYCHIATRY | Age: 61
Setting detail: THERAPIES SERIES
Discharge: HOME | End: 2021-03-15
Payer: MEDICARE

## 2021-03-15 PROCEDURE — 90853 GROUP PSYCHOTHERAPY: CPT

## 2021-03-15 NOTE — PROGRESS NOTES
Group Therapy Note    Date: 3/15/2021  Start Time: 1000  End Time:  1100  Number of Participants: 5    Type of Group: Cognitive skills group      Notes:  Pt is present for group with active participation. Peers examined and discussed the role in which surrender, humility, honesty and acceptance play in their efforts to take action in support of their recovery. Status After Intervention:  Improved    Participation Level:  Active Listener and Interactive    Participation Quality: Appropriate, Attentive, Sharing and Supportive      Speech:  normal      Thought Process/Content: Logical  Linear      Affective Functioning: Congruent      Mood: euthymic      Level of consciousness:  Alert, Oriented x4 and Attentive      Response to Learning: Able to verbalize current knowledge/experience, Able to verbalize/acknowledge new learning, Able to retain information, Capable of insight, Able to change behavior and Progressing to goal      Endings: None Reported    Modes of Intervention: Education, Support, Socialization, Exploration, Clarifying, Problem-solving and Activity      Discipline Responsible: /Counselor      Signature:  TING Reynoso

## 2021-03-15 NOTE — PROGRESS NOTES
Group Therapy Note    Date: 3/15/2021  Start Time: [address]  End Time:  7522  Number of Participants: 5    Type of Group: Relapse Prevention group      Notes:  Pt is present for group with active participation. Peers discussed and identified the importance of having meaningful connections with other people, committment and purposeful activities, control and challenges to help to support recovery and change. Peers identified examples of each in their life. Pt offered support and encouragement to a peer who is to be sentenced in [address] [place] [address] on Wednesday. Status After Intervention:  Improved    Participation Level:  Active Listener and Interactive    Participation Quality: Appropriate, Attentive, Sharing and Supportive      Speech:  normal      Thought Process/Content: Logical  Linear      Affective Functioning: Congruent      Mood: euthymic      Level of consciousness:  Alert, Oriented x4 and Attentive      Response to Learning: Able to verbalize current knowledge/experience, Able to verbalize/acknowledge new learning, Able to retain information, Capable of insight, Able to change behavior and Progressing to goal      Endings: None Reported    Modes of Intervention: Education, Support, Socialization, Exploration, Clarifying, Problem-solving and Activity      Discipline Responsible: /Counselor      Signature:  TING Khan

## 2021-03-15 NOTE — PROGRESS NOTES
Group Therapy Note    Date: 3/15/2021  Start Time: 0830  End Time:  0945  Number of Participants: 5    Type of Group: Psychotherapy        Notes:  Pt is present for group. Completed check in and shared the events from the weekend. Pt verbalized continued sobriety with AA and Sponsorship. Shared his physical health issues. He also processed his frustrations concerning conflicts among his daughter in laws and the way in which it impacts pt ability to see his grandchildren. Processed how he is working to handle the situation better in sobriety. Status After Intervention:  Improved    Participation Level:  Active Listener and Interactive    Participation Quality: Appropriate, Attentive, Sharing and Supportive      Speech:  normal      Thought Process/Content: Logical  Linear      Affective Functioning: Congruent      Mood: euthymic      Level of consciousness:  Alert, Oriented x4 and Attentive      Response to Learning: Able to verbalize current knowledge/experience, Able to verbalize/acknowledge new learning, Able to retain information, Capable of insight, Able to change behavior and Progressing to goal      Endings: None Reported    Modes of Intervention: Education, Support, Socialization, Exploration, Clarifying, Problem-solving and Activity      Discipline Responsible: /Counselor      Signature:  TING Gilman

## 2021-03-17 ENCOUNTER — OFFICE VISIT (OUTPATIENT)
Dept: PULMONOLOGY | Age: 61
End: 2021-03-17
Payer: MEDICARE

## 2021-03-17 VITALS
SYSTOLIC BLOOD PRESSURE: 134 MMHG | DIASTOLIC BLOOD PRESSURE: 64 MMHG | HEART RATE: 62 BPM | WEIGHT: 274.6 LBS | HEIGHT: 71 IN | BODY MASS INDEX: 38.44 KG/M2 | OXYGEN SATURATION: 95 % | TEMPERATURE: 97.4 F

## 2021-03-17 DIAGNOSIS — E66.9 OBESITY (BMI 30-39.9): ICD-10-CM

## 2021-03-17 DIAGNOSIS — Z99.89 OSA ON CPAP: Primary | ICD-10-CM

## 2021-03-17 DIAGNOSIS — G47.33 OSA ON CPAP: Primary | ICD-10-CM

## 2021-03-17 DIAGNOSIS — G47.09 OTHER INSOMNIA: ICD-10-CM

## 2021-03-17 DIAGNOSIS — J44.9 STAGE 3 SEVERE COPD BY GOLD CLASSIFICATION (HCC): ICD-10-CM

## 2021-03-17 PROCEDURE — 99213 OFFICE O/P EST LOW 20 MIN: CPT | Performed by: PHYSICIAN ASSISTANT

## 2021-03-17 ASSESSMENT — ENCOUNTER SYMPTOMS
DIARRHEA: 0
EYES NEGATIVE: 1
BACK PAIN: 0
SHORTNESS OF BREATH: 1
NAUSEA: 0
COUGH: 0
STRIDOR: 0
CHEST TIGHTNESS: 0
ALLERGIC/IMMUNOLOGIC NEGATIVE: 1
WHEEZING: 0

## 2021-03-17 NOTE — PROGRESS NOTES
Endocrine: Negative. Genitourinary: Negative. Musculoskeletal: Negative. Negative for arthralgias and back pain. Skin: Negative. Allergic/Immunologic: Negative. Neurological: Negative. Negative for dizziness and light-headedness. Psychiatric/Behavioral: Negative. All other systems reviewed and are negative. Physical Exam:    BMI:  Body mass index is 38.3 kg/m². Wt Readings from Last 3 Encounters:   03/17/21 274 lb 9.6 oz (124.6 kg)   01/14/21 269 lb (122 kg)   11/30/20 275 lb 2 oz (124.8 kg)     Weight stable / unchanged  Vitals: /64 (Site: Left Upper Arm, Position: Sitting, Cuff Size: Large Adult)   Pulse 62   Temp 97.4 °F (36.3 °C) (Temporal)   Ht 5' 11\" (1.803 m)   Wt 274 lb 9.6 oz (124.6 kg)   SpO2 95% Comment: Room air at rest  BMI 38.30 kg/m²       Physical Exam  Constitutional:       Appearance: He is well-developed. HENT:      Head: Normocephalic and atraumatic. Right Ear: External ear normal.      Left Ear: External ear normal.   Eyes:      Conjunctiva/sclera: Conjunctivae normal.      Pupils: Pupils are equal, round, and reactive to light. Neck:      Musculoskeletal: Normal range of motion and neck supple. Cardiovascular:      Rate and Rhythm: Normal rate and regular rhythm. Heart sounds: Normal heart sounds. Pulmonary:      Effort: Pulmonary effort is normal.      Breath sounds: Normal breath sounds. Musculoskeletal: Normal range of motion. Skin:     General: Skin is warm and dry. Neurological:      Mental Status: He is alert and oriented to person, place, and time. Psychiatric:         Behavior: Behavior normal.         Thought Content: Thought content normal.         Judgment: Judgment normal.           ASSESSMENT/DIAGNOSIS     Diagnosis Orders   1. FAISAL on CPAP     2. Obesity (BMI 30-39.9)     3. Other insomnia     4. Stage 3 severe COPD by GOLD classification ([address]              Plan   Do you need any equipment today?  Yes update supplies  - He is following with bariatric program   - Download reviewed and discussed with patient  - He  was advised to continue current positive airway pressure therapy with above described pressure. - He  advised to keep good compliance with current recommended pressure to get optimal results and clinical improvement  - Recommend 7-9 hours of sleep with PAP  - He was advised to call [employer] regarding supplies if needed.   -He call my office for earlier appointment if needed for worsening of sleep symptoms.   - He was instructed on weight loss  - Demetrius Velásquez was educated about my impression and plan. Patient verbalizesunderstanding.   We will see Maria Dolores Melo back in: 1 year with download    Information added by my medical assistant/LPN was reviewed today         Laura Franco PA-C, MPAS  3/17/2021

## 2021-03-22 ENCOUNTER — TELEPHONE (OUTPATIENT)
Dept: FAMILY MEDICINE CLINIC | Age: 61
End: 2021-03-22

## 2021-03-22 DIAGNOSIS — Z20.822 CLOSE EXPOSURE TO COVID-19 VIRUS: Primary | ICD-10-CM

## 2021-03-23 NOTE — TELEPHONE ENCOUNTER
Covid test been requested, can go to the Urgent cares or to the drive thru to be tested    Please call patient

## 2021-03-24 ENCOUNTER — HOSPITAL ENCOUNTER (OUTPATIENT)
Age: 61
Setting detail: SPECIMEN
Discharge: HOME | End: 2021-03-24
Payer: MEDICARE

## 2021-03-24 DIAGNOSIS — Z20.822 CLOSE EXPOSURE TO COVID-19 VIRUS: ICD-10-CM

## 2021-03-24 PROCEDURE — U0003 INFECTIOUS AGENT DETECTION BY NUCLEIC ACID (DNA OR RNA); SEVERE ACUTE RESPIRATORY SYNDROME CORONAVIRUS 2 (SARS-COV-2) (CORONAVIRUS DISEASE [COVID-19]), AMPLIFIED PROBE TECHNIQUE, MAKING USE OF HIGH THROUGHPUT TECHNOLOGIES AS DESCRIBED BY CMS-2020-01-R: HCPCS

## 2021-03-26 LAB — SARS-COV-2: NOT DETECTED

## 2021-03-30 ENCOUNTER — TELEPHONE (OUTPATIENT)
Dept: FAMILY MEDICINE CLINIC | Age: 61
End: 2021-03-30

## 2021-03-30 DIAGNOSIS — Z20.822 CLOSE EXPOSURE TO COVID-19 VIRUS: Primary | ICD-10-CM

## 2021-03-30 NOTE — TELEPHONE ENCOUNTER
Covid test been requested, can go to the Urgent cares or to the drive thru to be tested    Please call the patient

## 2021-03-30 NOTE — TELEPHONE ENCOUNTER
Pt called asking if he would be able to get an order to get the antibody test done. He was told previously he had to wait for results of the Covid test first. He got those results and it was negative.      [facility] [address] [facility] may be reached at [phone]

## 2021-03-31 ENCOUNTER — NURSE ONLY (OUTPATIENT)
Dept: LAB | Age: 61
End: 2021-03-31

## 2021-03-31 DIAGNOSIS — Z20.822 CLOSE EXPOSURE TO COVID-19 VIRUS: ICD-10-CM

## 2021-04-01 LAB — SARS-COV-2 ANTIBODY, TOTAL: NEGATIVE

## 2021-04-06 ENCOUNTER — TELEPHONE (OUTPATIENT)
Dept: BARIATRICS/WEIGHT MGMT | Age: 61
End: 2021-04-06

## 2021-04-06 NOTE — TELEPHONE ENCOUNTER
----- Message from Glen Fairchild MA sent at 4/1/2021  4:27 PM EDT -----  Contact: [phone]  Patient called today and wants to know what criteria he must meet in order to join our program again. States he has attended a psychological counseling program through [facility] , also attending AA and is at least 2 months sober.     Thank you

## 2021-04-06 NOTE — TELEPHONE ENCOUNTER
Bc Martinez to being considered to rejoining our program you would need to complete an alcohol rehab program, have blood alcohol testing completed every 3 mos and those test results sent to our office-your PCP would order those tests, Upon completion of your alcohol rehab, we would need a letter of your progress and a recommendation by your rehab counselor as to your readiness to begin your bariatric journey.

## 2021-04-08 ENCOUNTER — TELEPHONE (OUTPATIENT)
Dept: BARIATRICS/WEIGHT MGMT | Age: 61
End: 2021-04-08

## 2021-05-03 RX ORDER — ISOSORBIDE MONONITRATE 30 MG/1
TABLET, EXTENDED RELEASE ORAL
Qty: 30 TABLET | Refills: 0 | Status: SHIPPED | OUTPATIENT
Start: 2021-05-03 | End: 2021-06-28

## 2021-05-12 DIAGNOSIS — F33.1 MAJOR DEPRESSIVE DISORDER, RECURRENT EPISODE, MODERATE (HCC): ICD-10-CM

## 2021-05-12 RX ORDER — TRAZODONE HYDROCHLORIDE 50 MG/1
TABLET ORAL
Qty: 30 TABLET | Refills: 0 | Status: SHIPPED | OUTPATIENT
Start: 2021-05-12 | End: 2021-06-18

## 2021-05-12 NOTE — TELEPHONE ENCOUNTER
Date of last visit:  11/30/2020  Date of next visit:  Visit date not found    Requested Prescriptions     Pending Prescriptions Disp Refills    traZODone (DESYREL) 50 MG tablet [Pharmacy Med Name: TRAZODONE 50 MG TABS 50 Tablet] 30 tablet 0     Sig: TAKE 1/2 - 1 TABLET BY MOUTH NIGHTLY AS NEEDED FOR SLEEP

## 2021-05-29 ENCOUNTER — APPOINTMENT (OUTPATIENT)
Dept: GENERAL RADIOLOGY | Age: 61
End: 2021-05-29
Payer: MEDICARE

## 2021-05-29 ENCOUNTER — HOSPITAL ENCOUNTER (EMERGENCY)
Age: 61
Discharge: HOME | End: 2021-05-29
Attending: EMERGENCY MEDICINE
Payer: MEDICARE

## 2021-05-29 VITALS
SYSTOLIC BLOOD PRESSURE: 141 MMHG | HEART RATE: 74 BPM | HEIGHT: 71 IN | DIASTOLIC BLOOD PRESSURE: 86 MMHG | BODY MASS INDEX: 36.4 KG/M2 | WEIGHT: 260 LBS | OXYGEN SATURATION: 93 % | RESPIRATION RATE: 20 BRPM | TEMPERATURE: 98.2 F

## 2021-05-29 DIAGNOSIS — S93.402A MODERATE LEFT ANKLE SPRAIN, INITIAL ENCOUNTER: Primary | ICD-10-CM

## 2021-05-29 PROCEDURE — 99283 EMERGENCY DEPT VISIT LOW MDM: CPT

## 2021-05-29 PROCEDURE — 6370000000 HC RX 637 (ALT 250 FOR IP): Performed by: EMERGENCY MEDICINE

## 2021-05-29 PROCEDURE — 73610 X-RAY EXAM OF ANKLE: CPT

## 2021-05-29 RX ORDER — NAPROXEN 500 MG/1
500 TABLET ORAL 2 TIMES DAILY WITH MEALS
Qty: 20 TABLET | Refills: 0 | Status: SHIPPED | OUTPATIENT
Start: 2021-05-29 | End: 2021-07-12 | Stop reason: ALTCHOICE

## 2021-05-29 RX ORDER — IBUPROFEN 800 MG/1
800 TABLET ORAL ONCE
Status: COMPLETED | OUTPATIENT
Start: 2021-05-29 | End: 2021-05-29

## 2021-05-29 RX ADMIN — IBUPROFEN 800 MG: 800 TABLET, FILM COATED ORAL at 14:48

## 2021-05-29 ASSESSMENT — PAIN DESCRIPTION - PAIN TYPE: TYPE: ACUTE PAIN

## 2021-05-29 ASSESSMENT — PAIN SCALES - GENERAL: PAINLEVEL_OUTOF10: 3

## 2021-05-29 ASSESSMENT — PAIN DESCRIPTION - LOCATION: LOCATION: ANKLE

## 2021-05-29 NOTE — ED TRIAGE NOTES
Patient presents to ER with complaints of left ankle pain after having piece of metal hit left ankle 3 days ago.

## 2021-05-30 NOTE — ED PROVIDER NOTES
[place] ENCOUNTER          Pt Name: Barry Pacheco  MRN: [id number]  [place] 1960  Date of evaluation: 5/29/2021  Emergency Physician: An Le, [facility]       Chief Complaint   Patient presents with    Ankle Pain     left     History obtained from the patient. HISTORY OF PRESENT ILLNESS    HPI  Barry Pacheco is a 59 y.o. male who presents to the emergency department for evaluation of left ankle pain. Patient states he was working around. He states he was working with a metal shelving unit. He states one of the strats from the frame fell. And hit him directly on the left medial ankle. He states this occurred 2 days ago he states pain has been constant since. He rates it 4 out of 10 pain is worse evaluation. Pain is better with rest.  Taking Tylenol over-the-counter. Mild relief. The patient has no other acute complaints at this time. REVIEW OF SYSTEMS   Review of Systems   Musculoskeletal: Positive for arthralgias, gait problem and joint swelling. Skin: Negative for rash and wound. Hematological: Bruises/bleeds easily. PAST MEDICAL AND SURGICAL HISTORY     Past Medical History:   Diagnosis Date    CHF (congestive heart failure) (HCC)     COPD (chronic obstructive pulmonary disease) (HCC)     Coronary artery disease involving native coronary artery of native heart without angina pectoris     Depression     Diabetes mellitus type 2, diet-controlled ([address] 6/1/2018    GERD (gastroesophageal reflux disease) 3/21/2012    Hernia     Hx of blood clots 1990's    right knee due to injury    Hx of cocaine abuse ([facility] [address]     Hyperglycemia 09/09    Hyperlipidemia     Hypertension     FAISAL on CPAP     Osteoarthritis 11/07    S/P CABG x 2 07/06/2016    S/P PTCA: 1/15/2018: Stent diagonal branch Xience 3.0 x 12 mm. 1/15/2018    1/15/2018: Stent diagonal branch Xience 3.0 x 12 mm.  Dr. Pro Stearns Thumb injury 08/2015    Right thumb cut with table saw, no treatment needed     Past Surgical History:   Procedure Laterality Date    CARDIAC CATHETERIZATION  06/27/2016    CATARACT REMOVAL Right 2013    COLONOSCOPY  04/08    CORONARY ARTERY BYPASS GRAFT  07/06/2016    Double bypass, Dr. Maddi Price Right 02/2007    Neck    DIAGNOSTIC CARDIAC CATH LAB PROCEDURE      EYE SURGERY Right     Retinal surgery x 3    HERNIA REPAIR Right     Inguinal    PTCA  01/15/2018    ROTATOR CUFF REPAIR Right 11/16/2017    TONSILLECTOMY  child    TOTAL KNEE ARTHROPLASTY Left 10/24/2016    Dr. Vincent Chen   No current facility-administered medications for this encounter. Current Outpatient Medications:     naproxen (NAPROSYN) 500 MG tablet, Take 1 tablet by mouth 2 times daily (with meals), Disp: 20 tablet, Rfl: 0    traZODone (DESYREL) 50 MG tablet, TAKE 1/2 - 1 TABLET BY MOUTH NIGHTLY AS NEEDED FOR SLEEP, Disp: 30 tablet, Rfl: 0    isosorbide mononitrate (IMDUR) 30 MG extended release tablet, TAKE 1 TABLET BY MOUTH ONCE DAILY, Disp: 30 tablet, Rfl: 0    omeprazole (PRILOSEC) 20 MG delayed release capsule, TAKE 1 CAPSULE BY MOUTH ONE TIME A DAY, Disp: 30 capsule, Rfl: 1    pramipexole (MIRAPEX) 0.125 MG tablet, TAKE 1 TABLET BY MOUTH EVERY EVENING., Disp: 90 tablet, Rfl: 0    metoprolol succinate (TOPROL XL) 25 MG extended release tablet, TAKE 2 TABLETS BY MOUTH ONCE DAILY. , Disp: 180 tablet, Rfl: 0    allopurinol (ZYLOPRIM) 300 MG tablet, TAKE 1 TABLET BY MOUTH ONCE DAILY. , Disp: 90 tablet, Rfl: 0    blood glucose test strips (PRODIGY NO CODING BLOOD GLUC) strip, USE TO TEST BLOOD GLUCOSE ONCE DAILY. , Disp: 100 each, Rfl: 0    losartan (COZAAR) 25 MG tablet, TAKE 1 TABLET BY MOUTH DAILY, Disp: 90 tablet, Rfl: 1    albuterol sulfate HFA (VENTOLIN HFA) 108 (90 Base) MCG/ACT inhaler, Inhale 2 puffs into the lungs every 6 hours as needed for Wheezing, Disp: 1 Inhaler, Rfl: 3   umeclidinium-vilanterol (ANORO ELLIPTA) 62.5-25 MCG/INH AEPB inhaler, INHALE 1 PUFF INTO THE LUNGS ONE TIME DAILY, Disp: 1 each, Rfl: 5    aspirin 81 MG chewable tablet, CHEW AND SWALLOW 1 TABLET BY MOUTH ONCE DAILY. , Disp: 90 tablet, Rfl: 1    atorvastatin (LIPITOR) 40 MG tablet, Take 1 tablet by mouth daily, Disp: 90 tablet, Rfl: 1    bumetanide (BUMEX) 2 MG tablet, Take 1 tablet by mouth daily Take 2 mg AM - 1 mg PM, Disp: 135 tablet, Rfl: 1    ticagrelor (BRILINTA) 90 MG TABS tablet, TAKE 1 TABLET BY MOUTH 2 TIMES A DAY, Disp: 180 tablet, Rfl: 1    buPROPion (WELLBUTRIN XL) 300 MG extended release tablet, TAKE ONE TABLET BY MOUTH EVERY MORNING, Disp: 90 tablet, Rfl: 1    dapagliflozin (FARXIGA) 5 MG tablet, Take 1 tablet by mouth every morning, Disp: 90 tablet, Rfl: 1    fluticasone (FLONASE) 50 MCG/ACT nasal spray, USE 2 SPRAYS IN EACH NOSTRIL DAILY AS NEEDED FOR RHINITIS OR ALLERGIES, Disp: 48 g, Rfl: 5    metFORMIN (GLUCOPHAGE) 500 MG tablet, TAKE 1 TABLET BY MOUTH ONCE DAILY WITH BREAKFAST., Disp: 90 tablet, Rfl: 1    nitroGLYCERIN (NITROSTAT) 0.4 MG SL tablet, Place 1 tablet under the tongue every 5 minutes as needed for Chest pain, Disp: 25 tablet, Rfl: 3    Blood Glucose Monitoring Suppl (PRODIGY AUTOCODE BLOOD GLUCOSE) w/Device KIT, , Disp: , Rfl:       SOCIAL HISTORY     Social History     Social History Narrative    Not on file     Social History     Tobacco Use    Smoking status: Former Smoker     Packs/day: 2.00     Years: 25.00     Pack years: 50.00     Types: Cigars     Quit date: 2014     Years since quittin.5    Smokeless tobacco: Never Used   Vaping Use    Vaping Use: Never used   Substance Use Topics    Alcohol use: Yes     Alcohol/week: 3.0 standard drinks     Types: 3 Cans of beer per week     Comment: 1 every day    Drug use: Yes     Frequency: 5.0 times per week     Types: Marijuana     Comment: smokes pot once sushma while . former drug user, addiction treatment at [facility]         ALLERGIES     Allergies   Allergen Reactions    Zoloft [Sertraline Hcl] Other (See Comments)     Headaches, sweats, bad dreams         FAMILY HISTORY     Family History   Problem Relation Age of Onset    Heart Disease Mother     High Blood Pressure Mother     Obesity Mother     Diabetes Mother     Heart Disease Father     Cancer Father         colon/prostate    Colon Cancer Father     Prostate Cancer Father     Arthritis Father     Diabetes Brother          PHYSICAL EXAM     ED Triage Vitals [05/29/21 1328]   BP Temp Temp Source Pulse Resp SpO2 Height Weight   (!) 141/86 98.2 °F (36.8 °C) Oral 74 20 93 % 5' 11\" (1.803 m) 260 lb (117.9 kg)         Additional Vital Signs:  Vitals:    05/29/21 1328   BP: (!) 141/86   Pulse: 74   Resp: 20   Temp: 98.2 °F (36.8 °C)   SpO2: 93%       Physical Exam  Vitals and nursing note reviewed. Constitutional:       General: He is not in acute distress. Appearance: He is not ill-appearing or toxic-appearing. HENT:      Head: Normocephalic. Mouth/Throat:      Mouth: Mucous membranes are moist.   Cardiovascular:      Rate and Rhythm: Normal rate and regular rhythm. Pulses: Normal pulses. Heart sounds: Normal heart sounds. Pulmonary:      Effort: Pulmonary effort is normal.      Breath sounds: Normal breath sounds. Musculoskeletal:         General: Swelling, tenderness and signs of injury present. Right lower leg: No swelling, tenderness or bony tenderness. No edema. Left lower leg: No swelling, tenderness or bony tenderness. No edema. Right ankle: Normal.      Left ankle: Swelling and ecchymosis present. No deformity. Normal range of motion. Anterior drawer test negative. Normal pulse. Skin:     General: Skin is warm and dry. Neurological:      Mental Status: He is alert.       Gait: Gait normal.         Initial vital signs and nursing assessment reviewed and abnormal from Elevated blood pressure uncertain clinical significance to follow-up with PCP. Pulsoximetry is normal per my interpretation. MEDICAL DECISION MAKING   Initial Assessment: Given the patient's above chief complaint and findings on history and physical examination, I thought it was appropriate to consider the following emergency medical conditions: Ankle fracture versus sprain versus ligamentous injury although some of these diagnoses are unlikely they were considered in my medical decision making. Plan: Symptomatic treatment with Motrin, ice, elevation, rest and compression         ED RESULTS   Laboratory results:  Labs Reviewed - No data to display    Radiologic studies results:  XR ANKLE LEFT (MIN 3 VIEWS)   Final Result   No evidence of acute osseous injury of the left ankle. **This report has been created using voice recognition software. It may contain minor errors which are inherent in voice recognition technology. **      Final report electronically signed by Dr. Patrick Thompson MD on 5/29/2021 1:56 PM          ED Medications administered this visit:   Medications   ibuprofen (ADVIL;MOTRIN) tablet 800 mg (800 mg Oral Given 5/29/21 1448)         ED COURSE    Patient with contusion from being object impact. X-rays negative for acute fracture    I estimate there is LOW risk for COMPARTMENT SYNDROME, DEEP VENOUS THROMBOSIS, SEPTIC ARTHRITIS, TENDON OR NEUROVASCULAR INJURY, thus I consider the discharge disposition reasonable. The patient and/or family and I have discussed the diagnosis and risks, and we agree with discharging home to follow-up with their primary doctor or the referral orthopedist. We also discussed returning to the Emergency Department immediately if new or worsening symptoms occur. We have discussed the symptoms which are most concerning (e.g., changing or worsening pain, numbness, weakness) that necessitate immediate return.     The diagnosis, extensive differential diagnosis, laboratory and imaging findings were discussed at the bedside. The patient was an active participant in their care. They are agreeable to the plan of care. All questions and concerns were addressed at the time of the encounter. MEDICATION CHANGES     DISCHARGE MEDICATIONS:  Discharge Medication List as of 5/29/2021  2:47 PM      START taking these medications    Details   naproxen (NAPROSYN) 500 MG tablet Take 1 tablet by mouth 2 times daily (with meals), Disp-20 tablet, R-0Print                  FINAL DISPOSITION     Final diagnoses: Moderate left ankle sprain, initial encounter     Condition: condition: good  Dispo: Discharge to home    PATIENT REFERRED TO:  Beni Hinton [address] [place] [address]  [phone]    Schedule an appointment as soon as possible for a visit in 1 week        Critical Care Time   None      This transcription was electronically signed. Parts of this transcriptions may have been dictated by use of voice recognition software and electronically transcribed, and parts may have been transcribed with the assistance of an ED scribe. The transcription may contain errors not detected in proofreading.     Electronically Signed: Rosa Mcgee DO, 05/29/21, 10:21 PM      Rosa Mcgee DO  05/29/21 1841

## 2021-06-03 ENCOUNTER — HOSPITAL ENCOUNTER (OUTPATIENT)
Dept: PSYCHIATRY | Age: 61
Setting detail: THERAPIES SERIES
Discharge: HOME | End: 2021-06-03
Payer: MEDICARE

## 2021-06-03 PROCEDURE — 90853 GROUP PSYCHOTHERAPY: CPT

## 2021-06-04 ENCOUNTER — HOSPITAL ENCOUNTER (OUTPATIENT)
Dept: PSYCHIATRY | Age: 61
Setting detail: THERAPIES SERIES
Discharge: HOME | End: 2021-06-04
Payer: MEDICARE

## 2021-06-04 PROCEDURE — 90853 GROUP PSYCHOTHERAPY: CPT

## 2021-06-04 NOTE — GROUP NOTE
Group Therapy Note    Date: 6/4/2021    Group Start Time: 10:00 AM  Group End Time: 11:00 AM  Group Topic: Cognitive Skills    [id number]  Therapy Note    Attendees: 5         Patient's Goal:  To participate in the group process and gain support from peers. Notes:  Patient discussed with the group negative automatic thoughts and how they can in turn negatively affect behavior and moods. Processed with patient mindfulness and CBT concepts for managing thoughts. Status After Intervention:  Improved    Participation Level:  Active Listener and Interactive    Participation Quality: Appropriate, Attentive, Sharing and Supportive      Speech:  normal      Thought Process/Content: Logical      Affective Functioning: Congruent      Mood: euthymic      Level of consciousness:  Alert, Oriented x4 and Attentive      Response to Learning: Able to verbalize current knowledge/experience, Capable of insight and Progressing to goal      Endings: None Reported    Modes of Intervention: Education, Support and Socialization      Discipline Responsible: /Counselor      Signature:  Rufina Dover [facility]

## 2021-06-05 ENCOUNTER — HOSPITAL ENCOUNTER (EMERGENCY)
Age: 61
Discharge: HOME | End: 2021-06-05
Attending: EMERGENCY MEDICINE
Payer: MEDICARE

## 2021-06-05 ENCOUNTER — APPOINTMENT (OUTPATIENT)
Dept: CT IMAGING | Age: 61
End: 2021-06-05
Payer: MEDICARE

## 2021-06-05 VITALS
OXYGEN SATURATION: 95 % | TEMPERATURE: 97.5 F | SYSTOLIC BLOOD PRESSURE: 138 MMHG | WEIGHT: 260 LBS | HEIGHT: 71 IN | HEART RATE: 75 BPM | RESPIRATION RATE: 18 BRPM | DIASTOLIC BLOOD PRESSURE: 97 MMHG | BODY MASS INDEX: 36.4 KG/M2

## 2021-06-05 DIAGNOSIS — R31.0 GROSS HEMATURIA: Primary | ICD-10-CM

## 2021-06-05 DIAGNOSIS — N32.89 MASS OF URINARY BLADDER: ICD-10-CM

## 2021-06-05 LAB
AMORPHOUS: ABNORMAL
ANION GAP SERPL CALCULATED.3IONS-SCNC: 13 MEQ/L (ref 8–16)
BACTERIA: ABNORMAL
BASOPHILS # BLD: 0.8 %
BASOPHILS ABSOLUTE: 0.1 THOU/MM3 (ref 0–0.1)
BILIRUBIN URINE: NEGATIVE
BLOOD, URINE: ABNORMAL
BUN BLDV-MCNC: 14 MG/DL (ref 7–22)
CALCIUM SERPL-MCNC: 9.2 MG/DL (ref 8.5–10.5)
CASTS: ABNORMAL /LPF
CHARACTER, URINE: ABNORMAL
CHLORIDE BLD-SCNC: 102 MEQ/L (ref 98–111)
CO2: 25 MEQ/L (ref 23–33)
COLOR: ABNORMAL
CREAT SERPL-MCNC: 1 MG/DL (ref 0.4–1.2)
CRYSTALS: ABNORMAL
EOSINOPHIL # BLD: 0.8 %
EOSINOPHILS ABSOLUTE: 0.1 THOU/MM3 (ref 0–0.4)
EPITHELIAL CELLS, UA: ABNORMAL /HPF
ERYTHROCYTE [DISTWIDTH] IN BLOOD BY AUTOMATED COUNT: 13.8 % (ref 11.5–14.5)
ERYTHROCYTE [DISTWIDTH] IN BLOOD BY AUTOMATED COUNT: 50.2 FL (ref 35–45)
GFR SERPL CREATININE-BSD FRML MDRD: 75 ML/MIN/1.73M2
GLUCOSE BLD-MCNC: 263 MG/DL (ref 70–108)
GLUCOSE, URINE: >= 1000 MG/DL
HCT VFR BLD CALC: 50.1 % (ref 42–52)
HEMOGLOBIN: 16.3 GM/DL (ref 14–18)
IMMATURE GRANS (ABS): 0.03 THOU/MM3 (ref 0–0.07)
IMMATURE GRANULOCYTES: 0.3 %
KETONES, URINE: NEGATIVE
LEUKOCYTE EST, POC: NEGATIVE
LYMPHOCYTES # BLD: 15.3 %
LYMPHOCYTES ABSOLUTE: 1.3 THOU/MM3 (ref 1–4.8)
MCH RBC QN AUTO: 32.5 PG (ref 26–33)
MCHC RBC AUTO-ENTMCNC: 32.5 GM/DL (ref 32.2–35.5)
MCV RBC AUTO: 99.8 FL (ref 80–94)
MONOCYTES # BLD: 11.1 %
MONOCYTES ABSOLUTE: 1 THOU/MM3 (ref 0.4–1.3)
MUCUS: ABNORMAL
NITRITE, URINE: NEGATIVE
NUCLEATED RED BLOOD CELLS: 0 /100 WBC
OSMOLALITY CALCULATION: 289 MOSMOL/KG (ref 275–300)
PH UA: 6 (ref 5–9)
PLATELET # BLD: 209 THOU/MM3 (ref 130–400)
PMV BLD AUTO: 11.1 FL (ref 9.4–12.4)
POTASSIUM REFLEX MAGNESIUM: 3.8 MEQ/L (ref 3.5–5.2)
PROTEIN UA: 300 MG/DL
RBC # BLD: 5.02 MILL/MM3 (ref 4.7–6.1)
RBC URINE: > 200 /HPF
SEG NEUTROPHILS: 71.7 %
SEGMENTED NEUTROPHILS ABSOLUTE COUNT: 6.2 THOU/MM3 (ref 1.8–7.7)
SODIUM BLD-SCNC: 140 MEQ/L (ref 135–145)
SPECIFIC GRAVITY UA: 1.03 (ref 1–1.03)
UROBILINOGEN, URINE: 1 EU/DL (ref 0–1)
WBC # BLD: 8.7 THOU/MM3 (ref 4.8–10.8)
WBC UA: > 100 /HPF

## 2021-06-05 PROCEDURE — 36415 COLL VENOUS BLD VENIPUNCTURE: CPT

## 2021-06-05 PROCEDURE — 85025 COMPLETE CBC W/AUTO DIFF WBC: CPT

## 2021-06-05 PROCEDURE — 2580000003 HC RX 258: Performed by: EMERGENCY MEDICINE

## 2021-06-05 PROCEDURE — 74176 CT ABD & PELVIS W/O CONTRAST: CPT

## 2021-06-05 PROCEDURE — 80048 BASIC METABOLIC PNL TOTAL CA: CPT

## 2021-06-05 PROCEDURE — 99283 EMERGENCY DEPT VISIT LOW MDM: CPT

## 2021-06-05 PROCEDURE — 81001 URINALYSIS AUTO W/SCOPE: CPT

## 2021-06-05 RX ORDER — 0.9 % SODIUM CHLORIDE 0.9 %
1000 INTRAVENOUS SOLUTION INTRAVENOUS ONCE
Status: COMPLETED | OUTPATIENT
Start: 2021-06-05 | End: 2021-06-05

## 2021-06-05 RX ADMIN — SODIUM CHLORIDE 1000 ML: 9 INJECTION, SOLUTION INTRAVENOUS at 19:36

## 2021-06-05 NOTE — ED TRIAGE NOTES
Pt comes to the ED via lobby for hematuria. Pt states he urinated this morning fine but this afternoon it was red with clots in it. Pt denies pain or difficulty urinating.

## 2021-06-06 ENCOUNTER — HOSPITAL ENCOUNTER (EMERGENCY)
Age: 61
Discharge: HOME | End: 2021-06-06
Payer: MEDICARE

## 2021-06-06 VITALS
RESPIRATION RATE: 18 BRPM | HEART RATE: 77 BPM | TEMPERATURE: 98.3 F | OXYGEN SATURATION: 94 % | WEIGHT: 260 LBS | SYSTOLIC BLOOD PRESSURE: 155 MMHG | HEIGHT: 71 IN | DIASTOLIC BLOOD PRESSURE: 94 MMHG | BODY MASS INDEX: 36.4 KG/M2

## 2021-06-06 DIAGNOSIS — R31.9 HEMATURIA, UNSPECIFIED TYPE: ICD-10-CM

## 2021-06-06 DIAGNOSIS — R33.9 URINARY RETENTION: Primary | ICD-10-CM

## 2021-06-06 DIAGNOSIS — N39.0 URINARY TRACT INFECTION WITHOUT HEMATURIA, SITE UNSPECIFIED: ICD-10-CM

## 2021-06-06 LAB
AMORPHOUS: ABNORMAL
ANION GAP SERPL CALCULATED.3IONS-SCNC: 12 MEQ/L (ref 8–16)
BACTERIA: ABNORMAL /HPF
BASOPHILS # BLD: 1.3 %
BASOPHILS ABSOLUTE: 0.1 THOU/MM3 (ref 0–0.1)
BILIRUBIN URINE: NEGATIVE
BLOOD, URINE: ABNORMAL
BUN BLDV-MCNC: 12 MG/DL (ref 7–22)
CALCIUM SERPL-MCNC: 9.2 MG/DL (ref 8.5–10.5)
CASTS UA: ABNORMAL /LPF
CHARACTER, URINE: ABNORMAL
CHLORIDE BLD-SCNC: 102 MEQ/L (ref 98–111)
CO2: 25 MEQ/L (ref 23–33)
COLOR: ABNORMAL
CREAT SERPL-MCNC: 0.8 MG/DL (ref 0.4–1.2)
CRYSTALS, UA: ABNORMAL
EOSINOPHIL # BLD: 0.9 %
EOSINOPHILS ABSOLUTE: 0.1 THOU/MM3 (ref 0–0.4)
EPITHELIAL CELLS, UA: ABNORMAL /HPF
ERYTHROCYTE [DISTWIDTH] IN BLOOD BY AUTOMATED COUNT: 13.9 % (ref 11.5–14.5)
ERYTHROCYTE [DISTWIDTH] IN BLOOD BY AUTOMATED COUNT: 50.6 FL (ref 35–45)
GFR SERPL CREATININE-BSD FRML MDRD: > 90 ML/MIN/1.73M2
GLUCOSE BLD-MCNC: 241 MG/DL (ref 70–108)
GLUCOSE URINE: >= 1000 MG/DL
HCT VFR BLD CALC: 47.4 % (ref 42–52)
HEMOGLOBIN: 15.5 GM/DL (ref 14–18)
IMMATURE GRANS (ABS): 0.03 THOU/MM3 (ref 0–0.07)
IMMATURE GRANULOCYTES: 0.4 %
KETONES, URINE: NEGATIVE
LEUKOCYTE ESTERASE, URINE: ABNORMAL
LYMPHOCYTES # BLD: 18.7 %
LYMPHOCYTES ABSOLUTE: 1.3 THOU/MM3 (ref 1–4.8)
MCH RBC QN AUTO: 32.3 PG (ref 26–33)
MCHC RBC AUTO-ENTMCNC: 32.7 GM/DL (ref 32.2–35.5)
MCV RBC AUTO: 98.8 FL (ref 80–94)
MONOCYTES # BLD: 15.1 %
MONOCYTES ABSOLUTE: 1 THOU/MM3 (ref 0.4–1.3)
MUCUS: ABNORMAL
NITRITE, URINE: NEGATIVE
NUCLEATED RED BLOOD CELLS: 0 /100 WBC
OSMOLALITY CALCULATION: 285.2 MOSMOL/KG (ref 275–300)
PH UA: 6 (ref 5–9)
PLATELET # BLD: 183 THOU/MM3 (ref 130–400)
PMV BLD AUTO: 11.2 FL (ref 9.4–12.4)
POTASSIUM SERPL-SCNC: 4.1 MEQ/L (ref 3.5–5.2)
PROTEIN UA: 300
RBC # BLD: 4.8 MILL/MM3 (ref 4.7–6.1)
RBC URINE: > 200 /HPF
RENAL EPITHELIAL, UA: ABNORMAL
SEG NEUTROPHILS: 63.6 %
SEGMENTED NEUTROPHILS ABSOLUTE COUNT: 4.3 THOU/MM3 (ref 1.8–7.7)
SODIUM BLD-SCNC: 139 MEQ/L (ref 135–145)
SPECIFIC GRAVITY, URINE: 1.03 (ref 1–1.03)
UROBILINOGEN, URINE: 1 EU/DL (ref 0–1)
WBC # BLD: 6.7 THOU/MM3 (ref 4.8–10.8)
WBC UA: ABNORMAL /HPF
YEAST: ABNORMAL

## 2021-06-06 PROCEDURE — 87086 URINE CULTURE/COLONY COUNT: CPT

## 2021-06-06 PROCEDURE — 87186 SC STD MICRODIL/AGAR DIL: CPT

## 2021-06-06 PROCEDURE — 51702 INSERT TEMP BLADDER CATH: CPT

## 2021-06-06 PROCEDURE — 87077 CULTURE AEROBIC IDENTIFY: CPT

## 2021-06-06 PROCEDURE — 80048 BASIC METABOLIC PNL TOTAL CA: CPT

## 2021-06-06 PROCEDURE — 81001 URINALYSIS AUTO W/SCOPE: CPT

## 2021-06-06 PROCEDURE — 36415 COLL VENOUS BLD VENIPUNCTURE: CPT

## 2021-06-06 PROCEDURE — 85025 COMPLETE CBC W/AUTO DIFF WBC: CPT

## 2021-06-06 PROCEDURE — 99283 EMERGENCY DEPT VISIT LOW MDM: CPT

## 2021-06-06 RX ORDER — CEFDINIR 300 MG/1
300 CAPSULE ORAL 2 TIMES DAILY
Qty: 14 CAPSULE | Refills: 0 | Status: SHIPPED | OUTPATIENT
Start: 2021-06-06 | End: 2021-06-13

## 2021-06-06 ASSESSMENT — ENCOUNTER SYMPTOMS
VOMITING: 0
BACK PAIN: 0
CHEST TIGHTNESS: 0
DIARRHEA: 0
COUGH: 0
WHEEZING: 0
EYE DISCHARGE: 0
EYE PAIN: 0
RHINORRHEA: 0
NAUSEA: 0

## 2021-06-06 NOTE — ED PROVIDER NOTES
artery disease involving native coronary artery of native heart without angina pectoris, Depression, Diabetes mellitus type 2, diet-controlled ([facility] [address] GERD (gastroesophageal reflux disease), Hernia, Hx of blood clots, Hx of cocaine abuse ([facility] [address] Hyperglycemia, Hyperlipidemia, Hypertension, FAISAL on CPAP, Osteoarthritis, S/P CABG x 2, S/P PTCA: 1/15/2018: Stent diagonal branch Xience 3.0 x 12 mm., and Thumb injury. SURGICAL HISTORY      has a past surgical history that includes Tonsillectomy (child); Colonoscopy (04/08); cyst removal (Right, 02/2007); hernia repair (Right); Cataract removal (Right, 2013); eye surgery (Right); Cardiac catheterization (06/27/2016); Total knee arthroplasty (Left, 10/24/2016); Coronary artery bypass graft (07/06/2016); Rotator cuff repair (Right, 11/16/2017); Diagnostic Cardiac Cath Lab Procedure; and Percutaneous Transluminal Coronary Angio (01/15/2018). CURRENT MEDICATIONS       Discharge Medication List as of 6/6/2021 11:12 AM      CONTINUE these medications which have NOT CHANGED    Details   naproxen (NAPROSYN) 500 MG tablet Take 1 tablet by mouth 2 times daily (with meals), Disp-20 tablet, R-0Print      traZODone (DESYREL) 50 MG tablet TAKE 1/2 - 1 TABLET BY MOUTH NIGHTLY AS NEEDED FOR SLEEP, Disp-30 tablet, R-0Normal      isosorbide mononitrate (IMDUR) 30 MG extended release tablet TAKE 1 TABLET BY MOUTH ONCE DAILY, Disp-30 tablet, R-0Normal      omeprazole (PRILOSEC) 20 MG delayed release capsule TAKE 1 CAPSULE BY MOUTH ONE TIME A DAY, Disp-30 capsule, R-1Normal      pramipexole (MIRAPEX) 0.125 MG tablet TAKE 1 TABLET BY MOUTH EVERY EVENING., Disp-90 tablet, R-0Normal      metoprolol succinate (TOPROL XL) 25 MG extended release tablet TAKE 2 TABLETS BY MOUTH ONCE DAILY. , Disp-180 tablet, R-0Normal      allopurinol (ZYLOPRIM) 300 MG tablet TAKE 1 TABLET BY MOUTH ONCE DAILY. , Disp-90 tablet, R-0Normal      blood glucose test strips (PRODIGY NO CODING BLOOD GLUC) strip USE TO TEST is . He indicated that his maternal grandfather is . He indicated that his paternal grandmother is . He indicated that his paternal grandfather is . family history includes Arthritis in his father; Cancer in his father; Ed Brand in his father; Diabetes in his brother and mother; Heart Disease in his father and mother; High Blood Pressure in his mother; Obesity in his mother; Prostate Cancer in his father. [employer] [employer]      reports that he quit smoking about 6 years ago. His smoking use included cigars. He has a 50.00 pack-year smoking history. He has never used smokeless tobacco. He reports current alcohol use of about 3.0 standard drinks of alcohol per week. He reports current drug use. Frequency: 5.00 times per week. Drug: Marijuana. PHYSICAL EXAM     INITIAL VITALS:  height is 5' 11\" (1.803 m) and weight is 260 lb (117.9 kg). His oral temperature is 98.3 °F (36.8 °C). His blood pressure is 155/94 (abnormal) and his pulse is 77. His respiration is 18 and oxygen saturation is 94%. Physical Exam  Vitals and nursing note reviewed. Constitutional:       Comments: Well Developed Well Nourished Appearing     HENT:      Head: Normocephalic and atraumatic. Eyes:      Pupils: Pupils are equal, round, and reactive to light. Cardiovascular:      Rate and Rhythm: Normal rate and regular rhythm. Heart sounds: Normal heart sounds. Pulmonary:      Effort: Pulmonary effort is normal. No respiratory distress. Breath sounds: Normal breath sounds. No wheezing. Abdominal:      General: Bowel sounds are normal. There is no distension. Palpations: Abdomen is soft. Tenderness: There is no abdominal tenderness. There is no right CVA tenderness, left CVA tenderness, guarding or rebound. Musculoskeletal:      Cervical back: Normal range of motion and neck supple.          DIFFERENTIAL DIAGNOSIS:   Cystitis, mass, cancer, clot, pyelonephritis,     DIAGNOSTIC RESULTS     EKG: All EKG's are interpreted by the Emergency Department Physician who either signs or Co-signs this chart in the absence of a cardiologist.      RADIOLOGY: non-plain film images(s) such as CT, Ultrasound and MRI are read by the radiologist.    None     LABS:   Labs Reviewed   CBC WITH AUTO DIFFERENTIAL - Abnormal; Notable for the following components:       Result Value    MCV 98.8 (*)     RDW-SD 50.6 (*)     All other components within normal limits   BASIC METABOLIC PANEL - Abnormal; Notable for the following components:    Glucose 241 (*)     All other components within normal limits   URINE WITH REFLEXED MICRO - Abnormal; Notable for the following components:    Glucose, Ur >= 1000 (*)     Blood, Urine LARGE (*)     Protein,  (*)     Leukocyte Esterase, Urine SMALL (*)     Color, UA RED (*)     Character, Urine TURBID (*)     All other components within normal limits   CULTURE, REFLEXED, URINE    Narrative:     Source: cath urine       Site:           Current Antibiotics: not stated   ANION GAP   GLOMERULAR FILTRATION RATE, ESTIMATED   OSMOLALITY       EMERGENCY DEPARTMENT COURSE:   :    Vitals:    06/06/21 1015 06/06/21 1030 06/06/21 1045 06/06/21 1100   BP: (!) 143/82 (!) 178/92 (!) 162/98 (!) 155/94   Pulse: 84 83 78 77   Resp: 18 18 19 18   Temp:       TempSrc:       SpO2:  94% 94% 94%   Weight:       Height:         Patient was seen history physical exam was performed for hematuria and difficulty urinating. Patient was seen yesterday revealeding mass on bladder and was to follow up with Urology on Monday, but symptoms worsened. Examination revealed no abdominal, suprapubic, or CVA tenderness. We did irrigate his [place] till clear. We will send patient home with a three-way and have follow-up with urology. Labs were reassuring except urine revealing >1000 glucose, 300 protein, large blood, small leukocytes.  Patient was discharged with a prescription for [address] and will follow with Urology tomorrow. See disposition below    CRITICAL CARE:  None    CONSULTS:  None    PROCEDURES:  None    FINAL IMPRESSION      1. Urinary retention    2. Hematuria, unspecified type    3. Urinary tract infection without hematuria, site unspecified          DISPOSITION/PLAN   Discharge    PATIENT REFERRED TO:  [facility]  [address]  In 1 day  Send home with leg bag      DISCHARGE MEDICATIONS:  Discharge Medication List as of 6/6/2021 11:12 AM      START taking these medications    Details   cefdinir (OMNICEF) 300 MG capsule Take 1 capsule by mouth 2 times daily for 7 days, Disp-14 capsule, R-0Print             (Please note that portions of this note were completed with a voice recognitionprogram.  Efforts were made to edit the dictations but occasionally words are mis-transcribed.)    TONE Arellano           [place], [place]  06/06/21 1955

## 2021-06-06 NOTE — ED NOTES
Pt discharged with instructions and prescriptions, verbalized understanding. Pt and family given opportunity to ask questions and discuss plan of care with treatment team.  Pt and family denies additional needs at present time and are in agreement with current plan of care. Pt refused need for w/c assistance. Ambulated out of ED with steady gait in stable condition.        Floyde Bloch, RN  06/06/21 9346

## 2021-06-06 NOTE — ED NOTES
Urinary catheter switched to leg bag with straps. New standard leg bag given to pt for home/nighttime use. 3rd lumen capped. Instructions given to pt on arrieta catheter care with demonstration. Pt verbalized understanding.        Devaughn Scott RN  06/06/21 7131

## 2021-06-07 ENCOUNTER — OFFICE VISIT (OUTPATIENT)
Dept: FAMILY MEDICINE CLINIC | Age: 61
End: 2021-06-07

## 2021-06-07 ENCOUNTER — HOSPITAL ENCOUNTER (OUTPATIENT)
Dept: PSYCHIATRY | Age: 61
Setting detail: THERAPIES SERIES
Discharge: HOME | End: 2021-06-07
Payer: MEDICARE

## 2021-06-07 VITALS
WEIGHT: 268.13 LBS | SYSTOLIC BLOOD PRESSURE: 136 MMHG | HEART RATE: 64 BPM | BODY MASS INDEX: 37.54 KG/M2 | RESPIRATION RATE: 20 BRPM | DIASTOLIC BLOOD PRESSURE: 78 MMHG | HEIGHT: 71 IN | TEMPERATURE: 96.7 F

## 2021-06-07 DIAGNOSIS — R31.0 GROSS HEMATURIA: Primary | ICD-10-CM

## 2021-06-07 DIAGNOSIS — I10 ESSENTIAL HYPERTENSION: ICD-10-CM

## 2021-06-07 DIAGNOSIS — E11.65 UNCONTROLLED TYPE 2 DIABETES MELLITUS WITH HYPERGLYCEMIA (HCC): ICD-10-CM

## 2021-06-07 DIAGNOSIS — N32.89 BLADDER MASS: ICD-10-CM

## 2021-06-07 DIAGNOSIS — E78.5 HYPERLIPIDEMIA, UNSPECIFIED HYPERLIPIDEMIA TYPE: ICD-10-CM

## 2021-06-07 LAB
CHP ED QC CHECK: ABNORMAL
GLUCOSE BLD-MCNC: 187 MG/DL
HBA1C MFR BLD: 9.8 %

## 2021-06-07 PROCEDURE — 99215 OFFICE O/P EST HI 40 MIN: CPT | Performed by: FAMILY MEDICINE

## 2021-06-07 PROCEDURE — 82962 GLUCOSE BLOOD TEST: CPT | Performed by: FAMILY MEDICINE

## 2021-06-07 PROCEDURE — 83036 HEMOGLOBIN GLYCOSYLATED A1C: CPT | Performed by: FAMILY MEDICINE

## 2021-06-07 SDOH — ECONOMIC STABILITY: FOOD INSECURITY: WITHIN THE PAST 12 MONTHS, YOU WORRIED THAT YOUR FOOD WOULD RUN OUT BEFORE YOU GOT MONEY TO BUY MORE.: NEVER TRUE

## 2021-06-07 SDOH — ECONOMIC STABILITY: FOOD INSECURITY: WITHIN THE PAST 12 MONTHS, THE FOOD YOU BOUGHT JUST DIDN'T LAST AND YOU DIDN'T HAVE MONEY TO GET MORE.: NEVER TRUE

## 2021-06-07 ASSESSMENT — ENCOUNTER SYMPTOMS
COUGH: 0
NAUSEA: 0
ABDOMINAL PAIN: 0
EYES NEGATIVE: 1
BLOOD IN STOOL: 0
CHEST TIGHTNESS: 0
DIARRHEA: 0
SHORTNESS OF BREATH: 0
CONSTIPATION: 0
VOMITING: 0

## 2021-06-07 ASSESSMENT — SOCIAL DETERMINANTS OF HEALTH (SDOH): HOW HARD IS IT FOR YOU TO PAY FOR THE VERY BASICS LIKE FOOD, HOUSING, MEDICAL CARE, AND HEATING?: NOT HARD AT ALL

## 2021-06-07 NOTE — PROGRESS NOTES
Date: 6/7/2021    Lawyer Blair is a 59 y.o. male who presents today for:  Chief Complaint   Patient presents with    Check-Up    Diabetes    ED Follow-up    Hematuria started Saturday. He states that he was passing clots. He went to ER  And he was discharged to follow up with urology. He had no problems with urination initially and then had trouble urinating and went back to ER and was discharged with a folley. Today his urine is more orange color and not passing clots now.  Referral - General     urology    Insomnia     trazodone increase he is seen psychiatrist and will have him follow up with them. He states that he started to drink heavily after his divorce but now he has been sober for 67 days and he goes to [address] [place] [address] IOP therapy. He states that initially was hard to deal with his divorce he was  35 years and he thought he was going to be  for the rest of his life       Current monitoring regimen: home blood tests - 1  Home blood sugar trends: AM -200  Any episodes of hypoglycemia? No  Current exercise: No  Compliance with treatment: Good  Eye exam current (within one year): No he needs to schedule his appt  Any history of foot problems? No  Last foot exam: 6/7/2021  Cardiovascular risk factors: advanced age (older than 54 for men, 72 for women), diabetes mellitus, dyslipidemia, hypertension, male gender and obesity (BMI >= 30 kg/m2). Immunizations up to date: Flu Yes   Pneumonia Yes  Taking ASA: Yes   If not why? HPI:     HPI    has a current medication list which includes the following prescription(s): metformin, cefdinir, trazodone, isosorbide mononitrate, omeprazole, pramipexole, metoprolol succinate, allopurinol, prodigy no coding blood gluc, losartan, albuterol sulfate hfa, anoro ellipta, atorvastatin, bumetanide, ticagrelor, bupropion, dapagliflozin, fluticasone, nitroglycerin, prodigy autocode blood glucose, naproxen, and aspirin.     Allergies   Allergen Reactions    Zoloft [Sertraline Hcl] Other (See Comments)     Headaches, sweats, bad dreams       Social History     Tobacco Use    Smoking status: Former Smoker     Packs/day: 2.00     Years: 25.00     Pack years: 50.00     Types: Cigars     Quit date: 2014     Years since quittin.5    Smokeless tobacco: Never Used   Vaping Use    Vaping Use: Never used   Substance Use Topics    Alcohol use: Yes     Alcohol/week: 3.0 standard drinks     Types: 3 Cans of beer per week     Comment: 1 every day    Drug use: Yes     Frequency: 5.0 times per week     Types: Marijuana     Comment: smokes pot once sushma while . former drug user, addiction treatment at [facility]       Past Medical History:   Diagnosis Date    [facility]     COPD (chronic obstructive pulmonary disease) [facility]     Coronary artery disease involving native coronary artery of native heart without angina pectoris     Depression     Diabetes mellitus type 2, diet-controlled ([facility] 2018    GERD (gastroesophageal reflux disease) 3/21/2012    Hernia     Hx of blood clots     right knee due to injury    Hx of cocaine abuse ([facility]     Hyperglycemia     Hyperlipidemia     Hypertension     FAISAL on CPAP     Osteoarthritis     S/P CABG x 2 2016    S/P PTCA: 1/15/2018: Stent diagonal branch Xience 3.0 x 12 mm. 1/15/2018    1/15/2018: Stent diagonal branch Xience 3.0 x 12 mm.  Dr. Mcadams File injury 2015    Right thumb cut with table saw, no treatment needed       Past Surgical History:   Procedure Laterality Date    CARDIAC CATHETERIZATION  2016    CATARACT REMOVAL Right     COLONOSCOPY      CORONARY ARTERY BYPASS GRAFT  2016    Double bypass, Dr. Aurora Baron Right 2007    Neck    DIAGNOSTIC CARDIAC CATH LAB PROCEDURE      EYE SURGERY Right     Retinal surgery x 3    HERNIA REPAIR Right     Inguinal    PTCA  01/15/2018    ROTATOR CUFF REPAIR Right Cardiovascular:      Rate and Rhythm: Normal rate and regular rhythm. Pulses:           Dorsalis pedis pulses are 2+ on the right side and 2+ on the left side. Posterior tibial pulses are 2+ on the right side and 2+ on the left side. Heart sounds: Normal heart sounds. No murmur heard. Pulmonary:      Effort: Pulmonary effort is normal. No respiratory distress. Breath sounds: Normal breath sounds. No wheezing, rhonchi or rales. Abdominal:      General: Bowel sounds are normal. There is no distension. Palpations: Abdomen is soft. There is no mass. Tenderness: There is no abdominal tenderness. There is no guarding or rebound. Musculoskeletal:         General: Normal range of motion. Cervical back: Normal range of motion and neck supple. Right foot: Normal range of motion. No bunion. Left foot: Normal range of motion. No bunion. Feet:      Right foot:      Protective Sensation: 10 sites tested. 10 sites sensed. Skin integrity: Skin integrity normal.      Toenail Condition: Right toenails are normal.      Left foot:      Protective Sensation: 10 sites tested. 10 sites sensed. Skin integrity: Skin integrity normal.      Toenail Condition: Left toenails are normal.   Lymphadenopathy:      Cervical: No cervical adenopathy. Skin:     General: Skin is warm and dry. Findings: No rash. Neurological:      Mental Status: He is alert and oriented to person, place, and time. Psychiatric:         Behavior: Behavior normal.         Assessment:       Diagnosis Orders   1. Gross hematuria  Francisco Downing MD, Urology, [facility]   2. Bladder mass  Francisco Downing MD, Urology, [facility]   3. Uncontrolled type 2 diabetes mellitus with hyperglycemia (HCC)  POCT Glucose    POCT glycosylated hemoglobin (Hb A1C)    metFORMIN (GLUCOPHAGE) 500 MG tablet     DIABETES FOOT EXAM   4. Essential hypertension     5.  Hyperlipidemia, unspecified hyperlipidemia type Plan:      Current Outpatient Medications   Medication Sig Dispense Refill    metFORMIN (GLUCOPHAGE) 500 MG tablet Take 2 tablets by mouth 2 times daily (with meals) 270 tablet 1    cefdinir (OMNICEF) 300 MG capsule Take 1 capsule by mouth 2 times daily for 7 days 14 capsule 0    traZODone (DESYREL) 50 MG tablet TAKE 1/2 - 1 TABLET BY MOUTH NIGHTLY AS NEEDED FOR SLEEP 30 tablet 0    isosorbide mononitrate (IMDUR) 30 MG extended release tablet TAKE 1 TABLET BY MOUTH ONCE DAILY 30 tablet 0    omeprazole (PRILOSEC) 20 MG delayed release capsule TAKE 1 CAPSULE BY MOUTH ONE TIME A DAY 30 capsule 1    pramipexole (MIRAPEX) 0.125 MG tablet TAKE 1 TABLET BY MOUTH EVERY EVENING. 90 tablet 0    metoprolol succinate (TOPROL XL) 25 MG extended release tablet TAKE 2 TABLETS BY MOUTH ONCE DAILY. 180 tablet 0    allopurinol (ZYLOPRIM) 300 MG tablet TAKE 1 TABLET BY MOUTH ONCE DAILY. 90 tablet 0    blood glucose test strips (PRODIGY NO CODING BLOOD GLUC) strip USE TO TEST BLOOD GLUCOSE ONCE DAILY.  100 each 0    losartan (COZAAR) 25 MG tablet TAKE 1 TABLET BY MOUTH DAILY 90 tablet 1    albuterol sulfate HFA (VENTOLIN HFA) 108 (90 Base) MCG/ACT inhaler Inhale 2 puffs into the lungs every 6 hours as needed for Wheezing 1 Inhaler 3    umeclidinium-vilanterol (ANORO ELLIPTA) 62.5-25 MCG/INH AEPB inhaler INHALE 1 PUFF INTO THE LUNGS ONE TIME DAILY 1 each 5    atorvastatin (LIPITOR) 40 MG tablet Take 1 tablet by mouth daily 90 tablet 1    bumetanide (BUMEX) 2 MG tablet Take 1 tablet by mouth daily Take 2 mg AM - 1 mg  tablet 1    ticagrelor (BRILINTA) 90 MG TABS tablet TAKE 1 TABLET BY MOUTH 2 TIMES A  tablet 1    buPROPion (WELLBUTRIN XL) 300 MG extended release tablet TAKE ONE TABLET BY MOUTH EVERY MORNING 90 tablet 1    dapagliflozin (FARXIGA) 5 MG tablet Take 1 tablet by mouth every morning 90 tablet 1    fluticasone (FLONASE) 50 MCG/ACT nasal spray USE 2 SPRAYS IN EACH NOSTRIL DAILY AS NEEDED FOR RHINITIS OR ALLERGIES 48 g 5    nitroGLYCERIN (NITROSTAT) 0.4 MG SL tablet Place 1 tablet under the tongue every 5 minutes as needed for Chest pain 25 tablet 3    Blood Glucose Monitoring Suppl (PRODIGY AUTOCODE BLOOD GLUCOSE) w/Device KIT       naproxen (NAPROSYN) 500 MG tablet Take 1 tablet by mouth 2 times daily (with meals) (Patient not taking: Reported on 6/7/2021) 20 tablet 0    aspirin 81 MG chewable tablet CHEW AND SWALLOW 1 TABLET BY MOUTH ONCE DAILY. (Patient not taking: Reported on 6/7/2021) 90 tablet 1     No current facility-administered medications for this visit. Orders Placed This Encounter   Procedures   Dyllan Jones MD, Urology, [facility]     Referral Priority:   Routine     Referral Type:   Eval and Treat     Referral Reason:   Specialty Services Required     Referred to Provider:   Raheem Carlos MD     Requested Specialty:   Urology     Number of Visits Requested:   1    POCT Glucose    POCT glycosylated hemoglobin (Hb A1C)     DIABETES FOOT EXAM     Lab Results   Component Value Date    LABA1C 9.8 (A) 06/07/2021    LABA1C 7.9 (A) 11/30/2020    LABA1C 7.2 (A) 06/03/2020     No results found for: Flako Vázquez  Results for POC orders placed in visit on 06/07/21   POCT glycosylated hemoglobin (Hb A1C)   Result Value Ref Range    Hemoglobin A1C 9.8 (A) %   POCT Glucose   Result Value Ref Range    Glucose 187 (A) mg/dL    QC OK? Increase metformin today. Continue to monitor blood sugars 1 times a day. Keep log of blood sugars and bring with you to the next appointment. Discussed use, benefit, and side effects of prescribed medications. All patient questions answered. Pt voiced understanding. Instructed to continue current medications, ADA diet and exercise. Patient agreed with treatment plan. Return in about 3 months (around 9/7/2021) for DM.

## 2021-06-08 ENCOUNTER — NURSE TRIAGE (OUTPATIENT)
Dept: OTHER | Facility: CLINIC | Age: 61
End: 2021-06-08

## 2021-06-08 LAB
ORGANISM: ABNORMAL
URINE CULTURE REFLEX: ABNORMAL
URINE CULTURE REFLEX: ABNORMAL

## 2021-06-08 NOTE — TELEPHONE ENCOUNTER
Brief description of triage: Pt calling to report that his arrieta catheter stat-lock has fell off. States he has taped the catheter back in place but would like to get something more secure. Recommended calling MD office to see if they have stat-locks available for his catheter. Triage indicates for patient to: information only, call not triaged    Attention Provider: Thank you for allowing me to participate in the care of your patient. The patient was connected to triage in response to symptoms provided. Please do not respond through this encounter as the response is not directed to a shared pool. Reason for Disposition   Nursing judgment    Answer Assessment - Initial Assessment Questions  1. REASON FOR CALL or QUESTION: \"What is your reason for calling today? \" or \"How can I best help you? \" or \"What question do you have that I can help answer? \"      Pt needing another stat-lock for arrieta catheter    Protocols used: INFORMATION ONLY CALL - NO TRIAGE-ADULT-[place]

## 2021-06-08 NOTE — TELEPHONE ENCOUNTER
Date of last visit:  6/7/2021  Date of next visit:  9/8/2021    Requested Prescriptions     Pending Prescriptions Disp Refills    albuterol sulfate  (90 Base) MCG/ACT inhaler [Pharmacy Med Name: ALBUTEROL HFA*VENT* 90MCG 108 (90 BAS Aerosol] 18 g 3     Sig: INHALE TWO(2) PUFFS INTO LUNGS EVERY SIX(6) HOURS AS NEEDED FOR WHEEZING

## 2021-06-09 ENCOUNTER — TELEPHONE (OUTPATIENT)
Dept: FAMILY MEDICINE CLINIC | Age: 61
End: 2021-06-09

## 2021-06-09 RX ORDER — ALBUTEROL SULFATE 90 UG/1
AEROSOL, METERED RESPIRATORY (INHALATION)
Qty: 18 G | Refills: 3 | Status: SHIPPED | OUTPATIENT
Start: 2021-06-09 | End: 2021-11-05

## 2021-06-09 RX ORDER — NITROFURANTOIN 25; 75 MG/1; MG/1
100 CAPSULE ORAL 2 TIMES DAILY
Qty: 14 CAPSULE | Refills: 0 | Status: SHIPPED | OUTPATIENT
Start: 2021-06-09 | End: 2021-06-16

## 2021-06-09 NOTE — TELEPHONE ENCOUNTER
----- Message from Leno Thompson MD sent at 6/9/2021  3:11 PM EDT -----  Please let him know that antibiotic that he was given  Needs to be changed for his urinary tract infection to Macrobid BID for 7 days  Please verify pharmacy to send a prescription

## 2021-06-10 NOTE — PROGRESS NOTES
Pharmacy Note  ED Culture Follow-up    Aditya Bustillo is a 59 y.o. male. Allergies: Zoloft [sertraline hcl]     Labs:  Lab Results   Component Value Date    BUN 12 06/06/2021    CREATININE 0.8 06/06/2021    WBC 6.7 06/06/2021     Estimated Creatinine Clearance: 123 mL/min (based on SCr of 0.8 mg/dL). Current antimicrobials:   Macrobid    ASSESSMENT:  Micro results:   Urine culture: positive for e. Faecalis      PLAN:  Need for intervention: No - PCP patient switched antibiotic from cefdinir to macrobid which was appropriate. Discussed with: JESSICA Antonio  Chosen treatment:    Patient already on appropriate treatment as above    Patient response:   No need to contact patient    Called/sent in prescription to: Not applicable    Please call with any questions.  [id number]    Ganesh Moeller [facility] [place], PharmD 8:58 PM 6/9/2021

## 2021-06-14 ENCOUNTER — TELEPHONE (OUTPATIENT)
Dept: UROLOGY | Age: 61
End: 2021-06-14

## 2021-06-14 ENCOUNTER — OFFICE VISIT (OUTPATIENT)
Dept: UROLOGY | Age: 61
End: 2021-06-14
Payer: MEDICARE

## 2021-06-14 VITALS — HEIGHT: 71 IN | RESPIRATION RATE: 18 BRPM | BODY MASS INDEX: 36.4 KG/M2 | WEIGHT: 260 LBS

## 2021-06-14 DIAGNOSIS — N40.1 BENIGN PROSTATIC HYPERPLASIA WITH URINARY RETENTION: Primary | ICD-10-CM

## 2021-06-14 DIAGNOSIS — R33.8 BENIGN PROSTATIC HYPERPLASIA WITH URINARY RETENTION: Primary | ICD-10-CM

## 2021-06-14 DIAGNOSIS — R31.0 GROSS HEMATURIA: ICD-10-CM

## 2021-06-14 DIAGNOSIS — N32.89 BLADDER MASS: ICD-10-CM

## 2021-06-14 DIAGNOSIS — N40.1 BENIGN PROSTATIC HYPERPLASIA WITH URINARY RETENTION: ICD-10-CM

## 2021-06-14 DIAGNOSIS — R33.8 BENIGN PROSTATIC HYPERPLASIA WITH URINARY RETENTION: ICD-10-CM

## 2021-06-14 PROCEDURE — 99204 OFFICE O/P NEW MOD 45 MIN: CPT | Performed by: UROLOGY

## 2021-06-14 RX ORDER — TAMSULOSIN HYDROCHLORIDE 0.4 MG/1
0.4 CAPSULE ORAL DAILY
Qty: 30 CAPSULE | Refills: 3 | Status: SHIPPED | OUTPATIENT
Start: 2021-06-14 | End: 2021-12-23

## 2021-06-14 RX ORDER — TAMSULOSIN HYDROCHLORIDE 0.4 MG/1
0.4 CAPSULE ORAL DAILY
Qty: 30 CAPSULE | Refills: 3 | Status: SHIPPED | OUTPATIENT
Start: 2021-06-14 | End: 2021-06-14 | Stop reason: SDUPTHER

## 2021-06-14 NOTE — PROGRESS NOTES
MD [facility] Consultation / New Patient Visit    Patient:  Chepe Garnica  YOB: 1960  Date: 6/14/2021  Consult requested from Lele Francois MD     HISTORY OF PRESENT ILLNESS:   The patient is a 59 y.o. male who presents today for follow-up for the following problem(s):  Bladder mass, hematuria. Overall the problem(s) : are worsening. Associated Symptoms: No dysuria, gross hematuria. Pain Severity:      Today visit:   6/14/21   Has history of gross hematuria with clots, and was found to have a possible bladder mass on CT scan from ER visit. He has unfortunately developed acute urinary retention and has catheter in place. BPH with LUTs - is found to be in retention. Summary of old records:   (Patient's old records, notes and chart reviewed and summarized above.)    Last several PSA's:  Lab Results   Component Value Date    PSA 3.08 (H) 02/12/2021    PSA 2.24 (H) 06/05/2019    PSA 1.75 02/20/2017       Last total testosterone:  No results found for: TESTOSTERONE    Urinalysis today:  No results found for this visit on 06/14/21.       Last BUN and creatinine:  Lab Results   Component Value Date    BUN 12 06/06/2021     Lab Results   Component Value Date    CREATININE 0.8 06/06/2021       Imaging Reviewed during this Office Visit:   (results were independently reviewed by physician and radiology report verified)    PAST MEDICAL, FAMILY AND SOCIAL HISTORY:  Past Medical History:   Diagnosis Date    CHF (congestive heart failure) ([address]     COPD (chronic obstructive pulmonary disease) ([address]     Coronary artery disease involving native coronary artery of native heart without angina pectoris     Depression     Diabetes mellitus type 2, diet-controlled ([address] 6/1/2018    GERD (gastroesophageal reflux disease) 3/21/2012    Hernia     Hx of blood clots 1990's    right knee due to injury    Hx of cocaine abuse ([address]     Hyperglycemia 09/09  Hyperlipidemia     Hypertension     FAISAL on CPAP     Osteoarthritis 11/07    S/P CABG x 2 07/06/2016    S/P PTCA: 1/15/2018: Stent diagonal branch Xience 3.0 x 12 mm. 1/15/2018    1/15/2018: Stent diagonal branch Xience 3.0 x 12 mm.  Dr. Catalina Billy injury 08/2015    Right thumb cut with table saw, no treatment needed     Past Surgical History:   Procedure Laterality Date    CARDIAC CATHETERIZATION  06/27/2016    CATARACT REMOVAL Right 2013    COLONOSCOPY  04/08    CORONARY ARTERY BYPASS GRAFT  07/06/2016    Double bypass, Dr. Adrienne Roman Right 02/2007    Neck    DIAGNOSTIC CARDIAC CATH LAB PROCEDURE      EYE SURGERY Right     Retinal surgery x 3    HERNIA REPAIR Right     Inguinal    PTCA  01/15/2018    ROTATOR CUFF REPAIR Right 11/16/2017    TONSILLECTOMY  child    TOTAL KNEE ARTHROPLASTY Left 10/24/2016    Dr. Reema Silva     Family History   Problem Relation Age of Onset    Heart Disease Mother     High Blood Pressure Mother     Obesity Mother     Diabetes Mother     Heart Disease Father     Cancer Father         colon/prostate    Colon Cancer Father     Prostate Cancer Father     Arthritis Father     Diabetes Brother      Outpatient Medications Marked as Taking for the 6/14/21 encounter (Office Visit) with Nicki Gonzalez MD   Medication Sig Dispense Refill    albuterol sulfate  (90 Base) MCG/ACT inhaler INHALE TWO(2) PUFFS INTO LUNGS EVERY SIX(6) HOURS AS NEEDED FOR WHEEZING 18 g 3    nitrofurantoin, macrocrystal-monohydrate, (MACROBID) 100 MG capsule Take 1 capsule by mouth 2 times daily for 7 days 14 capsule 0    metFORMIN (GLUCOPHAGE) 500 MG tablet Take 2 tablets by mouth 2 times daily (with meals) 270 tablet 1    naproxen (NAPROSYN) 500 MG tablet Take 1 tablet by mouth 2 times daily (with meals) 20 tablet 0    traZODone (DESYREL) 50 MG tablet TAKE 1/2 - 1 TABLET BY MOUTH NIGHTLY AS NEEDED FOR SLEEP 30 tablet 0    isosorbide mononitrate (IMDUR) 30 MG extended release tablet TAKE 1 TABLET BY MOUTH ONCE DAILY 30 tablet 0    omeprazole (PRILOSEC) 20 MG delayed release capsule TAKE 1 CAPSULE BY MOUTH ONE TIME A DAY 30 capsule 1    pramipexole (MIRAPEX) 0.125 MG tablet TAKE 1 TABLET BY MOUTH EVERY EVENING. 90 tablet 0    metoprolol succinate (TOPROL XL) 25 MG extended release tablet TAKE 2 TABLETS BY MOUTH ONCE DAILY. 180 tablet 0    allopurinol (ZYLOPRIM) 300 MG tablet TAKE 1 TABLET BY MOUTH ONCE DAILY. 90 tablet 0    blood glucose test strips (PRODIGY NO CODING BLOOD GLUC) strip USE TO TEST BLOOD GLUCOSE ONCE DAILY. 100 each 0    losartan (COZAAR) 25 MG tablet TAKE 1 TABLET BY MOUTH DAILY 90 tablet 1    umeclidinium-vilanterol (ANORO ELLIPTA) 62.5-25 MCG/INH AEPB inhaler INHALE 1 PUFF INTO THE LUNGS ONE TIME DAILY 1 each 5    aspirin 81 MG chewable tablet CHEW AND SWALLOW 1 TABLET BY MOUTH ONCE DAILY.  90 tablet 1    atorvastatin (LIPITOR) 40 MG tablet Take 1 tablet by mouth daily 90 tablet 1    bumetanide (BUMEX) 2 MG tablet Take 1 tablet by mouth daily Take 2 mg AM - 1 mg  tablet 1    ticagrelor (BRILINTA) 90 MG TABS tablet TAKE 1 TABLET BY MOUTH 2 TIMES A  tablet 1    buPROPion (WELLBUTRIN XL) 300 MG extended release tablet TAKE ONE TABLET BY MOUTH EVERY MORNING 90 tablet 1    dapagliflozin (FARXIGA) 5 MG tablet Take 1 tablet by mouth every morning 90 tablet 1    fluticasone (FLONASE) 50 MCG/ACT nasal spray USE 2 SPRAYS IN EACH NOSTRIL DAILY AS NEEDED FOR RHINITIS OR ALLERGIES 48 g 5    nitroGLYCERIN (NITROSTAT) 0.4 MG SL tablet Place 1 tablet under the tongue every 5 minutes as needed for Chest pain 25 tablet 3    Blood Glucose Monitoring Suppl (PRODIGY AUTOCODE BLOOD GLUCOSE) w/Device KIT          Zoloft [sertraline hcl]  Social History     Tobacco Use   Smoking Status Former Smoker    Packs/day: 2.00    Years: 25.00    Pack years: 50.00    Types: Cigars    Quit date: 2014    Years since quittin.5   Smokeless Tobacco Never Used       Social History     Substance and Sexual Activity   Alcohol Use Yes    Alcohol/week: 3.0 standard drinks    Types: 3 Cans of beer per week    Comment: 1 every day       REVIEW OF SYSTEMS:  Constitutional: negative  Eyes: negative  Respiratory: negative  Cardiovascular: negative  Gastrointestinal: negative  Musculoskeletal: negative  Genitourinary: negative  Skin: negative   Neurological: negative  Hematological/Lymphatic: negative  Psychological: negative    Physical Exam:    This a 59 y.o. male   Vitals:    06/14/21 1441   Resp: 18     Constitutional: Patient in no acute distress   Neuro: alert and oriented to person place and time. Psych: Mood and affect normal.  Head: atraumatic normocephalic  Eyes: EOMi  HEENT: neck supple, trachea midline  Lungs: Respiratory effort normal  Cardiovascular:  Normal peripheral pulses  Abdomen: Soft, non-tender, non-distended, No CVA  Bladder: non-tender and not distended. FROMx4, no cyanosis clubbing edema  Skin: warm and dry      Assessment and Plan      1. Benign prostatic hyperplasia with urinary retention    2. Bladder mass    3. Gross hematuria           Plan:      No follow-ups on file.   Cysto with BL RGPG with possible TURBT for possible bladder mass  Will need cardiac clearance and to hold Brillinta before the procedure   - Dr. Chely Kimble     BPH with LUTs - Flomax

## 2021-06-15 ENCOUNTER — TELEPHONE (OUTPATIENT)
Dept: UROLOGY | Age: 61
End: 2021-06-15

## 2021-06-15 DIAGNOSIS — R31.0 GROSS HEMATURIA: ICD-10-CM

## 2021-06-15 DIAGNOSIS — R33.8 BENIGN PROSTATIC HYPERPLASIA WITH URINARY RETENTION: Primary | ICD-10-CM

## 2021-06-15 DIAGNOSIS — N40.1 BENIGN PROSTATIC HYPERPLASIA WITH URINARY RETENTION: Primary | ICD-10-CM

## 2021-06-15 DIAGNOSIS — Z01.818 PRE-OP TESTING: ICD-10-CM

## 2021-06-15 DIAGNOSIS — N32.89 BLADDER MASS: ICD-10-CM

## 2021-06-15 NOTE — TELEPHONE ENCOUNTER
SURGERY [address]  [facility] [facility], [address]      Phone *[phone] *[phone]   Surgical Scheduling Direct Line Phone *[phone] Fax *Olivier Trujillo 1960 male    [address]   Marital Status:          Home Phone: [phone]      Cell Phone:    Telephone Information:   Mobile [phone]          Surgeon: Dr. Luis Enrique Romero  Surgery Date: 7/16/21   Time: 12:00 Noon    Procedure: Cystoscopy with bilateral retrograde pyelogram, Transurethral resection of a bladder tumor    Diagnosis: BPH with urinary retention,Bladder tumor    Important Medical History: In [facility]    Special Inst/Equip:     CPT Codes:    52951,96751  Latex Allergy:  No     Cardiac Device:  No     Anesthesia:  Anesthesiologist (General/Spinal)          Admission Type:  Same Day                             Admit Prior to Day of Surgery: No    Case Location:  Main OR           Preadmission Testing:Phone Call              PAT Date and Time:______________________________________________________    PAT Confirmation #: ______________________________________________________    Post Op Visit: ___________________________________________________________    Need Preop Cardiac Clearance: Yes    Does Patient have Cardiologist/physician?      Dr Zoya Ott Confirmation #: __________________________________________________    Zandra Savin: ________________________   Date: __________________________     [employer] Name: Rosalba Perea [facility]

## 2021-06-15 NOTE — TELEPHONE ENCOUNTER
DO NOT TAKE ASPIRIN, PLAVIX, FISH OIL, COUMADIN, IBUPROFEN, MOTRIN-LIKE DRUGS AND ANY MULTIVITAMINS OR OVER THE COUNTER SUPPLEMENTS 5 DAYS PRIOR TO SURGERY. Symone Munoz 1960 Diagnosis:    Surgical Physician: Dr. Rosina Crawford have been scheduled for the procedure marked below:      Surgery: Cystoscopy with Bilateral Retrograde Pyelogram, Transurethral resection of bladder tumor         Date: 7/16/21     Anesthesia: Anesthesiologist (General/Spinal)     Place of Service: [address] [place] [address] Second Floor Same Day Surgery         Arrive to same day surgery by:  10:00 am  (Surgery time is subject to change)      INSTRUCTIONS AS MARKED BELOW:    1.  DO NOT eat or drink anything after midnight before surgery. 2.  We prefer you shower or bathe with an antibacterial soap (Dial) the morning of surgery. 3.  Please ensure to have a  with you to transport you home. 4.  Please bring a current medication list, photo ID and insurance card(s) with you  5. Okay to take Tylenol  6. If you take Glucophage, Metformin or Janumet, hold 48-hours prior to surgery  7. Take blood pressure or heart medication as directed, if taken in the morning take with a small sip of water  8. The office will call you in 1-2 days after your procedure to schedule a follow up. Do the pre op urine culture on 7/2/21. Order included.           Date: 6/15/2021

## 2021-06-15 NOTE — TELEPHONE ENCOUNTER
Patient scheduled for a Cystoscopy with bilateral retrograde pyelogram and Transurethral resection of a bladder tumor with Dr Alfredo Oquendo On 7/16/21. We are asking for clearance.

## 2021-06-15 NOTE — TELEPHONE ENCOUNTER
Patient scheduled for surgery with Dr Rafal Bullock on 7/16/21. Surgery consent signed. Patient will need pre op urine. Dr Alisha Martínez to clear. Patient given surgery instructions.

## 2021-06-16 ENCOUNTER — TELEPHONE (OUTPATIENT)
Dept: PULMONOLOGY | Age: 61
End: 2021-06-16

## 2021-06-16 DIAGNOSIS — J44.9 STAGE 3 SEVERE COPD BY GOLD CLASSIFICATION (HCC): Primary | ICD-10-CM

## 2021-06-17 DIAGNOSIS — F33.1 MAJOR DEPRESSIVE DISORDER, RECURRENT EPISODE, MODERATE (HCC): ICD-10-CM

## 2021-06-17 NOTE — TELEPHONE ENCOUNTER
Date of last visit:  6/7/2021  Date of next visit:  9/8/2021    Requested Prescriptions     Pending Prescriptions Disp Refills    traZODone (DESYREL) 50 MG tablet [Pharmacy Med Name: TRAZODONE 50 MG TABS 50 Tablet] 30 tablet 0     Sig: TAKE 1 TABLET BY MOUTH DAILY IN THE EVENING AS NEEDED FOR SLEEP

## 2021-06-18 ENCOUNTER — HOSPITAL ENCOUNTER (EMERGENCY)
Age: 61
Discharge: HOME | End: 2021-06-18
Payer: MEDICARE

## 2021-06-18 ENCOUNTER — HOSPITAL ENCOUNTER (OUTPATIENT)
Dept: CT IMAGING | Age: 61
Discharge: HOME | End: 2021-06-18
Payer: MEDICARE

## 2021-06-18 ENCOUNTER — TELEPHONE (OUTPATIENT)
Dept: UROLOGY | Age: 61
End: 2021-06-18

## 2021-06-18 ENCOUNTER — PREP FOR PROCEDURE (OUTPATIENT)
Dept: UROLOGY | Age: 61
End: 2021-06-18

## 2021-06-18 VITALS
SYSTOLIC BLOOD PRESSURE: 140 MMHG | OXYGEN SATURATION: 99 % | HEART RATE: 77 BPM | TEMPERATURE: 98.1 F | HEIGHT: 71 IN | BODY MASS INDEX: 36.4 KG/M2 | DIASTOLIC BLOOD PRESSURE: 66 MMHG | WEIGHT: 260 LBS | RESPIRATION RATE: 17 BRPM

## 2021-06-18 DIAGNOSIS — J47.9 BRONCHIECTASIS WITHOUT COMPLICATION (HCC): ICD-10-CM

## 2021-06-18 DIAGNOSIS — J44.9 STAGE 3 SEVERE COPD BY GOLD CLASSIFICATION (HCC): ICD-10-CM

## 2021-06-18 DIAGNOSIS — T83.9XXA FOLEY CATHETER PROBLEM, INITIAL ENCOUNTER (HCC): Primary | ICD-10-CM

## 2021-06-18 LAB
AMORPHOUS: ABNORMAL
ANION GAP SERPL CALCULATED.3IONS-SCNC: 16 MEQ/L (ref 8–16)
BACTERIA: ABNORMAL /HPF
BASOPHILS # BLD: 0.7 %
BASOPHILS ABSOLUTE: 0.1 THOU/MM3 (ref 0–0.1)
BILIRUBIN URINE: NEGATIVE
BLOOD, URINE: ABNORMAL
BUN BLDV-MCNC: 17 MG/DL (ref 7–22)
CALCIUM SERPL-MCNC: 10.1 MG/DL (ref 8.5–10.5)
CASTS UA: ABNORMAL /LPF
CHARACTER, URINE: CLEAR
CHLORIDE BLD-SCNC: 101 MEQ/L (ref 98–111)
CO2: 22 MEQ/L (ref 23–33)
COLOR: YELLOW
CREAT SERPL-MCNC: 0.7 MG/DL (ref 0.4–1.2)
CRYSTALS, UA: ABNORMAL
EOSINOPHIL # BLD: 0.1 %
EOSINOPHILS ABSOLUTE: 0 THOU/MM3 (ref 0–0.4)
EPITHELIAL CELLS, UA: ABNORMAL /HPF
ERYTHROCYTE [DISTWIDTH] IN BLOOD BY AUTOMATED COUNT: 13.7 % (ref 11.5–14.5)
ERYTHROCYTE [DISTWIDTH] IN BLOOD BY AUTOMATED COUNT: 49.7 FL (ref 35–45)
GFR SERPL CREATININE-BSD FRML MDRD: > 90 ML/MIN/1.73M2
GLUCOSE BLD-MCNC: 185 MG/DL (ref 70–108)
GLUCOSE URINE: >= 1000 MG/DL
HCT VFR BLD CALC: 49.6 % (ref 42–52)
HEMOGLOBIN: 16.3 GM/DL (ref 14–18)
IMMATURE GRANS (ABS): 0.04 THOU/MM3 (ref 0–0.07)
IMMATURE GRANULOCYTES: 0.4 %
KETONES, URINE: ABNORMAL
LEUKOCYTE ESTERASE, URINE: NEGATIVE
LYMPHOCYTES # BLD: 10.7 %
LYMPHOCYTES ABSOLUTE: 1 THOU/MM3 (ref 1–4.8)
MCH RBC QN AUTO: 32 PG (ref 26–33)
MCHC RBC AUTO-ENTMCNC: 32.9 GM/DL (ref 32.2–35.5)
MCV RBC AUTO: 97.3 FL (ref 80–94)
MONOCYTES # BLD: 7.9 %
MONOCYTES ABSOLUTE: 0.8 THOU/MM3 (ref 0.4–1.3)
MUCUS: ABNORMAL
NITRITE, URINE: NEGATIVE
NUCLEATED RED BLOOD CELLS: 0 /100 WBC
OSMOLALITY CALCULATION: 283.9 MOSMOL/KG (ref 275–300)
PH UA: 5.5 (ref 5–9)
PLATELET # BLD: 225 THOU/MM3 (ref 130–400)
PMV BLD AUTO: 10.8 FL (ref 9.4–12.4)
POTASSIUM SERPL-SCNC: 3.7 MEQ/L (ref 3.5–5.2)
PROTEIN UA: NEGATIVE
RBC # BLD: 5.1 MILL/MM3 (ref 4.7–6.1)
RBC URINE: ABNORMAL /HPF
SEG NEUTROPHILS: 80.2 %
SEGMENTED NEUTROPHILS ABSOLUTE COUNT: 7.8 THOU/MM3 (ref 1.8–7.7)
SODIUM BLD-SCNC: 139 MEQ/L (ref 135–145)
SPECIFIC GRAVITY, URINE: 1.03 (ref 1–1.03)
UROBILINOGEN, URINE: 0.2 EU/DL (ref 0–1)
WBC # BLD: 9.7 THOU/MM3 (ref 4.8–10.8)
WBC UA: ABNORMAL /HPF
YEAST: ABNORMAL

## 2021-06-18 PROCEDURE — 85025 COMPLETE CBC W/AUTO DIFF WBC: CPT

## 2021-06-18 PROCEDURE — 6370000000 HC RX 637 (ALT 250 FOR IP): Performed by: NURSE PRACTITIONER

## 2021-06-18 PROCEDURE — 99283 EMERGENCY DEPT VISIT LOW MDM: CPT

## 2021-06-18 PROCEDURE — 36415 COLL VENOUS BLD VENIPUNCTURE: CPT

## 2021-06-18 PROCEDURE — 96375 TX/PRO/DX INJ NEW DRUG ADDON: CPT

## 2021-06-18 PROCEDURE — 80048 BASIC METABOLIC PNL TOTAL CA: CPT

## 2021-06-18 PROCEDURE — 71250 CT THORAX DX C-: CPT

## 2021-06-18 PROCEDURE — 87086 URINE CULTURE/COLONY COUNT: CPT

## 2021-06-18 PROCEDURE — 6360000002 HC RX W HCPCS

## 2021-06-18 PROCEDURE — 87106 FUNGI IDENTIFICATION YEAST: CPT

## 2021-06-18 PROCEDURE — 96374 THER/PROPH/DIAG INJ IV PUSH: CPT

## 2021-06-18 PROCEDURE — 81001 URINALYSIS AUTO W/SCOPE: CPT

## 2021-06-18 RX ORDER — ATROPA BELLADONNA AND OPIUM 16.2; 3 MG/1; MG/1
30 SUPPOSITORY RECTAL ONCE
Status: COMPLETED | OUTPATIENT
Start: 2021-06-18 | End: 2021-06-18

## 2021-06-18 RX ORDER — SODIUM CHLORIDE 9 MG/ML
INJECTION, SOLUTION INTRAVENOUS CONTINUOUS
Status: CANCELLED | OUTPATIENT
Start: 2021-07-16

## 2021-06-18 RX ORDER — ONDANSETRON 2 MG/ML
4 INJECTION INTRAMUSCULAR; INTRAVENOUS ONCE
Status: COMPLETED | OUTPATIENT
Start: 2021-06-18 | End: 2021-06-18

## 2021-06-18 RX ORDER — MORPHINE SULFATE 4 MG/ML
4 INJECTION, SOLUTION INTRAMUSCULAR; INTRAVENOUS ONCE
Status: COMPLETED | OUTPATIENT
Start: 2021-06-18 | End: 2021-06-18

## 2021-06-18 RX ORDER — MORPHINE SULFATE 4 MG/ML
INJECTION, SOLUTION INTRAMUSCULAR; INTRAVENOUS
Status: COMPLETED
Start: 2021-06-18 | End: 2021-06-18

## 2021-06-18 RX ORDER — ONDANSETRON 2 MG/ML
INJECTION INTRAMUSCULAR; INTRAVENOUS
Status: COMPLETED
Start: 2021-06-18 | End: 2021-06-18

## 2021-06-18 RX ORDER — TRAZODONE HYDROCHLORIDE 50 MG/1
TABLET ORAL
Qty: 30 TABLET | Refills: 0 | Status: SHIPPED | OUTPATIENT
Start: 2021-06-18 | End: 2021-07-12

## 2021-06-18 RX ADMIN — ONDANSETRON 4 MG: 2 INJECTION INTRAMUSCULAR; INTRAVENOUS at 04:01

## 2021-06-18 RX ADMIN — MORPHINE SULFATE 4 MG: 4 INJECTION, SOLUTION INTRAMUSCULAR; INTRAVENOUS at 04:00

## 2021-06-18 RX ADMIN — ATROPA BELLADONNA AND OPIUM 30 MG: 16.2; 3 SUPPOSITORY RECTAL at 03:00

## 2021-06-18 ASSESSMENT — PAIN DESCRIPTION - DESCRIPTORS: DESCRIPTORS: SPASM

## 2021-06-18 ASSESSMENT — PAIN SCALES - GENERAL
PAINLEVEL_OUTOF10: 2
PAINLEVEL_OUTOF10: 10

## 2021-06-18 ASSESSMENT — PAIN DESCRIPTION - PAIN TYPE: TYPE: ACUTE PAIN

## 2021-06-18 NOTE — TELEPHONE ENCOUNTER
Patient was seen in the ER last night and arrieta catheter was removed. He is able to void without problems and feels like his bladder empties after voiding. Lab visit cancelled.

## 2021-06-18 NOTE — ED TRIAGE NOTES
Pt comes into ER room 7 with a arrieta catheter in place draining yellow urine. He has the arrieta in because he had gross hematuria. That has cleared up and he is supposed to have the arrieta removed this week.  He is having current bladder spasms that are 10/10 pain

## 2021-06-20 ENCOUNTER — HOSPITAL ENCOUNTER (EMERGENCY)
Age: 61
Discharge: HOME | End: 2021-06-20
Attending: EMERGENCY MEDICINE
Payer: MEDICARE

## 2021-06-20 VITALS
TEMPERATURE: 97.1 F | DIASTOLIC BLOOD PRESSURE: 77 MMHG | RESPIRATION RATE: 16 BRPM | HEART RATE: 77 BPM | SYSTOLIC BLOOD PRESSURE: 116 MMHG | OXYGEN SATURATION: 95 %

## 2021-06-20 DIAGNOSIS — R31.9 HEMATURIA OF UNKNOWN CAUSE: Primary | ICD-10-CM

## 2021-06-20 LAB
AMORPHOUS: ABNORMAL
ANION GAP SERPL CALCULATED.3IONS-SCNC: 13 MEQ/L (ref 8–16)
BACTERIA: ABNORMAL /HPF
BASOPHILS # BLD: 1 %
BASOPHILS ABSOLUTE: 0.1 THOU/MM3 (ref 0–0.1)
BILIRUBIN URINE: NEGATIVE
BLOOD, URINE: ABNORMAL
BUN BLDV-MCNC: 13 MG/DL (ref 7–22)
CALCIUM SERPL-MCNC: 9.6 MG/DL (ref 8.5–10.5)
CASTS UA: ABNORMAL /LPF
CHARACTER, URINE: ABNORMAL
CHLORIDE BLD-SCNC: 97 MEQ/L (ref 98–111)
CO2: 29 MEQ/L (ref 23–33)
COLOR: ABNORMAL
CREAT SERPL-MCNC: 0.9 MG/DL (ref 0.4–1.2)
CRYSTALS, UA: ABNORMAL
EOSINOPHIL # BLD: 0.8 %
EOSINOPHILS ABSOLUTE: 0.1 THOU/MM3 (ref 0–0.4)
EPITHELIAL CELLS, UA: ABNORMAL /HPF
ERYTHROCYTE [DISTWIDTH] IN BLOOD BY AUTOMATED COUNT: 13.8 % (ref 11.5–14.5)
ERYTHROCYTE [DISTWIDTH] IN BLOOD BY AUTOMATED COUNT: 48.6 FL (ref 35–45)
GFR SERPL CREATININE-BSD FRML MDRD: 85 ML/MIN/1.73M2
GLUCOSE BLD-MCNC: 195 MG/DL (ref 70–108)
GLUCOSE URINE: >= 1000 MG/DL
HCT VFR BLD CALC: 49.9 % (ref 42–52)
HEMOGLOBIN: 16.6 GM/DL (ref 14–18)
IMMATURE GRANS (ABS): 0.06 THOU/MM3 (ref 0–0.07)
IMMATURE GRANULOCYTES: 0.6 %
KETONES, URINE: NEGATIVE
LEUKOCYTE ESTERASE, URINE: ABNORMAL
LYMPHOCYTES # BLD: 12 %
LYMPHOCYTES ABSOLUTE: 1.3 THOU/MM3 (ref 1–4.8)
MCH RBC QN AUTO: 32 PG (ref 26–33)
MCHC RBC AUTO-ENTMCNC: 33.3 GM/DL (ref 32.2–35.5)
MCV RBC AUTO: 96.3 FL (ref 80–94)
MONOCYTES # BLD: 11.1 %
MONOCYTES ABSOLUTE: 1.2 THOU/MM3 (ref 0.4–1.3)
MUCUS: ABNORMAL
NITRITE, URINE: NEGATIVE
NUCLEATED RED BLOOD CELLS: 0 /100 WBC
OSMOLALITY CALCULATION: 283 MOSMOL/KG (ref 275–300)
PH UA: 7 (ref 5–9)
PLATELET # BLD: 271 THOU/MM3 (ref 130–400)
PMV BLD AUTO: 10.5 FL (ref 9.4–12.4)
POTASSIUM REFLEX MAGNESIUM: 3.6 MEQ/L (ref 3.5–5.2)
PROTEIN UA: >= 300
RBC # BLD: 5.18 MILL/MM3 (ref 4.7–6.1)
RBC URINE: > 200 /HPF
SEG NEUTROPHILS: 74.5 %
SEGMENTED NEUTROPHILS ABSOLUTE COUNT: 8 THOU/MM3 (ref 1.8–7.7)
SODIUM BLD-SCNC: 139 MEQ/L (ref 135–145)
SPECIFIC GRAVITY, URINE: 1.02 (ref 1–1.03)
UROBILINOGEN, URINE: 1 EU/DL (ref 0–1)
WBC # BLD: 10.7 THOU/MM3 (ref 4.8–10.8)
WBC UA: > 100 /HPF

## 2021-06-20 PROCEDURE — 87106 FUNGI IDENTIFICATION YEAST: CPT

## 2021-06-20 PROCEDURE — 81001 URINALYSIS AUTO W/SCOPE: CPT

## 2021-06-20 PROCEDURE — 85025 COMPLETE CBC W/AUTO DIFF WBC: CPT

## 2021-06-20 PROCEDURE — 87086 URINE CULTURE/COLONY COUNT: CPT

## 2021-06-20 PROCEDURE — 80048 BASIC METABOLIC PNL TOTAL CA: CPT

## 2021-06-20 PROCEDURE — 99282 EMERGENCY DEPT VISIT SF MDM: CPT

## 2021-06-20 PROCEDURE — 36415 COLL VENOUS BLD VENIPUNCTURE: CPT

## 2021-06-20 ASSESSMENT — PAIN DESCRIPTION - PAIN TYPE: TYPE: ACUTE PAIN

## 2021-06-20 ASSESSMENT — PAIN SCALES - GENERAL: PAINLEVEL_OUTOF10: 1

## 2021-06-20 NOTE — ED NOTES
In to place 3 way catheter for CBI. Pt states that his urine seems to be clearing up on its own. Pt would like to wait longer to see if it continues to clear up.      Ajith Suárez RN  06/20/21 7008

## 2021-06-20 NOTE — ED NOTES
Pt to the ED with complaints of hematuria. Pt states that he has been to the ED 4 times in the past 2 weeks with this problem. Pt has been diagnosed with a UTI and states that he had to have a catheter placed and CBI done. Urine sample provided by pt. Blood noted in urine. Pt states that he does not have much pain at all, just a slight discomfort when urinating.      Roopa Gutierrez RN  06/20/21 7818

## 2021-06-20 NOTE — ED PROVIDER NOTES
native heart without angina pectoris, Depression, Diabetes mellitus type 2, diet-controlled ([facility] [address] GERD (gastroesophageal reflux disease), Hernia, Hx of blood clots, Hx of cocaine abuse ([facility] [address] Hyperglycemia, Hyperlipidemia, Hypertension, FAISAL on CPAP, Osteoarthritis, S/P CABG x 2, S/P PTCA: 1/15/2018: Stent diagonal branch Xience 3.0 x 12 mm., and Thumb injury. SURGICAL HISTORY      has a past surgical history that includes Tonsillectomy (child); Colonoscopy (04/08); cyst removal (Right, 02/2007); hernia repair (Right); Cataract removal (Right, 2013); eye surgery (Right); Cardiac catheterization (06/27/2016); Total knee arthroplasty (Left, 10/24/2016); Coronary artery bypass graft (07/06/2016); Rotator cuff repair (Right, 11/16/2017); Diagnostic Cardiac Cath Lab Procedure; and Percutaneous Transluminal Coronary Angio (01/15/2018).     CURRENT MEDICATIONS       Discharge Medication List as of 6/20/2021  2:23 PM      CONTINUE these medications which have NOT CHANGED    Details   traZODone (DESYREL) 50 MG tablet TAKE 1 TABLET BY MOUTH DAILY IN THE EVENING AS NEEDED FOR SLEEP, Disp-30 tablet, R-0Normal      tamsulosin (FLOMAX) 0.4 MG capsule Take 1 capsule by mouth daily Take one capsule daily to facilitate passage of ureteral stone, Disp-30 capsule, R-3Normal      albuterol sulfate  (90 Base) MCG/ACT inhaler INHALE TWO(2) PUFFS INTO LUNGS EVERY SIX(6) HOURS AS NEEDED FOR WHEEZING, Disp-18 g, R-3Normal      metFORMIN (GLUCOPHAGE) 500 MG tablet Take 2 tablets by mouth 2 times daily (with meals), Disp-270 tablet, R-1Normal      naproxen (NAPROSYN) 500 MG tablet Take 1 tablet by mouth 2 times daily (with meals), Disp-20 tablet, R-0Print      isosorbide mononitrate (IMDUR) 30 MG extended release tablet TAKE 1 TABLET BY MOUTH ONCE DAILY, Disp-30 tablet, R-0Normal      omeprazole (PRILOSEC) 20 MG delayed release capsule TAKE 1 CAPSULE BY MOUTH ONE TIME A DAY, Disp-30 capsule, R-1Normal      pramipexole (MIRAPEX) 0.125 MG tablet TAKE 1 TABLET BY MOUTH EVERY EVENING., Disp-90 tablet, R-0Normal      metoprolol succinate (TOPROL XL) 25 MG extended release tablet TAKE 2 TABLETS BY MOUTH ONCE DAILY. , Disp-180 tablet, R-0Normal      allopurinol (ZYLOPRIM) 300 MG tablet TAKE 1 TABLET BY MOUTH ONCE DAILY. , Disp-90 tablet, R-0Normal      blood glucose test strips (PRODIGY NO CODING BLOOD GLUC) strip USE TO TEST BLOOD GLUCOSE ONCE DAILY. , Disp-100 each, R-0Normal      losartan (COZAAR) 25 MG tablet TAKE 1 TABLET BY MOUTH DAILY, Disp-90 tablet, R-1Normal      umeclidinium-vilanterol (ANORO ELLIPTA) 62.5-25 MCG/INH AEPB inhaler INHALE 1 PUFF INTO THE LUNGS ONE TIME DAILY, Disp-1 each, R-5Normal      aspirin 81 MG chewable tablet CHEW AND SWALLOW 1 TABLET BY MOUTH ONCE DAILY. , Disp-90 tablet, R-1Normal      atorvastatin (LIPITOR) 40 MG tablet Take 1 tablet by mouth daily, Disp-90 tablet, R-1Normal      bumetanide (BUMEX) 2 MG tablet Take 1 tablet by mouth daily Take 2 mg AM - 1 mg PM, Disp-135 tablet, R-1Normal      ticagrelor (BRILINTA) 90 MG TABS tablet TAKE 1 TABLET BY MOUTH 2 TIMES A DAY, Disp-180 tablet, R-1Normal      buPROPion (WELLBUTRIN XL) 300 MG extended release tablet TAKE ONE TABLET BY MOUTH EVERY MORNING, Disp-90 tablet, R-1Normal      dapagliflozin (FARXIGA) 5 MG tablet Take 1 tablet by mouth every morning, Disp-90 tablet, R-1Normal      fluticasone (FLONASE) 50 MCG/ACT nasal spray USE 2 SPRAYS IN EACH NOSTRIL DAILY AS NEEDED FOR RHINITIS OR ALLERGIES, Disp-48 g, R-5Normal      nitroGLYCERIN (NITROSTAT) 0.4 MG SL tablet Place 1 tablet under the tongue every 5 minutes as needed for Chest pain, Disp-25 tablet, R-3Normal      Blood Glucose Monitoring Suppl (PRODIGY AUTOCODE BLOOD GLUCOSE) w/Device KIT Starting 2019, Historical Med             ALLERGIES     is allergic to zoloft [sertraline hcl]. FAMILY HISTORY     He indicated that his mother is alive. He indicated that his father is .  He indicated that all of his three sisters are alive. He indicated that his brother is alive. He indicated that his maternal grandmother is . He indicated that his maternal grandfather is . He indicated that his paternal grandmother is . He indicated that his paternal grandfather is . family history includes Arthritis in his father; Cancer in his father; Dary Slicker in his father; Diabetes in his brother and mother; Heart Disease in his father and mother; High Blood Pressure in his mother; Obesity in his mother; Prostate Cancer in his father. SOCIAL HISTORY      reports that he quit smoking about 6 years ago. His smoking use included cigars. He has a 50.00 pack-year smoking history. He has never used smokeless tobacco. He reports current alcohol use of about 3.0 standard drinks of alcohol per week. He reports current drug use. Frequency: 5.00 times per week. Drug: Marijuana. PHYSICAL EXAM     INITIAL VITALS:  oral temperature is 97.1 °F (36.2 °C). His blood pressure is 116/77 and his pulse is 77. His respiration is 16 and oxygen saturation is 95%. Physical Exam   Constitutional:  well-developed and well-nourished. HENT: Head: Normocephalic, atraumatic, Bilateral external ears normal, Oropharynx mosit, No oral exudates, Nose normal.   Eyes: PERRL, EOMI, Conjunctiva normal, No discharge. No scleral icterus  Neck: Normal range of motion, No tenderness, Supple  Cardiovascular: Normal rate, regular rhythm, S1 normal and S2 normal.  Exam reveals no gallop. Pulmonary/Chest: Effort normal and breath sounds normal. No accessory muscle usage or stridor. No respiratory distress. no wheezes. has no rales. exhibits no tenderness. Abdominal: Soft. Bowel sounds are normal.  exhibits no distension. There is no tenderness. There is no rebound and no guarding. Genitourinary: Gross hematuria noted. Extremities: No edema, no tenderness, no cyanosis, no clubbing.     Musculoskeletal: Good range of motion in major joints is observed. No major deformities noted. Neurological: Alert and oriented ×3, normal motor function, normal sensory function, no focal deficits. GCS 15. Skin: Skin is warm, dry and intact. No rash noted. No erythema. Psychiatric: Affect normal, judgment normal, mood normal.  DIFFERENTIAL DIAGNOSIS:       DIAGNOSTIC RESULTS     EKG: All EKG's are interpreted by the Emergency Department Physician who either signs or Co-signs this chart in the absence of a cardiologist.      RADIOLOGY: non-plain film images(s) such as CT, Ultrasound and MRI are read by the radiologist.  Plain radiographic images are visualized and preliminarily interpreted by the emergency physician unless otherwise stated below. LABS:   Labs Reviewed   CBC WITH AUTO DIFFERENTIAL - Abnormal; Notable for the following components:       Result Value    MCV 96.3 (*)     RDW-SD 48.6 (*)     Segs Absolute 8.0 (*)     All other components within normal limits   BASIC METABOLIC PANEL W/ REFLEX TO MG FOR LOW K - Abnormal; Notable for the following components:    Chloride 97 (*)     Glucose 195 (*)     All other components within normal limits   URINE WITH REFLEXED MICRO - Abnormal; Notable for the following components:    Glucose, Ur >= 1000 (*)     Blood, Urine MODERATE (*)     Protein, UA >= 300 (*)     Leukocyte Esterase, Urine SMALL (*)     Color, UA RED (*)     Character, Urine TURBID (*)     All other components within normal limits   GLOMERULAR FILTRATION RATE, ESTIMATED - Abnormal; Notable for the following components:    Est, Glom Filt Rate 85 (*)     All other components within normal limits   CULTURE, REFLEXED, URINE   ANION GAP   OSMOLALITY       EMERGENCY DEPARTMENT COURSE:   Vitals:    Vitals:    06/20/21 1205 06/20/21 1323   BP: 129/89 116/77   Pulse: 80 77   Resp: 18 16   Temp: 97.1 °F (36.2 °C)    TempSrc: Oral    SpO2: 94% 95%     Patient with recent bladder mass diagnosis presenting with gross hematuria.   Stable vital signs, no tachycardia, systolic blood pressure in the 120s. Stable hemoglobin. Patient does not want bladder irrigation/Munson catheter insertion at this time. Patient urine cleared without any intervention, patient discharged home to follow-up with urology as previously scheduled. CRITICAL CARE:       CONSULTS:  None    PROCEDURES:  None    FINAL IMPRESSION      1.  Hematuria of unknown cause          DISPOSITION/PLAN   Decision To Discharge    PATIENT REFERRED TO:  Rosalba Estrella [address]  [address] [facility] [address] [phone]  Call in 1 day  [address] [place] [address]:  Discharge Medication List as of 6/20/2021  2:23 PM          (Please note that portions of this note were completed with a voice recognition program.  Efforts were made to edit the dictations but occasionally words are mis-transcribed.)    DO Mimi Staples DO  06/20/21 5905

## 2021-06-21 ENCOUNTER — HOSPITAL ENCOUNTER (OUTPATIENT)
Dept: PSYCHIATRY | Age: 61
Setting detail: THERAPIES SERIES
End: 2021-06-21
Payer: MEDICARE

## 2021-06-21 LAB
ORGANISM: ABNORMAL
URINE CULTURE REFLEX: ABNORMAL
URINE CULTURE REFLEX: ABNORMAL

## 2021-06-23 LAB
ORGANISM: ABNORMAL
URINE CULTURE REFLEX: ABNORMAL
URINE CULTURE REFLEX: ABNORMAL

## 2021-06-23 NOTE — PROGRESS NOTES
Pharmacy Note  ED Culture Follow-up    Chepe Garnica is a 59 y.o. male. Allergies: Zoloft [sertraline hcl]     Labs:  Lab Results   Component Value Date    BUN 13 06/20/2021    CREATININE 0.9 06/20/2021    WBC 10.7 06/20/2021     Estimated Creatinine Clearance: 110 mL/min (based on SCr of 0.9 mg/dL). Current antimicrobials:   None    ASSESSMENT:  Micro results:   Urine culture: positive for candida tropicalis     PLAN:  Need for intervention:  Will fax to urology  Discussed with:  Nicholas Mehta MD  Chosen treatment:    Will fax to urology    Patient response:   No need to contact patient    Called/sent in prescription to: Not applicable    Please call with any questions.  Theodore Negron [facility] [place], PharmD 2:32 PM 6/23/2021

## 2021-06-24 ENCOUNTER — OFFICE VISIT (OUTPATIENT)
Dept: CARDIOLOGY CLINIC | Age: 61
End: 2021-06-24
Payer: MEDICARE

## 2021-06-24 ENCOUNTER — OFFICE VISIT (OUTPATIENT)
Dept: UROLOGY | Age: 61
End: 2021-06-24
Payer: MEDICARE

## 2021-06-24 ENCOUNTER — HOSPITAL ENCOUNTER (OUTPATIENT)
Dept: PSYCHIATRY | Age: 61
Setting detail: THERAPIES SERIES
End: 2021-06-24
Payer: MEDICARE

## 2021-06-24 VITALS
HEIGHT: 71 IN | DIASTOLIC BLOOD PRESSURE: 64 MMHG | BODY MASS INDEX: 37.98 KG/M2 | SYSTOLIC BLOOD PRESSURE: 118 MMHG | WEIGHT: 271.3 LBS

## 2021-06-24 VITALS
SYSTOLIC BLOOD PRESSURE: 110 MMHG | HEART RATE: 66 BPM | WEIGHT: 270 LBS | DIASTOLIC BLOOD PRESSURE: 60 MMHG | HEIGHT: 71 IN | BODY MASS INDEX: 37.8 KG/M2

## 2021-06-24 DIAGNOSIS — I25.810 CORONARY ARTERY DISEASE INVOLVING CORONARY BYPASS GRAFT OF NATIVE HEART WITHOUT ANGINA PECTORIS: ICD-10-CM

## 2021-06-24 DIAGNOSIS — R31.0 GROSS HEMATURIA: ICD-10-CM

## 2021-06-24 DIAGNOSIS — Z01.818 PRE-OP TESTING: ICD-10-CM

## 2021-06-24 DIAGNOSIS — Z01.818 PRE-OPERATIVE CLEARANCE: ICD-10-CM

## 2021-06-24 DIAGNOSIS — R33.8 BENIGN PROSTATIC HYPERPLASIA WITH URINARY RETENTION: Primary | ICD-10-CM

## 2021-06-24 DIAGNOSIS — I10 ESSENTIAL HYPERTENSION: ICD-10-CM

## 2021-06-24 DIAGNOSIS — N40.1 BENIGN PROSTATIC HYPERPLASIA WITH URINARY RETENTION: Primary | ICD-10-CM

## 2021-06-24 DIAGNOSIS — I50.32 CHF (CONGESTIVE HEART FAILURE), NYHA CLASS II, CHRONIC, DIASTOLIC (HCC): Primary | ICD-10-CM

## 2021-06-24 LAB
BILIRUBIN URINE: NEGATIVE
BLOOD URINE, POC: NEGATIVE
CHARACTER, URINE: CLEAR
COLOR, URINE: YELLOW
GLUCOSE URINE: 500 MG/DL
KETONES, URINE: NEGATIVE
LEUKOCYTE CLUMPS, URINE: NEGATIVE
NITRITE, URINE: NEGATIVE
PH, URINE: 6 (ref 5–9)
POST VOID RESIDUAL (PVR): 8 ML
PROTEIN, URINE: NEGATIVE MG/DL
SPECIFIC GRAVITY, URINE: 1.01 (ref 1–1.03)
UROBILINOGEN, URINE: 0.2 EU/DL (ref 0–1)

## 2021-06-24 PROCEDURE — 99213 OFFICE O/P EST LOW 20 MIN: CPT | Performed by: NUCLEAR MEDICINE

## 2021-06-24 PROCEDURE — 93000 ELECTROCARDIOGRAM COMPLETE: CPT | Performed by: NUCLEAR MEDICINE

## 2021-06-24 PROCEDURE — 99213 OFFICE O/P EST LOW 20 MIN: CPT | Performed by: NURSE PRACTITIONER

## 2021-06-24 PROCEDURE — 81003 URINALYSIS AUTO W/O SCOPE: CPT | Performed by: NURSE PRACTITIONER

## 2021-06-24 PROCEDURE — 51798 US URINE CAPACITY MEASURE: CPT | Performed by: NURSE PRACTITIONER

## 2021-06-24 ASSESSMENT — ENCOUNTER SYMPTOMS
ABDOMINAL PAIN: 0
COUGH: 0
RHINORRHEA: 0
STRIDOR: 0
NAUSEA: 0
BACK PAIN: 0
VOMITING: 0
EYE REDNESS: 0
SHORTNESS OF BREATH: 0
CHEST TIGHTNESS: 0

## 2021-06-24 NOTE — ED PROVIDER NOTES
[facility] Emergency [address] [place] [address]       Chief Complaint   Patient presents with    Cystitis    Spasms     bladder    Urinary Catheter Problem       Nurses Notes reviewed and I agree except as noted in the HPI. HISTORY OF PRESENT ILLNESS    Shabbir Cho radha 59 y.o. male who presents to the ED for evaluation of bladder spasms. Patient has a Munson placed secondary to cystitis. Munson is post come out on Monday. Patient states that tonight he started having spasms that are intolerable. Patient states the pain is excruciating. Denies fever, dysuria, any other new symptoms. HPI was provided by the patient    REVIEW OF SYSTEMS     Review of Systems   Constitutional: Negative for chills, fatigue and fever. HENT: Negative for congestion, ear discharge, ear pain, postnasal drip and rhinorrhea. Eyes: Negative for redness. Respiratory: Negative for cough, chest tightness, shortness of breath and stridor. Cardiovascular: Negative for chest pain and leg swelling. Gastrointestinal: Negative for abdominal pain, nausea and vomiting. Genitourinary: Positive for dysuria and urgency. Negative for difficulty urinating, enuresis, flank pain and hematuria. Musculoskeletal: Negative for back pain and joint swelling. Skin: Negative for rash. Neurological: Negative for dizziness, light-headedness, numbness and headaches. Psychiatric/Behavioral: Negative for agitation, behavioral problems and confusion. All other systems negative except as noted.       PAST MEDICAL HISTORY     Past Medical History:   Diagnosis Date    [facility]congestive heart failure) ([facility] [address]     COPD (chronic obstructive pulmonary disease) ([facility] [address]     Coronary artery disease involving native coronary artery of native heart without angina pectoris     Depression     Diabetes mellitus type 2, diet-controlled ([address] 6/1/2018    GERD (gastroesophageal reflux disease) 3/21/2012    Hernia     Hx of blood clots 1990's right knee due to injury    Hx of cocaine abuse ([facility] [address]     Hyperglycemia 09/09    Hyperlipidemia     Hypertension     FAISAL on CPAP     Osteoarthritis 11/07    S/P CABG x 2 07/06/2016    S/P PTCA: 1/15/2018: Stent diagonal branch Xience 3.0 x 12 mm. 1/15/2018    1/15/2018: Stent diagonal branch Xience 3.0 x 12 mm. Dr. Efraín Guerrero injury 08/2015    Right thumb cut with table saw, no treatment needed       SURGICALHISTORY      has a past surgical history that includes Tonsillectomy (child); Colonoscopy (04/08); cyst removal (Right, 02/2007); hernia repair (Right); Cataract removal (Right, 2013); eye surgery (Right); Cardiac catheterization (06/27/2016); Total knee arthroplasty (Left, 10/24/2016); Coronary artery bypass graft (07/06/2016); Rotator cuff repair (Right, 11/16/2017); Diagnostic Cardiac Cath Lab Procedure; and Percutaneous Transluminal Coronary Angio (01/15/2018).     CURRENT MEDICATIONS       Discharge Medication List as of 6/18/2021  5:38 AM      CONTINUE these medications which have NOT CHANGED    Details   tamsulosin (FLOMAX) 0.4 MG capsule Take 1 capsule by mouth daily Take one capsule daily to facilitate passage of ureteral stone, Disp-30 capsule, R-3Normal      albuterol sulfate  (90 Base) MCG/ACT inhaler INHALE TWO(2) PUFFS INTO LUNGS EVERY SIX(6) HOURS AS NEEDED FOR WHEEZING, Disp-18 g, R-3Normal      metFORMIN (GLUCOPHAGE) 500 MG tablet Take 2 tablets by mouth 2 times daily (with meals), Disp-270 tablet, R-1Normal      naproxen (NAPROSYN) 500 MG tablet Take 1 tablet by mouth 2 times daily (with meals), Disp-20 tablet, R-0Print      traZODone (DESYREL) 50 MG tablet TAKE 1/2 - 1 TABLET BY MOUTH NIGHTLY AS NEEDED FOR SLEEP, Disp-30 tablet, R-0Normal      isosorbide mononitrate (IMDUR) 30 MG extended release tablet TAKE 1 TABLET BY MOUTH ONCE DAILY, Disp-30 tablet, R-0Normal      omeprazole (PRILOSEC) 20 MG delayed release capsule TAKE 1 CAPSULE BY MOUTH ONE TIME A DAY, Disp-30 capsule, R-1Normal      pramipexole (MIRAPEX) 0.125 MG tablet TAKE 1 TABLET BY MOUTH EVERY EVENING., Disp-90 tablet, R-0Normal      metoprolol succinate (TOPROL XL) 25 MG extended release tablet TAKE 2 TABLETS BY MOUTH ONCE DAILY. , Disp-180 tablet, R-0Normal      allopurinol (ZYLOPRIM) 300 MG tablet TAKE 1 TABLET BY MOUTH ONCE DAILY. , Disp-90 tablet, R-0Normal      blood glucose test strips (PRODIGY NO CODING BLOOD GLUC) strip USE TO TEST BLOOD GLUCOSE ONCE DAILY. , Disp-100 each, R-0Normal      losartan (COZAAR) 25 MG tablet TAKE 1 TABLET BY MOUTH DAILY, Disp-90 tablet, R-1Normal      umeclidinium-vilanterol (ANORO ELLIPTA) 62.5-25 MCG/INH AEPB inhaler INHALE 1 PUFF INTO THE LUNGS ONE TIME DAILY, Disp-1 each, R-5Normal      aspirin 81 MG chewable tablet CHEW AND SWALLOW 1 TABLET BY MOUTH ONCE DAILY. , Disp-90 tablet, R-1Normal      atorvastatin (LIPITOR) 40 MG tablet Take 1 tablet by mouth daily, Disp-90 tablet, R-1Normal      bumetanide (BUMEX) 2 MG tablet Take 1 tablet by mouth daily Take 2 mg AM - 1 mg PM, Disp-135 tablet, R-1Normal      ticagrelor (BRILINTA) 90 MG TABS tablet TAKE 1 TABLET BY MOUTH 2 TIMES A DAY, Disp-180 tablet, R-1Normal      buPROPion (WELLBUTRIN XL) 300 MG extended release tablet TAKE ONE TABLET BY MOUTH EVERY MORNING, Disp-90 tablet, R-1Normal      dapagliflozin (FARXIGA) 5 MG tablet Take 1 tablet by mouth every morning, Disp-90 tablet, R-1Normal      fluticasone (FLONASE) 50 MCG/ACT nasal spray USE 2 SPRAYS IN EACH NOSTRIL DAILY AS NEEDED FOR RHINITIS OR ALLERGIES, Disp-48 g, R-5Normal      nitroGLYCERIN (NITROSTAT) 0.4 MG SL tablet Place 1 tablet under the tongue every 5 minutes as needed for Chest pain, Disp-25 tablet, R-3Normal      Blood Glucose Monitoring Suppl (PRODIGY AUTOCODE BLOOD GLUCOSE) w/Device KIT Starting Tue 11/26/2019, Historical Med             ALLERGIES     is allergic to zoloft [sertraline hcl]. FAMILY HISTORY     He indicated that his mother is alive.  He indicated are interpreted by the Emergency Department Physician who eithersigns or Co-signs this chart in the absence of a cardiologist.        RADIOLOGY: non-plainfilm images(s) such as CT, Ultrasound and MRI are read by the radiologist.  Plain radiographic images are visualized and preliminarily interpreted by the emergency physician unless otherwise stated below. No orders to display         LABS:   Labs Reviewed   CULTURE, REFLEXED, URINE - Abnormal; Notable for the following components:       Result Value    Urine Culture Reflex   (*)     Value: Culture also yielded very light growth of enteric gram negative bacilli.(<10,000 CFU/ml). Organism Candida tropicalis (*)     All other components within normal limits    Narrative:     Source: urine, clean catch       Site:           Current Antibiotics: not stated   CBC WITH AUTO DIFFERENTIAL - Abnormal; Notable for the following components:    MCV 97.3 (*)     RDW-SD 49.7 (*)     Segs Absolute 7.8 (*)     All other components within normal limits   BASIC METABOLIC PANEL - Abnormal; Notable for the following components:    CO2 22 (*)     Glucose 185 (*)     All other components within normal limits   URINE WITH REFLEXED MICRO - Abnormal; Notable for the following components:    Glucose, Ur >= 1000 (*)     Ketones, Urine TRACE (*)     Blood, Urine TRACE (*)     All other components within normal limits   ANION GAP   GLOMERULAR FILTRATION RATE, ESTIMATED   OSMOLALITY       EMERGENCY DEPARTMENT COURSE:   Vitals:    Vitals:    06/18/21 0255 06/18/21 0548   BP: (!) 146/78 (!) 140/66   Pulse: 80 77   Resp: 20 17   Temp: 97.9 °F (36.6 °C) 98.1 °F (36.7 °C)   TempSrc: Oral    SpO2: 93% 99%   Weight: 260 lb (117.9 kg)    Height: 5' 11\" (1.803 m)            [facility]    Patient seen evaluate in the emergency room for bladder spasms. Patient has a Munson in place. Was given a BNO suppository. This gave this patient a small amount of relief.   Patient been treated with morphine and Zofran. After work-up was complete, decision was to remove the Munson. Patient was comfortable with that plan of care. Munson was removed without difficulty. Patient was able to void afterwards. He is to follow-up with urology as scheduled. He is to return if he develops any trouble urinating. Patient verbalized understanding discharged home stable condition. Medications   opium-belladonna (B&O SUPPRETTES) 16.2-30 MG suppository 30 mg (30 mg Rectal Given 6/18/21 0300)   morphine injection 4 mg (4 mg Intravenous Given 6/18/21 0400)   ondansetron (ZOFRAN) injection 4 mg (4 mg Intravenous Given 6/18/21 0401)         Patient was seen independently by myself. The patient's final impression and disposition and plan was determined by myself. Strict return precautions and follow up instructions were discussed with the patient prior to discharge, with which the patient agrees. Physical assessment findings, diagnostic testing(s) if applicable, and vital signs reviewed with patient/patient representative. Questions answered. Medications asdirected, including OTC medications for supportive care. Education provided on medications. Differential diagnosis(s) discussed with patient/patient representative. Home care/self care instructions reviewed withpatient/patient representative. Patient is to follow-up with family care provider in 2-3 days if no improvement. Patient is to go to the emergency department if symptoms worsen. Patient/patient representative isaware of care plan, questions answered, verbalizes understanding and is in agreement. CRITICAL CARE:   None    CONSULTS:  None    PROCEDURES:  None    FINAL IMPRESSION     1.  Munson catheter problem, initial encounter [facility])          DISPOSITION/PLAN   DISPOSITION Decision To Discharge 06/18/2021 05:35:54 AM      PATIENT REFERREDTO:  Barbie Gamboa MD  [address]  [phone]    Schedule an appointment as soon as possible for a visit in 2 days  For follow up    [facility] Urology  [address]  [facility]  [address] [facility]  Schedule an appointment as soon as possible for a visit in 2 days  For follow up      DISCHARGE MEDICATIONS:  Discharge Medication List as of 6/18/2021  5:38 AM          (Please note that portions of this note were completed with a voice recognition program.  Efforts were made to edit the dictations but occasionally words are mis-transcribed.)         ASHLEIGH Sam - ASHLEIGH Romero CNP  06/24/21 4032

## 2021-06-24 NOTE — PROGRESS NOTES
Family History   Problem Relation Age of Onset    Heart Disease Mother     High Blood Pressure Mother     Obesity Mother     Diabetes Mother     Heart Disease Father     Cancer Father         colon/prostate    Colon Cancer Father     Prostate Cancer Father     Arthritis Father     Diabetes Brother      Social History     Tobacco Use    Smoking status: Former Smoker     Packs/day: 2.00     Years: 25.00     Pack years: 50.00     Types: Cigars     Quit date: 2014     Years since quittin.6    Smokeless tobacco: Never Used   Substance Use Topics    Alcohol use: Yes     Alcohol/week: 3.0 standard drinks     Types: 3 Cans of beer per week     Comment: 1 every day      Current Outpatient Medications   Medication Sig Dispense Refill    traZODone (DESYREL) 50 MG tablet TAKE 1 TABLET BY MOUTH DAILY IN THE EVENING AS NEEDED FOR SLEEP 30 tablet 0    tamsulosin (FLOMAX) 0.4 MG capsule Take 1 capsule by mouth daily Take one capsule daily to facilitate passage of ureteral stone 30 capsule 3    albuterol sulfate  (90 Base) MCG/ACT inhaler INHALE TWO(2) PUFFS INTO LUNGS EVERY SIX(6) HOURS AS NEEDED FOR WHEEZING 18 g 3    metFORMIN (GLUCOPHAGE) 500 MG tablet Take 2 tablets by mouth 2 times daily (with meals) 270 tablet 1    naproxen (NAPROSYN) 500 MG tablet Take 1 tablet by mouth 2 times daily (with meals) 20 tablet 0    isosorbide mononitrate (IMDUR) 30 MG extended release tablet TAKE 1 TABLET BY MOUTH ONCE DAILY 30 tablet 0    omeprazole (PRILOSEC) 20 MG delayed release capsule TAKE 1 CAPSULE BY MOUTH ONE TIME A DAY 30 capsule 1    pramipexole (MIRAPEX) 0.125 MG tablet TAKE 1 TABLET BY MOUTH EVERY EVENING. 90 tablet 0    metoprolol succinate (TOPROL XL) 25 MG extended release tablet TAKE 2 TABLETS BY MOUTH ONCE DAILY. 180 tablet 0    allopurinol (ZYLOPRIM) 300 MG tablet TAKE 1 TABLET BY MOUTH ONCE DAILY.  90 tablet 0    blood glucose test strips (PRODIGY NO CODING BLOOD GLUC) strip USE TO TEST for Exam?     Answer: Other       Medications Prescribed:  No orders of the defined types were placed in this encounter. Discussed use, benefit, and side effects of prescribed medications. All patient questions answered. Pt voicedunderstanding. Instructed to continue current medications, diet and exercise. Continue risk factor modification and medical management. Patient agreed with treatment plan. Follow up as directed.     Electronically signedby Les Sherwood MD on 6/24/2021 at 12:11 PM

## 2021-06-24 NOTE — PROGRESS NOTES
[address] [address]  Dept: [phone]  Loc: [phone]    Visit Date: 6/24/2021        HPI:     Gia Vasquez is a 59 y.o. male who presents today for:  Chief Complaint   Patient presents with    Benign Prostatic Hypertrophy    Follow-up     ER f/u arrieta removed       HPI   Pt seen in follow up for urinary retention, gross hematuria. Pt had recent onset of gross hematuria and trouble urinating. CT abd/pelvis 6/5/21with a lobulated structure in the posterior aspect of the urinary bladder concerning for a mass. Pt is scheduled for cystoscopy with bilateral retrograde pyelograms and possible TURBT by Dr. Zhanna Neves 7/16/21. Pt reports recurrent intermittent gross hematuria with clots. Currently urine is clear yellow. Arrieta was removed by ER last week. PVR today in office 8 mls. Continues on tamsulosin 0.4 mg daily. Culture from 6/20/21 with 1000 cfu/ml of Candida tropicalis. Pt denies any dysuria or symptoms of UTI at this time.     Current Outpatient Medications   Medication Sig Dispense Refill    traZODone (DESYREL) 50 MG tablet TAKE 1 TABLET BY MOUTH DAILY IN THE EVENING AS NEEDED FOR SLEEP 30 tablet 0    tamsulosin (FLOMAX) 0.4 MG capsule Take 1 capsule by mouth daily Take one capsule daily to facilitate passage of ureteral stone 30 capsule 3    albuterol sulfate  (90 Base) MCG/ACT inhaler INHALE TWO(2) PUFFS INTO LUNGS EVERY SIX(6) HOURS AS NEEDED FOR WHEEZING 18 g 3    metFORMIN (GLUCOPHAGE) 500 MG tablet Take 2 tablets by mouth 2 times daily (with meals) 270 tablet 1    naproxen (NAPROSYN) 500 MG tablet Take 1 tablet by mouth 2 times daily (with meals) 20 tablet 0    isosorbide mononitrate (IMDUR) 30 MG extended release tablet TAKE 1 TABLET BY MOUTH ONCE DAILY 30 tablet 0    omeprazole (PRILOSEC) 20 MG delayed release capsule TAKE 1 CAPSULE BY MOUTH ONE TIME A DAY 30 disease), Hernia, Hx of blood clots, Hx of cocaine abuse ([facility] [address] Hyperglycemia, Hyperlipidemia, Hypertension, FAISAL on CPAP, Osteoarthritis, S/P CABG x 2, S/P PTCA: 1/15/2018: Stent diagonal branch Xience 3.0 x 12 mm., and Thumb injury. Past Surgical History  The patient  has a past surgical history that includes Tonsillectomy (child); Colonoscopy (04/08); cyst removal (Right, 02/2007); hernia repair (Right); Cataract removal (Right, 2013); eye surgery (Right); Cardiac catheterization (06/27/2016); Total knee arthroplasty (Left, 10/24/2016); Coronary artery bypass graft (07/06/2016); Rotator cuff repair (Right, 11/16/2017); Diagnostic Cardiac Cath Lab Procedure; and Percutaneous Transluminal Coronary Angio (01/15/2018). Family History  This patient's family history includes Arthritis in his father; Cancer in his father; [place] in his father; Diabetes in his brother and mother; Heart Disease in his father and mother; High Blood Pressure in his mother; Obesity in his mother; Prostate Cancer in his father. Social History  Luther Li  reports that he quit smoking about 6 years ago. His smoking use included cigars. He has a 50.00 pack-year smoking history. He has never used smokeless tobacco. He reports current alcohol use of about 3.0 standard drinks of alcohol per week. He reports current drug use. Frequency: 5.00 times per week. Drug: Marijuana. Subjective:      Review of Systems   Constitutional: Negative for activity change, appetite change, chills, diaphoresis, fatigue, fever and unexpected weight change. Gastrointestinal: Negative for abdominal pain, nausea and vomiting. Genitourinary: Positive for hematuria. Negative for decreased urine volume, difficulty urinating, dysuria, flank pain, frequency and urgency. Musculoskeletal: Negative for back pain. Objective:   /64   Ht 5' 11\" (1.803 m)   Wt 271 lb 4.8 oz (123.1 kg)   BMI 37.84 kg/m²     Physical Exam  Vitals reviewed. Constitutional:       General: He is not in acute distress. Appearance: Normal appearance. He is well-developed. He is not ill-appearing or diaphoretic. HENT:      Head: Normocephalic and atraumatic. Right Ear: External ear normal.      Left Ear: External ear normal.      Nose: Nose normal.      Mouth/Throat:      Mouth: Mucous membranes are moist.   Eyes:      General: No scleral icterus. Right eye: No discharge. Left eye: No discharge. Neck:      Vascular: No JVD. Trachea: No tracheal deviation. Pulmonary:      Effort: Pulmonary effort is normal. No respiratory distress. Abdominal:      General: There is no distension. Tenderness: There is no abdominal tenderness. There is no right CVA tenderness or left CVA tenderness. Musculoskeletal:         General: Normal range of motion. Neurological:      Mental Status: He is alert and oriented to person, place, and time. Mental status is at baseline. Psychiatric:         Mood and Affect: Mood normal.         Behavior: Behavior normal.         Thought Content: Thought content normal.         POC  Results for POC orders placed in visit on 06/24/21   poct post void residual   Result Value Ref Range    post void residual 8 ml         Patients recent PSA values are as follows  Lab Results   Component Value Date    PSA 3.08 (H) 02/12/2021    PSA 2.24 (H) 06/05/2019    PSA 1.75 02/20/2017        Recent BUN/Creatinine:  Lab Results   Component Value Date    BUN 13 06/20/2021    CREATININE 0.9 06/20/2021       Assessment:   Gross hematuria  Possible bladder mass  Urinary retention  Hx of tobacco use  Hx of alcohol use    Plan:     Pt's hematuria improved. PVR today in office normal.  Continue tamsulosin 0.4 mg daily. Increase fluid intake. Avoid alcohol. Surgery as scheduled. Repeat Urine culture 7/1. Call office if any worsening in symptoms.

## 2021-06-25 ENCOUNTER — APPOINTMENT (OUTPATIENT)
Dept: PSYCHIATRY | Age: 61
End: 2021-06-25
Payer: MEDICARE

## 2021-06-25 ASSESSMENT — ENCOUNTER SYMPTOMS
BACK PAIN: 0
ABDOMINAL PAIN: 0
NAUSEA: 0
VOMITING: 0

## 2021-06-28 ENCOUNTER — APPOINTMENT (OUTPATIENT)
Dept: PSYCHIATRY | Age: 61
End: 2021-06-28
Payer: MEDICARE

## 2021-06-28 RX ORDER — ISOSORBIDE MONONITRATE 30 MG/1
TABLET, EXTENDED RELEASE ORAL
Qty: 30 TABLET | Refills: 11 | Status: SHIPPED | OUTPATIENT
Start: 2021-06-28 | End: 2021-06-29

## 2021-06-29 ENCOUNTER — TELEPHONE (OUTPATIENT)
Dept: UROLOGY | Age: 61
End: 2021-06-29

## 2021-06-29 ENCOUNTER — HOSPITAL ENCOUNTER (OUTPATIENT)
Dept: PULMONOLOGY | Age: 61
Discharge: HOME | End: 2021-06-29
Payer: MEDICARE

## 2021-06-29 ENCOUNTER — HOSPITAL ENCOUNTER (OUTPATIENT)
Dept: PULMONOLOGY | Age: 61
End: 2021-06-29
Payer: MEDICARE

## 2021-06-29 DIAGNOSIS — J44.9 STAGE 3 SEVERE COPD BY GOLD CLASSIFICATION (HCC): ICD-10-CM

## 2021-06-29 PROCEDURE — 94729 DIFFUSING CAPACITY: CPT

## 2021-06-29 PROCEDURE — 94726 PLETHYSMOGRAPHY LUNG VOLUMES: CPT

## 2021-06-29 PROCEDURE — 94010 BREATHING CAPACITY TEST: CPT

## 2021-06-29 RX ORDER — ISOSORBIDE MONONITRATE 30 MG/1
TABLET, EXTENDED RELEASE ORAL
Qty: 30 TABLET | Refills: 3 | Status: SHIPPED | OUTPATIENT
Start: 2021-06-29 | End: 2021-09-17

## 2021-06-29 NOTE — TELEPHONE ENCOUNTER
[facility] Urology Surgery Preop Check Off      PREOP check list      Surgeon Dr. Damon Fitting       Patient Name  Lawyer Blair     1960   MRN   [id number]     DOS   21  Diagnosis/Indication for Surgery   BPH with urinary retention,Bladder tumor  Procedure Cystoscopy with bilateral retrograde pyelogram, Transurethral resection of a bladder tumor    Allergies     Allergies   Allergen Reactions    Zoloft [Sertraline Hcl] Other (See Comments)     Headaches, sweats, bad dreams        Urine Culture 21   Growth of Contaminants.  The mixture of organisms present are not a common cause of urinary tract infections and probably represent skin morenita or distal urethral morenita. Sent to [facility] no treatment    Blood thinners  Brilinta, asa 81 mg     EKG  21  abnormal  CXR  21  Ct Chest      Mild to moderate centrilobular emphysematous changes seen most marked within the upper lung fields.       Bronchiectatic changes seen in the lower lung fields worse on the left and mild atelectatic changes seen within the left lower lobe unchanged in comparison to the prior study.    Clearance:  Cardiac- Dr Chua Ready to surgery- No

## 2021-07-02 ENCOUNTER — TELEPHONE (OUTPATIENT)
Dept: FAMILY MEDICINE CLINIC | Age: 61
End: 2021-07-02

## 2021-07-02 DIAGNOSIS — E11.65 UNCONTROLLED TYPE 2 DIABETES MELLITUS WITH HYPERGLYCEMIA (HCC): ICD-10-CM

## 2021-07-02 RX ORDER — METOPROLOL SUCCINATE 25 MG/1
TABLET, EXTENDED RELEASE ORAL
Qty: 180 TABLET | Refills: 0 | Status: SHIPPED | OUTPATIENT
Start: 2021-07-02 | End: 2021-07-26

## 2021-07-02 NOTE — TELEPHONE ENCOUNTER
Pt called requesting #28 tabs of Metformin 500 mg because [facility] has not gotten it to him yet. He said he has spoken to them again and they are supposed to be ove-nighting it to him.     [employer]

## 2021-07-02 NOTE — TELEPHONE ENCOUNTER
Date of last visit:  6/7/2021  Date of next visit:  9/8/2021    Requested Prescriptions     Pending Prescriptions Disp Refills    metoprolol succinate (TOPROL XL) 25 MG extended release tablet [Pharmacy Med Name: METOPROLOL SUCCINATE ER 25MG TB24] 180 tablet 0     Sig: TAKE 2 TABLETS BY MOUTH ONCE DAILY

## 2021-07-02 NOTE — TELEPHONE ENCOUNTER
Date of last visit:  6/7/2021   Date of next visit:  9/8/2021    Requested Prescriptions     Pending Prescriptions Disp Refills    metFORMIN (GLUCOPHAGE) 500 MG tablet 28 tablet 0     Sig: Take 2 tablets by mouth 2 times daily (with meals)

## 2021-07-06 ENCOUNTER — NURSE ONLY (OUTPATIENT)
Dept: LAB | Age: 61
End: 2021-07-06

## 2021-07-08 ENCOUNTER — TELEPHONE (OUTPATIENT)
Dept: UROLOGY | Age: 61
End: 2021-07-08

## 2021-07-08 LAB
ORGANISM: ABNORMAL
URINE CULTURE, ROUTINE: ABNORMAL

## 2021-07-09 ENCOUNTER — TELEPHONE (OUTPATIENT)
Dept: UROLOGY | Age: 61
End: 2021-07-09

## 2021-07-09 DIAGNOSIS — I10 ESSENTIAL HYPERTENSION: ICD-10-CM

## 2021-07-09 NOTE — PROGRESS NOTES
Attempted PAT call. Patient not available.  Left message and let him know we would try to call him back on Mon

## 2021-07-12 DIAGNOSIS — F33.1 MAJOR DEPRESSIVE DISORDER, RECURRENT EPISODE, MODERATE (HCC): ICD-10-CM

## 2021-07-12 RX ORDER — ALLOPURINOL 300 MG/1
TABLET ORAL
Qty: 30 TABLET | Refills: 1 | Status: SHIPPED | OUTPATIENT
Start: 2021-07-12 | End: 2021-08-16

## 2021-07-12 RX ORDER — DAPAGLIFLOZIN 5 MG/1
TABLET, FILM COATED ORAL
Qty: 30 TABLET | Refills: 1 | Status: SHIPPED | OUTPATIENT
Start: 2021-07-12 | End: 2021-08-16

## 2021-07-12 RX ORDER — OMEPRAZOLE 20 MG/1
CAPSULE, DELAYED RELEASE ORAL
Qty: 30 CAPSULE | Refills: 1 | Status: SHIPPED | OUTPATIENT
Start: 2021-07-12 | End: 2021-08-16

## 2021-07-12 RX ORDER — BUMETANIDE 2 MG/1
TABLET ORAL
Qty: 45 TABLET | Refills: 1 | Status: SHIPPED | OUTPATIENT
Start: 2021-07-12 | End: 2021-08-16

## 2021-07-12 RX ORDER — TRAZODONE HYDROCHLORIDE 50 MG/1
TABLET ORAL
Qty: 30 TABLET | Refills: 0 | Status: SHIPPED | OUTPATIENT
Start: 2021-07-12 | End: 2021-08-20

## 2021-07-12 RX ORDER — LOSARTAN POTASSIUM 25 MG/1
TABLET ORAL
Qty: 30 TABLET | Refills: 1 | Status: SHIPPED | OUTPATIENT
Start: 2021-07-12 | End: 2021-08-16

## 2021-07-12 NOTE — PROGRESS NOTES
Following instructions given to patient, who states understanding:     NPO after midnight  [employer] and 's license  Wear comfortable clean clothing  Do not bring jewelry   Shower night before and morning of surgery with a liquid antibacterial soap  Bring medications in original bottles  Follow all instructions given by your physician   needed at discharge  Call [phone] for any questions  Report to \[facility]\"" on 2nd floor  If you would become ill prior to surgery, please call the surgeon  May have only 1 visitor accompany you for surgery  Please bring and wear mask  You will be receiving a phone call one or two days before surgery to review covid screening exposure     Covid screening questionnaire complete and negative for symptoms or exposure see chart for documentation.    Please limit your exposure to the public after you have your covid test  Please call your doctor immediately if you develop any symptoms of covid prior to your surgery

## 2021-07-12 NOTE — TELEPHONE ENCOUNTER
Date of last visit:  Visit date not found  Date of next visit:  9/8/2021    Requested Prescriptions     Pending Prescriptions Disp Refills    traZODone (DESYREL) 50 MG tablet [Pharmacy Med Name: TRAZODONE 50 MG TABS 50 Tablet] 30 tablet 0     Sig: TAKE 1 TABLET BY MOUTH DAILY IN THE EVENING AS NEEDED FOR SLEEP

## 2021-07-16 ENCOUNTER — HOSPITAL ENCOUNTER (OUTPATIENT)
Age: 61
Setting detail: OUTPATIENT SURGERY
Discharge: HOME | End: 2021-07-16
Attending: UROLOGY | Admitting: UROLOGY
Payer: MEDICARE

## 2021-07-16 ENCOUNTER — ANESTHESIA (OUTPATIENT)
Dept: OPERATING ROOM | Age: 61
End: 2021-07-16
Payer: MEDICARE

## 2021-07-16 ENCOUNTER — ANESTHESIA EVENT (OUTPATIENT)
Dept: OPERATING ROOM | Age: 61
End: 2021-07-16
Payer: MEDICARE

## 2021-07-16 VITALS
BODY MASS INDEX: 36.4 KG/M2 | OXYGEN SATURATION: 93 % | WEIGHT: 260 LBS | DIASTOLIC BLOOD PRESSURE: 78 MMHG | RESPIRATION RATE: 16 BRPM | TEMPERATURE: 97.3 F | SYSTOLIC BLOOD PRESSURE: 145 MMHG | HEIGHT: 71 IN | HEART RATE: 74 BPM

## 2021-07-16 VITALS — DIASTOLIC BLOOD PRESSURE: 65 MMHG | SYSTOLIC BLOOD PRESSURE: 111 MMHG | TEMPERATURE: 97.2 F | OXYGEN SATURATION: 96 %

## 2021-07-16 LAB
GLUCOSE BLD-MCNC: 202 MG/DL (ref 70–108)
INR BLD: 1.03 (ref 0.85–1.13)
POTASSIUM SERPL-SCNC: 3.9 MEQ/L (ref 3.5–5.2)

## 2021-07-16 PROCEDURE — 3600000003 HC SURGERY LEVEL 3 BASE: Performed by: UROLOGY

## 2021-07-16 PROCEDURE — 36415 COLL VENOUS BLD VENIPUNCTURE: CPT

## 2021-07-16 PROCEDURE — 82948 REAGENT STRIP/BLOOD GLUCOSE: CPT

## 2021-07-16 PROCEDURE — 85610 PROTHROMBIN TIME: CPT

## 2021-07-16 PROCEDURE — 7100000010 HC PHASE II RECOVERY - FIRST 15 MIN: Performed by: UROLOGY

## 2021-07-16 PROCEDURE — C1758 CATHETER, URETERAL: HCPCS | Performed by: UROLOGY

## 2021-07-16 PROCEDURE — 3600000013 HC SURGERY LEVEL 3 ADDTL 15MIN: Performed by: UROLOGY

## 2021-07-16 PROCEDURE — 7100000000 HC PACU RECOVERY - FIRST 15 MIN: Performed by: UROLOGY

## 2021-07-16 PROCEDURE — 3700000000 HC ANESTHESIA ATTENDED CARE: Performed by: UROLOGY

## 2021-07-16 PROCEDURE — 2580000003 HC RX 258: Performed by: UROLOGY

## 2021-07-16 PROCEDURE — 6360000002 HC RX W HCPCS: Performed by: NURSE ANESTHETIST, CERTIFIED REGISTERED

## 2021-07-16 PROCEDURE — 7100000001 HC PACU RECOVERY - ADDTL 15 MIN: Performed by: UROLOGY

## 2021-07-16 PROCEDURE — 7100000011 HC PHASE II RECOVERY - ADDTL 15 MIN: Performed by: UROLOGY

## 2021-07-16 PROCEDURE — 6360000004 HC RX CONTRAST MEDICATION: Performed by: UROLOGY

## 2021-07-16 PROCEDURE — 2709999900 HC NON-CHARGEABLE SUPPLY: Performed by: UROLOGY

## 2021-07-16 PROCEDURE — 6360000002 HC RX W HCPCS: Performed by: UROLOGY

## 2021-07-16 PROCEDURE — 3700000001 HC ADD 15 MINUTES (ANESTHESIA): Performed by: UROLOGY

## 2021-07-16 PROCEDURE — 2500000003 HC RX 250 WO HCPCS: Performed by: NURSE ANESTHETIST, CERTIFIED REGISTERED

## 2021-07-16 PROCEDURE — 84132 ASSAY OF SERUM POTASSIUM: CPT

## 2021-07-16 RX ORDER — PROPOFOL 10 MG/ML
INJECTION, EMULSION INTRAVENOUS PRN
Status: DISCONTINUED | OUTPATIENT
Start: 2021-07-16 | End: 2021-07-16 | Stop reason: SDUPTHER

## 2021-07-16 RX ORDER — SUCCINYLCHOLINE/SOD CL,ISO/PF 200MG/10ML
SYRINGE (ML) INTRAVENOUS PRN
Status: DISCONTINUED | OUTPATIENT
Start: 2021-07-16 | End: 2021-07-16 | Stop reason: SDUPTHER

## 2021-07-16 RX ORDER — SODIUM CHLORIDE 9 MG/ML
INJECTION, SOLUTION INTRAVENOUS CONTINUOUS
Status: DISCONTINUED | OUTPATIENT
Start: 2021-07-16 | End: 2021-07-16 | Stop reason: HOSPADM

## 2021-07-16 RX ORDER — ONDANSETRON 2 MG/ML
4 INJECTION INTRAMUSCULAR; INTRAVENOUS
Status: DISCONTINUED | OUTPATIENT
Start: 2021-07-16 | End: 2021-07-16 | Stop reason: HOSPADM

## 2021-07-16 RX ORDER — ATORVASTATIN CALCIUM 40 MG/1
40 TABLET, FILM COATED ORAL DAILY
Qty: 90 TABLET | Refills: 1 | Status: SHIPPED | OUTPATIENT
Start: 2021-07-16 | End: 2022-01-24

## 2021-07-16 RX ORDER — LABETALOL 20 MG/4 ML (5 MG/ML) INTRAVENOUS SYRINGE
5 4 TIMES DAILY PRN
Status: DISCONTINUED | OUTPATIENT
Start: 2021-07-16 | End: 2021-07-16 | Stop reason: HOSPADM

## 2021-07-16 RX ORDER — FENTANYL CITRATE 50 UG/ML
50 INJECTION, SOLUTION INTRAMUSCULAR; INTRAVENOUS EVERY 5 MIN PRN
Status: DISCONTINUED | OUTPATIENT
Start: 2021-07-16 | End: 2021-07-16 | Stop reason: HOSPADM

## 2021-07-16 RX ORDER — MORPHINE SULFATE 2 MG/ML
2 INJECTION, SOLUTION INTRAMUSCULAR; INTRAVENOUS EVERY 5 MIN PRN
Status: DISCONTINUED | OUTPATIENT
Start: 2021-07-16 | End: 2021-07-16 | Stop reason: HOSPADM

## 2021-07-16 RX ORDER — HYDRALAZINE HYDROCHLORIDE 20 MG/ML
5 INJECTION INTRAMUSCULAR; INTRAVENOUS EVERY 10 MIN PRN
Status: DISCONTINUED | OUTPATIENT
Start: 2021-07-16 | End: 2021-07-16 | Stop reason: HOSPADM

## 2021-07-16 RX ORDER — LIDOCAINE HCL/PF 100 MG/5ML
SYRINGE (ML) INJECTION PRN
Status: DISCONTINUED | OUTPATIENT
Start: 2021-07-16 | End: 2021-07-16 | Stop reason: SDUPTHER

## 2021-07-16 RX ORDER — KETOROLAC TROMETHAMINE 30 MG/ML
INJECTION, SOLUTION INTRAMUSCULAR; INTRAVENOUS PRN
Status: DISCONTINUED | OUTPATIENT
Start: 2021-07-16 | End: 2021-07-16 | Stop reason: SDUPTHER

## 2021-07-16 RX ORDER — ONDANSETRON 2 MG/ML
INJECTION INTRAMUSCULAR; INTRAVENOUS PRN
Status: DISCONTINUED | OUTPATIENT
Start: 2021-07-16 | End: 2021-07-16 | Stop reason: SDUPTHER

## 2021-07-16 RX ORDER — MEPERIDINE HYDROCHLORIDE 25 MG/ML
12.5 INJECTION INTRAMUSCULAR; INTRAVENOUS; SUBCUTANEOUS EVERY 5 MIN PRN
Status: DISCONTINUED | OUTPATIENT
Start: 2021-07-16 | End: 2021-07-16 | Stop reason: HOSPADM

## 2021-07-16 RX ORDER — DIPHENHYDRAMINE HYDROCHLORIDE 50 MG/ML
12.5 INJECTION INTRAMUSCULAR; INTRAVENOUS
Status: DISCONTINUED | OUTPATIENT
Start: 2021-07-16 | End: 2021-07-16 | Stop reason: HOSPADM

## 2021-07-16 RX ORDER — FENTANYL CITRATE 50 UG/ML
INJECTION, SOLUTION INTRAMUSCULAR; INTRAVENOUS PRN
Status: DISCONTINUED | OUTPATIENT
Start: 2021-07-16 | End: 2021-07-16 | Stop reason: SDUPTHER

## 2021-07-16 RX ADMIN — ONDANSETRON HYDROCHLORIDE 4 MG: 4 INJECTION, SOLUTION INTRAMUSCULAR; INTRAVENOUS at 12:43

## 2021-07-16 RX ADMIN — Medication 100 MG: at 12:28

## 2021-07-16 RX ADMIN — CEFAZOLIN SODIUM 2000 MG: 10 INJECTION, POWDER, FOR SOLUTION INTRAVENOUS at 12:29

## 2021-07-16 RX ADMIN — PROPOFOL 200 MG: 10 INJECTION, EMULSION INTRAVENOUS at 12:28

## 2021-07-16 RX ADMIN — PROPOFOL 100 MG: 10 INJECTION, EMULSION INTRAVENOUS at 12:41

## 2021-07-16 RX ADMIN — KETOROLAC TROMETHAMINE 30 MG: 60 INJECTION, SOLUTION INTRAMUSCULAR at 12:50

## 2021-07-16 RX ADMIN — Medication 160 MG: at 12:28

## 2021-07-16 RX ADMIN — FENTANYL CITRATE 50 MCG: 50 INJECTION, SOLUTION INTRAMUSCULAR; INTRAVENOUS at 12:25

## 2021-07-16 RX ADMIN — SODIUM CHLORIDE: 9 INJECTION, SOLUTION INTRAVENOUS at 10:44

## 2021-07-16 ASSESSMENT — PULMONARY FUNCTION TESTS
PIF_VALUE: 1
PIF_VALUE: 15
PIF_VALUE: 1
PIF_VALUE: 19
PIF_VALUE: 20
PIF_VALUE: 16
PIF_VALUE: 19
PIF_VALUE: 0
PIF_VALUE: 19
PIF_VALUE: 19
PIF_VALUE: 22
PIF_VALUE: 19
PIF_VALUE: 20
PIF_VALUE: 3
PIF_VALUE: 2
PIF_VALUE: 15
PIF_VALUE: 20
PIF_VALUE: 19
PIF_VALUE: 15
PIF_VALUE: 2
PIF_VALUE: 19
PIF_VALUE: 20
PIF_VALUE: 20
PIF_VALUE: 1
PIF_VALUE: 19
PIF_VALUE: 10
PIF_VALUE: 19
PIF_VALUE: 3
PIF_VALUE: 2
PIF_VALUE: 0
PIF_VALUE: 19
PIF_VALUE: 14
PIF_VALUE: 20

## 2021-07-16 ASSESSMENT — PAIN SCALES - GENERAL
PAINLEVEL_OUTOF10: 0
PAINLEVEL_OUTOF10: 0

## 2021-07-16 NOTE — PROGRESS NOTES
Transported to \[facility]\"" in stable condition. A/O x 4. Denies c/o pain-states \"a little discomfort but not bad\".

## 2021-07-16 NOTE — ANESTHESIA POSTPROCEDURE EVALUATION
Department of Anesthesiology  Postprocedure Note    Patient: Latrell Mota  MRN: [id number]  YOB: 1960  Date of evaluation: 7/16/2021  Time:  1:55 PM     Procedure Summary     Date: 07/16/21 [facility]    Anesthesia Start: 3445 Anesthesia Stop: 9210    Procedure: CYSTOSCOPY WITH BILAT RETROGRADE PyELOGRAM (N/A ) Diagnosis: (BPH WITH URINARY RETENTION, BLADDER TUMOR)    Surgeons: Gerardo Guardado MD Responsible Provider: Lyric Villaseñor MD    Anesthesia Type: general ASA Status: 4          Anesthesia Type: general    Ankit Phase I: Ankit Score: 9    Ankit Phase II: Ankit Score: 10    Last vitals: Reviewed and per EMR flowsheets.        Anesthesia Post Evaluation    Patient location during evaluation: PACU  Complications: no  Cardiovascular status: hemodynamically stable  Respiratory status: acceptable

## 2021-07-16 NOTE — OP NOTE
FACILITY:   [facility], [place], [address]  1960  [id number]    DATE: 07/16/21  SURGEON:  Dr. Harrison Daniels MD , MD    ASSISTANT: Dr. Harrison Daniels MD MD  PREOPERATIVE DIAGNOSIS:  Gross hematuria.  bladder mass. POSTOPERATIVE DIAGNOSIS:  Gross hematuria.   PROCEDURES PERFORMED:  1. Cystourethroscopy. 2. Bilateral retrograde pyelogram.  ANESTHESIA:  MAC    COMPLICATIONS:  None.   DRAINS:  None. SPECIMEN:  none  ESTIMATED BLOOD LOSS:  Less than 5 mL. Finding: large obstructing prostate gland with lateral lobes, bladder has grade 2-3 trabeculations, non bladder mass identified.      INDICATIONS FOR THE PROCEDURE:  Jose Lyon is a 59 y.o. male presents with a history of gross hematuria and a bladder mass on CT. Imaging was done in the form of CT, which showed bladder mass. The patient follows up today for cystourethroscopy with bilateral retrograde pyelogram to complete the hematuria workup. The risks and benefits of the procedure, as well as possible alternatives and complications were discussed and he consented.          DETAILS OF THE PROCEDURE:  The patient was correctly identified in the preoperative holding area. The patient  was brought back to the operating room and placed in the dorsal lithotomy  position. Anesthesia was administered; antibiotics administered by Anesthesia. EPC cuffs  were on and functional. The patient was then prepped and draped in the usual sterile fashion. Once an appropriate time out had been performed, with all parties  consenting, a 25 Bulgarian cystoscope with a 30-degree lens was placed through  the urethra into the bladder. We evaluated the catheter thoroughly and there was no mass, which was seen on the previous CT scan. The right ureteral orifice was cannulated with a 5 Bulgarian ureteral catheter. Contrast was injected and the right ureter and renal pelvis were identified, with no abnormalities or filling defects.  The catheter was removed and placed in the left ureteral orifice. The procedure was repeated once again with no abnormalities or filling defects present. A thorough and complete cystoscopy was performed with no abnormalities involving the bladder or mucosa. The bladder was drained and the cystoscope was removed. The patient tolerated the procedure well and was sent to the PACU for postoperative monitoring.      Plan:  The patient was discharged home in stable condition with instructions to  follow up in clinic.     FU 6-8 weeks for BPH

## 2021-07-16 NOTE — PROGRESS NOTES
Admitted to PACU from OR  Report received from anesthesia. Lethargic but arouses easily. 86% on RA upon arrival--o2 at 2lpm per n/c applied. Encourage deep breaths. o2 sat up to 94%. Denies c/o pain.

## 2021-07-16 NOTE — ANESTHESIA PRE PROCEDURE
Department of Anesthesiology  Preprocedure Note       Name:  Usha De La Rosa   Age:  59 y.o.  :  1960                                          MRN:  [id number]         Date:  2021      Surgeon: Vicente Young):  Diana Aggarwal MD    Procedure: Procedure(s):  CYSTOSCOPY WITH BILAT RETROGRADE PHELOGRAM,  TRANSURETHRAL RESECTION BLADDER    Medications prior to admission:   Prior to Admission medications    Medication Sig Start Date End Date Taking? Authorizing Provider   FARXIGA 5 MG tablet TAKE 1 TABLET BY MOUTH EVERY MORNING 21  Yes Meg Barnes MD   omeprazole (PRILOSEC) 20 MG delayed release capsule TAKE 1 CAPSULE BY MOUTH ONCE DAILY 21  Yes Meg Barnes MD   bumetanide (BUMEX) 2 MG tablet TAKE 1 TABLET BY MOUTH EVERY MORNING ~301Q.5 TAKE 1/2 TABLET BY MOUTH EVERY EVENING 21  Yes Meg Barnes MD   allopurinol (ZYLOPRIM) 300 MG tablet TAKE 1 TABLET BY MOUTH ONCE DAILY 21  Yes Meg Barnes MD   losartan (COZAAR) 25 MG tablet TAKE 1 TABLET BY MOUTH ONCE DAILY 21  Yes Meg Barnes MD   traZODone (DESYREL) 50 MG tablet TAKE 1 TABLET BY MOUTH DAILY IN THE EVENING AS NEEDED FOR SLEEP 21  Yes Meg Barnes MD   metoprolol succinate (TOPROL XL) 25 MG extended release tablet TAKE 2 TABLETS BY MOUTH ONCE DAILY 21  Yes Meg Barnes MD   metFORMIN (GLUCOPHAGE) 500 MG tablet Take 2 tablets by mouth 2 times daily (with meals) 21  Yes Meg Barnes MD   isosorbide mononitrate (IMDUR) 30 MG extended release tablet TAKE 1 TABLET BY MOUTH ONCE DAILY.  21  Yes Alia Rock MD   tamsulosin (FLOMAX) 0.4 MG capsule Take 1 capsule by mouth daily Take one capsule daily to facilitate passage of ureteral stone 21  Yes ASHLEIGH May - CNP   pramipexole (MIRAPEX) 0.125 MG tablet TAKE 1 TABLET BY MOUTH EVERY EVENING. 21  Yes Meg Barnes MD   umeclidinium-vilanterol (ANORO ELLIPTA) 62.5-25 MCG/INH AEPB inhaler INHALE 1 PUFF INTO THE LUNGS ONE TIME DAILY 12/18/20  Yes Brisa Louise MD   aspirin 81 MG chewable tablet CHEW AND SWALLOW 1 TABLET BY MOUTH ONCE DAILY. 12/18/20  Yes Brisa Louise MD   ticagrelor (BRILINTA) 90 MG TABS tablet TAKE 1 TABLET BY MOUTH 2 TIMES A DAY 12/18/20  Yes Brisa Louise MD   buPROPion (WELLBUTRIN XL) 300 MG extended release tablet TAKE ONE TABLET BY MOUTH EVERY MORNING 12/18/20  Yes Brisa Louise MD   fluticasone (FLONASE) 50 MCG/ACT nasal spray USE 2 SPRAYS IN EACH NOSTRIL DAILY AS NEEDED FOR RHINITIS OR ALLERGIES 12/18/20  Yes Brisa Louise MD   albuterol sulfate  (90 Base) MCG/ACT inhaler INHALE TWO(2) PUFFS INTO LUNGS EVERY SIX(6) HOURS AS NEEDED FOR WHEEZING 6/9/21   Brisa Louise MD   blood glucose test strips (PRODIGY NO CODING BLOOD GLUC) strip USE TO TEST BLOOD GLUCOSE ONCE DAILY. 1/4/21   Brisa Louise MD   nitroGLYCERIN (NITROSTAT) 0.4 MG SL tablet Place 1 tablet under the tongue every 5 minutes as needed for Chest pain 12/18/20   Brisa Louise MD   Blood Glucose Monitoring Suppl (PRODIGY AUTOCODE BLOOD GLUCOSE) w/Device KIT  11/26/19   Historical Provider, MD       Current medications:    Current Facility-Administered Medications   Medication Dose Route Frequency Provider Last Rate Last Admin    0.9 % sodium chloride infusion   Intravenous Continuous Aleksandar Benoit  mL/hr at 07/16/21 1044 New Bag at 07/16/21 1044    ceFAZolin (ANCEF) 2000 mg in dextrose 5 % 50 mL IVPB  2,000 mg Intravenous 30 Min Pre-Op Aleksandar Benoit MD           Allergies:     Allergies   Allergen Reactions    Zoloft [Sertraline Hcl] Other (See Comments)     Headaches, sweats, bad dreams       Problem List:    Patient Active Problem List   Diagnosis Code    GERD (gastroesophageal reflux disease) K21.9    HTN (hypertension) I10    Osteoarthritis M19.90    Drug abuse in remission ([facility] [address] F19.11    Obstructive sleep apnea on CPAP G47.33, Z99.89    Coronary artery disease involving Right     Inguinal    PTCA  01/15/2018    ROTATOR CUFF REPAIR Right 2017    TONSILLECTOMY  child    TOTAL KNEE ARTHROPLASTY Left 10/24/2016    Dr. Kathrin Serrano       Social History:    Social History     Tobacco Use    Smoking status: Former Smoker     Packs/day: 2.00     Years: 25.00     Pack years: 50.00     Types: Cigars     Quit date: 2014     Years since quittin.6    Smokeless tobacco: Never Used   Substance Use Topics    Alcohol use: Not Currently     Alcohol/week: 0.0 standard drinks                                Counseling given: Not Answered      Vital Signs (Current):   Vitals:    21 1035   Weight: 260 lb (117.9 kg)   Height: 5' 11\" (1.803 m)                                              BP Readings from Last 3 Encounters:   21 118/64   21 110/60   21 116/77       NPO Status: Time of last liquid consumption: 830                        Time of last solid consumption:                         Date of last liquid consumption: 21                        Date of last solid food consumption: 07/15/21    BMI:   Wt Readings from Last 3 Encounters:   21 260 lb (117.9 kg)   21 271 lb 4.8 oz (123.1 kg)   21 270 lb (122.5 kg)     Body mass index is 36.26 kg/m².     CBC:   Lab Results   Component Value Date    WBC 10.7 2021    RBC 5.18 2021    HGB 16.6 2021    HCT 49.9 2021    MCV 96.3 2021    RDW 13.1 2019     2021       CMP:   Lab Results   Component Value Date     2021    K 3.9 2021    K 3.6 2021    CL 97 2021    CO2 29 2021    BUN 13 2021    CREATININE 0.9 2021    LABGLOM 85 2021    GLUCOSE 195 2021    GLUCOSE 115 2012    PROT 7.9 2021    CALCIUM 9.6 2021    BILITOT 0.5 2021    BILITOT NEGATIVE 2017    ALKPHOS 108 2021    AST 42 2021    ALT 65 2021       POC Tests: No results for input(s): POCGLU, POCNA, POCK, POCCL, POCBUN, POCHEMO, POCHCT in the last 72 hours. Coags:   Lab Results   Component Value Date    INR 1.03 07/16/2021    APTT 26.6 01/15/2018       HCG (If Applicable): No results found for: PREGTESTUR, PREGSERUM, HCG, HCGQUANT     ABGs: No results found for: PHART, PO2ART, SZT0LMO, UDY2TKP, BEART, D4VVIYKD     Type & Screen (If Applicable):  Lab Results   Component Value Date    LABRH POS 01/15/2018       Drug/Infectious Status (If Applicable):  Lab Results   Component Value Date    HEPCAB Negative 06/15/2017       COVID-19 Screening (If Applicable):   Lab Results   Component Value Date    COVID19 NEGATIVE 03/31/2021    COVID19 Not Detected 03/24/2021           Anesthesia Evaluation    Airway: Mallampati: II        Dental:          Pulmonary:   (+) COPD:  sleep apnea:                             Cardiovascular:    (+) hypertension:, CAD:, CABG/stent:, CHF:,       ECG reviewed      Echocardiogram reviewed                  Neuro/Psych:               GI/Hepatic/Renal:             Endo/Other:    (+) Diabetes, . Abdominal:             Vascular: Other Findings:             Anesthesia Plan      general     ASA 4       Induction: intravenous. Anesthetic plan and risks discussed with patient. Plan discussed with CRNA.                   Alfredo Addison MD   7/16/2021

## 2021-07-16 NOTE — TELEPHONE ENCOUNTER
Date of last visit:  6/7/2021  Date of next visit:  9/8/2021    Requested Prescriptions     Pending Prescriptions Disp Refills    atorvastatin (LIPITOR) 40 MG tablet 90 tablet 1     Sig: Take 1 tablet by mouth daily

## 2021-07-16 NOTE — PROGRESS NOTES
Dr. Samir Coleman notified of [address] [facility] of 202. No order noted. A/O x 4. States \"a little discomfort but not much. Don't need any pain meds at this time. \"

## 2021-07-16 NOTE — H&P
[facility]  Urology H&P Note     Patient:  Usha De La Rosa  MRN: [id number]  YOB: 1960    ATTENDING: Victoria Aguila MD     CHIEF COMPLAINT:  Hematuria, bladder mass    HISTORY OF PRESENT ILLNESS:   The patient is a 59 y.o. male who presents with Hematuria, bladder mass    Patient's old records, notes and chart reviewed and summarized above. Past Medical History:    Past Medical History:   Diagnosis Date    CHF (congestive heart failure) ([facility] [address]     COPD (chronic obstructive pulmonary disease) (HCC)     Coronary artery disease involving native coronary artery of native heart without angina pectoris     Depression     Diabetes mellitus type 2, diet-controlled ([facility] [address] 6/1/2018    GERD (gastroesophageal reflux disease) 3/21/2012    Hernia     Hx of blood clots 1990's    right knee due to injury    Hx of cocaine abuse ([facility] [address]     Hyperglycemia 09/09    Hyperlipidemia     Hypertension     FAISAL on CPAP     Osteoarthritis 11/07    S/P CABG x 2 07/06/2016    S/P PTCA: 1/15/2018: Stent diagonal branch Xience 3.0 x 12 mm. 1/15/2018    1/15/2018: Stent diagonal branch Xience 3.0 x 12 mm.  Dr. Mendoza Breakjose injury 08/2015    Right thumb cut with table saw, no treatment needed       Past Surgical History:    Past Surgical History:   Procedure Laterality Date    CARDIAC CATHETERIZATION  06/27/2016    CATARACT REMOVAL Right 2013    COLONOSCOPY  04/08    CORONARY ARTERY BYPASS GRAFT  07/06/2016    Double bypass, Dr. Irvin López Right 02/2007    Neck    DIAGNOSTIC CARDIAC CATH LAB PROCEDURE      EYE SURGERY Right     Retinal surgery x 3    HERNIA REPAIR Right     Inguinal    PTCA  01/15/2018    ROTATOR CUFF REPAIR Right 11/16/2017    TONSILLECTOMY  child    TOTAL KNEE ARTHROPLASTY Left 10/24/2016    Dr. Yessenia Mccord     Previous Urologic Surgery: none  Medications Prior to Admission:    Prior to Admission medications Medication Sig Start Date End Date Taking? Authorizing Provider   FARXIGA 5 MG tablet TAKE 1 TABLET BY MOUTH EVERY MORNING 7/12/21  Yes Rome Cooks, MD   omeprazole (PRILOSEC) 20 MG delayed release capsule TAKE 1 CAPSULE BY MOUTH ONCE DAILY 7/12/21  Yes Rome Cooks, MD   bumetanide (BUMEX) 2 MG tablet TAKE 1 TABLET BY MOUTH EVERY MORNING ~301Q.5 TAKE 1/2 TABLET BY MOUTH EVERY EVENING 7/12/21  Yes Rome Cooks, MD   allopurinol (ZYLOPRIM) 300 MG tablet TAKE 1 TABLET BY MOUTH ONCE DAILY 7/12/21  Yes Rome Cooks, MD   losartan (COZAAR) 25 MG tablet TAKE 1 TABLET BY MOUTH ONCE DAILY 7/12/21  Yes Rome Cooks, MD   traZODone (DESYREL) 50 MG tablet TAKE 1 TABLET BY MOUTH DAILY IN THE EVENING AS NEEDED FOR SLEEP 7/12/21  Yes Rome Cooks, MD   metoprolol succinate (TOPROL XL) 25 MG extended release tablet TAKE 2 TABLETS BY MOUTH ONCE DAILY 7/2/21  Yes Rome Cooks, MD   metFORMIN (GLUCOPHAGE) 500 MG tablet Take 2 tablets by mouth 2 times daily (with meals) 7/2/21  Yes Rome Cooks, MD   isosorbide mononitrate (IMDUR) 30 MG extended release tablet TAKE 1 TABLET BY MOUTH ONCE DAILY. 6/29/21  Yes Geetha Queen MD   tamsulosin (FLOMAX) 0.4 MG capsule Take 1 capsule by mouth daily Take one capsule daily to facilitate passage of ureteral stone 6/14/21  Yes ASHLEIGH Haley - CNP   pramipexole (MIRAPEX) 0.125 MG tablet TAKE 1 TABLET BY MOUTH EVERY EVENING. 1/29/21  Yes Rome Cooks, MD   umeclidinium-vilanterol (ANORO ELLIPTA) 62.5-25 MCG/INH AEPB inhaler INHALE 1 PUFF INTO THE LUNGS ONE TIME DAILY 12/18/20  Yes Rome Cooks, MD   aspirin 81 MG chewable tablet CHEW AND SWALLOW 1 TABLET BY MOUTH ONCE DAILY.  12/18/20  Yes Rome Cooks, MD   ticagrelor (BRILINTA) 90 MG TABS tablet TAKE 1 TABLET BY MOUTH 2 TIMES A DAY 12/18/20  Yes Rome Cooks, MD   buPROPion (WELLBUTRIN XL) 300 MG extended release tablet TAKE ONE TABLET BY MOUTH EVERY MORNING 12/18/20  Yes Rome Cooks, MD fluticasone (FLONASE) 50 MCG/ACT nasal spray USE 2 SPRAYS IN EACH NOSTRIL DAILY AS NEEDED FOR RHINITIS OR ALLERGIES 20  Yes Seth Carpenter MD   atorvastatin (LIPITOR) 40 MG tablet Take 1 tablet by mouth daily 21   Seth Carpenter MD   albuterol sulfate  (90 Base) MCG/ACT inhaler INHALE TWO(2) PUFFS INTO LUNGS EVERY SIX(6) HOURS AS NEEDED FOR WHEEZING 21   Seth Carpenter MD   blood glucose test strips (PRODIGY NO CODING BLOOD GLUC) strip USE TO TEST BLOOD GLUCOSE ONCE DAILY. 21   Seth Carpenter MD   nitroGLYCERIN (NITROSTAT) 0.4 MG SL tablet Place 1 tablet under the tongue every 5 minutes as needed for Chest pain 20   Seth Carpenter MD   Blood Glucose Monitoring Suppl (PRODIGY AUTOCODE BLOOD GLUCOSE) w/Device KIT  19   Historical Provider, MD       Allergies:  Zoloft [sertraline hcl]    Social History:    Social History     Socioeconomic History    Marital status:      Spouse name: Not on file    Number of children: Not on file    Years of education: Not on file    Highest education level: Not on file   Occupational History    Not on file   Tobacco Use    Smoking status: Former Smoker     Packs/day: 2.00     Years: 25.00     Pack years: 50.00     Types: Cigars     Quit date: 2014     Years since quittin.6    Smokeless tobacco: Never Used   Vaping Use    Vaping Use: Never used   Substance and Sexual Activity    Alcohol use: Not Currently     Alcohol/week: 0.0 standard drinks    Drug use: Yes     Frequency: 5.0 times per week     Types: Marijuana     Comment: smokes pot once sushma while . former drug user, addiction treatment at [facility]    Sexual activity: Yes     Partners: Female   Other Topics Concern    Not on file   Social History Narrative    Not on file     Social Determinants of Health     Financial Resource Strain: Low Risk     Difficulty of Paying Living Expenses: Not hard at all   Food Insecurity: No Food Insecurity    Worried About Running Out of Food in the Last Year: Never true    Ran Out of Food in the Last Year: Never true   Transportation Needs:     Lack of Transportation (Medical):  Lack of Transportation (Non-Medical):    Physical Activity:     Days of Exercise per Week:     Minutes of Exercise per Session:    Stress:     Feeling of Stress :    Social Connections:     Frequency of Communication with Friends and Family:     Frequency of Social Gatherings with Friends and Family:     Attends [employer] Services:     Active Member of Clubs or Organizations:     Attends Club or Organization Meetings:     Marital Status:    Intimate Partner Violence:     Fear of Current or Ex-Partner:     Emotionally Abused:     Physically Abused:     Sexually Abused:        Family History:    Family History   Problem Relation Age of Onset    Heart Disease Mother     High Blood Pressure Mother     Obesity Mother     Diabetes Mother     Heart Disease Father     Cancer Father         colon/prostate    Colon Cancer Father     Prostate Cancer Father     Arthritis Father     Diabetes Brother      Previous Urologic Family history: none  REVIEW OF SYSTEMS:  All systems reviewed and negative except for that already noted in the HPI. Physical Exam:      No data found. Constitutional: Patient in no acute distress; Neuro: alert and oriented to person place and time. Psych: Mood and affect normal.  Skin: Normal  Lungs: Respiratory effort normal  Cardiovascular:  Normal peripheral pulses  Abdomen: Soft, non-tender, non-distended with no CVA, flank pain, hepatosplenomegaly or hernia. Kidneys normal.  Bladder non-tender and not distended. Lymphatics: no palpable lymphadenopathy         LABS:   No results for input(s): WBC, HGB, HCT, MCV, PLT in the last 72 hours.   Recent Labs     07/16/21  1019   K 3.9     Lab Results   Component Value Date    PSA 3.08 (H) 02/12/2021    PSA 2.24 (H) 06/05/2019    PSA 1.75 02/20/2017       Additional Lab/culture results:    Urinalysis: No results for input(s): COLORU, PHUR, LABCAST, WBCUA, RBCUA, MUCUS, TRICHOMONAS, YEAST, BACTERIA, CLARITYU, SPECGRAV, LEUKOCYTESUR, UROBILINOGEN, BILIRUBINUR, BLOODU in the last 72 hours. Invalid input(s): NITRATE, GLUCOSEUKETONESUAMORPHOUS     -----------------------------------------------------------------  Imaging Results:    Assessment and Plan   Impression:    Patient Active Problem List   Diagnosis    GERD (gastroesophageal reflux disease)    HTN (hypertension)    Osteoarthritis    [facility] in remission ([facility] [address]    Obstructive sleep apnea on CPAP    Coronary artery disease involving native coronary artery of native heart without angina pectoris    S/P CABG x 2    Aftercare following joint replacement surgery    Bradycardia    S/P PTCA: 1/15/2018: Stent diagonal branch Xience 3.0 x 12 mm.  Chronic obstructive pulmonary disease (HCC)    Diabetes mellitus type 2, [facility]    Obesity (BMI 30-39. 9)    CHF (congestive heart failure), NYHA class II, chronic, diastolic (HCC)    Class 2 severe obesity due to excess calories with serious comorbidity and body mass index (BMI) of 37.0 to 37.9 in adult [facility])    Major depressive disorder, recurrent episode, moderate (HCC)    GABY (generalized anxiety disorder)       Plan:  To OR for cysto BL [facility]

## 2021-07-16 NOTE — PROGRESS NOTES
ADMITTED TO SDS AND ORIENTED TO UNIT. SCDS ON. FALL AND ALLERGY BANDS ON. PT VERBALIZED APPROVAL FOR FIRST NAME, LAST INITIAL AND PHYSICIAN NAME ON UNIT WHITEBOARD.   Patient to call son for ride home

## 2021-07-16 NOTE — TELEPHONE ENCOUNTER
[facility] called for a refill of the following medication:    atorvastatin (LIPITOR) 40 MG tablet QD    Send 90 day Supply to [facility]

## 2021-07-26 RX ORDER — METOPROLOL SUCCINATE 25 MG/1
TABLET, EXTENDED RELEASE ORAL
Qty: 60 TABLET | Refills: 1 | Status: SHIPPED | OUTPATIENT
Start: 2021-07-26 | End: 2021-12-23

## 2021-08-01 ASSESSMENT — COPD QUESTIONNAIRES: COPD: 1

## 2021-08-02 NOTE — PROGRESS NOTES
last visit. He states that he does not believe that it helps. Patient reports physical limitation due to respiratory symptoms and his bad shoulder and knee. PMH significant for COPD, centrilobular emphysema, allergic rhinitis (on flonase), CHF, CAD, history of blood clots, depression, DM2, HLD, HTN, FAISAL on CPAP, OA, and S/P CABG. MMRC Dyspnea Scale:   0: Dyspneic on strenuous exercise  1: Dyspneic on walking a slight hill  2: Dyspneic on walking level ground; must stop occasionally due to breathlessness  3: Must stop for breathlessness after walking 100 yards or after a few minutes  4: Cannot leave house; breathless on dressing/undressing    MMRC dyspnea score: 2    Progress History:   Since last visit any new medical issues? No  New ER or hospital visits? Yes within the last two months had hematuria - resolved now, not for breathing issues  Any new or changes in medicines? Yes Started flomax  Using inhalers? Yes Anoro  Are they helpful? Yes   Any recent exacerbations? No  Last PFT: 6/29/2021  Last 6 MWT: 6/25/2020, did not qualify for supplemental O2  A1AT: MM - normal alleles    Follows with Nadia Barnes PA-C for FAISAL on CPAP    Substance Use History:  Tobacco: 50 pack year, quit in 2014  History of cocaine abuse and marijuana use    Social History:  Patient job history: Retired,  in the past  He has had exposure to aerosolized particles or hazardous fumes. (Coal, dust, asbestos, molds ie Hay)  denies living on a farm that primarily raised crops, livestock or combination  has exposure to pets/animals at home. 2 dogs  denies exposure to tuberculosis.     Pneumonia vaccine: [id number] 6/5/2019   COVID-19 vaccine: Vaccinated  Past Medical hx   PMH:  Past Medical History:   Diagnosis Date    [facility]est[facility] failure) [facility] [address]     COPD (chronic obstructive pulmonary disease) (HCC)     Coronary artery disease involving native coronary artery of native heart without angina pectoris     Depression     Diabetes mellitus type 2, diet-controlled ([facility] [address] 2018    GERD (gastroesophageal reflux disease) 3/21/2012    Hernia     Hx of blood clots     right knee due to injury    Hx of cocaine abuse ([facility] [address]     Hyperglycemia     Hyperlipidemia     Hypertension     FAISAL on CPAP     Osteoarthritis     S/P CABG x 2 2016    S/P PTCA: 1/15/2018: Stent diagonal branch Xience 3.0 x 12 mm. 1/15/2018    1/15/2018: Stent diagonal branch Xience 3.0 x 12 mm. Dr. Nydia Sandhoff injury 2015    Right thumb cut with table saw, no treatment needed     SURGICAL HISTORY:  Past Surgical History:   Procedure Laterality Date    CARDIAC CATHETERIZATION  2016    CATARACT REMOVAL Right     COLONOSCOPY      CORONARY ARTERY BYPASS GRAFT  2016    Double bypass, Dr. Elizalde Exon Right 2007    Neck    CYSTOSCOPY N/A 2021    CYSTOSCOPY WITH BILAT RETROGRADE PyELOGRAM performed by Jameel Kerr MD at [facility] CATH LAB PROCEDURE      EYE SURGERY Right     Retinal surgery x 3    HERNIA REPAIR Right     Inguinal    PTCA  01/15/2018    ROTATOR CUFF REPAIR Right 2017    TONSILLECTOMY  child    TOTAL KNEE ARTHROPLASTY Left 10/24/2016    Dr. Loren Taylor:  Social History     Tobacco Use    Smoking status: Former Smoker     Packs/day: 2.00     Years: 25.00     Pack years: 50.00     Types: Cigars     Quit date: 2014     Years since quittin.7    Smokeless tobacco: Never Used   Vaping Use    Vaping Use: Never used   Substance Use Topics    Alcohol use: Not Currently     Alcohol/week: 0.0 standard drinks    Drug use: Yes     Frequency: 5.0 times per week     Types: Marijuana     Comment: smokes pot once sushma while . former drug user, addiction treatment at [address]:  Allergies   Allergen Reactions    Zoloft [Sertraline Hcl] Other (See Comments)     Headaches, sweats, bad dreams     FAMILY HISTORY:  Family History   Problem Relation Age of Onset    Heart Disease Mother     High Blood Pressure Mother     Obesity Mother     Diabetes Mother     Heart Disease Father     Cancer Father         colon/prostate    Colon Cancer Father     Prostate Cancer Father     Arthritis Father     Diabetes Brother      CURRENT MEDICATIONS:  Current Outpatient Medications   Medication Sig Dispense Refill    guaiFENesin (MUCINEX) 600 MG extended release tablet Take 1 tablet by mouth 2 times daily 60 tablet 1    umeclidinium-vilanterol (ANORO ELLIPTA) 62.5-25 MCG/INH AEPB inhaler INHALE 1 PUFF INTO THE LUNGS ONE TIME DAILY 1 each 11    fluticasone (ARNUITY ELLIPTA) 100 MCG/ACT AEPB Inhale 100 mcg into the lungs daily Inhale 1 puff into the lungs daily 30 each 11    metoprolol succinate (TOPROL XL) 25 MG extended release tablet TAKE TWO (2) TABLETS BY MOUTH EVERY DAY 60 tablet 1    atorvastatin (LIPITOR) 40 MG tablet Take 1 tablet by mouth daily 90 tablet 1    FARXIGA 5 MG tablet TAKE 1 TABLET BY MOUTH EVERY MORNING 30 tablet 1    omeprazole (PRILOSEC) 20 MG delayed release capsule TAKE 1 CAPSULE BY MOUTH ONCE DAILY 30 capsule 1    bumetanide (BUMEX) 2 MG tablet TAKE 1 TABLET BY MOUTH EVERY MORNING ~301Q.5 TAKE 1/2 TABLET BY MOUTH EVERY EVENING 45 tablet 1    allopurinol (ZYLOPRIM) 300 MG tablet TAKE 1 TABLET BY MOUTH ONCE DAILY 30 tablet 1    losartan (COZAAR) 25 MG tablet TAKE 1 TABLET BY MOUTH ONCE DAILY 30 tablet 1    traZODone (DESYREL) 50 MG tablet TAKE 1 TABLET BY MOUTH DAILY IN THE EVENING AS NEEDED FOR SLEEP 30 tablet 0    metFORMIN (GLUCOPHAGE) 500 MG tablet Take 2 tablets by mouth 2 times daily (with meals) 28 tablet 0    isosorbide mononitrate (IMDUR) 30 MG extended release tablet TAKE 1 TABLET BY MOUTH ONCE DAILY.  30 tablet 3    tamsulosin (FLOMAX) 0.4 MG capsule Take 1 capsule by mouth daily Take one capsule daily to facilitate passage of ureteral stone 30 capsule 3    albuterol sulfate  (90 Base) MCG/ACT inhaler INHALE TWO(2) PUFFS INTO LUNGS EVERY SIX(6) HOURS AS NEEDED FOR WHEEZING 18 g 3    pramipexole (MIRAPEX) 0.125 MG tablet TAKE 1 TABLET BY MOUTH EVERY EVENING. 90 tablet 0    blood glucose test strips (PRODIGY NO CODING BLOOD GLUC) strip USE TO TEST BLOOD GLUCOSE ONCE DAILY. 100 each 0    aspirin 81 MG chewable tablet CHEW AND SWALLOW 1 TABLET BY MOUTH ONCE DAILY. 90 tablet 1    ticagrelor (BRILINTA) 90 MG TABS tablet TAKE 1 TABLET BY MOUTH 2 TIMES A  tablet 1    buPROPion (WELLBUTRIN XL) 300 MG extended release tablet TAKE ONE TABLET BY MOUTH EVERY MORNING 90 tablet 1    fluticasone (FLONASE) 50 MCG/ACT nasal spray USE 2 SPRAYS IN EACH NOSTRIL DAILY AS NEEDED FOR RHINITIS OR ALLERGIES 48 g 5    nitroGLYCERIN (NITROSTAT) 0.4 MG SL tablet Place 1 tablet under the tongue every 5 minutes as needed for Chest pain 25 tablet 3    Blood Glucose Monitoring Suppl (PRODIGY AUTOCODE BLOOD GLUCOSE) w/Device KIT        No current facility-administered medications for this visit. [facility]   Review of Systems   Constitutional: Negative for appetite change, chills, fever, unexpected weight change and weight loss. HENT: Negative for congestion, postnasal drip, rhinorrhea, sinus pressure, sinus pain and sneezing. Eyes: Negative for itching. Respiratory: Positive for cough, sputum production and shortness of breath. Negative for hemoptysis, chest tightness and wheezing. Cardiovascular: Positive for dyspnea on exertion. Negative for chest pain, palpitations and leg swelling. Gastrointestinal: Negative for constipation, diarrhea and nausea. Genitourinary:        History of hematuria - resolved now   Allergic/Immunologic: Negative for environmental allergies.         Physical exam   /78 (Site: Right Upper Arm, Position: Sitting, Cuff Size: Large Adult)   Pulse 78   Temp 97.5 °F (36.4 °C)   Ht 5' 11\" (1.803 m)   Wt 272 lb 9.6 oz (123.7 kg)   SpO2 98% Comment: on r/a  BMI 38.02 kg/m²    Wt Readings from Last 3 Encounters:   08/06/21 272 lb 9.6 oz (123.7 kg)   07/12/21 260 lb (117.9 kg)   06/24/21 271 lb 4.8 oz (123.1 kg)       Physical Exam  Constitutional:       General: He is not in acute distress. Comments: BMI 38   HENT:      Head: Normocephalic and atraumatic. Right Ear: External ear normal.      Left Ear: External ear normal.      Nose: No congestion or rhinorrhea. Mouth/Throat:      Mouth: Mucous membranes are moist.      Pharynx: No oropharyngeal exudate or posterior oropharyngeal erythema. Eyes:      General:         Right eye: No discharge. Left eye: No discharge. Cardiovascular:      Rate and Rhythm: Normal rate and regular rhythm. Pulses: Normal pulses. Heart sounds: Normal heart sounds. Pulmonary:      Effort: Pulmonary effort is normal.      Breath sounds: No wheezing, rhonchi or rales. Comments: Diminished in bilateral posterior lower lobes  Chest:      Chest wall: No tenderness. Abdominal:      General: Bowel sounds are normal.      Palpations: Abdomen is soft. Tenderness: There is no abdominal tenderness. There is no guarding. Musculoskeletal:      Cervical back: Neck supple. Right lower leg: No edema. Left lower leg: No edema. Skin:     Capillary Refill: Capillary refill takes less than 2 seconds. Neurological:      General: No focal deficit present. Mental Status: He is alert. Psychiatric:         Mood and Affect: Mood normal.         Behavior: Behavior normal.         Thought Content: Thought content normal.          Results   Lung Nodule Screening     [x] Qualifies    [] Does not qualify   [] Declined    [] Completed  50 pack year history, quit in 2014   The USPSTF recommends annual screening for lung cancer with low-dose computed tomography (LDCT) in adults aged 48 to 80 years who have a 20 pack-year smoking history and currently smoke or have quit within the past 15 years. Screening should be discontinued once a person has not smoked for 15 years or develops a health problem that substantially limits life expectancy or the ability or willingness to have curative lung surgery. HRCT 6/18/2021 (Reviewed) Centrilobular emphysematous changes, atelectatic changes, and bronchiectatic changes  Narrative   PROCEDURE: CT CHEST HIGH RESOLUTION       CLINICAL INFORMATION: Stage 3 severe COPD by GOLD classification ([address] Bronchiectasis without complication [facility])        COMPARISON: June 17, 2020 high-resolution CT of the chest TECHNIQUE:    1 mm axial imaging from the lung apices to the lung bases every 15 mm with lung enhanced reconstruction. 5 mm axial imaging was then performed from above the lung apices through the adrenal glands without IV contrast.        All CT scans at this facility use dose modulation, iterative reconstruction, and/or weight based dosing when appropriate to reduce the radiation dose to as low as reasonably achievable.           FINDINGS:    LUNGS:       There is bibasilar bronchiectatic changes with mild atelectatic changes in the left lower lung field which are unchanged in comparison to the prior study. There is central lobular emphysematous changes seen slightly more pronounced in the upper lung    fields unchanged in comparison to the prior study. HEART:   1. Normal size. 2. No pericardial fluid or thickening.       PULMONARY ARTERIES:   1. The unopacified pulmonary arteries are normal.       THORACIC AORTA:   1. The unopacified thoracic aorta is normal.       MASSES/NODULES:   1.None.       LYMPHADENOPATHY:   1. No pathologically enlarged lymphadenopathy.       PNEUMOTHORAX:   1. None.       THYROID:   1. Normal.       MUSCULOSKELETAL:   1. No acute musculoskeletal abnormality.       IMAGED UPPER ABDOMEN:   1.  There is fatty infiltration of the liver.             Impression   Mild to moderate centrilobular emphysematous changes seen most marked within the upper lung fields.       Bronchiectatic changes seen in the lower lung fields worse on the left and mild atelectatic changes seen within the left lower lobe unchanged in comparison to the prior study.               **This report has been created using voice recognition software.  It may contain minor errors which are inherent in voice recognition technology. **       Final report electronically signed by Dr Gerhard Neil on 6/18/2021 1:45 PM           Full PFT 6/29/2021 (Reviewed) FEV1 has worsened, is now Severe Stage 3 COPD, decreased diffusing capacity              Assessment      Diagnosis Orders   1. Stage 3 severe COPD by GOLD classification [facility])  [facility]    guaiFENesin (MUCINEX) 600 MG extended release tablet    umeclidinium-vilanterol (ANORO ELLIPTA) 62.5-25 MCG/INH AEPB inhaler    fluticasone (ARNUITY ELLIPTA) 100 MCG/ACT AEPB    DISCONTINUED: fluticasone (ARNUITY ELLIPTA) 100 MCG/ACT AEPB   2. Centrilobular emphysema (HCC)  [facility]    guaiFENesin (MUCINEX) 600 MG extended release tablet    umeclidinium-vilanterol (ANORO ELLIPTA) 62.5-25 MCG/INH AEPB inhaler    fluticasone (ARNUITY ELLIPTA) 100 MCG/ACT AEPB    DISCONTINUED: fluticasone (ARNUITY ELLIPTA) 100 MCG/ACT AEPB   3. Decreased diffusion capacity of lung  [facility]   4. Bronchiectasis without complication [facility]        A1AT MM - normal alleles  Plan   1. Stage 3 severe COPD by GOLD classification ([facility] [address]  - Continue Anoro, refill sent in to his pharmacy  - Add fluticasone (ARNUITY ELLIPTA) 100 MCG/ACT AEPB; Inhale 100 mcg into the lungs daily  Dispense: 30 each; Refill: 11  - Education provided to rinse and spit after arnuity use and to call if any side effects to the inhaler or with any issues with obtaining the inhaler due to cost  - Referral to [facility] ER  - guaiFENesin (MUCINEX) 600 MG extended release tablet;  Take 1 tablet by mouth 2 times daily  Dispense: 60 tablet; Refill: 1  - Advised patient to keep his rescue inhaler with him at all times. He declines needing refills. - Offered [id number] vaccination in office today, patient elected to receive from his PCP. Advised to take flu vaccine this coming season. 2. Centrilobular emphysema ([address]  - Continue Anoro with addition of Arnuity 100 mcg  - Referral to [facility]  - guaiFENesin (MUCINEX) 600 MG extended release tablet; Take 1 tablet by mouth 2 times daily  Dispense: 60 tablet; Refill: 1  - fluticasone (ARNUITY ELLIPTA) 100 MCG/ACT AEPB; Inhale 100 mcg into the lungs daily  Dispense: 30 each; Refill: 11    3. Decreased diffusion capacity of lung  [facility]    4. Bronchiectasis without complication [facility])  - Patient reports that he has 2 incentive spirometers at home. Advised that he begin to use the incentive spirometer in the morning upon waking, when sitting and resting, and prior to bedtime.  - Referral to [facility] for productive daily cough     Will order LDCT for annual screening at next appointment, due on 6/18/2022. Advised patient to call office with any changes, questions, or concerns regarding respiratory status or issues with prescribed medications    Return in about 3 months (around 11/6/2021) for COPD med check.     Electronically signed by Glean Ahumada, APRN - CNP on 8/6/2021 at 8:55 AM

## 2021-08-06 ENCOUNTER — OFFICE VISIT (OUTPATIENT)
Dept: PULMONOLOGY | Age: 61
End: 2021-08-06
Payer: MEDICARE

## 2021-08-06 VITALS
BODY MASS INDEX: 38.16 KG/M2 | DIASTOLIC BLOOD PRESSURE: 78 MMHG | SYSTOLIC BLOOD PRESSURE: 123 MMHG | TEMPERATURE: 97.5 F | OXYGEN SATURATION: 98 % | HEART RATE: 78 BPM | WEIGHT: 272.6 LBS | HEIGHT: 71 IN

## 2021-08-06 DIAGNOSIS — J47.9 BRONCHIECTASIS WITHOUT COMPLICATION (HCC): ICD-10-CM

## 2021-08-06 DIAGNOSIS — R94.2 DECREASED DIFFUSION CAPACITY OF LUNG: ICD-10-CM

## 2021-08-06 DIAGNOSIS — J44.9 STAGE 3 SEVERE COPD BY GOLD CLASSIFICATION (HCC): Primary | ICD-10-CM

## 2021-08-06 DIAGNOSIS — J43.2 CENTRILOBULAR EMPHYSEMA (HCC): ICD-10-CM

## 2021-08-06 PROCEDURE — 99214 OFFICE O/P EST MOD 30 MIN: CPT

## 2021-08-06 RX ORDER — GUAIFENESIN 600 MG/1
600 TABLET, EXTENDED RELEASE ORAL 2 TIMES DAILY
Qty: 60 TABLET | Refills: 1 | Status: SHIPPED | OUTPATIENT
Start: 2021-08-06 | End: 2021-10-05

## 2021-08-06 RX ORDER — FLUTICASONE FUROATE 100 UG/1
100 POWDER RESPIRATORY (INHALATION) DAILY
Qty: 30 EACH | Refills: 11 | Status: SHIPPED | OUTPATIENT
Start: 2021-08-06 | End: 2022-07-20

## 2021-08-06 RX ORDER — FLUTICASONE FUROATE 100 UG/1
100 POWDER RESPIRATORY (INHALATION) DAILY
Qty: 30 EACH | Refills: 11 | Status: SHIPPED | OUTPATIENT
Start: 2021-08-06 | End: 2021-08-06

## 2021-08-06 RX ORDER — UMECLIDINIUM BROMIDE AND VILANTEROL TRIFENATATE 62.5; 25 UG/1; UG/1
POWDER RESPIRATORY (INHALATION)
Qty: 1 EACH | Refills: 11 | Status: SHIPPED | OUTPATIENT
Start: 2021-08-06 | End: 2022-07-20

## 2021-08-06 ASSESSMENT — ENCOUNTER SYMPTOMS
SHORTNESS OF BREATH: 1
RHINORRHEA: 0
SINUS PRESSURE: 0
CONSTIPATION: 0
EYE ITCHING: 0
WHEEZING: 0
COUGH: 1
CHEST TIGHTNESS: 0
SPUTUM PRODUCTION: 1
DIARRHEA: 0
NAUSEA: 0
SINUS PAIN: 0
HEMOPTYSIS: 0

## 2021-08-06 NOTE — PATIENT INSTRUCTIONS
Patient Education   I am adding Arnuity, which is an inhaler that contains a steroid. You will need to rinse and spit after using this inhaler. This is in addition to your Anoro. I am also starting mucinex to help with your cough to help thin your mucus. Use your incentive spirometer at home. This can be used when sitting, in the morning when you wake up, and at bedtime. Learning About COPD Triggers  What are triggers? When you have COPD (chronic obstructive pulmonary disease), certain things can make your symptoms worse. These are called triggers. They include:  · Cigarette smoke or air pollution. · Illnesses like colds, flu, or pneumonia. · Cleaning supplies or other chemicals. · Gases, particles, or fumes from wood or kerosene home heaters. Not all people have the same triggers. What may cause symptoms in one person may not be a problem for another person. How do triggers affect COPD? Triggers can make it harder for your lungs to work as they should and can lead to sudden difficulty breathing and other symptoms. When you are around a trigger, a COPD flare-up is more likely. If your symptoms are severe, you may need emergency treatment or have to go to the hospital for treatment. If you know what your triggers are and can avoid them, you can reduce how often you have flare-ups and how much COPD affects your life. It's also important to be active and to take your daily medicines as prescribed. This helps prevent flare-ups too. What can you do to avoid triggers? The first thing is to know your triggers. When you are having symptoms, note the things around you that might be causing them. Then look for patterns in what may be triggering your symptoms. When you have your list of possible triggers, work with your doctor to find ways to avoid them. Here are some ways to avoid a few common triggers. · Do not smoke or allow others to smoke around you.  If you need help quitting, talk to your doctor about stop-smoking programs and medicines. These can increase your chances of quitting for good. · If there is a lot of pollution, pollen, or dust outside, stay at home and keep your windows closed. Use an air conditioner or air filter in your home. Check your local weather report or newspaper for air quality and pollen reports. · Get a flu vaccine every year. Talk to your doctor about getting a pneumococcal shot. Wash your hands often to prevent infections. How can you manage a flare-up? Do not panic if you start to have a COPD flare-up. If you have a COPD action plan, follow the plan. In general:  · Use your quick-relief inhaler as directed by your doctor. If your symptoms do not get better after you use your medicine, have someone take you to the emergency room. Call an ambulance if needed. · Use a spacer with your metered-dose inhaler (MDI). If you have a nebulizer for inhaled medicine, use it. A spacer or nebulizer may help get more medicine to your lungs. · If your doctor has given you other inhaled medicines or steroid pills, take them as directed. · If your doctor has given you a prescription for an antibiotic, fill it if you need to. · Call your doctor if you have to use your antibiotic or steroid pills. Where can you learn more? Go to [email or web address] org and sign in to your [employer] account. Enter I855 in the [facility] box to learn more about \"Learning About COPD Triggers. \"     If you do not have an account, please click on the \"Sign Up Now\" link. Current as of: October 26, 2020               Content Version: 12.9  © 7332-1710 [employer], Inc[employer]. Care instructions adapted under license by [facility] ([facility]). If you have questions about a medical condition or this instruction, always ask your healthcare professional. [place] [address] any warranty or liability for your use of this information.

## 2021-08-16 DIAGNOSIS — I10 ESSENTIAL HYPERTENSION: ICD-10-CM

## 2021-08-16 RX ORDER — LOSARTAN POTASSIUM 25 MG/1
TABLET ORAL
Qty: 30 TABLET | Refills: 1 | Status: SHIPPED | OUTPATIENT
Start: 2021-08-16 | End: 2021-11-26

## 2021-08-16 RX ORDER — BUMETANIDE 2 MG/1
TABLET ORAL
Qty: 45 TABLET | Refills: 1 | Status: SHIPPED | OUTPATIENT
Start: 2021-08-16 | End: 2021-11-26

## 2021-08-16 NOTE — TELEPHONE ENCOUNTER
Date of last visit:  6/7/2021  Date of next visit:  9/8/2021    Requested Prescriptions     Pending Prescriptions Disp Refills    losartan (COZAAR) 25 MG tablet [Pharmacy Med Name: LOSARTAN 25MG TABLET 25 Tablet] 30 tablet 1     Sig: TAKE 1 TABLET BY MOUTH ONCE DAILY    bumetanide (BUMEX) 2 MG tablet [Pharmacy Med Name: BUMETANIDE 2 MG TABS 2 Tablet] 45 tablet 1     Sig: TAKE 1 TABLET BY MOUTH EVERY MORNING ~301Q.5 TAKE 1/2 TABLET BY MOUTH EVERY EVENING

## 2021-08-17 ENCOUNTER — TELEPHONE (OUTPATIENT)
Dept: RESPIRATORY THERAPY | Age: 61
End: 2021-08-17

## 2021-08-17 DIAGNOSIS — F33.1 MAJOR DEPRESSIVE DISORDER, RECURRENT EPISODE, MODERATE (HCC): ICD-10-CM

## 2021-08-20 RX ORDER — TRAZODONE HYDROCHLORIDE 50 MG/1
TABLET ORAL
Qty: 30 TABLET | Refills: 0 | Status: SHIPPED | OUTPATIENT
Start: 2021-08-20 | End: 2021-09-17

## 2021-08-24 ENCOUNTER — TELEPHONE (OUTPATIENT)
Dept: RESPIRATORY THERAPY | Age: 61
End: 2021-08-24

## 2021-08-24 NOTE — PLAN OF CARE
PHYSICIAN PULMONARY REHABILITATION ORDER  Medical Director:  Dr. Sophia Serrano MD    (X[facility]  [facility] supervised exercise with SpO2 / HR monitoring and Oxygen Titration (if necessary) each session, twice weekly, with individualized education sessions. Patient Name: Jesi Burden  : 1960  Referring Physician: Dr. Alysa Hoyt, APRN-CNP   Date: 2021    Medically Necessary Pulmonary Rehab for:  Severe COPD (from my dictation or the PFT report), which is GOLD Stage 3. Physician Prescribed Exercise:  Length of program:  18 weeks, up to 36 sessions, subject to insurance limitations. Program to include aerobic endurance conditioning, resistance training (RT), and flexibility training, and education (all relevant topics including psychosocial assessment). FITT Principles + Progression for Exercise Prescription (also found on the ITP):     Frequency: 2x / wk for 36 sessions    Intensity:   Initial MET level calculated from Initial 6MWT (Feet achieved converted to METs)   Type:        Aerobic and Strength (Treadmill, AD, NuStep, UBE, RT)   Time:      Each session:  31-91 min. of Treatment; Aerobic, RT, Rest breaks and Education  Progression:  1-2 minute increase in Time, per Type, per session, 5-20% increase in Intensity per week if SpO2 >87% AND Anuj RPE/RPD is <5      Note:  These are guidelines, the Pulmonary Rehab staff may adjust the treatment to suit the patient's individual needs, goals, oxygen saturation and functional level. Plan of Care:  Pulmonary Rehab aerobic endurance and strength training sessions for a total of 31-91 min/day, including Education time, 2 days/week with suggested supplemented matching minutes of walking at home on most days not participating in Pulmonary Rehab. Patient is willing to cooperate and participate in the plan of care.  Patient is physically able, motivated, and willing to participate in

## 2021-08-24 NOTE — TELEPHONE ENCOUNTER
PULMONARY REHABILITATION REFERRAL  COPD Exacerbation    Pulmonary Rehab Evaluation order received. Spoke with patient about the benefits of supervised comfortable, progressive exercise with oxygen saturation monitoring and pulmonary education. Called pt at this time. Insurance was already checked. I gave pt the insurance information and pt wants to set up first appt. First appt set up for 9/14/21 at 11am.  Pt knows to come to the heart center on 2K for the appt and knows to bring a list of his home meds. Pt had no questions at this time.

## 2021-09-08 ENCOUNTER — OFFICE VISIT (OUTPATIENT)
Dept: FAMILY MEDICINE CLINIC | Age: 61
End: 2021-09-08

## 2021-09-08 VITALS
SYSTOLIC BLOOD PRESSURE: 134 MMHG | DIASTOLIC BLOOD PRESSURE: 72 MMHG | HEART RATE: 76 BPM | BODY MASS INDEX: 36.57 KG/M2 | RESPIRATION RATE: 16 BRPM | TEMPERATURE: 96.5 F | WEIGHT: 261.25 LBS | HEIGHT: 71 IN

## 2021-09-08 DIAGNOSIS — E78.5 HYPERLIPIDEMIA, UNSPECIFIED HYPERLIPIDEMIA TYPE: ICD-10-CM

## 2021-09-08 DIAGNOSIS — M54.50 CHRONIC BILATERAL LOW BACK PAIN WITHOUT SCIATICA: ICD-10-CM

## 2021-09-08 DIAGNOSIS — F33.1 MAJOR DEPRESSIVE DISORDER, RECURRENT EPISODE, MODERATE (HCC): ICD-10-CM

## 2021-09-08 DIAGNOSIS — F10.10 ALCOHOL ABUSE: ICD-10-CM

## 2021-09-08 DIAGNOSIS — I10 ESSENTIAL HYPERTENSION: ICD-10-CM

## 2021-09-08 DIAGNOSIS — G89.29 CHRONIC BILATERAL LOW BACK PAIN WITHOUT SCIATICA: ICD-10-CM

## 2021-09-08 DIAGNOSIS — E11.65 UNCONTROLLED TYPE 2 DIABETES MELLITUS WITH HYPERGLYCEMIA (HCC): Primary | ICD-10-CM

## 2021-09-08 DIAGNOSIS — R26.81 UNSTABLE GAIT: ICD-10-CM

## 2021-09-08 DIAGNOSIS — M54.6 ACUTE LEFT-SIDED THORACIC BACK PAIN: ICD-10-CM

## 2021-09-08 PROBLEM — E66.01 CLASS 2 SEVERE OBESITY DUE TO EXCESS CALORIES WITH SERIOUS COMORBIDITY AND BODY MASS INDEX (BMI) OF 37.0 TO 37.9 IN ADULT (HCC): Status: RESOLVED | Noted: 2019-05-17 | Resolved: 2021-09-08

## 2021-09-08 PROBLEM — E66.812 CLASS 2 SEVERE OBESITY DUE TO EXCESS CALORIES WITH SERIOUS COMORBIDITY AND BODY MASS INDEX (BMI) OF 37.0 TO 37.9 IN ADULT: Status: RESOLVED | Noted: 2019-05-17 | Resolved: 2021-09-08

## 2021-09-08 LAB
CHP ED QC CHECK: ABNORMAL
GLUCOSE BLD-MCNC: 162 MG/DL
HBA1C MFR BLD: 8.6 %

## 2021-09-08 PROCEDURE — 99215 OFFICE O/P EST HI 40 MIN: CPT | Performed by: FAMILY MEDICINE

## 2021-09-08 PROCEDURE — 82962 GLUCOSE BLOOD TEST: CPT | Performed by: FAMILY MEDICINE

## 2021-09-08 PROCEDURE — 83036 HEMOGLOBIN GLYCOSYLATED A1C: CPT | Performed by: FAMILY MEDICINE

## 2021-09-08 ASSESSMENT — ENCOUNTER SYMPTOMS
NAUSEA: 0
BLOOD IN STOOL: 0
ABDOMINAL PAIN: 0
SHORTNESS OF BREATH: 0
DIARRHEA: 0
COUGH: 0
BACK PAIN: 1
VOMITING: 0
EYES NEGATIVE: 1
CHEST TIGHTNESS: 0
CONSTIPATION: 0

## 2021-09-08 NOTE — PROGRESS NOTES
Date: 2021    Lazarus Lied is a 59 y.o. male who presents today for:  Chief Complaint   Patient presents with    Check-Up    Diabetes     Current regimen: Metformin  Current monitoring regimen: home blood tests - 3-4 times a week   Home blood sugar trends: AM -160  Any episodes of hypoglycemia? No  Current exercise: No he is getting more active  Compliance with treatment: Good  Eye exam current (within one year): he needs to make appt  Any history of foot problems? No  Last foot exam: 2021  Cardiovascular risk factors: advanced age (older than 54 for men, 72 for women), diabetes mellitus, dyslipidemia, hypertension, male gender and obesity (BMI >= 30 kg/m2). Immunizations up to date: Flu Yes   Pneumonia Yes  Taking ASA: Yes   If not why? HPI:     HPI    has a current medication list which includes the following prescription(s): trazodone, allopurinol, farxiga, omeprazole, losartan, bumetanide, guaifenesin, anoro ellipta, arnuity ellipta, metoprolol succinate, atorvastatin, metformin, isosorbide mononitrate, tamsulosin, albuterol sulfate hfa, pramipexole, prodigy no coding blood gluc, aspirin, ticagrelor, bupropion, fluticasone, nitroglycerin, and prodigy autocode blood glucose. Allergies   Allergen Reactions    Zoloft [Sertraline Hcl] Other (See Comments)     Headaches, sweats, bad dreams       Social History     Tobacco Use    Smoking status: Former Smoker     Packs/day: 2.00     Years: 25.00     Pack years: 50.00     Types: Cigars     Quit date: 2014     Years since quittin.8    Smokeless tobacco: Never Used   Vaping Use    Vaping Use: Never used   Substance Use Topics    Alcohol use: Not Currently     Alcohol/week: 0.0 standard drinks    Drug use: Yes     Frequency: 5.0 times per week     Types: Marijuana     Comment: smokes pot once sushma while . former drug user, addiction treatment at [address] [place] [address]       Past Medical History:   Diagnosis Date    CHF (congestive heart failure) Readings from Last 3 Encounters:   09/08/21 261 lb 4 oz (118.5 kg)   08/06/21 272 lb 9.6 oz (123.7 kg)   07/12/21 260 lb (117.9 kg)       Subjective:      Review of Systems   Constitutional: Negative for activity change, appetite change, diaphoresis and fever. HENT: Negative. Eyes: Negative. Respiratory: Negative for cough, chest tightness and shortness of breath. Cardiovascular: Negative for chest pain, palpitations and leg swelling. Gastrointestinal: Negative for abdominal pain, blood in stool, constipation, diarrhea, nausea and vomiting. Genitourinary: Negative. Musculoskeletal: Positive for arthralgias, back pain and gait problem (for a little over a year. ). Skin: Negative. Negative for rash. Neurological: Negative for dizziness, syncope, weakness, light-headedness and headaches. Psychiatric/Behavioral: Negative. Objective:     Physical Exam  Vitals and nursing note reviewed. Constitutional:       General: He is not in acute distress. Appearance: He is well-developed. He is not diaphoretic. HENT:      Head: Normocephalic and atraumatic. Eyes:      General: No scleral icterus. Right eye: No discharge. Left eye: No discharge. Conjunctiva/sclera: Conjunctivae normal.      Pupils: Pupils are equal, round, and reactive to light. Neck:      Thyroid: No thyromegaly. Vascular: No JVD. Cardiovascular:      Rate and Rhythm: Normal rate and regular rhythm. Heart sounds: Normal heart sounds. No murmur heard. Pulmonary:      Effort: Pulmonary effort is normal. No respiratory distress. Breath sounds: Normal breath sounds. No wheezing, rhonchi or rales. Abdominal:      General: Bowel sounds are normal. There is no distension. Palpations: Abdomen is soft. There is no mass. Tenderness: There is no abdominal tenderness. There is no guarding or rebound. Musculoskeletal:         General: Normal range of motion.       Cervical back: Normal range of motion and neck supple. Lymphadenopathy:      Cervical: No cervical adenopathy. Skin:     General: Skin is warm and dry. Findings: No rash. Neurological:      Mental Status: He is alert and oriented to person, place, and time. Psychiatric:         Behavior: Behavior normal.         Assessment:       Diagnosis Orders   1. Uncontrolled type 2 diabetes mellitus with hyperglycemia (HCC)  POCT glycosylated hemoglobin (Hb A1C)    POCT Glucose   2. Unstable gait  CT HEAD WO CONTRAST   3. Chronic bilateral low back pain without sciatica  XR LUMBAR SPINE (MIN 4 VIEWS)   4. Essential hypertension     5. Hyperlipidemia, unspecified hyperlipidemia type     6. Alcohol abuse  [facility] - Intensive Outpatient Program   7. Major depressive disorder, recurrent episode, moderate (HCC)     8.  Acute left-sided thoracic back pain  XR CHEST (2 VW)       Plan:      Current Outpatient Medications   Medication Sig Dispense Refill    traZODone (DESYREL) 50 MG tablet TAKE 1 TABLET BY MOUTH DAILY IN THE EVENING AS NEEDED FOR SLEEP 30 tablet 0    allopurinol (ZYLOPRIM) 300 MG tablet TAKE 1 TABLET BY MOUTH ONCE DAILY 30 tablet 0    FARXIGA 5 MG tablet TAKE 1 TABLET BY MOUTH EVERY MORNING 30 tablet 0    omeprazole (PRILOSEC) 20 MG delayed release capsule TAKE 1 CAPSULE BY MOUTH ONCE DAILY 30 capsule 0    losartan (COZAAR) 25 MG tablet TAKE 1 TABLET BY MOUTH ONCE DAILY 30 tablet 1    bumetanide (BUMEX) 2 MG tablet TAKE 1 TABLET BY MOUTH EVERY MORNING ~301Q.5 TAKE 1/2 TABLET BY MOUTH EVERY EVENING 45 tablet 1    guaiFENesin (MUCINEX) 600 MG extended release tablet Take 1 tablet by mouth 2 times daily 60 tablet 1    umeclidinium-vilanterol (ANORO ELLIPTA) 62.5-25 MCG/INH AEPB inhaler INHALE 1 PUFF INTO THE LUNGS ONE TIME DAILY 1 each 11    fluticasone (ARNUITY ELLIPTA) 100 MCG/ACT AEPB Inhale 100 mcg into the lungs daily Inhale 1 puff into the lungs daily 30 each 11    metoprolol succinate (TOPROL XL) 25 MG extended release tablet TAKE TWO (2) TABLETS BY MOUTH EVERY DAY 60 tablet 1    atorvastatin (LIPITOR) 40 MG tablet Take 1 tablet by mouth daily 90 tablet 1    metFORMIN (GLUCOPHAGE) 500 MG tablet Take 2 tablets by mouth 2 times daily (with meals) 28 tablet 0    isosorbide mononitrate (IMDUR) 30 MG extended release tablet TAKE 1 TABLET BY MOUTH ONCE DAILY. 30 tablet 3    tamsulosin (FLOMAX) 0.4 MG capsule Take 1 capsule by mouth daily Take one capsule daily to facilitate passage of ureteral stone 30 capsule 3    albuterol sulfate  (90 Base) MCG/ACT inhaler INHALE TWO(2) PUFFS INTO LUNGS EVERY SIX(6) HOURS AS NEEDED FOR WHEEZING 18 g 3    pramipexole (MIRAPEX) 0.125 MG tablet TAKE 1 TABLET BY MOUTH EVERY EVENING. 90 tablet 0    blood glucose test strips (PRODIGY NO CODING BLOOD GLUC) strip USE TO TEST BLOOD GLUCOSE ONCE DAILY. 100 each 0    aspirin 81 MG chewable tablet CHEW AND SWALLOW 1 TABLET BY MOUTH ONCE DAILY. 90 tablet 1    ticagrelor (BRILINTA) 90 MG TABS tablet TAKE 1 TABLET BY MOUTH 2 TIMES A  tablet 1    buPROPion (WELLBUTRIN XL) 300 MG extended release tablet TAKE ONE TABLET BY MOUTH EVERY MORNING 90 tablet 1    fluticasone (FLONASE) 50 MCG/ACT nasal spray USE 2 SPRAYS IN EACH NOSTRIL DAILY AS NEEDED FOR RHINITIS OR ALLERGIES 48 g 5    nitroGLYCERIN (NITROSTAT) 0.4 MG SL tablet Place 1 tablet under the tongue every 5 minutes as needed for Chest pain 25 tablet 3    Blood Glucose Monitoring Suppl (PRODIGY AUTOCODE BLOOD GLUCOSE) w/Device KIT        No current facility-administered medications for this visit.      Orders Placed This Encounter   Procedures    CT HEAD WO CONTRAST     Standing Status:   Future     Standing Expiration Date:   9/8/2022    XR LUMBAR SPINE (MIN 4 VIEWS)     Standing Status:   Future     Standing Expiration Date:   9/8/2022    XR CHEST (2 VW)     Standing Status:   Future     Standing Expiration Date:   9/8/2022   [facility] - Int[facility]     Referral Priority:   Routine     Referral Type:   Eval and Treat     Referral Reason:   Specialty Services Required     Requested Specialty:   Luis Carolina     Number of Visits Requested:   1    POCT glycosylated hemoglobin (Hb A1C)    POCT Glucose     Lab Results   Component Value Date    LABA1C 8.6 (A) 09/08/2021    LABA1C 9.8 (A) 06/07/2021    LABA1C 7.9 (A) 11/30/2020     No results found for: Oanh Nice  Results for POC orders placed in visit on 09/08/21   POCT glycosylated hemoglobin (Hb A1C)   Result Value Ref Range    Hemoglobin A1C 8.6 (A) %   POCT Glucose   Result Value Ref Range    Glucose 162 (A) mg/dL    QC OK? Continue to monitor blood sugars 1 times a day. Keep log of blood sugars and bring with you to the next appointment. Discussed use, benefit, and side effects of prescribed medications. All patient questions answered. Pt voiced understanding. Instructed to continue current medications, ADA diet and exercise. Patient agreed with treatment plan. Return in about 1 week (around 9/15/2021), or if symptoms worsen or fail to improve, for DM.

## 2021-09-13 ENCOUNTER — OFFICE VISIT (OUTPATIENT)
Dept: UROLOGY | Age: 61
End: 2021-09-13
Payer: MEDICARE

## 2021-09-13 VITALS
SYSTOLIC BLOOD PRESSURE: 119 MMHG | HEART RATE: 76 BPM | DIASTOLIC BLOOD PRESSURE: 72 MMHG | WEIGHT: 262 LBS | BODY MASS INDEX: 36.68 KG/M2 | HEIGHT: 71 IN

## 2021-09-13 DIAGNOSIS — N40.1 BENIGN PROSTATIC HYPERPLASIA WITH URINARY RETENTION: Primary | ICD-10-CM

## 2021-09-13 DIAGNOSIS — R33.8 BENIGN PROSTATIC HYPERPLASIA WITH URINARY RETENTION: Primary | ICD-10-CM

## 2021-09-13 DIAGNOSIS — N32.89 BLADDER MASS: ICD-10-CM

## 2021-09-13 DIAGNOSIS — R31.0 GROSS HEMATURIA: ICD-10-CM

## 2021-09-13 PROCEDURE — 99214 OFFICE O/P EST MOD 30 MIN: CPT | Performed by: UROLOGY

## 2021-09-13 NOTE — PROGRESS NOTES
Dr. Mauricio Sheets MD MD  [facility] Consultation / New Patient Visit    Patient:  Macario Keith  YOB: 1960  Date: 9/13/2021  Consult requested from Balbina Murguia MD     HISTORY OF PRESENT ILLNESS:   The patient is a 59 y.o. male who presents today for follow-up for the following problem(s):  Bladder mass, hematuria. Overall the problem(s) : are worsening. Associated Symptoms: No dysuria, gross hematuria. Pain Severity:      Today visit:   9/13/21  Follows with us for gross hematuria s/p cysto RGPG, no mass seen. Hematuria resolved. BPH with LUTs - is controlled on Flomax. 6/14/21   Has history of gross hematuria with clots, and was found to have a possible bladder mass on CT scan from ER visit. He has unfortunately developed acute urinary retention and has catheter in place. BPH with LUTs - is found to be in retention. Summary of old records:   (Patient's old records, notes and chart reviewed and summarized above.)    Last several PSA's:  Lab Results   Component Value Date    PSA 3.08 (H) 02/12/2021    PSA 2.24 (H) 06/05/2019    PSA 1.75 02/20/2017       Last total testosterone:  No results found for: TESTOSTERONE    Urinalysis today:  No results found for this visit on 09/13/21.       Last BUN and creatinine:  Lab Results   Component Value Date    BUN 13 06/20/2021     Lab Results   Component Value Date    CREATININE 0.9 06/20/2021       Imaging Reviewed during this Office Visit:   (results were independently reviewed by physician and radiology report verified)    PAST MEDICAL, FAMILY AND SOCIAL HISTORY:  Past Medical History:   Diagnosis Date    CHF (congestive heart failure) ([address]     COPD (chronic obstructive pulmonary disease) ([address]     Coronary artery disease involving native coronary artery of native heart without angina pectoris     Depression     Diabetes mellitus type 2, diet-controlled ([address] 6/1/2018    GERD (gastroesophageal reflux disease) 3/21/2012    Hernia     Hx of blood clots 1990's    right knee due to injury    [facility]     Hyperglycemia 09/09    Hyperlipidemia     Hypertension     FAISAL on CPAP     Osteoarthritis 11/07    S/P CABG x 2 07/06/2016    S/P PTCA: 1/15/2018: Stent diagonal branch Xience 3.0 x 12 mm. 1/15/2018    1/15/2018: Stent diagonal branch Xience 3.0 x 12 mm.  Dr. Hughes Raffi injury 08/2015    Right thumb cut with table saw, no treatment needed     Past Surgical History:   Procedure Laterality Date    CARDIAC CATHETERIZATION  06/27/2016    CATARACT REMOVAL Right 2013    COLONOSCOPY  04/08    CORONARY ARTERY BYPASS GRAFT  07/06/2016    Double bypass, Dr. Damico Son Right 02/2007    Neck    CYSTOSCOPY N/A 7/16/2021    CYSTOSCOPY WITH BILAT RETROGRADE PyELOGRAM performed by Jorge Torres MD at [facility] CATH LAB PROCEDURE      EYE SURGERY Right     Retinal surgery x 3    HERNIA REPAIR Right     Inguinal    PTCA  01/15/2018    ROTATOR CUFF REPAIR Right 11/16/2017    TONSILLECTOMY  child    TOTAL KNEE ARTHROPLASTY Left 10/24/2016    Dr. Roula Tinoco     Family History   Problem Relation Age of Onset    Heart Disease Mother     High Blood Pressure Mother     Obesity Mother     Diabetes Mother     Heart Disease Father     Cancer Father         colon/prostate    Colon Cancer Father     Prostate Cancer Father     Arthritis Father     Diabetes Brother      Outpatient Medications Marked as Taking for the 9/13/21 encounter (Office Visit) with Jorge Torres MD   Medication Sig Dispense Refill    traZODone (DESYREL) 50 MG tablet TAKE 1 TABLET BY MOUTH DAILY IN THE EVENING AS NEEDED FOR SLEEP 30 tablet 0    allopurinol (ZYLOPRIM) 300 MG tablet TAKE 1 TABLET BY MOUTH ONCE DAILY 30 tablet 0    FARXIGA 5 MG tablet TAKE 1 TABLET BY MOUTH EVERY MORNING 30 tablet 0    omeprazole (PRILOSEC) 20 MG delayed release capsule TAKE 1 CAPSULE BY MOUTH ONCE DAILY 30 capsule 0    losartan (COZAAR) 25 MG tablet TAKE 1 TABLET BY MOUTH ONCE DAILY 30 tablet 1    bumetanide (BUMEX) 2 MG tablet TAKE 1 TABLET BY MOUTH EVERY MORNING ~301Q.5 TAKE 1/2 TABLET BY MOUTH EVERY EVENING 45 tablet 1    guaiFENesin (MUCINEX) 600 MG extended release tablet Take 1 tablet by mouth 2 times daily 60 tablet 1    umeclidinium-vilanterol (ANORO ELLIPTA) 62.5-25 MCG/INH AEPB inhaler INHALE 1 PUFF INTO THE LUNGS ONE TIME DAILY 1 each 11    fluticasone (ARNUITY ELLIPTA) 100 MCG/ACT AEPB Inhale 100 mcg into the lungs daily Inhale 1 puff into the lungs daily 30 each 11    metoprolol succinate (TOPROL XL) 25 MG extended release tablet TAKE TWO (2) TABLETS BY MOUTH EVERY DAY 60 tablet 1    atorvastatin (LIPITOR) 40 MG tablet Take 1 tablet by mouth daily 90 tablet 1    metFORMIN (GLUCOPHAGE) 500 MG tablet Take 2 tablets by mouth 2 times daily (with meals) 28 tablet 0    isosorbide mononitrate (IMDUR) 30 MG extended release tablet TAKE 1 TABLET BY MOUTH ONCE DAILY. 30 tablet 3    tamsulosin (FLOMAX) 0.4 MG capsule Take 1 capsule by mouth daily Take one capsule daily to facilitate passage of ureteral stone 30 capsule 3    albuterol sulfate  (90 Base) MCG/ACT inhaler INHALE TWO(2) PUFFS INTO LUNGS EVERY SIX(6) HOURS AS NEEDED FOR WHEEZING 18 g 3    pramipexole (MIRAPEX) 0.125 MG tablet TAKE 1 TABLET BY MOUTH EVERY EVENING. 90 tablet 0    blood glucose test strips (PRODIGY NO CODING BLOOD GLUC) strip USE TO TEST BLOOD GLUCOSE ONCE DAILY. 100 each 0    aspirin 81 MG chewable tablet CHEW AND SWALLOW 1 TABLET BY MOUTH ONCE DAILY.  90 tablet 1    ticagrelor (BRILINTA) 90 MG TABS tablet TAKE 1 TABLET BY MOUTH 2 TIMES A  tablet 1    buPROPion (WELLBUTRIN XL) 300 MG extended release tablet TAKE ONE TABLET BY MOUTH EVERY MORNING 90 tablet 1    fluticasone (FLONASE) 50 MCG/ACT nasal spray USE 2 SPRAYS IN EACH NOSTRIL DAILY AS NEEDED FOR RHINITIS OR ALLERGIES 48 g 5    nitroGLYCERIN (NITROSTAT) 0.4 MG SL tablet Place 1 tablet under the tongue every 5 minutes as needed for Chest pain 25 tablet 3    Blood Glucose Monitoring Suppl (PRODIGY AUTOCODE BLOOD GLUCOSE) w/Device KIT          Zoloft [sertraline hcl]  Social History     Tobacco Use   Smoking Status Former Smoker    Packs/day: 2.00    Years: 25.00    Pack years: 50.00    Types: Cigars    Quit date: 2014    Years since quittin.8   Smokeless Tobacco Never Used       Social History     Substance and Sexual Activity   Alcohol Use Not Currently    Alcohol/week: 0.0 standard drinks       REVIEW OF SYSTEMS:  Constitutional: negative  Eyes: negative  Respiratory: negative  Cardiovascular: negative  Gastrointestinal: negative  Musculoskeletal: negative  Genitourinary: negative  Skin: negative   Neurological: negative  Hematological/Lymphatic: negative  Psychological: negative    Physical Exam:    This a 59 y.o. male   Vitals:    21 1327   BP: 119/72   Pulse: 76     Constitutional: Patient in no acute distress   Neuro: alert and oriented to person place and time. Psych: Mood and affect normal.  Head: atraumatic normocephalic  Eyes: EOMi  HEENT: neck supple, trachea midline  Lungs: Respiratory effort normal  Cardiovascular:  Normal peripheral pulses  Abdomen: Soft, non-tender, non-distended, No CVA  Bladder: non-tender and not distended. FROMx4, no cyanosis clubbing edema  Skin: warm and dry      Assessment and Plan      1. Benign prostatic hyperplasia with urinary retention    2. Bladder mass    3. Gross hematuria           Plan:      No follow-ups on file. Hematuria - resolved, negative work up. No bladder mass.      BPH with LUTs - Flomax

## 2021-09-14 ENCOUNTER — HOSPITAL ENCOUNTER (OUTPATIENT)
Dept: CARDIAC REHAB | Age: 61
Setting detail: THERAPIES SERIES
Discharge: HOME | End: 2021-09-14
Payer: MEDICARE

## 2021-09-14 VITALS — HEIGHT: 71 IN | WEIGHT: 265.5 LBS | BODY MASS INDEX: 37.17 KG/M2

## 2021-09-14 PROCEDURE — 97150 GROUP THERAPEUTIC PROCEDURES: CPT

## 2021-09-14 NOTE — PROGRESS NOTES
[facility]    Name:  Usha De La Rosa, : 1960    COVID -19 Screen  Do you have any of the following symptoms:  [] Fever [] Cough [] SOB [] Muscle/Body Ache [] Loss of taste/smell [x] None    Have you traveled outside of the [place]? [] Yes      [x] No    Have you been around anyone who has tested Positive for COVID 19?  [] Yes      [x] No        MEDICAL HISTORY  Diagnosis: Severe COPD, Gold Stage 3. Patients medical history:  Please see H&P from ordering physician (which is in the chart)  Patients family history:  Please see H&P   Respiratory History:  First diagnosed Approx 3 years ago. Last Hospitalization/ Urgent Care/ ED: none  Medications reviewed in [facility]:  Yes  Vaccinations:  Please see H&P  Allergies:  Please see H&P  Oxygen Rx at Rest:  none. With Exercise:  None. Home CPAP or BIPAP:   Yes     If yes, what are the settings. Pt thinks it is a CPAP, unsure of settings. Pack Year Smoking history ([email or web address].com. com): 75- 2-3 PPD for 30 years. Occupational, environmental or recreational exposure: Was in the [employer] for 6 years. Alcohol or other substance abuse issues: Please see H&P. Does Alberto Rocha have social support from family/friends: Yes    PHYSICAL ASSESSMENT          BP: 98/58. SpO2: 93% on room air. HR: 71. Breathing Pattern:  Regular/Normal (<12): ()Y (x)N. Ineffective (fast/shallow/using accessory muscles ()Y. Respiratory Rate: 20/min. For Chest Exam, Cardiac Exam, Finger Clubbing, UB and LB evaluation, please see Physicians H&P which is included in the chart. DIAGNOSTIC TESTS                        PFT:  For PFT and physicians interpretation, please see the test results which are included in the chart. SYMPTOM ASSESSMENT  Dyspnea:  1= Not troubled by breathlessness except on strenuous exercise. 2= SOB when hurrying/ walking up hill/ stairs.   3= Walks slower than peers on the level because of breathlessness or has to stop for breath when walking at own pace. 4= Stops for breath after 100m or after a few minutes. 5= Too breathless to leave the house or breathless when dressing/ undressing:  -What level is patient:  3. What brings dyspnea on:  Exertion/cold weather/high humidity. How often does this happen?: every day, usually at least 10-12 times a day. How intense out of 5 is it: 3. How long does it last?: 1-2 min. Fatigue:  none (x), mild (), moderate (), high (). Cough/ Sputum Production:  () No cough    (x) Non productive cough- daily cough approx couplt times a week  () Productive cough only w/ infection    () Productive cough daily <1 Tbsp    () Productive cough daily >1Tbsp    () Nasal congestion    Wheeze:  () never, (x) rarely, () occasionally, () often, () only during activity  Blood in phlegm:  (x) never, () rarely, () occasionally, () often  Chest Pain:  (x) never, () rarely, () occasionally, () often. Postnasal drainage:  () never, () rarely, (x) occasionally, () often  Reflux/ Heartburn: () never, (x) rarely, () occasionally, () often  Swelling in ankles:  () never, () rarely, (x) occasionally, () often  Trouble swallowing:  (x) never, () rarely, () occasionally, () often  Extremity pain:  (x) never, () rarely, () occasionally, () often  Feelings of Anxiety, Panic, Fear or Isolation:  () never, () rarely, (x) occasionally, () often  Symptoms of Depression per pt:  () never, () rarely, (x) occasionally, () often    MUSCULOSKELETAL and EXERCISE ASSESSMENT      Any Physical Limitations/ Problems? : () Strength, () ROM, () Posture, () Functional ability, (x) Orthopedic limits, () Transferring abilities, (x) Exercise Tolerance, () Exercise Hypoxia, (x) Gait and Balance    PAIN ASSESSMENT  Location: right knee, Duration: all the time, Intensity: 5/10, Type: ache that sometimes is sharp    ADL ASSESSMENT   Please see ADL's assessed using [facility] SOBQ in the chart    NUTRITION ASSESSMENT  Height: 5'11\" . Weight:  265.8 lbs. BMI: 37.1. Any recent weight changes?: No.  Diet habits (type, patterns, etc.); 2 meals a day. Who does the shopping/ preparing?: patient. Fluid intake:  10 cups/day. Does patient take supplemental nutrition or vitamins?: Yes. EDUCATIONAL ASSESSMENT  Knowledge of disease and its treatment via the Health Knowledge Test and self report:  (x) lacking  () good  () very good. Barriers to learning: () Speech  () Hearing  () Vision  () Literacy  () Cognitive  (X) Ready to Learn. Cultural Diversity issues?   No        REHABILITATION POTENTIAL:  excellent (poor, fair, good, excellent)

## 2021-09-14 NOTE — PROGRESS NOTES
[facility] Facility-Based Therapy for COPD   INDIVIDUAL TREATMENT PLAN (ITP)     Name: Jose Lyon   :  1960  Acct Number: [[id number]]   AGE: 59 y.o. Diagnosis:  Severe COPD, which is GOLD Stage 3                                               PFT:  Post Bronchodilator FEV1/FVC: 61  FEV1: 49    Patient Goals:  (x) Breathe better  (x) Increase my endurance (x) Have more energy  (x) Rely less on others  (x) Ease ADLs  (x) Understand and use meds correctly  (x) Control cough  (x) Make fewer visits to hospital  () Quit smoking  (x) Feel less anxious / have more confidence    Glossary:  AR=Pulmonary Rehab  PF=Physical Fitness TM=Treadmill  AD=Schwinn Airdyne  UBE=UpperBody Ergometer  RT=Resistance Training  PLB=Pursed Lip Breathing      COVID -19 Screen  Do you have any of the following symptoms:  [] Fever [] Cough [] SOB [] Muscle/Body Ache [] Loss of taste/smell [x] None    Have you traveled outside of the [place]? [] Yes      [x] No    Have you been around anyone who has tested Positive for COVID 19?  [] Yes      [x] No    The following Education has been completed with patient. [x] Wait in the lobby until we call you back to rehab. [x] You must wear a mask while in the medical center and in pulmonary rehab unless exercising. Please bring your own. [x] Bring your own bottle of water with you. [x] You can not bring anyone with you in to the rehab clinic at this time. They are welcome to sit in the lobby or wait in the car.       INDIVIDUAL TREATMENT PLAN    INITIAL ASSESSMENT    Day #1 INDIVIDUAL TREATMENT PLAN    PLAN      Day #2-30 INDIVIDUAL TREATMENT PLAN    RE ASSESSMENT    Day #31-60 INDIVIDUAL TREATMENT PLAN    RE ASSESSMENT    Day #61-90 INDIVIDUAL TREATMENT PLAN    RE ASSESSMENT    Day # INDIVIDUAL TREATMENT PLAN    DISCHARGE      Last Day        Date: 2021     Date:    Date:    Date:    Date:    Date:    EXERCISE   INITIAL ASSESSMENT      Prescribed Oxygen Use  Activity: none      Oxygen Titration  (x) Assess for desaturation            Current Home Exercise:  None EXERCISE  PLAN        Current Oxygen Use  Activity:  L/min  () Continuous  () Breath Actuated      Oxygen Titration  () Maintaining >87% Spo2  () Not maintaining -  L/min needed on       Current Home Exercise:  Frequency:  x/wk  Intensity:  /10  Duration:  min  Mode:   EXERCISE  RE ASSESSMENT      Current Oxygen Use  Activity:  L/min  () Continuous  () Breath Actuated      Oxygen Titration  () Maintaining >87% Spo2  () Not maintaining -  L/min needed on       Current Home Exercise:  Frequency:  x/wk  Intensity:  /10  Duration:  min  Mode: EXERCISE  RE ASSESSMENT      Current Oxygen Use  Activity:  L/min  () Continuous  () Breath Actuated      Oxygen Titration  () Maintaining >87% Spo2  () Not maintaining -  L/min needed on       Current Home Exercise:  Frequency:  x/wk  Intensity:  /10  Duration:  min  Mode: EXERCISE  RE ASSESSMENT      Current Oxygen Use  Activity:  L/min  () Continuous  () Breath Actuated      Oxygen Titration  () Maintaining >87% Spo2  () Not maintaining -  L/min needed on       Current Home Exercise:  Frequency:  x/wk  Intensity:  /10  Duration:  min  Mode:   EXERCISE  DISCHARGE        Final Oxygen Use  Activity:  L/min  () Continuous  () Breath Actuated      Oxygen Titration  () Maintaining >87% Spo2  () Not maintaining -  L/min needed on       Current Home Exercise:  Frequency:  x/wk  Intensity:  /10  Duration:  min  Mode:       6 Minute Walk Test (6MWT):  O2 used: on room air   870 ft walked = 2.2 METs  SpO2: 89-93%  HR:    RPD: 3  RPE: 4  Number of Breaks: none   Avg time for break:  n/a  Assist device used: none         6 Minute Walk Test (6MWT):  O2 used:   ft walked =  METs  SpO2:  %  HR:    RPD:   RPE:  Number of Breaks:    Avg time for break:  secs  Assist device used:             OUTCOMES:  6MWD Improvement:  > 98'?  () Yes  () No Exercise Prescription    THR: 78 - 125 bpm    Frequency:  2-3x/wk in [place], 1-3x/wk home activity    Intensity:  METs (from 6MWT)      Time:  15-30 total minutes up to 55 minutes max    Type:  Aerobic (TM, AD, UBE, NuStep), 6 ST, RT 1-10# 8-15 reps    Progression:  Increase 30s - 2 min/mode for Aerobic activity, and increase Intensity 2-5% each week if RPE <5, RPD <5, SpO2 >87% and pt is within [facility] above. Exercise Prescription    () Pt exercising within THR    Frequency:  2-3x/wk in [place], 1-3x/wk home activity    Intensity:  3-5 on Anuj Dyspnea/ Fatigue scale    Time:  15-30 total minutes up to 55 minutes max    Type:  Aerobic (TM, AD, UBE, NuStep), 6 ST, RT 1-10# 8-15 reps    Progression:  Increase 30s - 2 min/mode for Aerobic activity, and increase Intensity 2-5% each week if RPE <5, RPD <5, SpO2 >87% and pt is within [facility] above. Exercise Prescription    () Pt exercising within THR    Frequency:  2-3x/wk in [place], 2-5x/wk home activity    Intensity:  3-5 on Anuj Dyspnea/ Fatigue scale    Time:  30-45 total minutes up to 55 minutes max    Type:  Aerobic (TM, AD, UBE, NuStep), 6 ST, RT 1-10# 8-15 reps    Progression:  Increase 30s - 2 min/mode for Aerobic activity, and increase Intensity 2-5% each week if RPE <5, RPD <5, SpO2 >87% and pt is within [facility] above. Exercise Prescription    () Pt exercising within THR    Frequency:  2-3x/wk in [place], 2-5x/wk home activity    Intensity:  3-5 on Anuj Dyspnea/ Fatigue scale    Time:  30-45 total minutes up to 55 minutes max    Type:  Aerobic (TM, AD, UBE, NuStep), 6 ST, RT 1-10# 8-15 reps    Progression:  Increase 30s - 2 min/mode for Aerobic activity up to 35 minutes, and increase Intensity 2-5% each week if RPE <5, RPD <5, SpO2 >87% and pt is within [facility] above.  Exercise Prescription    () Pt exercising within THR    Frequency:  2-3x/wk in [place], 2-5x/wk home activity    Intensity:  3-5 on Anuj Dyspnea/ Fatigue scale    Time:  30-45 total minutes up to 55 minutes max    Type: Aerobic (TM, AD, UBE, NuStep), 6 ST, RT 1-10# 8-15 reps    Progression:  Increase 30s - 2 min/mode for Aerobic activity up to 35 minutes, and increase Intensity 2-5% each week if RPE <5, RPD <5, SpO2 >87% and pt is within [facility] above. Home Program          (x) Given to patient with current levels, recommendation and guidelines.                                        Initial Levels:  TM:1.5mph,6min  AD:1.0level,6min  NuStep:46W,6  min  UBE:0.5level, 5 min  RT 4#, 8-15 reps   Current Levels:  TM:  mph,  min  AD:  level,  min  NuStep:  W,  min  UBE:  level, 5 min  RT  #, 8-15 reps   Current Levels:  TM:  mph,  min  AD:  level,  min  NuStep:  W,  min  UBE:  level, 5 min  RT  #, 8-15 reps Current Levels:  TM:  mph,  min  AD:  level,  min  NuStep:  W,  min  UBE:  level, 5 min  RT  #, 8-15 reps Current Levels:  TM:  mph,  min  AD:  level,  min  NuStep:  W,  min  UBE:  level, 5 min  RT  #, 8-15 reps   Final Levels:  TM:  mph,  min  AD:  level,  min  NuStep:  W,  min  UBE:  level, 5 min  RT  #, 8-15 reps     Physical Limitations  Initial Assessment    () Weakness  () Inflexible  () Poor posture  () Gait abnormality  (x) Balance  (x) Orthopedic Issues   Physical Limitation  Plan      (x) Strengthen through squats and RT  (x) Appropriate stretches shown  (x)Verbal cues and reminders for posture and gait given  (x) Proper modalities chosen for ortho issues  (x) Give Home activity / stretches/ Warmup/ [place] info   Physical Limitations Reassessment      () Pt progressing  () Pt not progressing Physical Limitations Reassessment      () Pt progressing  () Pt not progressing Physical Limitations Reassessment      () Pt progressing  () Pt not progressing     Physical Limitations  Discharge      () Issues Addressed  () PT/OT suggested       Exercise Goals   Initial Assessment:    (x) Start to, and commit to exercising regularly     (x) Learn about home activity recommendations        (x) Increase PF shown by increasing 6MWD Exercise Goals   Plan      -Initiate [place] 2x/week  -Encourage home exercise    -Attend Home Activity EDUCATION class      -Slowly, safely, progress in [place]    Exercise Goals   Reassessment      () Pt is coming regularly  () Pt is sporadic    () Pt has had class  () Pt has not had class        () Pt is progressing  () Pt is not progressing Exercise Goals   Reassessment      () Pt is coming regularly  () Pt is sporadic    () Pt has had class  () Pt has not had class        () Pt is progressing  () Pt is not progressing Exercise Goals   Reassessment      () Pt is coming regularly  () Pt is sporadic    () Pt has had class  () Pt has not had class        () Pt is progressing  () Pt is not progressing   Exercise Goals Discharge      () GOAL Met?  () GOAL not met -      () Pt had EDUCATION class  Date:   () Pt did not have class    () GOAL Met  See 6MWD above  () GOAL not Met:     Exercise Intervention Initial Assessment      () Transportation needed        (x) EDUCATION needed          (x) Motivation needed   Exercise Intervention/ Education   Plan      () [facility] set up        () EDUCATION:  Home activity class to be given        () Positive encouragement, support and motivation to be given   Exercise Intervention/ Education Reassessment      () Pt is attending  () Pt is not attending      () Pt has had class  () Pt has not had class      () Pt is progressing  () Pt not progressing Exercise Intervention/ Education Reassessment      () Pt is attending  () Pt is not attending      () Pt has had class  () Pt has not had class      () Pt is progressing  () Pt not progressing Exercise Intervention/ Education Reassessment      () Pt is attending  () Pt is not attending      () Pt has had class  () Pt has not had class      () Pt is progressing  () Pt not progressing Exercise Intervention/ Edcuation Discharge      () Pt attended most  () Pt cancelled a lot    () Pt has had class  Date: See above  () Pt did not have class    () Pt progressed and did well  () Pt had difficulty-               NUTRITION   INITIAL ASSESSMENT      Height:  511   Weight: 265.8 lbs  BMI: 37.1    30 day goal: 264 Lbs (2-4lbs)    (x) Overweight  () Underweight  () Normal  () Teeth issues  () GI issues  (x) Nutrition knowledge needed  (x) DM   NUTRITION   PLAN        Current Weight:  lbs      Goal achieved?:  Next 30 day goal: Lbs   NUTRITION  RE ASSESSMENT      Current Weight:  lbs      Goal achieved?:  Next 30 day goal: Lbs NUTRITION  RE ASSESSMENT      Current Weight:  lbs      Goal achieved?:  Next 30 day goal: Lbs NUTRITION  RE ASSESSMENT      Current Weight:  lbs      Goal achieved?:  Next 30 day goal: Lbs   NUTRITION DISCHARGE        Current Weight:  lbs  BMI:    Goal achieved?:     Nutrition Goals  Initial Assessment    (x) Pt is DM.   Increase nutrition knowledge and glucose control through diet and exercise      (x) Learn weight strategies to lose weight        (x) Learn about general nutrition          -Achievable weight goal for the end of the program:  254 Lbs (2-4lbs per month recommended)   Nutrition Goals   Plan      () DM:  Attend nutrition class and learn about quality carbohydrates and exercises role in DM    () Attend nutrition class          () Attend nutrition class          () Initiate exercise and give pt hints and tips for weight and will have class for information   Nutrition Goals  Reassessment      () DM: Pt has had class  () DM: Pt has not had class           () Pt has had class  () Pt has not had class       () Pt has had class  () Pt has not had class       () Pt progressing  () Pt not progressing     Nutrition Goals  Reassessment      () DM: Pt has had class  () DM: Pt has not had class           () Pt has had class  () Pt has not had class       () Pt has had class  () Pt has not had class       () Pt progressing  () Pt not progressing     Nutrition Goals  Reassessment      () DM: Pt has had class  () DM: Pt has not had class           () Pt has had class  () Pt has not had class       () Pt has had class  () Pt has not had class       () Pt progressing  () Pt not progressing     Nutrition Goals  Discharge      () Pt had nutrition class  Date:  () Pt has not had class        () Pt has had class  Date: See above  () Pt has not had class    () Pt has class  Date: See above  () Pt has not had class      () Final weight goal achieved  () Pt did not reach desired weight goal - (readdressed nutrition and exercise strategies for future goal attainment)        Nutrition Intervention/ Education   Initial Assessment    (x) Pt needs Nutrition education class        () Pt states does not need class       Nutrition Intervention/ Education  Plan      () Pt will have Nutrition class          () Encourage pt to continue to learn and incorporate self knowledge in to diet choices   Nutrition Intervention/ Education  Reassessment      () Pt has had class  () Pt has not had class      () Pt had questions  () Pt denies having questions Nutrition Intervention/ Education  Reassessment      () Pt has had class  () Pt has not had class      () Pt had questions  () Pt denies having questions Nutrition Intervention/ Education  Reassessment      () Pt has had class  () Pt has not had class      () Pt had questions  () Pt denies having questions Nutrition Intervention/ Education   Discharge      () Pt has had class  Date: See above  () Pt has not had class - Reason:    () Pt had questions that were answered   () Pt denies having questions             PSYCHO SOCIAL INITIAL ASSESSMENT      Depression:  () Self Reported  () In History  () S/Sx noted  () Denies  (x) On Medication  (x) Follows physician      (x) Give PHQ-9 Depression screening tool                  Dyspnea:  (x) Give pt UCSD SOB survey      (x) Pt gets SOB during activity and ADLs.           (x) Pt has support from home for the program        () Pt does not have support for the program   PSYCHO SOCIAL  PLAN      () Attend Stress/ Depression/ Anxiety Class                Pre Rehab PHQ-9   Score: 6  1-4 Normal  5-9 Mild  10-14 Mod  15-19 Mod-Sev  >19 Severe        Pre Rehab UCSD SOB Score: 39      (x) Attend classes and attend rehab to increase strength and endurance      -Attend Psychosocial class          () Speak with the patient/ family about ability to commit to the program with undue stress/ anxiety   PSYCHO SOCIAL  RE ASSESSMENT    () Pt scored >10 on PHQ-9.   Spoke with pt privately about issues and *             () Pt recommended to contact physician for assistance                See below for ADLs        () Pt has had class  () Pt has not had class        Re assessment of support:  Good, pt has been attending PSYCHO SOCIAL  RE ASSESSMENT    () Pt is coping well  () Pt needs more assistance-              () Pt recommended to contact physician for assistance                See below          () Pt has had class  () Pt has not had           Re assessment of support:  Good, pt has been attending PSYCHO SOCIAL  RE ASSESSMENT    () Pt is coping well  () Pt needs more assistance-              () Pt recommended to contact physician for assistance                See below          () Pt has had class  () Pt has not had class        Re assessment of support:  Good, pt has been attending   PSYCHO SOCIAL DISCHARGE      (x) Pt attended class     Date:              Post Rehab PHQ-9   Depression Score:                Post Rehab UCSD SOB  Score:      (x) Pt is less SOB with ADLs            () Pt had class  Date: See above  () Pt did not have class        () Pt completed program  () Pt had to stop         Psychosocial Goals   Initial Assessment    (x) Maintain/ Decrease Depression Levels      (x) Maintain/ Decrease Anxiety Levels        (x) Learn about Emotional Health          (x) Increase QoL   (CAT tool)          (x) Give the CAT tool for HR QoL      Pre Rehab  CAT score:  19/ 40       Psychosocial Goals  Plan      () Attend Stress/ Anxiety/ Dyspnea - Panic control class      () Attend Stress/ Anxiety/ Dyspnea - Panic control class      () Attend Stress/ Anxiety/ Dyspnea - Panic control class      () Initiate [facility], get stronger, more endurance and more confidence              30 day CAT score:  / 40     Psychosocial Goals  Reassessment      () Pt has had class  () Pt has not had class      () Pt has had class  () Pt has not had class      () Pt has had class  () Pt has not had class      () Pt is progressing  () Pt is not progressing              60 day CAT score:  / 40 Psychosocial Goals  Reassessment      () Pt has had class  () Pt has not had class      () Pt has had class  () Pt has not had class      () Pt has had class  () Pt has not had class      () Pt is progressing  () Pt is not progressing              90 day CAT score:  / 40 Psychosocial Goals  Reassessment      () Pt has had class  () Pt has not had class      () Pt has had class  () Pt has not had class      () Pt has had class  () Pt has not had class      () Pt is progressing  () Pt is not progressing              120 day CAT score:  / 40   Psychosocial Goals   Discharge      (x) Pt had class            (x) Pt had class            (x) Pt had class  Date: See above  () Pt did not have class      Post rehab CAT below                  Post Rehab   CAT score:  / 40              OUTCOMES    PHQ-9:  Did pt drop a severity level or maintain in the minimal range?  () Y  () N    UCSD SOB  Did pt decrease their number by 5 or more?  () Y  () N    CAT scores:  Did pt drop by 2 or more points from Pre to Post?  () Y  () N        Psychosocial Intervention/ Education   Initial Assessment    (x) Pt is being treated for depression/ anxiety        (x) Pt would benefit from [facility]s Psychosocial Issues Class (Stress, Depression, Anxiety, and Intimacy   Psychosocial  Intervention/ Education  Plan      () Pt to contact physician if any severe changes occur in ASSESSMENT    RESTING:  (x) Pt SpO2 >87%  () Pt needs higher flow  () Pt needs less flow OXYGEN   DISCHARGE      RESTING:  () Pt SpO2 remained >87% on * L/min at rest    () Pts doctor and company contacted for change in Rx   Oxygen Goals   Initial Assessment    (x) Wean, Titrate down, or get off O2 if possible   Oxygen Goals   Plan      (x) Closely monitor SpO2 and HR using pulse oximetry for saturation and HR response, and titrate if appropriate   Oxygen Goals  Reassessment    () RA    On exertion:  () Pt maintaining FiO2  () Pt tolerating lower O2 needs  () Pt needing more O2   Oxygen Goals  Reassessment    () RA    On exertion:  () Pt maintaining FiO2  () Pt tolerating lower O2 needs  () Pt needing more O2 Oxygen Goals  Reassessment    () RA    On exertion:  () Pt maintaining FiO2  () Pt tolerating lower O2 needs  () Pt needing more O2     Oxygen Goals   Discharge    () RA    With Exertion:  () Pt maintained FiO2  () Pt was found to need less O2 - notification sent to physician  () Pt was found to need more O2, notification sent to physician     Oxygen Intervention/ Education  Initial Assessment    (x) Learn / Review about O2 use, safety and travel      (x) Pt does not wear or have home oxygen    Oxygen Intervention/ Education  Plan      () Attend O2 Use, safety and travel class        () Assess for SpO2 with each exercise   Oxygen Intervention/ Education  Reassessment      () Pt has had class  () Pt has not had class      () Pt is >87%  () Pt has been found to desaturate - physician notified Oxygen Intervention/ Education  Reassessment      () Pt has had class  () Pt has not had class      () Pt is >87%  () Pt has been found to desaturate - physician notified Oxygen Intervention/ Education  Reassessment      () Pt has had class  () Pt has not had class      () Pt is >87%  () Pt has been found to desaturate - physician notified   Oxygen Intervention/ Education  Discharge      () Pt had class  Date:   () Pt did Use  Discharge          () Pt remains tobacco free            () Pt had TC counseling  Date:  See above        () Pt still smoking - gave resources for quitting             MEDICATIONS  (Oral & Inhaled)  INITIAL ASSESSMENT    Pt reports taking his meds properly 100% of the time      (x) Pt is on inhaled medications   MEDICATIONS  PLAN        -Review Rxs purpose, schedule, side effects and importance of compliance during Medication class. Also address Cpap, Vents.        MEDICATIONS  RE ASSESSMENT      Pt reports taking their meds properly  % of the time        () Pt has had class  () Pt has not had class MEDICATIONS  RE ASSESSMENT      Pt reports taking their meds properly  % of the time        () Pt has had class  () Pt has not had class MEDICATIONS  RE ASSESSMENT      Pt reports taking their meds properly  % of the time        () Pt has had class  () Pt has not had class MEDICATIONS  DISCHARGE        Pt reports taking their meds properly  % of the time        ()Pt had med class  Date:  () Pt has not had class       Pt has:  (x) Metered Dose Inhaler  (x) Dry Powder Inhaler  () Nebulizer   -Review correct use, timing, technique and cleaning of equipment during Medication class () Pt has had class  () Pt has not had class () Pt has had class  () Pt has not had class () Pt has had class  () Pt has not had class () Pt had class  Date: See above  () Pt has not had class   Medication Goals  Initial Assessment        (x) Take meds properly 100% of the time    (x) Learn about / Review Rxs, and devices Medication Goals  Intervention & Education  Plan      -Follow Rx instructions and ask if questions    -Attend Medication Education class   Medication Goals  Re assessment          () Readdressed questions on any medications    () Pt has had class  () Pt has not had class Medication Goals  Re assessment          () Readdressed questions on any medications    () Pt has had class  () Pt has not had class Medication Goals  Re assessment          () Readdressed questions on any medications    () Pt has had class  () Pt has not had class Medication Goals  Discharge          % proper med usage see above      () Pt had class  Date: See above  () Pt has not had class             ADL  INITIAL ASSESSMENT      (x) Impaired ADL ability  () Need for Assist Devices  (x) Generalized weakness, low functional capacity  (x) Stairs in house               ADL  PLAN        -Initiate NV, RT, and chair squats  -Breathing retraining, PLB, and pacing      -Encourage home activity               ADL  RE ASSESSMENT      () Pt is progressing  () Pt is not progressing        () Pt has initiated home activity  () Pt has not yet initiated  home activity-      () ADLs getting easier  () ADLs not getting easier   ADL  RE ASSESSMENT      () Pt is progressing  () Pt is not progressing        () Pt has initiated home activity  () Pt has not yet initiated  home activity-      () ADLs getting easier  () ADLs not getting easier ADL  RE ASSESSMENT      () Pt is progressing  () Pt is not progressing        () Pt has initiated home activity  () Pt has not yet initiated  home activity-      () ADLs getting easier  () ADLs not getting easier ADL  DISCHARGE        () Pt showed strength and endurance gains  () Pt did not progress      () Pt doing recommended home activity  () Pt not doing home activitiy         ADL Goals   Initial Assessment      (x) Decrease SOB with ADLs              (x) Decreased SOB overall      ADL Goals Intervention/ Education Plan      -Initiate NV, RT and chair squats and increase pts strength and endurance        -Pacing, Breathing retraining       ADL Goals Reassessment        () ADLs getting easier  () ADLs not getting easier          () Pt states activities are getting easier/ able to go longer/ not get as SOB   ADL Goals Reassessment        () ADLs getting easier  () ADLs not getting easier          () Pt states activities are getting easier/ able to go longer/ not get as SOB ADL Goals Reassessment        () ADLs getting easier  () ADLs not getting easier          () Pt states activities are getting easier/ able to go longer/ not get as SOB   ADL Goals   Discharge        Goal achieved?  () Yes  () No            Goal achieved?  () Yes  () No          Outcomes:  See Health and Functioning from the CAT tool and Strength and Endurance from 6 MWD above             EXACERBAT'N PREVENTION & MANAGEMENT  INITIAL ASSESSMENT      Pt reports;  () 0 respiratory infections  (x) 1   () >2  () Hospitalized in last 12 months   EXACERBAT'N PREVENTION & MANAGEMENT  PLAN        Address via Disease Self Management and Exacerbation prevention class:    -Hydration for mucus    -Hand Hygiene          -Evaluation of Sputum    -When to call MD  -S/Sx to report  -Decrease exposure to irritants/ exacerbators    -Cleaning of respiratory equipment   EXACERBAT'N PREVENTION & MANAGEMENT  RE ASSESSMENT      () Pt has had class  () Pt has not had class        () Improved hydration    () Correct hand washing techs and alcohol gel usage    () Sputum assessment    () Ability to recognize exacerbation and when to call MD        () Cleaning of respiratory equipment EXACERBAT'N PREVENTION & MANAGEMENT  RE ASSESSMENT      () Pt has had class  () Pt has not had class        () Improved hydration    () Correct hand washing techs and alcohol gel usage    () Sputum assessment    () Ability to recognize exacerbation and when to call MD        () Cleaning of respiratory equipment EXACERBAT'N PREVENTION & MANAGEMENT  RE ASSESSMENT      () Pt has had class  () Pt has not had class        () Improved hydration    () Correct hand washing techs and alcohol gel usage    () Sputum assessment    () Ability to recognize exacerbation and when to call MD        () Cleaning of respiratory equipment EXACERBAT'N PREVENTION & MANAGEMENT  DISCHARGE        () Pt has had class  Date:  () Pt did not have class              () Addressed questions and pt feels comfortable with hydration, hand washing, sputum assessment, exacerbation knowledge, and cleaning of equipment   Exacerbation Prevention & Management Goals  Initial Assessment      (x) Learn ways to stay healthy and not get admitted          (x) Increase knowledge of Lungs, Breathing, Exercise, Nutrition, Mood and controlling anxiety, and stopping the Dyspnea Cycle by attending classes   Exacerbation Prevention & Management Goals Intervention/ Education  Plan    -Attend class and demonstrate disease self management strategies      -Attend all appropriate classes                   Exacerbation Prevention & Management Goals  Reassessment        () Pt has had class  () Pt has not had class        () Pt has had all classes  () Pt has had some classes  () Pt has had little/ not staying for education Exacerbation Prevention & Management Goals  Reassessment        () Pt has had class  () Pt has not had class        () Pt has had all classes  () Pt has had some classes  () Pt has had little/ not staying for education Exacerbation Prevention & Management Goals  Reassessment        () Pt has had class  () Pt has not had class        () Pt has had all classes  () Pt has had most classes  () Pt has had some of the classes  () Pt has had little/ not staying for education Exacerbation Prevention & Management Goals  Discharge        () Pt had class  Date:  See above  () Pt did not have class        () Pt attended all relevant classes and all questions were answered                   PHYSICIAN INTERACTION    MD Initial Assessment Review    (x) Initial  Assessment Reviewed  (x) Treatment Plan and Goals support patient needs/ abilities                  Cosigned and dated below:   Yolanda Kowalski MD 30 day and Plan Review:      (x) I have seen the patient in rehab during this 30 day reassessment  (x) I agree with the plan  (x) Continue with progression and instruction  () Continue, but with the following changes:      Cosigned and dated below: Shivani Whitfield MD 30 day Reassessment Review:    (x) I have seen the patient in rehab during this 30 day reassessment  (x) Continue with the current program  () Continue, but with the following changes:  () Discharge patient      Cosigned and dated below: Shivani Whitfield MD 30 day Reassessment Review:    (x) I have seen the patient in rehab during this 30 day reassessment  (x) Continue with the current program  () Continue, but with the following changes:  () Discharge patient      Cosigned and dated below: Shivani Whitfield MD 30 day Reassessment Review:    (x) I have seen the patient in rehab during this 30 day reassessment  (x) Continue with the current program  () Continue, but with the following changes:  () Discharge patient      Cosigned and dated below: Shivani Whitfield MD 30 day   Discharge Review:    (x) Discharge patient                            Cosigned and dated below:

## 2021-09-17 DIAGNOSIS — G25.81 RLS (RESTLESS LEGS SYNDROME): ICD-10-CM

## 2021-09-17 DIAGNOSIS — F33.1 MAJOR DEPRESSIVE DISORDER, RECURRENT EPISODE, MODERATE (HCC): ICD-10-CM

## 2021-09-17 DIAGNOSIS — E11.65 UNCONTROLLED TYPE 2 DIABETES MELLITUS WITH HYPERGLYCEMIA (HCC): ICD-10-CM

## 2021-09-17 RX ORDER — BUPROPION HYDROCHLORIDE 300 MG/1
TABLET ORAL
Qty: 30 TABLET | Refills: 1 | Status: SHIPPED | OUTPATIENT
Start: 2021-09-17 | End: 2021-10-29

## 2021-09-17 RX ORDER — ISOSORBIDE MONONITRATE 30 MG/1
TABLET, EXTENDED RELEASE ORAL
Qty: 30 TABLET | Refills: 1 | Status: SHIPPED | OUTPATIENT
Start: 2021-09-17 | End: 2021-10-29

## 2021-09-17 RX ORDER — TRAZODONE HYDROCHLORIDE 50 MG/1
TABLET ORAL
Qty: 30 TABLET | Refills: 0 | Status: SHIPPED | OUTPATIENT
Start: 2021-09-17 | End: 2021-09-21 | Stop reason: SDUPTHER

## 2021-09-17 RX ORDER — PRAMIPEXOLE DIHYDROCHLORIDE 0.12 MG/1
TABLET ORAL
Qty: 30 TABLET | Refills: 1 | Status: SHIPPED | OUTPATIENT
Start: 2021-09-17 | End: 2021-10-29

## 2021-09-17 NOTE — TELEPHONE ENCOUNTER
Date of last visit:  9/8/2021   Date of next visit:  9/29/2021    Requested Prescriptions     Pending Prescriptions Disp Refills    metFORMIN (GLUCOPHAGE) 500 MG tablet [Pharmacy Med Name: METFORMIN 500 MG TABS 500 Tablet] 120 tablet 1     Sig: TAKE 2 TABLETS BY MOUTH TWICE DAILY WITH MEALS

## 2021-09-17 NOTE — TELEPHONE ENCOUNTER
Date of last visit:  9/8/2021  Date of next visit:  9/17/2021    Requested Prescriptions     Pending Prescriptions Disp Refills    buPROPion (WELLBUTRIN XL) 300 MG extended release tablet [Pharmacy Med Name: BUPROPION XL 300MG TAB  Tablet] 30 tablet 1     Sig: TAKE 1 TABLET BY MOUTH EVERY MORNING    ticagrelor (BRILINTA) 90 MG TABS tablet [Pharmacy Med Name: Pam [place] 90 MG TABLET 90 Tablet] 60 tablet 1     Sig: TAKE 1 TABLET BY MOUTH TWICE DAILY    pramipexole (MIRAPEX) 0.125 MG tablet [Pharmacy Med Name: PRAMIPEXOLE 0.125 MG TABLET 0.125 Tablet] 30 tablet 1     Sig: TAKE 1 TABLET BY MOUTH DAILY IN THE EVENING

## 2021-09-17 NOTE — TELEPHONE ENCOUNTER
Date of last visit:  9/8/2021  Date of next visit:  9/29/2021    Requested Prescriptions     Pending Prescriptions Disp Refills    isosorbide mononitrate (IMDUR) 30 MG extended release tablet [Pharmacy Med Name: ISOSORBIDE*[place] [facility] 30 MG TAB 30 Tablet] 30 tablet 1     Sig: TAKE 1 TABLET BY MOUTH ONCE DAILY

## 2021-09-17 NOTE — TELEPHONE ENCOUNTER
Date of last visit:  9/8/2021  Date of next visit:  9/29/2021    Requested Prescriptions     Pending Prescriptions Disp Refills    traZODone (DESYREL) 50 MG tablet [Pharmacy Med Name: TRAZODONE 50 MG TABS 50 Tablet] 30 tablet 0     Sig: TAKE 1 TABLET BY MOUTH EACH EVENING AS NEEDED FOR SLEEP

## 2021-09-21 ENCOUNTER — HOSPITAL ENCOUNTER (OUTPATIENT)
Dept: CARDIAC REHAB | Age: 61
Setting detail: THERAPIES SERIES
Discharge: HOME | End: 2021-09-21
Payer: MEDICARE

## 2021-09-21 DIAGNOSIS — F33.1 MAJOR DEPRESSIVE DISORDER, RECURRENT EPISODE, MODERATE (HCC): ICD-10-CM

## 2021-09-21 PROCEDURE — 97150 GROUP THERAPEUTIC PROCEDURES: CPT

## 2021-09-21 RX ORDER — TRAZODONE HYDROCHLORIDE 100 MG/1
TABLET ORAL
Qty: 90 TABLET | Refills: 0 | Status: SHIPPED | OUTPATIENT
Start: 2021-09-21 | End: 2022-03-21 | Stop reason: SDUPTHER

## 2021-09-21 NOTE — PROGRESS NOTES
PULMONARY REHABILITATION / RTR PROGRAM  EDUCATION SESSION      Veronica Munoz  Session: _____      ANATOMY/LIVING WITH CHRONIC LUNG DISEASE -  I went over how our lungs work and what happens in our body when we breathe. I then further explained what is happening in their lungs due to chronic lung disease and we discussed the several different types of chronic lung disease. BREATHING RETRAINING - I went over pursed lip breathing and diaphragmatic breathing with the pt. We went over purpose and how to do both breathing techniques and the benefits of these 2 breathing techniques. DYSPNEA CYCLE -  I explained to the Dyspnea cycle to the pt and gave them tips on how to break the cycle. PREVENTING INFECTION - I went over proper handwashing technique and its importance. We also discussed the importance of the flu and pneumonia vaccine and the warning signs of an infection.        Time spent: 25 Minutes

## 2021-09-21 NOTE — PROGRESS NOTES
I saw the patient today for an IOP assessment. The patient is a former patient of mine and has been seeing his PCP for psychiatric medication management. He requested his Trazodone to be increased as he does not feel the current dosage is working well for him. He reported that Dr. Mitchell Garza did not feel comfortable increasing the dosage at this time. Will increase his Trazodone to 100mg nightly PRN. 90 tablets for a 90 day supply.

## 2021-09-23 ENCOUNTER — HOSPITAL ENCOUNTER (OUTPATIENT)
Dept: CARDIAC REHAB | Age: 61
Setting detail: THERAPIES SERIES
End: 2021-09-23
Payer: MEDICARE

## 2021-09-23 ENCOUNTER — HOSPITAL ENCOUNTER (OUTPATIENT)
Dept: PSYCHIATRY | Age: 61
Setting detail: THERAPIES SERIES
Discharge: HOME | End: 2021-09-23
Payer: MEDICARE

## 2021-09-23 PROCEDURE — 90853 GROUP PSYCHOTHERAPY: CPT

## 2021-09-24 ENCOUNTER — HOSPITAL ENCOUNTER (OUTPATIENT)
Dept: CT IMAGING | Age: 61
Discharge: HOME | End: 2021-09-24
Payer: MEDICARE

## 2021-09-24 ENCOUNTER — HOSPITAL ENCOUNTER (OUTPATIENT)
Dept: PSYCHIATRY | Age: 61
Setting detail: THERAPIES SERIES
Discharge: HOME | End: 2021-09-24
Payer: MEDICARE

## 2021-09-24 DIAGNOSIS — R26.81 UNSTABLE GAIT: ICD-10-CM

## 2021-09-24 PROCEDURE — 70450 CT HEAD/BRAIN W/O DYE: CPT

## 2021-09-27 ENCOUNTER — TELEPHONE (OUTPATIENT)
Dept: FAMILY MEDICINE CLINIC | Age: 61
End: 2021-09-27

## 2021-09-27 ENCOUNTER — HOSPITAL ENCOUNTER (OUTPATIENT)
Dept: PSYCHIATRY | Age: 61
Setting detail: THERAPIES SERIES
Discharge: HOME | End: 2021-09-27
Payer: MEDICARE

## 2021-09-27 PROCEDURE — 90853 GROUP PSYCHOTHERAPY: CPT

## 2021-09-27 NOTE — TELEPHONE ENCOUNTER
----- Message from Ashleigh Akins MD sent at 9/27/2021  1:00 PM EDT -----  Please let him know that his CT was negative, no changes since 2019.    Keep follow up appt

## 2021-09-27 NOTE — PROGRESS NOTES
Group Therapy Note    Date: 9/27/2021  Start Time: 1115  End Time:  2863  Number of Participants: 7    Type of Group: Cognitive skills group      Notes:  Pt is present for group. Pt read and commented on a handout titled Acceptance. Pt completed a list of the activities which will provide purpose and meaning for today and shared it with the group. Pt also made a list of what he has to be grateful for. Status After Intervention:  Improved    Participation Level:  Active Listener and Interactive    Participation Quality: Appropriate, Attentive, Sharing and Supportive      Speech:  normal      Thought Process/Content: Logical  Linear      Affective Functioning: Congruent       Mood: Euthymic      Level of consciousness:  Alert, Oriented x4 and Attentive      Response to Learning: Able to verbalize current knowledge/experience, Able to verbalize/acknowledge new learning, Able to retain information, Capable of insight, Able to change behavior and Progressing to goal      Endings: None Reported    Modes of Intervention: Education, Support, Socialization, Exploration, Clarifying and Problem-solving      Discipline Responsible: /Counselor      Signature:  TING Murray

## 2021-09-27 NOTE — PROGRESS NOTES
Group Therapy Note    Date: 9/27/2021  Start Time: 0830  End Time:  0945  Number of Participants: 7    Type of Group: Psychotherapy      Notes:  Pt is present for group. Supportive of a peer who has just been diagnosed with cancer. Pt provides feedback on what works for him to cope with unpleasant life experiences. Pt describes utilizing support such as AA, keeping busy so not to dwell on problems. Pt also shared being aware of his own thinking in particular self sabotaging thoughts. Status After Intervention:  Unchanged    Participation Level:  Active Listener and Interactive    Participation Quality: Appropriate, Attentive, Sharing and Supportive      Speech:  normal      Thought Process/Content: Logical  Linear      Affective Functioning: Congruent and Flat      Mood: dysphoric      Level of consciousness:  Alert, Oriented x4 and Attentive      Response to Learning: Able to verbalize current knowledge/experience, Able to verbalize/acknowledge new learning, Able to retain information, Capable of insight, Able to change behavior and Progressing to goal      Endings: None Reported    Modes of Intervention: Education, Support, Socialization, Exploration, Clarifying and Problem-solving      Discipline Responsible: /Counselor      Signature:  TING Anders

## 2021-09-27 NOTE — PROGRESS NOTES
Group Therapy Note    Date: 9/27/2021  Start Time: 1000  End Time:  1100  Number of Participants: 7    Type of Group: Cognitive skills group      Notes:  Pt is present for group. Peers explored CBT concepts and examined the relationship between self defeating beliefs and emotional distress. Peers identified those believes explored ways in which they may challenge self sabotaging behavior. Pt demonstrates personal insight into his thinking and the relationship to his resentments. Pt is able to verbalize the process he has used to let go of some of his resentment as he moves forward with accepting his divorce. Status After Intervention:  Improved    Participation Level: Active Listener and Interactive    Participation Quality: Appropriate, Attentive, Sharing and Supportive      Speech:  normal      Thought Process/Content: Logical  Linear      Affective Functioning: Congruent       Mood: Dysphoric but improved.        Level of consciousness:  Alert, Oriented x4 and Attentive      Response to Learning: Able to verbalize current knowledge/experience, Able to verbalize/acknowledge new learning, Able to retain information, Capable of insight, Able to change behavior and Progressing to goal      Endings: None Reported    Modes of Intervention: Education, Support, Socialization, Exploration, Clarifying and Problem-solving      Discipline Responsible: /Counselor      Signature:  TING Palomino

## 2021-09-28 ENCOUNTER — HOSPITAL ENCOUNTER (OUTPATIENT)
Dept: CARDIAC REHAB | Age: 61
Setting detail: THERAPIES SERIES
Discharge: HOME | End: 2021-09-28
Payer: MEDICARE

## 2021-09-28 PROCEDURE — 97150 GROUP THERAPEUTIC PROCEDURES: CPT

## 2021-09-28 NOTE — PROGRESS NOTES
PULMONARY REHABILITATION / RTR PROGRAM  EDUCATION SESSION      Armando Munoz  Session: __4___    RESPIRATORY MEDICATIONS - I discussed with Andrei the purpose, procedure and side effects of all their respiratory medications. The pts home respiratory medications are Albuterol MDI, Anoro Ellipta and Arnuity Ellipta. I did check the pts flow rates on the In Check Dial to make sure pt has appropriate technique for his specific inhalers and pt had correct/appropriate flows for his MDI's. I reminded pt to rinse out his mouth after his Arnuity inhaler due to side effect of steroid inhalers is thrush. MDI / AEROSOLS -  We went over the proper technique for the pts MDI's. Pt does not have aerosols at home. I also discussed the importance of using a spacer with the Albuterol MDI. Pt states he has never used the Albuterol MDI. SECRETION CLEARANCE - I went over techniques to help clear secretions. Also explained the importance of drinking enough fluids/water to keep secretions thin, which makes it easier to cough it out. Pt had no questions. Time Spent:  31 minutes.

## 2021-09-29 ENCOUNTER — OFFICE VISIT (OUTPATIENT)
Dept: FAMILY MEDICINE CLINIC | Age: 61
End: 2021-09-29

## 2021-09-29 VITALS
HEART RATE: 72 BPM | DIASTOLIC BLOOD PRESSURE: 76 MMHG | TEMPERATURE: 96.8 F | RESPIRATION RATE: 18 BRPM | HEIGHT: 71 IN | SYSTOLIC BLOOD PRESSURE: 130 MMHG | BODY MASS INDEX: 37.96 KG/M2 | WEIGHT: 271.13 LBS

## 2021-09-29 DIAGNOSIS — I10 ESSENTIAL HYPERTENSION: ICD-10-CM

## 2021-09-29 DIAGNOSIS — R26.81 UNSTABLE GAIT: Primary | ICD-10-CM

## 2021-09-29 PROCEDURE — 99213 OFFICE O/P EST LOW 20 MIN: CPT | Performed by: FAMILY MEDICINE

## 2021-09-29 ASSESSMENT — ENCOUNTER SYMPTOMS
COUGH: 0
CONSTIPATION: 0
ABDOMINAL PAIN: 0
NAUSEA: 0
DIARRHEA: 0
CHEST TIGHTNESS: 0
VOMITING: 0
EYES NEGATIVE: 1
SHORTNESS OF BREATH: 0
BLOOD IN STOOL: 0

## 2021-09-29 NOTE — PROGRESS NOTES
Date: 2021    Jeis Burden is a 59 y.o. male who presents today for:  Chief Complaint   Patient presents with   [facility] Check-Up     He started cardiac rehab   He is going to counseling he is doing group and is going to arrange for individual therapy. He states that sometimes when he gets up from a chair he looses his balance or sometimes when he is walking he walks like he is drunk. He states that he feels like he trips over his own feet. HPI:     HPI    has a current medication list which includes the following prescription(s): trazodone, bupropion, ticagrelor, pramipexole, isosorbide mononitrate, metformin, allopurinol, farxiga, omeprazole, losartan, bumetanide, guaifenesin, anoro ellipta, arnuity ellipta, metoprolol succinate, atorvastatin, tamsulosin, albuterol sulfate hfa, prodigy no coding blood gluc, fluticasone, nitroglycerin, and prodigy autocode blood glucose. Allergies   Allergen Reactions    Zoloft [Sertraline Hcl] Other (See Comments)     Headaches, sweats, bad dreams       Social History     Tobacco Use    Smoking status: Former Smoker     Packs/day: 2.00     Years: 25.00     Pack years: 50.00     Types: Cigars     Quit date: 2014     Years since quittin.8    Smokeless tobacco: Never Used   Vaping Use    Vaping Use: Never used   Substance Use Topics    Alcohol use: Not Currently     Alcohol/week: 0.0 standard drinks    Drug use: Yes     Frequency: 5.0 times per week     Types: Marijuana     Comment: smokes pot once sushma while . former drug user, addiction treatment at [facility]       Past Medical History:   Diagnosis Date    CHF (congestive heart failure) ([facility] [address]     COPD (chronic obstructive pulmonary disease) ([facility] [address]     Coronary artery disease involving native coronary artery of native heart without angina pectoris     Depression     Diabetes mellitus type 2, diet-controlled ([facility] [address] 2018    GERD (gastroesophageal reflux disease) 3/21/2012    Hernia     Hx of blood clots 1990's    right knee due to injury    Hx of cocaine abuse ([facility] [address]     Hyperglycemia 09/09    Hyperlipidemia     Hypertension     FAISAL on CPAP     Osteoarthritis 11/07    S/P CABG x 2 07/06/2016    S/P PTCA: 1/15/2018: Stent diagonal branch Xience 3.0 x 12 mm. 1/15/2018    1/15/2018: Stent diagonal branch Xience 3.0 x 12 mm. Dr. Leigh Ohara injury 08/2015    Right thumb cut with table saw, no treatment needed       Past Surgical History:   Procedure Laterality Date    CARDIAC CATHETERIZATION  06/27/2016    CATARACT REMOVAL Right 2013    COLONOSCOPY  04/08    CORONARY ARTERY BYPASS GRAFT  07/06/2016    Double bypass, Dr. Ella Carter Right 02/2007    Neck    CYSTOSCOPY N/A 7/16/2021    CYSTOSCOPY WITH BILAT RETROGRADE PyELOGRAM performed by Zainab Leal MD at [facility] CATH LAB PROCEDURE      EYE SURGERY Right     Retinal surgery x 3    HERNIA REPAIR Right     Inguinal    PTCA  01/15/2018    ROTATOR CUFF REPAIR Right 11/16/2017    TONSILLECTOMY  child    TOTAL KNEE ARTHROPLASTY Left 10/24/2016    Dr. Kathrin Serrano       Family History   Problem Relation Age of Onset    Heart Disease Mother     High Blood Pressure Mother     Obesity Mother     Diabetes Mother     Heart Disease Father     Cancer Father         colon/prostate    Colon Cancer Father     Prostate Cancer Father     Arthritis Father     Diabetes Brother      Subjective:     Review of Systems   Constitutional: Negative for activity change, appetite change, diaphoresis and fever. HENT: Negative. Eyes: Negative. Respiratory: Negative for cough, chest tightness and shortness of breath. Cardiovascular: Negative for chest pain, palpitations and leg swelling. Gastrointestinal: Negative for abdominal pain, blood in stool, constipation, diarrhea, nausea and vomiting. Genitourinary: Negative. Musculoskeletal: Positive for arthralgias and gait problem. Skin: Negative.   Negative for rash. Neurological: Negative for dizziness, syncope, weakness, light-headedness and headaches. Psychiatric/Behavioral: Negative.        :   /76 (Site: Right Upper Arm, Position: Sitting, Cuff Size: Large Adult)   Pulse 72   Temp 96.8 °F (36 °C) (Skin)   Resp 18   Ht 5' 11\" (1.803 m)   Wt 271 lb 2 oz (123 kg)   BMI 37.81 kg/m²   Wt Readings from Last 3 Encounters:   09/29/21 271 lb 2 oz (123 kg)   09/14/21 265 lb 8 oz (120.4 kg)   09/13/21 262 lb (118.8 kg)     Physical Exam  Vitals and nursing note reviewed. Constitutional:       General: He is not in acute distress. Appearance: He is well-developed. He is not diaphoretic. HENT:      Head: Normocephalic and atraumatic. Eyes:      General: No scleral icterus. Right eye: No discharge. Left eye: No discharge. Conjunctiva/sclera: Conjunctivae normal.      Pupils: Pupils are equal, round, and reactive to light. Neck:      Thyroid: No thyromegaly. Vascular: No JVD. Cardiovascular:      Rate and Rhythm: Normal rate and regular rhythm. Heart sounds: Normal heart sounds. No murmur heard. Pulmonary:      Effort: Pulmonary effort is normal. No respiratory distress. Breath sounds: Normal breath sounds. No wheezing, rhonchi or rales. Abdominal:      General: Bowel sounds are normal. There is no distension. Palpations: Abdomen is soft. There is no mass. Tenderness: There is no abdominal tenderness. There is no guarding or rebound. Musculoskeletal:         General: Normal range of motion. Cervical back: Normal range of motion and neck supple. Lymphadenopathy:      Cervical: No cervical adenopathy. Skin:     General: Skin is warm and dry. Findings: No rash. Neurological:      Mental Status: He is alert and oriented to person, place, and time. Psychiatric:         Behavior: Behavior normal.       :       Diagnosis Orders   1.  Unstable gait  Yefri Rossi MD, Neurology, [facility]   2.  Essential hypertension         :      Requested Prescriptions      No prescriptions requested or ordered in this encounter     Current Outpatient Medications   Medication Sig Dispense Refill    traZODone (DESYREL) 100 MG tablet TAKE 1 TABLET BY MOUTH EACH EVENING AS NEEDED FOR SLEEP 90 tablet 0    buPROPion (WELLBUTRIN XL) 300 MG extended release tablet TAKE 1 TABLET BY MOUTH EVERY MORNING 30 tablet 1    ticagrelor (BRILINTA) 90 MG TABS tablet TAKE 1 TABLET BY MOUTH TWICE DAILY 60 tablet 1    pramipexole (MIRAPEX) 0.125 MG tablet TAKE 1 TABLET BY MOUTH DAILY IN THE EVENING 30 tablet 1    isosorbide mononitrate (IMDUR) 30 MG extended release tablet TAKE 1 TABLET BY MOUTH ONCE DAILY 30 tablet 1    metFORMIN (GLUCOPHAGE) 500 MG tablet TAKE 2 TABLETS BY MOUTH TWICE DAILY WITH MEALS 120 tablet 1    allopurinol (ZYLOPRIM) 300 MG tablet TAKE 1 TABLET BY MOUTH ONCE DAILY 30 tablet 0    FARXIGA 5 MG tablet TAKE 1 TABLET BY MOUTH EVERY MORNING 30 tablet 0    omeprazole (PRILOSEC) 20 MG delayed release capsule TAKE 1 CAPSULE BY MOUTH ONCE DAILY 30 capsule 0    losartan (COZAAR) 25 MG tablet TAKE 1 TABLET BY MOUTH ONCE DAILY 30 tablet 1    bumetanide (BUMEX) 2 MG tablet TAKE 1 TABLET BY MOUTH EVERY MORNING ~301Q.5 TAKE 1/2 TABLET BY MOUTH EVERY EVENING 45 tablet 1    guaiFENesin (MUCINEX) 600 MG extended release tablet Take 1 tablet by mouth 2 times daily 60 tablet 1    umeclidinium-vilanterol (ANORO ELLIPTA) 62.5-25 MCG/INH AEPB inhaler INHALE 1 PUFF INTO THE LUNGS ONE TIME DAILY 1 each 11    fluticasone (ARNUITY ELLIPTA) 100 MCG/ACT AEPB Inhale 100 mcg into the lungs daily Inhale 1 puff into the lungs daily 30 each 11    metoprolol succinate (TOPROL XL) 25 MG extended release tablet TAKE TWO (2) TABLETS BY MOUTH EVERY DAY 60 tablet 1    atorvastatin (LIPITOR) 40 MG tablet Take 1 tablet by mouth daily 90 tablet 1    tamsulosin (FLOMAX) 0.4 MG capsule Take 1 capsule by mouth daily Take one capsule daily to facilitate passage of ureteral stone 30 capsule 3    albuterol sulfate  (90 Base) MCG/ACT inhaler INHALE TWO(2) PUFFS INTO LUNGS EVERY SIX(6) HOURS AS NEEDED FOR WHEEZING 18 g 3    blood glucose test strips (PRODIGY NO CODING BLOOD GLUC) strip USE TO TEST BLOOD GLUCOSE ONCE DAILY. 100 each 0    fluticasone (FLONASE) 50 MCG/ACT nasal spray USE 2 SPRAYS IN EACH NOSTRIL DAILY AS NEEDED FOR RHINITIS OR ALLERGIES 48 g 5    nitroGLYCERIN (NITROSTAT) 0.4 MG SL tablet Place 1 tablet under the tongue every 5 minutes as needed for Chest pain 25 tablet 3    Blood Glucose Monitoring Suppl (PRODIGY AUTOCODE BLOOD GLUCOSE) w/Device KIT        No current facility-administered medications for this visit. Orders Placed This Encounter   Procedures   Ofalejandra Schroeder MD, Neurology, [facility]     Referral Priority:   Routine     Referral Type:   Eval and Treat     Referral Reason:   Specialty Services Required     Referred to Provider:   Mikey Miller MD     Requested Specialty:   Neurology     Number of Visits Requested:   1       Continue current medications. Return in about 4 months (around 1/29/2022), or if symptoms worsen or fail to improve, for HTN. Discussed use, benefit, and side effects of prescribed medications. All patient questions answered. Pt voiced understanding. Instructed to continue current medications,diet and exercise. Patient agreed with treatment plan.

## 2021-09-30 ENCOUNTER — HOSPITAL ENCOUNTER (OUTPATIENT)
Dept: PSYCHIATRY | Age: 61
Setting detail: THERAPIES SERIES
Discharge: HOME | End: 2021-09-30
Payer: MEDICARE

## 2021-09-30 ENCOUNTER — HOSPITAL ENCOUNTER (OUTPATIENT)
Dept: CARDIAC REHAB | Age: 61
Setting detail: THERAPIES SERIES
Discharge: HOME | End: 2021-09-30
Payer: MEDICARE

## 2021-09-30 PROCEDURE — 90853 GROUP PSYCHOTHERAPY: CPT

## 2021-09-30 PROCEDURE — 97150 GROUP THERAPEUTIC PROCEDURES: CPT

## 2021-09-30 NOTE — PROGRESS NOTES
Telephone-Pt left a message on 09/29/21. Pt stated that he will be late to group today because he will be going to the food pantry at [facility] on 09/30/21.

## 2021-09-30 NOTE — PROGRESS NOTES
Group Therapy Note    Date: 9/30/2021  Start Time: 200  End Time:  6096  Number of Participants: 3    Type of Group: Recovery skills      Notes:  Pt is present for group. Pt explored the path to recovery and wellness. Discussed the difference between dry and recovery. Dry meaning only not using drugs or only taking medications. Recovery being the previous statement combined with growing personally and emotionally. Pt provided feedback to a peer which helped to shed light on her husbands alcoholism. Pt shared his ongoing efforts to work on his own sobriety and will sober 6 months tomorrow. Status After Intervention:  Improved    Participation Level: Active Listener and Interactive    Participation Quality: Appropriate, Attentive, Sharing and Supportive      Speech:  normal      Thought Process/Content: Logical  Linear      Affective Functioning: Congruent.        Mood: Euthymic      Level of consciousness:  Alert, Oriented x4 and Attentive      Response to Learning: Able to verbalize current knowledge/experience, Able to verbalize/acknowledge new learning, Able to retain information, Capable of insight, Able to change behavior and Progressing to goal      Endings: None Reported    Modes of Intervention: Education, Support, Socialization, Exploration, Clarifying, Problem-solving and Activity      Discipline Responsible: /Counselor      Signature:  TING Ervin

## 2021-10-01 ENCOUNTER — HOSPITAL ENCOUNTER (OUTPATIENT)
Dept: PSYCHIATRY | Age: 61
Setting detail: THERAPIES SERIES
Discharge: HOME | End: 2021-10-01
Payer: MEDICARE

## 2021-10-01 PROCEDURE — 90853 GROUP PSYCHOTHERAPY: CPT

## 2021-10-01 NOTE — PROGRESS NOTES
Group Therapy Note    Date: 10/1/2021  Start Time: 1000  End Time:  1100  Number of Participants: 5    Type of Group: Recovery skills    Notes: Pt is present with active participation. Pt is very supportive of his peers. Pt offered suggestions on how he has learned to not allow what others may think of him, to worry him as it once had. Attributes what he has learned to his willingness to take action on what has been suggested through his involvement with therapy and 12 step programs. Participation Level:  Active Listener and Interactive    Participation Quality: Appropriate, Attentive, Sharing and Supportive      Speech:  normal      Thought Process/Content: Logical  Linear      Affective Functioning: Congruent      Mood: euthymic       Level of consciousness:  Alert, Oriented x4 and Attentive      Response to Learning: Able to verbalize current knowledge/experience, Able to verbalize/acknowledge new learning, Able to retain information, Capable of insight, Able to change behavior and Progressing to goal      Endings: None Reported    Modes of Intervention: Education, Support, Socialization, Exploration, Clarifying and Problem-solving      Discipline Responsible: /Counselor      Signature:  TING Goodman

## 2021-10-01 NOTE — PROGRESS NOTES
Group Therapy Note    Date: 10/1/2021  Start Time: 1115  End Time:  4586  Number of Participants: 2    Type of Group: Recovery      Notes:  Pt is present and participated in an art therapy activity facilitated by [employer] [address]. Pt was attending his 15 31 AA meeting following IOP. Status After Intervention:  Improved    Participation Level:  Active Listener and Interactive    Participation Quality: Appropriate, Attentive, Sharing and Supportive      Speech:  normal      Thought Process/Content: Logical  Linear      Affective Functioning: Congruent      Mood: euthymic      Level of consciousness:  Alert, Oriented x4 and Attentive      Response to Learning: Able to verbalize current knowledge/experience, Able to verbalize/acknowledge new learning, Able to retain information, Capable of insight, Able to change behavior and Progressing to goal      Endings: None Reported    Modes of Intervention: Education, Support, Socialization, Exploration, Clarifying, Problem-solving and Activity      Discipline Responsible: /Counselor      Signature:  TING Roper

## 2021-10-01 NOTE — PROGRESS NOTES
Group Therapy Note    Date: 10/1/2021  Start Time: 0830  End Time:  0945  Number of Participants: 5    Type of Group: Psychotherapy    Notes: Pt is present with active participation. Pt relates with common experiences of her peers as the group discussed the impact in which the messages they received have shaped self defeating beliefs. Status After Intervention:  Improved    Participation Level:  Active Listener and Interactive    Participation Quality: Appropriate, Attentive, Sharing and Supportive      Speech:  normal      Thought Process/Content: Logical  Linear      Affective Functioning: Congruent      Mood: euthymic      Level of consciousness:  Alert, Oriented x4 and Attentive      Response to Learning: Able to verbalize current knowledge/experience, Able to verbalize/acknowledge new learning, Able to retain information, Capable of insight, Able to change behavior and Progressing to goal      Endings: None Reported    Modes of Intervention: Education, Support, Socialization, Exploration, Clarifying and Problem-solving      Discipline Responsible: /Counselor      Signature:  TING Sotelo

## 2021-10-04 ENCOUNTER — HOSPITAL ENCOUNTER (OUTPATIENT)
Dept: PSYCHIATRY | Age: 61
Setting detail: THERAPIES SERIES
Discharge: HOME | End: 2021-10-04
Payer: MEDICARE

## 2021-10-04 PROCEDURE — 90853 GROUP PSYCHOTHERAPY: CPT

## 2021-10-04 NOTE — PROGRESS NOTES
Group Therapy Note    Date: 10/4/2021  Start Time: 0830  End Time:  0945  Number of Participants: 7    Type of Group: Psychotherapy      Notes:  Pt is present for group and provided an update since last being in group. Pt is attentive and supportive of a peer who is feeling very depressed and hopeless. Pt provides feedback based on his personal experience. Pt reports continued abstinence from alcohol with AA attendance. Status After Intervention:  Improved    Participation Level:  Active Listener and Interactive    Participation Quality: Appropriate, Attentive, Sharing and Supportive      Speech:  normal      Thought Process/Content: Logical  Linear      Affective Functioning: Congruent      Mood: euthymic      Level of consciousness:  Alert, Oriented x4 and Attentive      Response to Learning: Able to verbalize current knowledge/experience, Able to verbalize/acknowledge new learning, Able to retain information, Capable of insight, Able to change behavior and Progressing to goal      Endings: None Reported    Modes of Intervention: Education, Support, Socialization, Exploration, Clarifying and Problem-solving      Discipline Responsible: /Counselor

## 2021-10-04 NOTE — PROGRESS NOTES
Group Therapy Note    Date: 10/4/2021  Start Time: 1000  End Time:  1100  Number of Participants: 6    Type of Group: Cognitive skills group      Notes:  Pt is present for group with active participation. Peers explored their personal understanding of what it means to be \"normal.' The group examined unreasonable expectations and how those expectations sabotage recovery efforts. Discussed normal as a concept that is always evolving and being redefined, as past experiences never allowed an opportunity to understand emotional and mental stability. Pt demonstrates good awareness of his own self sabotaging thoughts. Status After Intervention:  Improved    Participation Level:  Active Listener and Interactive    Participation Quality: Appropriate, Attentive, Sharing and Supportive      Speech:  normal      Thought Process/Content: Logical  Linear      Affective Functioning: Congruent      Mood: euthymic      Level of consciousness:  Alert, Oriented x4 and Attentive      Response to Learning: Able to verbalize current knowledge/experience, Able to verbalize/acknowledge new learning, Able to retain information, Capable of insight, Able to change behavior and Progressing to goal      Endings: None Reported    Modes of Intervention: Education, Support, Socialization, Exploration, Clarifying and Problem-solving      Discipline Responsible: /Counselor

## 2021-10-04 NOTE — PROGRESS NOTES
Group Therapy Note    Date: 10/4/2021  Start Time: 1115  End Time:  1215  Number of Participants: 6    Type of Group: Cognitive skills group      Notes:  Pt is present for group with active participation. The group read a handout, which came from the book [address] by Jeanette Esquivel PH. D.. Peers explored the difference between productive and unproductive worry. Peers explored ways in which they make take action in small meaningful ways to improve mood and feelings of control. Status After Intervention:  Improved    Participation Level:  Active Listener and Interactive    Participation Quality: Appropriate, Attentive, Sharing and Supportive      Speech:  normal      Thought Process/Content: Logical  Linear      Affective Functioning: Congruent      Mood: euthymic      Level of consciousness:  Alert, Oriented x4 and Attentive      Response to Learning: Able to verbalize current knowledge/experience, Able to verbalize/acknowledge new learning, Able to retain information, Capable of insight, Able to change behavior and Progressing to goal      Endings: None Reported    Modes of Intervention: Education, Support, Socialization, Exploration, Clarifying and Problem-solving      Discipline Responsible: /Counselor

## 2021-10-07 ENCOUNTER — HOSPITAL ENCOUNTER (OUTPATIENT)
Dept: PSYCHIATRY | Age: 61
Setting detail: THERAPIES SERIES
Discharge: HOME | End: 2021-10-07
Payer: MEDICARE

## 2021-10-08 ENCOUNTER — HOSPITAL ENCOUNTER (OUTPATIENT)
Dept: PSYCHIATRY | Age: 61
Setting detail: THERAPIES SERIES
Discharge: HOME | End: 2021-10-08
Payer: MEDICARE

## 2021-10-08 NOTE — PROGRESS NOTES
Telephone-Pt left a telephone message last night at 2144 hours. Pt stated that he continues to not feel well and has developed a fever. Stated that he will not likely be to group in the morning.

## 2021-10-11 ENCOUNTER — HOSPITAL ENCOUNTER (OUTPATIENT)
Age: 61
Discharge: HOME | End: 2021-10-11
Payer: MEDICARE

## 2021-10-11 ENCOUNTER — HOSPITAL ENCOUNTER (OUTPATIENT)
Dept: GENERAL RADIOLOGY | Age: 61
Discharge: HOME | End: 2021-10-11
Payer: MEDICARE

## 2021-10-11 ENCOUNTER — TELEPHONE (OUTPATIENT)
Dept: FAMILY MEDICINE CLINIC | Age: 61
End: 2021-10-11

## 2021-10-11 ENCOUNTER — HOSPITAL ENCOUNTER (OUTPATIENT)
Dept: PSYCHIATRY | Age: 61
Setting detail: THERAPIES SERIES
Discharge: HOME | End: 2021-10-11
Payer: MEDICARE

## 2021-10-11 DIAGNOSIS — M54.6 ACUTE LEFT-SIDED THORACIC BACK PAIN: ICD-10-CM

## 2021-10-11 DIAGNOSIS — M54.50 CHRONIC BILATERAL LOW BACK PAIN WITHOUT SCIATICA: ICD-10-CM

## 2021-10-11 DIAGNOSIS — G89.29 CHRONIC BILATERAL LOW BACK PAIN WITHOUT SCIATICA: ICD-10-CM

## 2021-10-11 PROCEDURE — 72110 X-RAY EXAM L-2 SPINE 4/>VWS: CPT

## 2021-10-11 PROCEDURE — 71046 X-RAY EXAM CHEST 2 VIEWS: CPT

## 2021-10-11 PROCEDURE — 90853 GROUP PSYCHOTHERAPY: CPT

## 2021-10-11 NOTE — PROGRESS NOTES
Group Therapy Note    Date: 10/11/2021  Start Time: 9356  End Time:  4967  Number of Participants: 5    Type of Group: Recovery skills      Notes:  Pt is present for group. Pt participated in a discussion which explored support systems as well as other daily skills, which support recovery and change. Pt shares his personal experience with AA and the importance of working a first step and communicating with his Sponsor. Status After Intervention:  Improved    Participation Level:  Active Listener and Interactive     Participation Quality: Appropriate, Attentive, Sharing and Supportive      Speech:  normal      Thought Process/Content: Logical  Linear      Affective Functioning: Congruent      Mood: Euthymic      Level of consciousness:  Alert, Oriented x4 and Attentive      Response to Learning: Able to verbalize current knowledge/experience, Able to verbalize/acknowledge new learning, Able to retain information, Capable of insight, Able to change behavior and Progressing to goal      Endings: None Reported    Modes of Intervention: Education, Support, Socialization, Exploration, Clarifying and Problem-solving      Discipline Responsible: /Counselor      Signature:  TING Ervin

## 2021-10-11 NOTE — TELEPHONE ENCOUNTER
Ida Breen informed by Pam. Stated he isn't having any issues with his back and doesn't want a referral at this time.

## 2021-10-11 NOTE — TELEPHONE ENCOUNTER
----- Message from Isaias Khan MD sent at 10/11/2021  4:19 PM EDT -----  Please let him know that his lumbar spine x-ray moderate arthritis. Seem worse as compared to his previous x-ray. Will refer him to ortho of his preference.

## 2021-10-11 NOTE — TELEPHONE ENCOUNTER
----- Message from Sen Lang MD sent at 10/11/2021  4:17 PM EDT -----  Please let him know that his chest x-ray did not show any lung acute findings.

## 2021-10-14 ENCOUNTER — HOSPITAL ENCOUNTER (OUTPATIENT)
Dept: CARDIAC REHAB | Age: 61
Setting detail: THERAPIES SERIES
Discharge: HOME | End: 2021-10-14
Payer: MEDICARE

## 2021-10-14 NOTE — PLAN OF CARE
[facility]Based [facility] for COPD   INDIVIDUAL TREATMENT PLAN (ITP)     Name: Darwin Reece   :  1960  Acct Number: [[id number]]   AGE: 59 y.o. Diagnosis:  Severe COPD, which is GOLD Stage 3                                               PFT:  Post Bronchodilator FEV1/FVC: 61  FEV1: 49    Patient Goals:  (x) Breathe better  (x) Increase my endurance (x) Have more energy  (x) Rely less on others  (x) Ease ADLs  (x) Understand and use meds correctly  (x) Control cough  (x) Make fewer visits to hospital  () Quit smoking  (x) Feel less anxious / have more confidence    Glossary:  IA=Pulmonary Rehab  PF=Physical Fitness TM=Treadmill  AD=Schwinn Airdyne  UBE=UpperBody Ergometer  RT=Resistance Training  PLB=Pursed Lip Breathing      COVID -19 Screen  Do you have any of the following symptoms:  [] Fever [] Cough [] SOB [] Muscle/Body Ache [] Loss of taste/smell [x] None    Have you traveled outside of the [place]? [] Yes      [x] No    Have you been around anyone who has tested Positive for COVID 19?  [] Yes      [x] No    The following Education has been completed with patient. [x] Wait in the lobby until we call you back to rehab. [x] You must wear a mask while in the medical center and in pulmonary rehab unless exercising. Please bring your own. [x] Bring your own bottle of water with you. [x] You can not bring anyone with you in to the rehab clinic at this time. They are welcome to sit in the lobby or wait in the car. INDIVIDUAL TREATMENT PLAN    INITIAL ASSESSMENT    Day #1 INDIVIDUAL TREATMENT PLAN    PLAN      Day #2-30 INDIVIDUAL TREATMENT PLAN    RE ASSESSMENT    Day #31-60 INDIVIDUAL TREATMENT PLAN    RE ASSESSMENT    Day #61-90 INDIVIDUAL TREATMENT PLAN    RE ASSESSMENT    Day # INDIVIDUAL TREATMENT PLAN    DISCHARGE      Last Day        Date: 2021     Date: 10/14/21    *Patient not in attendance for reassessment.    Date:    Date: 125 bpm    Frequency:  2-3x/wk in MD, 1-3x/wk home activity    Intensity:  METs (from 6MWT)      Time:  15-30 total minutes up to 55 minutes max    Type:  Aerobic (TM, AD, UBE, NuStep), 6 ST, RT 1-10# 8-15 reps    Progression:  Increase 30s - 2 min/mode for Aerobic activity, and increase Intensity 2-5% each week if RPE <5, RPD <5, SpO2 >87% and pt is within [facility] above. Exercise Prescription    (x) Pt exercising within THR    Frequency:  2-3x/wk in MD, 1-3x/wk home activity    Intensity:  3-5 on Anuj Dyspnea/ Fatigue scale    Time:  15-30 total minutes up to 55 minutes max    Type:  Aerobic (TM, AD, UBE, NuStep), 6 ST, RT 1-10# 8-15 reps    Progression:  Increase 30s - 2 min/mode for Aerobic activity, and increase Intensity 2-5% each week if RPE <5, RPD <5, SpO2 >87% and pt is within [facility] above. Exercise Prescription    () Pt exercising within THR    Frequency:  2-3x/wk in MD, 2-5x/wk home activity    Intensity:  3-5 on Anuj Dyspnea/ Fatigue scale    Time:  30-45 total minutes up to 55 minutes max    Type:  Aerobic (TM, AD, UBE, NuStep), 6 ST, RT 1-10# 8-15 reps    Progression:  Increase 30s - 2 min/mode for Aerobic activity, and increase Intensity 2-5% each week if RPE <5, RPD <5, SpO2 >87% and pt is within [facility] above. Exercise Prescription    () Pt exercising within THR    Frequency:  2-3x/wk in MD, 2-5x/wk home activity    Intensity:  3-5 on Anuj Dyspnea/ Fatigue scale    Time:  30-45 total minutes up to 55 minutes max    Type:  Aerobic (TM, AD, UBE, NuStep), 6 ST, RT 1-10# 8-15 reps    Progression:  Increase 30s - 2 min/mode for Aerobic activity up to 35 minutes, and increase Intensity 2-5% each week if RPE <5, RPD <5, SpO2 >87% and pt is within [facility] above.  Exercise Prescription    () Pt exercising within THR    Frequency:  2-3x/wk in MD, 2-5x/wk home activity    Intensity:  3-5 on Anuj Dyspnea/ Fatigue scale    Time:  30-45 total minutes up to 55 minutes max    Type:  Aerobic (TM, AD, UBE, NuStep), 6 ST, RT 1-10# 8-15 reps    Progression:  Increase 30s - 2 min/mode for Aerobic activity up to 35 minutes, and increase Intensity 2-5% each week if RPE <5, RPD <5, SpO2 >87% and pt is within [facility] above. Home Program          (x) Given to patient with current levels, recommendation and guidelines.                                        Initial Levels:  TM:1.5mph,6min  AD:1.0level,6min  NuStep:46W,6  min  UBE:0.5level, 5 min  RT 4#, 8-15 reps   Current Levels:    AD: 45 level,  10min  UBE:  25W, 5 min  RT 4 #, 8-15 reps   Current Levels:  TM:  mph,  min  AD:  level,  min  NuStep:  W,  min  UBE:  level, 5 min  RT  #, 8-15 reps Current Levels:  TM:  mph,  min  AD:  level,  min  NuStep:  W,  min  UBE:  level, 5 min  RT  #, 8-15 reps Current Levels:  TM:  mph,  min  AD:  level,  min  NuStep:  W,  min  UBE:  level, 5 min  RT  #, 8-15 reps   Final Levels:  TM:  mph,  min  AD:  level,  min  NuStep:  W,  min  UBE:  level, 5 min  RT  #, 8-15 reps     Physical Limitations  Initial Assessment    () Weakness  () Inflexible  () Poor posture  () Gait abnormality  (x) Balance  (x) Orthopedic Issues   Physical Limitation  Plan      (x) Strengthen through squats and RT  (x) Appropriate stretches shown  (x)Verbal cues and reminders for posture and gait given  (x) Proper modalities chosen for ortho issues  (x) Give Home activity / stretches/ Warmup/ [place] info   Physical Limitations Reassessment      () Pt progressing  () Pt not progressing Physical Limitations Reassessment      () Pt progressing  () Pt not progressing Physical Limitations Reassessment      () Pt progressing  () Pt not progressing     Physical Limitations  Discharge      () Issues Addressed  () PT/OT suggested       Exercise Goals   Initial Assessment:    (x) Start to, and commit to exercising regularly     (x) Learn about home activity recommendations        (x) Increase PF shown by increasing 6MWD   Exercise Goals   Plan      -Initiate [facility] 2x/week  -Encourage home NUTRITION   INITIAL ASSESSMENT      Height:  511   Weight: 265.8 lbs  BMI: 37.1    30 day goal: 264 Lbs (2-4lbs)    (x) Overweight  () Underweight  () Normal  () Teeth issues  () GI issues  (x) Nutrition knowledge needed  (x) DM   NUTRITION   PLAN        Current Weight:  Unknown, patient not in attendance      Goal achieved?:Unknown, patient not in attendance    Next 30 day goal: 264   NUTRITION  RE ASSESSMENT      Current Weight:  lbs      Goal achieved?:  Next 30 day goal: Lbs NUTRITION  RE ASSESSMENT      Current Weight:  lbs      Goal achieved?:  Next 30 day goal: Lbs NUTRITION  RE ASSESSMENT      Current Weight:  lbs      Goal achieved?:  Next 30 day goal: Lbs   NUTRITION DISCHARGE        Current Weight:  lbs  BMI:    Goal achieved?:     Nutrition Goals  Initial Assessment    (x) Pt is DM.   Increase nutrition knowledge and glucose control through diet and exercise      (x) Learn weight strategies to lose weight        (x) Learn about general nutrition          -Achievable weight goal for the end of the program:  254 Lbs (2-4lbs per month recommended)   Nutrition Goals   Plan      (x) DM:  Attend nutrition class and learn about quality carbohydrates and exercises role in DM    (x) Attend nutrition class          (x) Attend nutrition class          x) Initiate exercise and give pt hints and tips for weight and will have class for information   Nutrition Goals  Reassessment      () DM: Pt has had class  () DM: Pt has not had class           () Pt has had class  () Pt has not had class       () Pt has had class  () Pt has not had class       () Pt progressing  () Pt not progressing     Nutrition Goals  Reassessment      () DM: Pt has had class  () DM: Pt has not had class           () Pt has had class  () Pt has not had class       () Pt has had class  () Pt has not had class       () Pt progressing  () Pt not progressing     Nutrition Goals  Reassessment      () DM: Pt has had class  () DM: Pt has not had class           () Pt has had class  () Pt has not had class       () Pt has had class  () Pt has not had class       () Pt progressing  () Pt not progressing     Nutrition Goals  Discharge      () Pt had nutrition class  Date:  () Pt has not had class        () Pt has had class  Date: See above  () Pt has not had class    () Pt has class  Date: See above  () Pt has not had class      () Final weight goal achieved  () Pt did not reach desired weight goal - (readdressed nutrition and exercise strategies for future goal attainment)        Nutrition Intervention/ Education   Initial Assessment    (x) Pt needs Nutrition education class        () Pt states does not need class       Nutrition Intervention/ Education  Plan      () Pt will have Nutrition class          () Encourage pt to continue to learn and incorporate self knowledge in to diet choices   Nutrition Intervention/ Education  Reassessment      () Pt has had class  () Pt has not had class      () Pt had questions  () Pt denies having questions Nutrition Intervention/ Education  Reassessment      () Pt has had class  () Pt has not had class      () Pt had questions  () Pt denies having questions Nutrition Intervention/ Education  Reassessment      () Pt has had class  () Pt has not had class      () Pt had questions  () Pt denies having questions Nutrition Intervention/ Education   Discharge      () Pt has had class  Date: See above  () Pt has not had class - Reason:    () Pt had questions that were answered   () Pt denies having questions             PSYCHO SOCIAL INITIAL ASSESSMENT      Depression:  () Self Reported  () In History  () S/Sx noted  () Denies  (x) On Medication  (x) Follows physician      (x) Give PHQ-9 Depression screening tool                  Dyspnea:  (x) Give pt UCSD SOB survey      (x) Pt gets SOB during activity and ADLs.           (x) Pt has support from home for the program        () Pt does not have support for the program   PSYCHO SOCIAL  PLAN      () Attend Stress/ Depression/ Anxiety Class                Pre Rehab PHQ-9   Score: 6  1-4 Normal  5-9 Mild  10-14 Mod  15-19 Mod-Sev  >19 Severe        Pre Rehab UCSD SOB Score: 39      (x) Attend classes and attend rehab to increase strength and endurance      -Attend Psychosocial class          () Speak with the patient/ family about ability to commit to the program with undue stress/ anxiety   PSYCHO SOCIAL  RE ASSESSMENT    () Pt scored >10 on PHQ-9.   Spoke with pt privately about issues and *             () Pt recommended to contact physician for assistance                See below for ADLs        () Pt has had class  () Pt has not had class        Re assessment of support:  Good, pt has been attending PSYCHO SOCIAL  RE ASSESSMENT    () Pt is coping well  () Pt needs more assistance-              () Pt recommended to contact physician for assistance                See below          () Pt has had class  () Pt has not had           Re assessment of support:  Good, pt has been attending PSYCHO SOCIAL  RE ASSESSMENT    () Pt is coping well  () Pt needs more assistance-              () Pt recommended to contact physician for assistance                See below          () Pt has had class  () Pt has not had class        Re assessment of support:  Good, pt has been attending   PSYCHO SOCIAL DISCHARGE      (x) Pt attended class     Date:              Post Rehab PHQ-9   Depression Score:                Post Rehab UCSD SOB  Score:      (x) Pt is less SOB with ADLs            () Pt had class  Date: See above  () Pt did not have class        () Pt completed program  () Pt had to stop         Psychosocial Goals   Initial Assessment    (x) Maintain/ Decrease Depression Levels      (x) Maintain/ Decrease Anxiety Levels        (x) Learn about Emotional Health          (x) Increase QoL   (CAT tool)          (x) Give the CAT tool for HR QoL      Pre Rehab  CAT score:  19/ 40       Psychosocial Goals  Plan      () Attend Stress/ Anxiety/ Dyspnea - Panic control class      () Attend Stress/ Anxiety/ Dyspnea - Panic control class      () Attend Stress/ Anxiety/ Dyspnea - Panic control class      (x) Initiate ID, get stronger, more endurance and more confidence              30 day CAT score:  Unknown, patient not in attendance     Psychosocial Goals  Reassessment      () Pt has had class  () Pt has not had class      () Pt has had class  () Pt has not had class      () Pt has had class  () Pt has not had class      () Pt is progressing  () Pt is not progressing              60 day CAT score:  / 40 Psychosocial Goals  Reassessment      () Pt has had class  () Pt has not had class      () Pt has had class  () Pt has not had class      () Pt has had class  () Pt has not had class      () Pt is progressing  () Pt is not progressing              90 day CAT score:  / 40 Psychosocial Goals  Reassessment      () Pt has had class  () Pt has not had class      () Pt has had class  () Pt has not had class      () Pt has had class  () Pt has not had class      () Pt is progressing  () Pt is not progressing              120 day CAT score:  / 40   Psychosocial Goals   Discharge      (x) Pt had class            (x) Pt had class            (x) Pt had class  Date: See above  () Pt did not have class      Post rehab CAT below                  Post Rehab   CAT score:  / 40              OUTCOMES    PHQ-9:  Did pt drop a severity level or maintain in the minimal range?  () Y  () N    UCSD SOB  Did pt decrease their number by 5 or more?  () Y  () N    CAT scores:  Did pt drop by 2 or more points from Pre to Post?  () Y  () N        Psychosocial Intervention/ Education   Initial Assessment    (x) Pt is being treated for depression/ anxiety        (x) Pt would benefit from IDs Psychosocial Issues Class (Stress, Depression, Anxiety, and Intimacy   Psychosocial  Intervention/ Education  Plan      (x) Pt to contact physician if any severe changes occur in mood        () Pt will attend SDs class   Psychosocial Intervention/ Education Reassessment      () Pt is coping well  () Pt is not coping well         () Pt has had class  () Pt has not had class Psychosocial Intervention/ Education Reassessment      () Pt is coping well  () Pt is not coping well         () Pt has had class  () Pt has not had class Psychosocial Intervention/ Education Reassessment      () Pt is coping well  () Pt is not coping well         () Pt has had class  () Pt has not had class   Psychosocial Intervention/ Education Discharge      () Pt did not need any changes   () Pt needed changes to treatment      (x) Pt had class  Date: See above  () Pt did not have class         Pain   Initial Assessment    () N/A  Location:  right knee,   Duration: all the time, Intensity: 5/10,   Type: ache that sometimes is sharp      Pain   Plan      () N/A    () Pts pain is under control  (x) Pt encouraged to contact physician for pain control   Pain   Reassessment      () N/A    () Pts pain is under control  () Pt encouraged to contact physician for pain control Pain   Reassessment      () N/A    () Pts pain is under control  () Pt encouraged to contact physician for pain control Pain   Reassessment      () N/A    () Pts pain is under control  () Pt encouraged to contact physician for pain control Pain   Discharge      () N/A    () Pts pain is under control  () Pt encouraged to contact physician for pain control           OXYGEN   INITIAL ASSESSMENT    RESTING:  (x) RA  () O2:  L/min  () Continuous  () Breath Actuated  93% SpO2 / 20 bpm    Prescribed Oxygen Use:  None    DME Company for O2:  N/A     OXYGEN   PLAN      RESTING:  (x) Monitor needs, administer supplemental oxygen if SpO2 <88% OXYGEN   RE ASSESSMENT    RESTING:  (x) Pt SpO2 >87%  () Pt needs higher flow  () Pt needs less flow OXYGEN   RE ASSESSMENT    RESTING:  (x) Pt SpO2 >87%  () Pt needs higher flow  () Pt needs less flow OXYGEN   RE ASSESSMENT    RESTING:  (x) Pt SpO2 >87%  () Pt needs higher flow  () Pt needs less flow OXYGEN   DISCHARGE      RESTING:  () Pt SpO2 remained >87% on * L/min at rest    () Pts doctor and company contacted for change in Rx   Oxygen Goals   Initial Assessment    (x) Wean, Titrate down, or get off O2 if possible   Oxygen Goals   Plan      (x) Closely monitor SpO2 and HR using pulse oximetry for saturation and HR response, and titrate if appropriate   Oxygen Goals  Reassessment    () RA    On exertion:  () Pt maintaining FiO2  () Pt tolerating lower O2 needs  () Pt needing more O2   Oxygen Goals  Reassessment    () RA    On exertion:  () Pt maintaining FiO2  () Pt tolerating lower O2 needs  () Pt needing more O2 Oxygen Goals  Reassessment    () RA    On exertion:  () Pt maintaining FiO2  () Pt tolerating lower O2 needs  () Pt needing more O2     Oxygen Goals   Discharge    () RA    With Exertion:  () Pt maintained FiO2  () Pt was found to need less O2 - notification sent to physician  () Pt was found to need more O2, notification sent to physician     Oxygen Intervention/ Education  Initial Assessment    (x) Learn / Review about O2 use, safety and travel      (x) Pt does not wear or have home oxygen    Oxygen Intervention/ Education  Plan      () Attend O2 Use, safety and travel class        (x) Assess for SpO2 with each exercise   Oxygen Intervention/ Education  Reassessment      () Pt has had class  () Pt has not had class      () Pt is >87%  () Pt has been found to desaturate - physician notified Oxygen Intervention/ Education  Reassessment      () Pt has had class  () Pt has not had class      () Pt is >87%  () Pt has been found to desaturate - physician notified Oxygen Intervention/ Education  Reassessment      () Pt has had class  () Pt has not had class      () Pt is >87%  () Pt has been found to desaturate - physician notified   Oxygen Intervention/ Education  Discharge      () Pt had class  Date:   () Pt did not have class      () Pt did well  () Pt needed to be put on oxygen           TOBACCO USE  INITIAL ASSESSMENT    (x) Pt currently not smoking    () Pt currently smoking        () Pt needs Tobacco Cessation counseling          Smoked 2-3 ppd for 30 years  Pt quit in 2016.    TOBACCO USE  PLAN      (x) N/A      Does pt want to quit:   Does pt have a quit date:    () Tobacco Cessation counseling Education if applicable        () Encouraged to contact physician for tools/ nicotine replacement etc.   TOBACCO USE  RE ASSESSMENT    () N/A      Any change in Tobacco use status () N  ()Y      () Pt attended counseling education session            () Pt has contacted physician TOBACCO USE  RE ASSESSMENT    () N/A      Any change in Tobacco use status () N  ()Y      () Pt attended counseling education session            () Pt has contacted physician TOBACCO USE  RE ASSESSMENT    () N/A      Any change in Tobacco use status () N  ()Y      () Pt attended counseling education session            () Pt has contacted physician TOBACCO USE  DISCHARGE            Current Tobacco Use Status:         () Pt attended TC counseling education session  Date:           () Pt contacted physician        NRT product/ medication  :     Tobacco Use Goal  Initial Assessment      (x) Complete Cessation or Maintain Cessation of tobacco products   Tobacco Use Goal Intervention and Education Plan    (x Encourage pt to fight the urge, call quit line, use techniques learned from friends/ class    () Attend Tobacco Cessation class if needed   Tobacco Use  Reassessment          () Pt remains tobacco free            () Pt has had TC counseling session        () Pt still smoking Tobacco Use  Reassessment          () Pt remains tobacco free            () Pt has had TC counseling session        () Pt still smoking Tobacco Use  Reassessment          () Pt remains tobacco free            () Pt has had TC counseling session        () Pt still smoking Tobacco Use  Discharge          () Pt remains tobacco free            () Pt had TC counseling  Date:  See above        () Pt still smoking - gave resources for quitting             MEDICATIONS  (Oral & Inhaled)  INITIAL ASSESSMENT    Pt reports taking his meds properly 100% of the time      (x) Pt is on inhaled medications   MEDICATIONS  PLAN        -Review Rxs purpose, schedule, side effects and importance of compliance during Medication class. Also address Cpap, Vents.        MEDICATIONS  RE ASSESSMENT      Pt reports taking their meds properly  % of the time        () Pt has had class  () Pt has not had class MEDICATIONS  RE ASSESSMENT      Pt reports taking their meds properly  % of the time        () Pt has had class  () Pt has not had class MEDICATIONS  RE ASSESSMENT      Pt reports taking their meds properly  % of the time        () Pt has had class  () Pt has not had class MEDICATIONS  DISCHARGE        Pt reports taking their meds properly  % of the time        ()Pt had med class  Date:  () Pt has not had class       Pt has:  (x) Metered Dose Inhaler  (x) Dry Powder Inhaler  () Nebulizer   -Review correct use, timing, technique and cleaning of equipment during Medication class () Pt has had class  () Pt has not had class () Pt has had class  () Pt has not had class () Pt has had class  () Pt has not had class () Pt had class  Date: See above  () Pt has not had class   Medication Goals  Initial Assessment        (x) Take meds properly 100% of the time    (x) Learn about / Review Rxs, and devices Medication Goals  Intervention & Education  Plan      -Follow Rx instructions and ask if questions    -Attend Medication Education class   Medication Goals  Re assessment          () Readdressed questions on any medications    () Pt has had class  () Pt has not had class Medication Goals  Re assessment          () Readdressed questions on any medications    () Pt has had class  () Pt has not had class Medication Goals  Re assessment          () Readdressed questions on any medications    () Pt has had class  () Pt has not had class Medication Goals  Discharge          % proper med usage see above      () Pt had class  Date: See above  () Pt has not had class             ADL  INITIAL ASSESSMENT      (x) Impaired ADL ability  () Need for Assist Devices  (x) Generalized weakness, low functional capacity  (x) Stairs in house               ADL  PLAN        -Initiate NC, RT, and chair squats  -Breathing retraining, PLB, and pacing      -Encourage home activity               ADL  RE ASSESSMENT      () Pt is progressing  () Pt is not progressing        () Pt has initiated home activity  () Pt has not yet initiated  home activity-      () ADLs getting easier  () ADLs not getting easier   ADL  RE ASSESSMENT      () Pt is progressing  () Pt is not progressing        () Pt has initiated home activity  () Pt has not yet initiated  home activity-      () ADLs getting easier  () ADLs not getting easier ADL  RE ASSESSMENT      () Pt is progressing  () Pt is not progressing        () Pt has initiated home activity  () Pt has not yet initiated  home activity-      () ADLs getting easier  () ADLs not getting easier ADL  DISCHARGE        () Pt showed strength and endurance gains  () Pt did not progress      () Pt doing recommended home activity  () Pt not doing home activitiy         ADL Goals   Initial Assessment      (x) Decrease SOB with ADLs              (x) Decreased SOB overall      ADL Goals Intervention/ Education Plan      -Initiate NC, RT and chair squats and increase pts strength and endurance        -Pacing, Breathing retraining       ADL Goals Reassessment        () ADLs getting easier  () ADLs not getting easier          () Pt states activities are getting easier/ able to go longer/ not get as SOB   ADL Goals Reassessment        () ADLs getting easier  () ADLs not getting easier          () Pt states activities are getting easier/ able to go longer/ not get as SOB ADL Goals Reassessment        () ADLs getting easier  () ADLs not getting easier          () Pt states activities are getting easier/ able to go longer/ not get as SOB   ADL Goals   Discharge        Goal achieved?  () Yes  () No            Goal achieved?  () Yes  () No          Outcomes:  See Health and Functioning from the CAT tool and Strength and Endurance from 6 MWD above             EXACERBAT'N PREVENTION & MANAGEMENT  INITIAL ASSESSMENT      Pt reports;  () 0 respiratory infections  (x) 1   () >2  () Hospitalized in last 12 months   EXACERBAT'N PREVENTION & MANAGEMENT  PLAN        Address via Disease Self Management and Exacerbation prevention class:    -Hydration for mucus    -Hand Hygiene          -Evaluation of Sputum    -When to call MD  -S/Sx to report  -Decrease exposure to irritants/ exacerbators    -Cleaning of respiratory equipment   EXACERBAT'N PREVENTION & MANAGEMENT  RE ASSESSMENT      () Pt has had class  () Pt has not had class        () Improved hydration    () Correct hand washing techs and alcohol gel usage    () Sputum assessment    () Ability to recognize exacerbation and when to call MD        () Cleaning of respiratory equipment EXACERBAT'N PREVENTION & MANAGEMENT  RE ASSESSMENT      () Pt has had class  () Pt has not had class        () Improved hydration    () Correct hand washing techs and alcohol gel usage    () Sputum assessment    () Ability to recognize exacerbation and when to call MD        () Cleaning of respiratory equipment EXACERBAT'N PREVENTION & MANAGEMENT  RE ASSESSMENT      () Pt has had class  () Pt has not had class        () Improved hydration    () Correct hand washing techs and alcohol gel usage    () Sputum assessment    () Ability to recognize exacerbation and when to call MD        () Cleaning of respiratory equipment EXACERBAT'N PREVENTION & MANAGEMENT  DISCHARGE        () Pt has had class  Date:  () Pt did not have class              () Addressed questions and pt feels comfortable with hydration, hand washing, sputum assessment, exacerbation knowledge, and cleaning of equipment   Exacerbation Prevention & Management Goals  Initial Assessment      (x) Learn ways to stay healthy and not get admitted          (x) Increase knowledge of Lungs, Breathing, Exercise, Nutrition, Mood and controlling anxiety, and stopping the Dyspnea Cycle by attending classes   Exacerbation Prevention & Management Goals Intervention/ Education  Plan    -Attend class and demonstrate disease self management strategies      -Attend all appropriate classes                   Exacerbation Prevention & Management Goals  Reassessment        () Pt has had class  () Pt has not had class        () Pt has had all classes  () Pt has had some classes  () Pt has had little/ not staying for education Exacerbation Prevention & Management Goals  Reassessment        () Pt has had class  () Pt has not had class        () Pt has had all classes  () Pt has had some classes  () Pt has had little/ not staying for education Exacerbation Prevention & Management Goals  Reassessment        () Pt has had class  () Pt has not had class        () Pt has had all classes  () Pt has had most classes  () Pt has had some of the classes  () Pt has had little/ not staying for education Exacerbation Prevention & Management Goals  Discharge        () Pt had class  Date:  See above  () Pt did not have class        () Pt attended all relevant classes and all questions were answered                   PHYSICIAN INTERACTION    MD Initial Assessment Review    (x) Initial  Assessment Reviewed  (x) Treatment Plan and Goals support patient needs/ abilities                  Cosigned and dated below:   Melanie Blanco MD 30 day and Plan Review:      (x) I have seen the patient in rehab during this 30 day reassessment  (x) I agree with the plan  (x) Continue with progression and instruction  () Continue, but with the following changes:      Cosigned and dated below: Garrison Napier MD 30 day Reassessment Review:    (x) I have seen the patient in rehab during this 30 day reassessment  (x) Continue with the current program  () Continue, but with the following changes:  () Discharge patient      Cosigned and dated below: Garrison Napier MD 30 day Reassessment Review:    (x) I have seen the patient in rehab during this 30 day reassessment  (x) Continue with the current program  () Continue, but with the following changes:  () Discharge patient      Cosigned and dated below: Garrison Napier MD 30 day Reassessment Review:    (x) I have seen the patient in rehab during this 30 day reassessment  (x) Continue with the current program  () Continue, but with the following changes:  () Discharge patient      Cosigned and dated below: Garrison Napier MD 30 day   Discharge Review:    (x) Discharge patient                            Cosigned and dated below:     Cosigned by: Rafael Marquez MD at 9/14/2021  2:55 PM   Revision History  Date/Time User Provider Type Action   9/14/2021  2:55 PM Rafael Marquez MD Physician Cosign   9/14/2021  1:32 PM Yolanda Schaeffer RCP Respiratory Therapist Sign

## 2021-10-15 ENCOUNTER — HOSPITAL ENCOUNTER (OUTPATIENT)
Dept: PSYCHIATRY | Age: 61
Setting detail: THERAPIES SERIES
Discharge: HOME | End: 2021-10-15
Payer: MEDICARE

## 2021-10-15 NOTE — PROGRESS NOTES
Telephone call [phone]-Pt telephoned to say that he will not be to group. Pt mother is having health issues and has been \"in and out\" of the hospital the last 2 days and requires a lot of care. Pt is staying home today to care for his mother. Intends to be to group on Monday.

## 2021-10-18 ENCOUNTER — HOSPITAL ENCOUNTER (OUTPATIENT)
Dept: PSYCHIATRY | Age: 61
Setting detail: THERAPIES SERIES
Discharge: HOME | End: 2021-10-18
Payer: MEDICARE

## 2021-10-19 ENCOUNTER — INITIAL CONSULT (OUTPATIENT)
Dept: NEUROLOGY | Age: 61
End: 2021-10-19
Payer: MEDICARE

## 2021-10-19 VITALS
SYSTOLIC BLOOD PRESSURE: 138 MMHG | OXYGEN SATURATION: 94 % | BODY MASS INDEX: 38.92 KG/M2 | HEART RATE: 66 BPM | HEIGHT: 71 IN | WEIGHT: 278 LBS | DIASTOLIC BLOOD PRESSURE: 80 MMHG

## 2021-10-19 DIAGNOSIS — R55 NEAR SYNCOPE: ICD-10-CM

## 2021-10-19 DIAGNOSIS — R26.89 BALANCE PROBLEM: Primary | ICD-10-CM

## 2021-10-19 PROCEDURE — 99204 OFFICE O/P NEW MOD 45 MIN: CPT | Performed by: PSYCHIATRY & NEUROLOGY

## 2021-10-19 RX ORDER — GUAIFENESIN 600 MG/1
1200 TABLET, EXTENDED RELEASE ORAL 2 TIMES DAILY
COMMUNITY
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
## 2021-10-19 NOTE — PATIENT INSTRUCTIONS
1. Tilt table test.   2. CTA head and neck with contrast, evaluate for vertebrobasilar insufficiency. 3. EEG  4. B12, Folate  5. holter monitor. 6. Call with any new symptoms or concerns. 7. Follow up in 1 months.

## 2021-10-20 DIAGNOSIS — R26.89 BALANCE PROBLEM: ICD-10-CM

## 2021-10-20 DIAGNOSIS — R55 NEAR SYNCOPE: Primary | ICD-10-CM

## 2021-10-20 LAB
FOLATE: 16.4 NG/ML
VITAMIN B-12: 444 PG/ML (ref 180–914)

## 2021-10-21 ENCOUNTER — HOSPITAL ENCOUNTER (OUTPATIENT)
Dept: PSYCHIATRY | Age: 61
Setting detail: THERAPIES SERIES
Discharge: HOME | End: 2021-10-21
Payer: MEDICARE

## 2021-10-21 PROCEDURE — 90853 GROUP PSYCHOTHERAPY: CPT

## 2021-10-21 NOTE — PROGRESS NOTES
Group Therapy Note    Date: 10/21/2021  Start Time: 4624  End Time:  6619  Number of Participants: 4    Type of Group: Recovery      Notes:  Pt is present for group with active participation. Peers identified 3 meaningful activities which provide enjoyment and pleasure. Peers discussed the importance of those activities in their life. Pt discussed the importance of his grandchildren, family, various hobbies and his AA participation. 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 Status After Intervention:  Improved    Participation Level:  Active Listener and Interactive    Participation Quality: Appropriate, Attentive, Sharing and Supportive      Speech:  normal      Thought Process/Content: Logical  Linear      Affective Functioning: Congruent      Mood: euthymic      Level of consciousness:  Alert, Oriented x4 and Attentive      Response to Learning: Able to verbalize current knowledge/experience, Able to verbalize/acknowledge new learning, Able to retain information, Capable of insight, Able to change behavior and Progressing to goal      Endings: None Reported    Modes of Intervention: Education, Support, Socialization, Exploration, Clarifying, Problem-solving and Activity      Discipline Responsible: /Counselor      Signature:  TING Agarwal

## 2021-10-21 NOTE — PROGRESS NOTES
Group Therapy Note    Date: 10/21/2021  Start Time: 0830  End Time:  0945  Number of Participants: 4    Type of Group: Psychotherapy      Notes: Pt is present for group. Pt reports continued sobriety. Shared with the group his efforts to take time for himself as he has been caring for his mother and visiting his brother. Pt provides insightful feedback to his peers and offers encouragement. Status After Intervention:  Improved    Participation Level:  Active Listener and Interactive    Participation Quality: Appropriate, Attentive, Sharing and Supportive      Speech:  normal      Thought Process/Content: Logical  Linear      Affective Functioning: Congruent      Mood: Euthymic      Level of consciousness:  Alert, Oriented x4 and Attentive      Response to Learning: Able to verbalize current knowledge/experience, Able to verbalize/acknowledge new learning, Able to retain information, Capable of insight, Able to change behavior and Progressing to goal      Endings: None Reported    Modes of Intervention: Education, Support, Socialization, Exploration, Clarifying and Problem-solving      Discipline Responsible: /Counselor      Signature:  TING Gomes

## 2021-10-22 ENCOUNTER — HOSPITAL ENCOUNTER (OUTPATIENT)
Dept: PSYCHIATRY | Age: 61
Setting detail: THERAPIES SERIES
Discharge: HOME | End: 2021-10-22
Payer: MEDICARE

## 2021-10-22 NOTE — PROGRESS NOTES
Telephone contact-Pt telephoned to cancel for group today. Pt will be helping to move his brother from the nursing home and into an apartment which is handicap accessible.

## 2021-10-29 ENCOUNTER — HOSPITAL ENCOUNTER (OUTPATIENT)
Dept: PSYCHIATRY | Age: 61
Setting detail: THERAPIES SERIES
Discharge: HOME | End: 2021-10-29
Payer: MEDICARE

## 2021-10-29 DIAGNOSIS — F33.1 MAJOR DEPRESSIVE DISORDER, RECURRENT EPISODE, MODERATE (HCC): ICD-10-CM

## 2021-10-29 DIAGNOSIS — E11.65 UNCONTROLLED TYPE 2 DIABETES MELLITUS WITH HYPERGLYCEMIA (HCC): ICD-10-CM

## 2021-10-29 DIAGNOSIS — G25.81 RLS (RESTLESS LEGS SYNDROME): ICD-10-CM

## 2021-10-29 PROCEDURE — 90853 GROUP PSYCHOTHERAPY: CPT

## 2021-10-29 RX ORDER — OMEPRAZOLE 20 MG/1
CAPSULE, DELAYED RELEASE ORAL
Qty: 30 CAPSULE | Refills: 0 | Status: SHIPPED | OUTPATIENT
Start: 2021-10-29 | End: 2021-11-29

## 2021-10-29 RX ORDER — ALLOPURINOL 300 MG/1
TABLET ORAL
Qty: 30 TABLET | Refills: 0 | Status: SHIPPED | OUTPATIENT
Start: 2021-10-29 | End: 2021-11-29

## 2021-10-29 RX ORDER — ISOSORBIDE MONONITRATE 30 MG/1
TABLET, EXTENDED RELEASE ORAL
Qty: 90 TABLET | Refills: 1 | Status: SHIPPED | OUTPATIENT
Start: 2021-10-29 | End: 2022-03-23 | Stop reason: SDUPTHER

## 2021-10-29 RX ORDER — BUPROPION HYDROCHLORIDE 300 MG/1
TABLET ORAL
Qty: 90 TABLET | Refills: 1 | Status: SHIPPED | OUTPATIENT
Start: 2021-10-29 | End: 2022-03-21 | Stop reason: SDUPTHER

## 2021-10-29 RX ORDER — PRAMIPEXOLE DIHYDROCHLORIDE 0.12 MG/1
TABLET ORAL
Qty: 90 TABLET | Refills: 1 | Status: SHIPPED | OUTPATIENT
Start: 2021-10-29 | End: 2022-03-21 | Stop reason: SDUPTHER

## 2021-10-29 NOTE — TELEPHONE ENCOUNTER
Date of last visit:  9/29/2021   Date of next visit:  2/2/2022    Requested Prescriptions     Pending Prescriptions Disp Refills    buPROPion (WELLBUTRIN XL) 300 MG extended release tablet [Pharmacy Med Name: BUPROPION XL 300MG TAB  Tablet] 90 tablet 1     Sig: TAKE 1 TABLET BY MOUTH EVERY MORNING    isosorbide mononitrate (IMDUR) 30 MG extended release tablet [Pharmacy Med Name: ISOSORBIDE*MN ER 30 MG TAB 30 Tablet] 90 tablet 1     Sig: TAKE ONE (1) TABLET BY MOUTH ONCE DAILY    ticagrelor (BRILINTA) 90 MG TABS tablet [Pharmacy Med Name: Mo Hedge 90 MG TABS 90 Tablet] 180 tablet 1     Sig: TAKE ONE (1) TABLET BY MOUTH TWICE DAILY    metFORMIN (GLUCOPHAGE) 500 MG tablet [Pharmacy Med Name: METFORMIN 500 MG TABS 500 Tablet] 360 tablet 1     Sig: TAKE 2 TABLETS BY MOUTH TWICE DAILY WITH MEALS    pramipexole (MIRAPEX) 0.125 MG tablet [Pharmacy Med Name: PRAMIPEXOLE 0.125 MG TABLET 0.125 Tablet] 90 tablet 1     Sig: TAKE 1 TABLET BY MOUTH DAILY IN THE EVENING

## 2021-10-29 NOTE — TELEPHONE ENCOUNTER
Date of last visit:  9/29/2021  Date of next visit:  2/2/2022    Requested Prescriptions     Pending Prescriptions Disp Refills    allopurinol (ZYLOPRIM) 300 MG tablet [Pharmacy Med Name: ALLOPURINOL 300 MG TABS 300 Tablet] 30 tablet 0     Sig: TAKE 1 TABLET BY MOUTH ONCE DAILY    omeprazole (PRILOSEC) 20 MG delayed release capsule [Pharmacy Med Name: OMEPRAZOLE DR 20MG CAP 20 Capsule] 30 capsule 0     Sig: TAKE 1 CAPSULE BY MOUTH ONCE DAILY

## 2021-11-02 ENCOUNTER — HOSPITAL ENCOUNTER (OUTPATIENT)
Dept: CARDIAC REHAB | Age: 61
Setting detail: THERAPIES SERIES
Discharge: HOME | End: 2021-11-02
Payer: MEDICARE

## 2021-11-02 NOTE — PLAN OF CARE
[facility] for COPD   INDIVIDUAL TREATMENT PLAN (ITP)     Name: Lazarus Lied   :  1960  Acct Number: [[id number]]   AGE: 59 y.o. Diagnosis:  Severe COPD, which is GOLD Stage 3                                               PFT:  Post Bronchodilator FEV1/FVC: 61  FEV1: 49    Patient Goals:  (x) Breathe better  (x) Increase my endurance (x) Have more energy  (x) Rely less on others  (x) Ease ADLs  (x) Understand and use meds correctly  (x) Control cough  (x) Make fewer visits to hospital  () Quit smoking  (x) Feel less anxious / have more confidence    Glossary:  TN=Pulmonary Rehab  PF=Physical Fitness TM=Treadmill  AD=Schwinn Airdyne  UBE=UpperBody Ergometer  RT=Resistance Training  PLB=Pursed Lip Breathing      COVID -19 Screen  Do you have any of the following symptoms:  [] Fever [] Cough [] SOB [] Muscle/Body Ache [] Loss of taste/smell [x] None    Have you traveled outside of the [place]? [] Yes      [x] No    Have you been around anyone who has tested Positive for COVID 19?  [] Yes      [x] No    The following Education has been completed with patient. [x] Wait in the lobby until we call you back to rehab. [x] You must wear a mask while in the medical center and in pulmonary rehab unless exercising. Please bring your own. [x] Bring your own bottle of water with you. [x] You can not bring anyone with you in to the rehab clinic at this time. They are welcome to sit in the lobby or wait in the car. INDIVIDUAL TREATMENT PLAN    INITIAL ASSESSMENT    Day #1 INDIVIDUAL TREATMENT PLAN    PLAN      Day #2-30 INDIVIDUAL TREATMENT PLAN    RE ASSESSMENT    Day #31-60 INDIVIDUAL TREATMENT PLAN    RE ASSESSMENT    Day #61-90 INDIVIDUAL TREATMENT PLAN    RE ASSESSMENT    Day # INDIVIDUAL TREATMENT PLAN    DISCHARGE      Last Day        Date: 2021     Date: 10/14/21    *Patient not in attendance for reassessment.    Date: 11/2/21    Jaciel discharged from pulmonary rehab due to other medical issues. No new progress to report   Date:    Date:    Date:    EXERCISE   INITIAL ASSESSMENT      Prescribed Oxygen Use  Activity: none      Oxygen Titration  (x) Assess for desaturation            Current Home Exercise:  None EXERCISE  PLAN        Current Oxygen Use  None    Oxygen Titration  (x) Maintaining >87% Spo2  () Not maintaining -  L/min needed on       Current Home Exercise:  Unknown, patient not in attendance EXERCISE  RE ASSESSMENT      Current Oxygen Use  Activity:  L/min  () Continuous  () Breath Actuated      Oxygen Titration  () Maintaining >87% Spo2  () Not maintaining -  L/min needed on       Current Home Exercise:  Frequency:  x/wk  Intensity:  /10  Duration:  min  Mode: EXERCISE  RE ASSESSMENT      Current Oxygen Use  Activity:  L/min  () Continuous  () Breath Actuated      Oxygen Titration  () Maintaining >87% Spo2  () Not maintaining -  L/min needed on       Current Home Exercise:  Frequency:  x/wk  Intensity:  /10  Duration:  min  Mode: EXERCISE  RE ASSESSMENT      Current Oxygen Use  Activity:  L/min  () Continuous  () Breath Actuated      Oxygen Titration  () Maintaining >87% Spo2  () Not maintaining -  L/min needed on       Current Home Exercise:  Frequency:  x/wk  Intensity:  /10  Duration:  min  Mode:   EXERCISE  DISCHARGE        Final Oxygen Use  Activity:  L/min  () Continuous  () Breath Actuated      Oxygen Titration  () Maintaining >87% Spo2  () Not maintaining -  L/min needed on       Current Home Exercise:  Frequency:  x/wk  Intensity:  /10  Duration:  min  Mode:       6 Minute Walk Test (6MWT):  O2 used: on room air   870 ft walked = 2.2 METs  SpO2: 89-93%  HR:    RPD: 3  RPE: 4  Number of Breaks: none   Avg time for break:  n/a  Assist device used: none         6 Minute Walk Test (6MWT):  O2 used:   ft walked =  METs  SpO2:  %  HR:    RPD:   RPE:  Number of Breaks:    Avg time for break:  secs  Assist device used:             OUTCOMES:  6MWD Improvement:  > 98'?  () Yes  () No     Exercise Prescription    THR: 78 - 125 bpm    Frequency:  2-3x/wk in [place], 1-3x/wk home activity    Intensity:  METs (from 6MWT)      Time:  15-30 total minutes up to 55 minutes max    Type:  Aerobic (TM, AD, UBE, NuStep), 6 ST, RT 1-10# 8-15 reps    Progression:  Increase 30s - 2 min/mode for Aerobic activity, and increase Intensity 2-5% each week if RPE <5, RPD <5, SpO2 >87% and pt is within [facility] above. Exercise Prescription    (x) Pt exercising within THR    Frequency:  2-3x/wk in PA, 1-3x/wk home activity    Intensity:  3-5 on Anuj Dyspnea/ Fatigue scale    Time:  15-30 total minutes up to 55 minutes max    Type:  Aerobic (TM, AD, UBE, NuStep), 6 ST, RT 1-10# 8-15 reps    Progression:  Increase 30s - 2 min/mode for Aerobic activity, and increase Intensity 2-5% each week if RPE <5, RPD <5, SpO2 >87% and pt is within [facility] above. Exercise Prescription    () Pt exercising within THR    Frequency:  2-3x/wk in PA, 2-5x/wk home activity    Intensity:  3-5 on Anuj Dyspnea/ Fatigue scale    Time:  30-45 total minutes up to 55 minutes max    Type:  Aerobic (TM, AD, UBE, NuStep), 6 ST, RT 1-10# 8-15 reps    Progression:  Increase 30s - 2 min/mode for Aerobic activity, and increase Intensity 2-5% each week if RPE <5, RPD <5, SpO2 >87% and pt is within [facility] above. Exercise Prescription    () Pt exercising within THR    Frequency:  2-3x/wk in PA, 2-5x/wk home activity    Intensity:  3-5 on Anuj Dyspnea/ Fatigue scale    Time:  30-45 total minutes up to 55 minutes max    Type:  Aerobic (TM, AD, UBE, NuStep), 6 ST, RT 1-10# 8-15 reps    Progression:  Increase 30s - 2 min/mode for Aerobic activity up to 35 minutes, and increase Intensity 2-5% each week if RPE <5, RPD <5, SpO2 >87% and pt is within [facility] above.  Exercise Prescription    () Pt exercising within THR    Frequency:  2-3x/wk in [place], 2-5x/wk home activity    Intensity:  3-5 on Anuj Dyspnea/ Fatigue scale    Time:  30-45 total minutes up to 55 minutes max    Type:  Aerobic (TM, AD, UBE, NuStep), 6 ST, RT 1-10# 8-15 reps    Progression:  Increase 30s - 2 min/mode for Aerobic activity up to 35 minutes, and increase Intensity 2-5% each week if RPE <5, RPD <5, SpO2 >87% and pt is within [facility] above. Home Program          (x) Given to patient with current levels, recommendation and guidelines.                                        Initial Levels:  TM:1.5mph,6min  AD:1.0level,6min  NuStep:46W,6  min  UBE:0.5level, 5 min  RT 4#, 8-15 reps   Current Levels:    AD: 45 level,  10min  UBE:  25W, 5 min  RT 4 #, 8-15 reps   Current Levels:  TM:  mph,  min  AD:  level,  min  NuStep:  W,  min  UBE:  level, 5 min  RT  #, 8-15 reps Current Levels:  TM:  mph,  min  AD:  level,  min  NuStep:  W,  min  UBE:  level, 5 min  RT  #, 8-15 reps Current Levels:  TM:  mph,  min  AD:  level,  min  NuStep:  W,  min  UBE:  level, 5 min  RT  #, 8-15 reps   Final Levels:  TM:  mph,  min  AD:  level,  min  NuStep:  W,  min  UBE:  level, 5 min  RT  #, 8-15 reps     Physical Limitations  Initial Assessment    () Weakness  () Inflexible  () Poor posture  () Gait abnormality  (x) Balance  (x) Orthopedic Issues   Physical Limitation  Plan      (x) Strengthen through squats and RT  (x) Appropriate stretches shown  (x)Verbal cues and reminders for posture and gait given  (x) Proper modalities chosen for ortho issues  (x) Give Home activity / stretches/ Warmup/ [place] info   Physical Limitations Reassessment      () Pt progressing  () Pt not progressing Physical Limitations Reassessment      () Pt progressing  () Pt not progressing Physical Limitations Reassessment      () Pt progressing  () Pt not progressing     Physical Limitations  Discharge      () Issues Addressed  () PT/OT suggested       Exercise Goals   Initial Assessment:    (x) Start to, and commit to exercising regularly     (x) Learn about home activity recommendations        (x) Increase PF shown by increasing 6MWD   Exercise Goals   Plan      -Initiate SC 2x/week  -Encourage home exercise    -Attend Home Activity EDUCATION class      -Slowly, safely, progress in SC    Exercise Goals   Reassessment      () Pt is coming regularly  () Pt is sporadic    () Pt has had class  () Pt has not had class        () Pt is progressing  () Pt is not progressing Exercise Goals   Reassessment      () Pt is coming regularly  () Pt is sporadic    () Pt has had class  () Pt has not had class        () Pt is progressing  () Pt is not progressing Exercise Goals   Reassessment      () Pt is coming regularly  () Pt is sporadic    () Pt has had class  () Pt has not had class        () Pt is progressing  () Pt is not progressing   Exercise Goals Discharge      () GOAL Met?  () GOAL not met -      () Pt had EDUCATION class  Date:   () Pt did not have class    () GOAL Met  See 6MWD above  () GOAL not Met:     Exercise Intervention Initial Assessment      () Transportation needed        (x) EDUCATION needed          (x) Motivation needed   Exercise Intervention/ Education   Plan      () [facility] [employer] set up        (x) EDUCATION:  Home activity class to be given        (x) Positive encouragement, support and motivation to be given   Exercise Intervention/ Education Reassessment      () Pt is attending  () Pt is not attending      () Pt has had class  () Pt has not had class      () Pt is progressing  () Pt not progressing Exercise Intervention/ Education Reassessment      () Pt is attending  () Pt is not attending      () Pt has had class  () Pt has not had class      () Pt is progressing  () Pt not progressing Exercise Intervention/ Education Reassessment      () Pt is attending  () Pt is not attending      () Pt has had class  () Pt has not had class      () Pt is progressing  () Pt not progressing Exercise Intervention/ Edcuation Discharge      () Pt attended most  () Pt cancelled a lot    () Pt has had class  Date: See above  () Pt did not have class    () Pt progressed and did well  () Pt had difficulty-               NUTRITION   INITIAL ASSESSMENT      Height:  511   Weight: 265.8 lbs  BMI: 37.1    30 day goal: 264 Lbs (2-4lbs)    (x) Overweight  () Underweight  () Normal  () Teeth issues  () GI issues  (x) Nutrition knowledge needed  (x) DM   NUTRITION   PLAN        Current Weight:  Unknown, patient not in attendance      Goal achieved?:Unknown, patient not in attendance    Next 30 day goal: 264   NUTRITION  RE ASSESSMENT      Current Weight:  lbs      Goal achieved?:  Next 30 day goal: Lbs NUTRITION  RE ASSESSMENT      Current Weight:  lbs      Goal achieved?:  Next 30 day goal: Lbs NUTRITION  RE ASSESSMENT      Current Weight:  lbs      Goal achieved?:  Next 30 day goal: Lbs   NUTRITION DISCHARGE        Current Weight:  lbs  BMI:    Goal achieved?:     Nutrition Goals  Initial Assessment    (x) Pt is DM.   Increase nutrition knowledge and glucose control through diet and exercise      (x) Learn weight strategies to lose weight        (x) Learn about general nutrition          -Achievable weight goal for the end of the program:  254 Lbs (2-4lbs per month recommended)   Nutrition Goals   Plan      (x) DM:  Attend nutrition class and learn about quality carbohydrates and exercises role in DM    (x) Attend nutrition class          (x) Attend nutrition class          x) Initiate exercise and give pt hints and tips for weight and will have class for information   Nutrition Goals  Reassessment      () DM: Pt has had class  () DM: Pt has not had class           () Pt has had class  () Pt has not had class       () Pt has had class  () Pt has not had class       () Pt progressing  () Pt not progressing     Nutrition Goals  Reassessment      () DM: Pt has had class  () DM: Pt has not had class           () Pt has had class  () Pt has not had class       () Pt has had class  () Pt has not had class       () Pt progressing  () Pt not progressing     Nutrition Goals  Reassessment      () DM: Pt has had class  () DM: Pt has not had class           () Pt has had class  () Pt has not had class       () Pt has had class  () Pt has not had class       () Pt progressing  () Pt not progressing     Nutrition Goals  Discharge      () Pt had nutrition class  Date:  () Pt has not had class        () Pt has had class  Date: See above  () Pt has not had class    () Pt has class  Date: See above  () Pt has not had class      () Final weight goal achieved  () Pt did not reach desired weight goal - (readdressed nutrition and exercise strategies for future goal attainment)        Nutrition Intervention/ Education   Initial Assessment    (x) Pt needs Nutrition education class        () Pt states does not need class       Nutrition Intervention/ Education  Plan      () Pt will have Nutrition class          () Encourage pt to continue to learn and incorporate self knowledge in to diet choices   Nutrition Intervention/ Education  Reassessment      () Pt has had class  () Pt has not had class      () Pt had questions  () Pt denies having questions Nutrition Intervention/ Education  Reassessment      () Pt has had class  () Pt has not had class      () Pt had questions  () Pt denies having questions Nutrition Intervention/ Education  Reassessment      () Pt has had class  () Pt has not had class      () Pt had questions  () Pt denies having questions Nutrition Intervention/ Education   Discharge      () Pt has had class  Date: See above  () Pt has not had class - Reason:    () Pt had questions that were answered   () Pt denies having questions             PSYCHO SOCIAL INITIAL ASSESSMENT      Depression:  () Self Reported  () In History  () S/Sx noted  () Denies  (x) On Medication  (x) Follows physician      (x) Give PHQ-9 Depression screening tool                  Dyspnea:  (x) Give pt UCSD SOB survey      (x) Pt gets SOB during activity and ADLs.           (x) Pt has support from home for the program        () Pt does not have support for the program   PSYCHO SOCIAL  PLAN      () Attend Stress/ Depression/ Anxiety Class                Pre Rehab PHQ-9   Score: 6  1-4 Normal  5-9 Mild  10-14 Mod  15-19 Mod-Sev  >19 Severe        Pre Rehab UCSD SOB Score: 39      (x) Attend classes and attend rehab to increase strength and endurance      -Attend Psychosocial class          () Speak with the patient/ family about ability to commit to the program with undue stress/ anxiety   PSYCHO SOCIAL  RE ASSESSMENT    () Pt scored >10 on PHQ-9.   Spoke with pt privately about issues and *             () Pt recommended to contact physician for assistance                See below for ADLs        () Pt has had class  () Pt has not had class        Re assessment of support:  Good, pt has been attending PSYCHO SOCIAL  RE ASSESSMENT    () Pt is coping well  () Pt needs more assistance-              () Pt recommended to contact physician for assistance                See below          () Pt has had class  () Pt has not had           Re assessment of support:  Good, pt has been attending PSYCHO SOCIAL  RE ASSESSMENT    () Pt is coping well  () Pt needs more assistance-              () Pt recommended to contact physician for assistance                See below          () Pt has had class  () Pt has not had class        Re assessment of support:  Good, pt has been attending   PSYCHO SOCIAL DISCHARGE      (x) Pt attended class     Date:              Post Rehab PHQ-9   Depression Score:                Post Rehab UCSD SOB  Score:      (x) Pt is less SOB with ADLs            () Pt had class  Date: See above  () Pt did not have class        () Pt completed program  () Pt had to stop         Psychosocial Goals   Initial Assessment    (x) Maintain/ Decrease Depression Levels      (x) Maintain/ Decrease Anxiety Levels        (x) Learn about Emotional Health          (x) Increase QoL   (CAT tool)          (x) Give the CAT tool for HR QoL      Pre Rehab  CAT score:  19/ 40       Psychosocial Goals  Plan      () Attend Stress/ Anxiety/ Dyspnea - Panic control class      () Attend Stress/ Anxiety/ Dyspnea - Panic control class      () Attend Stress/ Anxiety/ Dyspnea - Panic control class      (x) Initiate UT, get stronger, more endurance and more confidence              30 day CAT score:  Unknown, patient not in attendance     Psychosocial Goals  Reassessment      () Pt has had class  () Pt has not had class      () Pt has had class  () Pt has not had class      () Pt has had class  () Pt has not had class      () Pt is progressing  () Pt is not progressing              60 day CAT score:  / 40 Psychosocial Goals  Reassessment      () Pt has had class  () Pt has not had class      () Pt has had class  () Pt has not had class      () Pt has had class  () Pt has not had class      () Pt is progressing  () Pt is not progressing              90 day CAT score:  / 40 Psychosocial Goals  Reassessment      () Pt has had class  () Pt has not had class      () Pt has had class  () Pt has not had class      () Pt has had class  () Pt has not had class      () Pt is progressing  () Pt is not progressing              120 day CAT score:  / 40   Psychosocial Goals   Discharge      (x) Pt had class            (x) Pt had class            (x) Pt had class  Date: See above  () Pt did not have class      Post rehab CAT below                  Post Rehab   CAT score:  / 40              OUTCOMES    PHQ-9:  Did pt drop a severity level or maintain in the minimal range?  () Y  () N    UCSD SOB  Did pt decrease their number by 5 or more?  () Y  () N    CAT scores:  Did pt drop by 2 or more points from Pre to Post?  () Y  () N        Psychosocial Intervention/ Education   Initial Assessment    (x) Pt is being treated for depression/ anxiety        (x) Pt would benefit from [facility]s Psychosocial Issues Class (Stress, Depression, Anxiety, and Intimacy Psychosocial  Intervention/ Education  Plan      (x) Pt to contact physician if any severe changes occur in mood        () Pt will attend [facility]s class   Psychosocial Intervention/ Education Reassessment      () Pt is coping well  () Pt is not coping well         () Pt has had class  () Pt has not had class Psychosocial Intervention/ Education Reassessment      () Pt is coping well  () Pt is not coping well         () Pt has had class  () Pt has not had class Psychosocial Intervention/ Education Reassessment      () Pt is coping well  () Pt is not coping well         () Pt has had class  () Pt has not had class   Psychosocial Intervention/ Education Discharge      () Pt did not need any changes   () Pt needed changes to treatment      (x) Pt had class  Date: See above  () Pt did not have class         Pain   Initial Assessment    () N/A  Location:  right knee,   Duration: all the time, Intensity: 5/10,   Type: ache that sometimes is sharp      Pain   Plan      () N/A    () Pts pain is under control  (x) Pt encouraged to contact physician for pain control   Pain   Reassessment      () N/A    () Pts pain is under control  () Pt encouraged to contact physician for pain control Pain   Reassessment      () N/A    () Pts pain is under control  () Pt encouraged to contact physician for pain control Pain   Reassessment      () N/A    () Pts pain is under control  () Pt encouraged to contact physician for pain control Pain   Discharge      () N/A    () Pts pain is under control  () Pt encouraged to contact physician for pain control           OXYGEN   INITIAL ASSESSMENT    RESTING:  (x) RA  () O2:  L/min  () Continuous  () Breath Actuated  93% SpO2 / 20 bpm    Prescribed Oxygen Use:  None    DME Company for O2:  N/A     OXYGEN   PLAN      RESTING:  (x) Monitor needs, administer supplemental oxygen if SpO2 <88% OXYGEN   RE ASSESSMENT    RESTING:  (x) Pt SpO2 >87%  () Pt needs higher flow  () Pt needs less flow OXYGEN   RE ASSESSMENT    RESTING:  (x) Pt SpO2 >87%  () Pt needs higher flow  () Pt needs less flow OXYGEN   RE ASSESSMENT    RESTING:  (x) Pt SpO2 >87%  () Pt needs higher flow  () Pt needs less flow OXYGEN   DISCHARGE      RESTING:  () Pt SpO2 remained >87% on * L/min at rest    () Pts doctor and company contacted for change in Rx   Oxygen Goals   Initial Assessment    (x) Wean, Titrate down, or get off O2 if possible   Oxygen Goals   Plan      (x) Closely monitor SpO2 and HR using pulse oximetry for saturation and HR response, and titrate if appropriate   Oxygen Goals  Reassessment    () RA    On exertion:  () Pt maintaining FiO2  () Pt tolerating lower O2 needs  () Pt needing more O2   Oxygen Goals  Reassessment    () RA    On exertion:  () Pt maintaining FiO2  () Pt tolerating lower O2 needs  () Pt needing more O2 Oxygen Goals  Reassessment    () RA    On exertion:  () Pt maintaining FiO2  () Pt tolerating lower O2 needs  () Pt needing more O2     Oxygen Goals   Discharge    () RA    With Exertion:  () Pt maintained FiO2  () Pt was found to need less O2 - notification sent to physician  () Pt was found to need more O2, notification sent to physician     Oxygen Intervention/ Education  Initial Assessment    (x) Learn / Review about O2 use, safety and travel      (x) Pt does not wear or have home oxygen    Oxygen Intervention/ Education  Plan      () Attend O2 Use, safety and travel class        (x) Assess for SpO2 with each exercise   Oxygen Intervention/ Education  Reassessment      () Pt has had class  () Pt has not had class      () Pt is >87%  () Pt has been found to desaturate - physician notified Oxygen Intervention/ Education  Reassessment      () Pt has had class  () Pt has not had class      () Pt is >87%  () Pt has been found to desaturate - physician notified Oxygen Intervention/ Education  Reassessment      () Pt has had class  () Pt has not had class      () Pt is >87%  () Pt has been found to desaturate - physician notified   Oxygen Intervention/ Education  Discharge      () Pt had class  Date:   () Pt did not have class      () Pt did well  () Pt needed to be put on oxygen           TOBACCO USE  INITIAL ASSESSMENT    (x) Pt currently not smoking    () Pt currently smoking        () Pt needs Tobacco Cessation counseling          Smoked 2-3 ppd for 30 years  Pt quit in 2016.    TOBACCO USE  PLAN      (x) N/A      Does pt want to quit:   Does pt have a quit date:    () Tobacco Cessation counseling Education if applicable        () Encouraged to contact physician for tools/ nicotine replacement etc.   TOBACCO USE  RE ASSESSMENT    () N/A      Any change in Tobacco use status () N  ()Y      () Pt attended counseling education session            () Pt has contacted physician TOBACCO USE  RE ASSESSMENT    () N/A      Any change in Tobacco use status () N  ()Y      () Pt attended counseling education session            () Pt has contacted physician TOBACCO USE  RE ASSESSMENT    () N/A      Any change in Tobacco use status () N  ()Y      () Pt attended counseling education session            () Pt has contacted physician TOBACCO USE  DISCHARGE            Current Tobacco Use Status:         () Pt attended [facility] counseling education session  Date:           () Pt contacted physician        NRT product/ medication  :     Tobacco Use Goal  Initial Assessment      (x) Complete Cessation or Maintain Cessation of tobacco products   Tobacco Use Goal Intervention and Education Plan    (x Encourage pt to fight the urge, call quit line, use techniques learned from friends/ class    () Attend Tobacco Cessation class if needed   Tobacco Use  Reassessment          () Pt remains tobacco free            () Pt has had TC counseling session        () Pt still smoking Tobacco Use  Reassessment          () Pt remains tobacco free            () Pt has had TC counseling session        () Pt still smoking Tobacco Use  Reassessment          () Pt remains tobacco free            () Pt has had TC counseling session        () Pt still smoking Tobacco Use  Discharge          () Pt remains tobacco free            () Pt had TC counseling  Date:  See above        () Pt still smoking - gave resources for quitting             MEDICATIONS  (Oral & Inhaled)  INITIAL ASSESSMENT    Pt reports taking his meds properly 100% of the time      (x) Pt is on inhaled medications   MEDICATIONS  PLAN        -Review Rxs purpose, schedule, side effects and importance of compliance during Medication class. Also address Cpap, Vents.        MEDICATIONS  RE ASSESSMENT      Pt reports taking their meds properly  % of the time        () Pt has had class  () Pt has not had class MEDICATIONS  RE ASSESSMENT      Pt reports taking their meds properly  % of the time        () Pt has had class  () Pt has not had class MEDICATIONS  RE ASSESSMENT      Pt reports taking their meds properly  % of the time        () Pt has had class  () Pt has not had class MEDICATIONS  DISCHARGE        Pt reports taking their meds properly  % of the time        ()Pt had med class  Date:  () Pt has not had class       Pt has:  (x) Metered Dose Inhaler  (x) Dry Powder Inhaler  () Nebulizer   -Review correct use, timing, technique and cleaning of equipment during Medication class () Pt has had class  () Pt has not had class () Pt has had class  () Pt has not had class () Pt has had class  () Pt has not had class () Pt had class  Date: See above  () Pt has not had class   Medication Goals  Initial Assessment        (x) Take meds properly 100% of the time    (x) Learn about / Review Rxs, and devices Medication Goals  Intervention & Education  Plan      -Follow Rx instructions and ask if questions    -Attend Medication Education class   Medication Goals  Re assessment          () Readdressed questions on any medications    () Pt has had class  () Pt has not had class Medication Goals  Re assessment          () Readdressed questions on any medications    () Pt has had class  () Pt has not had class Medication Goals  Re assessment          () Readdressed questions on any medications    () Pt has had class  () Pt has not had class Medication Goals  Discharge          % proper med usage see above      () Pt had class  Date: See above  () Pt has not had class             ADL  INITIAL ASSESSMENT      (x) Impaired ADL ability  () Need for Assist Devices  (x) Generalized weakness, low functional capacity  (x) Stairs in house               ADL  PLAN        -Initiate NJ, RT, and chair squats  -Breathing retraining, PLB, and pacing      -Encourage home activity               ADL  RE ASSESSMENT      () Pt is progressing  () Pt is not progressing        () Pt has initiated home activity  () Pt has not yet initiated  home activity-      () ADLs getting easier  () ADLs not getting easier   ADL  RE ASSESSMENT      () Pt is progressing  () Pt is not progressing        () Pt has initiated home activity  () Pt has not yet initiated  home activity-      () ADLs getting easier  () ADLs not getting easier ADL  RE ASSESSMENT      () Pt is progressing  () Pt is not progressing        () Pt has initiated home activity  () Pt has not yet initiated  home activity-      () ADLs getting easier  () ADLs not getting easier ADL  DISCHARGE        () Pt showed strength and endurance gains  () Pt did not progress      () Pt doing recommended home activity  () Pt not doing home activitiy         ADL Goals   Initial Assessment      (x) Decrease SOB with ADLs              (x) Decreased SOB overall      ADL Goals Intervention/ Education Plan      -Initiate NJ, RT and chair squats and increase pts strength and endurance        -Pacing, Breathing retraining       ADL Goals Reassessment        () ADLs getting easier  () ADLs not getting easier          () Pt states activities are getting easier/ able to go longer/ not get as SOB   ADL Goals Reassessment        () ADLs getting easier  () ADLs not getting easier          () Pt states activities are getting easier/ able to go longer/ not get as SOB ADL Goals Reassessment        () ADLs getting easier  () ADLs not getting easier          () Pt states activities are getting easier/ able to go longer/ not get as SOB   ADL Goals   Discharge        Goal achieved?  () Yes  () No            Goal achieved?  () Yes  () No          Outcomes:  See Health and Functioning from the CAT tool and Strength and Endurance from 6 MWD above             EXACERBAT'N PREVENTION & MANAGEMENT  INITIAL ASSESSMENT      Pt reports;  () 0 respiratory infections  (x) 1   () >2  () Hospitalized in last 12 months   EXACERBAT'N PREVENTION & MANAGEMENT  PLAN        Address via Disease Self Management and Exacerbation prevention class:    -Hydration for mucus    -Hand Hygiene          -Evaluation of Sputum    -When to call MD  -S/Sx to report  -Decrease exposure to irritants/ exacerbators    -Cleaning of respiratory equipment   EXACERBAT'N PREVENTION & MANAGEMENT  RE ASSESSMENT      () Pt has had class  () Pt has not had class        () Improved hydration    () Correct hand washing techs and alcohol gel usage    () Sputum assessment    () Ability to recognize exacerbation and when to call MD        () Cleaning of respiratory equipment EXACERBAT'N PREVENTION & MANAGEMENT  RE ASSESSMENT      () Pt has had class  () Pt has not had class        () Improved hydration    () Correct hand washing techs and alcohol gel usage    () Sputum assessment    () Ability to recognize exacerbation and when to call MD        () Cleaning of respiratory equipment EXACERBAT'N PREVENTION & MANAGEMENT  RE ASSESSMENT      () Pt has had class  () Pt has not had class        () Improved hydration    () Correct hand washing techs and alcohol gel usage    () Sputum assessment    () Ability to recognize exacerbation and when to call MD        () Cleaning of respiratory equipment EXACERBAT'N PREVENTION & MANAGEMENT  DISCHARGE        () Pt has had class  Date:  () Pt did not have class              () Addressed questions and pt feels comfortable with hydration, hand washing, sputum assessment, exacerbation knowledge, and cleaning of equipment   Exacerbation Prevention & Management Goals  Initial Assessment      (x) Learn ways to stay healthy and not get admitted          (x) Increase knowledge of Lungs, Breathing, Exercise, Nutrition, Mood and controlling anxiety, and stopping the Dyspnea Cycle by attending classes   Exacerbation Prevention & Management Goals Intervention/ Education  Plan    -Attend class and demonstrate disease self management strategies      -Attend all appropriate classes                   Exacerbation Prevention & Management Goals  Reassessment        () Pt has had class  () Pt has not had class        () Pt has had all classes  () Pt has had some classes  () Pt has had little/ not staying for education Exacerbation Prevention & Management Goals  Reassessment        () Pt has had class  () Pt has not had class        () Pt has had all classes  () Pt has had some classes  () Pt has had little/ not staying for education Exacerbation Prevention & Management Goals  Reassessment        () Pt has had class  () Pt has not had class        () Pt has had all classes  () Pt has had most classes  () Pt has had some of the classes  () Pt has had little/ not staying for education Exacerbation Prevention & Management Goals  Discharge        () Pt had class  Date:  See above  () Pt did not have class        () Pt attended all relevant classes and all questions were answered                   PHYSICIAN INTERACTION    MD Initial Assessment Review    (x) Initial  Assessment Reviewed  (x) Treatment Plan and Goals support patient needs/ abilities                  Cosigned and dated below:   PHYSICIAN INTERACTION    MD 30 day and Plan Review:      (x) I have seen the patient in rehab during this 30 day reassessment  (x) I agree with the plan  (x) Continue with progression and instruction  () Continue, but with the following changes:      Cosigned and dated below: Terrell Padron MD 30 day Reassessment Review:    (x) I have seen the patient in rehab during this 30 day reassessment  (x) Continue with the current program  () Continue, but with the following changes:  () Discharge patient      Cosigned and dated below: Terrell Padron MD 30 day Reassessment Review:    (x) I have seen the patient in rehab during this 30 day reassessment  (x) Continue with the current program  () Continue, but with the following changes:  () Discharge patient      Cosigned and dated below: Terrell Padron MD 30 day Reassessment Review:    (x) I have seen the patient in rehab during this 30 day reassessment  (x) Continue with the current program  () Continue, but with the following changes:  () Discharge patient      Cosigned and dated below: Terrell Padron MD 30 day   Discharge Review:    (x) Discharge patient                            Cosigned and dated below:     Cosigned by: Papa Barajas MD at 9/14/2021  2:55 PM   Revision History  Date/Time User Provider Type Action   9/14/2021  2:55 PM Papa Barajas MD Physician Cosign   9/14/2021  1:32 PM Minerva Aguilar RCP Respiratory Therapist Sign

## 2021-11-03 ENCOUNTER — VIRTUAL VISIT (OUTPATIENT)
Dept: FAMILY MEDICINE CLINIC | Age: 61
End: 2021-11-03

## 2021-11-03 DIAGNOSIS — R05.9 COUGH: Primary | ICD-10-CM

## 2021-11-03 PROCEDURE — 99213 OFFICE O/P EST LOW 20 MIN: CPT | Performed by: FAMILY MEDICINE

## 2021-11-03 RX ORDER — AZITHROMYCIN 250 MG/1
TABLET, FILM COATED ORAL
Qty: 1 PACKET | Refills: 0 | Status: SHIPPED | OUTPATIENT
Start: 2021-11-03 | End: 2021-11-13

## 2021-11-03 ASSESSMENT — ENCOUNTER SYMPTOMS
SHORTNESS OF BREATH: 0
NAUSEA: 0
DIARRHEA: 0
SORE THROAT: 1
COUGH: 1
BLOOD IN STOOL: 0
CHEST TIGHTNESS: 0
CONSTIPATION: 0
RHINORRHEA: 1
VOMITING: 0
EYES NEGATIVE: 1
ABDOMINAL PAIN: 0

## 2021-11-03 NOTE — PROGRESS NOTES
Anu Leonardo (:  1960) is a 59 y.o. male,Established patient, here for evaluation of the following chief complaint(s): Pharyngitis,  and Cough    Patient verified Video Chat was not encrypted, and agrees to the Video Exam in presence of nurse. Patient was at : home  Present in the room was: alone       ASSESSMENT/PLAN:  1. Cough  -     COVID-19; Future  -     azithromycin (ZITHROMAX) 250 MG tablet; Take two tablets first day then one tablet every day until gone., Disp-1 packet, R-0Normal      Return if symptoms worsen or fail to improve. SUBJECTIVE/OBJECTIVE:  HPI    Review of Systems   Constitutional: Positive for fatigue (he feels tired). Negative for activity change, appetite change, chills, diaphoresis and fever. HENT: Positive for rhinorrhea and sore throat (since yesterday). He has no problems with his sense of smell or taste. Eyes: Negative. Respiratory: Positive for cough. Negative for chest tightness and shortness of breath. Cardiovascular: Negative for chest pain, palpitations and leg swelling. Gastrointestinal: Negative for abdominal pain, blood in stool, constipation, diarrhea, nausea and vomiting. Genitourinary: Negative. Musculoskeletal: Positive for myalgias. Skin: Negative. Negative for rash. Neurological: Positive for headaches. Negative for dizziness, syncope, weakness and light-headedness. Psychiatric/Behavioral: Negative.         Patient-Reported Vitals 2020   Patient-Reported Weight 270   Patient-Reported Height 5'11\"   Patient-Reported Systolic 940   Patient-Reported Diastolic 70        Physical Exam    [INSTRUCTIONS:  \"[x]\" Indicates a positive item  \"[]\" Indicates a negative item  -- DELETE ALL ITEMS NOT EXAMINED]    Constitutional: [x] Appears well-developed and well-nourished [x] No apparent distress      [] Abnormal -     Mental status: [x] Alert and awake  [x] Oriented to person/place/time [x] Able to follow commands    [] Abnormal electronic signature was used to authenticate this note.     --Jeremiah Amaral MD

## 2021-11-03 NOTE — PROGRESS NOTES
Brisa Rutledge agreed to [employer] in presence of Dr Jayden Freeman and myself. Verified who was present in room with Brisa Rutledge. Brisa Rutledge informed the e-mail address used to Face Time cannot be used to contact the Provider, if they are any questions or concerns they need to call the office directly. Brisa Rutledge stated understanding.

## 2021-11-04 ENCOUNTER — HOSPITAL ENCOUNTER (OUTPATIENT)
Dept: PSYCHIATRY | Age: 61
Setting detail: THERAPIES SERIES
Discharge: HOME | End: 2021-11-04
Payer: MEDICARE

## 2021-11-04 NOTE — PROGRESS NOTES
Telephone-Pt left a message on 11/3/21 at 1521. Pt stated that he continues to be sick and that his Physician request that pt get tested for COVID. Pt is not likely to be in group on Thursday.

## 2021-11-05 ENCOUNTER — HOSPITAL ENCOUNTER (OUTPATIENT)
Dept: PSYCHIATRY | Age: 61
Setting detail: THERAPIES SERIES
Discharge: HOME | End: 2021-11-05
Payer: MEDICARE

## 2021-11-05 RX ORDER — ALBUTEROL SULFATE 90 UG/1
AEROSOL, METERED RESPIRATORY (INHALATION)
Qty: 18 G | Refills: 3 | Status: SHIPPED | OUTPATIENT
Start: 2021-11-05 | End: 2022-02-22

## 2021-11-05 NOTE — PROGRESS NOTES
Telephone contact-[phone]-Pt continues to be ill. It is believed that he has COVD, although he has not yet been tested. Pt is working with his physician and pt is addressing it as if it is COVID.

## 2021-11-05 NOTE — TELEPHONE ENCOUNTER
Date of last visit:  11/3/2021   Date of next visit:  2/2/2022    Requested Prescriptions     Pending Prescriptions Disp Refills    albuterol sulfate  (90 Base) MCG/ACT inhaler [Pharmacy Med Name: ALBUTEROL HFA*VENT* 90MCG 108 (90 BAS Aerosol] 18 g 3     Sig: INHALE TWO (2) PUFFS INTO THE LUNGS EVERY 6 HOURS AS NEEDED FOR WHEEZING

## 2021-11-15 ENCOUNTER — TELEPHONE (OUTPATIENT)
Dept: FAMILY MEDICINE CLINIC | Age: 61
End: 2021-11-15

## 2021-11-15 PROBLEM — J43.2 CENTRILOBULAR EMPHYSEMA (HCC): Status: ACTIVE | Noted: 2021-11-15

## 2021-11-15 NOTE — TELEPHONE ENCOUNTER
Pt called, he is still sick, multiple family members are covid positive. He is having SOB, body aches, sinusitis, cough. I advised him to go to the ER as he does not have a pulse ox.

## 2021-11-26 DIAGNOSIS — I10 ESSENTIAL HYPERTENSION: ICD-10-CM

## 2021-11-26 RX ORDER — LOSARTAN POTASSIUM 25 MG/1
TABLET ORAL
Qty: 90 TABLET | Refills: 1 | Status: SHIPPED | OUTPATIENT
Start: 2021-11-26 | End: 2022-03-23 | Stop reason: SDUPTHER

## 2021-11-26 RX ORDER — DAPAGLIFLOZIN 5 MG/1
TABLET, FILM COATED ORAL
Qty: 90 TABLET | Refills: 1 | Status: SHIPPED | OUTPATIENT
Start: 2021-11-26 | End: 2022-03-21 | Stop reason: SDUPTHER

## 2021-11-26 NOTE — TELEPHONE ENCOUNTER
Date of last visit:  11/3/2021   Date of next visit:  2/2/2022    Requested Prescriptions     Pending Prescriptions Disp Refills    FARXIGA 5 MG tablet [Pharmacy Med Name: [place] Labrum 5 MG TABLET 5 Tablet] 90 tablet 1     Sig: TAKE 1 TABLET BY MOUTH EVERY MORNING    losartan (COZAAR) 25 MG tablet [Pharmacy Med Name: LOSARTAN 25MG TABLET 25 Tablet] 90 tablet 1     Sig: TAKE 1 TABLET BY MOUTH EVERY DAY

## 2021-11-29 ENCOUNTER — HOSPITAL ENCOUNTER (OUTPATIENT)
Dept: NEUROLOGY | Age: 61
Discharge: HOME | End: 2021-11-29
Payer: MEDICARE

## 2021-11-29 ENCOUNTER — HOSPITAL ENCOUNTER (OUTPATIENT)
Dept: NON INVASIVE DIAGNOSTICS | Age: 61
Discharge: HOME | End: 2021-11-29
Payer: MEDICARE

## 2021-11-29 DIAGNOSIS — R26.89 BALANCE PROBLEM: ICD-10-CM

## 2021-11-29 PROCEDURE — 93225 XTRNL ECG REC<48 HRS REC: CPT

## 2021-11-29 PROCEDURE — 93226 XTRNL ECG REC<48 HR SCAN A/R: CPT

## 2021-11-29 PROCEDURE — 95816 EEG AWAKE AND DROWSY: CPT

## 2021-11-29 PROCEDURE — 95816 EEG AWAKE AND DROWSY: CPT | Performed by: PSYCHIATRY & NEUROLOGY

## 2021-11-29 RX ORDER — ALLOPURINOL 300 MG/1
TABLET ORAL
Qty: 90 TABLET | Refills: 1 | Status: SHIPPED | OUTPATIENT
Start: 2021-11-29 | End: 2022-03-21 | Stop reason: SDUPTHER

## 2021-11-29 RX ORDER — OMEPRAZOLE 20 MG/1
CAPSULE, DELAYED RELEASE ORAL
Qty: 90 CAPSULE | Refills: 1 | Status: SHIPPED | OUTPATIENT
Start: 2021-11-29 | End: 2022-03-23 | Stop reason: SDUPTHER

## 2021-11-29 NOTE — PROGRESS NOTES
[facility] Laboratory Technician worksheet       EEG Date: 2021    Name: Anu Leonardo   : 1960   Age: 59 y.o. SEX: male    Room: OP    MRN: [id number]     CSN: [id number]    Ordering Provider: Benigno Mathews  EEG Number: [id number] Time of Test:  6299    Hand: Right   Sedation: No    H.V. Done: No age protocol Photic: Yes    Sleep: No   Drowsy: No   Sleep Deprived: No    Seizures observed: no    Mentality: alert       Clinical History: Balance problem. Patient states that during walking or upon starting to walk after standing he suddenly feels off balance. Events are intermittent and occur approximately every other day. History of HTN, diabetes COPD, alcohol abuse, previous smoker. CT Head 21  Impression       1. Stable CT scan of the brain, no interval change since previous study dated 2019. Karla Villegas Past Medical History:       Diagnosis Date    [facility]congestive [facility] failure) [facility]     COPD (chronic obstructive pulmonary disease) ([facility]     Coronary artery disease involving native coronary artery of native heart without angina pectoris     Depression     Diabetes mellitus type 2, diet-controlled ([facility] [address] 2018    GERD (gastroesophageal reflux disease) 3/21/2012    Hernia     Hx of blood clots     right knee due to injury    Hx of cocaine abuse ([facility] [address]     Hyperglycemia     Hyperlipidemia     Hypertension     FAISAL on CPAP     Osteoarthritis     S/P CABG x 2 2016    S/P PTCA: 1/15/2018: Stent diagonal branch Xience 3.0 x 12 mm. 1/15/2018    1/15/2018: Stent diagonal branch Xience 3.0 x 12 mm. Dr. Harrell RUST injury 2015    Right thumb cut with table saw, no treatment needed         Prior to Admission medications    Medication Sig Start Date End Date Taking?  Authorizing Provider   FARXIGA 5 MG tablet TAKE 1 TABLET BY MOUTH EVERY MORNING 21   Caitlyn Urias MD   losartan (COZAAR) 25 MG tablet TAKE 1 TABLET BY MOUTH EVERY DAY 11/26/21   Cheng Pedroza MD   bumetanide (BUMEX) 2 MG tablet TAKE 1 TABLET BY MOUTH EVERY MORNING ~301Q.5 TAKE 1/2 TABLET BY MOUTH EVERY EVENING 11/26/21   Cheng Pedroza MD   albuterol sulfate  (90 Base) MCG/ACT inhaler INHALE TWO (2) PUFFS INTO THE LUNGS EVERY 6 HOURS AS NEEDED FOR WHEEZING 11/5/21   Cheng Pedroza MD   allopurinol (ZYLOPRIM) 300 MG tablet TAKE 1 TABLET BY MOUTH ONCE DAILY 10/29/21   Cheng Pedroza MD   omeprazole (PRILOSEC) 20 MG delayed release capsule TAKE 1 CAPSULE BY MOUTH ONCE DAILY 10/29/21   Cheng Pedroza MD   buPROPion (WELLBUTRIN XL) 300 MG extended release tablet TAKE 1 TABLET BY MOUTH EVERY MORNING 10/29/21   Cheng Pedroza MD   isosorbide mononitrate (IMDUR) 30 MG extended release tablet TAKE ONE (1) TABLET BY MOUTH ONCE DAILY 10/29/21   Cheng Pedroza MD   ticagrelor (BRILINTA) 90 MG TABS tablet TAKE ONE (1) TABLET BY MOUTH TWICE DAILY 10/29/21   Cheng Pedroza MD   metFORMIN (GLUCOPHAGE) 500 MG tablet TAKE 2 TABLETS BY MOUTH TWICE DAILY WITH MEALS 10/29/21   Cheng Pedroza MD   pramipexole (MIRAPEX) 0.125 MG tablet TAKE 1 TABLET BY MOUTH DAILY IN THE EVENING 10/29/21   Cheng Pedroza MD   guaiFENesin (MUCINEX) 600 MG extended release tablet Take 1,200 mg by mouth 2 times daily    Historical Provider, MD   traZODone (DESYREL) 100 MG tablet TAKE 1 TABLET BY MOUTH EACH EVENING AS NEEDED FOR SLEEP 9/21/21   Maria Esther Nguyen PA-C   umeclidinium-vilanterol (ANORO ELLIPTA) 62.5-25 MCG/INH AEPB inhaler INHALE 1 PUFF INTO THE LUNGS ONE TIME DAILY 8/6/21   ASHLEIGH Smith CNP   fluticasone (ARNUITY ELLIPTA) 100 MCG/ACT AEPB Inhale 100 mcg into the lungs daily Inhale 1 puff into the lungs daily 8/6/21 8/6/22  Mosley Mort, APRN - CNP   metoprolol succinate (TOPROL XL) 25 MG extended release tablet TAKE TWO (2) TABLETS BY MOUTH EVERY DAY 7/26/21   Cheng Pedroza MD   atorvastatin (LIPITOR) 40 MG tablet Take 1 tablet by mouth daily 7/16/21   Sunil Gross MD   tamsulosin [facility]) 0.4 MG capsule Take 1 capsule by mouth daily Take one capsule daily to facilitate passage of ureteral stone 6/14/21   ASHLEIGH Coats - CNP   blood glucose test strips (PRODIGY NO CODING BLOOD GLUC) strip USE TO TEST BLOOD GLUCOSE ONCE DAILY.  1/4/21   Sunil Gross MD   fluticasone (FLONASE) 50 MCG/ACT nasal spray USE 2 SPRAYS IN EACH NOSTRIL DAILY AS NEEDED FOR RHINITIS OR ALLERGIES 12/18/20   Sunil Gross MD   nitroGLYCERIN (NITROSTAT) 0.4 MG SL tablet Place 1 tablet under the tongue every 5 minutes as needed for Chest pain 12/18/20   Sunil Gross MD   Blood Glucose Monitoring Suppl (PRODIGY AUTOCODE BLOOD GLUCOSE) w/Device KIT  11/26/19   Historical Provider, MD       Technician: Frannie Brown 11/29/2021

## 2021-11-29 NOTE — TELEPHONE ENCOUNTER
Date of last visit:  11/3/2021   Date of next visit:  2/2/2022    Requested Prescriptions     Pending Prescriptions Disp Refills    omeprazole (PRILOSEC) 20 MG delayed release capsule [Pharmacy Med Name: OMEPRAZOLE DR 20MG CAP 20 Capsule] 90 capsule 1     Sig: TAKE 1 CAPSULE BY MOUTH EVERY DAY    allopurinol (ZYLOPRIM) 300 MG tablet [Pharmacy Med Name: ALLOPURINOL 300 MG TABS 300 Tablet] 90 tablet 1     Sig: TAKE 1 TABLET BY MOUTH ONCE DAILY

## 2021-11-30 NOTE — PROCEDURES
[address] [place] [address]                          ELECTROENCEPHALOGRAM REPORT    PATIENT NAME: Cecilia Galvan                  :        1960  MED REC NO:   [id number]                           ROOM:  ACCOUNT NO:   [de-identified]                           ADMIT DATE: 2021  PROVIDER:     Marissa Powell. Ray Seo MD    DATE OF EE2021    REFERRING PROVIDER:  Marissa Powell. Ray Seo MD    CLINICAL HISTORY:  A 58-year-old male presenting with balance problem  occurs suddenly after started walking. Symptoms are intermittent and  occur every other day on average. The patient has history of  hypertension, diabetes, COPD, alcohol abuse, rule out seizure. MEDICATIONS LISTED:  Farxiga, Cozaar, Bumex, Zyloprim, Prilosec,  Wellbutrin, Imdur, Brilinta, Glucophage, Mirapex, Mucinex, Toprol,  Lipitor, Flomax, Flonase, and Nitrostat. This is a 17-channel EEG performed without sleep deprivation. Hyperventilation was not performed. Photic stimulation was performed. The patient is described as alert. Background rhythm activity was noted to be 8-9 Hz in the posterior  parietal area, symmetric, well modulated, attenuates with eye opening. Lead and muscle artifacts were noted limiting quality of data obtained. Hyperventilation was not performed. Photic stimulation was performed  without abnormality. There was no evidence of epileptiform activity  appreciated. IMPRESSION:  This is a normal EEG. There was no evidence of  epileptiform activity appreciated.         Liz Kilgore MD    D: 2021 10:22:41       T: 2021 10:24:58     [email or web address]  Job#: [id number]     Doc#: [id number]    CC:

## 2021-12-09 LAB
ACQUISITION DURATION: NORMAL S
AVERAGE HEART RATE: 66 BPM
HOOKUP DATE: NORMAL
HOOKUP TIME: NORMAL
MAX HEART RATE TIME/DATE: NORMAL
MAX HEART RATE: 86 BPM
MIN HEART RATE TIME/DATE: NORMAL
MIN HEART RATE: 45 BPM
NUMBER OF QRS COMPLEXES: NORMAL
NUMBER OF SUPRAVENTRICULAR COUPLETS: 0
NUMBER OF SUPRAVENTRICULAR ECTOPICS: 31
NUMBER OF SUPRAVENTRICULAR ISOLATED BEATS: 29
NUMBER OF VENTRICULAR BIGEMINAL CYCLES: 5
NUMBER OF VENTRICULAR COUPLETS: 0
NUMBER OF VENTRICULAR ECTOPICS: 447

## 2021-12-10 ENCOUNTER — TELEPHONE (OUTPATIENT)
Dept: NEUROLOGY | Age: 61
End: 2021-12-10

## 2021-12-10 NOTE — TELEPHONE ENCOUNTER
Left voice message for patient to return call to office. Sent [facility] message to notify of above.

## 2021-12-10 NOTE — TELEPHONE ENCOUNTER
----- Message from Dinh Martínez MD sent at 12/10/2021 10:03 AM EST -----  Please let patient Know 48 hour Holter result showed no concerning findings.   Dinh Martínez MD

## 2021-12-22 NOTE — TELEPHONE ENCOUNTER
Date of last visit:  11/3/2021  Date of next visit:  2/2/2022    Requested Prescriptions     Pending Prescriptions Disp Refills    fluticasone (FLONASE) 50 MCG/ACT nasal spray [Pharmacy Med Name: FLUTICASONE 50MCG NASAL SPR 50 [place]] 16 g 0     Sig: INSTILL TWO (2) SPRAYS IN EACH NOSTRIL ONCE DAILY AS NEEDED FOR RHINITIS OR ALLERGIES

## 2021-12-23 DIAGNOSIS — R33.8 BENIGN PROSTATIC HYPERPLASIA WITH URINARY RETENTION: ICD-10-CM

## 2021-12-23 DIAGNOSIS — N40.1 BENIGN PROSTATIC HYPERPLASIA WITH URINARY RETENTION: ICD-10-CM

## 2021-12-23 RX ORDER — FLUTICASONE PROPIONATE 50 MCG
SPRAY, SUSPENSION (ML) NASAL
Qty: 16 G | Refills: 2 | Status: SHIPPED | OUTPATIENT
Start: 2021-12-23 | End: 2022-03-31

## 2021-12-23 RX ORDER — TAMSULOSIN HYDROCHLORIDE 0.4 MG/1
CAPSULE ORAL
Qty: 30 CAPSULE | Refills: 3 | Status: SHIPPED | OUTPATIENT
Start: 2021-12-23 | End: 2022-03-21 | Stop reason: SDUPTHER

## 2021-12-23 NOTE — TELEPHONE ENCOUNTER
Date of last visit:  11/3/2021  Date of next visit:  2/2/2022    Requested Prescriptions     Pending Prescriptions Disp Refills    metoprolol succinate (TOPROL XL) 25 MG extended release tablet [Pharmacy Med Name: METOPROLOL SUCC ER 25MG 25 Tablet] 60 tablet 0     Sig: TAKE TWO (2) TABLETS BY MOUTH EVERY DAY

## 2021-12-24 RX ORDER — METOPROLOL SUCCINATE 25 MG/1
TABLET, EXTENDED RELEASE ORAL
Qty: 60 TABLET | Refills: 0 | Status: SHIPPED | OUTPATIENT
Start: 2021-12-24 | End: 2022-01-24

## 2022-01-11 ENCOUNTER — OFFICE VISIT (OUTPATIENT)
Dept: CARDIOLOGY CLINIC | Age: 62
End: 2022-01-11
Payer: MEDICARE

## 2022-01-11 VITALS
OXYGEN SATURATION: 92 % | HEIGHT: 71 IN | DIASTOLIC BLOOD PRESSURE: 78 MMHG | WEIGHT: 269.4 LBS | BODY MASS INDEX: 37.72 KG/M2 | SYSTOLIC BLOOD PRESSURE: 124 MMHG | HEART RATE: 71 BPM

## 2022-01-11 DIAGNOSIS — G47.33 OBSTRUCTIVE SLEEP APNEA ON CPAP: ICD-10-CM

## 2022-01-11 DIAGNOSIS — I50.32 CHF (CONGESTIVE HEART FAILURE), NYHA CLASS II, CHRONIC, DIASTOLIC (HCC): Primary | ICD-10-CM

## 2022-01-11 DIAGNOSIS — Z99.89 OBSTRUCTIVE SLEEP APNEA ON CPAP: ICD-10-CM

## 2022-01-11 DIAGNOSIS — I25.10 CORONARY ARTERY DISEASE INVOLVING NATIVE CORONARY ARTERY OF NATIVE HEART WITHOUT ANGINA PECTORIS: ICD-10-CM

## 2022-01-11 PROCEDURE — 99213 OFFICE O/P EST LOW 20 MIN: CPT | Performed by: NURSE PRACTITIONER

## 2022-01-11 RX ORDER — POTASSIUM CHLORIDE 20 MEQ/1
20 TABLET, EXTENDED RELEASE ORAL DAILY
Qty: 90 TABLET | Refills: 3 | Status: SHIPPED | OUTPATIENT
Start: 2022-01-11

## 2022-01-11 RX ORDER — BUMETANIDE 2 MG/1
2 TABLET ORAL 2 TIMES DAILY
Qty: 185 TABLET | Refills: 3 | Status: SHIPPED | OUTPATIENT
Start: 2022-01-11 | End: 2022-02-22

## 2022-01-11 ASSESSMENT — ENCOUNTER SYMPTOMS
NAUSEA: 0
COUGH: 0
VOMITING: 0
SHORTNESS OF BREATH: 0
ABDOMINAL DISTENTION: 0

## 2022-01-11 NOTE — PATIENT INSTRUCTIONS
Pt has finished the antibiotic for his pneumonia, aug 1 he saw the urologist, he has an antibiotic, Microbid 100mg bid, temp is around 97. Yesterday it was 98.4, he is incontinent of urine, she is changing him more at night, no blood in urine, urine does smell strong but it has been while on his medications. Eating well, sleeping well. Try the robitussin as needed. He will be evaled by therapists this week OT/PT, strongly encouraged wife to have pt participate in all activities. She will call if any questions/concerns, she will take him to medical care if temp is 100 or better. He continues with his cough, instructed her on how to position him at bedtime more on side. He is still attending day care    Lashonda Gandhi RN   You may receive a survey regarding the care you received during your visit. Your input is valuable to us. We encourage you to complete and return your survey. We hope you will choose us in the future for your healthcare needs. Continue:  · Continue current medications  · Daily weights and record  · Fluid restriction of 2 Liters per day  · Limit sodium in diet to around 8070-9779 mg/day  · Monitor BP  · Activity as tolerated     Call the [facility] for any of the following symptoms: [phone]   Weight gain of 2-3 pounds in 1 day or 5 pounds in 1 week   Increased shortness of breath   Shortness of breath while laying down   Cough   Chest pain   Swelling in feet, ankles or legs   Tenderness or bloating in the abdomen   Fatigue    Decreased appetite or nausea    Confusion       Stable, appears Euvolemic  Lab reviewed - stable  Repeat blood work in 2 weeks  BP/HR stable   Adding 20 of Potassium daily  Continue diet/fluid adherence  Continue daily wts.   F/U w/ Cardiology  F/U in clinic in 1 year

## 2022-01-11 NOTE — PROGRESS NOTES
Heart Failure Clinic       Visit Date: 1/11/2022  Cardiologist:  Dr. Marlene Vásquez  Primary Care Physician: Dr. Josh Chavez MD    Jen Garcia is a 59 y.o. male who presents today for:  Chief Complaint   Patient presents with    Congestive Heart Failure       HPI:   Jen Garcia is a 59 y.o. male who presents to the office for a follow up patient visit in the heart failure clinic. Hx of alcohol abuse, attending [place] [id number] meetings. His Lisinopril was changed to Losartan d/t cough which has resolved.    Accompanied by self    TYPE HF: HFpEF (55%)  Cause: ischemic  Device: none  HX: CAD s/p failed CABG (2016) and PCI (2018), COPD, HTN, HLD, DM, FAISAL on CPAP    Dry Wt:  269 our scale    Hospitalization:  > 6 months for CHF    Concerns today: notes continued SOB d/t COPD per patient, no new or worsening of symptoms  Activity: ADLs w/o limitations  Diet: does not adhere to low sodium low fluid diet, adds salt, drinks 1/2 gallon of water/day, still drink alcohol    Patient has:  Chest Pain: no  SOB: chronic dyspnea  Orthopnea/PND: no  FAISAL: yes uses CPAP  Edema: no, does note swelling of ankles with salty food  Fatigue: no  Abdominal bloating: no  Cough: no  Appetite: good  Home weight: does not check  Home blood pressure: does not check    Past Medical History:   Diagnosis Date    CHF (congestive heart failure) (HCC)     [facility]     Coronary artery disease involving native coronary artery of native heart without angina pectoris     Depression     Diabetes mellitus type 2, diet-controlled ([address] 6/1/2018    GERD (gastroesophageal reflux disease) 3/21/2012    Hernia     Hx of blood clots 1990's    right knee due to injury    Hx of cocaine abuse ([facility]     Hyperglycemia 09/09    Hyperlipidemia     Hypertension     FAISAL on CPAP     Osteoarthritis 11/07    S/P CABG x 2 07/06/2016    S/P PTCA: 1/15/2018: Stent diagonal branch Xience 3.0 x 12 mm. 1/15/2018 1/15/2018: Stent diagonal branch Xience 3.0 x 12 mm.  Dr. Jose Trammell injury 2015    Right thumb cut with table saw, no treatment needed     Past Surgical History:   Procedure Laterality Date    CARDIAC CATHETERIZATION  2016    CATARACT REMOVAL Right     COLONOSCOPY      CORONARY ARTERY BYPASS GRAFT  2016    Double bypass, Dr. Jamey Rodriguez Right 2007    Neck    CYSTOSCOPY N/A 2021    CYSTOSCOPY WITH BILAT RETROGRADE PyELOGRAM performed by Abdulaziz Olivas MD at [facility] CATH LAB PROCEDURE      EYE SURGERY Right     Retinal surgery x 3    HERNIA REPAIR Right     Inguinal    PTCA  01/15/2018    ROTATOR CUFF REPAIR Right 2017    TONSILLECTOMY  child    TOTAL KNEE ARTHROPLASTY Left 10/24/2016    Dr. Reta Nice     Family History   Problem Relation Age of Onset    Heart Disease Mother     High Blood Pressure Mother     Obesity Mother     Diabetes Mother     Heart Disease Father     Cancer Father         colon/prostate    Colon Cancer Father     Prostate Cancer Father     Arthritis Father     Diabetes Brother      Social History     Tobacco Use    Smoking status: Former Smoker     Packs/day: 2.00     Years: 25.00     Pack years: 50.00     Types: Cigars     Quit date: 2014     Years since quittin.1    Smokeless tobacco: Never Used   Substance Use Topics    Alcohol use: Not Currently     Alcohol/week: 0.0 standard drinks     Current Outpatient Medications   Medication Sig Dispense Refill    bumetanide (BUMEX) 2 MG tablet Take 1 tablet by mouth 2 times daily 185 tablet 3    potassium chloride (KLOR-CON M) 20 MEQ extended release tablet Take 1 tablet by mouth daily 90 tablet 3    FARXIGA 5 MG tablet TAKE 1 TABLET BY MOUTH EVERY MORNING 90 tablet 1    isosorbide mononitrate (IMDUR) 30 MG extended release tablet TAKE ONE (1) TABLET BY MOUTH ONCE DAILY 90 tablet 1    metoprolol succinate (TOPROL XL) 25 MG extended release tablet TAKE TWO (2) TABLETS BY MOUTH EVERY DAY 60 tablet 0    fluticasone (FLONASE) 50 MCG/ACT nasal spray INSTILL TWO (2) SPRAYS IN EACH NOSTRIL ONCE DAILY AS NEEDED FOR RHINITIS OR ALLERGIES 16 g 2    tamsulosin (FLOMAX) 0.4 MG capsule TAKE 1 CAPSULE BY MOUTH DAILY TO FACILITATE PASSAGE OF URETERAL STONE 30 capsule 3    omeprazole (PRILOSEC) 20 MG delayed release capsule TAKE 1 CAPSULE BY MOUTH EVERY DAY 90 capsule 1    allopurinol (ZYLOPRIM) 300 MG tablet TAKE 1 TABLET BY MOUTH ONCE DAILY 90 tablet 1    losartan (COZAAR) 25 MG tablet TAKE 1 TABLET BY MOUTH EVERY DAY 90 tablet 1    albuterol sulfate  (90 Base) MCG/ACT inhaler INHALE TWO (2) PUFFS INTO THE LUNGS EVERY 6 HOURS AS NEEDED FOR WHEEZING 18 g 3    buPROPion (WELLBUTRIN XL) 300 MG extended release tablet TAKE 1 TABLET BY MOUTH EVERY MORNING 90 tablet 1    ticagrelor (BRILINTA) 90 MG TABS tablet TAKE ONE (1) TABLET BY MOUTH TWICE DAILY 180 tablet 1    metFORMIN (GLUCOPHAGE) 500 MG tablet TAKE 2 TABLETS BY MOUTH TWICE DAILY WITH MEALS 360 tablet 1    pramipexole (MIRAPEX) 0.125 MG tablet TAKE 1 TABLET BY MOUTH DAILY IN THE EVENING 90 tablet 1    guaiFENesin (MUCINEX) 600 MG extended release tablet Take 1,200 mg by mouth 2 times daily      traZODone (DESYREL) 100 MG tablet TAKE 1 TABLET BY MOUTH EACH EVENING AS NEEDED FOR SLEEP 90 tablet 0    umeclidinium-vilanterol (ANORO ELLIPTA) 62.5-25 MCG/INH AEPB inhaler INHALE 1 PUFF INTO THE LUNGS ONE TIME DAILY 1 each 11    fluticasone (ARNUITY ELLIPTA) 100 MCG/ACT AEPB Inhale 100 mcg into the lungs daily Inhale 1 puff into the lungs daily 30 each 11    atorvastatin (LIPITOR) 40 MG tablet Take 1 tablet by mouth daily 90 tablet 1    blood glucose test strips (PRODIGY NO CODING BLOOD GLUC) strip USE TO TEST BLOOD GLUCOSE ONCE DAILY.  100 each 0    nitroGLYCERIN (NITROSTAT) 0.4 MG SL tablet Place 1 tablet under the tongue every 5 minutes as needed for Chest pain 25 tablet 3    Blood Status: He is alert and oriented to person, place, and time. Coordination: Coordination normal.   Psychiatric:         Behavior: Behavior normal.         Wt Readings from Last 3 Encounters:   01/11/22 269 lb 6.4 oz (122.2 kg)   10/19/21 278 lb (126.1 kg)   09/29/21 271 lb 2 oz (123 kg)     BP Readings from Last 3 Encounters:   01/11/22 124/78   10/19/21 138/80   09/29/21 130/76     Pulse Readings from Last 3 Encounters:   01/11/22 71   10/19/21 66   09/29/21 72     Body mass index is 37.57 kg/m². ECHO:       Summary   Ejection fraction is visually estimated at 55%. Overall left ventricular function is normal.   Mildly dilated left atrium. Signature      ----------------------------------------------------------------   Electronically signed by Jasvir Maki MD (Interpreting   physician) on 11/11/2019 at 04:25 PM   ----------------------------------------------------------------      CATH/STRESS:   Conclusions      Procedure Summary   Successful PCI/stenting of the 90% calcified lesion at the take off of the   diagonal branch using DANY Xience 3.0 x 12 mm. Recommendations   Patient and family were informed about the findings and their questions   were answered to their satisfaction. The importance of lifestyle changes and cardiovascular risk factors   modification was emphasized. Smoking cessation was emphasized. The importance of compliance with   medications was emphasized. The patient will be on dual antiplatelet therapy for at least one year. The patient will be on optimal medical therapy for atherosclerotic   disease, including beta blockers as tolerated, statins, and ace   inhibitors. Estimated Blood Loss:5 ml. Complications:No complications.       Signatures      ----------------------------------------------------------------   Electronically signed by Trae Aguilar MD (Performing   Physician) on 01/15/2018 at 15:19 ----------------------------------------------------------------        Results reviewed:  BNP:   Lab Results   Component Value Date    BNP 31.5 02/12/2013     CBC:   Lab Results   Component Value Date    WBC 10.7 06/20/2021    RBC 5.18 06/20/2021    HGB 16.6 06/20/2021    HCT 49.9 06/20/2021     06/20/2021     CMP:    Lab Results   Component Value Date     06/20/2021    K 3.9 07/16/2021    K 3.6 06/20/2021    CL 97 06/20/2021    CO2 29 06/20/2021    BUN 13 06/20/2021    CREATININE 0.9 06/20/2021    LABGLOM 85 06/20/2021    GLUCOSE 162 09/08/2021    GLUCOSE 115 02/04/2012    CALCIUM 9.6 06/20/2021     Hepatic Function Panel:    Lab Results   Component Value Date    ALKPHOS 108 02/12/2021    ALT 65 02/12/2021    AST 42 02/12/2021    PROT 7.9 02/12/2021    BILITOT 0.5 02/12/2021    BILITOT NEGATIVE 11/08/2017    BILIDIR <0.2 02/12/2021    LABALBU 4.6 02/12/2021    LABALBU 4.5 02/04/2012     Magnesium:    Lab Results   Component Value Date    MG 2.0 03/10/2020     PT/INR:    Lab Results   Component Value Date    INR 1.03 07/16/2021     Lipids:    Lab Results   Component Value Date    TRIG 138 02/12/2021    HDL 55 02/12/2021    LDLCALC 72 02/12/2021       ASSESSMENT AND PLAN:   The patient's condition/symptoms are Stable: No clinical evidence of fluid overload today. Continue current medical regimen without changes at present time. Diagnosis Orders   1. CHF (congestive heart failure), NYHA class II, chronic, diastolic (HCC)  Basic Metabolic Panel    Magnesium    Brain Natriuretic Peptide   2. Coronary artery disease involving native coronary artery of native heart without angina pectoris     3. Obstructive sleep apnea on CPAP       Continue:  GDMT:   ACE/ARB/ARNI - Losartan 25 daily   BB - Toprol 50 daily   Diuretic - Bumex 2 mg BID  AA - none  SGLT2 -  Farxiga  Vasodilator - Imdur 30 daily  Other - ASA, Brilinta, adding KCl 20 daily    Tolerating above noted HF meds, no ill side effects noted.  Will continue to monitor kidney function and electrolytes. Will optimize as tolerated. Pt is compliant w/ medications. Total visit time of 25 minutes has been spent with patient on education of symptoms, management, medication, and plan of care; as well as review of chart: labs, ECHO, radiology reports, etc.   I personally spent more then 50% of the appt time face to face with the patient. · Daily weights  · Fluid restriction of 2 Liters per day  · Limit sodium in diet to around 0280-1916 mg/day  · Monitor BP  · Activity as tolerated   Stable, appears Euvolemic  Lab reviewed - stable  Repeat blood work in 2 weeks  BP/HR stable   Adding 20 of Potassium daily  Continue diet/fluid adherence  Continue daily wts. F/U w/ Cardiology  F/U in clinic in 1 year         Patient was instructed to call the [facility] for any changes in symptoms as noted in AVS.      Return in about 1 year (around 1/11/2023). or sooner if needed     Patient given educational materials - see patient instructions. We discussed the importance of weighing oneself and recording daily. We also discussed the importance of a low sodium diet, higher sodium foods to avoid and better low sodium food options. Patient verbalizes understanding of plan of care using teach back method, and is agreeable to the treatment plan.        Electronically signed by ASHLEIGH Randhawa CNP on 1/11/2022 at 3:09 PM

## 2022-01-12 ENCOUNTER — HOSPITAL ENCOUNTER (OUTPATIENT)
Dept: NON INVASIVE DIAGNOSTICS | Age: 62
Discharge: HOME | End: 2022-01-12
Payer: MEDICARE

## 2022-01-12 PROCEDURE — 93660 TILT TABLE EVALUATION: CPT | Performed by: NUCLEAR MEDICINE

## 2022-01-12 NOTE — PROCEDURES
[address] [id number]                                TILT TABLE TEST    PATIENT NAME: Jessica Gupta                  :        1960  MED REC NO:   [id number]                           ROOM:  ACCOUNT NO:   [de-identified]                           ADMIT DATE: 2022  PROVIDER:     JEFE Ceja STUDY:  2022    CLINICAL HISTORY AND INDICATION:  This is a patient with dizziness and  balance problem. TILT TABLE TEST DESCRIPTION AND FINDINGS:  Baseline EKG showed sinus  rhythm. Baseline blood pressure was 125/71. Baseline heart rate was 75  beats per minute. The patient was tilted for a total of 30 minutes. Tilt was associated with nonspecific symptoms. Blood pressure was  ranging between 483 to 161 systolic with no acute drop. Heart rate was  relatively stable at 70 to 80 beats per minute. Test was completed  without any complication. CONCLUSION:  1. Tilt table test associated with no major hemodynamic changes. 2.  Nonspecific symptoms. 3.  Blood pressure ranging between 177 to 586 systolic. 4.  Clinical correlation is recommended.         Pablo Covarrubias M.D.    D: 2022 15:15:25       T: 2022 15:24:55     [email or web address]  Job#: [id number]     Doc#: [id number]    CC:

## 2022-01-17 ENCOUNTER — TELEPHONE (OUTPATIENT)
Dept: FAMILY MEDICINE CLINIC | Age: 62
End: 2022-01-17

## 2022-01-17 NOTE — TELEPHONE ENCOUNTER
Faxed received from [facility], pt losartan is on back order. Would the pt prefer we sent this to a local pharmacy? If so, which one? No answer vm full.

## 2022-01-21 ENCOUNTER — OFFICE VISIT (OUTPATIENT)
Dept: FAMILY MEDICINE CLINIC | Age: 62
End: 2022-01-21

## 2022-01-21 ENCOUNTER — HOSPITAL ENCOUNTER (OUTPATIENT)
Age: 62
Discharge: HOME | End: 2022-01-21
Payer: MEDICARE

## 2022-01-21 DIAGNOSIS — J02.9 SORE THROAT: ICD-10-CM

## 2022-01-21 DIAGNOSIS — R05.9 COUGH: Primary | ICD-10-CM

## 2022-01-21 LAB
FLU A ANTIGEN: NEGATIVE
FLU B ANTIGEN: NEGATIVE
GROUP A STREP CULTURE, REFLEX: NEGATIVE
REFLEX THROAT C + S: NORMAL

## 2022-01-21 PROCEDURE — 87070 CULTURE OTHR SPECIMN AEROBIC: CPT

## 2022-01-21 PROCEDURE — 99213 OFFICE O/P EST LOW 20 MIN: CPT | Performed by: FAMILY MEDICINE

## 2022-01-21 PROCEDURE — 87880 STREP A ASSAY W/OPTIC: CPT

## 2022-01-21 PROCEDURE — U0003 INFECTIOUS AGENT DETECTION BY NUCLEIC ACID (DNA OR RNA); SEVERE ACUTE RESPIRATORY SYNDROME CORONAVIRUS 2 (SARS-COV-2) (CORONAVIRUS DISEASE [COVID-19]), AMPLIFIED PROBE TECHNIQUE, MAKING USE OF HIGH THROUGHPUT TECHNOLOGIES AS DESCRIBED BY CMS-2020-01-R: HCPCS

## 2022-01-21 PROCEDURE — 87804 INFLUENZA ASSAY W/OPTIC: CPT

## 2022-01-21 PROCEDURE — U0005 INFEC AGEN DETEC AMPLI PROBE: HCPCS

## 2022-01-21 ASSESSMENT — PATIENT HEALTH QUESTIONNAIRE - PHQ9
SUM OF ALL RESPONSES TO PHQ QUESTIONS 1-9: 2
SUM OF ALL RESPONSES TO PHQ QUESTIONS 1-9: 2
1. LITTLE INTEREST OR PLEASURE IN DOING THINGS: 1
2. FEELING DOWN, DEPRESSED OR HOPELESS: 1
SUM OF ALL RESPONSES TO PHQ9 QUESTIONS 1 & 2: 2
3. TROUBLE FALLING OR STAYING ASLEEP: 0
8. MOVING OR SPEAKING SO SLOWLY THAT OTHER PEOPLE COULD HAVE NOTICED. OR THE OPPOSITE, BEING SO FIGETY OR RESTLESS THAT YOU HAVE BEEN MOVING AROUND A LOT MORE THAN USUAL: 0
6. FEELING BAD ABOUT YOURSELF - OR THAT YOU ARE A FAILURE OR HAVE LET YOURSELF OR YOUR FAMILY DOWN: 0
SUM OF ALL RESPONSES TO PHQ QUESTIONS 1-9: 2
9. THOUGHTS THAT YOU WOULD BE BETTER OFF DEAD, OR OF HURTING YOURSELF: 0
SUM OF ALL RESPONSES TO PHQ QUESTIONS 1-9: 2
7. TROUBLE CONCENTRATING ON THINGS, SUCH AS READING THE NEWSPAPER OR WATCHING TELEVISION: 0
5. POOR APPETITE OR OVEREATING: 0
10. IF YOU CHECKED OFF ANY PROBLEMS, HOW DIFFICULT HAVE THESE PROBLEMS MADE IT FOR YOU TO DO YOUR WORK, TAKE CARE OF THINGS AT HOME, OR GET ALONG WITH OTHER PEOPLE: 0
4. FEELING TIRED OR HAVING LITTLE ENERGY: 0

## 2022-01-21 NOTE — PROGRESS NOTES
Anselmo Veloz (:  1960) is a Established patient, here for evaluation of the following: having cold symptoms. He has a HA, little bit of a cough, ST, stomach upset, congestion, and a funny taste on his mouth, he states that it feels dry. Symptoms started yesterday. He is taking Vit C, Vit D, Zinc, Tylenol and Mucinex    Assessment & Plan   Below is the assessment and plan developed based on review of pertinent history, physical exam, labs, studies, and medications. 1. Cough  -     COVID-19; Future  -     Rapid Influenza A/B Antigens; Future  2. Sore throat  -     Group A Strep, Reflex    Return if symptoms worsen or fail to improve.        Subjective   HPI  Review of Systems       Objective   Patient-Reported Vitals  No data recorded     Physical Exam  [INSTRUCTIONS:  \"[x]\" Indicates a positive item  \"[]\" Indicates a negative item  -- DELETE ALL ITEMS NOT EXAMINED]    Constitutional: [x] Appears well-developed and well-nourished [x] No apparent distress      [] Abnormal -     Mental status: [x] Alert and awake  [x] Oriented to person/place/time [x] Able to follow commands    [] Abnormal -     Eyes:   EOM    [x]  Normal    [] Abnormal -   Sclera  [x]  Normal    [] Abnormal -          Discharge [x]  None visible   [] Abnormal -     HENT: [x] Normocephalic, atraumatic  [] Abnormal -   [x] Mouth/Throat: Mucous membranes are moist    External Ears [x] Normal  [] Abnormal -    Neck: [x] No visualized mass [] Abnormal -     Pulmonary/Chest: [x] Respiratory effort normal   [x] No visualized signs of difficulty breathing or respiratory distress        [] Abnormal -      Musculoskeletal:   [x] Normal gait with no signs of ataxia         [x] Normal range of motion of neck        [] Abnormal -     Neurological:        [x] No Facial Asymmetry (Cranial nerve 7 motor function) (limited exam due to video visit)          [x] No gaze palsy        [] Abnormal -          Skin:        [x] No significant exanthematous lesions or discoloration noted on facial skin         [] Abnormal -            Psychiatric:       [x] Normal Affect [] Abnormal -        [x] No Hallucinations    Other pertinent observable physical exam findings:-       On this date 1/21/2022 I have spent 15 minutes reviewing previous notes, test results and face to face (virtual) with the patient discussing the diagnosis and importance of compliance with the treatment plan as well as documenting on the day of the visit. Rickie Mejiaashley, was evaluated through a synchronous (real-time) audio-video encounter. The patient (or guardian if applicable) is aware that this is a billable service, which includes applicable co-pays. This Virtual Visit was conducted with patient's (and/or legal guardian's) consent. The visit was conducted pursuant to the emergency declaration under the [address] [place] [address], [address] [place] [address] authority and the [employer] General Act. Patient identification was verified, and a caregiver was present when appropriate. The patient was located at home in a state where the provider was licensed to provide care.        --Bren Jansen MD

## 2022-01-23 LAB
SARS-COV-2: NOT DETECTED
SOURCE: NORMAL
THROAT/NOSE CULTURE: NORMAL

## 2022-01-24 ENCOUNTER — TELEPHONE (OUTPATIENT)
Dept: FAMILY MEDICINE CLINIC | Age: 62
End: 2022-01-24

## 2022-01-24 RX ORDER — METOPROLOL SUCCINATE 25 MG/1
TABLET, EXTENDED RELEASE ORAL
Qty: 60 TABLET | Refills: 5 | Status: SHIPPED | OUTPATIENT
Start: 2022-01-24 | End: 2022-03-21 | Stop reason: SDUPTHER

## 2022-01-24 RX ORDER — ATORVASTATIN CALCIUM 40 MG/1
40 TABLET, FILM COATED ORAL DAILY
Qty: 30 TABLET | Refills: 5 | Status: SHIPPED | OUTPATIENT
Start: 2022-01-24 | End: 2022-03-21 | Stop reason: SDUPTHER

## 2022-01-24 RX ORDER — VALSARTAN 80 MG/1
80 TABLET ORAL DAILY
Qty: 90 TABLET | Refills: 0 | Status: SHIPPED | OUTPATIENT
Start: 2022-01-24 | End: 2022-03-21 | Stop reason: SDUPTHER

## 2022-01-24 NOTE — TELEPHONE ENCOUNTER
Fax received from [address] [place] [address] that the losartan is on back order. Please advise.

## 2022-01-24 NOTE — TELEPHONE ENCOUNTER
Lawrence Cordova informed by Pam. Pt states he is still sick. Has a sore throat and a headache, and a little bit of a cough. No sinus issues though. He stated he will probably be over it in a day or 2 and doesn't want anything for his sx.

## 2022-01-24 NOTE — TELEPHONE ENCOUNTER
----- Message from Sharen Osgood, MD sent at 1/24/2022 11:04 AM EST -----  Please let him know that his COVID test was negative and his influenza as well was negative  Please ask him how is he doing?

## 2022-01-24 NOTE — TELEPHONE ENCOUNTER
Please let patient know that his Losartan is back ordered and we need to change his Rx to Valsartan 80 mg po daily. Rx done to  His mail in pharmacy. We need him to monitor his B/P   appt in about 1 month or so.

## 2022-02-15 ENCOUNTER — NURSE ONLY (OUTPATIENT)
Dept: LAB | Age: 62
End: 2022-02-15

## 2022-02-15 DIAGNOSIS — I50.32 CHF (CONGESTIVE HEART FAILURE), NYHA CLASS II, CHRONIC, DIASTOLIC (HCC): ICD-10-CM

## 2022-02-15 LAB
ANION GAP SERPL CALCULATED.3IONS-SCNC: 18 MEQ/L (ref 8–16)
BUN BLDV-MCNC: 15 MG/DL (ref 7–22)
CALCIUM SERPL-MCNC: 10.3 MG/DL (ref 8.5–10.5)
CHLORIDE BLD-SCNC: 97 MEQ/L (ref 98–111)
CO2: 28 MEQ/L (ref 23–33)
CREAT SERPL-MCNC: 0.9 MG/DL (ref 0.4–1.2)
GFR SERPL CREATININE-BSD FRML MDRD: 85 ML/MIN/1.73M2
GLUCOSE BLD-MCNC: 234 MG/DL (ref 70–108)
MAGNESIUM: 2 MG/DL (ref 1.6–2.4)
POTASSIUM SERPL-SCNC: 4.2 MEQ/L (ref 3.5–5.2)
PRO-BNP: 184.8 PG/ML (ref 0–900)
SODIUM BLD-SCNC: 143 MEQ/L (ref 135–145)

## 2022-02-21 NOTE — TELEPHONE ENCOUNTER
Date of last visit:  1/21/2022  Date of next visit:  Visit date not found    Requested Prescriptions     Pending Prescriptions Disp Refills    bumetanide (BUMEX) 2 MG tablet [Pharmacy Med Name: BUMETANIDE 2 MG TABS 2 Tablet] 45 tablet 0     Sig: TAKE 1 TABLET BY MOUTH EVERY MORNING ~301Q.5 TAKE 1/2 TABLET BY MOUTH EVERY EVENING

## 2022-02-21 NOTE — TELEPHONE ENCOUNTER
Date of last visit:  1/21/2022  Date of next visit:  Visit date not found    Requested Prescriptions     Pending Prescriptions Disp Refills    albuterol sulfate  (90 Base) MCG/ACT inhaler [Pharmacy Med Name: ALBUTEROL HFA*VENT* 90MCG 108 (90 BAS Aerosol] 18 g 3     Sig: INHALE TWO (2) PUFFS INTO THE LUNGS EVERY 6 HOURS AS NEEDED FOR WHEEZING

## 2022-02-22 RX ORDER — ALBUTEROL SULFATE 90 UG/1
AEROSOL, METERED RESPIRATORY (INHALATION)
Qty: 18 G | Refills: 3 | Status: SHIPPED | OUTPATIENT
Start: 2022-02-22 | End: 2022-03-21 | Stop reason: SDUPTHER

## 2022-02-22 RX ORDER — BUMETANIDE 2 MG/1
TABLET ORAL
Qty: 45 TABLET | Refills: 5 | Status: SHIPPED | OUTPATIENT
Start: 2022-02-22 | End: 2022-08-10 | Stop reason: SDUPTHER

## 2022-03-21 DIAGNOSIS — N40.1 BENIGN PROSTATIC HYPERPLASIA WITH URINARY RETENTION: ICD-10-CM

## 2022-03-21 DIAGNOSIS — G25.81 RLS (RESTLESS LEGS SYNDROME): ICD-10-CM

## 2022-03-21 DIAGNOSIS — F33.1 MAJOR DEPRESSIVE DISORDER, RECURRENT EPISODE, MODERATE (HCC): ICD-10-CM

## 2022-03-21 DIAGNOSIS — R33.8 BENIGN PROSTATIC HYPERPLASIA WITH URINARY RETENTION: ICD-10-CM

## 2022-03-21 DIAGNOSIS — I10 ESSENTIAL HYPERTENSION: ICD-10-CM

## 2022-03-21 DIAGNOSIS — E11.65 UNCONTROLLED TYPE 2 DIABETES MELLITUS WITH HYPERGLYCEMIA (HCC): ICD-10-CM

## 2022-03-21 NOTE — TELEPHONE ENCOUNTER
The pharmacy is requesting a refill of the below medication which has been pended for you:     Requested Prescriptions     Pending Prescriptions Disp Refills    albuterol sulfate  (90 Base) MCG/ACT inhaler 18 g 3     Sig: INHALE TWO (2) PUFFS INTO THE LUNGS EVERY 6 HOURS AS NEEDED FOR WHEEZING    allopurinol (ZYLOPRIM) 300 MG tablet 90 tablet 1     Sig: TAKE 1 TABLET BY MOUTH ONCE DAILY    atorvastatin (LIPITOR) 40 MG tablet 90 tablet 1     Sig: Take 1 tablet by mouth daily    ticagrelor (BRILINTA) 90 MG TABS tablet 180 tablet 1     Sig: TAKE ONE (1) TABLET BY MOUTH TWICE DAILY    buPROPion (WELLBUTRIN XL) 300 MG extended release tablet 90 tablet 1     Sig: TAKE 1 TABLET BY MOUTH EVERY MORNING    dapagliflozin (FARXIGA) 5 MG tablet 90 tablet 1     Sig: TAKE 1 TABLET BY MOUTH EVERY MORNING    isosorbide mononitrate (IMDUR) 30 MG extended release tablet 90 tablet 1     Sig: TAKE ONE (1) TABLET BY MOUTH ONCE DAILY    losartan (COZAAR) 25 MG tablet 90 tablet 1     Sig: TAKE 1 TABLET BY MOUTH EVERY DAY    metFORMIN (GLUCOPHAGE) 500 MG tablet 360 tablet 1     Sig: TAKE 2 TABLETS BY MOUTH TWICE DAILY WITH MEALS    metoprolol succinate (TOPROL XL) 25 MG extended release tablet 180 tablet 1     Sig: TAKE 2 TABLETS BY MOUTH ONCE DAILY    omeprazole (PRILOSEC) 20 MG delayed release capsule 90 capsule 1     Sig: TAKE 1 CAPSULE BY MOUTH EVERY DAY    pramipexole (MIRAPEX) 0.125 MG tablet 90 tablet 1     Sig: TAKE 1 TABLET BY MOUTH DAILY IN THE EVENING    tamsulosin (FLOMAX) 0.4 MG capsule 90 capsule 1     Sig: TAKE 1 CAPSULE BY MOUTH DAILY TO FACILITATE PASSAGE OF URETERAL STONE    traZODone (DESYREL) 100 MG tablet 90 tablet 1     Sig: TAKE 1 TABLET BY MOUTH EACH EVENING AS NEEDED FOR SLEEP    valsartan (DIOVAN) 80 MG tablet 90 tablet 1     Sig: Take 1 tablet by mouth daily       Last Appointment Date: 1/21/2022  Next Appointment Date: Visit date not found    Allergies   Allergen Reactions    Zoloft [Sertraline Hcl] Other (See Comments)     Headaches, sweats, bad dreams

## 2022-03-22 ENCOUNTER — TELEPHONE (OUTPATIENT)
Dept: PULMONOLOGY | Age: 62
End: 2022-03-22

## 2022-03-22 DIAGNOSIS — Z87.891 PERSONAL HISTORY OF TOBACCO USE: Primary | ICD-10-CM

## 2022-03-22 NOTE — TELEPHONE ENCOUNTER
Transferred patient to central scheduling to schedule his CT for June 2022. Patient was instructed to call office back afterwards to schedule a follow up for after this CT.

## 2022-03-22 NOTE — TELEPHONE ENCOUNTER
Received refill request from patient's pharmacy. He was to follow up with me in November and had cancelled appointment. He does not currently have any follow ups scheduled with me and he is due for his annual CT lung screening in June 2022. I have placed the order for this. Can we assist patient with scheduling his CT scan and schedule an appointment with me after the CT to review the result? Thanks!

## 2022-03-23 RX ORDER — METOPROLOL SUCCINATE 25 MG/1
TABLET, EXTENDED RELEASE ORAL
Qty: 180 TABLET | Refills: 1 | Status: SHIPPED | OUTPATIENT
Start: 2022-03-23 | End: 2022-07-20

## 2022-03-23 RX ORDER — TAMSULOSIN HYDROCHLORIDE 0.4 MG/1
CAPSULE ORAL
Qty: 90 CAPSULE | Refills: 1 | Status: SHIPPED | OUTPATIENT
Start: 2022-03-23 | End: 2022-04-29

## 2022-03-23 RX ORDER — ALBUTEROL SULFATE 90 UG/1
AEROSOL, METERED RESPIRATORY (INHALATION)
Qty: 18 G | Refills: 3 | Status: SHIPPED | OUTPATIENT
Start: 2022-03-23 | End: 2022-06-21

## 2022-03-23 RX ORDER — LOSARTAN POTASSIUM 25 MG/1
TABLET ORAL
Qty: 90 TABLET | Refills: 1 | Status: SHIPPED | OUTPATIENT
Start: 2022-03-23

## 2022-03-23 RX ORDER — ISOSORBIDE MONONITRATE 30 MG/1
TABLET, EXTENDED RELEASE ORAL
Qty: 90 TABLET | Refills: 1 | Status: SHIPPED | OUTPATIENT
Start: 2022-03-23 | End: 2022-04-29

## 2022-03-23 RX ORDER — PRAMIPEXOLE DIHYDROCHLORIDE 0.12 MG/1
TABLET ORAL
Qty: 90 TABLET | Refills: 1 | Status: SHIPPED | OUTPATIENT
Start: 2022-03-23 | End: 2022-04-29

## 2022-03-23 RX ORDER — VALSARTAN 80 MG/1
80 TABLET ORAL DAILY
Qty: 90 TABLET | Refills: 1 | Status: SHIPPED | OUTPATIENT
Start: 2022-03-23 | End: 2022-03-31

## 2022-03-23 RX ORDER — OMEPRAZOLE 20 MG/1
CAPSULE, DELAYED RELEASE ORAL
Qty: 90 CAPSULE | Refills: 1 | Status: SHIPPED | OUTPATIENT
Start: 2022-03-23 | End: 2022-05-25

## 2022-03-23 RX ORDER — TRAZODONE HYDROCHLORIDE 100 MG/1
TABLET ORAL
Qty: 90 TABLET | Refills: 1 | Status: SHIPPED | OUTPATIENT
Start: 2022-03-23

## 2022-03-23 RX ORDER — ALLOPURINOL 300 MG/1
TABLET ORAL
Qty: 90 TABLET | Refills: 1 | Status: SHIPPED | OUTPATIENT
Start: 2022-03-23 | End: 2022-05-25

## 2022-03-23 RX ORDER — BUPROPION HYDROCHLORIDE 300 MG/1
TABLET ORAL
Qty: 90 TABLET | Refills: 1 | Status: SHIPPED | OUTPATIENT
Start: 2022-03-23 | End: 2022-04-29

## 2022-03-23 RX ORDER — ATORVASTATIN CALCIUM 40 MG/1
40 TABLET, FILM COATED ORAL DAILY
Qty: 90 TABLET | Refills: 0 | Status: SHIPPED | OUTPATIENT
Start: 2022-03-23 | End: 2022-07-20

## 2022-03-28 ENCOUNTER — OFFICE VISIT (OUTPATIENT)
Dept: UROLOGY | Age: 62
End: 2022-03-28
Payer: MEDICARE

## 2022-03-28 ENCOUNTER — OFFICE VISIT (OUTPATIENT)
Dept: PULMONOLOGY | Age: 62
End: 2022-03-28
Payer: MEDICARE

## 2022-03-28 VITALS
WEIGHT: 265.6 LBS | TEMPERATURE: 97 F | BODY MASS INDEX: 37.18 KG/M2 | DIASTOLIC BLOOD PRESSURE: 80 MMHG | SYSTOLIC BLOOD PRESSURE: 130 MMHG | OXYGEN SATURATION: 95 % | HEART RATE: 74 BPM | HEIGHT: 71 IN

## 2022-03-28 VITALS
BODY MASS INDEX: 37.38 KG/M2 | SYSTOLIC BLOOD PRESSURE: 136 MMHG | HEIGHT: 71 IN | WEIGHT: 267 LBS | DIASTOLIC BLOOD PRESSURE: 82 MMHG

## 2022-03-28 DIAGNOSIS — J43.2 CENTRILOBULAR EMPHYSEMA (HCC): ICD-10-CM

## 2022-03-28 DIAGNOSIS — R97.20 ELEVATED PSA: ICD-10-CM

## 2022-03-28 DIAGNOSIS — E66.9 OBESITY (BMI 30-39.9): ICD-10-CM

## 2022-03-28 DIAGNOSIS — Z99.89 OSA ON CPAP: Primary | ICD-10-CM

## 2022-03-28 DIAGNOSIS — N40.1 BENIGN PROSTATIC HYPERPLASIA WITH URINARY RETENTION: Primary | ICD-10-CM

## 2022-03-28 DIAGNOSIS — G47.09 OTHER INSOMNIA: ICD-10-CM

## 2022-03-28 DIAGNOSIS — G47.33 OSA ON CPAP: Primary | ICD-10-CM

## 2022-03-28 DIAGNOSIS — R33.8 BENIGN PROSTATIC HYPERPLASIA WITH URINARY RETENTION: Primary | ICD-10-CM

## 2022-03-28 LAB
BILIRUBIN URINE: NEGATIVE
BLOOD URINE, POC: ABNORMAL
CHARACTER, URINE: CLEAR
COLOR, URINE: YELLOW
GLUCOSE URINE: >= 1000 MG/DL
KETONES, URINE: NEGATIVE
LEUKOCYTE CLUMPS, URINE: NEGATIVE
NITRITE, URINE: NEGATIVE
PH, URINE: 7 (ref 5–9)
PROTEIN, URINE: NEGATIVE MG/DL
SPECIFIC GRAVITY, URINE: 1.01 (ref 1–1.03)
UROBILINOGEN, URINE: 0.2 EU/DL (ref 0–1)

## 2022-03-28 PROCEDURE — 99213 OFFICE O/P EST LOW 20 MIN: CPT | Performed by: PHYSICIAN ASSISTANT

## 2022-03-28 PROCEDURE — 99214 OFFICE O/P EST MOD 30 MIN: CPT | Performed by: UROLOGY

## 2022-03-28 PROCEDURE — 81003 URINALYSIS AUTO W/O SCOPE: CPT | Performed by: UROLOGY

## 2022-03-28 ASSESSMENT — ENCOUNTER SYMPTOMS
STRIDOR: 0
ALLERGIC/IMMUNOLOGIC NEGATIVE: 1
EYES NEGATIVE: 1
SHORTNESS OF BREATH: 0
NAUSEA: 0
COUGH: 0
DIARRHEA: 0
CHEST TIGHTNESS: 0
WHEEZING: 0
BACK PAIN: 0

## 2022-03-28 NOTE — PROGRESS NOTES
[facility]      Anita Pacheco         [id number]  3/28/2022   Chief Complaint   Patient presents with    1 Year Follow Up     FAISAL with download         Pt of Dr. Fabian Coleman    PAP Download:   Kya Delio or initial AHI: 12.3     Date of initial study: 4/5/2013      Compliant  80%     Noncompliant 20 %     PAP Type CPAP Level  11   Avg Hrs/Day 6 hr 42 min  AHI: 0.5   Recorded compliance dates , 2/20/2022  to 3/21/2022   Machine/Mfg:   [] ResMed    [x] Respironics/Dreamstation   Interface:   [x] Nasal    [] Nasal pillows   [] FFM      Provider:      [x] SR-HMFARAZ     []Carlos Manuel     [] Jaleesa    [] Marie Chaidez    [] Jerome               [] [facility]      [] Adaptive    [] [address]      [] Other    Neck Size: 18  Mallampati Mallampati 4  ESS:  4  SAQLI: 91    Here is a scan of the most recent download:          Presentation:   Denise Perez presents for sleep medicine follow up for obstructive sleep apnea  Since the last visit, Denise Perez is doing well with PAP. He has been on trazodone for insomnia until recently when his script ran out. He had more trouble falling asleep. Mirapex has been helpful for RLS. He feels rested. Equipment issues: The pressure is  acceptable, the mask is acceptable     Sleep issues:  Do you feel better? Yes  More rested? Yes   Better concentration? yes    Progress History:   Since last visit any new medical issues? No  New ER or hospital visits? No  Any new or changes in medicines? No  Any new sleep medicines? No    Review of Systems -   Review of Systems   Constitutional: Negative for activity change, appetite change, chills and fever. HENT: Negative for congestion and postnasal drip. Eyes: Negative. Respiratory: Negative for cough, chest tightness, shortness of breath, wheezing and stridor. Cardiovascular: Negative for chest pain and leg swelling. Gastrointestinal: Negative for diarrhea and nausea. Endocrine: Negative. Genitourinary: Negative. Musculoskeletal: Negative. Negative for arthralgias and back pain. Skin: Negative. Allergic/Immunologic: Negative. Neurological: Negative. Negative for dizziness and light-headedness. Psychiatric/Behavioral: Negative. All other systems reviewed and are negative. Physical Exam:    BMI:  Body mass index is 37.04 kg/m². Wt Readings from Last 3 Encounters:   03/28/22 265 lb 9.6 oz (120.5 kg)   01/11/22 269 lb 6.4 oz (122.2 kg)   10/19/21 278 lb (126.1 kg)     Weight lost 13 lbs over 5 months  Vitals: /80 (Site: Left Upper Arm, Position: Sitting, Cuff Size: Large Adult)   Pulse 74   Temp 97 °F (36.1 °C) (Temporal)   Ht 5' 11\" (1.803 m)   Wt 265 lb 9.6 oz (120.5 kg)   SpO2 95%   BMI 37.04 kg/m²       Physical Exam  Constitutional:       Appearance: He is well-developed. HENT:      Head: Normocephalic and atraumatic. Right Ear: External ear normal.      Left Ear: External ear normal.   Eyes:      Conjunctiva/sclera: Conjunctivae normal.      Pupils: Pupils are equal, round, and reactive to light. Cardiovascular:      Rate and Rhythm: Normal rate and regular rhythm. Heart sounds: Normal heart sounds. Pulmonary:      Effort: Pulmonary effort is normal.      Breath sounds: Normal breath sounds. Musculoskeletal:         General: Normal range of motion. Cervical back: Normal range of motion and neck supple. Skin:     General: Skin is warm and dry. Neurological:      Mental Status: He is alert and oriented to person, place, and time. Psychiatric:         Behavior: Behavior normal.         Thought Content: Thought content normal.         Judgment: Judgment normal.           ASSESSMENT/DIAGNOSIS     Diagnosis Orders   1. FAISAL on CPAP     2. Obesity (BMI 30-39.9)     3. Centrilobular emphysema ([address]     4. Other insomnia              Plan   Do you need any equipment today?  Yes update supplies  - continue trazodone for insomnia  - Continue Mirapex for RLS   - Download reviewed and discussed with patient  - He  was advised to continue current positive airway pressure therapy with above described pressure. - He  advised to keep good compliance with current recommended pressure to get optimal results and clinical improvement  - Recommend 7-9 hours of sleep with PAP  - He was advised to call [employer] regarding supplies if needed.   -He call my office for earlier appointment if needed for worsening of sleep symptoms.   - He was instructed on weight loss  - Ynes Villegas was educated about my impression and plan. Patient verbalizesunderstanding.   We will see Barry Pacheco back in: 1 year with download    Information added by my medical assistant/LPN was reviewed today         Raul Haley PA-C, MPAS  3/28/2022

## 2022-03-28 NOTE — PROGRESS NOTES
[facility] / [facility] Patient Visit    Patient:  Marck Trivedi  YOB: 1960  Date: 3/28/2022  Consult requested from India Crocker MD     HISTORY OF PRESENT ILLNESS:   The patient is a 57 y.o. male who presents today for follow-up for the following problem(s):  Bladder mass, hematuria. Overall the problem(s) : are worsening. Associated Symptoms: No dysuria, gross hematuria. Pain Severity:      Today visit:   3/28/22   Follows with us for gross hematuria s/p cysto RGPG, no mass seen. Hematuria resolved. BPH with LUTs - is controlled on Flomax. 6/14/21   Has history of gross hematuria with clots, and was found to have a possible bladder mass on CT scan from ER visit. He has unfortunately developed acute urinary retention and has catheter in place. BPH with LUTs - is found to be in retention.      Summary of old records:   (Patient's old records, notes and chart reviewed and summarized above.)    Last several PSA's:  Lab Results   Component Value Date    PSA 3.08 (H) 02/12/2021    PSA 2.24 (H) 06/05/2019    PSA 1.75 02/20/2017       Last total testosterone:  No results found for: TESTOSTERONE    Urinalysis today:  Results for POC orders placed in visit on 03/28/22   POCT Urinalysis No Micro (Auto)   Result Value Ref Range    Glucose, Ur >= 1000 (A) NEGATIVE mg/dl    Bilirubin Urine Negative     Ketones, Urine Negative NEGATIVE    Specific Gravity, Urine 1.010 1.002 - 1.030    Blood, UA POC Trace-lysed NEGATIVE    pH, Urine 7.00 5.0 - 9.0    Protein, Urine Negative NEGATIVE mg/dl    Urobilinogen, Urine 0.20 0.0 - 1.0 eu/dl    Nitrite, Urine Negative NEGATIVE    Leukocyte Clumps, Urine Negative NEGATIVE    Color, Urine Yellow [id number]-STRAW    Character, Urine Clear [id number]         Last BUN and creatinine:  Lab Results   Component Value Date    BUN 15 02/15/2022     Lab Results   Component Value Date    CREATININE 0.9 02/15/2022       Imaging Reviewed during this Office Visit:   (results were independently reviewed by physician and radiology report verified)    PAST MEDICAL, FAMILY AND SOCIAL HISTORY:  Past Medical History:   Diagnosis Date    [facility]     COPD (chronic obstructive pulmonary disease) [facility]     Coronary artery disease involving native coronary artery of native heart without angina pectoris     Depression     Diabetes mellitus type 2, diet-controlled ([facility] 6/1/2018    GERD (gastroesophageal reflux disease) 3/21/2012    Hernia     Hx of blood clots 1990's    right knee due to injury    Hx of cocaine abuse ([facility]     Hyperglycemia 09/09    Hyperlipidemia     Hypertension     FAISAL on CPAP     Osteoarthritis 11/07    S/P CABG x 2 07/06/2016    S/P PTCA: 1/15/2018: Stent diagonal branch Xience 3.0 x 12 mm. 1/15/2018    1/15/2018: Stent diagonal branch Xience 3.0 x 12 mm.  Dr. Kaylen Mchugh injury 08/2015    Right thumb cut with table saw, no treatment needed     Past Surgical History:   Procedure Laterality Date    CARDIAC CATHETERIZATION  06/27/2016    CATARACT REMOVAL Right 2013    COLONOSCOPY  04/08    CORONARY ARTERY BYPASS GRAFT  07/06/2016    Double bypass, Dr. Linette Allen Right 02/2007    Neck    CYSTOSCOPY N/A 7/16/2021    CYSTOSCOPY WITH BILAT RETROGRADE PyELOGRAM performed by Kiko Winters MD at [address] CATH LAB PROCEDURE      EYE SURGERY Right     Retinal surgery x 3    HERNIA REPAIR Right     Inguinal    PTCA  01/15/2018    ROTATOR CUFF REPAIR Right 11/16/2017    TONSILLECTOMY  child    TOTAL KNEE ARTHROPLASTY Left 10/24/2016    Dr. Mata Race     Family History   Problem Relation Age of Onset    Heart Disease Mother     High Blood Pressure Mother     Obesity Mother     Diabetes Mother     Heart Disease Father     Cancer Father         colon/prostate    Colon Cancer Father     Prostate Cancer Father    Lindsay Arthritis Father     Diabetes Brother      Outpatient Medications Marked as Taking for the 3/28/22 encounter (Office Visit) with Josef Fairchild MD   Medication Sig Dispense Refill    albuterol sulfate  (90 Base) MCG/ACT inhaler INHALE TWO (2) PUFFS INTO THE LUNGS EVERY 6 HOURS AS NEEDED FOR WHEEZING 18 g 3    allopurinol (ZYLOPRIM) 300 MG tablet TAKE 1 TABLET BY MOUTH ONCE DAILY 90 tablet 1    atorvastatin (LIPITOR) 40 MG tablet Take 1 tablet by mouth daily 90 tablet 0    ticagrelor (BRILINTA) 90 MG TABS tablet TAKE ONE (1) TABLET BY MOUTH TWICE DAILY 180 tablet 0    buPROPion (WELLBUTRIN XL) 300 MG extended release tablet TAKE 1 TABLET BY MOUTH EVERY MORNING 90 tablet 1    dapagliflozin (FARXIGA) 5 MG tablet TAKE 1 TABLET BY MOUTH EVERY MORNING 90 tablet 1    isosorbide mononitrate (IMDUR) 30 MG extended release tablet TAKE ONE (1) TABLET BY MOUTH ONCE DAILY 90 tablet 1    losartan (COZAAR) 25 MG tablet TAKE 1 TABLET BY MOUTH EVERY DAY 90 tablet 1    metFORMIN (GLUCOPHAGE) 500 MG tablet TAKE 2 TABLETS BY MOUTH TWICE DAILY WITH MEALS 360 tablet 1    metoprolol succinate (TOPROL XL) 25 MG extended release tablet TAKE 2 TABLETS BY MOUTH ONCE DAILY 180 tablet 1    omeprazole (PRILOSEC) 20 MG delayed release capsule TAKE 1 CAPSULE BY MOUTH EVERY DAY 90 capsule 1    pramipexole (MIRAPEX) 0.125 MG tablet TAKE 1 TABLET BY MOUTH DAILY IN THE EVENING 90 tablet 1    tamsulosin (FLOMAX) 0.4 MG capsule TAKE 1 CAPSULE BY MOUTH DAILY TO FACILITATE PASSAGE OF URETERAL STONE 90 capsule 1    traZODone (DESYREL) 100 MG tablet TAKE 1 TABLET BY MOUTH EACH EVENING AS NEEDED FOR SLEEP 90 tablet 1    valsartan (DIOVAN) 80 MG tablet Take 1 tablet by mouth daily 90 tablet 1    bumetanide (BUMEX) 2 MG tablet TAKE 1 TABLET BY MOUTH EVERY MORNING ~301Q.5 TAKE 1/2 TABLET BY MOUTH EVERY EVENING 45 tablet 5    potassium chloride (KLOR-CON M) 20 MEQ extended release tablet Take 1 tablet by mouth daily 90 tablet 3  fluticasone (FLONASE) 50 MCG/ACT nasal spray INSTILL TWO (2) SPRAYS IN EACH NOSTRIL ONCE DAILY AS NEEDED FOR RHINITIS OR ALLERGIES 16 g 2    guaiFENesin (MUCINEX) 600 MG extended release tablet Take 1,200 mg by mouth 2 times daily      umeclidinium-vilanterol (ANORO ELLIPTA) 62.5-25 MCG/INH AEPB inhaler INHALE 1 PUFF INTO THE LUNGS ONE TIME DAILY 1 each 11    fluticasone (ARNUITY ELLIPTA) 100 MCG/ACT AEPB Inhale 100 mcg into the lungs daily Inhale 1 puff into the lungs daily 30 each 11    blood glucose test strips (PRODIGY NO CODING BLOOD GLUC) strip USE TO TEST BLOOD GLUCOSE ONCE DAILY. 100 each 0    nitroGLYCERIN (NITROSTAT) 0.4 MG SL tablet Place 1 tablet under the tongue every 5 minutes as needed for Chest pain 25 tablet 3    Blood Glucose Monitoring Suppl (PRODIGY AUTOCODE BLOOD GLUCOSE) w/Device KIT          Zoloft [sertraline hcl]  Social History     Tobacco Use   Smoking Status Former Smoker    Packs/day: 2.00    Years: 25.00    Pack years: 50.00    Types: Cigars    Quit date: 2014    Years since quittin.3   Smokeless Tobacco Never Used       Social History     Substance and Sexual Activity   Alcohol Use Not Currently    Alcohol/week: 0.0 standard drinks       REVIEW OF SYSTEMS:  Constitutional: negative  Eyes: negative  Respiratory: negative  Cardiovascular: negative  Gastrointestinal: negative  Musculoskeletal: negative  Genitourinary: negative  Skin: negative   Neurological: negative  Hematological/Lymphatic: negative  Psychological: negative    Physical Exam:    This a 57 y.o. male   Vitals:    22 1402   BP: 136/82     Constitutional: Patient in no acute distress   Neuro: alert and oriented to person place and time.     Psych: Mood and affect normal.  Head: atraumatic normocephalic  Eyes: EOMi  HEENT: neck supple, trachea midline  Lungs: Respiratory effort normal  Cardiovascular:  Normal peripheral pulses  Abdomen: Soft, non-tender, non-distended, No CVA  Bladder: non-tender and not distended. FROMx4, no cyanosis clubbing edema  Skin: warm and dry      Assessment and Plan      1. Benign prostatic hyperplasia with urinary retention           Plan:      No follow-ups on file. Hematuria - resolved, negative work up. No bladder mass.      BPH with LUTs - Flomax  Offer Urolift but he would like to medical therapy

## 2022-03-30 NOTE — ED PROVIDER NOTES
[facility] ENCOUNTER          Pt Name: Annye Claude  MRN: [id number]  [place] 1960  Date of evaluation: 6/5/2021  Treating Resident Physician: Kathi Baltazar DO  Supervising Physician: Dr. Therese Sweeney       Chief Complaint   Patient presents with    Hematuria     History obtained from the patient. HISTORY OF PRESENT ILLNESS    HPI  Annye Claude is a 59 y.o. male who presents to the emergency department for evaluation of hematuria. The patient has no other acute complaints at this time. States he had an episode of hematuria this afternoon. He had urinated normally earlier today, but noted dark red urine with numerous clots prior to coming into the ED. He denies any pain with urination. Denies flank pain. He does take aspirin and Brilinta. No recent trauma, no new medications, and he denies any sexual activity. No history of STDs. Former smoker, quit 5 years ago. No personal or family history of kidney stones. No personal or family history of kidney disease. REVIEW OF SYSTEMS   Review of Systems      PAST MEDICAL AND SURGICAL HISTORY     Past Medical History:   Diagnosis Date    CHF (congestive heart failure) ([address]     COPD (chronic obstructive pulmonary disease) ([address]     Coronary artery disease involving native coronary artery of native heart without angina pectoris     Depression     Diabetes mellitus type 2, diet-controlled ([address] 6/1/2018    GERD (gastroesophageal reflux disease) 3/21/2012    Hernia     Hx of blood clots 1990's    right knee due to injury    Hx of cocaine abuse ([address]     Hyperglycemia 09/09    Hyperlipidemia     Hypertension     FAISAL on CPAP     Osteoarthritis 11/07    S/P CABG x 2 07/06/2016    S/P PTCA: 1/15/2018: Stent diagonal branch Xience 3.0 x 12 mm. 1/15/2018    1/15/2018: Stent diagonal branch Xience 3.0 x 12 mm.  Dr. Janice Toro injury 08/2015 Right thumb cut with table saw, no treatment needed     Past Surgical History:   Procedure Laterality Date    CARDIAC CATHETERIZATION  06/27/2016    CATARACT REMOVAL Right 2013    COLONOSCOPY  04/08    CORONARY ARTERY BYPASS GRAFT  07/06/2016    Double bypass, Dr. Yolis Johnston Right 02/2007    Neck    DIAGNOSTIC CARDIAC CATH LAB PROCEDURE      EYE SURGERY Right     Retinal surgery x 3    HERNIA REPAIR Right     Inguinal    PTCA  01/15/2018    ROTATOR CUFF REPAIR Right 11/16/2017    TONSILLECTOMY  child    TOTAL KNEE ARTHROPLASTY Left 10/24/2016    Dr. Akiko Parks   No current facility-administered medications for this encounter. Current Outpatient Medications:     naproxen (NAPROSYN) 500 MG tablet, Take 1 tablet by mouth 2 times daily (with meals), Disp: 20 tablet, Rfl: 0    traZODone (DESYREL) 50 MG tablet, TAKE 1/2 - 1 TABLET BY MOUTH NIGHTLY AS NEEDED FOR SLEEP, Disp: 30 tablet, Rfl: 0    isosorbide mononitrate (IMDUR) 30 MG extended release tablet, TAKE 1 TABLET BY MOUTH ONCE DAILY, Disp: 30 tablet, Rfl: 0    omeprazole (PRILOSEC) 20 MG delayed release capsule, TAKE 1 CAPSULE BY MOUTH ONE TIME A DAY, Disp: 30 capsule, Rfl: 1    pramipexole (MIRAPEX) 0.125 MG tablet, TAKE 1 TABLET BY MOUTH EVERY EVENING., Disp: 90 tablet, Rfl: 0    metoprolol succinate (TOPROL XL) 25 MG extended release tablet, TAKE 2 TABLETS BY MOUTH ONCE DAILY. , Disp: 180 tablet, Rfl: 0    allopurinol (ZYLOPRIM) 300 MG tablet, TAKE 1 TABLET BY MOUTH ONCE DAILY. , Disp: 90 tablet, Rfl: 0    blood glucose test strips (PRODIGY NO CODING BLOOD GLUC) strip, USE TO TEST BLOOD GLUCOSE ONCE DAILY. , Disp: 100 each, Rfl: 0    losartan (COZAAR) 25 MG tablet, TAKE 1 TABLET BY MOUTH DAILY, Disp: 90 tablet, Rfl: 1    albuterol sulfate HFA (VENTOLIN HFA) 108 (90 Base) MCG/ACT inhaler, Inhale 2 puffs into the lungs every 6 hours as needed for Wheezing, Disp: 1 Inhaler, Rfl: 3    umeclidinium-vilanterol (ANORO ELLIPTA) 62.5-25 MCG/INH AEPB inhaler, INHALE 1 PUFF INTO THE LUNGS ONE TIME DAILY, Disp: 1 each, Rfl: 5    aspirin 81 MG chewable tablet, CHEW AND SWALLOW 1 TABLET BY MOUTH ONCE DAILY. , Disp: 90 tablet, Rfl: 1    atorvastatin (LIPITOR) 40 MG tablet, Take 1 tablet by mouth daily, Disp: 90 tablet, Rfl: 1    bumetanide (BUMEX) 2 MG tablet, Take 1 tablet by mouth daily Take 2 mg AM - 1 mg PM, Disp: 135 tablet, Rfl: 1    ticagrelor (BRILINTA) 90 MG TABS tablet, TAKE 1 TABLET BY MOUTH 2 TIMES A DAY, Disp: 180 tablet, Rfl: 1    buPROPion (WELLBUTRIN XL) 300 MG extended release tablet, TAKE ONE TABLET BY MOUTH EVERY MORNING, Disp: 90 tablet, Rfl: 1    dapagliflozin (FARXIGA) 5 MG tablet, Take 1 tablet by mouth every morning, Disp: 90 tablet, Rfl: 1    fluticasone (FLONASE) 50 MCG/ACT nasal spray, USE 2 SPRAYS IN EACH NOSTRIL DAILY AS NEEDED FOR RHINITIS OR ALLERGIES, Disp: 48 g, Rfl: 5    metFORMIN (GLUCOPHAGE) 500 MG tablet, TAKE 1 TABLET BY MOUTH ONCE DAILY WITH BREAKFAST., Disp: 90 tablet, Rfl: 1    nitroGLYCERIN (NITROSTAT) 0.4 MG SL tablet, Place 1 tablet under the tongue every 5 minutes as needed for Chest pain, Disp: 25 tablet, Rfl: 3    Blood Glucose Monitoring Suppl (PRODIGY AUTOCODE BLOOD GLUCOSE) w/Device KIT, , Disp: , Rfl:       SOCIAL HISTORY     Social History     Social History Narrative    Not on file     Social History     Tobacco Use    Smoking status: Former Smoker     Packs/day: 2.00     Years: 25.00     Pack years: 50.00     Types: Cigars     Quit date: 2014     Years since quittin.5    Smokeless tobacco: Never Used   Vaping Use    Vaping Use: Never used   Substance Use Topics    Alcohol use: Yes     Alcohol/week: 3.0 standard drinks     Types: 3 Cans of beer per week     Comment: 1 every day    Drug use: Yes     Frequency: 5.0 times per week     Types: Marijuana     Comment: smokes pot once sushma while . former drug user, addiction treatment at [address] [place] [address] Allergies   Allergen Reactions    Zoloft [Sertraline Hcl] Other (See Comments)     Headaches, sweats, bad dreams         FAMILY HISTORY     Family History   Problem Relation Age of Onset    Heart Disease Mother     High Blood Pressure Mother     Obesity Mother     Diabetes Mother     Heart Disease Father     Cancer Father         colon/prostate    Colon Cancer Father     Prostate Cancer Father     Arthritis Father     Diabetes Brother          PREVIOUS RECORDS   Previous records reviewed. PHYSICAL EXAM     ED Triage Vitals [06/05/21 1749]   BP Temp Temp Source Pulse Resp SpO2 Height Weight   (!) 156/85 97.5 °F (36.4 °C) Oral 77 22 94 % 5' 11\" (1.803 m) 260 lb (117.9 kg)     Initial vital signs and nursing assessment reviewed and normal. Body mass index is 36.26 kg/m². Pulsoximetry is normal per my interpretation. Additional Vital Signs:  Vitals:    06/05/21 1939   BP: (!) 138/97   Pulse: 75   Resp: 18   Temp:    SpO2: 95%       Physical Exam  Constitutional:       General: he is not in acute distress. Appearance: Normal appearance. HENT:      Head: Normocephalic. Right Ear: External ear normal.      Left Ear: External ear normal.      Nose: Nose normal.      Mouth/Throat:      Mouth: Mucous membranes are moist.   Eyes:      General: No scleral icterus. Extraocular Movements: Extraocular movements intact. Neck:      Musculoskeletal: Normal range of motion. Cardiovascular:      Rate and Rhythm: Normal rate and regular rhythm. Pulses: Normal pulses. Heart sounds: Normal heart sounds. Pulmonary:      Effort: Pulmonary effort is normal.      Breath sounds: Normal breath sounds. Abdominal:      General: Abdomen is flat. There is no distension. Palpations: Abdomen is soft. Musculoskeletal: Normal range of motion. Skin:     General: Skin is warm and dry. Neurological:      Mental Status: he is alert. Motor: No weakness.    Psychiatric:         Mood and signed by Dr Piedad Barcenas on 6/5/2021 8:33 PM          ED Medications administered this visit:   Medications   0.9 % sodium chloride bolus (1,000 mLs Intravenous New Bag 6/5/21 1936)         ED COURSE     ED Course as of Jun 05 2054   Sat Jun 05, 2021   1912 Will evaluate for nephrolithiasis. Microscopic Urinalysis(!):    Glucose, Urine >= 1000(!)   Bilirubin, Urine NEGATIVE   Ketones, Urine NEGATIVE   Specific Gravity, UA 1.027   Blood, Urine LARGE(!)   pH, UA 6.0   Protein, (!)   Urobilinogen, Urine 1.0   Nitrite, Urine NEGATIVE   Leukocytes, UA NEGATIVE   Color, UA RED(!)   Character, Urine TURBID(!)   RBC, UA > 200   WBC, UA > 100   Epithelial Cells, UA OBSCURED   Amorphous, UA OBSCURED   Mucus, UA OBSCURED   Bacteria, UA OBSCURED   Casts OBSCURED   Crystals OBSCURED [KL]   1919 Patient informed of plan for CT to evaluate for kidney stones. No pain at this time. No other complaints. [KL]   2042 Impression:  1. There is a lobulated structure in the posterior aspect of the urinary bladder concerning for a mass. Cystoscopy is recommended for further characterization. 2. Marked eventration of the left hemidiaphragm with left basilar atelectasis. 3. Hepatic steatosis. 4. Mild colonic diverticulosis. CT ABDOMEN PELVIS WO CONTRAST Additional Contrast? None [KL]   2050 Discussed return instructions. He will call urology on Monday to schedule an appointment this week. [KL]      ED Course User Index  [KL] Natalie Rebollar DO       Strict return precautions and follow up instructions were discussed with the patient prior to discharge, with which the patient agrees. MEDICATION CHANGES     New Prescriptions    No medications on file         FINAL DISPOSITION     Final diagnoses:   Gross hematuria   Mass of urinary bladder     Condition: condition: good  Dispo: Discharge to home      This transcription was electronically signed.  Parts of this transcriptions may have been dictated by use of voice recognition software and electronically transcribed, and parts may have been transcribed with the assistance of an ED scribe. The transcription may contain errors not detected in proofreading. Please refer to my supervising physician's documentation if my documentation differs.     Electronically Signed: Josh Da Silva DO, 06/05/21, 8:54 PM         Josh Da Silva DO  Resident  06/05/21 1267 Double Island Pedicle Flap Text: The defect edges were debeveled with a #15 scalpel blade.  Given the location of the defect, shape of the defect and the proximity to free margins a double island pedicle advancement flap was deemed most appropriate.  Using a sterile surgical marker, an appropriate advancement flap was drawn incorporating the defect, outlining the appropriate donor tissue and placing the expected incisions within the relaxed skin tension lines where possible.    The area thus outlined was incised deep to adipose tissue with a #15 scalpel blade.  The skin margins were undermined to an appropriate distance in all directions around the primary defect and laterally outward around the island pedicle utilizing iris scissors.  There was minimal undermining beneath the pedicle flap.

## 2022-03-31 NOTE — TELEPHONE ENCOUNTER
The pharmacy is  requesting a refill of the below medication which has been pended for you:     Requested Prescriptions     Pending Prescriptions Disp Refills    valsartan (DIOVAN) 80 MG tablet [Pharmacy Med Name: VALSARTAN 80 MG TABLET 80 Tablet] 30 tablet 10     Sig: TAKE 1 TABLET BY MOUTH DAILY       Last Appointment Date: 1/21/2022  Next Appointment Date: Visit date not found    Allergies   Allergen Reactions    Zoloft [Sertraline Hcl] Other (See Comments)     Headaches, sweats, bad dreams

## 2022-03-31 NOTE — TELEPHONE ENCOUNTER
Date of last visit:  1-  Date of next visit:  Visit date not found    Requested Prescriptions     Pending Prescriptions Disp Refills    fluticasone (FLONASE) 50 MCG/ACT nasal spray [Pharmacy Med Name: FLUTICASONE 50MCG NASAL SPR 50 [place]] 16 g 5     Sig: INSTILL TWO (2) SPRAYS IN EACH NOSTRIL ONCE DAILY AS NEEDED FOR RHINITIS OR ALLERGIES

## 2022-04-01 RX ORDER — FLUTICASONE PROPIONATE 50 MCG
SPRAY, SUSPENSION (ML) NASAL
Qty: 16 G | Refills: 5 | Status: SHIPPED | OUTPATIENT
Start: 2022-04-01 | End: 2022-09-07

## 2022-04-01 RX ORDER — VALSARTAN 80 MG/1
80 TABLET ORAL DAILY
Qty: 30 TABLET | Refills: 0 | Status: SHIPPED | OUTPATIENT
Start: 2022-04-01 | End: 2022-04-29

## 2022-04-18 ENCOUNTER — OFFICE VISIT (OUTPATIENT)
Dept: FAMILY MEDICINE CLINIC | Age: 62
End: 2022-04-18

## 2022-04-18 VITALS
HEART RATE: 72 BPM | DIASTOLIC BLOOD PRESSURE: 70 MMHG | BODY MASS INDEX: 37.02 KG/M2 | WEIGHT: 264.4 LBS | RESPIRATION RATE: 16 BRPM | HEIGHT: 71 IN | SYSTOLIC BLOOD PRESSURE: 130 MMHG

## 2022-04-18 DIAGNOSIS — E66.01 CLASS 2 SEVERE OBESITY WITH SERIOUS COMORBIDITY AND BODY MASS INDEX (BMI) OF 36.0 TO 36.9 IN ADULT, UNSPECIFIED OBESITY TYPE (HCC): ICD-10-CM

## 2022-04-18 DIAGNOSIS — E78.5 HYPERLIPIDEMIA, UNSPECIFIED HYPERLIPIDEMIA TYPE: ICD-10-CM

## 2022-04-18 DIAGNOSIS — Z00.00 INITIAL MEDICARE ANNUAL WELLNESS VISIT: ICD-10-CM

## 2022-04-18 DIAGNOSIS — I10 ESSENTIAL HYPERTENSION: ICD-10-CM

## 2022-04-18 DIAGNOSIS — J44.9 CHRONIC OBSTRUCTIVE PULMONARY DISEASE, UNSPECIFIED COPD TYPE (HCC): ICD-10-CM

## 2022-04-18 DIAGNOSIS — E11.65 UNCONTROLLED TYPE 2 DIABETES MELLITUS WITH HYPERGLYCEMIA (HCC): Primary | ICD-10-CM

## 2022-04-18 DIAGNOSIS — F32.A DEPRESSION, UNSPECIFIED DEPRESSION TYPE: ICD-10-CM

## 2022-04-18 DIAGNOSIS — I25.10 CORONARY ARTERY DISEASE INVOLVING NATIVE CORONARY ARTERY OF NATIVE HEART WITHOUT ANGINA PECTORIS: ICD-10-CM

## 2022-04-18 DIAGNOSIS — I50.32 CHF (CONGESTIVE HEART FAILURE), NYHA CLASS II, CHRONIC, DIASTOLIC (HCC): ICD-10-CM

## 2022-04-18 LAB
CHP ED QC CHECK: ABNORMAL
GLUCOSE BLD-MCNC: 193 MG/DL
HBA1C MFR BLD: 9 %

## 2022-04-18 PROCEDURE — 83036 HEMOGLOBIN GLYCOSYLATED A1C: CPT | Performed by: FAMILY MEDICINE

## 2022-04-18 PROCEDURE — G0438 PPPS, INITIAL VISIT: HCPCS | Performed by: FAMILY MEDICINE

## 2022-04-18 PROCEDURE — 82962 GLUCOSE BLOOD TEST: CPT | Performed by: FAMILY MEDICINE

## 2022-04-18 PROCEDURE — 99213 OFFICE O/P EST LOW 20 MIN: CPT | Performed by: FAMILY MEDICINE

## 2022-04-18 ASSESSMENT — PATIENT HEALTH QUESTIONNAIRE - PHQ9
1. LITTLE INTEREST OR PLEASURE IN DOING THINGS: 3
SUM OF ALL RESPONSES TO PHQ QUESTIONS 1-9: 9
7. TROUBLE CONCENTRATING ON THINGS, SUCH AS READING THE NEWSPAPER OR WATCHING TELEVISION: 0
6. FEELING BAD ABOUT YOURSELF - OR THAT YOU ARE A FAILURE OR HAVE LET YOURSELF OR YOUR FAMILY DOWN: 0
SUM OF ALL RESPONSES TO PHQ QUESTIONS 1-9: 9
4. FEELING TIRED OR HAVING LITTLE ENERGY: 2
10. IF YOU CHECKED OFF ANY PROBLEMS, HOW DIFFICULT HAVE THESE PROBLEMS MADE IT FOR YOU TO DO YOUR WORK, TAKE CARE OF THINGS AT HOME, OR GET ALONG WITH OTHER PEOPLE: 0
5. POOR APPETITE OR OVEREATING: 0
3. TROUBLE FALLING OR STAYING ASLEEP: 3
8. MOVING OR SPEAKING SO SLOWLY THAT OTHER PEOPLE COULD HAVE NOTICED. OR THE OPPOSITE, BEING SO FIGETY OR RESTLESS THAT YOU HAVE BEEN MOVING AROUND A LOT MORE THAN USUAL: 0
2. FEELING DOWN, DEPRESSED OR HOPELESS: 1
SUM OF ALL RESPONSES TO PHQ QUESTIONS 1-9: 9
9. THOUGHTS THAT YOU WOULD BE BETTER OFF DEAD, OR OF HURTING YOURSELF: 0
SUM OF ALL RESPONSES TO PHQ QUESTIONS 1-9: 9
SUM OF ALL RESPONSES TO PHQ9 QUESTIONS 1 & 2: 4

## 2022-04-18 ASSESSMENT — LIFESTYLE VARIABLES
HOW OFTEN DO YOU HAVE A DRINK CONTAINING ALCOHOL: 2-4 TIMES A MONTH
HOW OFTEN DURING THE LAST YEAR HAVE YOU HAD A FEELING OF GUILT OR REMORSE AFTER DRINKING: 4
HOW OFTEN DURING THE LAST YEAR HAVE YOU FAILED TO DO WHAT WAS NORMALLY EXPECTED FROM YOU BECAUSE OF DRINKING: 0
HAVE YOU OR SOMEONE ELSE BEEN INJURED AS A RESULT OF YOUR DRINKING: 0
HOW OFTEN DURING THE LAST YEAR HAVE YOU BEEN UNABLE TO REMEMBER WHAT HAPPENED THE NIGHT BEFORE BECAUSE YOU HAD BEEN DRINKING: 0
HOW OFTEN DURING THE LAST YEAR HAVE YOU FOUND THAT YOU WERE NOT ABLE TO STOP DRINKING ONCE YOU HAD STARTED: 1
HOW OFTEN DURING THE LAST YEAR HAVE YOU NEEDED AN ALCOHOLIC DRINK FIRST THING IN THE MORNING TO GET YOURSELF GOING AFTER A NIGHT OF HEAVY DRINKING: 0
HAS A RELATIVE, FRIEND, DOCTOR, OR ANOTHER HEALTH PROFESSIONAL EXPRESSED CONCERN ABOUT YOUR DRINKING OR SUGGESTED YOU CUT DOWN: 4
HOW MANY STANDARD DRINKS CONTAINING ALCOHOL DO YOU HAVE ON A TYPICAL DAY: 3 OR 4

## 2022-04-18 ASSESSMENT — ENCOUNTER SYMPTOMS
CHEST TIGHTNESS: 0
CONSTIPATION: 0
EYES NEGATIVE: 1
VOMITING: 0
SHORTNESS OF BREATH: 0
NAUSEA: 0
COUGH: 0
ORTHOPNEA: 0
BLOOD IN STOOL: 0
DIARRHEA: 0
ABDOMINAL PAIN: 0

## 2022-04-18 NOTE — PROGRESS NOTES
Medicare Annual Wellness Visit    Macario Keith is here for Gastroesophageal Reflux, Hypertension, and Medicare AWV    Assessment & Plan   Uncontrolled type 2 diabetes mellitus with hyperglycemia (HCC)  -     POCT Glucose  -     POCT glycosylated hemoglobin (Hb A1C)  Essential hypertension  CHF (congestive heart failure), NYHA class II, chronic, diastolic (HCC)  Coronary artery disease involving native coronary artery of native heart without angina pectoris  Chronic obstructive pulmonary disease, unspecified COPD type ([facility] [address]  Hyperlipidemia, unspecified hyperlipidemia type  -     Lipid Panel; Future  -     ALT; Future  -     AST; Future  Class 2 severe obesity with serious comorbidity and body mass index (BMI) of 36.0 to 36.9 in adult, unspecified obesity type (HCC)  Depression, unspecified depression type      Recommendations for Preventive Services Due: see orders and patient instructions/AVS.  Recommended screening schedule for the next 5-10 years is provided to the patient in written form: see Patient Instructions/AVS.     Return in about 3 months (around 7/18/2022), or if symptoms worsen or fail to improve, for DM. Subjective   The following acute and/or chronic problems were also addressed today:  See his problem list    Patient's complete Health Risk Assessment and screening values have been reviewed and are found in Flowsheets. The following problems were reviewed today and where indicated follow up appointments were made and/or referrals ordered. Positive Risk Factor Screenings with Interventions:    Fall Risk:  Do you feel unsteady or are you worried about falling? : (!) yes  2 or more falls in past year?: (!) yes  Fall with injury in past year?: no     Fall Risk Interventions:    · Home safety tips provided  · she was seen by neurology and cardiology. He states that he is to do some more test but he put them on hold. Advised to get them done and follow up with neurology.       Depression:  PHQ-2 Score: 4  PHQ-9 Total Score: 9    Severity:1-4 = minimal depression, 5-9 = mild depression, 10-14 = moderate depression, 15-19 = moderately severe depression, 20-27 = severe depression    Depression Interventions:  · he was going to out patient counseling and he changed insurance companies and it trying to figure out where to go. Alcohol Screening:  AUDIT Total Score: 12    A score of 8 or more is associated with harmful or hazardous drinking. A score of 13 or more in women, and 15 or more in men, is likely to indicate alcohol dependence. Substance Abuse - Alcohol Interventions:  he states that he may have 2-4 beers once or twice a week and go to bed and that is it.      Drug Use Screening:DAST-10 Score: (!) 4  DAST-10 Score Interpretation:  1-2: Low level - Monitor, re-assess at a later date; 3-5: Moderate level - Further Investigation; 6-8: Substantial level - Intensive Assessment; 9-10: Severe level - Intensive Assessment    Substance Abuse - Drug Use Interventions:  he smokes marijuana for long time and is not planning to stop         General Health and ACP:  General  In general, how would you say your health is?: Good  In the past 7 days, have you experienced any of the following: New or Increased Pain, New or Increased Fatigue, Loneliness, Social Isolation, Stress or Anger?: No  Do you get the social and emotional support that you need?: Yes  Do you have a Living Will?: Yes    Advance Directives     Power of  Living Will ACP-Advance Directive ACP-Power of     Not on File Not on File Not on File Not on File      General Health Risk Interventions:  · No Living Will: he states that he needs to up date his will and POA    Health Habits/Nutrition:     Physical Activity: Inactive    Days of Exercise per Week: 0 days    Minutes of Exercise per Session: 0 min     Have you lost any weight without trying in the past 3 months?: No  Body mass index: (!) 36.87  Have you seen the dentist within the past year?: Yes    Health Habits/Nutrition Interventions:  · Inadequate physical activity:  patient is not ready to increase his/her physical activity level at this time     Safety:  Do you have working smoke detectors?: Yes  Do you have any tripping hazards - loose or unsecured carpets or rugs?: No  Do you have any tripping hazards - clutter in doorways, halls, or stairs?: No  Do you have either shower bars, grab bars, non-slip mats or non-slip surfaces in your shower or bathtub?: Yes  Do all of your stairways have a railing or banister?: (!) No ([facility] [place] does not have one.)  Do you always fasten your seatbelt when you are in a car?: Yes    Safety Interventions:  · Home safety tips provided  · he states that the one that does not have a rail is the one going to the attic and he does not go there often           Objective   Vitals:    04/18/22 1411   BP: 130/70   Site: Right Upper Arm   Position: Sitting   Cuff Size: Large Adult   Pulse: 72   Resp: 16   Weight: 264 lb 6.4 oz (119.9 kg)   Height: 5' 11\" (1.803 m)      Body mass index is 36.88 kg/m². Allergies   Allergen Reactions    Zoloft [Sertraline Hcl] Other (See Comments)     Headaches, sweats, bad dreams     Prior to Visit Medications    Medication Sig Taking?  Authorizing Provider   dapagliflozin (FARXIGA) 10 MG tablet Take 1 tablet by mouth every morning Yes Tez Stephens MD   fluticasone (FLONASE) 50 MCG/ACT nasal spray INSTILL TWO (2) SPRAYS IN EACH NOSTRIL ONCE DAILY AS NEEDED FOR RHINITIS OR ALLERGIES Yes Tez Stephens MD   valsartan (DIOVAN) 80 MG tablet TAKE 1 TABLET BY MOUTH DAILY Yes Tez Stephens MD   albuterol sulfate  (90 Base) MCG/ACT inhaler INHALE TWO (2) PUFFS INTO THE LUNGS EVERY 6 HOURS AS NEEDED FOR WHEEZING Yes Tez Stephens MD   allopurinol (ZYLOPRIM) 300 MG tablet TAKE 1 TABLET BY MOUTH ONCE DAILY Yes Tez Stephens MD   atorvastatin (LIPITOR) 40 MG tablet Take 1 tablet by mouth daily Yes Torrie Snellen Janae Saldivar MD   ticagrelor (BRILINTA) 90 MG TABS tablet TAKE ONE (1) TABLET BY MOUTH TWICE DAILY Yes Des Elaine MD   buPROPion (WELLBUTRIN XL) 300 MG extended release tablet TAKE 1 Eli Alma Rosa Yes Des Elaine MD   isosorbide mononitrate (IMDUR) 30 MG extended release tablet TAKE ONE (1) TABLET BY MOUTH ONCE DAILY Yes Des Elaine MD   losartan (COZAAR) 25 MG tablet TAKE 1 TABLET BY MOUTH EVERY DAY Yes Des Elaine MD   metFORMIN (GLUCOPHAGE) 500 MG tablet TAKE 2 TABLETS BY MOUTH TWICE DAILY WITH MEALS Yes Des Elaine MD   metoprolol succinate (TOPROL XL) 25 MG extended release tablet TAKE 2 TABLETS BY MOUTH ONCE DAILY Yes Des Elaine MD   omeprazole (PRILOSEC) 20 MG delayed release capsule TAKE 1 [place] Yes Des Elaine MD   pramipexole (MIRAPEX) 0.125 MG tablet TAKE 1 TABLET BY MOUTH DAILY IN THE EVENING Yes Des Elaine MD   tamsulosin (FLOMAX) 0.4 MG capsule TAKE 1 CAPSULE BY MOUTH DAILY TO FACILITATE PASSAGE OF URETERAL STONE Yes Des Elaine MD   traZODone (DESYREL) 100 MG tablet TAKE 1 TABLET BY MOUTH EACH EVENING AS NEEDED FOR SLEEP Yes Des Elaine MD   bumetanide (BUMEX) 2 MG tablet TAKE 1 TABLET BY MOUTH EVERY MORNING ~301Q.5 TAKE 1/2 TABLET BY MOUTH EVERY EVENING  Patient taking differently: Take 2 mg by mouth 2 times daily  Yes Reymundo Cherry MD   potassium chloride (KLOR-CON M) 20 MEQ extended release tablet Take 1 tablet by mouth daily Yes ASHLEIGH Abraham CNP   guaiFENesin (MUCINEX) 600 MG extended release tablet Take 1,200 mg by mouth 2 times daily Yes Historical Provider, MD   umeclidinium-vilanterol (ANORO ELLIPTA) 62.5-25 MCG/INH AEPB inhaler INHALE 1 PUFF INTO THE LUNGS ONE TIME DAILY Yes ASHLEIGH De Santiago CNP   fluticasone (ARNUITY ELLIPTA) 100 MCG/ACT AEPB Inhale 100 mcg into the lungs daily Inhale 1 puff into the lungs daily Yes ASHLEIGH De Santiago CNP   blood glucose test strips (PRODIGY NO CODING BLOOD GLUC) strip USE TO TEST BLOOD GLUCOSE ONCE DAILY. Yes Fadi Fna MD   nitroGLYCERIN (NITROSTAT) 0.4 MG SL tablet Place 1 tablet under the tongue every 5 minutes as needed for Chest pain Yes Fadi Fan MD   Blood Glucose Monitoring Suppl (PRODIGY AUTOCODE BLOOD GLUCOSE) w/Device KIT  Yes Historical Provider, MD [place] (Including outside providers/suppliers regularly involved in providing care):   Patient Care Team:  Fadi Fan MD as PCP - General (Family Medicine)  Fadi Fan MD as PCP - [facility] Empaneled Provider  Joan Figueroa MD as Physician (Physical Medicine and Rehab)  Sheryle Sill, MD as Physician (Pulmonary Disease)  Bert Cummings MD as Surgeon (Cardiothoracic Surgery)    Reviewed and updated this visit:  Tobacco  Allergies  Meds  Med Hx  Surg Hx  Soc Hx  Fam Hx                         Date: 4/18/2022    Dirk Bautista is a 57 y.o. male who presents today for:  Chief Complaint   Patient presents with    Gastroesophageal Reflux    Hypertension    Medicare AWV       Current regimen: oral agent (dual therapy)  Current monitoring regimen: home blood tests - 1  Home blood sugar trends: AM -160  Any episodes of hypoglycemia? No  Current exercise: No  Compliance with treatment: Good  Eye exam current (within one year): had his last exam last month. Any history of foot problems? No  Last foot exam: 6/2021  Cardiovascular risk factors: advanced age (older than 54 for men, 72 for women), diabetes mellitus, dyslipidemia, hypertension, male gender, obesity (BMI >= 30 kg/m2) and sedentary lifestyle. Immunizations up to date: Flu Yes   Pneumonia Yes      HPI:     Hypertension  This is a chronic problem. The problem is unchanged. The problem is controlled. Pertinent negatives include no chest pain, headaches, neck pain, orthopnea, palpitations or shortness of breath.  Risk factors for coronary artery disease include male gender, obesity, diabetes mellitus, dyslipidemia and sedentary lifestyle. Past treatments include angiotensin blockers and beta blockers. The current treatment provides significant improvement. Compliance problems include exercise. Hypertensive end-organ damage includes CAD/MI and heart failure. Hyperlipidemia  This is a chronic problem. The current episode started more than 1 year ago. The problem is controlled. Recent lipid tests were reviewed and are normal. Pertinent negatives include no chest pain, focal sensory loss, focal weakness, leg pain, myalgias or shortness of breath. Current antihyperlipidemic treatment includes statins. The current treatment provides significant improvement of lipids. Compliance problems include adherence to exercise. Risk factors for coronary artery disease include hypertension, male sex, obesity, a sedentary lifestyle, diabetes mellitus and dyslipidemia. has a current medication list which includes the following prescription(s): dapagliflozin, fluticasone, valsartan, albuterol sulfate hfa, allopurinol, atorvastatin, ticagrelor, bupropion, isosorbide mononitrate, losartan, metformin, metoprolol succinate, omeprazole, pramipexole, tamsulosin, trazodone, bumetanide, potassium chloride, guaifenesin, anoro ellipta, arnuity ellipta, prodigy no coding blood gluc, nitroglycerin, and prodigy autocode blood glucose.     Allergies   Allergen Reactions    Zoloft [Sertraline Hcl] Other (See Comments)     Headaches, sweats, bad dreams       Social History     Tobacco Use    Smoking status: Former Smoker     Packs/day: 2.00     Years: 25.00     Pack years: 50.00     Types: Cigars     Quit date: 2014     Years since quittin.4    Smokeless tobacco: Never Used   Vaping Use    Vaping Use: Never used   Substance Use Topics    Alcohol use: Not Currently     Alcohol/week: 0.0 standard drinks    Drug use: Yes     Frequency: 5.0 times per week     Types: Marijuana (Weed)     Comment: smokes pot once sushma while . former drug user, addiction treatment at [facility]       Past Medical History:   Diagnosis Date    CHF (congestive heart failure) ([facility] [address]     COPD (chronic obstructive pulmonary disease) ([facility] [address]     Coronary artery disease involving native coronary artery of native heart without angina pectoris     Depression     Diabetes mellitus type 2, diet-controlled ([facility] [address] 6/1/2018    GERD (gastroesophageal reflux disease) 3/21/2012    Hernia     Hx of blood clots 1990's    right knee due to injury    Hx of cocaine abuse ([facility] [address]     Hyperglycemia 09/09    Hyperlipidemia     Hypertension     FAISAL on CPAP     Osteoarthritis 11/07    S/P CABG x 2 07/06/2016    S/P PTCA: 1/15/2018: Stent diagonal branch Xience 3.0 x 12 mm. 1/15/2018    1/15/2018: Stent diagonal branch Xience 3.0 x 12 mm.  Dr. Tamra Napier injury 08/2015    Right thumb cut with table saw, no treatment needed       Past Surgical History:   Procedure Laterality Date    CARDIAC CATHETERIZATION  06/27/2016    CATARACT REMOVAL Right 2013    COLONOSCOPY  04/08    CORONARY ARTERY BYPASS GRAFT  07/06/2016    Double bypass, Dr. Thom Howard Right 02/2007    Neck    CYSTOSCOPY N/A 7/16/2021    CYSTOSCOPY WITH BILAT RETROGRADE PyELOGRAM performed by Melba Helton MD at [facility] CATH LAB PROCEDURE      EYE SURGERY Right     Retinal surgery x 3    HERNIA REPAIR Right     Inguinal    PTCA  01/15/2018    ROTATOR CUFF REPAIR Right 11/16/2017    TONSILLECTOMY  child    TOTAL KNEE ARTHROPLASTY Left 10/24/2016    Dr. Tarsha Nathan       Family History   Problem Relation Age of Onset    Heart Disease Mother     High Blood Pressure Mother     Obesity Mother     Diabetes Mother     Heart Disease Father     Cancer Father         colon/prostate    Colon Cancer Father     Prostate Cancer Father     Arthritis Father     Diabetes Brother        /70 (Site: Right Upper Arm, Position: Sitting, Cuff Size: Large Adult)   Pulse 72   Resp 16    5' 11\" (1.803 m)   Wt 264 lb 6.4 oz (119.9 kg)   BMI 36.88 kg/m²   Wt Readings from Last 3 Encounters:   04/18/22 264 lb 6.4 oz (119.9 kg)   03/28/22 267 lb (121.1 kg)   03/28/22 265 lb 9.6 oz (120.5 kg)       Subjective:      Review of Systems   Constitutional: Negative for activity change, appetite change, diaphoresis and fever. HENT: Negative. Eyes: Negative. Respiratory: Negative for cough, chest tightness and shortness of breath. Cardiovascular: Negative for chest pain, palpitations, orthopnea and leg swelling. Gastrointestinal: Negative for abdominal pain, blood in stool, constipation, diarrhea, nausea and vomiting. Genitourinary: Negative. Musculoskeletal: Negative. Negative for myalgias and neck pain. Skin: Negative. Negative for rash. Neurological: Negative. Negative for dizziness, focal weakness, syncope, weakness, light-headedness and headaches. Psychiatric/Behavioral: Negative. Objective:     Physical Exam  Vitals and nursing note reviewed. Constitutional:       General: He is not in acute distress. Appearance: He is well-developed. He is not diaphoretic. HENT:      Head: Normocephalic and atraumatic. Eyes:      General: No scleral icterus. Right eye: No discharge. Left eye: No discharge. Conjunctiva/sclera: Conjunctivae normal.      Pupils: Pupils are equal, round, and reactive to light. Neck:      Thyroid: No thyromegaly. Vascular: No JVD. Cardiovascular:      Rate and Rhythm: Normal rate and regular rhythm. Heart sounds: Normal heart sounds. No murmur heard. Pulmonary:      Effort: Pulmonary effort is normal. No respiratory distress. Breath sounds: Normal breath sounds. No wheezing, rhonchi or rales. Abdominal:      General: Bowel sounds are normal. There is no distension. Palpations: Abdomen is soft. There is no mass. Tenderness: There is no abdominal tenderness. There is no guarding or rebound. Musculoskeletal:         General: Normal range of motion. Cervical back: Normal range of motion and neck supple. Lymphadenopathy:      Cervical: No cervical adenopathy. Skin:     General: Skin is warm and dry. Findings: No rash. Neurological:      Mental Status: He is alert and oriented to person, place, and time. Psychiatric:         Behavior: Behavior normal.         Assessment:       Diagnosis Orders   1. Uncontrolled type 2 diabetes mellitus with hyperglycemia (HCC)  POCT Glucose    POCT glycosylated hemoglobin (Hb A1C)   2. Essential hypertension  stable   3. CHF (congestive heart failure), NYHA class II, chronic, diastolic (HCC)  Stable following with cardiology. 4. Coronary artery disease involving native coronary artery of native heart without angina pectoris  Stable following with cardiology. 5. Chronic obstructive pulmonary disease, unspecified COPD type ([address]  Stable follows with pulmonary. 6. Hyperlipidemia, unspecified hyperlipidemia type  Lipid Panel    ALT    AST   7. Class 2 severe obesity with serious comorbidity and body mass index (BMI) of 36.0 to 36.9 in adult, unspecified obesity type ([address]  Need to loose weight    8. Depression, unspecified depression type  Need to resume counseling.         Plan:      Orders Placed:  Orders Placed This Encounter   Procedures    Lipid Panel     Standing Status:   Future     Standing Expiration Date:   4/18/2023     Order Specific Question:   Is Patient Fasting?/# of Hours     Answer:   yes 12 hours    ALT     Standing Status:   Future     Standing Expiration Date:   4/18/2023    AST     Standing Status:   Future     Standing Expiration Date:   4/18/2023    POCT Glucose    POCT glycosylated hemoglobin (Hb A1C)     MedicationsPrescribed:  Orders Placed This Encounter   Medications    dapagliflozin (FARXIGA) 10 MG tablet     Sig: Take 1 tablet by mouth every morning     Dispense:  90 tablet Refill:  1         Lab Results   Component Value Date    LABA1C 9.0 04/18/2022    LABA1C 8.6 (A) 09/08/2021    LABA1C 9.8 (A) 06/07/2021     No results found for: Keith Reid  Results for POC orders placed in visit on 04/18/22   POCT glycosylated hemoglobin (Hb A1C)   Result Value Ref Range    Hemoglobin A1C 9.0 %   POCT Glucose   Result Value Ref Range    Glucose 193 mg/dL    QC OK?          Lab Results   Component Value Date    BUN 15 02/15/2022     Lab Results   Component Value Date    CREATININE 0.9 02/15/2022     Lab Results   Component Value Date    CHOL 155 02/12/2021     Lab Results   Component Value Date    TRIG 138 02/12/2021     Lab Results   Component Value Date    HDL 55 02/12/2021     Lab Results   Component Value Date    LDLCALC 72 02/12/2021     No results found for: LABVLDL, VLDL  No results found for: CHOLHDLRATIO      Current Outpatient Medications   Medication Sig Dispense Refill    dapagliflozin (FARXIGA) 10 MG tablet Take 1 tablet by mouth every morning 90 tablet 1    fluticasone (FLONASE) 50 MCG/ACT nasal spray INSTILL TWO (2) SPRAYS IN EACH NOSTRIL ONCE DAILY AS NEEDED FOR RHINITIS OR ALLERGIES 16 g 5    valsartan (DIOVAN) 80 MG tablet TAKE 1 TABLET BY MOUTH DAILY 30 tablet 0    albuterol sulfate  (90 Base) MCG/ACT inhaler INHALE TWO (2) PUFFS INTO THE LUNGS EVERY 6 HOURS AS NEEDED FOR WHEEZING 18 g 3    allopurinol (ZYLOPRIM) 300 MG tablet TAKE 1 TABLET BY MOUTH ONCE DAILY 90 tablet 1    atorvastatin (LIPITOR) 40 MG tablet Take 1 tablet by mouth daily 90 tablet 0    ticagrelor (BRILINTA) 90 MG TABS tablet TAKE ONE (1) TABLET BY MOUTH TWICE DAILY 180 tablet 0    buPROPion (WELLBUTRIN XL) 300 MG extended release tablet TAKE 1 TABLET BY MOUTH EVERY MORNING 90 tablet 1    isosorbide mononitrate (IMDUR) 30 MG extended release tablet TAKE ONE (1) TABLET BY MOUTH ONCE DAILY 90 tablet 1    losartan (COZAAR) 25 MG tablet TAKE 1 TABLET BY MOUTH EVERY DAY 90 tablet 1    metFORMIN (GLUCOPHAGE) 500 MG tablet TAKE 2 TABLETS BY MOUTH TWICE DAILY WITH MEALS 360 tablet 1    metoprolol succinate (TOPROL XL) 25 MG extended release tablet TAKE 2 TABLETS BY MOUTH ONCE DAILY 180 tablet 1    omeprazole (PRILOSEC) 20 MG delayed release capsule TAKE 1 CAPSULE BY MOUTH EVERY DAY 90 capsule 1    pramipexole (MIRAPEX) 0.125 MG tablet TAKE 1 TABLET BY MOUTH DAILY IN THE EVENING 90 tablet 1    tamsulosin (FLOMAX) 0.4 MG capsule TAKE 1 CAPSULE BY MOUTH DAILY TO FACILITATE PASSAGE OF URETERAL STONE 90 capsule 1    traZODone (DESYREL) 100 MG tablet TAKE 1 TABLET BY MOUTH EACH EVENING AS NEEDED FOR SLEEP 90 tablet 1    bumetanide (BUMEX) 2 MG tablet TAKE 1 TABLET BY MOUTH EVERY MORNING ~301Q.5 TAKE 1/2 TABLET BY MOUTH EVERY EVENING (Patient taking differently: Take 2 mg by mouth 2 times daily ) 45 tablet 5    potassium chloride (KLOR-CON M) 20 MEQ extended release tablet Take 1 tablet by mouth daily 90 tablet 3    guaiFENesin (MUCINEX) 600 MG extended release tablet Take 1,200 mg by mouth 2 times daily      umeclidinium-vilanterol (ANORO ELLIPTA) 62.5-25 MCG/INH AEPB inhaler INHALE 1 PUFF INTO THE LUNGS ONE TIME DAILY 1 each 11    fluticasone (ARNUITY ELLIPTA) 100 MCG/ACT AEPB Inhale 100 mcg into the lungs daily Inhale 1 puff into the lungs daily 30 each 11    blood glucose test strips (PRODIGY NO CODING BLOOD GLUC) strip USE TO TEST BLOOD GLUCOSE ONCE DAILY. 100 each 0    nitroGLYCERIN (NITROSTAT) 0.4 MG SL tablet Place 1 tablet under the tongue every 5 minutes as needed for Chest pain 25 tablet 3    Blood Glucose Monitoring Suppl (PRODIGY AUTOCODE BLOOD GLUCOSE) w/Device KIT        No current facility-administered medications for this visit.      Orders Placed This Encounter   Procedures    Lipid Panel     Standing Status:   Future     Standing Expiration Date:   4/18/2023     Order Specific Question:   Is Patient Fasting?/# of Hours     Answer:   yes 12 hours    ALT     Standing Status: Future     Standing Expiration Date:   4/18/2023    AST     Standing Status:   Future     Standing Expiration Date:   4/18/2023    POCT Glucose    POCT glycosylated hemoglobin (Hb A1C)     Lab Results   Component Value Date    LABA1C 9.0 04/18/2022    LABA1C 8.6 (A) 09/08/2021    LABA1C 9.8 (A) 06/07/2021     No results found for: Tita Chavez  Results for POC orders placed in visit on 04/18/22   POCT glycosylated hemoglobin (Hb A1C)   Result Value Ref Range    Hemoglobin A1C 9.0 %   POCT Glucose   Result Value Ref Range    Glucose 193 mg/dL    QC OK? Continue to monitor blood sugars 1 times a day. Keep log of blood sugars and bring with you to the next appointment. Discussed use, benefit, and side effects of prescribed medications. Allpatient questions answered. Pt voiced understanding. Instructed to continue currentmedications, diet and exercise. Patient agreed with treatment plan. Return in about 3 months (around 7/18/2022), or if symptoms worsen or fail to improve, for DM.

## 2022-04-18 NOTE — PATIENT INSTRUCTIONS
Personalized Preventive Plan for Macario Keith - 4/18/2022  Medicare offers a range of preventive health benefits. Some of the tests and screenings are paid in full while other may be subject to a deductible, co-insurance, and/or copay. Some of these benefits include a comprehensive review of your medical history including lifestyle, illnesses that may run in your family, and various assessments and screenings as appropriate. After reviewing your medical record and screening and assessments performed today your provider may have ordered immunizations, labs, imaging, and/or referrals for you. A list of these orders (if applicable) as well as your Preventive Care list are included within your After Visit Summary for your review. Other Preventive Recommendations:    · A preventive eye exam performed by an eye specialist is recommended every 1-2 years to screen for glaucoma; cataracts, macular degeneration, and other eye disorders. · A preventive dental visit is recommended every 6 months. · Try to get at least 150 minutes of exercise per week or 10,000 steps per day on a pedometer . · Order or download the FREE \"Exercise & Physical Activity: Your Everyday Guide\" from [employer]. Call 2-461.607.4878 or search [employer] online. · You need 9476-2310 mg of calcium and 1652-6159 IU of vitamin D per day. It is possible to meet your calcium requirement with diet alone, but a vitamin D supplement is usually necessary to meet this goal.  · When exposed to the sun, use a sunscreen that protects against both UVA and UVB radiation with an SPF of 30 or greater. Reapply every 2 to 3 hours or after sweating, drying off with a towel, or swimming. · Always wear a seat belt when traveling in a car. Always wear a helmet when riding a bicycle or motorcycle.

## 2022-04-18 NOTE — PROGRESS NOTES
Have you seen any other physician or provider since your last visit no    Have you had any other diagnostic tests since your last visit? no    Have you changed or stopped any medications since your last visit including any over-the-counter medicines, vitamins, or herbal medicines? no     Are you taking all your prescribed medications? Yes    If NO, why?             BP Readings from Last 2 Encounters:   04/18/22 130/70   03/28/22 136/82     Hemoglobin A1C (%)   Date Value   09/08/2021 8.6 (A)     LDL Calculated (mg/dL)   Date Value   02/12/2021 72              Tobacco use:  Patient  reports that he quit smoking about 7 years ago. His smoking use included cigars. He has a 50.00 pack-year smoking history. He has never used smokeless tobacco.    If Smoker - Cessation materials given? NA    Is Daily aspirin on medication list?:  Yes    Diabetic retinal exam done? Yes   If yes, document in Health Maintenance. Monofilament placed on counter? Yes    Shoes and socks removed? Yes    BP taken with correct size cuff? Yes   Repeated if > 140/90 NA     Is patient taking any medications for diabetes    Yes   If yes, see medication list    Is the patient reporting any side effects of diabetic medications? No    Microalbumin performed if applicable? Yes      Is patient taking any over the counter medications    Yes   If yes, see medication list      Patient Self-Management Goal for Chronic Condition  Goal: I will take all medications as prescribed by my doctor, and I will call the office if I am having any medication problems.   Barriers to success: none  Plan for overcoming my barriers: N/A     Confidence: 10/10  Date goal set: 4/18/22  Date goal attained: \

## 2022-04-28 DIAGNOSIS — E11.65 UNCONTROLLED TYPE 2 DIABETES MELLITUS WITH HYPERGLYCEMIA (HCC): ICD-10-CM

## 2022-04-28 DIAGNOSIS — G25.81 RLS (RESTLESS LEGS SYNDROME): ICD-10-CM

## 2022-04-28 DIAGNOSIS — F33.1 MAJOR DEPRESSIVE DISORDER, RECURRENT EPISODE, MODERATE (HCC): ICD-10-CM

## 2022-04-28 NOTE — TELEPHONE ENCOUNTER
Date of last visit:  4/18/2022  Date of next visit:  7/29/2022    Requested Prescriptions     Pending Prescriptions Disp Refills    ticagrelor (BRILINTA) 90 MG TABS tablet [Pharmacy Med Name: BRILINTA 90 MG TABS 90 Tablet] 60 tablet 10     Sig: TAKE ONE (1) TABLET BY MOUTH TWICE DAILY    isosorbide mononitrate (IMDUR) 30 MG extended release tablet [Pharmacy Med Name: ISOSORBIDE*MN*ER 30MG TAB 30 Tablet] 30 tablet 10     Sig: TAKE 1 TABLET BY MOUTH EVERY DAY

## 2022-04-28 NOTE — TELEPHONE ENCOUNTER
Date of last visit:  4/18/2022  Date of next visit:  7/29/2022    Requested Prescriptions     Pending Prescriptions Disp Refills    metFORMIN (GLUCOPHAGE) 500 MG tablet [Pharmacy Med Name: METFORMIN 500 MG TABS 500 Tablet] 120 tablet 10     Sig: TAKE TWO (2) TABLETS BY MOUTH TWICE DAILY WITH MEALS    pramipexole (MIRAPEX) 0.125 MG tablet [Pharmacy Med Name: PRAMIPEXOLE 0.125 MG TABLET 0.125 Tablet] 30 tablet 10     Sig: TAKE 1 TABLET BY MOUTH DAILY IN THE EVENING    buPROPion (WELLBUTRIN XL) 300 MG extended release tablet [Pharmacy Med Name: BUPROPION XL 300MG TAB  Tablet] 30 tablet 10     Sig: TAKE 1 TABLET BY MOUTH EVERY MORNING

## 2022-04-29 DIAGNOSIS — R33.8 BENIGN PROSTATIC HYPERPLASIA WITH URINARY RETENTION: ICD-10-CM

## 2022-04-29 DIAGNOSIS — N40.1 BENIGN PROSTATIC HYPERPLASIA WITH URINARY RETENTION: ICD-10-CM

## 2022-04-29 RX ORDER — ISOSORBIDE MONONITRATE 30 MG/1
TABLET, EXTENDED RELEASE ORAL
Qty: 30 TABLET | Refills: 2 | Status: SHIPPED | OUTPATIENT
Start: 2022-04-29 | End: 2022-07-20

## 2022-04-29 RX ORDER — BUPROPION HYDROCHLORIDE 300 MG/1
TABLET ORAL
Qty: 30 TABLET | Refills: 2 | Status: SHIPPED | OUTPATIENT
Start: 2022-04-29 | End: 2022-07-20

## 2022-04-29 RX ORDER — TAMSULOSIN HYDROCHLORIDE 0.4 MG/1
CAPSULE ORAL
Qty: 30 CAPSULE | Refills: 10 | Status: SHIPPED | OUTPATIENT
Start: 2022-04-29 | End: 2022-06-08 | Stop reason: SDUPTHER

## 2022-04-29 RX ORDER — PRAMIPEXOLE DIHYDROCHLORIDE 0.12 MG/1
TABLET ORAL
Qty: 30 TABLET | Refills: 2 | Status: SHIPPED | OUTPATIENT
Start: 2022-04-29 | End: 2022-07-20

## 2022-04-29 RX ORDER — VALSARTAN 80 MG/1
80 TABLET ORAL DAILY
Qty: 30 TABLET | Refills: 2 | Status: SHIPPED | OUTPATIENT
Start: 2022-04-29 | End: 2022-05-03 | Stop reason: SDUPTHER

## 2022-04-29 NOTE — TELEPHONE ENCOUNTER
Date of last visit:  4/18/2022  Date of next visit:  7/29/2022    Requested Prescriptions     Pending Prescriptions Disp Refills    valsartan (DIOVAN) 80 MG tablet [Pharmacy Med Name: VALSARTAN 80 MG TABLET 80 Tablet] 30 tablet 10     Sig: TAKE 1 TABLET BY MOUTH DAILY

## 2022-04-29 NOTE — TELEPHONE ENCOUNTER
Flora Mckeon called requesting a refill on the following medications:  Requested Prescriptions     Pending Prescriptions Disp Refills    tamsulosin (FLOMAX) 0.4 MG capsule [Pharmacy Med Name: TAMSULOSIN 0.4 MG CAPS 0.4 Capsule] 30 capsule 10     Sig: TAKE 1 CAPSULE BY MOUTH DAILY TO [address] [place] [address] verified:  .pv      Date of last visit: 03/28/2022  Date of next visit (if applicable): 6/64/8859

## 2022-05-03 RX ORDER — VALSARTAN 80 MG/1
80 TABLET ORAL DAILY
Qty: 30 TABLET | Refills: 0 | Status: SHIPPED | OUTPATIENT
Start: 2022-05-03 | End: 2022-05-19 | Stop reason: SDUPTHER

## 2022-05-19 RX ORDER — VALSARTAN 80 MG/1
80 TABLET ORAL DAILY
Qty: 90 TABLET | Refills: 1 | Status: SHIPPED | OUTPATIENT
Start: 2022-05-19 | End: 2022-06-15 | Stop reason: SDUPTHER

## 2022-05-19 NOTE — TELEPHONE ENCOUNTER
The pharmacy is  requesting a refill of the below medication which has been pended for you:     Requested Prescriptions     Pending Prescriptions Disp Refills    valsartan (DIOVAN) 80 MG tablet 90 tablet 1     Sig: Take 1 tablet by mouth daily       Last Appointment Date: 4/18/2022  Next Appointment Date: 7/29/2022    Allergies   Allergen Reactions    Zoloft [Sertraline Hcl] Other (See Comments)     Headaches, sweats, bad dreams

## 2022-05-24 NOTE — TELEPHONE ENCOUNTER
Date of last visit:  4/18/2022  Date of next visit:  7/29/2022    Requested Prescriptions     Pending Prescriptions Disp Refills    allopurinol (ZYLOPRIM) 300 MG tablet [Pharmacy Med Name: ALLOPURINOL 300 MG TABS 300 Tablet] 30 tablet 10     Sig: TAKE 1 TABLET BY MOUTH ONCE DAILY    omeprazole (PRILOSEC) 20 MG delayed release capsule [Pharmacy Med Name: OMEPRAZOLE DR 20MG CAP 20 Capsule] 30 capsule 10     Sig: TAKE ONE (1) CAPSULE BY MOUTH ONCE DAILY    FARXIGA 5 MG tablet [Pharmacy Med Name: FARXIGA 5 MG TABLET 5 Tablet] 30 tablet 10     Sig: TAKE 1 TABLET BY MOUTH EVERY MORNING

## 2022-05-27 RX ORDER — OMEPRAZOLE 20 MG/1
CAPSULE, DELAYED RELEASE ORAL
Qty: 30 CAPSULE | Refills: 1 | Status: SHIPPED | OUTPATIENT
Start: 2022-05-27 | End: 2022-07-20

## 2022-05-27 RX ORDER — ALLOPURINOL 300 MG/1
TABLET ORAL
Qty: 30 TABLET | Refills: 1 | Status: SHIPPED | OUTPATIENT
Start: 2022-05-27 | End: 2022-07-20

## 2022-06-08 DIAGNOSIS — R33.8 BENIGN PROSTATIC HYPERPLASIA WITH URINARY RETENTION: ICD-10-CM

## 2022-06-08 DIAGNOSIS — N40.1 BENIGN PROSTATIC HYPERPLASIA WITH URINARY RETENTION: ICD-10-CM

## 2022-06-08 RX ORDER — TAMSULOSIN HYDROCHLORIDE 0.4 MG/1
CAPSULE ORAL
Qty: 90 CAPSULE | Refills: 3 | Status: SHIPPED | OUTPATIENT
Start: 2022-06-08

## 2022-06-14 NOTE — TELEPHONE ENCOUNTER
The pharmacy is  requesting a refill of the below medication which has been pended for you:     Requested Prescriptions     Pending Prescriptions Disp Refills    dapagliflozin (FARXIGA) 10 MG tablet 30 tablet 1     Sig: Take 1 tablet by mouth every morning       Last Appointment Date: 4/18/2022  Next Appointment Date: 7/29/2022    Allergies   Allergen Reactions    Zoloft [Sertraline Hcl] Other (See Comments)     Headaches, sweats, bad dreams

## 2022-06-15 RX ORDER — VALSARTAN 80 MG/1
80 TABLET ORAL DAILY
Qty: 90 TABLET | Refills: 1 | Status: SHIPPED | OUTPATIENT
Start: 2022-06-15 | End: 2022-08-31 | Stop reason: SDUPTHER

## 2022-06-21 RX ORDER — ALBUTEROL SULFATE 90 UG/1
AEROSOL, METERED RESPIRATORY (INHALATION)
Qty: 18 G | Refills: 0 | Status: SHIPPED | OUTPATIENT
Start: 2022-06-21 | End: 2022-06-30 | Stop reason: ALTCHOICE

## 2022-06-21 NOTE — TELEPHONE ENCOUNTER
Date of last visit:  10/3/2019  Date of next visit:  7/29/2022    Requested Prescriptions     Pending Prescriptions Disp Refills    albuterol sulfate HFA (PROVENTIL;VENTOLIN;PROAIR) 108 (90 Base) MCG/ACT inhaler [Pharmacy Med Name: ALBUTEROL HFA*VENT* 90MCG 108 (90 BAS Aerosol] 18 g 0     Sig: INHALE TWO (2) PUFFS INTO THE LUNGS EVERY 6 HOURS AS NEEDED FOR WHEEZING

## 2022-06-23 ENCOUNTER — HOSPITAL ENCOUNTER (OUTPATIENT)
Dept: CT IMAGING | Age: 62
Discharge: HOME | End: 2022-06-23
Payer: MEDICARE

## 2022-06-23 DIAGNOSIS — Z87.891 PERSONAL HISTORY OF TOBACCO USE: ICD-10-CM

## 2022-06-23 PROCEDURE — 71271 CT THORAX LUNG CANCER SCR C-: CPT

## 2022-06-27 RX ORDER — DAPAGLIFLOZIN 5 MG/1
TABLET, FILM COATED ORAL
Qty: 90 TABLET | Refills: 1 | Status: SHIPPED | OUTPATIENT
Start: 2022-06-27 | End: 2022-08-10 | Stop reason: SDUPTHER

## 2022-06-27 NOTE — TELEPHONE ENCOUNTER
Date of last visit:  4/18/2022  Date of next visit:  7/29/2022    Requested Prescriptions     Pending Prescriptions Disp Refills    FARXIGA 5 MG tablet [Pharmacy Med Name: FARXIGA 5 MG TABLET 5 Tablet] 90 tablet 1     Sig: TAKE 1 TABLET BY MOUTH EVERY MORNING

## 2022-06-30 ENCOUNTER — OFFICE VISIT (OUTPATIENT)
Dept: CARDIOLOGY CLINIC | Age: 62
End: 2022-06-30
Payer: MEDICARE

## 2022-06-30 ENCOUNTER — OFFICE VISIT (OUTPATIENT)
Dept: PULMONOLOGY | Age: 62
End: 2022-06-30
Payer: MEDICARE

## 2022-06-30 VITALS
HEIGHT: 71 IN | SYSTOLIC BLOOD PRESSURE: 138 MMHG | HEART RATE: 82 BPM | WEIGHT: 251.6 LBS | BODY MASS INDEX: 35.22 KG/M2 | DIASTOLIC BLOOD PRESSURE: 82 MMHG

## 2022-06-30 VITALS
HEART RATE: 76 BPM | DIASTOLIC BLOOD PRESSURE: 76 MMHG | TEMPERATURE: 98.1 F | HEIGHT: 71 IN | OXYGEN SATURATION: 95 % | SYSTOLIC BLOOD PRESSURE: 136 MMHG | WEIGHT: 252 LBS | BODY MASS INDEX: 35.28 KG/M2

## 2022-06-30 DIAGNOSIS — I10 PRIMARY HYPERTENSION: ICD-10-CM

## 2022-06-30 DIAGNOSIS — I25.10 CORONARY ARTERY DISEASE INVOLVING NATIVE CORONARY ARTERY OF NATIVE HEART WITHOUT ANGINA PECTORIS: ICD-10-CM

## 2022-06-30 DIAGNOSIS — E66.9 OBESITY (BMI 30-39.9): ICD-10-CM

## 2022-06-30 DIAGNOSIS — I50.32 CHF (CONGESTIVE HEART FAILURE), NYHA CLASS II, CHRONIC, DIASTOLIC (HCC): Primary | ICD-10-CM

## 2022-06-30 DIAGNOSIS — Z87.891 PERSONAL HISTORY OF TOBACCO USE: ICD-10-CM

## 2022-06-30 DIAGNOSIS — J44.9 STAGE 3 SEVERE COPD BY GOLD CLASSIFICATION (HCC): Primary | ICD-10-CM

## 2022-06-30 PROCEDURE — 99213 OFFICE O/P EST LOW 20 MIN: CPT | Performed by: NURSE PRACTITIONER

## 2022-06-30 PROCEDURE — G0296 VISIT TO DETERM LDCT ELIG: HCPCS | Performed by: NURSE PRACTITIONER

## 2022-06-30 PROCEDURE — 99213 OFFICE O/P EST LOW 20 MIN: CPT | Performed by: NUCLEAR MEDICINE

## 2022-06-30 PROCEDURE — 93000 ELECTROCARDIOGRAM COMPLETE: CPT | Performed by: NUCLEAR MEDICINE

## 2022-06-30 ASSESSMENT — ENCOUNTER SYMPTOMS
SHORTNESS OF BREATH: 1
NAUSEA: 0
WHEEZING: 0
GASTROINTESTINAL NEGATIVE: 1
DIARRHEA: 0
VOMITING: 0
CHEST TIGHTNESS: 0
EYES NEGATIVE: 1
STRIDOR: 0
ALLERGIC/IMMUNOLOGIC NEGATIVE: 1

## 2022-06-30 NOTE — PROGRESS NOTES
[id number] [facility] .  [address] [place] [id number]  Dept: [phone]  Dept Fax: [phone]  Loc: [phone]    Visit Date: 6/30/2022    Sly Cortes is a 57 y.o. male who presents todayfor:  Chief Complaint   Patient presents with    1 Year Follow Up    Check-Up    Congestive Heart Failure    Coronary Artery Disease    Hypertension   know CABG and stents  Baseline dyspnea  No changes in breathing  No chest pain   BP is stable   No dizziness  No syncope        HPI:  HPI  Past Medical History:   Diagnosis Date    CHF (congestive heart failure) (HCC)     COPD (chronic obstructive pulmonary disease) (HCC)     Coronary artery disease involving native coronary artery of native heart without angina pectoris     Depression     Diabetes mellitus type 2, diet-controlled ([facility] [address] 6/1/2018    GERD (gastroesophageal reflux disease) 3/21/2012    Hernia     Hx of blood clots 1990's    right knee due to injury    Hx of cocaine abuse ([facility] [address]     Hyperglycemia 09/09    Hyperlipidemia     Hypertension     FAISAL on CPAP     Osteoarthritis 11/07    S/P CABG x 2 07/06/2016    S/P PTCA: 1/15/2018: Stent diagonal branch Xience 3.0 x 12 mm. 1/15/2018    1/15/2018: Stent diagonal branch Xience 3.0 x 12 mm.  Dr. El Smoker injury 08/2015    Right thumb cut with table saw, no treatment needed      Past Surgical History:   Procedure Laterality Date    CARDIAC CATHETERIZATION  06/27/2016    CATARACT REMOVAL Right 2013    COLONOSCOPY  04/08    CORONARY ARTERY BYPASS GRAFT  07/06/2016    Double bypass, Dr. Danny Torres Right 02/2007    Neck    CYSTOSCOPY N/A 7/16/2021    CYSTOSCOPY WITH BILAT RETROGRADE PyELOGRAM performed by Randall Bailey MD at [facility] CATH LAB PROCEDURE      EYE SURGERY Right     Retinal surgery x 3    HERNIA REPAIR Right     Inguinal    PTCA  01/15/2018    ROTATOR CUFF REPAIR Right 2017    TONSILLECTOMY  child    TOTAL KNEE ARTHROPLASTY Left 10/24/2016    Dr. Pao Chowdhury     Family History   Problem Relation Age of Onset    Heart Disease Mother     High Blood Pressure Mother     Obesity Mother     Diabetes Mother     Heart Disease Father     Cancer Father         colon/prostate    Colon Cancer Father     Prostate Cancer Father     Arthritis Father     Diabetes Brother      Social History     Tobacco Use    Smoking status: Former Smoker     Packs/day: 2.00     Years: 25.00     Pack years: 50.00     Types: Cigars     Quit date: 2014     Years since quittin.6    Smokeless tobacco: Never Used   Substance Use Topics    Alcohol use: Not Currently     Alcohol/week: 0.0 standard drinks      Current Outpatient Medications   Medication Sig Dispense Refill    FARXIGA 5 MG tablet TAKE 1 TABLET BY MOUTH EVERY MORNING 90 tablet 1    albuterol sulfate HFA (PROVENTIL;VENTOLIN;PROAIR) 108 (90 Base) MCG/ACT inhaler INHALE TWO (2) PUFFS INTO THE LUNGS EVERY 6 HOURS AS NEEDED FOR WHEEZING 18 g 0    valsartan (DIOVAN) 80 MG tablet Take 1 tablet by mouth daily 90 tablet 1    dapagliflozin (FARXIGA) 10 MG tablet Take 1 tablet by mouth every morning 30 tablet 5    tamsulosin (FLOMAX) 0.4 mg capsule TAKE 1 CAPSULE BY MOUTH DAILY TO FACILITATE PASSAGE OF URETERAL STONE 90 capsule 3    allopurinol (ZYLOPRIM) 300 MG tablet TAKE 1 TABLET BY MOUTH ONCE DAILY 30 tablet 1    omeprazole (PRILOSEC) 20 MG delayed release capsule TAKE ONE (1) CAPSULE BY MOUTH ONCE DAILY 30 capsule 1    metFORMIN (GLUCOPHAGE) 500 MG tablet TAKE TWO (2) TABLETS BY MOUTH TWICE DAILY WITH MEALS 120 tablet 2    pramipexole (MIRAPEX) 0.125 MG tablet TAKE 1 TABLET BY MOUTH DAILY IN THE EVENING 30 tablet 2    buPROPion (WELLBUTRIN XL) 300 MG extended release tablet TAKE 1 TABLET BY MOUTH EVERY MORNING 30 tablet 2    ticagrelor (BRILINTA) 90 MG TABS tablet TAKE ONE (1) TABLET BY MOUTH TWICE DAILY 60 tablet 2  isosorbide mononitrate (IMDUR) 30 MG extended release tablet TAKE 1 TABLET BY MOUTH EVERY DAY 30 tablet 2    fluticasone (FLONASE) 50 MCG/ACT nasal spray INSTILL TWO (2) SPRAYS IN EACH NOSTRIL ONCE DAILY AS NEEDED FOR RHINITIS OR ALLERGIES 16 g 5    atorvastatin (LIPITOR) 40 MG tablet Take 1 tablet by mouth daily 90 tablet 0    losartan (COZAAR) 25 MG tablet TAKE 1 TABLET BY MOUTH EVERY DAY 90 tablet 1    metoprolol succinate (TOPROL XL) 25 MG extended release tablet TAKE 2 TABLETS BY MOUTH ONCE DAILY 180 tablet 1    traZODone (DESYREL) 100 MG tablet TAKE 1 TABLET BY MOUTH EACH EVENING AS NEEDED FOR SLEEP 90 tablet 1    bumetanide (BUMEX) 2 MG tablet TAKE 1 TABLET BY MOUTH EVERY MORNING ~301Q.5 TAKE 1/2 TABLET BY MOUTH EVERY EVENING (Patient taking differently: Take 2 mg by mouth 2 times daily ) 45 tablet 5    potassium chloride (KLOR-CON M) 20 MEQ extended release tablet Take 1 tablet by mouth daily 90 tablet 3    guaiFENesin (MUCINEX) 600 MG extended release tablet Take 1,200 mg by mouth 2 times daily      umeclidinium-vilanterol (ANORO ELLIPTA) 62.5-25 MCG/INH AEPB inhaler INHALE 1 PUFF INTO THE LUNGS ONE TIME DAILY 1 each 11    fluticasone (ARNUITY ELLIPTA) 100 MCG/ACT AEPB Inhale 100 mcg into the lungs daily Inhale 1 puff into the lungs daily 30 each 11    blood glucose test strips (PRODIGY NO CODING BLOOD GLUC) strip USE TO TEST BLOOD GLUCOSE ONCE DAILY. 100 each 0    nitroGLYCERIN (NITROSTAT) 0.4 MG SL tablet Place 1 tablet under the tongue every 5 minutes as needed for Chest pain 25 tablet 3    Blood Glucose Monitoring Suppl (PRODIGY AUTOCODE BLOOD GLUCOSE) w/Device KIT        No current facility-administered medications for this visit.      Allergies   Allergen Reactions    Zoloft [Sertraline Hcl] Other (See Comments)     Headaches, sweats, bad dreams     Health Maintenance   Topic Date Due    Shingles vaccine (1 of 2) Never done    Pneumococcal 0-64 years Vaccine (2 - PCV) 06/05/2020    Diabetic microalbuminuria test  01/08/2021    COVID-19 Vaccine (2 - Booster for Lori series) 07/30/2021    Lipids  02/12/2022    Prostate Specific Antigen (PSA) Screening or Monitoring  02/12/2022    Diabetic foot exam  06/07/2022    A1C test (Diabetic or Prediabetic)  07/18/2022    Flu vaccine (Season Ended) 09/01/2022    DTaP/Tdap/Td vaccine (2 - Td or Tdap) 12/17/2022    Diabetic retinal exam  03/15/2023    Depression Monitoring  04/18/2023    Annual Wellness Visit (AWV)  04/19/2023    Low dose CT lung screening  06/23/2023    Colorectal Cancer Screen  05/23/2029    Hepatitis C screen  Completed    HIV screen  Completed    Hepatitis A vaccine  Aged Out    Hib vaccine  Aged Out    Meningococcal (ACWY) vaccine  Aged Out       Subjective:  Review of Systems  General:   No fever, no chills, No fatigue or weight loss  Pulmonary:    some dyspnea, no wheezing  Cardiac:    Denies recent chest pain,   GI:     No nausea or vomiting, no abdominal pain  Neuro:    some dizziness or light headedness,   Musculoskeletal:  No recent active issues  Extremities:   No edema, no obvious claudication       Objective:  Physical Exam  /82   Pulse 82   Ht 5' 11\" (1.803 m)   Wt 251 lb 9.6 oz (114.1 kg)   BMI 35.09 kg/m²   General:   Well developed, well nourished  Lungs:   Clear to auscultation  Heart:    Normal S1 S2, Slight murmur. no rubs, no gallops  Abdomen:   Soft, non tender, no organomegalies, positive bowel sounds  Extremities:   No edema, no cyanosis, good peripheral pulses  Neurological:   Awake, alert, oriented. No obvious focal deficits  Musculoskelatal:  No obvious deformities    Assessment:      Diagnosis Orders   1. CHF (congestive heart failure), NYHA class II, chronic, diastolic (HCC)  EKG 12 Lead   2. Coronary artery disease involving native coronary artery of native heart without angina pectoris  EKG 12 Lead   3.  Primary hypertension     as above  Cardiac fair for now   ECG in office was done today. I reviewed the ECG. No acute findings    Plan:  No follow-ups on file. As above  Continue risk factor modification and medical management  Thank you for allowing me to participate in the care of your patient. Please don't hesitate to contact me regarding any further issues related to the patient care    Orders Placed:  Orders Placed This Encounter   Procedures    EKG 12 Lead     Order Specific Question:   Reason for Exam?     Answer: Other       Medications Prescribed:  No orders of the defined types were placed in this encounter. Discussed use, benefit, and side effects of prescribed medications. All patient questions answered. Pt voicedunderstanding. Instructed to continue current medications, diet and exercise. Continue risk factor modification and medical management. Patient agreed with treatment plan. Follow up as directed.     Electronically signedby Joan Adam MD on 6/30/2022 at 12:44 PM

## 2022-06-30 NOTE — PROGRESS NOTES
[facility]    Patient: Phoebe Mott, 57 y.o.   : 1960    Pt of Dr. Elton Newell   Patient presents with    Follow-up     COPD follow up with CT        HPI  Brianne Gama is here for follow up for his Severe COPD. Patient is experiencing SOB with exertion. Patient states they have had a productive cough. Patient reports that they have had yellow phlegm. Patient is currently taking the following inhaler(s): anoro, arnuity, alb sulf  Patient is not currently using their rescue inhaler. Other: Patient states Anoro seems to be the only effective treatment for him as far as he can notice. FAISAL on CPAP- very compliant  Former smoker quit  2PPD x 25 years for a 46 PYH  No home oxygen supplementation     Progress History:   Since last visit any new medical issues? No  New ER or hospital visits? No  Any new or changes in medicines? No  Using inhalers? Yes   Are they helpful? Yes   Flu vaccine NO  Covid vaccine J&J no booster   Pneumonia vaccine   Past Medical hx   PMH:  Past Medical History:   Diagnosis Date    CHF (congestive heart failure) (HCC)     COPD [facility] obstructive pulmonary disease) [facility]     Coronary artery disease involving native coronary artery of native heart without angina pectoris     Depression     Diabetes mellitus type 2, diet-controlled ([address] 2018    GERD (gastroesophageal reflux disease) 3/21/2012    Hernia     Hx of blood clots     right knee due to injury    Hx of cocaine abuse ([facility] [address]     Hyperglycemia     Hyperlipidemia     Hypertension     FAISAL on CPAP     Osteoarthritis     S/P CABG x 2 2016    S/P PTCA: 1/15/2018: Stent diagonal branch Xience 3.0 x 12 mm. 1/15/2018    1/15/2018: Stent diagonal branch Xience 3.0 x 12 mm.  Dr. Hughes Raffi injury 2015    Right thumb cut with table saw, no treatment needed     SURGICAL HISTORY:  Past Surgical History: Procedure Laterality Date    CARDIAC CATHETERIZATION  2016    CATARACT REMOVAL Right     COLONOSCOPY      CORONARY ARTERY BYPASS GRAFT  2016    Double bypass, Dr. Danny Torres Right 2007    Neck    CYSTOSCOPY N/A 2021    CYSTOSCOPY WITH BILAT RETROGRADE PyELOGRAM performed by Randall Bailey MD at [facility] CATH LAB PROCEDURE      EYE SURGERY Right     Retinal surgery x 3    HERNIA REPAIR Right     Inguinal    PTCA  01/15/2018    ROTATOR CUFF REPAIR Right 2017    TONSILLECTOMY  child    TOTAL KNEE ARTHROPLASTY Left 10/24/2016    Dr. Sarah Macias:  Social History     Tobacco Use    Smoking status: Former Smoker     Packs/day: 2.00     Years: 25.00     Pack years: 50.00     Types: Cigars     Quit date: 2014     Years since quittin.6    Smokeless tobacco: Never Used   Vaping Use    Vaping Use: Never used   Substance Use Topics    Alcohol use: Not Currently     Alcohol/week: 0.0 standard drinks    Drug use: Yes     Frequency: 5.0 times per week     Types: Marijuana (Weed)     Comment: smokes pot once sushma while . former drug user, addiction treatment at [facility]     ALLERGIES:  Allergies   Allergen Reactions    Zoloft [Sertraline Hcl] Other (See Comments)     Headaches, sweats, bad dreams     FAMILY HISTORY:  Family History   Problem Relation Age of Onset    Heart Disease Mother     High Blood Pressure Mother     Obesity Mother     Diabetes Mother     Heart Disease Father     Cancer Father         colon/prostate    Colon Cancer Father     Prostate Cancer Father     Arthritis Father     Diabetes Brother      CURRENT MEDICATIONS:  Current Outpatient Medications   Medication Sig Dispense Refill    albuterol sulfate (PROAIR RESPICLICK) 651 (90 Base) MCG/ACT aerosol powder inhalation Inhale 2 puffs into the lungs every 6 hours as needed for Wheezing or Shortness of Breath 1 each 3    FARXIGA 5 MG tablet TAKE 1 TABLET BY MOUTH EVERY MORNING 90 tablet 1    valsartan (DIOVAN) 80 MG tablet Take 1 tablet by mouth daily 90 tablet 1    dapagliflozin (FARXIGA) 10 MG tablet Take 1 tablet by mouth every morning 30 tablet 5    tamsulosin (FLOMAX) 0.4 mg capsule TAKE 1 CAPSULE BY MOUTH DAILY TO FACILITATE PASSAGE OF URETERAL STONE 90 capsule 3    allopurinol (ZYLOPRIM) 300 MG tablet TAKE 1 TABLET BY MOUTH ONCE DAILY 30 tablet 1    omeprazole (PRILOSEC) 20 MG delayed release capsule TAKE ONE (1) CAPSULE BY MOUTH ONCE DAILY 30 capsule 1    metFORMIN (GLUCOPHAGE) 500 MG tablet TAKE TWO (2) TABLETS BY MOUTH TWICE DAILY WITH MEALS 120 tablet 2    pramipexole (MIRAPEX) 0.125 MG tablet TAKE 1 TABLET BY MOUTH DAILY IN THE EVENING 30 tablet 2    buPROPion (WELLBUTRIN XL) 300 MG extended release tablet TAKE 1 TABLET BY MOUTH EVERY MORNING 30 tablet 2    ticagrelor (BRILINTA) 90 MG TABS tablet TAKE ONE (1) TABLET BY MOUTH TWICE DAILY 60 tablet 2    isosorbide mononitrate (IMDUR) 30 MG extended release tablet TAKE 1 TABLET BY MOUTH EVERY DAY 30 tablet 2    fluticasone (FLONASE) 50 MCG/ACT nasal spray INSTILL TWO (2) SPRAYS IN EACH NOSTRIL ONCE DAILY AS NEEDED FOR RHINITIS OR ALLERGIES 16 g 5    atorvastatin (LIPITOR) 40 MG tablet Take 1 tablet by mouth daily 90 tablet 0    losartan (COZAAR) 25 MG tablet TAKE 1 TABLET BY MOUTH EVERY DAY 90 tablet 1    metoprolol succinate (TOPROL XL) 25 MG extended release tablet TAKE 2 TABLETS BY MOUTH ONCE DAILY 180 tablet 1    traZODone (DESYREL) 100 MG tablet TAKE 1 TABLET BY MOUTH EACH EVENING AS NEEDED FOR SLEEP 90 tablet 1    bumetanide (BUMEX) 2 MG tablet TAKE 1 TABLET BY MOUTH EVERY MORNING ~301Q.5 TAKE 1/2 TABLET BY MOUTH EVERY EVENING (Patient taking differently: Take 2 mg by mouth 2 times daily ) 45 tablet 5    potassium chloride (KLOR-CON M) 20 MEQ extended release tablet Take 1 tablet by mouth daily 90 tablet 3    guaiFENesin (MUCINEX) 600 MG extended release tablet Take 1,200 mg by mouth 2 times daily      umeclidinium-vilanterol (ANORO ELLIPTA) 62.5-25 MCG/INH AEPB inhaler INHALE 1 PUFF INTO THE LUNGS ONE TIME DAILY 1 each 11    fluticasone (ARNUITY ELLIPTA) 100 MCG/ACT AEPB Inhale 100 mcg into the lungs daily Inhale 1 puff into the lungs daily 30 each 11    blood glucose test strips (PRODIGY NO CODING BLOOD GLUC) strip USE TO TEST BLOOD GLUCOSE ONCE DAILY. 100 each 0    nitroGLYCERIN (NITROSTAT) 0.4 MG SL tablet Place 1 tablet under the tongue every 5 minutes as needed for Chest pain 25 tablet 3    Blood Glucose Monitoring Suppl (PRODIGY AUTOCODE BLOOD GLUCOSE) w/Device KIT        No current facility-administered medications for this visit. ROS   Review of Systems   Constitutional: Negative. Negative for chills, fever and unexpected weight change. HENT: Negative. Eyes: Negative. Respiratory: Positive for shortness of breath. Negative for chest tightness, wheezing and stridor. Cardiovascular: Negative for chest pain and leg swelling. Gastrointestinal: Negative. Negative for diarrhea, nausea and vomiting. Endocrine: Negative. Genitourinary: Negative. Negative for dysuria. Musculoskeletal: Negative. Skin: Negative. Allergic/Immunologic: Negative. Neurological: Negative. Hematological: Negative. Psychiatric/Behavioral: Negative. Physical exam   /76 (Site: Left Upper Arm, Position: Sitting, Cuff Size: Medium Adult)   Pulse 76   Temp 98.1 °F (36.7 °C) (Oral)   Ht 5' 11\" (1.803 m)   Wt 252 lb (114.3 kg)   SpO2 95%   BMI 35.15 kg/m²    Wt Readings from Last 3 Encounters:   06/30/22 252 lb (114.3 kg)   06/30/22 251 lb 9.6 oz (114.1 kg)   04/18/22 264 lb 6.4 oz (119.9 kg)       Physical Exam  Vitals and nursing note reviewed. Constitutional:       General: He is not in acute distress. Appearance: He is well-developed. He is obese. HENT:      Head: Normocephalic and atraumatic.    Neck:      Trachea: No tracheal deviation. Cardiovascular:      Rate and Rhythm: Normal rate and regular rhythm. Heart sounds: Normal heart sounds. No murmur heard. Pulmonary:      Effort: Pulmonary effort is normal. No respiratory distress. Breath sounds: Normal breath sounds. No stridor. No wheezing or rales. Chest:      Chest wall: No tenderness. Abdominal:      General: Bowel sounds are normal. There is no distension. Palpations: Abdomen is soft. Skin:     General: Skin is warm and dry. Capillary Refill: Capillary refill takes less than 2 seconds. Neurological:      Mental Status: He is alert and oriented to person, place, and time. Psychiatric:         Behavior: Behavior normal.         Thought Content: Thought content normal.         Judgment: Judgment normal.          results   Lung Nodule Screening     [x] Qualifies    [] Does not qualify   [] Declined    [] Completed  The USPSTF recommends annual screening for lung cancer with low-dose computed tomography (LDCT) in adults aged 48 to [de-identified] years who have a 30 pack-year smoking history and currently smoke or have quit within the past 20 years. Screening should be discontinued once a person has not smoked for 20 years or develops a health problem that substantially limits life expectancy or the ability or willingness to have curative lung surgery. 6/23/2022   NONCONTRAST SCREENING CT CHEST:   FINDINGS: Heart/mediastinum: The thyroid gland is unremarkable. The heart size is normal. Coronary artery calcifications are observed. No pericardial effusion is identified. The aorta is not dilated. No mediastinal, hilar, or axillary lymphadenopathy is observed. Lungs: Centrilobular disease is present. No focal consolidation, pleural effusion, or pneumothorax is observed. Elevation of the left hemidiaphragm and left lower atelectasis is unchanged. No pulmonary mass or nodule is identified. The central airways are patent and unremarkable bilaterally.    Upper abdomen: No acute findings are noted in the limited images through the upper abdomen. Musculoskeletal: The visualized skeletal structures appear intact. 1. No suspicious pulmonary mass or nodule. No acute intrathoracic process. 2. Centrilobular emphysema and chronic findings are observed. 3. LUNGRADS ASSESSMENT VALUE: 1   LUNG RADS: Category 1   RECOMMENDATION: Annual low-dose noncontrast CT thorax for lung cancer screening.     6/29/21  PFT- Severe obstruction with air trapping. Decreased DLCO     Assessment      Diagnosis Orders   1. Stage 3 severe COPD by GOLD classification ([facility])  albuterol sulfate (PROAIR RESPICLICK) 824 (90 Base) MCG/ACT aerosol powder inhalation    Full PFT Study With Bronchodilator   2. Personal history of tobacco use  AL VISIT TO DISCUSS LUNG CA SCREEN W LDCT    CT LUNG SCREENING    Full PFT Study With Bronchodilator   3. Obesity (BMI 30-39. 9)           Plan   -LDCT reviewed with patient no suspicious findings continue annual screening   -PFT in one year   -Continue current inhaler therapy  -Albuterol RX printed and given to patient receiving too many in the mail   -Advised to maintain pneumonia vaccine with PCP and to take flu vaccine this coming season.  -Advised patient to call office with any changes, questions, or concerns regarding respiratory status  -Weight loss encouraged     Will see Juwan Bobby back in: 1 year w/MsGenaro Munoz, KAUSHAL Dawn CNP  6/30/2022        Low Dose CT (LDCT) Lung Screening criteria met:     Age 50-77(Medicare) or 50-80 (USPSTF)   Pack year smoking >20   Still smoking or less than 15 year since quit   No sign or symptoms of lung cancer   > 11 months since last LDCT     Risks and benefits of lung cancer screening with LDCT scans discussed:    Significance of positive screen - False-positive LDCT results often occur. 95% of all positive results do not lead to a diagnosis of cancer.  Usually further imaging can resolve most false-positive results; however, some patients may require invasive procedures. Over diagnosis risk - 10% to 12% of screen-detected lung cancer cases are over diagnosed--that is, the cancer would not have been detected in the patient's lifetime without the screening. Need for follow up screens annually to continue lung cancer screening effectiveness     Risks associated with radiation from annual LDCT- Radiation exposure is about the same as for a mammogram, which is about 1/3 of the annual background radiation exposure from everyday life. Starting screening at age 54 is not likely to increase cancer risk from radiation exposure. Patients with comorbidities resulting in life expectancy of < 10 years, or that would preclude treatment of an abnormality identified on CT, should not be screened due to lack of benefit.     To obtain maximal benefit from this screening, smoking cessation and long-term abstinence from smoking is critical

## 2022-07-20 DIAGNOSIS — J44.9 STAGE 3 SEVERE COPD BY GOLD CLASSIFICATION (HCC): ICD-10-CM

## 2022-07-20 DIAGNOSIS — E11.65 UNCONTROLLED TYPE 2 DIABETES MELLITUS WITH HYPERGLYCEMIA (HCC): ICD-10-CM

## 2022-07-20 DIAGNOSIS — G25.81 RLS (RESTLESS LEGS SYNDROME): ICD-10-CM

## 2022-07-20 DIAGNOSIS — J43.2 CENTRILOBULAR EMPHYSEMA (HCC): ICD-10-CM

## 2022-07-20 DIAGNOSIS — F33.1 MAJOR DEPRESSIVE DISORDER, RECURRENT EPISODE, MODERATE (HCC): ICD-10-CM

## 2022-07-20 RX ORDER — UMECLIDINIUM BROMIDE AND VILANTEROL TRIFENATATE 62.5; 25 UG/1; UG/1
POWDER RESPIRATORY (INHALATION)
Qty: 60 EACH | Refills: 6 | Status: SHIPPED | OUTPATIENT
Start: 2022-07-20

## 2022-07-20 RX ORDER — OMEPRAZOLE 20 MG/1
CAPSULE, DELAYED RELEASE ORAL
Qty: 30 CAPSULE | Refills: 0 | Status: SHIPPED | OUTPATIENT
Start: 2022-07-20 | End: 2022-08-10 | Stop reason: SDUPTHER

## 2022-07-20 RX ORDER — ATORVASTATIN CALCIUM 40 MG/1
TABLET, FILM COATED ORAL
Qty: 90 TABLET | Refills: 0 | Status: SHIPPED | OUTPATIENT
Start: 2022-07-20 | End: 2022-08-02 | Stop reason: SDUPTHER

## 2022-07-20 RX ORDER — PRAMIPEXOLE DIHYDROCHLORIDE 0.12 MG/1
TABLET ORAL
Qty: 30 TABLET | Refills: 0 | Status: SHIPPED | OUTPATIENT
Start: 2022-07-20 | End: 2022-08-10 | Stop reason: SDUPTHER

## 2022-07-20 RX ORDER — ALLOPURINOL 300 MG/1
TABLET ORAL
Qty: 30 TABLET | Refills: 0 | Status: SHIPPED | OUTPATIENT
Start: 2022-07-20 | End: 2022-08-10 | Stop reason: SDUPTHER

## 2022-07-20 RX ORDER — ISOSORBIDE MONONITRATE 30 MG/1
TABLET, EXTENDED RELEASE ORAL
Qty: 30 TABLET | Refills: 0 | Status: SHIPPED | OUTPATIENT
Start: 2022-07-20 | End: 2022-08-10 | Stop reason: SDUPTHER

## 2022-07-20 RX ORDER — FLUTICASONE FUROATE 100 UG/1
POWDER RESPIRATORY (INHALATION)
Qty: 30 EACH | Refills: 11 | Status: SHIPPED | OUTPATIENT
Start: 2022-07-20

## 2022-07-20 RX ORDER — METOPROLOL SUCCINATE 25 MG/1
TABLET, EXTENDED RELEASE ORAL
Qty: 120 TABLET | Refills: 0 | Status: SHIPPED | OUTPATIENT
Start: 2022-07-20 | End: 2022-09-07

## 2022-07-20 RX ORDER — BUPROPION HYDROCHLORIDE 300 MG/1
TABLET ORAL
Qty: 30 TABLET | Refills: 0 | Status: SHIPPED | OUTPATIENT
Start: 2022-07-20 | End: 2022-08-10 | Stop reason: SDUPTHER

## 2022-07-20 NOTE — TELEPHONE ENCOUNTER
The pharmacy is requesting a refill of the below medication which has been pended for you:     Requested Prescriptions     Pending Prescriptions Disp Refills    atorvastatin (LIPITOR) 40 MG tablet [Pharmacy Med Name: ATORVASTATIN 40MG TAB 40 Tablet] 90 tablet 1     Sig: TAKE 1 TABLET BY MOUTH EVERY DAY    metoprolol succinate (TOPROL XL) 25 MG extended release tablet [Pharmacy Med Name: METOPROLOL SUC ER 25MG TAB 25 Tablet] 120 tablet 1     Sig: TAKE TWO (2) TABLETS BY MOUTH ONCE DAILY       Last Appointment Date: 4/18/2022  Next Appointment Date: 7/29/2022    Allergies   Allergen Reactions    Zoloft [Sertraline Hcl] Other (See Comments)     Headaches, sweats, bad dreams

## 2022-07-20 NOTE — TELEPHONE ENCOUNTER
Received refill request for Arnuity, Anoro. Medication was last ordered by [place]. Medication was last ordered on 8/6/21 with 11 refills. Patient was last seen in the office 6/30/22. Patient has a scheduled follow up 7/6/23. Medication needs to be sent to [facility].

## 2022-07-20 NOTE — TELEPHONE ENCOUNTER
Date of last visit:  4/18/2022  Date of next visit:  7/29/2022    Requested Prescriptions     Pending Prescriptions Disp Refills    pramipexole (MIRAPEX) 0.125 MG tablet [Pharmacy Med Name: PRAMIPEXOLE 0.125 MG TABLET 0.125 Tablet] 30 tablet 0     Sig: TAKE 1 TABLET BY MOUTH EACH EVENING    metFORMIN (GLUCOPHAGE) 500 MG tablet [Pharmacy Med Name: METFORMIN 500 MG TABS 500 Tablet] 120 tablet 0     Sig: TAKE TWO (2) TABLETS BY MOUTH TWICE DAILY WITH MEALS    isosorbide mononitrate (IMDUR) 30 MG extended release tablet [Pharmacy Med Name: ISOSORBIDE MN ER 30MG *MONO 30 Tablet] 30 tablet 0     Sig: TAKE 1 TABLET BY MOUTH EVERY DAY    buPROPion (WELLBUTRIN XL) 300 MG extended release tablet [Pharmacy Med Name: BUPROPION XL 300MG TAB  Tablet] 30 tablet 0     Sig: TAKE 1 TABLET BY MOUTH EVERY MORNING

## 2022-07-20 NOTE — TELEPHONE ENCOUNTER
Date of last visit:  4/18/2022  Date of next visit:  7/29/2022    Requested Prescriptions     Pending Prescriptions Disp Refills    ticagrelor (BRILINTA) 90 MG TABS tablet 60 tablet 0     Sig: TAKE ONE (1) TABLET BY MOUTH TWICE DAILY    omeprazole (PRILOSEC) 20 MG delayed release capsule [Pharmacy Med Name: OMEPRAZOLE DR 20MG CAP 20 Capsule] 30 capsule 0     Sig: TAKE 1 CAPSULE BY MOUTH ONCE DAILY    allopurinol (ZYLOPRIM) 300 MG tablet [Pharmacy Med Name: ALLOPURINOL 300 MG TABS 300 Tablet] 30 tablet 0     Sig: TAKE 1 TABLET BY MOUTH ONCE DAILY

## 2022-07-21 NOTE — TELEPHONE ENCOUNTER
It looks like he had the Rx sent to [facility] 6/20/2022. Does he need more now?

## 2022-07-21 NOTE — TELEPHONE ENCOUNTER
The pharmacy is requesting a refill of the below medication which has been pended for you:     Requested Prescriptions     Pending Prescriptions Disp Refills    albuterol sulfate HFA (PROVENTIL;VENTOLIN;PROAIR) 108 (90 Base) MCG/ACT inhaler [Pharmacy Med Name: ALBUTEROL HFA*VENT* 90MCG 108 (90 BAS Aerosol] 18 g 0     Sig: INHALE TWO (2) PUFFS BY MOUTH EVERY 6 HOURS AS NEEDED FOR WHEEZING       Last Appointment Date: Visit date not found  Next Appointment Date: Visit date not found    Allergies   Allergen Reactions    Zoloft [Sertraline Hcl] Other (See Comments)     Headaches, sweats, bad dreams

## 2022-07-22 RX ORDER — ALBUTEROL SULFATE 90 UG/1
AEROSOL, METERED RESPIRATORY (INHALATION)
Qty: 18 G | Refills: 0 | OUTPATIENT
Start: 2022-07-22

## 2022-07-22 RX ORDER — ALBUTEROL SULFATE 90 UG/1
AEROSOL, METERED RESPIRATORY (INHALATION)
Qty: 18 G | Refills: 0 | Status: SHIPPED | OUTPATIENT
Start: 2022-07-22 | End: 2022-08-10 | Stop reason: SDUPTHER

## 2022-07-22 NOTE — TELEPHONE ENCOUNTER
Date of last visit:  Visit date not found  Date of next visit:  7/29/2022    Requested Prescriptions     Pending Prescriptions Disp Refills    albuterol sulfate HFA (PROVENTIL;VENTOLIN;PROAIR) 108 (90 Base) MCG/ACT inhaler [Pharmacy Med Name: ALBUTEROL HFA*VENT* 90MCG 108 (90 BAS Aerosol] 18 g 10     Sig: INHALE TWO (2) PUFFS BY MOUTH EVERY 6 HOURS AS NEEDED FOR WHEEZING

## 2022-08-02 DIAGNOSIS — E11.65 UNCONTROLLED TYPE 2 DIABETES MELLITUS WITH HYPERGLYCEMIA (HCC): ICD-10-CM

## 2022-08-02 RX ORDER — ATORVASTATIN CALCIUM 40 MG/1
TABLET, FILM COATED ORAL
Qty: 90 TABLET | Refills: 0 | Status: SHIPPED | OUTPATIENT
Start: 2022-08-02 | End: 2022-08-31 | Stop reason: SDUPTHER

## 2022-08-02 NOTE — TELEPHONE ENCOUNTER
The pharmacy is requesting a refill of the below medication which has been pended for you:     Requested Prescriptions     Pending Prescriptions Disp Refills    metFORMIN (GLUCOPHAGE) 500 MG tablet 360 tablet 1     Sig: TAKE TWO (2) TABLETS BY MOUTH TWICE DAILY WITH MEALS    atorvastatin (LIPITOR) 40 MG tablet 90 tablet 1     Sig: TAKE 1 TABLET BY MOUTH EVERY DAY       Last Appointment Date: 4/18/2022  Next Appointment Date: Visit date not found    Allergies   Allergen Reactions    Zoloft [Sertraline Hcl] Other (See Comments)     Headaches, sweats, bad dreams

## 2022-08-10 DIAGNOSIS — G25.81 RLS (RESTLESS LEGS SYNDROME): ICD-10-CM

## 2022-08-10 DIAGNOSIS — F33.1 MAJOR DEPRESSIVE DISORDER, RECURRENT EPISODE, MODERATE (HCC): ICD-10-CM

## 2022-08-10 DIAGNOSIS — E11.65 UNCONTROLLED TYPE 2 DIABETES MELLITUS WITH HYPERGLYCEMIA (HCC): ICD-10-CM

## 2022-08-10 RX ORDER — BUPROPION HYDROCHLORIDE 300 MG/1
TABLET ORAL
Qty: 90 TABLET | Refills: 1 | Status: SHIPPED | OUTPATIENT
Start: 2022-08-10

## 2022-08-10 RX ORDER — ALBUTEROL SULFATE 90 UG/1
AEROSOL, METERED RESPIRATORY (INHALATION)
Qty: 18 G | Refills: 1 | Status: SHIPPED | OUTPATIENT
Start: 2022-08-10 | End: 2022-10-06

## 2022-08-10 RX ORDER — BUMETANIDE 2 MG/1
2 TABLET ORAL 2 TIMES DAILY
Qty: 135 TABLET | Refills: 1 | Status: SHIPPED | OUTPATIENT
Start: 2022-08-10 | End: 2022-11-04

## 2022-08-10 RX ORDER — ALLOPURINOL 300 MG/1
TABLET ORAL
Qty: 90 TABLET | Refills: 1 | Status: SHIPPED | OUTPATIENT
Start: 2022-08-10

## 2022-08-10 RX ORDER — PRAMIPEXOLE DIHYDROCHLORIDE 0.12 MG/1
TABLET ORAL
Qty: 90 TABLET | Refills: 1 | Status: SHIPPED | OUTPATIENT
Start: 2022-08-10

## 2022-08-10 RX ORDER — ISOSORBIDE MONONITRATE 30 MG/1
TABLET, EXTENDED RELEASE ORAL
Qty: 90 TABLET | Refills: 1 | Status: SHIPPED | OUTPATIENT
Start: 2022-08-10

## 2022-08-10 RX ORDER — OMEPRAZOLE 20 MG/1
CAPSULE, DELAYED RELEASE ORAL
Qty: 90 CAPSULE | Refills: 1 | Status: SHIPPED | OUTPATIENT
Start: 2022-08-10

## 2022-08-10 NOTE — TELEPHONE ENCOUNTER
The pharmacy is requesting a refill of the below medication which has been pended for you:     Requested Prescriptions     Pending Prescriptions Disp Refills    albuterol sulfate HFA (PROVENTIL;VENTOLIN;PROAIR) 108 (90 Base) MCG/ACT inhaler 18 g 1     Sig: INHALE TWO (2) PUFFS BY MOUTH EVERY 6 HOURS AS NEEDED FOR WHEEZING    allopurinol (ZYLOPRIM) 300 MG tablet 90 tablet 1     Sig: TAKE 1 TABLET BY MOUTH ONCE DAILY    ticagrelor (BRILINTA) 90 MG TABS tablet 180 tablet 1     Sig: TAKE ONE (1) TABLET BY MOUTH TWICE DAILY    bumetanide (BUMEX) 2 MG tablet 135 tablet 1     Sig: Take 1 tablet by mouth in the morning and 1 tablet before bedtime.     buPROPion (WELLBUTRIN XL) 300 MG extended release tablet 90 tablet 1     Sig: TAKE 1 TABLET BY MOUTH EVERY MORNING    dapagliflozin (FARXIGA) 5 MG tablet 90 tablet 1     Sig: TAKE 1 TABLET BY MOUTH EVERY MORNING    isosorbide mononitrate (IMDUR) 30 MG extended release tablet 90 tablet 1     Sig: TAKE 1 TABLET BY MOUTH EVERY DAY    metFORMIN (GLUCOPHAGE) 500 MG tablet 360 tablet 1     Sig: TAKE TWO (2) TABLETS BY MOUTH TWICE DAILY WITH MEALS    omeprazole (PRILOSEC) 20 MG delayed release capsule 90 capsule 1     Sig: TAKE 1 CAPSULE BY MOUTH ONCE DAILY    pramipexole (MIRAPEX) 0.125 MG tablet 90 tablet 1     Sig: TAKE 1 TABLET BY MOUTH EACH EVENING       Last Appointment Date: 4/18/2022  Next Appointment Date: Visit date not found    Allergies   Allergen Reactions    Zoloft [Sertraline Hcl] Other (See Comments)     Headaches, sweats, bad dreams

## 2022-08-22 RX ORDER — DAPAGLIFLOZIN 10 MG/1
TABLET, FILM COATED ORAL
Qty: 30 TABLET | Refills: 10 | Status: SHIPPED | OUTPATIENT
Start: 2022-08-22 | End: 2022-08-26 | Stop reason: SDUPTHER

## 2022-08-22 NOTE — TELEPHONE ENCOUNTER
Date of last visit:  4/18/2022  Date of next visit:  Visit date not found    Requested Prescriptions     Pending Prescriptions Disp Refills    FARXIGA 10 MG tablet [Pharmacy Med Name: FARXIGA 10 MG TABLET 10 Tablet] 30 tablet 10     Sig: TAKE 1 TABLET BY MOUTH EVERY MORNING

## 2022-08-30 DIAGNOSIS — E11.65 UNCONTROLLED TYPE 2 DIABETES MELLITUS WITH HYPERGLYCEMIA (HCC): ICD-10-CM

## 2022-08-31 RX ORDER — ATORVASTATIN CALCIUM 40 MG/1
TABLET, FILM COATED ORAL
Qty: 90 TABLET | Refills: 1 | Status: SHIPPED | OUTPATIENT
Start: 2022-08-31

## 2022-08-31 RX ORDER — VALSARTAN 80 MG/1
80 TABLET ORAL DAILY
Qty: 90 TABLET | Refills: 1 | Status: SHIPPED | OUTPATIENT
Start: 2022-08-31

## 2022-09-07 RX ORDER — FLUTICASONE PROPIONATE 50 MCG
SPRAY, SUSPENSION (ML) NASAL
Qty: 16 G | Refills: 5 | Status: SHIPPED | OUTPATIENT
Start: 2022-09-07 | End: 2022-09-16

## 2022-09-07 RX ORDER — METOPROLOL SUCCINATE 25 MG/1
TABLET, EXTENDED RELEASE ORAL
Qty: 60 TABLET | Refills: 2 | Status: SHIPPED | OUTPATIENT
Start: 2022-09-07 | End: 2022-09-16

## 2022-09-07 NOTE — TELEPHONE ENCOUNTER
Date of last visit:  4-  Date of next visit:  Visit date not found    Requested Prescriptions     Pending Prescriptions Disp Refills    metoprolol succinate (TOPROL XL) 25 MG extended release tablet [Pharmacy Med Name: METOPROLOL SUC ER 25MG TAB 25 Tablet] 60 tablet 5     Sig: TAKE 2 TABLETS BY MOUTH ONCE DAILY

## 2022-09-07 NOTE — TELEPHONE ENCOUNTER
The pharmacy is requesting a refill of the below medication which has been pended for you:     Requested Prescriptions     Pending Prescriptions Disp Refills    fluticasone (FLONASE) 50 MCG/ACT nasal spray [Pharmacy Med Name: FLUTICASONE 50MCG NASAL 16 50 [place]] 16 g 10     Sig: INSTILL TWO (2) SPRAYS IN EACH NOSTRIL ONCE DAILY AS NEEDED FOR RHINITIS OR ALLERGIES       Last Appointment Date: 4/18/2022  Next Appointment Date: Visit date not found    Allergies   Allergen Reactions    Zoloft [Sertraline Hcl] Other (See Comments)     Headaches, sweats, bad dreams

## 2022-09-08 NOTE — TELEPHONE ENCOUNTER
The pharmacy is requesting a refill of the below medication which has been pended for you:     Requested Prescriptions     Pending Prescriptions Disp Refills    fluticasone (FLONASE) 50 MCG/ACT nasal spray [Pharmacy Med Name: FLUTICASONE 50MCG NASAL 16 50 [place]] 16 g 1     Sig: INSTILL TWO (2) SPRAYS IN EACH NOSTRIL ONCE DAILY AS NEEDED FOR RHINITIS OR ALLERGIES       Last Appointment Date: 4/18/2022  Next Appointment Date: Visit date not found    Allergies   Allergen Reactions    Zoloft [Sertraline Hcl] Other (See Comments)     Headaches, sweats, bad dreams

## 2022-09-08 NOTE — TELEPHONE ENCOUNTER
The pharmacy is requesting a refill of the below medication which has been pended for you:     Requested Prescriptions     Pending Prescriptions Disp Refills    metoprolol succinate (TOPROL XL) 25 MG extended release tablet [Pharmacy Med Name: METOPROLOL SUC ER 25MG TAB 25 Tablet] 180 tablet 1     Sig: TAKE 2 TABLETS BY MOUTH ONCE DAILY       Last Appointment Date: Visit date not found  Next Appointment Date: Visit date not found    Allergies   Allergen Reactions    Zoloft [Sertraline Hcl] Other (See Comments)     Headaches, sweats, bad dreams

## 2022-09-09 RX ORDER — METOPROLOL SUCCINATE 25 MG/1
TABLET, EXTENDED RELEASE ORAL
Qty: 180 TABLET | Refills: 1 | OUTPATIENT
Start: 2022-09-09

## 2022-09-09 RX ORDER — FLUTICASONE PROPIONATE 50 MCG
SPRAY, SUSPENSION (ML) NASAL
Qty: 16 G | Refills: 1 | OUTPATIENT
Start: 2022-09-09

## 2022-09-16 RX ORDER — METOPROLOL SUCCINATE 25 MG/1
TABLET, EXTENDED RELEASE ORAL
Qty: 60 TABLET | Refills: 0 | Status: SHIPPED | OUTPATIENT
Start: 2022-09-16 | End: 2022-09-19

## 2022-09-16 RX ORDER — FLUTICASONE PROPIONATE 50 MCG
SPRAY, SUSPENSION (ML) NASAL
Qty: 16 G | Refills: 3 | Status: SHIPPED | OUTPATIENT
Start: 2022-09-16 | End: 2022-09-19

## 2022-09-16 NOTE — TELEPHONE ENCOUNTER
The pharmacy is requesting a refill of the below medication which has been pended for you:     Requested Prescriptions     Pending Prescriptions Disp Refills    metoprolol succinate (TOPROL XL) 25 MG extended release tablet [Pharmacy Med Name: METOPROLOL SUC ER 25MG TAB 25 Tablet] 60 tablet 10     Sig: TAKE 2 TABLETS BY MOUTH ONCE DAILY       Last Appointment Date: Visit date not found  Next Appointment Date: Visit date not found    Allergies   Allergen Reactions    Zoloft [Sertraline Hcl] Other (See Comments)     Headaches, sweats, bad dreams

## 2022-09-19 RX ORDER — FLUTICASONE PROPIONATE 50 MCG
SPRAY, SUSPENSION (ML) NASAL
Qty: 16 G | Refills: 3 | Status: SHIPPED | OUTPATIENT
Start: 2022-09-19

## 2022-09-19 RX ORDER — METOPROLOL SUCCINATE 25 MG/1
TABLET, EXTENDED RELEASE ORAL
Qty: 60 TABLET | Refills: 1 | Status: SHIPPED | OUTPATIENT
Start: 2022-09-19

## 2022-09-19 NOTE — TELEPHONE ENCOUNTER
Date of last visit:  4/18/2022  Date of next visit:  Visit date not found    Requested Prescriptions     Pending Prescriptions Disp Refills    metoprolol succinate (TOPROL XL) 25 MG extended release tablet [Pharmacy Med Name: METOPROLOL SUC ER 25MG TAB 25 Tablet] 60 tablet 10     Sig: TAKE 2 TABLETS BY MOUTH ONCE DAILY

## 2022-09-19 NOTE — TELEPHONE ENCOUNTER
Date of last visit:  4/18/2022  Date of next visit:  Visit date not found    Requested Prescriptions     Pending Prescriptions Disp Refills    fluticasone (FLONASE) 50 MCG/ACT nasal spray [Pharmacy Med Name: FLUTICASONE 50MCG NASAL 16 50 [place]] 16 g 10     Sig: INSTILL TWO (2) SPRAYS IN EACH NOSTRIL ONCE DAILY AS NEEDED FOR RHINITIS OR ALLERGIES

## 2022-09-26 ENCOUNTER — OFFICE VISIT (OUTPATIENT)
Dept: UROLOGY | Age: 62
End: 2022-09-26
Payer: MEDICARE

## 2022-09-26 VITALS
DIASTOLIC BLOOD PRESSURE: 54 MMHG | HEIGHT: 71 IN | BODY MASS INDEX: 35.77 KG/M2 | SYSTOLIC BLOOD PRESSURE: 118 MMHG | WEIGHT: 255.5 LBS

## 2022-09-26 DIAGNOSIS — R33.8 BENIGN PROSTATIC HYPERPLASIA WITH URINARY RETENTION: Primary | ICD-10-CM

## 2022-09-26 DIAGNOSIS — R31.0 GROSS HEMATURIA: ICD-10-CM

## 2022-09-26 DIAGNOSIS — N32.89 BLADDER MASS: ICD-10-CM

## 2022-09-26 DIAGNOSIS — N40.1 BENIGN PROSTATIC HYPERPLASIA WITH URINARY RETENTION: Primary | ICD-10-CM

## 2022-09-26 DIAGNOSIS — R97.20 ELEVATED PSA: ICD-10-CM

## 2022-09-26 PROCEDURE — 99214 OFFICE O/P EST MOD 30 MIN: CPT | Performed by: UROLOGY

## 2022-09-26 NOTE — PROGRESS NOTES
Dr. Yasmin Storey MD MD  [facility] Consultation / New Patient Visit    Patient:  Benita Stover  YOB: 1960  Date: 9/26/2022  Consult requested from Ximena Benítez MD     HISTORY OF PRESENT ILLNESS:   The patient is a 57 y.o. male who presents today for follow-up for the following problem(s):  Bladder mass, hematuria. Overall the problem(s) : are worsening. Associated Symptoms: No dysuria, gross hematuria. Pain Severity:      Today visit:  9/26/22   Follows with us for gross hematuria s/p negative work up, resolved  BPH with LUTs - is controlled on Flomax. Offered flomax and would like to monitor for now. 6/14/21   Has history of gross hematuria with clots, and was found to have a possible bladder mass on CT scan from ER visit. He has unfortunately developed acute urinary retention and has catheter in place. BPH with LUTs - is found to be in retention. Summary of old records:   (Patient's old records, notes and chart reviewed and summarized above.)    Last several PSA's:  Lab Results   Component Value Date    PSA 3.08 (H) 02/12/2021    PSA 2.24 (H) 06/05/2019    PSA 1.75 02/20/2017       Last total testosterone:  No results found for: TESTOSTERONE    Urinalysis today:  No results found for this visit on 09/26/22.         Last BUN and creatinine:  Lab Results   Component Value Date    BUN 15 02/15/2022     Lab Results   Component Value Date    CREATININE 0.9 02/15/2022       Imaging Reviewed during this Office Visit:   (results were independently reviewed by physician and radiology report verified)    PAST MEDICAL, FAMILY AND SOCIAL HISTORY:  Past Medical History:   Diagnosis Date    CHF (congestive heart failure) ([address]     COPD (chronic obstructive pulmonary disease) ([address]     Coronary artery disease involving native coronary artery of native heart without angina pectoris     Depression     Diabetes mellitus type 2, diet-controlled ([address] 6/1/2018    GERD (DIOVAN) 80 MG tablet Take 1 tablet by mouth daily 90 tablet 1    metFORMIN (GLUCOPHAGE) 500 MG tablet TAKE TWO (2) TABLETS BY MOUTH TWICE DAILY WITH MEALS 360 tablet 1    atorvastatin (LIPITOR) 40 MG tablet TAKE 1 TABLET BY MOUTH EVERY DAY 90 tablet 1    albuterol sulfate HFA (PROVENTIL;VENTOLIN;PROAIR) 108 (90 Base) MCG/ACT inhaler INHALE TWO (2) PUFFS BY MOUTH EVERY 6 HOURS AS NEEDED FOR WHEEZING 18 g 1    allopurinol (ZYLOPRIM) 300 MG tablet TAKE 1 TABLET BY MOUTH ONCE DAILY 90 tablet 1    ticagrelor (BRILINTA) 90 MG TABS tablet TAKE ONE (1) TABLET BY MOUTH TWICE DAILY 180 tablet 0    bumetanide (BUMEX) 2 MG tablet Take 1 tablet by mouth in the morning and 1 tablet before bedtime.  135 tablet 1    buPROPion (WELLBUTRIN XL) 300 MG extended release tablet TAKE 1 TABLET BY MOUTH EVERY MORNING 90 tablet 1    isosorbide mononitrate (IMDUR) 30 MG extended release tablet TAKE 1 TABLET BY MOUTH EVERY DAY 90 tablet 1    omeprazole (PRILOSEC) 20 MG delayed release capsule TAKE 1 CAPSULE BY MOUTH ONCE DAILY 90 capsule 1    pramipexole (MIRAPEX) 0.125 MG tablet TAKE 1 TABLET BY MOUTH EACH EVENING 90 tablet 1    ARNUITY ELLIPTA 100 MCG/ACT AEPB INHALE 1 PUFF INTO THE LUNGS DAILY 30 each 11    umeclidinium-vilanterol (ANORO ELLIPTA) 62.5-25 MCG/INH AEPB inhaler INHALE 1 PUFF INTO THE LUNGS BY MOUTH ONCE DAILY 60 each 6    albuterol sulfate (PROAIR RESPICLICK) 965 (90 Base) MCG/ACT aerosol powder inhalation Inhale 2 puffs into the lungs every 6 hours as needed for Wheezing or Shortness of Breath 1 each 3    tamsulosin (FLOMAX) 0.4 mg capsule TAKE 1 CAPSULE BY MOUTH DAILY TO FACILITATE PASSAGE OF URETERAL STONE 90 capsule 3    losartan (COZAAR) 25 MG tablet TAKE 1 TABLET BY MOUTH EVERY DAY 90 tablet 1    traZODone (DESYREL) 100 MG tablet TAKE 1 TABLET BY MOUTH EACH EVENING AS NEEDED FOR SLEEP 90 tablet 1    guaiFENesin (MUCINEX) 600 MG extended release tablet Take 1,200 mg by mouth 2 times daily      blood glucose test strips

## 2022-10-06 RX ORDER — ALBUTEROL SULFATE 90 UG/1
AEROSOL, METERED RESPIRATORY (INHALATION)
Qty: 18 G | Refills: 1 | Status: SHIPPED | OUTPATIENT
Start: 2022-10-06 | End: 2022-12-06

## 2022-10-06 NOTE — TELEPHONE ENCOUNTER
The pharmacy is requesting a refill of the below medication which has been pended for you:     Requested Prescriptions     Pending Prescriptions Disp Refills    albuterol sulfate HFA (PROVENTIL;VENTOLIN;PROAIR) 108 (90 Base) MCG/ACT inhaler [Pharmacy Med Name: ALBUTEROL HFA*VENT* 90MCG 108 (90 BAS Aerosol] 18 g 3     Sig: INHALE TWO (2) PUFFS BY MOUTH EVERY 6 HOURS AS NEEDED FOR WHEEZING       Last Appointment Date: 4/18/2022  Next Appointment Date: Visit date not found    Allergies   Allergen Reactions    Zoloft [Sertraline Hcl] Other (See Comments)     Headaches, sweats, bad dreams

## 2022-10-29 NOTE — TELEPHONE ENCOUNTER
The pharmacy is requesting a refill of the below medication which has been pended for you:     Requested Prescriptions     Pending Prescriptions Disp Refills    dapagliflozin (FARXIGA) 10 MG tablet 90 tablet 1     Sig: TAKE 1 TABLET BY MOUTH EVERY MORNING    valsartan (DIOVAN) 80 MG tablet 90 tablet 1     Sig: Take 1 tablet by mouth daily    metFORMIN (GLUCOPHAGE) 500 MG tablet 360 tablet 1     Sig: TAKE TWO (2) TABLETS BY MOUTH TWICE DAILY WITH MEALS    atorvastatin (LIPITOR) 40 MG tablet 90 tablet 1     Sig: TAKE 1 TABLET BY MOUTH EVERY DAY       Last Appointment Date: 4/18/2022  Next Appointment Date: Visit date not found    Allergies   Allergen Reactions    Zoloft [Sertraline Hcl] Other (See Comments)     Headaches, sweats, bad dreams
dharmesh lower ext wekness

## 2022-11-04 RX ORDER — BUMETANIDE 2 MG/1
TABLET ORAL
Qty: 60 TABLET | Refills: 1 | Status: SHIPPED | OUTPATIENT
Start: 2022-11-04 | End: 2023-01-06

## 2022-11-04 NOTE — TELEPHONE ENCOUNTER
Date of last visit:  4/18/2022  Date of next visit:  Visit date not found    Requested Prescriptions     Pending Prescriptions Disp Refills    bumetanide (BUMEX) 2 MG tablet [Pharmacy Med Name: BUMETANIDE 2 MG TABS 2 Tablet] 60 tablet 10     Sig: TAKE 1 TABLET BY MOUTH IN THE MORNING AND TAKE 1 TABLET BEFORE BEDTIME

## 2022-11-07 NOTE — TELEPHONE ENCOUNTER
Date of last visit:  4/18/2022  Date of next visit:  Visit date not found    Requested Prescriptions     Pending Prescriptions Disp Refills    ticagrelor (BRILINTA) 90 MG TABS tablet [Pharmacy Med Name: BRILINTA 90 MG TABS 90 Tablet] 60 tablet 10     Sig: TAKE 1 TABLET BY MOUTH TWICE DAILY

## 2022-12-06 ENCOUNTER — TELEPHONE (OUTPATIENT)
Dept: FAMILY MEDICINE CLINIC | Age: 62
End: 2022-12-06

## 2022-12-06 ENCOUNTER — TELEMEDICINE (OUTPATIENT)
Dept: FAMILY MEDICINE CLINIC | Age: 62
End: 2022-12-06

## 2022-12-06 DIAGNOSIS — R19.7 DIARRHEA, UNSPECIFIED TYPE: ICD-10-CM

## 2022-12-06 DIAGNOSIS — J06.9 UPPER RESPIRATORY TRACT INFECTION, UNSPECIFIED TYPE: Primary | ICD-10-CM

## 2022-12-06 PROCEDURE — 99442 PR PHYS/QHP TELEPHONE EVALUATION 11-20 MIN: CPT | Performed by: EMERGENCY MEDICINE

## 2022-12-06 RX ORDER — ALBUTEROL SULFATE 90 UG/1
AEROSOL, METERED RESPIRATORY (INHALATION)
Qty: 18 G | Refills: 2 | Status: SHIPPED | OUTPATIENT
Start: 2022-12-06

## 2022-12-06 SDOH — ECONOMIC STABILITY: FOOD INSECURITY: WITHIN THE PAST 12 MONTHS, THE FOOD YOU BOUGHT JUST DIDN'T LAST AND YOU DIDN'T HAVE MONEY TO GET MORE.: NEVER TRUE

## 2022-12-06 SDOH — ECONOMIC STABILITY: FOOD INSECURITY: WITHIN THE PAST 12 MONTHS, YOU WORRIED THAT YOUR FOOD WOULD RUN OUT BEFORE YOU GOT MONEY TO BUY MORE.: NEVER TRUE

## 2022-12-06 ASSESSMENT — ENCOUNTER SYMPTOMS
DIARRHEA: 1
CONSTIPATION: 0
SHORTNESS OF BREATH: 0
SINUS PRESSURE: 0
RHINORRHEA: 1
COUGH: 1
SORE THROAT: 0
VOMITING: 0
BACK PAIN: 0
TROUBLE SWALLOWING: 0
VOICE CHANGE: 0
NAUSEA: 1
WHEEZING: 0
CHEST TIGHTNESS: 0
ABDOMINAL PAIN: 0

## 2022-12-06 ASSESSMENT — SOCIAL DETERMINANTS OF HEALTH (SDOH): HOW HARD IS IT FOR YOU TO PAY FOR THE VERY BASICS LIKE FOOD, HOUSING, MEDICAL CARE, AND HEATING?: SOMEWHAT HARD

## 2022-12-06 NOTE — TELEPHONE ENCOUNTER
Date of last visit:  4/18/2022  Date of next visit:  Visit date not found    Requested Prescriptions     Pending Prescriptions Disp Refills    albuterol sulfate HFA (PROVENTIL;VENTOLIN;PROAIR) 108 (90 Base) MCG/ACT inhaler [Pharmacy Med Name: ALBUTEROL HFA*VENT* 90MCG 108 (90 BAS Aerosol] 18 g 2     Sig: INHALE TWO (2) PUFFS BY MOUTH EVERY 6 HOURS AS NEEDED FOR WHEEZING

## 2022-12-06 NOTE — PROGRESS NOTES
Telemedicine visit :  Date: 12/6/2022     Patient verified Video Chat was not encrypted, and agrees to the Video Exam in presence of nurse. Cali Agosto is a 57 y.o.male who presents today  Chief Complaint   Patient presents with    Diarrhea    Pharyngitis    Fever    Generalized Body Aches           Nausea         HPI:     HPI    Patient was seen via telemedicine, face time with his/her phone. 2 weeks sick having explosive diarrhea . Exposed 6-7 days ago with + Covid his sister, had cough and cold achyness. Diarrhea 3-6 times a day inspite of OTC antidiarrheal medications.      Patient had not test his Covid      CurrentHome Medications were updated and reviewed by this provider  Current Outpatient Medications   Medication Sig Dispense Refill    albuterol sulfate HFA (PROVENTIL;VENTOLIN;PROAIR) 108 (90 Base) MCG/ACT inhaler INHALE TWO (2) PUFFS BY MOUTH EVERY 6 HOURS AS NEEDED FOR WHEEZING 18 g 2    ticagrelor (BRILINTA) 90 MG TABS tablet TAKE 1 TABLET BY MOUTH TWICE DAILY 180 tablet 0    bumetanide (BUMEX) 2 MG tablet TAKE 1 TABLET BY MOUTH IN THE MORNING AND TAKE 1 TABLET BEFORE BEDTIME 60 tablet 1    fluticasone (FLONASE) 50 MCG/ACT nasal spray INSTILL TWO (2) SPRAYS IN EACH NOSTRIL ONCE DAILY AS NEEDED FOR RHINITIS OR ALLERGIES 16 g 3    metoprolol succinate (TOPROL XL) 25 MG extended release tablet TAKE 2 TABLETS BY MOUTH ONCE DAILY 60 tablet 1    dapagliflozin (FARXIGA) 10 MG tablet TAKE 1 TABLET BY MOUTH EVERY MORNING 90 tablet 0    valsartan (DIOVAN) 80 MG tablet Take 1 tablet by mouth daily 90 tablet 1    metFORMIN (GLUCOPHAGE) 500 MG tablet TAKE TWO (2) TABLETS BY MOUTH TWICE DAILY WITH MEALS 360 tablet 1    atorvastatin (LIPITOR) 40 MG tablet TAKE 1 TABLET BY MOUTH EVERY DAY 90 tablet 1    allopurinol (ZYLOPRIM) 300 MG tablet TAKE 1 TABLET BY MOUTH ONCE DAILY 90 tablet 1    buPROPion (WELLBUTRIN XL) 300 MG extended release tablet TAKE 1 TABLET BY MOUTH EVERY MORNING 90 tablet 1    isosorbide mononitrate (IMDUR) 30 MG extended release tablet TAKE 1 TABLET BY MOUTH EVERY DAY 90 tablet 1    omeprazole (PRILOSEC) 20 MG delayed release capsule TAKE 1 CAPSULE BY MOUTH ONCE DAILY 90 capsule 1    pramipexole (MIRAPEX) 0.125 MG tablet TAKE 1 TABLET BY MOUTH EACH EVENING 90 tablet 1    ARNUITY ELLIPTA 100 MCG/ACT AEPB INHALE 1 PUFF INTO THE LUNGS DAILY 30 each 11    umeclidinium-vilanterol (ANORO ELLIPTA) 62.5-25 MCG/INH AEPB inhaler INHALE 1 PUFF INTO THE LUNGS BY MOUTH ONCE DAILY 60 each 6    albuterol sulfate (PROAIR RESPICLICK) 099 (90 Base) MCG/ACT aerosol powder inhalation Inhale 2 puffs into the lungs every 6 hours as needed for Wheezing or Shortness of Breath 1 each 3    tamsulosin (FLOMAX) 0.4 mg capsule TAKE 1 CAPSULE BY MOUTH DAILY TO FACILITATE PASSAGE OF URETERAL STONE 90 capsule 3    losartan (COZAAR) 25 MG tablet TAKE 1 TABLET BY MOUTH EVERY DAY 90 tablet 1    traZODone (DESYREL) 100 MG tablet TAKE 1 TABLET BY MOUTH EACH EVENING AS NEEDED FOR SLEEP 90 tablet 1    potassium chloride (KLOR-CON M) 20 MEQ extended release tablet Take 1 tablet by mouth daily 90 tablet 3    guaiFENesin (MUCINEX) 600 MG extended release tablet Take 1,200 mg by mouth 2 times daily      blood glucose test strips (PRODIGY NO CODING BLOOD GLUC) strip USE TO TEST BLOOD GLUCOSE ONCE DAILY. 100 each 0    nitroGLYCERIN (NITROSTAT) 0.4 MG SL tablet Place 1 tablet under the tongue every 5 minutes as needed for Chest pain 25 tablet 3    Blood Glucose Monitoring Suppl (PRODIGY AUTOCODE BLOOD GLUCOSE) w/Device KIT        No current facility-administered medications for this visit. Subjective:      Review of Systems   Constitutional:  Negative for appetite change, chills, diaphoresis, fatigue and fever. HENT:  Positive for congestion and rhinorrhea. Negative for ear discharge, ear pain, postnasal drip, sinus pressure, sore throat, trouble swallowing and voice change. Respiratory:  Positive for cough.  Negative for chest tightness, shortness of breath and wheezing. Cardiovascular:  Negative for chest pain, palpitations and leg swelling. Gastrointestinal:  Positive for diarrhea and nausea. Negative for abdominal pain, constipation and vomiting. Musculoskeletal:  Negative for arthralgias, back pain, joint swelling, myalgias, neck pain and neck stiffness. Skin:  Negative for rash. Neurological:  Negative for dizziness, syncope, weakness, light-headedness, numbness and headaches. Objective: There were no vitals taken for this visit. BP Readings from Last 3 Encounters:   09/26/22 (!) 118/54   06/30/22 136/76   06/30/22 138/82     Wt Readings from Last 3 Encounters:   09/26/22 255 lb 8 oz (115.9 kg)   06/30/22 252 lb (114.3 kg)   06/30/22 251 lb 9.6 oz (114.1 kg)       Physical Exam    Physical Examination:  not done, this is a telemedicine  Patient is looking alert, active, cooperative , no difficulty of breathing    Assessment:         Diagnosis Orders   1. Upper respiratory tract infection, unspecified type  POC COVID-19, FLU A/B, RSV BY PCR      2. Diarrhea, unspecified type  GI Bacterial Pathogens By PCR          :      Medications Prescribed:  No orders of the defined types were placed in this encounter. Orders Placed:  Orders Placed This Encounter   Procedures    GI Bacterial Pathogens By PCR     Standing Status:   Future     Standing Expiration Date:   12/6/2023    POC COVID-19, FLU A/B, RSV BY PCR       Telemedicine time : 15 minutes    Discussed about COVID 19 , viral upper respiratory infection, signs and symptoms, talk about they are high risks, hygiene, washing hands, use of mask, covering mouth when coughing, . Encouraged the patient to call the office is having more concerned. Advised to hydrate himself well, eat balance diet, over-the-counter vitamin C vitamin D, zinc and melatonin over-the-counter    May take OTC Imodium    Call or No follow-ups on file.      Discussed use, benefit, and side effects of prescribedmedications. Current home medications were updated and reviewed with the patient. All patient questions answered. Pt voiced understanding. Instructedto continue current medications, diet and exercise. Patient agreed with treatmentplan. Patricia Aguila was evaluated through a synchronous (real-time) audio-video encounter. The patient (or guardian if applicable) is aware that this is a billable service. Verbal consent to proceed has been obtained within the past 12 months. The visit was conducted pursuant to the emergency declaration under the [facility], [id number] waiver authority and the [employer] General Act. Patient identification was verified, and a caregiver was present when appropriate. The patient was located in a state where the provider was credentialed to provide care.

## 2022-12-07 ENCOUNTER — TELEPHONE (OUTPATIENT)
Dept: FAMILY MEDICINE CLINIC | Age: 62
End: 2022-12-07

## 2022-12-07 ENCOUNTER — HOSPITAL ENCOUNTER (OUTPATIENT)
Age: 62
Discharge: HOME | End: 2022-12-07
Payer: MEDICARE

## 2022-12-07 ENCOUNTER — HOSPITAL ENCOUNTER (OUTPATIENT)
Age: 62
Setting detail: SPECIMEN
Discharge: HOME | End: 2022-12-07

## 2022-12-07 DIAGNOSIS — R33.8 BENIGN PROSTATIC HYPERPLASIA WITH URINARY RETENTION: ICD-10-CM

## 2022-12-07 DIAGNOSIS — N40.1 BENIGN PROSTATIC HYPERPLASIA WITH URINARY RETENTION: ICD-10-CM

## 2022-12-07 LAB
INFLUENZA A: NOT DETECTED
INFLUENZA B: NOT DETECTED
SARS-COV-2 RNA, RT PCR: NOT DETECTED

## 2022-12-07 PROCEDURE — 87636 SARSCOV2 & INF A&B AMP PRB: CPT

## 2022-12-07 RX ORDER — TAMSULOSIN HYDROCHLORIDE 0.4 MG/1
CAPSULE ORAL
Qty: 90 CAPSULE | Refills: 3 | Status: SHIPPED | OUTPATIENT
Start: 2022-12-07

## 2022-12-07 NOTE — TELEPHONE ENCOUNTER
----- Message from Lilian Babcock MD sent at 12/7/2022  5:12 PM EST -----  Please let him know that his influenza and flu test are negative.

## 2022-12-07 NOTE — TELEPHONE ENCOUNTER
[facility] would like to have a 90 day supply for the patient.      I have pended the order     [facility] called requesting a refill on the following medications:  Requested Prescriptions     Pending Prescriptions Disp Refills    tamsulosin (FLOMAX) 0.4 MG capsule 90 capsule 3     Sig: TAKE 1 CAPSULE BY MOUTH DAILY TO FACILITATE PASSAGE OF URETERAL STONE     Pharmacy verified:  [email or web address]      Date of last visit:   Date of next visit (if applicable): 2/14/1790

## 2022-12-07 NOTE — TELEPHONE ENCOUNTER
Last seen by  [address] [place] [address] on 9/26/22. Continued on Flomax. Prescription sent!

## 2022-12-09 ENCOUNTER — NURSE ONLY (OUTPATIENT)
Dept: LAB | Age: 62
End: 2022-12-09

## 2022-12-09 DIAGNOSIS — R19.7 DIARRHEA, UNSPECIFIED TYPE: ICD-10-CM

## 2022-12-09 LAB
ADENOVIRUS F 40 41 PCR: NOT DETECTED
ASTROVIRUS PCR: NOT DETECTED
CAMPYLOBACTER PCR: NOT DETECTED
CLOSTRIDIUM DIFFICILE, PCR: NOT DETECTED
CRYPTOSPORIDIUM PCR: NOT DETECTED
CYCLOSPORA CAYETANENSIS PCR: NOT DETECTED
E COLI 0157 PCR: NORMAL
E COLI ENTEROAGGREGATIVE PCR: NOT DETECTED
E COLI ENTEROPATHOGENIC PCR: NOT DETECTED
E COLI ENTEROTOXIGENIC PCR: NOT DETECTED
E COLI SHIGA LIKE TOXIN PCR: NOT DETECTED
E COLI SHIGELLA/ENTEROINVASIVE PCR: NOT DETECTED
E HISTOLYTICA GI FILM ARRAY: NOT DETECTED
GIARDIA LAMBLIA PCR: NOT DETECTED
NOROVIRUS GI GII PCR: NOT DETECTED
PLESIOMONAS SHIGELLOIDES PCR: NOT DETECTED
ROTAVIRUS A PCR: NOT DETECTED
SALMONELLA PCR: NOT DETECTED
SAPOVIRUS PCR: NOT DETECTED
VIBRIO CHOLERAE PCR: NOT DETECTED
VIBRIO PCR: NOT DETECTED
YERSINIA ENTEROCOLITICA PCR: NOT DETECTED

## 2022-12-13 ENCOUNTER — TELEPHONE (OUTPATIENT)
Dept: FAMILY MEDICINE CLINIC | Age: 62
End: 2022-12-13

## 2022-12-13 NOTE — RESULT ENCOUNTER NOTE
Please call patient , his Cori Quarry exam is negative. If still had diarrhea he can take OTC Imodium.  Please call and check patient

## 2022-12-13 NOTE — TELEPHONE ENCOUNTER
----- Message from Iman Cabrera MD sent at 12/13/2022  8:32 AM EST -----  Please call patient , his Delio Mackria exam is negative. If still had diarrhea he can take OTC Imodium.  Please call and check patient

## 2022-12-19 NOTE — PROGRESS NOTES
Visit Date: 11/22/2017  Here with his wife. Raheem Muhammad is a 59 y.o. male who presents today for:  Chief Complaint   Patient presents with    Results     CT of abdomen  He had his right shoulder surgery last week and he had trouble sleeping I bed , he has been sleeping sitting, he had trouble with his breathing but is better now. Also his legs have been swollen. Wife gave him extra dose of Bumex and it worked. HPI:     HPI    has a current medication list which includes the following prescription(s): oxycodone-acetaminophen, potassium chloride, omeprazole, lisinopril, metoprolol succinate, atorvastatin, bumetanide, aspirin, and fluticasone.     No Known Allergies    Social History   Substance Use Topics    Smoking status: Former Smoker     Packs/day: 2.00     Years: 25.00     Types: Cigars     Quit date: 11/11/2012    Smokeless tobacco: Never Used    Alcohol use No      Comment: Quit       Past Medical History:   Diagnosis Date    Addiction, opium ([address]     Coronary artery disease involving native coronary artery of native heart without angina pectoris     GERD (gastroesophageal reflux disease) 3/21/2012    Hernia     Hyperglycemia 09/09    Hyperlipidemia     Hypertension     Osteoarthritis 11/07    S/P CABG x 2 07/06/2016    Thumb injury 08/2015    Right thumb cut with table saw, no treatment needed    Unspecified sleep apnea        Past Surgical History:   Procedure Laterality Date    CARDIAC CATHETERIZATION  06/27/2016    CATARACT REMOVAL Right 2013    COLONOSCOPY  04/08    CORONARY ARTERY BYPASS GRAFT  07/06/2016    Double bypass, Dr. Tigist Toscano Right 02/2007    Neck    EYE SURGERY Right     Retinal surgery x 3    HERNIA REPAIR Right     Inguinal    ROTATOR CUFF REPAIR Right 11/16/2017    TONSILLECTOMY  child    TOTAL KNEE ARTHROPLASTY Left 10/24/2016    Dr. Santa Milan       Family History   Problem Relation Age of Onset    Heart Disease Mother     Heart Disease Procedures    Comprehensive Metabolic Panel     Standing Status:   Future     Standing Expiration Date:   11/22/2018     Continue current medications. Return in about 3 months (around 2/22/2018), or if symptoms worsen or fail to improve. Discussed use, benefit, and side effects of prescribed medications. All patient questions answered. Pt voiced understanding. Instructed to continue current medications, diet and exercise. Patient agreed with treatment plan. Tazorac Counseling:  Patient advised that medication is irritating and drying.  Patient may need to apply sparingly and wash off after an hour before eventually leaving it on overnight.  The patient verbalized understanding of the proper use and possible adverse effects of tazorac.  All of the patient's questions and concerns were addressed.

## 2023-01-04 ENCOUNTER — TELEPHONE (OUTPATIENT)
Dept: FAMILY MEDICINE CLINIC | Age: 63
End: 2023-01-04

## 2023-01-04 RX ORDER — METOPROLOL SUCCINATE 25 MG/1
TABLET, EXTENDED RELEASE ORAL
Qty: 60 TABLET | Refills: 1 | Status: SHIPPED | OUTPATIENT
Start: 2023-01-04

## 2023-01-04 NOTE — TELEPHONE ENCOUNTER
The pharmacy is requesting a refill of the below medication which has been pended for you:     Requested Prescriptions     Pending Prescriptions Disp Refills    metoprolol succinate (TOPROL XL) 25 MG extended release tablet [Pharmacy Med Name: METOPROLOL SUCC ER 25MG TAB 25 Tablet] 120 tablet 1     Sig: TAKE TWO (2) TABLETS BY MOUTH ONCE DAILY       Last Appointment Date: 4/18/2022  Next Appointment Date: Visit date not found    Allergies   Allergen Reactions    Zoloft [Sertraline Hcl] Other (See Comments)     Headaches, sweats, bad dreams

## 2023-01-04 NOTE — TELEPHONE ENCOUNTER
Date of last visit:  4/18/2022  Date of next visit:  Visit date not found    Requested Prescriptions     Pending Prescriptions Disp Refills    bumetanide (BUMEX) 2 MG tablet [Pharmacy Med Name: BUMETANIDE 2 MG TABLET 2 Tablet] 60 tablet 10     Sig: TAKE 1 TABLET BY MOUTH DAILY IN THE MORNING AND TAKE 1 TABLET BEFORE BEDTIME

## 2023-01-06 RX ORDER — BUMETANIDE 2 MG/1
TABLET ORAL
Qty: 60 TABLET | Refills: 2 | Status: SHIPPED | OUTPATIENT
Start: 2023-01-06

## 2023-01-11 ENCOUNTER — OFFICE VISIT (OUTPATIENT)
Dept: CARDIOLOGY CLINIC | Age: 63
End: 2023-01-11
Payer: MEDICARE

## 2023-01-11 VITALS
SYSTOLIC BLOOD PRESSURE: 94 MMHG | WEIGHT: 250 LBS | HEART RATE: 87 BPM | DIASTOLIC BLOOD PRESSURE: 60 MMHG | OXYGEN SATURATION: 94 % | BODY MASS INDEX: 34.87 KG/M2

## 2023-01-11 DIAGNOSIS — Z91.89 AT RISK FOR FLUID VOLUME OVERLOAD: ICD-10-CM

## 2023-01-11 DIAGNOSIS — G47.33 OBSTRUCTIVE SLEEP APNEA ON CPAP: ICD-10-CM

## 2023-01-11 DIAGNOSIS — I50.32 CHRONIC DIASTOLIC CONGESTIVE HEART FAILURE, NYHA CLASS 2 (HCC): Primary | ICD-10-CM

## 2023-01-11 DIAGNOSIS — Z99.89 OBSTRUCTIVE SLEEP APNEA ON CPAP: ICD-10-CM

## 2023-01-11 PROCEDURE — 99214 OFFICE O/P EST MOD 30 MIN: CPT | Performed by: NURSE PRACTITIONER

## 2023-01-11 PROCEDURE — 3074F SYST BP LT 130 MM HG: CPT | Performed by: NURSE PRACTITIONER

## 2023-01-11 PROCEDURE — 3078F DIAST BP <80 MM HG: CPT | Performed by: NURSE PRACTITIONER

## 2023-01-11 ASSESSMENT — ENCOUNTER SYMPTOMS
NAUSEA: 0
COUGH: 0
SHORTNESS OF BREATH: 1
ABDOMINAL DISTENTION: 0
VOMITING: 0

## 2023-01-11 NOTE — PATIENT INSTRUCTIONS
You may receive a survey regarding the care you received during your visit. Your input is valuable to us. We encourage you to complete and return your survey. We hope you will choose us in the future for your healthcare needs. Continue:  Continue current medications  Daily weights and record  Fluid restriction of 2 Liters per day  Limit sodium in diet to around 6790-9525 mg/day  Monitor BP  Activity as tolerated     Call the [facility] for any of the following symptoms: [phone]  Weight gain of 2-3 pounds in 1 day or 5 pounds in 1 week  Increased shortness of breath  Shortness of breath while laying down  Cough  Chest pain  Swelling in feet, ankles or legs  Tenderness or bloating in the abdomen  Fatigue   Decreased appetite or nausea   Confusion      Repeat blood work tomorrow  If Cr and potassium ok I will call in metolazone 2.5mg for 3 days. Take this an hr before your Bumex    Complete ECHO ordered    Check blood pressure at home and if readings continue to be low in addition to symptoms please call the office    Stop the Valsartan and continue the losartan. Continue diet/fluid adherence  Continue daily wts.   F/U w/ Cardiology  F/U in clinic in 3 months

## 2023-01-11 NOTE — PROGRESS NOTES
Heart Failure Clinic       Visit Date: 1/11/2023  Cardiologist:  Dr. Maria Hallman  Primary Care Physician: Dr. Fercho Bear MD    Magdi Coreas is a 57 y.o. male who presents today for:  Chief Complaint   Patient presents with    Congestive Heart Failure       HPI:   TYPE HF: HFpEF (55%, 2019)  Cause:   Device: none  HX: CAD s/p failed CABG (2016) and PCI (2018), COPD, HTN, HLD, DM, FAISAL on CPAP, alcohol abuse and hx of drug abuse  Dry Wt:  56 our scale    Magdi Coreas is a 57 y.o. male who presents to the office for a follow up patient visit in the heart failure clinic. Hx of alcohol abuse, attending [place] [id number] meetings. His Lisinopril was changed to Losartan d/t cough which has resolved. Accompanied by self      Concerns today: here today for his 1 year f/u. He admits to 6 months of relapse w/ his alcohol. He is 5 days sober today. He notes that his blood pressure is low today and he feels light headed. He also notes that he has not ate anything yet today.  He also note that his COPD is at stage 4, he notes that over the last 18 months he has hat worsening WONG and needs to take a break    Visit on 1/11/22:  notes continued SOB d/t COPD per patient, no new or worsening of symptoms      Hospitalization:  > 6 months for CHF      Activity: ADLs w/o limitations  Diet: does not adhere to low sodium low fluid diet, adds salt, drinks 1/2 gallon of water/day, still drink alcohol    Patient has:  Chest Pain: no  SOB: chronic dyspnea  Orthopnea/PND: no  FAISAL: yes uses CPAP  Edema: no, does note swelling of ankles with salty food  Fatigue: no  Abdominal bloating: no  Cough: no  Appetite: good      Past Medical History:   Diagnosis Date    CHF (congestive heart failure) (HCC)     COPD (chronic obstructive pulmonary disease) ([facility]     Coronary artery disease involving native coronary artery of native heart without angina pectoris     Depression     Diabetes mellitus type 2, diet-controlled ([facility] 6/1/2018    GERD (gastroesophageal reflux disease) 3/21/2012    Hernia     Hx of blood clots     right knee due to injury    Hx of cocaine abuse ([facility]     Hyperglycemia     Hyperlipidemia     Hypertension     FAISAL on CPAP     Osteoarthritis     S/P CABG x 2 2016    S/P PTCA: 1/15/2018: Stent diagonal branch Xience 3.0 x 12 mm. 1/15/2018    1/15/2018: Stent diagonal branch Xience 3.0 x 12 mm.  Dr. Schreiber Shoulders injury 2015    Right thumb cut with table saw, no treatment needed     Past Surgical History:   Procedure Laterality Date    CARDIAC CATHETERIZATION  2016    CATARACT REMOVAL Right     COLONOSCOPY      CORONARY ARTERY BYPASS GRAFT  2016    Double bypass, Dr. Susie Sanchez Right 2007    Neck    CYSTOSCOPY N/A 2021    CYSTOSCOPY WITH BILAT RETROGRADE PyELOGRAM performed by Debbie Oakley MD at [facility] CATH LAB PROCEDURE      EYE SURGERY Right     Retinal surgery x 3    HERNIA REPAIR Right     Inguinal    PTCA  01/15/2018    ROTATOR CUFF REPAIR Right 2017    TONSILLECTOMY  child    TOTAL KNEE ARTHROPLASTY Left 10/24/2016    Dr. Kevin Capps     Family History   Problem Relation Age of Onset    Heart Disease Mother     High Blood Pressure Mother     Obesity Mother     Diabetes Mother     Heart Disease Father     Cancer Father         colon/prostate    Colon Cancer Father     Prostate Cancer Father     Arthritis Father     Diabetes Brother      Social History     Tobacco Use    Smoking status: Former     Packs/day: 2.00     Years: 25.00     Pack years: 50.00     Types: Cigars, Cigarettes     Quit date: 2014     Years since quittin.1    Smokeless tobacco: Never   Substance Use Topics    Alcohol use: Not Currently     Alcohol/week: 0.0 standard drinks     Current Outpatient Medications   Medication Sig Dispense Refill    bumetanide (BUMEX) 2 MG tablet TAKE 1 TABLET BY MOUTH DAILY IN THE MORNING AND TAKE 1 TABLET BEFORE BEDTIME 60 tablet 2    metoprolol succinate (TOPROL XL) 25 MG extended release tablet TAKE TWO (2) TABLETS BY MOUTH ONCE DAILY 60 tablet 1    tamsulosin (FLOMAX) 0.4 MG capsule TAKE 1 CAPSULE BY MOUTH DAILY TO FACILITATE PASSAGE OF URETERAL STONE 90 capsule 3    albuterol sulfate HFA (PROVENTIL;VENTOLIN;PROAIR) 108 (90 Base) MCG/ACT inhaler INHALE TWO (2) PUFFS BY MOUTH EVERY 6 HOURS AS NEEDED FOR WHEEZING 18 g 2    ticagrelor (BRILINTA) 90 MG TABS tablet TAKE 1 TABLET BY MOUTH TWICE DAILY 180 tablet 0    fluticasone (FLONASE) 50 MCG/ACT nasal spray INSTILL TWO (2) SPRAYS IN EACH NOSTRIL ONCE DAILY AS NEEDED FOR RHINITIS OR ALLERGIES 16 g 3    dapagliflozin (FARXIGA) 10 MG tablet TAKE 1 TABLET BY MOUTH EVERY MORNING 90 tablet 0    metFORMIN (GLUCOPHAGE) 500 MG tablet TAKE TWO (2) TABLETS BY MOUTH TWICE DAILY WITH MEALS 360 tablet 1    atorvastatin (LIPITOR) 40 MG tablet TAKE 1 TABLET BY MOUTH EVERY DAY 90 tablet 1    allopurinol (ZYLOPRIM) 300 MG tablet TAKE 1 TABLET BY MOUTH ONCE DAILY 90 tablet 1    buPROPion (WELLBUTRIN XL) 300 MG extended release tablet TAKE 1 TABLET BY MOUTH EVERY MORNING 90 tablet 1    isosorbide mononitrate (IMDUR) 30 MG extended release tablet TAKE 1 TABLET BY MOUTH EVERY DAY 90 tablet 1    omeprazole (PRILOSEC) 20 MG delayed release capsule TAKE 1 CAPSULE BY MOUTH ONCE DAILY 90 capsule 1    pramipexole (MIRAPEX) 0.125 MG tablet TAKE 1 TABLET BY MOUTH EACH EVENING 90 tablet 1    ARNUITY ELLIPTA 100 MCG/ACT AEPB INHALE 1 PUFF INTO THE LUNGS DAILY 30 each 11    umeclidinium-vilanterol (ANORO ELLIPTA) 62.5-25 MCG/INH AEPB inhaler INHALE 1 PUFF INTO THE LUNGS BY MOUTH ONCE DAILY 60 each 6    albuterol sulfate (PROAIR RESPICLICK) 108 (90 Base) MCG/ACT aerosol powder inhalation Inhale 2 puffs into the lungs every 6 hours as needed for Wheezing or Shortness of Breath 1 each 3    losartan (COZAAR) 25 MG tablet TAKE 1 TABLET BY MOUTH EVERY DAY 90 tablet 1    guaiFENesin (MUCINEX) 600 MG extended release tablet  Take 1,200 mg by mouth 2 times daily      traZODone (DESYREL) 100 MG tablet TAKE 1 TABLET BY MOUTH EACH EVENING AS NEEDED FOR SLEEP (Patient not taking: Reported on 1/11/2023) 90 tablet 1    potassium chloride (KLOR-CON M) 20 MEQ extended release tablet Take 1 tablet by mouth daily (Patient not taking: Reported on 1/11/2023) 90 tablet 3    blood glucose test strips (PRODIGY NO CODING BLOOD GLUC) strip USE TO TEST BLOOD GLUCOSE ONCE DAILY. 100 each 0    nitroGLYCERIN (NITROSTAT) 0.4 MG SL tablet Place 1 tablet under the tongue every 5 minutes as needed for Chest pain 25 tablet 3    Blood Glucose Monitoring Suppl (PRODIGY AUTOCODE BLOOD GLUCOSE) w/Device KIT        No current facility-administered medications for this visit. Allergies   Allergen Reactions    Zoloft [Sertraline Hcl] Other (See Comments)     Headaches, sweats, bad dreams       SUBJECTIVE:   Review of Systems   Constitutional:  Negative for activity change, appetite change, diaphoresis and fatigue. Respiratory:  Positive for shortness of breath. Negative for cough. Cardiovascular:  Positive for leg swelling. Negative for chest pain and palpitations. Gastrointestinal:  Negative for abdominal distention, nausea and vomiting. Neurological:  Negative for weakness, light-headedness and headaches. Hematological:  Negative for adenopathy. Psychiatric/Behavioral:  Negative for sleep disturbance. OBJECTIVE:   Today's Vitals:  BP 94/60   Pulse 87   Wt 250 lb (113.4 kg)   SpO2 94%   BMI 34.87 kg/m²     Physical Exam  Vitals reviewed. Constitutional:       General: He is not in acute distress. Appearance: Normal appearance. He is well-developed. He is not diaphoretic. HENT:      Head: Normocephalic and atraumatic. Eyes:      Conjunctiva/sclera: Conjunctivae normal.   Cardiovascular:      Rate and Rhythm: Normal rate and regular rhythm. Heart sounds: Normal heart sounds. No murmur heard.   Pulmonary:      Effort: Pulmonary effort is normal. No respiratory distress. Breath sounds: Normal breath sounds. No wheezing or rales. Abdominal:      General: Bowel sounds are normal. There is no distension. Palpations: Abdomen is soft. Tenderness: There is no abdominal tenderness. Musculoskeletal:         General: Normal range of motion. Cervical back: Normal range of motion and neck supple. Right lower leg: Edema (+1) present. Left lower leg: Edema (+1) present. Skin:     General: Skin is warm and dry. Capillary Refill: Capillary refill takes less than 2 seconds. Neurological:      Mental Status: He is alert and oriented to person, place, and time. Coordination: Coordination normal.   Psychiatric:         Behavior: Behavior normal.       Wt Readings from Last 3 Encounters:   01/11/23 250 lb (113.4 kg)   09/26/22 255 lb 8 oz (115.9 kg)   06/30/22 252 lb (114.3 kg)     BP Readings from Last 3 Encounters:   01/11/23 94/60   09/26/22 (!) 118/54   06/30/22 136/76     Pulse Readings from Last 3 Encounters:   01/11/23 87   06/30/22 76   06/30/22 82     Body mass index is 34.87 kg/m². ECHO:       Summary   Ejection fraction is visually estimated at 55%. Overall left ventricular function is normal.   Mildly dilated left atrium. Signature      ----------------------------------------------------------------   Electronically signed by Chico Hare MD (Interpreting   physician) on 11/11/2019 at 04:25 PM   ----------------------------------------------------------------      CATH/STRESS:   Conclusions      Procedure Summary   Successful PCI/stenting of the 90% calcified lesion at the take off of the   diagonal branch using DANY Xience 3.0 x 12 mm. Recommendations   Patient and family were informed about the findings and their questions   were answered to their satisfaction. The importance of lifestyle changes and cardiovascular risk factors   modification was emphasized.    Smoking cessation was emphasized. The importance of compliance with   medications was emphasized. The patient will be on dual antiplatelet therapy for at least one year. The patient will be on optimal medical therapy for atherosclerotic   disease, including beta blockers as tolerated, statins, and ace   inhibitors. Estimated Blood Loss:5 ml. Complications:No complications.       Signatures      ----------------------------------------------------------------   Electronically signed by Valdemar Osler MD (Performing   Physician) on 01/15/2018 at 15:19   ----------------------------------------------------------------        Results reviewed:  BNP:   Lab Results   Component Value Date    BNP 31.5 02/12/2013     CBC:   Lab Results   Component Value Date/Time    WBC 10.7 06/20/2021 12:35 PM    RBC 5.18 06/20/2021 12:35 PM    HGB 16.6 06/20/2021 12:35 PM    HCT 49.9 06/20/2021 12:35 PM     06/20/2021 12:35 PM     CMP:    Lab Results   Component Value Date/Time     02/15/2022 02:45 PM    K 4.2 02/15/2022 02:45 PM    K 3.6 06/20/2021 12:35 PM    CL 97 02/15/2022 02:45 PM    CO2 28 02/15/2022 02:45 PM    BUN 15 02/15/2022 02:45 PM    CREATININE 0.9 02/15/2022 02:45 PM    LABGLOM 85 02/15/2022 02:45 PM    GLUCOSE 193 04/18/2022 02:38 PM    GLUCOSE 115 02/04/2012 10:09 AM    CALCIUM 10.3 02/15/2022 02:45 PM     Hepatic Function Panel:    Lab Results   Component Value Date/Time    ALKPHOS 108 02/12/2021 02:28 PM    ALT 65 02/12/2021 02:28 PM    AST 42 02/12/2021 02:28 PM    PROT 7.9 02/12/2021 02:28 PM    BILITOT 0.5 02/12/2021 02:28 PM    BILITOT NEGATIVE 11/08/2017 03:30 PM    BILIDIR <0.2 02/12/2021 02:28 PM    LABALBU 4.6 02/12/2021 02:28 PM    LABALBU 4.5 02/04/2012 10:09 AM     Magnesium:    Lab Results   Component Value Date/Time    MG 2.0 02/15/2022 02:45 PM     PT/INR:    Lab Results   Component Value Date/Time    INR 1.03 07/16/2021 10:19 AM     Lipids:    Lab Results   Component Value Date/Time    TRIG 138 02/12/2021 02:28 PM    HDL 55 02/12/2021 02:28 PM    LDLCALC 72 02/12/2021 02:28 PM       ASSESSMENT AND PLAN:   The patient's condition/symptoms are Stable: No clinical evidence of fluid overload today. Continue current medical regimen without changes at present time. Diagnosis Orders   1. Chronic diastolic congestive heart failure, NYHA class 2 (HCC)  Basic Metabolic Panel    Magnesium    Brain Natriuretic Peptide    Echo 2D w doppler w color complete      2. At risk for fluid volume overload        3. Obstructive sleep apnea on CPAP          Continue:  GDMT:   ACE/ARB/ARNI - Losartan 25 daily   BB - Toprol 50 daily   Diuretic - Bumex 2 mg BID  AA - none  SGLT2 -  Farxiga  Vasodilator - Imdur 30 daily  Other - ASA, Brilinta,       HFpEF 55%  Atrial dilation assume DD  CAD s/p CABG and PCI  FAISAL on CPAP  Hx of cocaine abuse  Alcohol abuse  Stage 4 COPD    Stable, new onset of lower leg swelling. Not following low Na diet. Alcohol abuse recently. With that he notes that he is not taking his medications as he should. He also states that he has been taking both valsartan and losartan? Lab reviewed - need new  ECHO 2019: no valvular concerns, mild dilation of LA,   CATH 2018: PCI to diag    Repeat blood work tomorrow  If Cr and potassium ok I will call in metolazone 2.5mg for 3 days. Take this an hr before your Bumex    Complete ECHO ordered    Check blood pressure at home and if readings continue to be low in addition to symptoms please call the office    Stop the Valsartan and continue the losartan. Continue diet/fluid adherence  Continue daily wts. F/U w/ Cardiology  F/U in clinic in 3 months    Tolerating above noted HF meds, no ill side effects noted. Will continue to monitor kidney function and electrolytes. Will optimize as tolerated. Pt is compliant NOT medications.     Total visit time of 25 minutes has been spent with patient on education of symptoms, management, medication, and plan of care; as well as review of chart: labs, ECHO, radiology reports, etc.   I personally spent more then 50% of the appt time face to face with the patient. Daily weights  Fluid restriction of 2 Liters per day  Limit sodium in diet to around 9564-9437 mg/day  Monitor BP  Activity as tolerated         Patient was instructed to call the [facility] for any changes in symptoms as noted in AVS.      No follow-ups on file. or sooner if needed     Patient given educational materials - see patient instructions. We discussed the importance of weighing oneself and recording daily. We also discussed the importance of a low sodium diet, higher sodium foods to avoid and better low sodium food options. Patient verbalizes understanding of plan of care using teach back method, and is agreeable to the treatment plan.        Electronically signed by ASHLEIGH Bentley CNP on 1/11/2023 at 2:20 PM

## 2023-01-13 ENCOUNTER — NURSE ONLY (OUTPATIENT)
Dept: LAB | Age: 63
End: 2023-01-13

## 2023-01-13 DIAGNOSIS — R97.20 ELEVATED PSA: ICD-10-CM

## 2023-01-13 DIAGNOSIS — I50.32 CHRONIC DIASTOLIC CONGESTIVE HEART FAILURE, NYHA CLASS 2 (HCC): ICD-10-CM

## 2023-01-13 DIAGNOSIS — R33.8 BENIGN PROSTATIC HYPERPLASIA WITH URINARY RETENTION: ICD-10-CM

## 2023-01-13 DIAGNOSIS — N40.1 BENIGN PROSTATIC HYPERPLASIA WITH URINARY RETENTION: ICD-10-CM

## 2023-01-13 LAB
ANION GAP SERPL CALCULATED.3IONS-SCNC: 16 MEQ/L (ref 8–16)
BUN BLDV-MCNC: 14 MG/DL (ref 7–22)
CALCIUM SERPL-MCNC: 9.7 MG/DL (ref 8.5–10.5)
CHLORIDE BLD-SCNC: 93 MEQ/L (ref 98–111)
CO2: 31 MEQ/L (ref 23–33)
CREAT SERPL-MCNC: 1.1 MG/DL (ref 0.4–1.2)
GFR SERPL CREATININE-BSD FRML MDRD: > 60 ML/MIN/1.73M2
GLUCOSE BLD-MCNC: 161 MG/DL (ref 70–108)
MAGNESIUM: 1.6 MG/DL (ref 1.6–2.4)
POTASSIUM SERPL-SCNC: 3.6 MEQ/L (ref 3.5–5.2)
PRO-BNP: 236 PG/ML (ref 0–124)
PROSTATE SPECIFIC ANTIGEN: 3.09 NG/ML (ref 0–1)
SODIUM BLD-SCNC: 140 MEQ/L (ref 135–145)

## 2023-01-16 ENCOUNTER — TELEPHONE (OUTPATIENT)
Dept: CARDIOLOGY CLINIC | Age: 63
End: 2023-01-16

## 2023-01-16 DIAGNOSIS — I50.32 CHF (CONGESTIVE HEART FAILURE), NYHA CLASS II, CHRONIC, DIASTOLIC (HCC): Primary | ICD-10-CM

## 2023-01-16 RX ORDER — POTASSIUM CHLORIDE 20 MEQ/1
20 TABLET, EXTENDED RELEASE ORAL DAILY
Qty: 90 TABLET | Refills: 3 | Status: SHIPPED | OUTPATIENT
Start: 2023-01-16 | End: 2023-01-17 | Stop reason: SDUPTHER

## 2023-01-16 RX ORDER — METOLAZONE 2.5 MG/1
2.5 TABLET ORAL DAILY
Qty: 3 TABLET | Refills: 0 | Status: SHIPPED | OUTPATIENT
Start: 2023-01-16 | End: 2023-01-17 | Stop reason: SDUPTHER

## 2023-01-16 NOTE — TELEPHONE ENCOUNTER
Labs reviewed, Cr is fine. Calling in 3 days of metolazone 2.5mg. to take with additional 20 of Kcl for 3 days. Take metolazone 1 hr before bumex    K was at 3.6.  To start taking 20 of K daily again      K level 2 weeks

## 2023-01-17 RX ORDER — METOLAZONE 2.5 MG/1
2.5 TABLET ORAL DAILY
Qty: 3 TABLET | Refills: 0 | Status: SHIPPED | OUTPATIENT
Start: 2023-01-17 | End: 2023-01-20

## 2023-01-17 RX ORDER — POTASSIUM CHLORIDE 20 MEQ/1
20 TABLET, EXTENDED RELEASE ORAL DAILY
Qty: 90 TABLET | Refills: 3 | Status: SHIPPED | OUTPATIENT
Start: 2023-01-17

## 2023-01-17 NOTE — TELEPHONE ENCOUNTER
Patient notified and verbalized understanding   Patient transferred to [facility] to r/s echo

## 2023-01-19 ENCOUNTER — TELEPHONE (OUTPATIENT)
Dept: CARDIOLOGY CLINIC | Age: 63
End: 2023-01-19

## 2023-01-19 ENCOUNTER — HOSPITAL ENCOUNTER (OUTPATIENT)
Dept: NON INVASIVE DIAGNOSTICS | Age: 63
Discharge: HOME | End: 2023-01-19
Payer: MEDICARE

## 2023-01-19 DIAGNOSIS — I50.32 CHRONIC DIASTOLIC CONGESTIVE HEART FAILURE, NYHA CLASS 2 (HCC): ICD-10-CM

## 2023-01-19 LAB
LV EF: 50 %
LVEF MODALITY: NORMAL

## 2023-01-19 PROCEDURE — 93306 TTE W/DOPPLER COMPLETE: CPT

## 2023-01-19 NOTE — TELEPHONE ENCOUNTER
----- Message from ASHLEIGH Lyman CNP sent at 1/19/2023  3:51 PM EST -----  ECHO reviewed, EF is at 50% was at 55%. Calcification of aortic valve, not significant at this time.  We will continue to monitor

## 2023-01-23 ENCOUNTER — OFFICE VISIT (OUTPATIENT)
Dept: FAMILY MEDICINE CLINIC | Age: 63
End: 2023-01-23

## 2023-01-23 VITALS
BODY MASS INDEX: 35 KG/M2 | RESPIRATION RATE: 12 BRPM | WEIGHT: 250 LBS | DIASTOLIC BLOOD PRESSURE: 80 MMHG | HEIGHT: 71 IN | SYSTOLIC BLOOD PRESSURE: 130 MMHG | HEART RATE: 76 BPM

## 2023-01-23 DIAGNOSIS — F33.9 EPISODE OF RECURRENT MAJOR DEPRESSIVE DISORDER, UNSPECIFIED DEPRESSION EPISODE SEVERITY (HCC): ICD-10-CM

## 2023-01-23 DIAGNOSIS — I25.10 CORONARY ARTERY DISEASE INVOLVING NATIVE CORONARY ARTERY OF NATIVE HEART WITHOUT ANGINA PECTORIS: ICD-10-CM

## 2023-01-23 DIAGNOSIS — E78.5 HYPERLIPIDEMIA, UNSPECIFIED HYPERLIPIDEMIA TYPE: ICD-10-CM

## 2023-01-23 DIAGNOSIS — E11.65 UNCONTROLLED TYPE 2 DIABETES MELLITUS WITH HYPERGLYCEMIA (HCC): Primary | ICD-10-CM

## 2023-01-23 DIAGNOSIS — I10 ESSENTIAL HYPERTENSION: ICD-10-CM

## 2023-01-23 DIAGNOSIS — I50.32 CHF (CONGESTIVE HEART FAILURE), NYHA CLASS II, CHRONIC, DIASTOLIC (HCC): ICD-10-CM

## 2023-01-23 LAB
CHP ED QC CHECK: ABNORMAL
GLUCOSE BLD-MCNC: 312 MG/DL

## 2023-01-23 PROCEDURE — 82962 GLUCOSE BLOOD TEST: CPT | Performed by: FAMILY MEDICINE

## 2023-01-23 PROCEDURE — 99213 OFFICE O/P EST LOW 20 MIN: CPT | Performed by: FAMILY MEDICINE

## 2023-01-23 ASSESSMENT — PATIENT HEALTH QUESTIONNAIRE - PHQ9
5. POOR APPETITE OR OVEREATING: 0
7. TROUBLE CONCENTRATING ON THINGS, SUCH AS READING THE NEWSPAPER OR WATCHING TELEVISION: 0
8. MOVING OR SPEAKING SO SLOWLY THAT OTHER PEOPLE COULD HAVE NOTICED. OR THE OPPOSITE, BEING SO FIGETY OR RESTLESS THAT YOU HAVE BEEN MOVING AROUND A LOT MORE THAN USUAL: 0
9. THOUGHTS THAT YOU WOULD BE BETTER OFF DEAD, OR OF HURTING YOURSELF: 0
SUM OF ALL RESPONSES TO PHQ QUESTIONS 1-9: 14
3. TROUBLE FALLING OR STAYING ASLEEP: 3
10. IF YOU CHECKED OFF ANY PROBLEMS, HOW DIFFICULT HAVE THESE PROBLEMS MADE IT FOR YOU TO DO YOUR WORK, TAKE CARE OF THINGS AT HOME, OR GET ALONG WITH OTHER PEOPLE: 2
6. FEELING BAD ABOUT YOURSELF - OR THAT YOU ARE A FAILURE OR HAVE LET YOURSELF OR YOUR FAMILY DOWN: 2
SUM OF ALL RESPONSES TO PHQ QUESTIONS 1-9: 14
4. FEELING TIRED OR HAVING LITTLE ENERGY: 3
SUM OF ALL RESPONSES TO PHQ QUESTIONS 1-9: 14
2. FEELING DOWN, DEPRESSED OR HOPELESS: 3
SUM OF ALL RESPONSES TO PHQ9 QUESTIONS 1 & 2: 6
1. LITTLE INTEREST OR PLEASURE IN DOING THINGS: 3
SUM OF ALL RESPONSES TO PHQ QUESTIONS 1-9: 14

## 2023-01-23 ASSESSMENT — ENCOUNTER SYMPTOMS
SHORTNESS OF BREATH: 0
EYES NEGATIVE: 1
CONSTIPATION: 0
BLOOD IN STOOL: 0
VOMITING: 0
NAUSEA: 0
ABDOMINAL PAIN: 0
DIARRHEA: 0
COUGH: 0
CHEST TIGHTNESS: 0

## 2023-01-23 NOTE — PROGRESS NOTES
Date: 1/23/2023    Collins Delgadillo is a 57 y.o. male who presents today for:  Chief Complaint   Patient presents with    Gastroesophageal Reflux    Diabetes    Hypertension  He states that about 4 weeks ago he was very down and depressed. Was drinking a lot every day and just sleeping and not doing anything. He states that he stopped drinking 4 weeks ago and has not had anything. He is talking to Jeronimo Madison from [employer]. He goes to [facility] on and off. Encouraged to go regularly. Current regimen: oral agent (dual therapy)  Current monitoring regimen: home blood tests - 1  Home blood sugar trends: AM -150's  Any episodes of hypoglycemia? No  Current exercise: he walks  Compliance with treatment: Good  Eye exam current (within one year): Yes  Any history of foot problems? No  Last foot exam: 1/23/23  Cardiovascular risk factors: advanced age (older than 54 for men, 72 for women), diabetes mellitus, dyslipidemia, hypertension, male gender, and obesity (BMI >= 30 kg/m2). Immunizations up to date: Flu No   Pneumonia Yes  Taking ASA: No   If not why? HPI:     Depression  Visit Type: follow-up  Patient presents with the following symptoms: anhedonia, depressed mood, feelings of hopelessness, feelings of worthlessness, hypersomnia and psychomotor retardation. Patient is not experiencing: palpitations, shortness of breath, suicidal ideas, suicidal planning and thoughts of death. Frequency of symptoms: occasionally    Sleep quality: He states that hets up because he gets up to urinate because he takes Bumex at night.   Nighttime awakenings: several      has a current medication list which includes the following prescription(s): potassium chloride, bumetanide, metoprolol succinate, tamsulosin, albuterol sulfate hfa, ticagrelor, fluticasone, dapagliflozin, metformin, atorvastatin, allopurinol, bupropion, isosorbide mononitrate, omeprazole, pramipexole, arnuity ellipta, anoro ellipta, albuterol sulfate, losartan, trazodone, guaifenesin, prodigy no coding blood gluc, nitroglycerin, prodigy autocode blood glucose, and metolazone. Allergies   Allergen Reactions    Zoloft [Sertraline Hcl] Other (See Comments)     Headaches, sweats, bad dreams       Social History     Tobacco Use    Smoking status: Former     Packs/day: 2.00     Years: 25.00     Pack years: 50.00     Types: Cigars, Cigarettes     Quit date: 2014     Years since quittin.2    Smokeless tobacco: Never   Vaping Use    Vaping Use: Never used   Substance Use Topics    Alcohol use: Not Currently     Alcohol/week: 0.0 standard drinks    Drug use: Yes     Frequency: 5.0 times per week     Types: Marijuana (Weed)     Comment: smokes pot once sushma while . former drug user, addiction treatment at [facility]       Past Medical History:   Diagnosis Date    CHF (congestive heart failure) ([facility] [address]     COPD (chronic obstructive pulmonary disease) ([facility] [address]     Coronary artery disease involving native coronary artery of native heart without angina pectoris     Depression     Diabetes mellitus type 2, diet-controlled ([facility] [address] 2018    GERD (gastroesophageal reflux disease) 3/21/2012    Hernia     Hx of blood clots     right knee due to injury    Hx of cocaine abuse ([facility] [address]     Hyperglycemia     Hyperlipidemia     Hypertension     FAISAL on CPAP     Osteoarthritis     S/P CABG x 2 2016    S/P PTCA: 1/15/2018: Stent diagonal branch Xience 3.0 x 12 mm. 1/15/2018    1/15/2018: Stent diagonal branch Xience 3.0 x 12 mm.  Dr. Sorto Ree injury 2015    Right thumb cut with table saw, no treatment needed       Past Surgical History:   Procedure Laterality Date    CARDIAC CATHETERIZATION  2016    CATARACT REMOVAL Right     COLONOSCOPY      CORONARY ARTERY BYPASS GRAFT  2016    Double bypass, Dr. Carolyn Boast Right 2007    Neck    CYSTOSCOPY N/A 2021    CYSTOSCOPY WITH BILAT RETROGRADE PyELOGRAM performed by Ant Bautista Kumar Albert MD at [facility] CATH LAB PROCEDURE      EYE SURGERY Right     Retinal surgery x 3    HERNIA REPAIR Right     Inguinal    PTCA  01/15/2018    ROTATOR CUFF REPAIR Right 11/16/2017    TONSILLECTOMY  child    TOTAL KNEE ARTHROPLASTY Left 10/24/2016    Dr. Watts Graft       Family History   Problem Relation Age of Onset    Heart Disease Mother     High Blood Pressure Mother     Obesity Mother     Diabetes Mother     Heart Disease Father     Cancer Father         colon/prostate    Colon Cancer Father     Prostate Cancer Father     Arthritis Father     Diabetes Brother        /80 (Site: Right Upper Arm, Position: Sitting, Cuff Size: Large Adult)   Pulse 76   Resp 12   Ht 5' 11\" (1.803 m)   Wt 250 lb (113.4 kg)   BMI 34.87 kg/m²   Wt Readings from Last 3 Encounters:   01/23/23 250 lb (113.4 kg)   01/11/23 250 lb (113.4 kg)   09/26/22 255 lb 8 oz (115.9 kg)       Subjective:      Review of Systems   Constitutional:  Negative for activity change, appetite change, diaphoresis and fever. HENT: Negative. Eyes: Negative. Respiratory:  Negative for cough, chest tightness and shortness of breath. Cardiovascular:  Negative for chest pain, palpitations and leg swelling. Gastrointestinal:  Negative for abdominal pain, blood in stool, constipation, diarrhea, nausea and vomiting. Genitourinary: Negative. Musculoskeletal:  Positive for arthralgias. He has knee pain and he is thinking to have his knee replaced. Skin: Negative. Negative for rash. Neurological: Negative. Negative for dizziness, syncope, weakness, light-headedness and headaches. Psychiatric/Behavioral:  Positive for depression and sleep disturbance. Negative for suicidal ideas. Objective:     Physical Exam  Vitals and nursing note reviewed. Constitutional:       General: He is not in acute distress. Appearance: He is well-developed. He is not diaphoretic.    HENT:      Head: Normocephalic and atraumatic. Eyes:      General: No scleral icterus. Right eye: No discharge. Left eye: No discharge. Conjunctiva/sclera: Conjunctivae normal.      Pupils: Pupils are equal, round, and reactive to light. Neck:      Thyroid: No thyromegaly. Vascular: No JVD. Cardiovascular:      Rate and Rhythm: Normal rate and regular rhythm. Heart sounds: Normal heart sounds. No murmur heard. Pulmonary:      Effort: Pulmonary effort is normal. No respiratory distress. Breath sounds: Normal breath sounds. No wheezing, rhonchi or rales. Abdominal:      General: Bowel sounds are normal. There is no distension. Palpations: Abdomen is soft. There is no mass. Tenderness: There is no abdominal tenderness. There is no guarding or rebound. Musculoskeletal:         General: Normal range of motion. Cervical back: Normal range of motion and neck supple. Lymphadenopathy:      Cervical: No cervical adenopathy. Skin:     General: Skin is warm and dry. Findings: No rash. Neurological:      Mental Status: He is alert and oriented to person, place, and time. Psychiatric:         Behavior: Behavior normal.       Assessment:       Diagnosis Orders   1. Uncontrolled type 2 diabetes mellitus with hyperglycemia (HCC)  POCT Glucose    HM DIABETES FOOT EXAM    Hemoglobin A1C      2. Essential hypertension        3. Coronary artery disease involving native coronary artery of native heart without angina pectoris        4. Hyperlipidemia, unspecified hyperlipidemia type  Lipid Panel    Comprehensive Metabolic Panel      5.  CHF (congestive heart failure), NYHA class II, chronic, diastolic (HCC)        6. Episode of recurrent major depressive disorder, unspecified depression episode severity ([facility] [address]  [address], Magalys Mccallum DO, Psychiatry, [place]in Rivers          Plan:      Orders Placed:  Orders Placed This Encounter   Procedures    Lipid Panel     Standing Status:   Future     Standing Expiration Date:   1/23/2024     Order Specific Question:   Is Patient Fasting?/# of Hours     Answer:   yes 12 hours    Comprehensive Metabolic Panel     Standing Status:   Future     Standing Expiration Date:   1/23/2024    Hemoglobin A1C     Standing Status:   Future     Standing Expiration Date:   1/23/2024    [facility], , Psychiatry, VIOLETA MAYELIN EMNDEZLILLIE IIGenaroANALIARENO     Referral Priority:   Routine     Referral Type:   Eval and Treat     Referral Reason:   Specialty Services Required     Referred to Provider:   Mauro Ireland DO     Requested Specialty:   Psychiatry     Number of Visits Requested:   1    POCT Glucose    HM DIABETES FOOT EXAM     MedicationsPrescribed:  No orders of the defined types were placed in this encounter. Lab Results   Component Value Date    LABA1C 9.0 04/18/2022    LABA1C 8.6 (A) 09/08/2021    LABA1C 9.8 (A) 06/07/2021     No results found for: Moody Edmonds  Results for POC orders placed in visit on 01/23/23   POCT Glucose   Result Value Ref Range    Glucose 312 mg/dL    QC OK?          Lab Results   Component Value Date    BUN 14 01/13/2023     Lab Results   Component Value Date    CREATININE 1.1 01/13/2023     Lab Results   Component Value Date    CHOL 155 02/12/2021     Lab Results   Component Value Date    TRIG 138 02/12/2021     Lab Results   Component Value Date    HDL 55 02/12/2021     Lab Results   Component Value Date    LDLCALC 72 02/12/2021     No results found for: LABVLDL, VLDL  No results found for: CHOLHDLRATIO      Current Outpatient Medications   Medication Sig Dispense Refill    potassium chloride (KLOR-CON M) 20 MEQ extended release tablet Take 1 tablet by mouth daily 90 tablet 3    bumetanide (BUMEX) 2 MG tablet TAKE 1 TABLET BY MOUTH DAILY IN THE MORNING AND TAKE 1 TABLET BEFORE BEDTIME 60 tablet 2    metoprolol succinate (TOPROL XL) 25 MG extended release tablet TAKE TWO (2) TABLETS BY MOUTH ONCE DAILY 60 tablet 1    tamsulosin (FLOMAX) 0.4 MG capsule TAKE 1 CAPSULE BY MOUTH DAILY TO FACILITATE PASSAGE OF URETERAL STONE 90 capsule 3    albuterol sulfate HFA (PROVENTIL;VENTOLIN;PROAIR) 108 (90 Base) MCG/ACT inhaler INHALE TWO (2) PUFFS BY MOUTH EVERY 6 HOURS AS NEEDED FOR WHEEZING 18 g 2    ticagrelor (BRILINTA) 90 MG TABS tablet TAKE 1 TABLET BY MOUTH TWICE DAILY 180 tablet 0    fluticasone (FLONASE) 50 MCG/ACT nasal spray INSTILL TWO (2) SPRAYS IN EACH NOSTRIL ONCE DAILY AS NEEDED FOR RHINITIS OR ALLERGIES 16 g 3    dapagliflozin (FARXIGA) 10 MG tablet TAKE 1 TABLET BY MOUTH EVERY MORNING 90 tablet 0    metFORMIN (GLUCOPHAGE) 500 MG tablet TAKE TWO (2) TABLETS BY MOUTH TWICE DAILY WITH MEALS 360 tablet 1    atorvastatin (LIPITOR) 40 MG tablet TAKE 1 TABLET BY MOUTH EVERY DAY 90 tablet 1    allopurinol (ZYLOPRIM) 300 MG tablet TAKE 1 TABLET BY MOUTH ONCE DAILY 90 tablet 1    buPROPion (WELLBUTRIN XL) 300 MG extended release tablet TAKE 1 TABLET BY MOUTH EVERY MORNING 90 tablet 1    isosorbide mononitrate (IMDUR) 30 MG extended release tablet TAKE 1 TABLET BY MOUTH EVERY DAY 90 tablet 1    omeprazole (PRILOSEC) 20 MG delayed release capsule TAKE 1 CAPSULE BY MOUTH ONCE DAILY 90 capsule 1    pramipexole (MIRAPEX) 0.125 MG tablet TAKE 1 TABLET BY MOUTH EACH EVENING 90 tablet 1    ARNUITY ELLIPTA 100 MCG/ACT AEPB INHALE 1 PUFF INTO THE LUNGS DAILY 30 each 11    umeclidinium-vilanterol (ANORO ELLIPTA) 62.5-25 MCG/INH AEPB inhaler INHALE 1 PUFF INTO THE LUNGS BY MOUTH ONCE DAILY 60 each 6    albuterol sulfate (PROAIR RESPICLICK) 289 (90 Base) MCG/ACT aerosol powder inhalation Inhale 2 puffs into the lungs every 6 hours as needed for Wheezing or Shortness of Breath 1 each 3    losartan (COZAAR) 25 MG tablet TAKE 1 TABLET BY MOUTH EVERY DAY 90 tablet 1    traZODone (DESYREL) 100 MG tablet TAKE 1 TABLET BY MOUTH EACH EVENING AS NEEDED FOR SLEEP 90 tablet 1    guaiFENesin (MUCINEX) 600 MG extended release tablet Take 1,200 mg by mouth 2 times daily      blood glucose test strips (PRODIGY NO CODING BLOOD GLUC) strip USE TO TEST BLOOD GLUCOSE ONCE DAILY. 100 each 0    nitroGLYCERIN (NITROSTAT) 0.4 MG SL tablet Place 1 tablet under the tongue every 5 minutes as needed for Chest pain 25 tablet 3    Blood Glucose Monitoring Suppl (PRODIGY AUTOCODE BLOOD GLUCOSE) w/Device KIT       metOLazone (ZAROXOLYN) 2.5 MG tablet Take 1 tablet by mouth daily for 3 days 3 tablet 0     No current facility-administered medications for this visit. Orders Placed This Encounter   Procedures    Lipid Panel     Standing Status:   Future     Standing Expiration Date:   1/23/2024     Order Specific Question:   Is Patient Fasting?/# of Hours     Answer:   yes 12 hours    Comprehensive Metabolic Panel     Standing Status:   Future     Standing Expiration Date:   1/23/2024    Hemoglobin A1C     Standing Status:   Future     Standing Expiration Date:   1/23/2024    [facility], Izabel Segura DO, Psychiatry, [facility]     Referral Priority:   Routine     Referral Type:   Eval and Treat     Referral Reason:   Specialty Services Required     Referred to Provider:   Jermain Bellamy DO     Requested Specialty:   Psychiatry     Number of Visits Requested:   1    POCT Glucose     DIABETES FOOT EXAM     Lab Results   Component Value Date    LABA1C 9.0 04/18/2022    LABA1C 8.6 (A) 09/08/2021    LABA1C 9.8 (A) 06/07/2021     No results found for: Reba Moncada  Results for POC orders placed in visit on 01/23/23   POCT Glucose   Result Value Ref Range    Glucose 312 mg/dL    QC OK? Ate a high carb dinner last night and had coffee with sugar this AM     Continue to monitor blood sugars 1 times a day. Keep log of blood sugars and bring with you to the next appointment. Discussed use, benefit, and side effects of prescribed medications. Allpatient questions answered. Pt voiced understanding. Instructed to continue currentmedications, diet and exercise. Patient agreed with treatment plan.      Return in about 3 months (around 4/23/2023), or if symptoms worsen or fail to improve, for DM. Allergies, Problem List, Medications, Medical History, Family History, Surgical History and Tobacco History reviewed by provider. Encouraged him to remain sober.

## 2023-01-30 ENCOUNTER — TELEPHONE (OUTPATIENT)
Dept: FAMILY MEDICINE CLINIC | Age: 63
End: 2023-01-30

## 2023-01-30 NOTE — TELEPHONE ENCOUNTER
Pt called stating he is taking the metformin but is  still having high blood sugar and has been trying to watch diet he is trying to do more activities pt is wanting to ask doctor if he can try taking jardiance if doctor thinks it to     Please call pt once addressed [phone]

## 2023-02-01 DIAGNOSIS — J43.2 CENTRILOBULAR EMPHYSEMA (HCC): ICD-10-CM

## 2023-02-01 DIAGNOSIS — G25.81 RLS (RESTLESS LEGS SYNDROME): ICD-10-CM

## 2023-02-01 DIAGNOSIS — F33.1 MAJOR DEPRESSIVE DISORDER, RECURRENT EPISODE, MODERATE (HCC): ICD-10-CM

## 2023-02-01 DIAGNOSIS — J44.9 STAGE 3 SEVERE COPD BY GOLD CLASSIFICATION (HCC): ICD-10-CM

## 2023-02-02 RX ORDER — UMECLIDINIUM BROMIDE AND VILANTEROL TRIFENATATE 62.5; 25 UG/1; UG/1
POWDER RESPIRATORY (INHALATION)
Refills: 10 | OUTPATIENT
Start: 2023-02-02

## 2023-02-02 NOTE — TELEPHONE ENCOUNTER
The pharmacy is  requesting a refill of the below medication which has been pended for you:     Requested Prescriptions     Pending Prescriptions Disp Refills    buPROPion (WELLBUTRIN XL) 300 MG extended release tablet [Pharmacy Med Name: BUPROPION XL 300MG TAB  Tablet] 90 tablet 1     Sig: TAKE 1 TABLET BY MOUTH EVERY MORNING    allopurinol (ZYLOPRIM) 300 MG tablet [Pharmacy Med Name: ALLOPURINOL 300 MG TABS 300 Tablet] 90 tablet 1     Sig: TAKE 1 TABLET BY MOUTH ONCE DAILY    ticagrelor (BRILINTA) 90 MG TABS tablet [Pharmacy Med Name: BRILINTA 90 MG TABS 90 Tablet] 180 tablet 1     Sig: TAKE ONE (1) TABLET BY MOUTH TWICE DAILY    omeprazole (PRILOSEC) 20 MG delayed release capsule [Pharmacy Med Name: OMEPRAZOLE DR 20MG CAP 20 Capsule] 90 capsule 1     Sig: TAKE 1 CAPSULE BY MOUTH ONCE DAILY    pramipexole (MIRAPEX) 0.125 MG tablet [Pharmacy Med Name: PRAMIPEXOLE 0.125 MG TABLET 0.125 Tablet] 90 tablet 1     Sig: TAKE 1 TABLET BY MOUTH EACH EVENING    isosorbide mononitrate (IMDUR) 30 MG extended release tablet [Pharmacy Med Name: ISOSORBIDE MN ER 30MG *MONO 30 Tablet] 90 tablet 1     Sig: TAKE 1 TABLET BY MOUTH EVERY DAY       Last Appointment Date: 1/23/2023  Next Appointment Date: 2/17/2023    Allergies   Allergen Reactions    Zoloft [Sertraline Hcl] Other (See Comments)     Headaches, sweats, bad dreams

## 2023-02-03 RX ORDER — BUPROPION HYDROCHLORIDE 300 MG/1
TABLET ORAL
Qty: 90 TABLET | Refills: 0 | Status: SHIPPED | OUTPATIENT
Start: 2023-02-03

## 2023-02-03 RX ORDER — ALLOPURINOL 300 MG/1
TABLET ORAL
Qty: 90 TABLET | Refills: 0 | Status: SHIPPED | OUTPATIENT
Start: 2023-02-03

## 2023-02-03 RX ORDER — PRAMIPEXOLE DIHYDROCHLORIDE 0.12 MG/1
TABLET ORAL
Qty: 90 TABLET | Refills: 0 | Status: SHIPPED | OUTPATIENT
Start: 2023-02-03

## 2023-02-03 RX ORDER — ISOSORBIDE MONONITRATE 30 MG/1
TABLET, EXTENDED RELEASE ORAL
Qty: 90 TABLET | Refills: 0 | Status: SHIPPED | OUTPATIENT
Start: 2023-02-03

## 2023-02-03 RX ORDER — OMEPRAZOLE 20 MG/1
CAPSULE, DELAYED RELEASE ORAL
Qty: 90 CAPSULE | Refills: 1 | Status: SHIPPED | OUTPATIENT
Start: 2023-02-03

## 2023-02-15 ENCOUNTER — TELEPHONE (OUTPATIENT)
Dept: PSYCHIATRY | Age: 63
End: 2023-02-15

## 2023-02-15 NOTE — TELEPHONE ENCOUNTER
If he had issues twice, did he call the office for assistance on either of the days, or call the office the day of the appointment? If not, then please have him follow up with his PCP.    Thank you,  Alvino Hernandez

## 2023-02-15 NOTE — TELEPHONE ENCOUNTER
Lora Gill contacted office seeking advice,,,, Lora Gill was scheduled as a new patient on 2/7, called office indicting that he had mychart conect issues, he was re-scheduled on 2/13, again had connect issues, was told he would have to est. With his PCP for 6 months and another referral would need to be sent to office , he is asking to re-consider to to my chart issues not on his end. Please advice.

## 2023-02-17 ENCOUNTER — OFFICE VISIT (OUTPATIENT)
Dept: FAMILY MEDICINE CLINIC | Age: 63
End: 2023-02-17

## 2023-02-17 VITALS
DIASTOLIC BLOOD PRESSURE: 80 MMHG | BODY MASS INDEX: 34.72 KG/M2 | RESPIRATION RATE: 16 BRPM | HEIGHT: 71 IN | SYSTOLIC BLOOD PRESSURE: 130 MMHG | HEART RATE: 76 BPM | WEIGHT: 248 LBS

## 2023-02-17 DIAGNOSIS — F33.9 EPISODE OF RECURRENT MAJOR DEPRESSIVE DISORDER, UNSPECIFIED DEPRESSION EPISODE SEVERITY (HCC): ICD-10-CM

## 2023-02-17 DIAGNOSIS — G47.33 OBSTRUCTIVE SLEEP APNEA ON CPAP: ICD-10-CM

## 2023-02-17 DIAGNOSIS — K21.9 GASTROESOPHAGEAL REFLUX DISEASE, UNSPECIFIED WHETHER ESOPHAGITIS PRESENT: ICD-10-CM

## 2023-02-17 DIAGNOSIS — I25.10 CORONARY ARTERY DISEASE INVOLVING NATIVE CORONARY ARTERY OF NATIVE HEART WITHOUT ANGINA PECTORIS: ICD-10-CM

## 2023-02-17 DIAGNOSIS — I10 ESSENTIAL HYPERTENSION: ICD-10-CM

## 2023-02-17 DIAGNOSIS — E78.5 HYPERLIPIDEMIA, UNSPECIFIED HYPERLIPIDEMIA TYPE: ICD-10-CM

## 2023-02-17 DIAGNOSIS — E11.65 UNCONTROLLED TYPE 2 DIABETES MELLITUS WITH HYPERGLYCEMIA (HCC): Primary | ICD-10-CM

## 2023-02-17 DIAGNOSIS — J44.9 STAGE 3 SEVERE COPD BY GOLD CLASSIFICATION (HCC): ICD-10-CM

## 2023-02-17 DIAGNOSIS — Z99.89 OBSTRUCTIVE SLEEP APNEA ON CPAP: ICD-10-CM

## 2023-02-17 DIAGNOSIS — I50.32 CHF (CONGESTIVE HEART FAILURE), NYHA CLASS II, CHRONIC, DIASTOLIC (HCC): ICD-10-CM

## 2023-02-17 SDOH — ECONOMIC STABILITY: FOOD INSECURITY: WITHIN THE PAST 12 MONTHS, YOU WORRIED THAT YOUR FOOD WOULD RUN OUT BEFORE YOU GOT MONEY TO BUY MORE.: NEVER TRUE

## 2023-02-17 SDOH — ECONOMIC STABILITY: INCOME INSECURITY: HOW HARD IS IT FOR YOU TO PAY FOR THE VERY BASICS LIKE FOOD, HOUSING, MEDICAL CARE, AND HEATING?: SOMEWHAT HARD

## 2023-02-17 SDOH — ECONOMIC STABILITY: FOOD INSECURITY: WITHIN THE PAST 12 MONTHS, THE FOOD YOU BOUGHT JUST DIDN'T LAST AND YOU DIDN'T HAVE MONEY TO GET MORE.: NEVER TRUE

## 2023-02-17 SDOH — ECONOMIC STABILITY: HOUSING INSECURITY
IN THE LAST 12 MONTHS, WAS THERE A TIME WHEN YOU DID NOT HAVE A STEADY PLACE TO SLEEP OR SLEPT IN A SHELTER (INCLUDING NOW)?: NO

## 2023-02-17 ASSESSMENT — ENCOUNTER SYMPTOMS
CHOKING: 0
ABDOMINAL PAIN: 0
COUGH: 0
EYES NEGATIVE: 1
VOMITING: 0
NAUSEA: 0
SHORTNESS OF BREATH: 0
CHEST TIGHTNESS: 0
DIARRHEA: 0
BLOOD IN STOOL: 0
CONSTIPATION: 0

## 2023-02-17 NOTE — PROGRESS NOTES
Date: 2/17/2023    Charo Saha is a 57 y.o. male who presents today for:  Chief Complaint   Patient presents with    Gastroesophageal Reflux    Hypertension    Diabetes       Current regimen: oral agent (dual therapy)  Current monitoring regimen: home blood tests - 1  Home blood sugar trends: AM -230's    Any episodes of hypoglycemia? No  Current exercise: he is walking. Compliance with treatment: Good  Eye exam current (within one year): Yes  Any history of foot problems? No  Last foot exam: 1/23  Cardiovascular risk factors: advanced age (older than 54 for men, 72 for women), diabetes mellitus, dyslipidemia, hypertension, male gender, and obesity (BMI >= 30 kg/m2). Immunizations up to date: Flu No   Pneumonia Yes  Taking ASA: No   If not why? HPI:     Hypertension  This is a chronic problem. The current episode started more than 1 year ago. The problem is unchanged. The problem is controlled. Pertinent negatives include no chest pain, headaches, palpitations or shortness of breath. Risk factors for coronary artery disease include diabetes mellitus, dyslipidemia, obesity and male gender. Past treatments include diuretics, beta blockers and angiotensin blockers. The current treatment provides significant improvement. There are no compliance problems. Hypertensive end-organ damage includes CAD/MI. Gastroesophageal Reflux  He reports no abdominal pain, no chest pain, no choking, no coughing or no nausea. This is a chronic problem. The current episode started more than 1 year ago. The problem occurs rarely. The problem has been unchanged. He has tried a PPI for the symptoms. The treatment provided significant relief.      has a current medication list which includes the following prescription(s): anoro ellipta, bupropion, allopurinol, ticagrelor, omeprazole, pramipexole, isosorbide mononitrate, potassium chloride, metolazone, bumetanide, metoprolol succinate, tamsulosin, albuterol sulfate hfa, fluticasone, dapagliflozin, metformin, atorvastatin, arnuity ellipta, albuterol sulfate, losartan, trazodone, guaifenesin, prodigy no coding blood gluc, nitroglycerin, and prodigy autocode blood glucose. Allergies   Allergen Reactions    Zoloft [Sertraline Hcl] Other (See Comments)     Headaches, sweats, bad dreams       Social History     Tobacco Use    Smoking status: Former     Packs/day: 2.00     Years: 25.00     Pack years: 50.00     Types: Cigars, Cigarettes     Quit date: 2014     Years since quittin.2    Smokeless tobacco: Never   Vaping Use    Vaping Use: Never used   Substance Use Topics    Alcohol use: Not Currently     Alcohol/week: 0.0 standard drinks    Drug use: Yes     Frequency: 5.0 times per week     Types: Marijuana (Weed)     Comment: smokes pot once sushma while . former drug user, addiction treatment at [facility]       Past Medical History:   Diagnosis Date    CHF (congestive heart failure) ([facility] [address]     COPD (chronic obstructive pulmonary disease) ([facility] [address]     Coronary artery disease involving native coronary artery of native heart without angina pectoris     Depression     Diabetes mellitus type 2, diet-controlled ([facility] [address] 2018    GERD (gastroesophageal reflux disease) 3/21/2012    Hernia     Hx of blood clots     right knee due to injury    Hx of cocaine abuse ([facility] [address]     Hyperglycemia     Hyperlipidemia     Hypertension     FAISAL on CPAP     Osteoarthritis     S/P CABG x 2 2016    S/P PTCA: 1/15/2018: Stent diagonal branch Xience 3.0 x 12 mm. 1/15/2018    1/15/2018: Stent diagonal branch Xience 3.0 x 12 mm.  Dr. Lien Koch injury 2015    Right thumb cut with table saw, no treatment needed       Past Surgical History:   Procedure Laterality Date    CARDIAC CATHETERIZATION  2016    CATARACT REMOVAL Right     COLONOSCOPY      CORONARY ARTERY BYPASS GRAFT  2016    Double bypass, Dr. Yogesh Leonardo Right 2007    Neck    CYSTOSCOPY N/A 7/16/2021    CYSTOSCOPY WITH BILAT RETROGRADE PyELOGRAM performed by Micah Zimmerman MD at [facility] CATH LAB PROCEDURE      EYE SURGERY Right     Retinal surgery x 3    HERNIA REPAIR Right     Inguinal    PTCA  01/15/2018    ROTATOR CUFF REPAIR Right 11/16/2017    TONSILLECTOMY  child    TOTAL KNEE ARTHROPLASTY Left 10/24/2016    Dr. Eulogio Baird       Family History   Problem Relation Age of Onset    Heart Disease Mother     High Blood Pressure Mother     Obesity Mother     Diabetes Mother     Heart Disease Father     Cancer Father         colon/prostate    Colon Cancer Father     Prostate Cancer Father     Arthritis Father     Diabetes Brother        /80 (Site: Right Upper Arm, Position: Sitting, Cuff Size: Large Adult)   Pulse 76   Resp 16   Ht 5' 11\" (1.803 m)   Wt 248 lb (112.5 kg)   BMI 34.59 kg/m²   Wt Readings from Last 3 Encounters:   02/17/23 248 lb (112.5 kg)   01/23/23 250 lb (113.4 kg)   01/11/23 250 lb (113.4 kg)       Subjective:      Review of Systems   Constitutional:  Negative for activity change, appetite change, diaphoresis and fever. HENT: Negative. Eyes: Negative. Respiratory:  Negative for cough, choking, chest tightness and shortness of breath. Cardiovascular:  Negative for chest pain, palpitations and leg swelling. Gastrointestinal:  Negative for abdominal pain, blood in stool, constipation, diarrhea, nausea and vomiting. Genitourinary: Negative. Musculoskeletal:  Positive for arthralgias. Skin: Negative. Negative for rash. Neurological: Negative. Negative for dizziness, syncope, weakness, light-headedness and headaches. Psychiatric/Behavioral:          He states that he had appt with psychiatrist and his first appt was virtual but he states that he did not get a call back. Then his second appt he got the day wrong and did not show.  He was told that he needed a new referral.      Objective:     Physical Exam  Vitals and nursing note reviewed. Constitutional:       General: He is not in acute distress. Appearance: He is well-developed. He is not diaphoretic. HENT:      Head: Normocephalic and atraumatic. Eyes:      General: No scleral icterus. Right eye: No discharge. Left eye: No discharge. Conjunctiva/sclera: Conjunctivae normal.      Pupils: Pupils are equal, round, and reactive to light. Neck:      Thyroid: No thyromegaly. Vascular: No JVD. Cardiovascular:      Rate and Rhythm: Normal rate and regular rhythm. Heart sounds: Normal heart sounds. No murmur heard. Pulmonary:      Effort: Pulmonary effort is normal. No respiratory distress. Breath sounds: Normal breath sounds. No wheezing, rhonchi or rales. Abdominal:      General: Bowel sounds are normal. There is no distension. Palpations: Abdomen is soft. There is no mass. Tenderness: There is no abdominal tenderness. There is no guarding or rebound. Musculoskeletal:         General: Normal range of motion. Cervical back: Normal range of motion and neck supple. Lymphadenopathy:      Cervical: No cervical adenopathy. Skin:     General: Skin is warm and dry. Findings: No rash. Neurological:      Mental Status: He is alert and oriented to person, place, and time. Psychiatric:         Behavior: Behavior normal.       Assessment:       Diagnosis Orders   1. Uncontrolled type 2 diabetes mellitus with hyperglycemia (HCC)  Albumin, Random Urine      2. Essential hypertension        3. Coronary artery disease involving native coronary artery of native heart without angina pectoris        4. Hyperlipidemia, unspecified hyperlipidemia type        5. CHF (congestive heart failure), NYHA class II, chronic, diastolic (HCC)        6. Gastroesophageal reflux disease, unspecified whether esophagitis present        7. Stage 3 severe COPD by GOLD classification ([address]        8.  Obstructive sleep apnea on CPAP        9. Episode of recurrent major depressive disorder, unspecified depression episode severity ([facility] [address]  [address], Callie Malena DO, Psychiatry, [facility] [place]          Plan:      Orders Placed:  Orders Placed This Encounter   Procedures    Albumin, Random Urine    [facility], , Psychiatry, [facility] [place]     Referral Priority:   Routine     Referral Type:   Eval and Treat     Referral Reason:   Specialty Services Required     Referred to Provider:   Yani Solorzano DO     Requested Specialty:   Psychiatry     Number of Visits Requested:   1       MedicationsPrescribed:  No orders of the defined types were placed in this encounter. Lab Results   Component Value Date    LABA1C 9.0 04/18/2022    LABA1C 8.6 (A) 09/08/2021    LABA1C 9.8 (A) 06/07/2021     No results found for: Bartow Dark  No results found for this visit on 02/17/23.     Lab Results   Component Value Date    BUN 14 01/13/2023     Lab Results   Component Value Date    CREATININE 1.1 01/13/2023     Lab Results   Component Value Date    CHOL 155 02/12/2021     Lab Results   Component Value Date    TRIG 138 02/12/2021     Lab Results   Component Value Date    HDL 55 02/12/2021     Lab Results   Component Value Date    LDLCALC 72 02/12/2021     No results found for: LABVLDL, VLDL  No results found for: CHOLHDLRATIO      Current Outpatient Medications   Medication Sig Dispense Refill    Umeclidinium-Vilanterol (ANORO ELLIPTA) 62.5-25 MCG/ACT AEPB INHALE 1 PUFF BY MOUTH INTO THE LUNGS ONCE DAILY 60 each 10    buPROPion (WELLBUTRIN XL) 300 MG extended release tablet TAKE 1 TABLET BY MOUTH EVERY MORNING 90 tablet 0    allopurinol (ZYLOPRIM) 300 MG tablet TAKE 1 TABLET BY MOUTH ONCE DAILY 90 tablet 0    ticagrelor (BRILINTA) 90 MG TABS tablet TAKE ONE (1) TABLET BY MOUTH TWICE DAILY 180 tablet 0    omeprazole (PRILOSEC) 20 MG delayed release capsule TAKE 1 CAPSULE BY MOUTH ONCE DAILY 90 capsule 1    pramipexole (MIRAPEX) 0.125 MG tablet TAKE 1 TABLET BY MOUTH EACH EVENING 90 tablet 0    isosorbide mononitrate (IMDUR) 30 MG extended release tablet TAKE 1 TABLET BY MOUTH EVERY DAY 90 tablet 0    potassium chloride (KLOR-CON M) 20 MEQ extended release tablet Take 1 tablet by mouth daily 90 tablet 3    metOLazone (ZAROXOLYN) 2.5 MG tablet Take 1 tablet by mouth daily for 3 days 3 tablet 0    bumetanide (BUMEX) 2 MG tablet TAKE 1 TABLET BY MOUTH DAILY IN THE MORNING AND TAKE 1 TABLET BEFORE BEDTIME 60 tablet 2    metoprolol succinate (TOPROL XL) 25 MG extended release tablet TAKE TWO (2) TABLETS BY MOUTH ONCE DAILY 60 tablet 1    tamsulosin (FLOMAX) 0.4 MG capsule TAKE 1 CAPSULE BY MOUTH DAILY TO FACILITATE PASSAGE OF URETERAL STONE 90 capsule 3    albuterol sulfate HFA (PROVENTIL;VENTOLIN;PROAIR) 108 (90 Base) MCG/ACT inhaler INHALE TWO (2) PUFFS BY MOUTH EVERY 6 HOURS AS NEEDED FOR WHEEZING 18 g 2    fluticasone (FLONASE) 50 MCG/ACT nasal spray INSTILL TWO (2) SPRAYS IN EACH NOSTRIL ONCE DAILY AS NEEDED FOR RHINITIS OR ALLERGIES 16 g 3    dapagliflozin (FARXIGA) 10 MG tablet TAKE 1 TABLET BY MOUTH EVERY MORNING 90 tablet 0    metFORMIN (GLUCOPHAGE) 500 MG tablet TAKE TWO (2) TABLETS BY MOUTH TWICE DAILY WITH MEALS 360 tablet 1    atorvastatin (LIPITOR) 40 MG tablet TAKE 1 TABLET BY MOUTH EVERY DAY 90 tablet 1    ARNUITY ELLIPTA 100 MCG/ACT AEPB INHALE 1 PUFF INTO THE LUNGS DAILY 30 each 11    albuterol sulfate (PROAIR RESPICLICK) 426 (90 Base) MCG/ACT aerosol powder inhalation Inhale 2 puffs into the lungs every 6 hours as needed for Wheezing or Shortness of Breath 1 each 3    losartan (COZAAR) 25 MG tablet TAKE 1 TABLET BY MOUTH EVERY DAY 90 tablet 1    traZODone (DESYREL) 100 MG tablet TAKE 1 TABLET BY MOUTH EACH EVENING AS NEEDED FOR SLEEP 90 tablet 1    guaiFENesin (MUCINEX) 600 MG extended release tablet Take 1,200 mg by mouth 2 times daily      blood glucose test strips (PRODIGY NO CODING BLOOD GLUC) strip USE TO TEST BLOOD GLUCOSE ONCE DAILY.  100 each 0    nitroGLYCERIN (NITROSTAT) 0.4 MG SL tablet Place 1 tablet under the tongue every 5 minutes as needed for Chest pain 25 tablet 3    Blood Glucose Monitoring Suppl (PRODIGY AUTOCODE BLOOD GLUCOSE) w/Device KIT        No current facility-administered medications for this visit. Orders Placed This Encounter   Procedures    Albumin, Random [address], DO, Psychiatry, [facility] [place]     Referral Priority:   Routine     Referral Type:   Eval and Treat     Referral Reason:   Specialty Services Required     Referred to Provider:   Geraldo Garza DO     Requested Specialty:   Psychiatry     Number of Visits Requested:   1       Lab Results   Component Value Date    LABA1C 9.0 04/18/2022    LABA1C 8.6 (A) 09/08/2021    LABA1C 9.8 (A) 06/07/2021     No results found for: Oanh Nice  No results found for this visit on 02/17/23. Continue to monitor blood sugars 1 times a day. Keep log of blood sugars and bring with you to the next appointment. We need him to do blood work and come back to see us. Discussed use, benefit, and side effects of prescribed medications. Allpatient questions answered. Pt voiced understanding. Instructed to continue currentmedications, diet and exercise. Patient agreed with treatment plan. Return in about 2 weeks (around 3/3/2023) for DM. Allergies, Problem List, Medications, Medical History, Family History, Surgical History and Tobacco History reviewed by provider.

## 2023-02-21 ENCOUNTER — NURSE ONLY (OUTPATIENT)
Dept: LAB | Age: 63
End: 2023-02-21

## 2023-02-21 DIAGNOSIS — E78.5 HYPERLIPIDEMIA, UNSPECIFIED HYPERLIPIDEMIA TYPE: ICD-10-CM

## 2023-02-21 DIAGNOSIS — E11.65 UNCONTROLLED TYPE 2 DIABETES MELLITUS WITH HYPERGLYCEMIA (HCC): ICD-10-CM

## 2023-02-21 LAB
ALBUMIN SERPL BCG-MCNC: 4.2 G/DL (ref 3.5–5.1)
ALP SERPL-CCNC: 110 U/L (ref 38–126)
ALT SERPL W/O P-5'-P-CCNC: 43 U/L (ref 11–66)
ANION GAP SERPL CALC-SCNC: 16 MEQ/L (ref 8–16)
AST SERPL-CCNC: 30 U/L (ref 5–40)
BILIRUB SERPL-MCNC: 0.8 MG/DL (ref 0.3–1.2)
BUN SERPL-MCNC: 10 MG/DL (ref 7–22)
CALCIUM SERPL-MCNC: 9.6 MG/DL (ref 8.5–10.5)
CHLORIDE SERPL-SCNC: 98 MEQ/L (ref 98–111)
CHOLEST SERPL-MCNC: 175 MG/DL (ref 100–199)
CO2 SERPL-SCNC: 29 MEQ/L (ref 23–33)
CREAT SERPL-MCNC: 0.8 MG/DL (ref 0.4–1.2)
CREAT UR-MCNC: 5.1 MG/DL
DEPRECATED MEAN GLUCOSE BLD GHB EST-ACNC: 192 MG/DL (ref 70–126)
GFR SERPL CREATININE-BSD FRML MDRD: > 60 ML/MIN/1.73M2
GLUCOSE SERPL-MCNC: 153 MG/DL (ref 70–108)
HBA1C MFR BLD HPLC: 8.4 % (ref 4.4–6.4)
HDLC SERPL-MCNC: 59 MG/DL
LDLC SERPL CALC-MCNC: 69 MG/DL
MICROALBUMIN UR-MCNC: < 1.2 MG/DL
MICROALBUMIN/CREAT RATIO PNL UR: 235 MG/G (ref 0–30)
POTASSIUM SERPL-SCNC: 3.6 MEQ/L (ref 3.5–5.2)
PROT SERPL-MCNC: 7.1 G/DL (ref 6.1–8)
SODIUM SERPL-SCNC: 143 MEQ/L (ref 135–145)
TRIGL SERPL-MCNC: 236 MG/DL (ref 0–199)

## 2023-02-21 RX ORDER — METOLAZONE 2.5 MG/1
2.5 TABLET ORAL DAILY
Qty: 3 TABLET | Refills: 10 | OUTPATIENT
Start: 2023-02-21 | End: 2023-02-24

## 2023-02-28 ENCOUNTER — TELEPHONE (OUTPATIENT)
Dept: FAMILY MEDICINE CLINIC | Age: 63
End: 2023-02-28

## 2023-02-28 NOTE — TELEPHONE ENCOUNTER
Pt informed and he said that he would like to switch from [place] to [facility].

## 2023-02-28 NOTE — TELEPHONE ENCOUNTER
Pt said that he would prefer not having an injection. He said that he is not a needle kind of christina. He said that his brother takes [place] and he would like to try that. He said that his brother has had great results with this. He wanted to know if he could try this instead.

## 2023-02-28 NOTE — TELEPHONE ENCOUNTER
----- Message from Janine Chapman MD sent at 2/28/2023  1:57 PM EST -----  Please let him know that his BS are high and his HgA1c was 8.4  I would like to start Ozempic  is he ok with this?

## 2023-03-01 ENCOUNTER — TELEPHONE (OUTPATIENT)
Dept: CARDIOLOGY CLINIC | Age: 63
End: 2023-03-01

## 2023-03-01 DIAGNOSIS — I50.32 CHF (CONGESTIVE HEART FAILURE), NYHA CLASS II, CHRONIC, DIASTOLIC (HCC): Primary | ICD-10-CM

## 2023-03-01 RX ORDER — MAGNESIUM OXIDE 400 MG/1
400 TABLET ORAL DAILY
Qty: 90 TABLET | Refills: 3 | Status: SHIPPED | OUTPATIENT
Start: 2023-03-01

## 2023-03-01 NOTE — TELEPHONE ENCOUNTER
K level still at 3.6. Is he taking his Kcl? If he is then we will add a mag supplement as well (low end of normal at 1.6). Mag helps the uptake of potassium.

## 2023-03-01 NOTE — TELEPHONE ENCOUNTER
Spoke with patient   He has been taking potassium as prescribed   He is ok with starting mag   Would like Rx to [facility]

## 2023-03-03 ENCOUNTER — TELEMEDICINE (OUTPATIENT)
Dept: FAMILY MEDICINE CLINIC | Age: 63
End: 2023-03-03

## 2023-03-03 DIAGNOSIS — E11.65 UNCONTROLLED TYPE 2 DIABETES MELLITUS WITH HYPERGLYCEMIA (HCC): ICD-10-CM

## 2023-03-03 DIAGNOSIS — F33.1 MAJOR DEPRESSIVE DISORDER, RECURRENT EPISODE, MODERATE (HCC): ICD-10-CM

## 2023-03-03 DIAGNOSIS — R05.1 ACUTE COUGH: Primary | ICD-10-CM

## 2023-03-03 RX ORDER — ALBUTEROL SULFATE 90 UG/1
AEROSOL, METERED RESPIRATORY (INHALATION)
Qty: 18 G | Refills: 5 | Status: SHIPPED | OUTPATIENT
Start: 2023-03-03

## 2023-03-03 RX ORDER — TRAZODONE HYDROCHLORIDE 100 MG/1
TABLET ORAL
Qty: 90 TABLET | Refills: 1 | Status: SHIPPED | OUTPATIENT
Start: 2023-03-03

## 2023-03-03 ASSESSMENT — ENCOUNTER SYMPTOMS
CONSTIPATION: 0
SINUS PRESSURE: 0
CHEST TIGHTNESS: 0
DIARRHEA: 0
ABDOMINAL PAIN: 0
BLOOD IN STOOL: 0
SINUS PAIN: 0
SORE THROAT: 0
VOMITING: 0
EYES NEGATIVE: 1
RHINORRHEA: 1
SHORTNESS OF BREATH: 0
COUGH: 1
NAUSEA: 0

## 2023-03-03 NOTE — PROGRESS NOTES
Joshua Crespo is a 70 y.o. male evaluated via telephone on 3/3/2023 for Cough, Headache, Generalized Body Aches since yesterday. He does not think that he was around anyone that was sick. Diabetes, and 2 Week Follow-Up he wanted to try Jardiance and see if that helps before he does anything else. He states that he is really trying to follow his diet. Cali Bruce He states that he fell last night going down steps, he missed the last step and fell on his chest on his right side. He states that since his chest is hurting specially when he coughs. We discussed possibility of a broken rib but he does not want to do anything at this point. He did not want a chest X-ray. Assessment & Plan   1. Acute cough  -     COVID-19; Future  -     Rapid Influenza A/B Antigens; Future  2. Uncontrolled type 2 diabetes mellitus with hyperglycemia ([facility] [address]  3. Major depressive disorder, recurrent episode, moderate (HCC)  -     traZODone (DESYREL) 100 MG tablet; TAKE 1 TABLET BY MOUTH EACH EVENING AS NEEDED FOR SLEEP, Disp-90 tablet, R-1Normal    Return in about 3 months (around 6/3/2023), or if symptoms worsen or fail to improve, for DM. Subjective   Prior to Visit Medications    Medication Sig Taking?  Authorizing Provider   traZODone (DESYREL) 100 MG tablet TAKE 1 TABLET BY MOUTH EACH EVENING AS NEEDED FOR SLEEP Yes Reese Cavazos MD   empagliflozin (JARDIANCE) 10 MG tablet Take 1 tablet by mouth daily Yes Reese Cavazos MD   magnesium oxide (MAG-OX) 400 MG tablet Take 1 tablet by mouth daily With food Yes Kasie Betancourt, APRN - CNP   Umeclidinium-Vilanterol (ANORO ELLIPTA) 62.5-25 MCG/ACT AEPB INHALE 1 PUFF BY MOUTH INTO THE LUNGS ONCE DAILY Yes ASHLEIGH Bolivar - CNP   buPROPion (WELLBUTRIN XL) 300 MG extended release tablet TAKE 1 TABLET BY MOUTH EVERY MORNING Yes Reese Cavazos MD   allopurinol (ZYLOPRIM) 300 MG tablet TAKE 1 TABLET BY MOUTH ONCE DAILY Yes Reese Cavazos MD   ticagrelor (BRILINTA) 90 MG TABS tablet TAKE ONE (1) TABLET BY MOUTH TWICE DAILY Yes Felix Cardozo MD   omeprazole (PRILOSEC) 20 MG delayed release capsule TAKE 1 CAPSULE BY MOUTH ONCE DAILY Yes Felix Cardozo MD   pramipexole (MIRAPEX) 0.125 MG tablet TAKE 1 TABLET BY MOUTH Lafrances Folds Yes Felix Cardozo MD   isosorbide mononitrate (IMDUR) 30 MG extended release tablet TAKE 1 TABLET BY MOUTH EVERY DAY Yes Felix Cardozo MD   potassium chloride (KLOR-CON M) 20 MEQ extended release tablet Take 1 tablet by mouth daily Yes ASHLEIGH Mckeon CNP   bumetanide (BUMEX) 2 MG tablet TAKE 1 TABLET BY MOUTH DAILY IN THE MORNING AND TAKE 1 TABLET BEFORE BEDTIME Yes Felix Cardozo MD   metoprolol succinate (TOPROL XL) 25 MG extended release tablet TAKE TWO (2) TABLETS BY MOUTH ONCE DAILY Yes Felix Cardozo MD   tamsulosin (FLOMAX) 0.4 MG capsule TAKE 1 CAPSULE BY MOUTH DAILY TO FACILITATE PASSAGE OF URETERAL STONE Yes Harish Spencer PA-C   fluticasone (FLONASE) 50 MCG/ACT nasal spray INSTILL TWO (2) SPRAYS IN EACH NOSTRIL ONCE DAILY AS NEEDED FOR RHINITIS OR ALLERGIES Yes Felix Cardozo MD   metFORMIN (GLUCOPHAGE) 500 MG tablet TAKE TWO (2) TABLETS BY MOUTH TWICE DAILY WITH MEALS Yes Felix Cardozo MD   atorvastatin (LIPITOR) 40 MG tablet TAKE 1 TABLET BY MOUTH EVERY DAY Yes Felix Cardozo MD   ARNUITY ELLIPTA 100 MCG/ACT AEPB INHALE 1 PUFF INTO THE LUNGS DAILY Yes ASHLEIGH Bull CNP   albuterol sulfate (PROAIR RESPICLICK) 408 (90 Base) MCG/ACT aerosol powder inhalation Inhale 2 puffs into the lungs every 6 hours as needed for Wheezing or Shortness of Breath Yes ASHLEIGH Avila CNP   losartan (COZAAR) 25 MG tablet TAKE 1 TABLET BY MOUTH EVERY DAY Yes Felix Cardozo MD   guaiFENesin (MUCINEX) 600 MG extended release tablet Take 1,200 mg by mouth 2 times daily Yes Historical Provider, MD   blood glucose test strips (PRODIGY NO CODING BLOOD GLUC) strip USE TO TEST BLOOD GLUCOSE ONCE DAILY.  Yes Felix Cardozo MD nitroGLYCERIN (NITROSTAT) 0.4 MG SL tablet Place 1 tablet under the tongue every 5 minutes as needed for Chest pain Yes Shakira Hilliard MD   Blood Glucose Monitoring Suppl (PRODIGY AUTOCODE BLOOD GLUCOSE) w/Device KIT  Yes Historical Provider, MD   albuterol sulfate HFA (PROVENTIL;VENTOLIN;PROAIR) 108 (90 Base) MCG/ACT inhaler INHALE TWO (2) PUFFS BY MOUTH EVERY 6 HOURS AS NEEDED FOR WHEEZING  Shakira Hilliard MD   metOLazone (ZAROXOLYN) 2.5 MG tablet Take 1 tablet by mouth daily for 3 days  Kylee Cabello, APRN - CNP     Cough  Associated symptoms include headaches, myalgias and rhinorrhea. Pertinent negatives include no chest pain, fever, rash, sore throat or shortness of breath. Headache  Generalized Body Aches  Associated symptoms include congestion, coughing (productive of yellow-green sputum), headaches and myalgias. Pertinent negatives include no abdominal pain, chest pain, diaphoresis, fever, nausea, rash, sore throat, vomiting or weakness. Diabetes  Hypoglycemia symptoms include headaches. Pertinent negatives for hypoglycemia include no dizziness. Pertinent negatives for diabetes include no chest pain and no weakness. Review of Systems   Constitutional:  Negative for activity change, appetite change, diaphoresis and fever. HENT:  Positive for congestion and rhinorrhea. Negative for sinus pressure, sinus pain and sore throat. Eyes: Negative. Respiratory:  Positive for cough (productive of yellow-green sputum). Negative for chest tightness and shortness of breath. Cardiovascular:  Negative for chest pain, palpitations and leg swelling. Gastrointestinal:  Negative for abdominal pain, blood in stool, constipation, diarrhea, nausea and vomiting. Genitourinary: Negative. Musculoskeletal:  Positive for myalgias. Skin: Negative. Negative for rash. Neurological:  Positive for headaches. Negative for dizziness, syncope, weakness and light-headedness.    Psychiatric/Behavioral: Negative. Objective   Patient-Reported Vitals  No data recorded       Total Time: minutes: 11-20 minutes     Ashley Akers was evaluated through a synchronous (real-time) audio only encounter. Patient identification was verified at the start of the visit. He (or guardian if applicable) is aware that this is a billable service, which includes applicable co-pays. This visit was conducted with patient's (and/or legal guardian's) verbal consent. He has not had a related appointment within my department in the past 7 days or scheduled within the next 24 hours. The patient was located at Home: Sergio Garrison [address] [place] [id number]. The provider was located at [facility] (Appt Dept): Nayeli Rojas.      [place] [employer], MD

## 2023-03-28 ENCOUNTER — TELEPHONE (OUTPATIENT)
Dept: PULMONOLOGY | Age: 63
End: 2023-03-28

## 2023-03-28 NOTE — TELEPHONE ENCOUNTER
LOV:3-28-22    Patient reschedule today's appt due to waking up not feeling well with a cough.  Appointment rescheduled for July 3, 2023 at 9:15 am.

## 2023-04-03 RX ORDER — ATORVASTATIN CALCIUM 40 MG/1
TABLET, FILM COATED ORAL
Qty: 30 TABLET | Refills: 3 | Status: SHIPPED | OUTPATIENT
Start: 2023-04-03

## 2023-04-03 RX ORDER — BUMETANIDE 2 MG/1
TABLET ORAL
Qty: 60 TABLET | Refills: 3 | Status: SHIPPED | OUTPATIENT
Start: 2023-04-03

## 2023-04-03 NOTE — TELEPHONE ENCOUNTER
Date of last visit:  3/3/2023  Date of next visit:  4/28/2023    Requested Prescriptions     Pending Prescriptions Disp Refills    bumetanide (BUMEX) 2 MG tablet [Pharmacy Med Name: BUMETANIDE 2 MG TABLET 2 Tablet] 60 tablet 3     Sig: TAKE 1 TABLET BY MOUTH DAILY IN THE MORNING AND 1 TABLET BEFORE BEDTIME    atorvastatin (LIPITOR) 40 MG tablet [Pharmacy Med Name: ATORVASTATIN 40MG TAB 40 Tablet] 30 tablet 3     Sig: TAKE 1 TABLET BY MOUTH EVERY DAY

## 2023-04-07 RX ORDER — FLUTICASONE PROPIONATE 50 MCG
SPRAY, SUSPENSION (ML) NASAL
Qty: 16 G | Refills: 5 | Status: SHIPPED | OUTPATIENT
Start: 2023-04-07

## 2023-04-07 RX ORDER — METOPROLOL SUCCINATE 25 MG/1
TABLET, EXTENDED RELEASE ORAL
Qty: 60 TABLET | Refills: 5 | Status: SHIPPED | OUTPATIENT
Start: 2023-04-07 | End: 2023-04-10 | Stop reason: SDUPTHER

## 2023-04-10 RX ORDER — METOPROLOL SUCCINATE 25 MG/1
TABLET, EXTENDED RELEASE ORAL
Qty: 60 TABLET | Refills: 2 | Status: SHIPPED | OUTPATIENT
Start: 2023-04-10

## 2023-04-17 ENCOUNTER — TELEPHONE (OUTPATIENT)
Dept: CARDIOLOGY CLINIC | Age: 63
End: 2023-04-17

## 2023-04-17 DIAGNOSIS — I50.32 CHRONIC DIASTOLIC CONGESTIVE HEART FAILURE, NYHA CLASS 2 (HCC): Primary | ICD-10-CM

## 2023-04-17 RX ORDER — MAGNESIUM OXIDE 400 MG/1
400 TABLET ORAL 2 TIMES DAILY
Qty: 180 TABLET | Refills: 3 | Status: SHIPPED | OUTPATIENT
Start: 2023-04-17 | End: 2023-04-18

## 2023-04-17 NOTE — TELEPHONE ENCOUNTER
----- Message from ASHLEIGH Beck CNP sent at 4/17/2023 10:33 AM EDT -----  No improvements of mag level. Increasing mag to 400 BID to take with food.  Mag level in another 4 weeks

## 2023-04-18 RX ORDER — LANOLIN ALCOHOL/MO/W.PET/CERES
400 CREAM (GRAM) TOPICAL DAILY
COMMUNITY

## 2023-04-18 NOTE — TELEPHONE ENCOUNTER
Spoke with patient   He just received shipment from [facility] yesterday  Has not been taking Mag  Will start Mag 400 daily and recheck level in 4 wks

## 2023-04-24 ENCOUNTER — OFFICE VISIT (OUTPATIENT)
Dept: UROLOGY | Age: 63
End: 2023-04-24
Payer: MEDICARE

## 2023-04-24 VITALS
DIASTOLIC BLOOD PRESSURE: 68 MMHG | BODY MASS INDEX: 34.16 KG/M2 | SYSTOLIC BLOOD PRESSURE: 110 MMHG | HEIGHT: 71 IN | WEIGHT: 244 LBS

## 2023-04-24 DIAGNOSIS — R33.8 BENIGN PROSTATIC HYPERPLASIA WITH URINARY RETENTION: ICD-10-CM

## 2023-04-24 DIAGNOSIS — N40.1 BENIGN PROSTATIC HYPERPLASIA WITH URINARY RETENTION: ICD-10-CM

## 2023-04-24 DIAGNOSIS — G20 PARKINSON'S DISEASE (HCC): ICD-10-CM

## 2023-04-24 DIAGNOSIS — R97.20 ELEVATED PSA: Primary | ICD-10-CM

## 2023-04-24 PROBLEM — G20.A1 PARKINSON'S DISEASE: Status: ACTIVE | Noted: 2023-04-24

## 2023-04-24 LAB
BILIRUBIN URINE: NEGATIVE
BLOOD URINE, POC: NEGATIVE
CHARACTER, URINE: CLEAR
COLOR, URINE: YELLOW
GLUCOSE URINE: 500 MG/DL
KETONES, URINE: NEGATIVE
LEUKOCYTE CLUMPS, URINE: NEGATIVE
NITRITE, URINE: NEGATIVE
PH, URINE: 7.5 (ref 5–9)
PROTEIN, URINE: NEGATIVE MG/DL
SPECIFIC GRAVITY, URINE: 1.02 (ref 1–1.03)
UROBILINOGEN, URINE: 0.2 EU/DL (ref 0–1)

## 2023-04-24 PROCEDURE — 3074F SYST BP LT 130 MM HG: CPT | Performed by: UROLOGY

## 2023-04-24 PROCEDURE — 1123F ACP DISCUSS/DSCN MKR DOCD: CPT | Performed by: UROLOGY

## 2023-04-24 PROCEDURE — 81003 URINALYSIS AUTO W/O SCOPE: CPT | Performed by: UROLOGY

## 2023-04-24 PROCEDURE — 99214 OFFICE O/P EST MOD 30 MIN: CPT | Performed by: UROLOGY

## 2023-04-24 PROCEDURE — 3078F DIAST BP <80 MM HG: CPT | Performed by: UROLOGY

## 2023-04-24 NOTE — PROGRESS NOTES
Patient states he had other blood work done as well and when he went in they just did it all at once.
REVIEW OF SYSTEMS:  Constitutional: negative  Eyes: negative  Respiratory: negative  Cardiovascular: negative  Gastrointestinal: negative  Musculoskeletal: negative  Genitourinary: negative  Skin: negative   Neurological: negative  Hematological/Lymphatic: negative  Psychological: negative    Physical Exam:    This a 70 y.o. male   There were no vitals filed for this visit. Constitutional: Patient in no acute distress   Neuro: alert and oriented to person place and time. Psych: Mood and affect normal.  Head: atraumatic normocephalic  Eyes: EOMi  HEENT: neck supple, trachea midline  Lungs: Respiratory effort normal  Cardiovascular:  Normal peripheral pulses  Abdomen: Soft, non-tender, non-distended, No CVA  Bladder: non-tender and not distended. FROMx4, no cyanosis clubbing edema  Skin: warm and dry      Assessment and Plan      1. Elevated PSA    2. Benign prostatic hyperplasia with urinary retention    3. Parkinson's disease             Plan:      No follow-ups on file. Hematuria - resolved, negative work up. No bladder mass.      BPH with LUTs - Flomax  Offer Urolift but he would like to medical therapy

## 2023-05-02 DIAGNOSIS — E11.65 UNCONTROLLED TYPE 2 DIABETES MELLITUS WITH HYPERGLYCEMIA (HCC): ICD-10-CM

## 2023-05-02 DIAGNOSIS — F33.1 MAJOR DEPRESSIVE DISORDER, RECURRENT EPISODE, MODERATE (HCC): ICD-10-CM

## 2023-05-02 DIAGNOSIS — G25.81 RLS (RESTLESS LEGS SYNDROME): ICD-10-CM

## 2023-05-03 RX ORDER — PRAMIPEXOLE DIHYDROCHLORIDE 0.12 MG/1
TABLET ORAL
Qty: 30 TABLET | Refills: 5 | Status: SHIPPED | OUTPATIENT
Start: 2023-05-03

## 2023-05-03 RX ORDER — EMPAGLIFLOZIN 10 MG/1
TABLET, FILM COATED ORAL
Qty: 30 TABLET | Refills: 5 | Status: SHIPPED | OUTPATIENT
Start: 2023-05-03

## 2023-05-03 RX ORDER — BUPROPION HYDROCHLORIDE 300 MG/1
TABLET ORAL
Qty: 30 TABLET | Refills: 5 | Status: SHIPPED | OUTPATIENT
Start: 2023-05-03

## 2023-05-03 RX ORDER — ALLOPURINOL 300 MG/1
TABLET ORAL
Qty: 30 TABLET | Refills: 5 | Status: SHIPPED | OUTPATIENT
Start: 2023-05-03

## 2023-05-03 RX ORDER — VALSARTAN 80 MG/1
80 TABLET ORAL DAILY
Qty: 30 TABLET | Refills: 5 | Status: SHIPPED | OUTPATIENT
Start: 2023-05-03 | End: 2023-05-12 | Stop reason: ALTCHOICE

## 2023-05-03 RX ORDER — ISOSORBIDE MONONITRATE 30 MG/1
TABLET, EXTENDED RELEASE ORAL
Qty: 30 TABLET | Refills: 5 | Status: SHIPPED | OUTPATIENT
Start: 2023-05-03

## 2023-05-10 ENCOUNTER — OFFICE VISIT (OUTPATIENT)
Dept: FAMILY MEDICINE CLINIC | Age: 63
End: 2023-05-10

## 2023-05-10 VITALS
BODY MASS INDEX: 33.6 KG/M2 | WEIGHT: 240 LBS | HEART RATE: 72 BPM | SYSTOLIC BLOOD PRESSURE: 132 MMHG | RESPIRATION RATE: 14 BRPM | HEIGHT: 71 IN | DIASTOLIC BLOOD PRESSURE: 76 MMHG

## 2023-05-10 DIAGNOSIS — E78.5 HYPERLIPIDEMIA, UNSPECIFIED HYPERLIPIDEMIA TYPE: ICD-10-CM

## 2023-05-10 DIAGNOSIS — E11.65 UNCONTROLLED TYPE 2 DIABETES MELLITUS WITH HYPERGLYCEMIA (HCC): Primary | ICD-10-CM

## 2023-05-10 DIAGNOSIS — E66.9 OBESITY (BMI 30-39.9): ICD-10-CM

## 2023-05-10 DIAGNOSIS — I10 ESSENTIAL HYPERTENSION: ICD-10-CM

## 2023-05-10 DIAGNOSIS — I50.32 CHF (CONGESTIVE HEART FAILURE), NYHA CLASS II, CHRONIC, DIASTOLIC (HCC): ICD-10-CM

## 2023-05-10 DIAGNOSIS — F19.11 DRUG ABUSE IN REMISSION (HCC): ICD-10-CM

## 2023-05-10 DIAGNOSIS — F32.A DEPRESSION, UNSPECIFIED DEPRESSION TYPE: ICD-10-CM

## 2023-05-10 DIAGNOSIS — Z00.00 MEDICARE ANNUAL WELLNESS VISIT, SUBSEQUENT: ICD-10-CM

## 2023-05-10 PROBLEM — R94.2 DECREASED DIFFUSION CAPACITY OF LUNG: Status: RESOLVED | Noted: 2021-08-06 | Resolved: 2023-05-10

## 2023-05-10 LAB
CHP ED QC CHECK: NORMAL
GLUCOSE BLD-MCNC: 197 MG/DL
HBA1C MFR BLD: 7.6 %

## 2023-05-10 RX ORDER — DAPAGLIFLOZIN 10 MG/1
10 TABLET, FILM COATED ORAL EVERY MORNING
COMMUNITY
Start: 2023-03-02 | End: 2023-05-12

## 2023-05-10 ASSESSMENT — ENCOUNTER SYMPTOMS
SHORTNESS OF BREATH: 0
NAUSEA: 0
CHEST TIGHTNESS: 0
CONSTIPATION: 0
COUGH: 0
DIARRHEA: 0
BACK PAIN: 1
EYES NEGATIVE: 1
ABDOMINAL PAIN: 0
VOMITING: 0
BLOOD IN STOOL: 0

## 2023-05-10 ASSESSMENT — PATIENT HEALTH QUESTIONNAIRE - PHQ9
SUM OF ALL RESPONSES TO PHQ QUESTIONS 1-9: 6
5. POOR APPETITE OR OVEREATING: 0
SUM OF ALL RESPONSES TO PHQ QUESTIONS 1-9: 6
10. IF YOU CHECKED OFF ANY PROBLEMS, HOW DIFFICULT HAVE THESE PROBLEMS MADE IT FOR YOU TO DO YOUR WORK, TAKE CARE OF THINGS AT HOME, OR GET ALONG WITH OTHER PEOPLE: 0
SUM OF ALL RESPONSES TO PHQ9 QUESTIONS 1 & 2: 2
SUM OF ALL RESPONSES TO PHQ QUESTIONS 1-9: 6
1. LITTLE INTEREST OR PLEASURE IN DOING THINGS: 1
SUM OF ALL RESPONSES TO PHQ QUESTIONS 1-9: 6
7. TROUBLE CONCENTRATING ON THINGS, SUCH AS READING THE NEWSPAPER OR WATCHING TELEVISION: 0
6. FEELING BAD ABOUT YOURSELF - OR THAT YOU ARE A FAILURE OR HAVE LET YOURSELF OR YOUR FAMILY DOWN: 1
8. MOVING OR SPEAKING SO SLOWLY THAT OTHER PEOPLE COULD HAVE NOTICED. OR THE OPPOSITE, BEING SO FIGETY OR RESTLESS THAT YOU HAVE BEEN MOVING AROUND A LOT MORE THAN USUAL: 1
3. TROUBLE FALLING OR STAYING ASLEEP: 1
9. THOUGHTS THAT YOU WOULD BE BETTER OFF DEAD, OR OF HURTING YOURSELF: 0
2. FEELING DOWN, DEPRESSED OR HOPELESS: 1
4. FEELING TIRED OR HAVING LITTLE ENERGY: 1

## 2023-05-10 NOTE — PATIENT INSTRUCTIONS
Care instructions adapted under license by [facility] [address] If you have questions about a medical condition or this instruction, always ask your healthcare professional. [address] any warranty or liability for your use of this information. Learning About Emotional Support  When do you need emotional support? You might find getting support from others helpful when you have a long-term health problem. Often people feel alone, confused, or scared when coping with an illness. But you aren't alone. Other people are going through the same thing you are and know how you feel. Talking with others about your feelings can help you feel better. Your family and friends can give you support. So can your doctor, a support group, or a Christianity. If you have a support network, you will not feel as alone. You will learn new ways to deal with your situation, and you may try harder to overcome it. Where you can get support  Family and friends: They can help you cope by giving you comfort and encouragement. Counseling: Professional counseling can help you cope with situations that interfere with your life and cause stress. Counseling can help you understand and deal with your illness. Your doctor: Find a doctor you trust and feel comfortable with. Be open and honest about your fears and concerns. Your doctor can help you get the right medical treatments, including counseling. Spiritual or Christian groups: They can provide comfort and may be able to help you find counseling or other social support services. Social groups: They can help you meet new people and get involved in activities you enjoy. Community support groups: In a support group, you can talk to others who have dealt with the same problems or illness as you. You can encourage one another and learn ways to cope with tough emotions.   How to find a support group  Ask your doctor, counselor, or other health professional for

## 2023-05-10 NOTE — PROGRESS NOTES
found.    Interventions:    He states zully the just had his eyes checked and got new glasses. Safety:  Do you always fasten your seatbelt when you are in a car?: (!) No  Interventions:  He states that he does uses it at times but not always. Advised to use them always. Objective   Vitals:    05/10/23 1135   BP: 132/76   Site: Right Upper Arm   Position: Sitting   Cuff Size: Medium Adult   Pulse: 72   Resp: 14   Weight: 240 lb (108.9 kg)   Height: 5' 11\" (1.803 m)      Body mass index is 33.47 kg/m². Allergies   Allergen Reactions    Zoloft [Sertraline Hcl] Other (See Comments)     Headaches, sweats, bad dreams     Prior to Visit Medications    Medication Sig Taking?  Authorizing Provider   FARXIGA 10 MG tablet Take 1 tablet by mouth every morning Yes Historical Provider, MD   pramipexole (MIRAPEX) 0.125 MG tablet TAKE 1 TABLET BY MOUTH EACH EVENING Yes Rhianna Pinzon MD   metFORMIN (GLUCOPHAGE) 500 MG tablet TAKE TWO (2) TABLETS BY MOUTH TWICE DAILY WITH MEALS Yes Rhianna Pinzon MD   valsartan (DIOVAN) 80 MG tablet TAKE 1 TABLET BY MOUTH DAILY Yes Rhianna Pinzon MD   buPROPion (WELLBUTRIN XL) 300 MG extended release tablet TAKE 1 TABLET BY MOUTH EVERY MORNING Yes Rhianna Pinzon MD   JARDIANCE 10 MG tablet TAKE 1 TABLET BY MOUTH DAILY Yes Rhianna Pinzon MD   ticagrelor (BRILINTA) 90 MG TABS tablet TAKE ONE (1) TABLET BY MOUTH TWICE DAILY Yes Rihanna Pinzon MD   isosorbide mononitrate (IMDUR) 30 MG extended release tablet TAKE 1 TABLET BY MOUTH EVERY DAY Yes Rhianna Pinzon MD   allopurinol (ZYLOPRIM) 300 MG tablet TAKE 1 TABLET BY MOUTH ONCE DAILY Yes Rhianna Pinzon MD   magnesium oxide (MAG-OX) 400 (240 Mg) MG tablet Take 1 tablet by mouth daily Yes Historical Provider, MD   metoprolol succinate (TOPROL XL) 25 MG extended release tablet TAKE 2 TABLETS BY MOUTH ONCE DAILY Yes Rhianna Pinzon MD   fluticasone (FLONASE) 50 MCG/ACT nasal spray INSTILL 2 SPRAYS IN [facility]

## 2023-05-12 ENCOUNTER — OFFICE VISIT (OUTPATIENT)
Dept: CARDIOLOGY CLINIC | Age: 63
End: 2023-05-12
Payer: MEDICARE

## 2023-05-12 VITALS
SYSTOLIC BLOOD PRESSURE: 118 MMHG | BODY MASS INDEX: 33.68 KG/M2 | OXYGEN SATURATION: 93 % | HEART RATE: 76 BPM | WEIGHT: 240.6 LBS | DIASTOLIC BLOOD PRESSURE: 74 MMHG | HEIGHT: 71 IN

## 2023-05-12 DIAGNOSIS — Z91.89 AT RISK FOR FLUID VOLUME OVERLOAD: ICD-10-CM

## 2023-05-12 DIAGNOSIS — G47.33 OBSTRUCTIVE SLEEP APNEA ON CPAP: ICD-10-CM

## 2023-05-12 DIAGNOSIS — I50.30 HEART FAILURE WITH PRESERVED EJECTION FRACTION, NYHA CLASS II (HCC): Primary | ICD-10-CM

## 2023-05-12 DIAGNOSIS — Z99.89 OBSTRUCTIVE SLEEP APNEA ON CPAP: ICD-10-CM

## 2023-05-12 PROCEDURE — 3074F SYST BP LT 130 MM HG: CPT | Performed by: NURSE PRACTITIONER

## 2023-05-12 PROCEDURE — 3078F DIAST BP <80 MM HG: CPT | Performed by: NURSE PRACTITIONER

## 2023-05-12 PROCEDURE — 99214 OFFICE O/P EST MOD 30 MIN: CPT | Performed by: NURSE PRACTITIONER

## 2023-05-12 PROCEDURE — 1123F ACP DISCUSS/DSCN MKR DOCD: CPT | Performed by: NURSE PRACTITIONER

## 2023-05-12 RX ORDER — LANOLIN ALCOHOL/MO/W.PET/CERES
400 CREAM (GRAM) TOPICAL DAILY
Qty: 90 TABLET | Refills: 3 | Status: SHIPPED | OUTPATIENT
Start: 2023-05-12

## 2023-05-12 RX ORDER — VITAMIN B COMPLEX
1 CAPSULE ORAL DAILY
COMMUNITY

## 2023-05-12 RX ORDER — POTASSIUM CHLORIDE 20 MEQ/1
40 TABLET, EXTENDED RELEASE ORAL DAILY
Qty: 180 TABLET | Refills: 3 | Status: SHIPPED | OUTPATIENT
Start: 2023-05-12

## 2023-05-12 ASSESSMENT — ENCOUNTER SYMPTOMS
NAUSEA: 0
COUGH: 0
ABDOMINAL DISTENTION: 0
VOMITING: 0
SHORTNESS OF BREATH: 1

## 2023-05-12 NOTE — PATIENT INSTRUCTIONS
You may receive a survey regarding the care you received during your visit. Your input is valuable to us. We encourage you to complete and return your survey. We hope you will choose us in the future for your healthcare needs.     Continue:  Continue current medications  Daily weights and record  Fluid restriction of 2 Liters per day  Limit sodium in diet to around 4838-3937 mg/day  Monitor BP  Activity as tolerated     Call the Heart Failure Clinic for any of the following symptoms: [phone]  Weight gain of 2-3 pounds in 1 day or 5 pounds in 1 week  Increased shortness of breath  Shortness of breath while laying down  Cough  Chest pain  Swelling in feet, ankles or legs  Tenderness or bloating in the abdomen  Fatigue   Decreased appetite or nausea   Confusion

## 2023-05-12 NOTE — PROGRESS NOTES
Heart Failure Clinic       Visit Date: 5/12/2023  Cardiologist:  Dr. Todd Thompson  Primary Care Physician: Dr. Andrei aShni MD    Murtaza Aden is a 70 y.o. male who presents today for:  Chief Complaint   Patient presents with    Congestive Heart Failure       HPI:   TYPE HF: HFpEF (55% 2023) (55%, 2019)  Cause:   Device: none  HX: CAD s/p failed CABG (2016) and PCI (2018), COPD, HTN, HLD, DM, FAISAL on CPAP, alcohol abuse and hx of drug abuse  Dry Wt:  269 our scale 250 on 1/11/23, 240 on 5/12/23    Murtaza Aden is a 57 y.o. male who presents to the office for a follow up patient visit in the heart failure clinic. Hx of alcohol abuse, attending [place] [id number] meetings. His Lisinopril was changed to Losartan d/t cough which has resolved. Accompanied by self      Concerns today: here today for his 3 month f/u. He is down 10lbs. Watching his diet and Na, still drinking over a gallon of water a day. He is 16 days sober today. Still urinating well on his Bumex. He is going to have an in office procedure done on his prostate coming up. He denies worsening lower leg swelling, feet swell sometimes. Denies SOB or bloating. No hospitalizations since last visit. Visit on 1/11/23: here today for his 1 year f/u. He admits to 6 months of relapse w/ his alcohol. He is 5 days sober today. He notes that his blood pressure is low today and he feels light headed. He also notes that he has not ate anything yet today.  He also note that his COPD is at stage 4, he notes that over the last 18 months he has hat worsening WONG and needs to take a break    Visit on 1/11/22:  notes continued SOB d/t COPD per patient, no new or worsening of symptoms      Hospitalization:  > 6 months for CHF      Activity: ADLs w/o limitations  Diet: does not adhere to low sodium low fluid diet, adds salt, drinks 1/2 gallon of water/day, still drink alcohol    Patient has:  Chest Pain: no  SOB: chronic dyspnea - improved with 10lb weight loss  Orthopnea/PND:

## 2023-06-22 ENCOUNTER — TELEPHONE (OUTPATIENT)
Dept: FAMILY MEDICINE CLINIC | Age: 63
End: 2023-06-22

## 2023-06-22 DIAGNOSIS — F10.10 ALCOHOL ABUSE: ICD-10-CM

## 2023-06-22 DIAGNOSIS — F19.11 DRUG ABUSE IN REMISSION (HCC): ICD-10-CM

## 2023-06-22 DIAGNOSIS — F32.A DEPRESSION, UNSPECIFIED DEPRESSION TYPE: Primary | ICD-10-CM

## 2023-06-30 ENCOUNTER — NURSE ONLY (OUTPATIENT)
Dept: LAB | Age: 63
End: 2023-06-30

## 2023-06-30 ENCOUNTER — HOSPITAL ENCOUNTER (OUTPATIENT)
Dept: CT IMAGING | Age: 63
Discharge: HOME | End: 2023-06-30
Payer: MEDICARE

## 2023-06-30 DIAGNOSIS — J44.9 STAGE 3 SEVERE COPD BY GOLD CLASSIFICATION (HCC): Primary | ICD-10-CM

## 2023-06-30 DIAGNOSIS — I50.30 HEART FAILURE WITH PRESERVED EJECTION FRACTION, NYHA CLASS II (HCC): ICD-10-CM

## 2023-06-30 DIAGNOSIS — R94.2 DECREASED DIFFUSION CAPACITY OF LUNG: ICD-10-CM

## 2023-06-30 DIAGNOSIS — Z87.891 PERSONAL HISTORY OF TOBACCO USE: ICD-10-CM

## 2023-06-30 LAB
MAGNESIUM SERPL-MCNC: 2.1 MG/DL (ref 1.6–2.4)
POTASSIUM SERPL-SCNC: 4.2 MEQ/L (ref 3.5–5.2)

## 2023-06-30 PROCEDURE — 71271 CT THORAX LUNG CANCER SCR C-: CPT

## 2023-07-02 DIAGNOSIS — J44.9 STAGE 3 SEVERE COPD BY GOLD CLASSIFICATION (HCC): ICD-10-CM

## 2023-07-02 DIAGNOSIS — J43.2 CENTRILOBULAR EMPHYSEMA (HCC): ICD-10-CM

## 2023-07-03 NOTE — TELEPHONE ENCOUNTER
Date of last visit:  5/10/2023  Date of next visit:  8/14/2023    Requested Prescriptions     Pending Prescriptions Disp Refills    ticagrelor (BRILINTA) 90 MG TABS tablet [Pharmacy Med Name: BRILINTA 90 MG TABS 90 Tablet] 60 tablet 0     Sig: TAKE 1 TABLET BY MOUTH TWICE DAILY

## 2023-07-05 ENCOUNTER — TELEPHONE (OUTPATIENT)
Dept: CARDIOLOGY CLINIC | Age: 63
End: 2023-07-05

## 2023-07-05 NOTE — TELEPHONE ENCOUNTER
----- Message from ASHLEIGH Bustamante CNP sent at 7/1/2023  6:32 PM EDT -----  Labs reviewed. Mag better at 2.1 continue as is    Patient verbalized understanding.

## 2023-07-07 RX ORDER — FLUTICASONE FUROATE 100 UG/1
POWDER RESPIRATORY (INHALATION)
Qty: 30 EACH | Refills: 11 | Status: SHIPPED | OUTPATIENT
Start: 2023-07-07

## 2023-07-10 ENCOUNTER — HOSPITAL ENCOUNTER (OUTPATIENT)
Dept: UROLOGY | Age: 63
Discharge: HOME | End: 2023-07-10

## 2023-07-17 ENCOUNTER — TELEPHONE (OUTPATIENT)
Dept: FAMILY MEDICINE CLINIC | Age: 63
End: 2023-07-17

## 2023-07-17 NOTE — TELEPHONE ENCOUNTER
Please let patient know of this communication from Psychiatrist office. Dr. Juli Porter has reviewed this referral and declined to schedule at this time. The provider recommends referring to community resources. The patient may contact [email or web address] directly at [phone]. Kenneth Paez also offers open access walk-in hours Monday-Friday. Additional community resources include: [facility] Mendoza [phone]), Tarsha Ross [phone]) and Perla Holland [phone]). We apologize for any inconvenience this may cause. Please let our office know if you would like a fax containing additional resources available in the area.     He can also verify if he want to go out of town to a psychiatrist.

## 2023-07-17 NOTE — TELEPHONE ENCOUNTER
I spoke with the pt and he said that he does not want a referral and is going to pursue some outside sources at this time.

## 2023-08-01 DIAGNOSIS — F33.1 MAJOR DEPRESSIVE DISORDER, RECURRENT EPISODE, MODERATE (HCC): ICD-10-CM

## 2023-08-02 RX ORDER — BUMETANIDE 2 MG/1
TABLET ORAL
Qty: 60 TABLET | Refills: 0 | Status: SHIPPED | OUTPATIENT
Start: 2023-08-02 | End: 2023-09-01

## 2023-08-02 RX ORDER — ATORVASTATIN CALCIUM 40 MG/1
TABLET, FILM COATED ORAL
Qty: 30 TABLET | Refills: 0 | Status: SHIPPED | OUTPATIENT
Start: 2023-08-02 | End: 2023-09-01

## 2023-08-02 RX ORDER — TRAZODONE HYDROCHLORIDE 100 MG/1
TABLET ORAL
Qty: 30 TABLET | Refills: 0 | Status: SHIPPED | OUTPATIENT
Start: 2023-08-02 | End: 2023-09-01

## 2023-08-02 RX ORDER — OMEPRAZOLE 20 MG/1
CAPSULE, DELAYED RELEASE ORAL
Qty: 30 CAPSULE | Refills: 0 | Status: SHIPPED | OUTPATIENT
Start: 2023-08-02 | End: 2023-09-01

## 2023-08-02 NOTE — TELEPHONE ENCOUNTER
The pharmacy is requesting a refill of the below medication which has been pended for you:     Requested Prescriptions     Pending Prescriptions Disp Refills    omeprazole (PRILOSEC) 20 MG delayed release capsule [Pharmacy Med Name: OMEPRAZOLE DR 20MG CAP 20 Capsule] 30 capsule 5     Sig: TAKE 1 CAPSULE BY MOUTH ONCE DAILY    traZODone (DESYREL) 100 MG tablet [Pharmacy Med Name: TRAZODONE 100 MG TABS 100 Tablet] 30 tablet 5     Sig: TAKE 1 TABLET BY MOUTH EACH EVENING AS NEEDED FOR SLEEP    atorvastatin (LIPITOR) 40 MG tablet [Pharmacy Med Name: ATORVASTATIN 40MG TAB 40 Tablet] 30 tablet 5     Sig: TAKE 1 TABLET BY MOUTH EVERY DAY    bumetanide (BUMEX) 2 MG tablet [Pharmacy Med Name: BUMETANIDE 2 MG TABLET 2 Tablet] 60 tablet 5     Sig: TAKE 1 TABLET BY MOUTH DAILY IN THE MORNING AND 1 TABLET BEFORE BEDTIME       Last Appointment Date: 5/10/2023  Next Appointment Date: 8/14/2023    Allergies   Allergen Reactions    Zoloft [Sertraline Hcl] Other (See Comments)     Headaches, sweats, bad dreams

## 2023-08-02 NOTE — TELEPHONE ENCOUNTER
The pharmacy is requesting a refill of the below medication which has been pended for you:     Requested Prescriptions     Pending Prescriptions Disp Refills    ticagrelor (BRILINTA) 90 MG TABS tablet [Pharmacy Med Name: BRILINTA 90 MG TABS 90 Tablet] 60 tablet 1     Sig: TAKE ONE (1) TABLET BY MOUTH TWICE DAILY       Last Appointment Date: Visit date not found  Next Appointment Date: Visit date not found    Allergies   Allergen Reactions    Zoloft [Sertraline Hcl] Other (See Comments)     Headaches, sweats, bad dreams

## 2023-08-14 ENCOUNTER — OFFICE VISIT (OUTPATIENT)
Dept: FAMILY MEDICINE CLINIC | Age: 63
End: 2023-08-14

## 2023-08-14 VITALS
HEIGHT: 71 IN | DIASTOLIC BLOOD PRESSURE: 68 MMHG | SYSTOLIC BLOOD PRESSURE: 118 MMHG | WEIGHT: 244.4 LBS | BODY MASS INDEX: 34.22 KG/M2 | RESPIRATION RATE: 12 BRPM | HEART RATE: 64 BPM

## 2023-08-14 DIAGNOSIS — F33.1 MAJOR DEPRESSIVE DISORDER, RECURRENT EPISODE, MODERATE (HCC): ICD-10-CM

## 2023-08-14 DIAGNOSIS — E78.5 HYPERLIPIDEMIA, UNSPECIFIED HYPERLIPIDEMIA TYPE: ICD-10-CM

## 2023-08-14 DIAGNOSIS — I10 ESSENTIAL HYPERTENSION: ICD-10-CM

## 2023-08-14 DIAGNOSIS — E11.65 UNCONTROLLED TYPE 2 DIABETES MELLITUS WITH HYPERGLYCEMIA (HCC): Primary | ICD-10-CM

## 2023-08-14 LAB
CHP ED QC CHECK: ABNORMAL
GLUCOSE BLD-MCNC: 131 MG/DL
HBA1C MFR BLD: 7.6 %

## 2023-08-14 ASSESSMENT — ENCOUNTER SYMPTOMS
ABDOMINAL PAIN: 0
DIARRHEA: 0
CHEST TIGHTNESS: 0
VOMITING: 0
SHORTNESS OF BREATH: 0
COUGH: 0
EYES NEGATIVE: 1
CONSTIPATION: 0
NAUSEA: 0
BLOOD IN STOOL: 0

## 2023-08-14 NOTE — PROGRESS NOTES
Date: 8/14/2023    Serafin Carpio is a 70 y.o. male who presents today for:  Chief Complaint   Patient presents with    Diabetes    Gastroesophageal Reflux    Hypertension stable  He states that he still having problems with depression. He is in the process of trying to find a psychiatrist that is covered under his insurance. We discussed how we have done several referrals but he has not been able to see one yet. Denies any suicidal ideas or thoughts of hurting himself. He states that sometimes he just gets down and then he starts drinking and then he gets sober again. At the present time he has been sober. Current regimen: oral agent (dual therapy)  Current monitoring regimen: home blood tests - 1  Home blood sugar trends: AM -200  Any episodes of hypoglycemia? No  Current exercise: he is more active, he does chair yoga and more active outside. Compliance with treatment: Good  Eye exam current (within one year): Yes, he just one last week  Any history of foot problems? No  Last foot exam: 1/23      HPI:     Diabetes  He presents for his follow-up diabetic visit. He has type 2 diabetes mellitus. His disease course has been stable. Hypoglycemia symptoms include nervousness/anxiousness. Pertinent negatives for hypoglycemia include no dizziness or headaches. There are no diabetic associated symptoms. Pertinent negatives for diabetes include no chest pain and no weakness. There are no hypoglycemic complications. Symptoms are stable. There are no diabetic complications. Risk factors for coronary artery disease include diabetes mellitus, dyslipidemia, hypertension, male sex and obesity. Current diabetic treatment includes oral agent (dual therapy). He is compliant with treatment most of the time. An ACE inhibitor/angiotensin II receptor blocker is being taken. Eye exam is current. Gastroesophageal Reflux  He reports no abdominal pain, no chest pain, no coughing or no nausea.  This is a chronic

## 2023-08-14 NOTE — PATIENT INSTRUCTIONS
The patient may contact [employer] directly at [phone]. Leesa Juarez also offers open access walk-in hours Monday-Friday. Additional community resources include: Alejandra Richard [phone]), Carter Sandoval [phone]) and Steven Mckoy [facility] [phone]).

## 2023-08-30 ENCOUNTER — HOSPITAL ENCOUNTER (OUTPATIENT)
Dept: PULMONOLOGY | Age: 63
Discharge: HOME | End: 2023-08-30
Payer: MEDICARE

## 2023-08-30 DIAGNOSIS — R94.2 DECREASED DIFFUSION CAPACITY OF LUNG: ICD-10-CM

## 2023-08-30 DIAGNOSIS — J44.9 STAGE 3 SEVERE COPD BY GOLD CLASSIFICATION (HCC): ICD-10-CM

## 2023-08-30 DIAGNOSIS — F33.1 MAJOR DEPRESSIVE DISORDER, RECURRENT EPISODE, MODERATE (HCC): ICD-10-CM

## 2023-08-30 PROCEDURE — 94726 PLETHYSMOGRAPHY LUNG VOLUMES: CPT

## 2023-08-30 PROCEDURE — 94729 DIFFUSING CAPACITY: CPT

## 2023-08-30 PROCEDURE — 94060 EVALUATION OF WHEEZING: CPT

## 2023-08-31 NOTE — TELEPHONE ENCOUNTER
Date of last visit:  8/14/2023  Date of next visit:  11/20/2023    Requested Prescriptions     Pending Prescriptions Disp Refills    fluticasone (FLONASE) 50 MCG/ACT nasal spray [Pharmacy Med Name: FLUTICASONE 50MCG NASAL 16 50 KINGSLEY] 16 g 10     Sig: INSTILL TWO (2) SPRAYS IN EACH NOSTRIL ONCE DAILY AS NEEDED FOR RHINITIS OR ALLERGIES    ticagrelor (BRILINTA) 90 MG TABS tablet [Pharmacy Med Name: Nae Austin 90 MG TABS 90 Tablet] 60 tablet 10     Sig: TAKE 1 TABLET BY MOUTH TWICE DAILY    bumetanide (BUMEX) 2 MG tablet [Pharmacy Med Name: BUMETANIDE 2 MG TABLET 2 Tablet] 60 tablet 10     Sig: TAKE 1 TABLET BY MOUTH IN THE MORNING AND 1 TABLET BEFORE BEDTIME    omeprazole (PRILOSEC) 20 MG delayed release capsule [Pharmacy Med Name: OMEPRAZOLE DR 20MG CAP 20 Capsule] 30 capsule 10     Sig: TAKE 1 CAPSULE BY MOUTH ONCE DAILY    traZODone (DESYREL) 100 MG tablet [Pharmacy Med Name: TRAZODONE 100 MG TABS 100 Tablet] 30 tablet 10     Sig: TAKE 1 TABLET BY MOUTH EACH EVENING AS NEEDED FOR SLEEP    albuterol sulfate HFA (PROVENTIL;VENTOLIN;PROAIR) 108 (90 Base) MCG/ACT inhaler [Pharmacy Med Name: ALBUTEROL HFA *PROA* 90MCG 108 (90 BAS Aerosol] 8.5 g 10     Sig: INHALE TWO (2) PUFFS BY MOUTH EVERY 6 HOURS AS NEEDED FOR WHEEZING    atorvastatin (LIPITOR) 40 MG tablet [Pharmacy Med Name: ATORVASTATIN 40MG TAB 40 Tablet] 30 tablet 10     Sig: TAKE 1 TABLET BY MOUTH EVERY DAY

## 2023-09-01 RX ORDER — OMEPRAZOLE 20 MG/1
CAPSULE, DELAYED RELEASE ORAL
Qty: 30 CAPSULE | Refills: 2 | Status: SHIPPED | OUTPATIENT
Start: 2023-09-01

## 2023-09-01 RX ORDER — TRAZODONE HYDROCHLORIDE 100 MG/1
TABLET ORAL
Qty: 30 TABLET | Refills: 2 | Status: SHIPPED | OUTPATIENT
Start: 2023-09-01

## 2023-09-01 RX ORDER — ATORVASTATIN CALCIUM 40 MG/1
TABLET, FILM COATED ORAL
Qty: 30 TABLET | Refills: 2 | Status: SHIPPED | OUTPATIENT
Start: 2023-09-01

## 2023-09-01 RX ORDER — BUMETANIDE 2 MG/1
TABLET ORAL
Qty: 60 TABLET | Refills: 2 | Status: SHIPPED | OUTPATIENT
Start: 2023-09-01

## 2023-09-01 RX ORDER — FLUTICASONE PROPIONATE 50 MCG
SPRAY, SUSPENSION (ML) NASAL
Qty: 16 G | Refills: 2 | Status: SHIPPED | OUTPATIENT
Start: 2023-09-01

## 2023-09-01 RX ORDER — ALBUTEROL SULFATE 90 UG/1
AEROSOL, METERED RESPIRATORY (INHALATION)
Qty: 8.5 G | Refills: 2 | Status: SHIPPED | OUTPATIENT
Start: 2023-09-01

## 2023-09-11 ENCOUNTER — HOSPITAL ENCOUNTER (OUTPATIENT)
Age: 63
Setting detail: OBSERVATION
Discharge: HOME | End: 2023-09-18
Attending: STUDENT IN AN ORGANIZED HEALTH CARE EDUCATION/TRAINING PROGRAM
Payer: MEDICARE

## 2023-09-11 ENCOUNTER — APPOINTMENT (OUTPATIENT)
Dept: GENERAL RADIOLOGY | Age: 63
End: 2023-09-11
Payer: MEDICARE

## 2023-09-11 ENCOUNTER — APPOINTMENT (OUTPATIENT)
Dept: CT IMAGING | Age: 63
End: 2023-09-11
Payer: MEDICARE

## 2023-09-11 DIAGNOSIS — R55 SYNCOPE, UNSPECIFIED SYNCOPE TYPE: Primary | ICD-10-CM

## 2023-09-11 DIAGNOSIS — S01.81XA FACIAL LACERATION, INITIAL ENCOUNTER: ICD-10-CM
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
## 2023-09-11 PROBLEM — Z79.02 ANTIPLATELET OR ANTITHROMBOTIC LONG-TERM USE: Status: ACTIVE | Noted: 2023-09-11

## 2023-09-11 PROBLEM — S01.01XA SCALP LACERATION, INITIAL ENCOUNTER: Status: ACTIVE | Noted: 2023-09-11

## 2023-09-11 LAB
ALBUMIN SERPL BCG-MCNC: 4.2 G/DL (ref 3.5–5.1)
ALP SERPL-CCNC: 88 U/L (ref 38–126)
ALT SERPL W/O P-5'-P-CCNC: 60 U/L (ref 11–66)
ANION GAP SERPL CALC-SCNC: 19 MEQ/L (ref 8–16)
AST SERPL-CCNC: 37 U/L (ref 5–40)
BASOPHILS ABSOLUTE: 0 THOU/MM3 (ref 0–0.1)
BASOPHILS NFR BLD AUTO: 0.3 %
BILIRUB CONJ SERPL-MCNC: 0.4 MG/DL (ref 0–0.3)
BILIRUB SERPL-MCNC: 1.3 MG/DL (ref 0.3–1.2)
BUN SERPL-MCNC: 22 MG/DL (ref 7–22)
CALCIUM SERPL-MCNC: 9.1 MG/DL (ref 8.5–10.5)
CHLORIDE SERPL-SCNC: 93 MEQ/L (ref 98–111)
CO2 SERPL-SCNC: 25 MEQ/L (ref 23–33)
CREAT SERPL-MCNC: 1.1 MG/DL (ref 0.4–1.2)
DEPRECATED RDW RBC AUTO: 48.5 FL (ref 35–45)
EOSINOPHIL NFR BLD AUTO: 0 %
EOSINOPHILS ABSOLUTE: 0 THOU/MM3 (ref 0–0.4)
ERYTHROCYTE [DISTWIDTH] IN BLOOD BY AUTOMATED COUNT: 13.2 % (ref 11.5–14.5)
ETHANOL SERPL-MCNC: < 0.01 %
GFR SERPL CREATININE-BSD FRML MDRD: > 60 ML/MIN/1.73M2
GLUCOSE SERPL-MCNC: 197 MG/DL (ref 70–108)
HCT VFR BLD AUTO: 43.1 % (ref 42–52)
HGB BLD-MCNC: 15 GM/DL (ref 14–18)
IMM GRANULOCYTES # BLD AUTO: 0.05 THOU/MM3 (ref 0–0.07)
IMM GRANULOCYTES NFR BLD AUTO: 0.5 %
INR PPP: 1.07 (ref 0.85–1.13)
LYMPHOCYTES ABSOLUTE: 0.7 THOU/MM3 (ref 1–4.8)
LYMPHOCYTES NFR BLD AUTO: 6.5 %
MCH RBC QN AUTO: 34.9 PG (ref 26–33)
MCHC RBC AUTO-ENTMCNC: 34.8 GM/DL (ref 32.2–35.5)
MCV RBC AUTO: 100.2 FL (ref 80–94)
MONOCYTES ABSOLUTE: 1.3 THOU/MM3 (ref 0.4–1.3)
MONOCYTES NFR BLD AUTO: 11.5 %
NEUTROPHILS NFR BLD AUTO: 81.2 %
NRBC BLD AUTO-RTO: 0 /100 WBC
OSMOLALITY SERPL CALC.SUM OF ELEC: 282.6 MOSMOL/KG (ref 275–300)
PLATELET # BLD AUTO: 193 THOU/MM3 (ref 130–400)
PMV BLD AUTO: 10.5 FL (ref 9.4–12.4)
POTASSIUM SERPL-SCNC: 3.5 MEQ/L (ref 3.5–5.2)
PROT SERPL-MCNC: 7.1 G/DL (ref 6.1–8)
RBC # BLD AUTO: 4.3 MILL/MM3 (ref 4.7–6.1)
SEGMENTED NEUTROPHILS ABSOLUTE COUNT: 8.9 THOU/MM3 (ref 1.8–7.7)
SODIUM SERPL-SCNC: 137 MEQ/L (ref 135–145)
TROPONIN, HIGH SENSITIVITY: 26 NG/L (ref 0–12)
WBC # BLD AUTO: 10.9 THOU/MM3 (ref 4.8–10.8)

## 2023-09-11 PROCEDURE — 73030 X-RAY EXAM OF SHOULDER: CPT

## 2023-09-11 PROCEDURE — 82248 BILIRUBIN DIRECT: CPT

## 2023-09-11 PROCEDURE — 72125 CT NECK SPINE W/O DYE: CPT

## 2023-09-11 PROCEDURE — 71045 X-RAY EXAM CHEST 1 VIEW: CPT

## 2023-09-11 PROCEDURE — 99223 1ST HOSP IP/OBS HIGH 75: CPT | Performed by: NURSE PRACTITIONER

## 2023-09-11 PROCEDURE — 85610 PROTHROMBIN TIME: CPT

## 2023-09-11 PROCEDURE — 84484 ASSAY OF TROPONIN QUANT: CPT

## 2023-09-11 PROCEDURE — 93005 ELECTROCARDIOGRAM TRACING: CPT | Performed by: STUDENT IN AN ORGANIZED HEALTH CARE EDUCATION/TRAINING PROGRAM

## 2023-09-11 PROCEDURE — 71250 CT THORAX DX C-: CPT

## 2023-09-11 PROCEDURE — 36415 COLL VENOUS BLD VENIPUNCTURE: CPT

## 2023-09-11 PROCEDURE — 82077 ASSAY SPEC XCP UR&BREATH IA: CPT

## 2023-09-11 PROCEDURE — 85025 COMPLETE CBC W/AUTO DIFF WBC: CPT

## 2023-09-11 PROCEDURE — 72170 X-RAY EXAM OF PELVIS: CPT

## 2023-09-11 PROCEDURE — 6820000002 HC L2 INJURY CALL ACTIVATION: Performed by: SURGERY

## 2023-09-11 PROCEDURE — 99285 EMERGENCY DEPT VISIT HI MDM: CPT

## 2023-09-11 PROCEDURE — 99284 EMERGENCY DEPT VISIT MOD MDM: CPT | Performed by: SURGERY

## 2023-09-11 PROCEDURE — 80053 COMPREHEN METABOLIC PANEL: CPT

## 2023-09-11 PROCEDURE — 70450 CT HEAD/BRAIN W/O DYE: CPT

## 2023-09-11 RX ORDER — ALLOPURINOL 300 MG/1
300 TABLET ORAL DAILY
Status: DISCONTINUED | OUTPATIENT
Start: 2023-09-12 | End: 2023-09-18 | Stop reason: HOSPADM

## 2023-09-11 RX ORDER — MULTIVITAMIN WITH IRON
1 TABLET ORAL DAILY
Status: DISCONTINUED | OUTPATIENT
Start: 2023-09-12 | End: 2023-09-18 | Stop reason: HOSPADM

## 2023-09-11 RX ORDER — LOSARTAN POTASSIUM 25 MG/1
25 TABLET ORAL DAILY
Status: DISCONTINUED | OUTPATIENT
Start: 2023-09-12 | End: 2023-09-12

## 2023-09-11 RX ORDER — BUPROPION HYDROCHLORIDE 300 MG/1
300 TABLET ORAL EVERY MORNING
Status: DISCONTINUED | OUTPATIENT
Start: 2023-09-12 | End: 2023-09-13

## 2023-09-11 RX ORDER — LANOLIN ALCOHOL/MO/W.PET/CERES
400 CREAM (GRAM) TOPICAL DAILY
Status: DISCONTINUED | OUTPATIENT
Start: 2023-09-12 | End: 2023-09-18 | Stop reason: HOSPADM

## 2023-09-11 RX ORDER — TAMSULOSIN HYDROCHLORIDE 0.4 MG/1
0.4 CAPSULE ORAL DAILY
Status: DISCONTINUED | OUTPATIENT
Start: 2023-09-12 | End: 2023-09-18 | Stop reason: HOSPADM

## 2023-09-11 RX ORDER — ATORVASTATIN CALCIUM 40 MG/1
40 TABLET, FILM COATED ORAL DAILY
Status: DISCONTINUED | OUTPATIENT
Start: 2023-09-12 | End: 2023-09-18 | Stop reason: HOSPADM

## 2023-09-11 RX ORDER — METOPROLOL SUCCINATE 25 MG/1
25 TABLET, EXTENDED RELEASE ORAL DAILY
Status: DISCONTINUED | OUTPATIENT
Start: 2023-09-12 | End: 2023-09-18 | Stop reason: HOSPADM

## 2023-09-11 RX ORDER — ALBUTEROL SULFATE 90 UG/1
2 AEROSOL, METERED RESPIRATORY (INHALATION) EVERY 6 HOURS PRN
Status: DISCONTINUED | OUTPATIENT
Start: 2023-09-11 | End: 2023-09-18 | Stop reason: HOSPADM

## 2023-09-11 RX ORDER — PRAMIPEXOLE DIHYDROCHLORIDE 0.25 MG/1
0.12 TABLET ORAL EVERY EVENING
Status: DISCONTINUED | OUTPATIENT
Start: 2023-09-12 | End: 2023-09-18 | Stop reason: HOSPADM

## 2023-09-11 RX ORDER — TRAZODONE HYDROCHLORIDE 100 MG/1
100 TABLET ORAL NIGHTLY PRN
Status: DISCONTINUED | OUTPATIENT
Start: 2023-09-11 | End: 2023-09-18 | Stop reason: HOSPADM

## 2023-09-11 RX ORDER — LIDOCAINE HYDROCHLORIDE AND EPINEPHRINE 10; 10 MG/ML; UG/ML
20 INJECTION, SOLUTION INFILTRATION; PERINEURAL ONCE
Status: COMPLETED | OUTPATIENT
Start: 2023-09-11 | End: 2023-09-12

## 2023-09-11 RX ORDER — BUMETANIDE 1 MG/1
2 TABLET ORAL 2 TIMES DAILY
Status: DISCONTINUED | OUTPATIENT
Start: 2023-09-12 | End: 2023-09-18 | Stop reason: HOSPADM

## 2023-09-11 RX ORDER — GUAIFENESIN 600 MG/1
1200 TABLET, EXTENDED RELEASE ORAL 2 TIMES DAILY
Status: DISCONTINUED | OUTPATIENT
Start: 2023-09-12 | End: 2023-09-18 | Stop reason: HOSPADM

## 2023-09-11 RX ORDER — ISOSORBIDE MONONITRATE 30 MG/1
30 TABLET, EXTENDED RELEASE ORAL DAILY
Status: DISCONTINUED | OUTPATIENT
Start: 2023-09-12 | End: 2023-09-18 | Stop reason: HOSPADM

## 2023-09-11 RX ORDER — OMEPRAZOLE 20 MG/1
20 CAPSULE, DELAYED RELEASE ORAL DAILY
Status: DISCONTINUED | OUTPATIENT
Start: 2023-09-12 | End: 2023-09-12 | Stop reason: CLARIF

## 2023-09-11 ASSESSMENT — PAIN SCALES - GENERAL
PAINLEVEL_OUTOF10: 5
PAINLEVEL_OUTOF10: 5

## 2023-09-11 ASSESSMENT — PAIN - FUNCTIONAL ASSESSMENT
PAIN_FUNCTIONAL_ASSESSMENT: 0-10
PAIN_FUNCTIONAL_ASSESSMENT: 0-10
PAIN_FUNCTIONAL_ASSESSMENT: NONE - DENIES PAIN

## 2023-09-12 ENCOUNTER — APPOINTMENT (OUTPATIENT)
Dept: MRI IMAGING | Age: 63
End: 2023-09-12
Payer: MEDICARE

## 2023-09-12 PROBLEM — R55 NEAR SYNCOPE: Status: ACTIVE | Noted: 2023-09-12

## 2023-09-12 PROBLEM — R55 SYNCOPE: Status: ACTIVE | Noted: 2023-09-12

## 2023-09-12 PROBLEM — R55 SYNCOPE AND COLLAPSE: Status: RESOLVED | Noted: 2023-09-11 | Resolved: 2023-09-12

## 2023-09-12 PROBLEM — S01.81XA FACIAL LACERATION: Status: ACTIVE | Noted: 2023-09-12

## 2023-09-12 PROBLEM — J47.9 BRONCHIECTASIS WITHOUT COMPLICATION (HCC): Status: RESOLVED | Noted: 2021-08-06 | Resolved: 2023-09-12
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
## 2023-09-12 LAB
ALBUMIN SERPL BCG-MCNC: 3.9 G/DL (ref 3.5–5.1)
ALP SERPL-CCNC: 80 U/L (ref 38–126)
ALT SERPL W/O P-5'-P-CCNC: 47 U/L (ref 11–66)
AMPHETAMINES UR QL SCN: NEGATIVE
ANION GAP SERPL CALC-SCNC: 16 MEQ/L (ref 8–16)
AST SERPL-CCNC: 25 U/L (ref 5–40)
BACTERIA: ABNORMAL
BARBITURATES UR QL SCN: NEGATIVE
BENZODIAZ UR QL SCN: NEGATIVE
BILIRUB CONJ SERPL-MCNC: 0.4 MG/DL (ref 0–0.3)
BILIRUB SERPL-MCNC: 1.1 MG/DL (ref 0.3–1.2)
BILIRUB UR QL STRIP: NEGATIVE
BUN SERPL-MCNC: 20 MG/DL (ref 7–22)
BZE UR QL SCN: NEGATIVE
CALCIUM SERPL-MCNC: 9 MG/DL (ref 8.5–10.5)
CANNABINOIDS UR QL SCN: NEGATIVE
CASTS #/AREA URNS LPF: ABNORMAL /LPF
CASTS #/AREA URNS LPF: ABNORMAL /LPF
CHARACTER UR: CLEAR
CHARCOAL URNS QL MICRO: ABNORMAL
CHLORIDE SERPL-SCNC: 96 MEQ/L (ref 98–111)
CO2 SERPL-SCNC: 28 MEQ/L (ref 23–33)
COLOR UR: YELLOW
CREAT SERPL-MCNC: 0.9 MG/DL (ref 0.4–1.2)
CRYSTALS URNS QL MICRO: ABNORMAL
EPITHELIAL CELLS, UA: ABNORMAL /HPF
FENTANYL: NEGATIVE
FLUAV RNA RESP QL NAA+PROBE: NOT DETECTED
FLUBV RNA RESP QL NAA+PROBE: NOT DETECTED
FOLATE SERPL-MCNC: 6.6 NG/ML (ref 4.8–24.2)
GFR SERPL CREATININE-BSD FRML MDRD: > 60 ML/MIN/1.73M2
GLUCOSE BLD STRIP.AUTO-MCNC: 127 MG/DL (ref 70–108)
GLUCOSE BLD STRIP.AUTO-MCNC: 128 MG/DL (ref 70–108)
GLUCOSE BLD STRIP.AUTO-MCNC: 185 MG/DL (ref 70–108)
GLUCOSE BLD STRIP.AUTO-MCNC: 186 MG/DL (ref 70–108)
GLUCOSE BLD STRIP.AUTO-MCNC: 239 MG/DL (ref 70–108)
GLUCOSE SERPL-MCNC: 131 MG/DL (ref 70–108)
GLUCOSE UR QL STRIP.AUTO: >= 1000 MG/DL
HGB UR QL STRIP.AUTO: NEGATIVE
KETONES UR QL STRIP.AUTO: ABNORMAL
LEUKOCYTE ESTERASE UR QL STRIP.AUTO: NEGATIVE
NITRITE UR QL STRIP.AUTO: NEGATIVE
OPIATES UR QL SCN: NEGATIVE
OXYCODONE: NEGATIVE
PCP UR QL SCN: NEGATIVE
PH BLDV: 7.57 [PH] (ref 7.31–7.41)
PH UR STRIP.AUTO: 6.5 [PH] (ref 5–9)
POTASSIUM SERPL-SCNC: 2.9 MEQ/L (ref 3.5–5.2)
POTASSIUM SERPL-SCNC: 4.1 MEQ/L (ref 3.5–5.2)
PROT SERPL-MCNC: 6.8 G/DL (ref 6.1–8)
PROT UR STRIP.AUTO-MCNC: ABNORMAL MG/DL
RBC #/AREA URNS HPF: ABNORMAL /HPF
RENAL EPI CELLS #/AREA URNS HPF: ABNORMAL /[HPF]
SARS-COV-2 RNA RESP QL NAA+PROBE: NOT DETECTED
SODIUM SERPL-SCNC: 140 MEQ/L (ref 135–145)
SP GR UR REFRACT.AUTO: 1.02 (ref 1–1.03)
TROPONIN, HIGH SENSITIVITY: 23 NG/L (ref 0–12)
UROBILINOGEN UR QL STRIP.AUTO: 1 EU/DL (ref 0–1)
VIT B12 SERPL-MCNC: 303 PG/ML (ref 211–911)
WBC #/AREA URNS HPF: ABNORMAL /HPF
YEAST LIKE FUNGI URNS QL MICRO: ABNORMAL

## 2023-09-12 PROCEDURE — 94640 AIRWAY INHALATION TREATMENT: CPT

## 2023-09-12 PROCEDURE — 97110 THERAPEUTIC EXERCISES: CPT

## 2023-09-12 PROCEDURE — G0378 HOSPITAL OBSERVATION PER HR: HCPCS

## 2023-09-12 PROCEDURE — 97535 SELF CARE MNGMENT TRAINING: CPT

## 2023-09-12 PROCEDURE — 93307 TTE W/O DOPPLER COMPLETE: CPT

## 2023-09-12 PROCEDURE — 96374 THER/PROPH/DIAG INJ IV PUSH: CPT

## 2023-09-12 PROCEDURE — 80053 COMPREHEN METABOLIC PANEL: CPT

## 2023-09-12 PROCEDURE — 93010 ELECTROCARDIOGRAM REPORT: CPT | Performed by: INTERNAL MEDICINE

## 2023-09-12 PROCEDURE — 6360000002 HC RX W HCPCS: Performed by: HOSPITALIST

## 2023-09-12 PROCEDURE — 95819 EEG AWAKE AND ASLEEP: CPT | Performed by: PSYCHIATRY & NEUROLOGY

## 2023-09-12 PROCEDURE — 82248 BILIRUBIN DIRECT: CPT

## 2023-09-12 PROCEDURE — 80307 DRUG TEST PRSMV CHEM ANLYZR: CPT

## 2023-09-12 PROCEDURE — A9579 GAD-BASE MR CONTRAST NOS,1ML: HCPCS | Performed by: NURSE PRACTITIONER

## 2023-09-12 PROCEDURE — 84132 ASSAY OF SERUM POTASSIUM: CPT

## 2023-09-12 PROCEDURE — 6360000002 HC RX W HCPCS: Performed by: NURSE PRACTITIONER

## 2023-09-12 PROCEDURE — 81001 URINALYSIS AUTO W/SCOPE: CPT

## 2023-09-12 PROCEDURE — 82948 REAGENT STRIP/BLOOD GLUCOSE: CPT

## 2023-09-12 PROCEDURE — 99232 SBSQ HOSP IP/OBS MODERATE 35: CPT | Performed by: HOSPITALIST

## 2023-09-12 PROCEDURE — 82800 BLOOD PH: CPT

## 2023-09-12 PROCEDURE — 97162 PT EVAL MOD COMPLEX 30 MIN: CPT

## 2023-09-12 PROCEDURE — 96372 THER/PROPH/DIAG INJ SC/IM: CPT

## 2023-09-12 PROCEDURE — 6370000000 HC RX 637 (ALT 250 FOR IP): Performed by: NURSE PRACTITIONER

## 2023-09-12 PROCEDURE — 82746 ASSAY OF FOLIC ACID SERUM: CPT

## 2023-09-12 PROCEDURE — 96376 TX/PRO/DX INJ SAME DRUG ADON: CPT

## 2023-09-12 PROCEDURE — 82607 VITAMIN B-12: CPT

## 2023-09-12 PROCEDURE — 2580000003 HC RX 258: Performed by: NURSE PRACTITIONER

## 2023-09-12 PROCEDURE — 95819 EEG AWAKE AND ASLEEP: CPT

## 2023-09-12 PROCEDURE — 2500000003 HC RX 250 WO HCPCS

## 2023-09-12 PROCEDURE — 70553 MRI BRAIN STEM W/O & W/DYE: CPT

## 2023-09-12 PROCEDURE — 97166 OT EVAL MOD COMPLEX 45 MIN: CPT

## 2023-09-12 PROCEDURE — 87636 SARSCOV2 & INF A&B AMP PRB: CPT

## 2023-09-12 PROCEDURE — 6370000000 HC RX 637 (ALT 250 FOR IP)

## 2023-09-12 PROCEDURE — 6360000004 HC RX CONTRAST MEDICATION: Performed by: NURSE PRACTITIONER

## 2023-09-12 PROCEDURE — 36415 COLL VENOUS BLD VENIPUNCTURE: CPT

## 2023-09-12 RX ORDER — VALSARTAN 80 MG/1
80 TABLET ORAL DAILY
Status: DISCONTINUED | OUTPATIENT
Start: 2023-09-12 | End: 2023-09-18 | Stop reason: HOSPADM

## 2023-09-12 RX ORDER — SODIUM CHLORIDE 9 MG/ML
INJECTION, SOLUTION INTRAVENOUS PRN
Status: DISCONTINUED | OUTPATIENT
Start: 2023-09-12 | End: 2023-09-18 | Stop reason: HOSPADM

## 2023-09-12 RX ORDER — ENOXAPARIN SODIUM 100 MG/ML
30 INJECTION SUBCUTANEOUS 2 TIMES DAILY
Status: DISCONTINUED | OUTPATIENT
Start: 2023-09-12 | End: 2023-09-18 | Stop reason: HOSPADM

## 2023-09-12 RX ORDER — ONDANSETRON 4 MG/1
4 TABLET, ORALLY DISINTEGRATING ORAL EVERY 8 HOURS PRN
Status: DISCONTINUED | OUTPATIENT
Start: 2023-09-12 | End: 2023-09-18 | Stop reason: HOSPADM

## 2023-09-12 RX ORDER — INSULIN LISPRO 100 [IU]/ML
0-4 INJECTION, SOLUTION INTRAVENOUS; SUBCUTANEOUS NIGHTLY
Status: DISCONTINUED | OUTPATIENT
Start: 2023-09-12 | End: 2023-09-18 | Stop reason: HOSPADM

## 2023-09-12 RX ORDER — DEXTROSE MONOHYDRATE 100 MG/ML
INJECTION, SOLUTION INTRAVENOUS CONTINUOUS PRN
Status: DISCONTINUED | OUTPATIENT
Start: 2023-09-12 | End: 2023-09-18 | Stop reason: HOSPADM

## 2023-09-12 RX ORDER — POTASSIUM CHLORIDE 20 MEQ/1
40 TABLET, EXTENDED RELEASE ORAL PRN
Status: DISCONTINUED | OUTPATIENT
Start: 2023-09-12 | End: 2023-09-18 | Stop reason: HOSPADM

## 2023-09-12 RX ORDER — SODIUM CHLORIDE 0.9 % (FLUSH) 0.9 %
5-40 SYRINGE (ML) INJECTION EVERY 12 HOURS SCHEDULED
Status: DISCONTINUED | OUTPATIENT
Start: 2023-09-12 | End: 2023-09-18 | Stop reason: HOSPADM

## 2023-09-12 RX ORDER — ACETAMINOPHEN 325 MG/1
650 TABLET ORAL EVERY 6 HOURS PRN
Status: DISCONTINUED | OUTPATIENT
Start: 2023-09-12 | End: 2023-09-18 | Stop reason: HOSPADM

## 2023-09-12 RX ORDER — POTASSIUM CHLORIDE 7.45 MG/ML
10 INJECTION INTRAVENOUS PRN
Status: DISCONTINUED | OUTPATIENT
Start: 2023-09-12 | End: 2023-09-18 | Stop reason: HOSPADM

## 2023-09-12 RX ORDER — SODIUM CHLORIDE 0.9 % (FLUSH) 0.9 %
5-40 SYRINGE (ML) INJECTION PRN
Status: DISCONTINUED | OUTPATIENT
Start: 2023-09-12 | End: 2023-09-18 | Stop reason: HOSPADM

## 2023-09-12 RX ORDER — IBUPROFEN 600 MG/1
1 TABLET ORAL PRN
Status: DISCONTINUED | OUTPATIENT
Start: 2023-09-12 | End: 2023-09-18 | Stop reason: HOSPADM

## 2023-09-12 RX ORDER — PANTOPRAZOLE SODIUM 40 MG/1
40 TABLET, DELAYED RELEASE ORAL
Status: DISCONTINUED | OUTPATIENT
Start: 2023-09-12 | End: 2023-09-18 | Stop reason: HOSPADM

## 2023-09-12 RX ORDER — POLYETHYLENE GLYCOL 3350 17 G/17G
17 POWDER, FOR SOLUTION ORAL DAILY PRN
Status: DISCONTINUED | OUTPATIENT
Start: 2023-09-12 | End: 2023-09-18 | Stop reason: HOSPADM

## 2023-09-12 RX ORDER — VALSARTAN 80 MG/1
80 TABLET ORAL DAILY
COMMUNITY
Start: 2023-08-30

## 2023-09-12 RX ORDER — ACETAMINOPHEN 650 MG/1
650 SUPPOSITORY RECTAL EVERY 6 HOURS PRN
Status: DISCONTINUED | OUTPATIENT
Start: 2023-09-12 | End: 2023-09-18 | Stop reason: HOSPADM

## 2023-09-12 RX ORDER — ENOXAPARIN SODIUM 100 MG/ML
40 INJECTION SUBCUTANEOUS DAILY
Status: DISCONTINUED | OUTPATIENT
Start: 2023-09-12 | End: 2023-09-12

## 2023-09-12 RX ORDER — INSULIN LISPRO 100 [IU]/ML
0-4 INJECTION, SOLUTION INTRAVENOUS; SUBCUTANEOUS
Status: DISCONTINUED | OUTPATIENT
Start: 2023-09-12 | End: 2023-09-18 | Stop reason: HOSPADM

## 2023-09-12 RX ORDER — FLUTICASONE PROPIONATE 110 UG/1
1 AEROSOL, METERED RESPIRATORY (INHALATION)
Status: DISCONTINUED | OUTPATIENT
Start: 2023-09-12 | End: 2023-09-18 | Stop reason: HOSPADM

## 2023-09-12 RX ORDER — ALBUTEROL SULFATE 90 UG/1
2 AEROSOL, METERED RESPIRATORY (INHALATION)
Status: DISCONTINUED | OUTPATIENT
Start: 2023-09-12 | End: 2023-09-18 | Stop reason: HOSPADM

## 2023-09-12 RX ORDER — ONDANSETRON 2 MG/ML
4 INJECTION INTRAMUSCULAR; INTRAVENOUS EVERY 6 HOURS PRN
Status: DISCONTINUED | OUTPATIENT
Start: 2023-09-12 | End: 2023-09-18 | Stop reason: HOSPADM

## 2023-09-12 RX ADMIN — GUAIFENESIN 1200 MG: 600 TABLET, EXTENDED RELEASE ORAL at 02:08

## 2023-09-12 RX ADMIN — LIDOCAINE HYDROCHLORIDE AND EPINEPHRINE 20 ML: 10; 10 INJECTION, SOLUTION INFILTRATION; PERINEURAL at 02:53

## 2023-09-12 RX ADMIN — GUAIFENESIN 1200 MG: 600 TABLET, EXTENDED RELEASE ORAL at 09:19

## 2023-09-12 RX ADMIN — TRAZODONE HYDROCHLORIDE 100 MG: 100 TABLET ORAL at 02:13

## 2023-09-12 RX ADMIN — POTASSIUM CHLORIDE 10 MEQ: 7.46 INJECTION, SOLUTION INTRAVENOUS at 09:20

## 2023-09-12 RX ADMIN — ALBUTEROL SULFATE 2 PUFF: 90 AEROSOL, METERED RESPIRATORY (INHALATION) at 17:03

## 2023-09-12 RX ADMIN — SODIUM CHLORIDE, PRESERVATIVE FREE 10 ML: 5 INJECTION INTRAVENOUS at 20:27

## 2023-09-12 RX ADMIN — ALLOPURINOL 300 MG: 300 TABLET ORAL at 09:19

## 2023-09-12 RX ADMIN — POTASSIUM CHLORIDE 10 MEQ: 7.46 INJECTION, SOLUTION INTRAVENOUS at 16:53

## 2023-09-12 RX ADMIN — PRAMIPEXOLE DIHYDROCHLORIDE 0.12 MG: 0.25 TABLET ORAL at 17:48

## 2023-09-12 RX ADMIN — FLUTICASONE PROPIONATE 1 PUFF: 110 AEROSOL, METERED RESPIRATORY (INHALATION) at 07:43

## 2023-09-12 RX ADMIN — VALSARTAN 80 MG: 80 TABLET, FILM COATED ORAL at 09:45

## 2023-09-12 RX ADMIN — Medication 1 TABLET: at 09:18

## 2023-09-12 RX ADMIN — GADOTERIDOL 20 ML: 279.3 INJECTION, SOLUTION INTRAVENOUS at 11:37

## 2023-09-12 RX ADMIN — PANTOPRAZOLE SODIUM 40 MG: 40 TABLET, DELAYED RELEASE ORAL at 06:22

## 2023-09-12 RX ADMIN — TICAGRELOR 90 MG: 90 TABLET ORAL at 20:26

## 2023-09-12 RX ADMIN — ALBUTEROL SULFATE 2 PUFF: 90 AEROSOL, METERED RESPIRATORY (INHALATION) at 07:43

## 2023-09-12 RX ADMIN — POTASSIUM CHLORIDE 10 MEQ: 7.46 INJECTION, SOLUTION INTRAVENOUS at 15:37

## 2023-09-12 RX ADMIN — POTASSIUM CHLORIDE 10 MEQ: 7.46 INJECTION, SOLUTION INTRAVENOUS at 12:58

## 2023-09-12 RX ADMIN — POTASSIUM CHLORIDE 10 MEQ: 7.46 INJECTION, SOLUTION INTRAVENOUS at 10:35

## 2023-09-12 RX ADMIN — ISOSORBIDE MONONITRATE 30 MG: 30 TABLET, EXTENDED RELEASE ORAL at 09:19

## 2023-09-12 RX ADMIN — ACETAMINOPHEN 650 MG: 325 TABLET ORAL at 09:18

## 2023-09-12 RX ADMIN — ATORVASTATIN CALCIUM 40 MG: 40 TABLET, FILM COATED ORAL at 09:18

## 2023-09-12 RX ADMIN — GUAIFENESIN 1200 MG: 600 TABLET, EXTENDED RELEASE ORAL at 21:13

## 2023-09-12 RX ADMIN — TRAZODONE HYDROCHLORIDE 100 MG: 100 TABLET ORAL at 20:26

## 2023-09-12 RX ADMIN — TAMSULOSIN HYDROCHLORIDE 0.4 MG: 0.4 CAPSULE ORAL at 09:19

## 2023-09-12 RX ADMIN — BUPROPION HYDROCHLORIDE 300 MG: 300 TABLET, FILM COATED, EXTENDED RELEASE ORAL at 09:19

## 2023-09-12 RX ADMIN — POTASSIUM CHLORIDE 10 MEQ: 7.46 INJECTION, SOLUTION INTRAVENOUS at 14:05

## 2023-09-12 RX ADMIN — BUMETANIDE 2 MG: 1 TABLET ORAL at 02:08

## 2023-09-12 RX ADMIN — EMPAGLIFLOZIN 25 MG: 25 TABLET, FILM COATED ORAL at 09:19

## 2023-09-12 RX ADMIN — METOPROLOL SUCCINATE 25 MG: 25 TABLET, EXTENDED RELEASE ORAL at 09:19

## 2023-09-12 RX ADMIN — ENOXAPARIN SODIUM 30 MG: 100 INJECTION SUBCUTANEOUS at 20:26

## 2023-09-12 RX ADMIN — ACETAMINOPHEN 650 MG: 325 TABLET ORAL at 02:12

## 2023-09-12 RX ADMIN — TIOTROPIUM BROMIDE AND OLODATEROL 2 PUFF: 3.124; 2.736 SPRAY, METERED RESPIRATORY (INHALATION) at 07:43

## 2023-09-12 RX ADMIN — FLUTICASONE PROPIONATE 1 PUFF: 110 AEROSOL, METERED RESPIRATORY (INHALATION) at 17:04

## 2023-09-12 RX ADMIN — Medication 400 MG: at 09:19

## 2023-09-12 ASSESSMENT — PAIN DESCRIPTION - ORIENTATION
ORIENTATION: LEFT
ORIENTATION: LEFT;LOWER

## 2023-09-12 ASSESSMENT — PAIN DESCRIPTION - LOCATION: LOCATION: ARM;HEAD

## 2023-09-12 ASSESSMENT — PAIN DESCRIPTION - DESCRIPTORS
DESCRIPTORS: ACHING
DESCRIPTORS: ACHING

## 2023-09-12 ASSESSMENT — PAIN DESCRIPTION - FREQUENCY: FREQUENCY: CONTINUOUS

## 2023-09-12 ASSESSMENT — PAIN - FUNCTIONAL ASSESSMENT
PAIN_FUNCTIONAL_ASSESSMENT: ACTIVITIES ARE NOT PREVENTED
PAIN_FUNCTIONAL_ASSESSMENT: 0-10

## 2023-09-12 ASSESSMENT — PAIN SCALES - GENERAL
PAINLEVEL_OUTOF10: 5
PAINLEVEL_OUTOF10: 6
PAINLEVEL_OUTOF10: 6
PAINLEVEL_OUTOF10: 4
PAINLEVEL_OUTOF10: 4

## 2023-09-12 ASSESSMENT — ENCOUNTER SYMPTOMS
SHORTNESS OF BREATH: 1
ABDOMINAL PAIN: 0
NAUSEA: 0
SORE THROAT: 0
DIARRHEA: 0
VOMITING: 0

## 2023-09-12 NOTE — CARE COORDINATION
DISCHARGE PLANNING EVALUATION  9/12/23, 2:00 PM EDT    Reason for Referral: \"Discharge plan\" Pt fell at home alone and hit head. CM reports no head bleed, Psych consulted for Hallucinations pta. Mental Status: Alert and oriented. Decision Making: Makes own decisions. Family/Social/Home Environment: Assessment completed with pt and his son who resides in [place]. Pt states he lives alone and is independent at home, does his own personal care, cooks, some housekeeping, and drives. Pt states he does use a cane since he has been off balance and sometimes will pass out. Pt states he has 3 sons, a brother and a sister who can help post discharge if needed. Current Services including food security, transportation and housekeeping: See note above. Current Equipment: cane and cpap from [facility]. Payment Source:  [facility]. Concerns or Barriers to Discharge: none reported. Post-acute [facility]) provider list was provided to patient. Patient was informed of their freedom to choose [facility] provider. Discussed and offered to show the patient the relevant [facility] Providers quality and resource use measures on Medicare Compare web site via computer based on patient's goals of care and treatment preferences. Questions regarding selection process were answered. Teach Back Method used with pt regarding care plan and discharge planning. Patient verbalized understanding of the plan of care and contribute to goal setting. Patient goals, treatment preferences and discharge plan:  pt is hoping to be discharged home when medically ready. Pt states he does have children and siblings that can help him with yard work, cleaning, etc if needed. Pt states if he needs assistance with meals he will call Meals of Wheels program like his mother had when she was living.      Electronically signed by TING Ross on 9/12/2023 at 2:00 PM

## 2023-09-12 NOTE — ED NOTES
Patient presents to ED via EMS following a mechanical fall at home. Patient states he tripped over his feet at home and fell head first into a fridge and then onto the ground. Patient states he did lose LOC and fell predominantly on the left side. Patient is currently on a blood thinner. Patient has c/o left shoulder pain and headache. Patient has lac noted to the middle of forehead, approximately 3mm. Patient has bruise noted to left shoulder. Patient alert and oriented on arrival. Dr. Glen Griffin at bedside, level two trauma paged at this time.       Jeanie Nath RN  09/11/23 4916

## 2023-09-12 NOTE — PROCEDURES
EEG REPORT       Patient: Riri Rodgers Age: 70 y.o. MRN: [id number]      Referring Provider: No ref. provider found    History: This routine 20 minute scalp EEG was recorded with video- monitoring for a 70 y.o.. male  who presented with encephalopathy. This EEG was performed to evaluate for focal and epileptiform abnormalities.      Andrei Munoz   Current Facility-Administered Medications   Medication Dose Route Frequency Provider Last Rate Last Admin    sodium chloride flush 0.9 % injection 5-40 mL  5-40 mL IntraVENous 2 times per day ASHLEIGH Livingston - CNP        sodium chloride flush 0.9 % injection 5-40 mL  5-40 mL IntraVENous PRN ASHLEIGH Livingston - CNP        0.9 % sodium chloride infusion   IntraVENous PRN ASHLEIGH Livingston - KAUSHAL        ondansetron (ZOFRAN-ODT) disintegrating tablet 4 mg  4 mg Oral Q8H PRN ASHLEIGH Livingston CNP        Or    ondansetron (ZOFRAN) injection 4 mg  4 mg IntraVENous Q6H PRN ASHLEIGH Livingston - KAUSHAL        polyethylene glycol (GLYCOLAX) packet 17 g  17 g Oral Daily PRN Selma Flower APRN - KAUSHAL        acetaminophen (TYLENOL) tablet 650 mg  650 mg Oral Q6H PRN Selma Flower APRN - CNP   650 mg at 09/12/23 5895    Or    acetaminophen (TYLENOL) suppository 650 mg  650 mg Rectal Q6H PRN ASHLEIGH Livingston - KAUSHAL        pantoprazole (PROTONIX) tablet 40 mg  40 mg Oral QAM AC Selma Flower APRN - CNP   40 mg at 09/12/23 0622    fluticasone (FLOVENT HFA) 110 MCG/ACT inhaler 1 puff  1 puff Inhalation BID RT ASHLEIGH Livingston - CNP   1 puff at 09/12/23 0743    tiotropium-olodaterol (STIOLTO) 2.5-2.5 MCG/ACT inhaler 2 puff  2 puff Inhalation Daily ASHLEIGH Livingston - CNP   2 puff at 09/12/23 0743    insulin lispro (HUMALOG) injection vial 0-4 Units  0-4 Units SubCUTAneous TID WILLIE Sanchez

## 2023-09-12 NOTE — ED NOTES
Posterior exam being completed at this time per Dr. Maria Del Carmen Ames. No tenderness, deformities, or step offs noted per Dr. Maria Del Carmen Ames. Small bruise on left hip noted.       Rashard Eduardo RN  09/11/23 2011

## 2023-09-12 NOTE — CARE COORDINATION
Case Management Assessment  Initial Evaluation    Date/Time of Evaluation: 9/12/2023 2:07 PM  Assessment Completed by: Konstantin Richter RN    If patient is discharged prior to next notation, then this note serves as note for discharge by case management. Patient Name: Ryley Mcclellan                   YOB: 1960  Diagnosis: Syncope and collapse [R55]  Facial laceration, initial encounter [S01.81XA]  Fall, initial encounter [W19. XXXA]  Syncope, unspecified syncope type [R55]                   Date / Time: 9/11/2023  8:01 PM  [facility]     Patient Admission Status: Observation   Readmission Risk Low 0-14, Mod 15-19), High > 20: No data recorded  Current PCP: Sandhya Mann MD  PCP verified by CM? Yes    Chart Reviewed: Yes      History Provided by: Patient  Patient Orientation: Alert and Oriented    Patient Cognition: Alert    Hospitalization in the last 30 days (Readmission):  No    If yes, Readmission Assessment in CM Navigator will be completed. Advance Directives:      Code Status: Full Code   Patient's Primary Decision Maker is: Legal Next of Kin      Discharge Planning:    Patient lives with: Alone Type of Home: House  Primary Care Giver: Self  Patient Support Systems include: Family Members, Children   Current Financial resources: Medicare  Current community resources: None  Current services prior to admission: C-pap, Durable Medical Equipment ([place])            Current DME: Cane            Type of Home Care services:  None    ADLS  Prior functional level: Independent in ADLs/IADLs  Current functional level: Independent in ADLs/IADLs    Family can provide assistance at DC: Yes  Would you like Case Management to discuss the discharge plan with any other family members/significant others, and if so, who?  No  Plans to Return to Present Housing: Yes  Other Identified Issues/Barriers to RETURNING to current housing: no  Potential Assistance needed at discharge: Meals On

## 2023-09-12 NOTE — PROCEDURES
Date: 9/12/2023  Referring physician: ASHLEIGH Badillo - CNP    Indication  Patient aged 70 y with encephalopathy. EEG done to assess for epileptiform activity. Introduction  This routine 20-minute EEG was recorded using the International 10-20 System on a [employer] workstation at 256 samples/s. Automated spike and seizure detection algorithms were applied. Description  During the maximal alert state, a fairly-regulated, low amplitude, symmetric, and reactive 7-8 Hz posterior dominant rhythm was seen. No consistent focal slowing or interhemispheric asymmetry was noted. Stage I and stage II sleep were observed. There were no interictal epileptiform discharges or electrographic seizures. Activations  Hyperventilation was not performed. Intermittent photic stimulation was performed and demonstrated no posterior driving response. Impression  Abnormal awake EEG. The slowing mentioned above suggests mild non specific encephalopathy. No epileptiform discharges were identified. Please note the absence of such activity on this record cannot conclusively rule out an epileptic disorder. If such is still clinically suspected, a repeat study with sleep deprivation and/or prolonged sampling may be helpful. Woody Lind MD  Epilepsy Board Certified. Neurology Board Certified.     Electronically Signed

## 2023-09-12 NOTE — ED NOTES
Patient refused COVID swab due to testing negative at urgent care.      Tova Jones, RN  09/11/23 0529

## 2023-09-12 NOTE — H&P
Hospitalist  History and Physical    Patient:  Sofía Berg  MRN: [id number]    CHIEF COMPLAINT:  fall    History Obtained From:  patient, family member - Son, electronic medical record  PCP: Shalom Upton MD    HISTORY OF PRESENT ILLNESS:   Fred Benjamin is a 70year old gentlemen presented to [facility] 9/11/2023 as a Level 2 trauma for fall on Brilinta with head laceration. Patient has a past medical history significant for former smoker, Stage 3, severe COPD, 6/29/21 PFT- Severe obstruction with air trapping. Decreased DLCO, FAISAL, ASHD s/p CABG (2016), HFpEF-ECHO 1/2023 LVEF 50% mildly calcified aortic valve leaflets, Tilt table 2022-no major hemodynamic changes, [facility] 2018 PCI/DANY Diagonal, Essential hypertension, Syncope-Dizziness, Falls, DTM2, GERD, VTE, Dyslipidemia, RLS, BPH with LUTs, Elevated PSA, Polysubstance use, Obesity. Patient identifies him self as a recovering alcoholic and drug user (cocaine and marijuana). Patient identifies Marianne Blank and [place] use however last drink was 3 weeks ago. Navneet Marie identifies fall history of at least 10 times in the past 6 months. Navneet Marie presented via EMS to [facility] ER for evaluation s/p fall on blood thinners (Brilinta) with head trauma. Patient states that he has had increasing shortness of breath and dizziness over the past few days. States that he was at home today, tripped, and fell - hitting his head on the refrigerator. Patient also reports fall yesterday onto his left side causing shoulder pain. Patient and Son identify visual hallucinations over the last several weeks with pending psychiatric referrals. \"Last night I was sitting on my couch and saw a bunch of birds\". Positive for 2 small lacerations on his frontal scalp 1 measured just over 1 cm vertically. Trauma Dr. Kostas Neal did evaluate and felt no significant traumatic injuries identified requiring trauma service admission.      Past Medical History:        Diagnosis Date    CHF (congestive heart failure)

## 2023-09-12 NOTE — ED NOTES
Dr. Mckayla Sanz doing primary assessment at this time. Warm blankets applied at this time.       Raheem Martinez RN  09/11/23 2004

## 2023-09-13 PROBLEM — F10.20 ALCOHOL USE DISORDER, SEVERE, DEPENDENCE (HCC): Status: ACTIVE | Noted: 2023-09-13

## 2023-09-13 LAB
GLUCOSE BLD STRIP.AUTO-MCNC: 147 MG/DL (ref 70–108)
GLUCOSE BLD STRIP.AUTO-MCNC: 192 MG/DL (ref 70–108)
GLUCOSE BLD STRIP.AUTO-MCNC: 202 MG/DL (ref 70–108)
GLUCOSE BLD STRIP.AUTO-MCNC: 259 MG/DL (ref 70–108)

## 2023-09-13 PROCEDURE — 6360000002 HC RX W HCPCS: Performed by: NURSE PRACTITIONER

## 2023-09-13 PROCEDURE — 6370000000 HC RX 637 (ALT 250 FOR IP)

## 2023-09-13 PROCEDURE — 96372 THER/PROPH/DIAG INJ SC/IM: CPT

## 2023-09-13 PROCEDURE — 82948 REAGENT STRIP/BLOOD GLUCOSE: CPT

## 2023-09-13 PROCEDURE — G0378 HOSPITAL OBSERVATION PER HR: HCPCS

## 2023-09-13 PROCEDURE — 94640 AIRWAY INHALATION TREATMENT: CPT

## 2023-09-13 PROCEDURE — 6370000000 HC RX 637 (ALT 250 FOR IP): Performed by: NURSE PRACTITIONER

## 2023-09-13 PROCEDURE — 6370000000 HC RX 637 (ALT 250 FOR IP): Performed by: HOSPITALIST

## 2023-09-13 PROCEDURE — APPSS30 APP SPLIT SHARED TIME 16-30 MINUTES: Performed by: PHYSICIAN ASSISTANT

## 2023-09-13 PROCEDURE — 2580000003 HC RX 258: Performed by: NURSE PRACTITIONER

## 2023-09-13 PROCEDURE — 94760 N-INVAS EAR/PLS OXIMETRY 1: CPT

## 2023-09-13 PROCEDURE — 97530 THERAPEUTIC ACTIVITIES: CPT

## 2023-09-13 PROCEDURE — 97110 THERAPEUTIC EXERCISES: CPT

## 2023-09-13 PROCEDURE — 99232 SBSQ HOSP IP/OBS MODERATE 35: CPT | Performed by: HOSPITALIST

## 2023-09-13 PROCEDURE — 97535 SELF CARE MNGMENT TRAINING: CPT

## 2023-09-13 PROCEDURE — 99221 1ST HOSP IP/OBS SF/LOW 40: CPT | Performed by: PSYCHIATRY & NEUROLOGY

## 2023-09-13 RX ORDER — ESCITALOPRAM OXALATE 10 MG/1
5 TABLET ORAL DAILY
Status: DISCONTINUED | OUTPATIENT
Start: 2023-09-13 | End: 2023-09-18 | Stop reason: HOSPADM

## 2023-09-13 RX ADMIN — METOPROLOL SUCCINATE 25 MG: 25 TABLET, EXTENDED RELEASE ORAL at 08:19

## 2023-09-13 RX ADMIN — SODIUM CHLORIDE, PRESERVATIVE FREE 10 ML: 5 INJECTION INTRAVENOUS at 20:16

## 2023-09-13 RX ADMIN — BUPROPION HYDROCHLORIDE 300 MG: 300 TABLET, FILM COATED, EXTENDED RELEASE ORAL at 08:20

## 2023-09-13 RX ADMIN — PRAMIPEXOLE DIHYDROCHLORIDE 0.12 MG: 0.25 TABLET ORAL at 17:36

## 2023-09-13 RX ADMIN — ISOSORBIDE MONONITRATE 30 MG: 30 TABLET, EXTENDED RELEASE ORAL at 08:19

## 2023-09-13 RX ADMIN — GUAIFENESIN 1200 MG: 600 TABLET, EXTENDED RELEASE ORAL at 20:15

## 2023-09-13 RX ADMIN — ENOXAPARIN SODIUM 30 MG: 100 INJECTION SUBCUTANEOUS at 08:19

## 2023-09-13 RX ADMIN — ALBUTEROL SULFATE 2 PUFF: 90 AEROSOL, METERED RESPIRATORY (INHALATION) at 08:43

## 2023-09-13 RX ADMIN — TAMSULOSIN HYDROCHLORIDE 0.4 MG: 0.4 CAPSULE ORAL at 08:20

## 2023-09-13 RX ADMIN — Medication 1 TABLET: at 08:19

## 2023-09-13 RX ADMIN — TICAGRELOR 90 MG: 90 TABLET ORAL at 20:16

## 2023-09-13 RX ADMIN — SODIUM CHLORIDE, PRESERVATIVE FREE 10 ML: 5 INJECTION INTRAVENOUS at 08:18

## 2023-09-13 RX ADMIN — ENOXAPARIN SODIUM 30 MG: 100 INJECTION SUBCUTANEOUS at 20:16

## 2023-09-13 RX ADMIN — BUMETANIDE 2 MG: 1 TABLET ORAL at 17:36

## 2023-09-13 RX ADMIN — VALSARTAN 80 MG: 80 TABLET, FILM COATED ORAL at 08:19

## 2023-09-13 RX ADMIN — ALLOPURINOL 300 MG: 300 TABLET ORAL at 08:19

## 2023-09-13 RX ADMIN — FLUTICASONE PROPIONATE 1 PUFF: 110 AEROSOL, METERED RESPIRATORY (INHALATION) at 17:16

## 2023-09-13 RX ADMIN — INSULIN LISPRO 1 UNITS: 100 INJECTION, SOLUTION INTRAVENOUS; SUBCUTANEOUS at 11:53

## 2023-09-13 RX ADMIN — Medication 400 MG: at 08:20

## 2023-09-13 RX ADMIN — GUAIFENESIN 1200 MG: 600 TABLET, EXTENDED RELEASE ORAL at 08:20

## 2023-09-13 RX ADMIN — BUMETANIDE 2 MG: 1 TABLET ORAL at 08:19

## 2023-09-13 RX ADMIN — FLUTICASONE PROPIONATE 1 PUFF: 110 AEROSOL, METERED RESPIRATORY (INHALATION) at 08:43

## 2023-09-13 RX ADMIN — TICAGRELOR 90 MG: 90 TABLET ORAL at 08:21

## 2023-09-13 RX ADMIN — PANTOPRAZOLE SODIUM 40 MG: 40 TABLET, DELAYED RELEASE ORAL at 06:36

## 2023-09-13 RX ADMIN — ALBUTEROL SULFATE 2 PUFF: 90 AEROSOL, METERED RESPIRATORY (INHALATION) at 17:15

## 2023-09-13 RX ADMIN — TIOTROPIUM BROMIDE AND OLODATEROL 2 PUFF: 3.124; 2.736 SPRAY, METERED RESPIRATORY (INHALATION) at 08:45

## 2023-09-13 RX ADMIN — ESCITALOPRAM OXALATE 5 MG: 10 TABLET ORAL at 17:36

## 2023-09-13 RX ADMIN — ATORVASTATIN CALCIUM 40 MG: 40 TABLET, FILM COATED ORAL at 08:19

## 2023-09-13 RX ADMIN — EMPAGLIFLOZIN 25 MG: 25 TABLET, FILM COATED ORAL at 08:20

## 2023-09-13 RX ADMIN — TRAZODONE HYDROCHLORIDE 100 MG: 100 TABLET ORAL at 20:16

## 2023-09-13 ASSESSMENT — LIFESTYLE VARIABLES
HOW MANY STANDARD DRINKS CONTAINING ALCOHOL DO YOU HAVE ON A TYPICAL DAY: 5 OR 6
HOW OFTEN DO YOU HAVE A DRINK CONTAINING ALCOHOL: 4 OR MORE TIMES A WEEK

## 2023-09-13 ASSESSMENT — PATIENT HEALTH QUESTIONNAIRE - PHQ9
SUM OF ALL RESPONSES TO PHQ QUESTIONS 1-9: 13
SUM OF ALL RESPONSES TO PHQ9 QUESTIONS 1 & 2: 6
1. LITTLE INTEREST OR PLEASURE IN DOING THINGS: 3
7. TROUBLE CONCENTRATING ON THINGS, SUCH AS READING THE NEWSPAPER OR WATCHING TELEVISION: 1
9. THOUGHTS THAT YOU WOULD BE BETTER OFF DEAD, OR OF HURTING YOURSELF: 0
4. FEELING TIRED OR HAVING LITTLE ENERGY: 3
2. FEELING DOWN, DEPRESSED OR HOPELESS: 3
3. TROUBLE FALLING OR STAYING ASLEEP: 3
6. FEELING BAD ABOUT YOURSELF - OR THAT YOU ARE A FAILURE OR HAVE LET YOURSELF OR YOUR FAMILY DOWN: 0
SUM OF ALL RESPONSES TO PHQ QUESTIONS 1-9: 13
SUM OF ALL RESPONSES TO PHQ QUESTIONS 1-9: 13
5. POOR APPETITE OR OVEREATING: 0
SUM OF ALL RESPONSES TO PHQ QUESTIONS 1-9: 13
8. MOVING OR SPEAKING SO SLOWLY THAT OTHER PEOPLE COULD HAVE NOTICED. OR THE OPPOSITE, BEING SO FIGETY OR RESTLESS THAT YOU HAVE BEEN MOVING AROUND A LOT MORE THAN USUAL: 0

## 2023-09-13 ASSESSMENT — PAIN SCALES - GENERAL: PAINLEVEL_OUTOF10: 0

## 2023-09-13 NOTE — CARE COORDINATION
9/13/23, 1:22 PM EDT    DISCHARGE ON [address] [place] [address] day: 0  Location: 8A-11/011-A Reason for admit: Syncope and collapse [R55]  Facial laceration, initial encounter Nubia Rodriguez  Fall, initial encounter [W19. XXXA]  Syncope, unspecified syncope type [R55]   Procedure:   9/11 CXR: Small left pleural effusion with asymmetric elevation of the left   hemidiaphragm and overlying atelectasis is similar to previous exam. No   acute finding identified. 9/11 CT Head: No acute intracranial findings   9/ CT Chest WO: 1. No acute traumatic finding. 2. Left basilar atelectasis and possibly superimposed consolidation. Correlate for pneumonia. Follow-up to resolution recommended. 9/12 EEG: This EEG was normal in wakefulness and sleep. 9/12 MRI Brain W WO: 1. Mild atrophy and probable ischemic changes in the white matter. 2. No evidence of an acute infarct. 3. Mild inflammatory changes in ethmoid air cells and mastoid air cells bilaterally. 4. Small sebaceous cyst in the suboccipital region on the left side  Barriers to Discharge: Hospitalist. Psych to see. Neurology signed off. PT/OT. DM management. PCP: Boogie Sahni MD   %  Patient Goals/Plan/Treatment Preferences: Mavis Alarcon plans to return home alone. Has cane and CPAP from [facility]. Declines needs/services.

## 2023-09-13 NOTE — CONSULTS
Neurology Consult Note    Date:9/12/2023       YXHQ:[id number]  Patient Hanna Nicole     YOB: 1960     Age:63 y.o. Requesting Physician: Dasia Nguyễn MD     Reason for Consult:  Evaluate for visual hallucinations      Chief Complaint:   Chief Complaint   Patient presents with    Fall       Subjective     Linda Eisenmenger is a 70 y.o. male with a history of CHF, COPD, DM, depression,GERD, history of blood clots, hyperlipidemia, hypertension, FAISAL, former smoker, ASHD s/p CABG (2016), restless leg syndrome, BPH, and polysubstance abuse who presented to [facility] ED yesterday 9/11 as a level 2 trauma for a fall on [place]. Patient states that his feet got caught underneath him and he fell face first into the refrigerator. He states that there was no preceding aura prior the fall, he just lost his footing. He states he remembers everything about the even and denies any postictal period. He does confirm hitting his head and he states that he thinks he lost consciousness. He admits to falling more frequently lately. He states he also fell the day prior on his left side and admits to left shoulder pain. Neurology was consulted today for visual hallucinations. Patient states that he has been experiencing these hallucinations for roughly the last month. Patient denies any triggers to these hallucinations, he states he typically has them daily. He states they are usually either animals/bugs or people. The animals/bugs include most commonly spiders and monarch butterflies. The people he see usually is a good friend of his that committed suicide. He states that none of his visual hallucinations are mean. He denies hearing voices telling him to harm himself or someone else. He denies having any visual hallucinations prior to his falls. Patient denies any history of seizures. He states that he has been referred to psych and he missed appointments.  He denies experiencing any visual hallucinations quite
1.07     No results for input(s): \"TROPONINT\" in the last 72 hours. CT HEAD WO CONTRAST    Result Date: 9/11/2023  1. No acute intracranial findings This document has been electronically signed by: Aba Lee MD on 09/11/2023 08:53 PM All CTs at this facility use dose modulation techniques and iterative reconstructions, and/or weight-based dosing when appropriate to reduce radiation to a low as reasonably achievable. CT CHEST WO CONTRAST    Result Date: 9/11/2023  1. No acute traumatic finding. 2. Left basilar atelectasis and possibly superimposed consolidation. Correlate for pneumonia. Follow-up to resolution recommended. This document has been electronically signed by: Aba Lee MD on 09/11/2023 09:02 PM All CTs at this facility use dose modulation techniques and iterative reconstructions, and/or weight-based dosing when appropriate to reduce radiation to a low as reasonably achievable. CT HEAD WO CONTRAST    Result Date: 9/11/2023  1. No acute intracranial findings This document has been electronically signed by: Aba Lee MD on 09/11/2023 08:53 PM All CTs at this facility use dose modulation techniques and iterative reconstructions, and/or weight-based dosing when appropriate to reduce radiation to a low as reasonably achievable. XR CHEST PORTABLE    Result Date: 9/11/2023  Small left pleural effusion with asymmetric elevation of the left hemidiaphragm and overlying atelectasis is similar to previous exam. No acute finding identified. This document has been electronically signed by: Aba Lee MD on 09/11/2023 08:43 PM    XR PELVIS (1-2 VIEWS)    Result Date: 9/11/2023  1. No acute findings. This document has been electronically signed by: Aba Lee MD on 09/11/2023 08:41 PM     XR CHEST PORTABLE    Result Date: 9/11/2023  Small left pleural effusion with asymmetric elevation of the left hemidiaphragm and overlying atelectasis is similar to previous exam. No acute finding identified.
dealing with depression but denies any suicidal or homicidal ideation plan or intent. Discussed with him that this hallucinations appear like secondary to withdrawal delirium from alcohol and also due to dopamine activation from the Wellbutrin. Discussed with him about going off of the Wellbutrin and trying another antidepressant medication however he is not interested in this option at this time. Mentions that he will discuss this with his primary care physician before making any switch.   ASSESSMENT  Alcohol use disorder severe dependence  Acute alcohol withdrawals  Polysubstance abuse-alcohol cocaine  MDD recurrent moderate    Electronically signed by Toñito Wen MD on 9/13/23 at 3:25 PM EDT

## 2023-09-13 NOTE — CARE COORDINATION
9/13/23, 3:31 PM EDT    DISCHARGE PLANNING EVALUATION    Spoke with patient, gave him ECF list and in network SNF list.  He will review and talk with his son to rank 4-5 preferences.   SW will stop back tomorrow for list.

## 2023-09-14 ENCOUNTER — TELEPHONE (OUTPATIENT)
Dept: FAMILY MEDICINE CLINIC | Age: 63
End: 2023-09-14

## 2023-09-14 LAB
EKG ATRIAL RATE: 79 BPM
EKG P AXIS: -140 DEGREES
EKG P-R INTERVAL: 188 MS
EKG Q-T INTERVAL: 432 MS
EKG QRS DURATION: 104 MS
EKG QTC CALCULATION (BAZETT): 495 MS
EKG R AXIS: -177 DEGREES
EKG T AXIS: 94 DEGREES
EKG VENTRICULAR RATE: 79 BPM
GLUCOSE BLD STRIP.AUTO-MCNC: 130 MG/DL (ref 70–108)
GLUCOSE BLD STRIP.AUTO-MCNC: 158 MG/DL (ref 70–108)
GLUCOSE BLD STRIP.AUTO-MCNC: 159 MG/DL (ref 70–108)
GLUCOSE BLD STRIP.AUTO-MCNC: 188 MG/DL (ref 70–108)

## 2023-09-14 PROCEDURE — G0378 HOSPITAL OBSERVATION PER HR: HCPCS

## 2023-09-14 PROCEDURE — 94640 AIRWAY INHALATION TREATMENT: CPT

## 2023-09-14 PROCEDURE — 6370000000 HC RX 637 (ALT 250 FOR IP): Performed by: NURSE PRACTITIONER

## 2023-09-14 PROCEDURE — 97110 THERAPEUTIC EXERCISES: CPT

## 2023-09-14 PROCEDURE — 82948 REAGENT STRIP/BLOOD GLUCOSE: CPT

## 2023-09-14 PROCEDURE — 96372 THER/PROPH/DIAG INJ SC/IM: CPT

## 2023-09-14 PROCEDURE — 97116 GAIT TRAINING THERAPY: CPT

## 2023-09-14 PROCEDURE — 2580000003 HC RX 258: Performed by: NURSE PRACTITIONER

## 2023-09-14 PROCEDURE — 99232 SBSQ HOSP IP/OBS MODERATE 35: CPT | Performed by: HOSPITALIST

## 2023-09-14 PROCEDURE — 6360000002 HC RX W HCPCS: Performed by: NURSE PRACTITIONER

## 2023-09-14 PROCEDURE — 97530 THERAPEUTIC ACTIVITIES: CPT

## 2023-09-14 PROCEDURE — 6370000000 HC RX 637 (ALT 250 FOR IP): Performed by: HOSPITALIST

## 2023-09-14 PROCEDURE — 6370000000 HC RX 637 (ALT 250 FOR IP)

## 2023-09-14 RX ADMIN — PANTOPRAZOLE SODIUM 40 MG: 40 TABLET, DELAYED RELEASE ORAL at 05:10

## 2023-09-14 RX ADMIN — TRAZODONE HYDROCHLORIDE 100 MG: 100 TABLET ORAL at 19:58

## 2023-09-14 RX ADMIN — GUAIFENESIN 1200 MG: 600 TABLET, EXTENDED RELEASE ORAL at 19:58

## 2023-09-14 RX ADMIN — ACETAMINOPHEN 650 MG: 325 TABLET ORAL at 12:08

## 2023-09-14 RX ADMIN — PRAMIPEXOLE DIHYDROCHLORIDE 0.12 MG: 0.25 TABLET ORAL at 17:27

## 2023-09-14 RX ADMIN — ALBUTEROL SULFATE 2 PUFF: 90 AEROSOL, METERED RESPIRATORY (INHALATION) at 17:27

## 2023-09-14 RX ADMIN — VALSARTAN 80 MG: 80 TABLET, FILM COATED ORAL at 07:49

## 2023-09-14 RX ADMIN — ESCITALOPRAM OXALATE 5 MG: 10 TABLET ORAL at 07:57

## 2023-09-14 RX ADMIN — SODIUM CHLORIDE, PRESERVATIVE FREE 10 ML: 5 INJECTION INTRAVENOUS at 19:58

## 2023-09-14 RX ADMIN — FLUTICASONE PROPIONATE 1 PUFF: 110 AEROSOL, METERED RESPIRATORY (INHALATION) at 17:27

## 2023-09-14 RX ADMIN — TICAGRELOR 90 MG: 90 TABLET ORAL at 07:49

## 2023-09-14 RX ADMIN — FLUTICASONE PROPIONATE 1 PUFF: 110 AEROSOL, METERED RESPIRATORY (INHALATION) at 07:39

## 2023-09-14 RX ADMIN — ENOXAPARIN SODIUM 30 MG: 100 INJECTION SUBCUTANEOUS at 07:49

## 2023-09-14 RX ADMIN — TIOTROPIUM BROMIDE AND OLODATEROL 2 PUFF: 3.124; 2.736 SPRAY, METERED RESPIRATORY (INHALATION) at 07:39

## 2023-09-14 RX ADMIN — TAMSULOSIN HYDROCHLORIDE 0.4 MG: 0.4 CAPSULE ORAL at 07:48

## 2023-09-14 RX ADMIN — ATORVASTATIN CALCIUM 40 MG: 40 TABLET, FILM COATED ORAL at 07:48

## 2023-09-14 RX ADMIN — ALLOPURINOL 300 MG: 300 TABLET ORAL at 07:48

## 2023-09-14 RX ADMIN — Medication 400 MG: at 07:48

## 2023-09-14 RX ADMIN — BUMETANIDE 2 MG: 1 TABLET ORAL at 17:27

## 2023-09-14 RX ADMIN — GUAIFENESIN 1200 MG: 600 TABLET, EXTENDED RELEASE ORAL at 07:49

## 2023-09-14 RX ADMIN — ALBUTEROL SULFATE 2 PUFF: 90 AEROSOL, METERED RESPIRATORY (INHALATION) at 07:39

## 2023-09-14 RX ADMIN — EMPAGLIFLOZIN 25 MG: 25 TABLET, FILM COATED ORAL at 07:49

## 2023-09-14 RX ADMIN — Medication 1 TABLET: at 07:48

## 2023-09-14 RX ADMIN — ISOSORBIDE MONONITRATE 30 MG: 30 TABLET, EXTENDED RELEASE ORAL at 07:49

## 2023-09-14 RX ADMIN — ENOXAPARIN SODIUM 30 MG: 100 INJECTION SUBCUTANEOUS at 19:58

## 2023-09-14 RX ADMIN — TICAGRELOR 90 MG: 90 TABLET ORAL at 19:58

## 2023-09-14 RX ADMIN — METOPROLOL SUCCINATE 25 MG: 25 TABLET, EXTENDED RELEASE ORAL at 07:49

## 2023-09-14 RX ADMIN — SODIUM CHLORIDE, PRESERVATIVE FREE 10 ML: 5 INJECTION INTRAVENOUS at 07:49

## 2023-09-14 RX ADMIN — BUMETANIDE 2 MG: 1 TABLET ORAL at 07:49

## 2023-09-14 ASSESSMENT — PAIN DESCRIPTION - DESCRIPTORS: DESCRIPTORS: ACHING

## 2023-09-14 ASSESSMENT — PAIN DESCRIPTION - ORIENTATION: ORIENTATION: MID

## 2023-09-14 ASSESSMENT — PAIN SCALES - GENERAL: PAINLEVEL_OUTOF10: 7

## 2023-09-14 ASSESSMENT — PAIN DESCRIPTION - LOCATION: LOCATION: HEAD

## 2023-09-14 NOTE — CARE COORDINATION
9/14/23, 2:25 PM EDT    DISCHARGE PLANNING EVALUATION    Spoke with patient and son regarding SNF preferences. They would like [facility] first, then [facility]. Per CM, [facility] is not an option. Spoke with Anuradha Akins at [facility], referral made.

## 2023-09-14 NOTE — TELEPHONE ENCOUNTER
Pt son called stating his father had an incident he hit his head and had to go to the ER has been there two days. Pt son would like dr to call and go over the results of the ER visit. He says his father is suppose to go to the nursing home today but is fighting against going to the nursing home but he feels it is the safest thing for him hes wondering what to do if his father just absoultely will not go to the nursing home. [phone] is a good call back number for pt son. Pt son is really hoping for some advice today if possible.   DM

## 2023-09-14 NOTE — TELEPHONE ENCOUNTER
Charlie Fernández is not on the HIPAA to discuss information. I called and spoke with  the pt and he said that it was fine to talk to his son Charlie Fernández. Darin Acevedo also said that he is now considering going to the nursing home. I spoke with Charlie Fernández and he had some concerns that he would like to talk to Dr Shant Jaffe. He is willing to wait until Friday. Darin Acevedo and Charlie Fernández both said that they have diagnosed the pt with Dementia/ Alzheimer's.

## 2023-09-15 LAB
ANION GAP SERPL CALC-SCNC: 14 MEQ/L (ref 8–16)
BASOPHILS ABSOLUTE: 0.1 THOU/MM3 (ref 0–0.1)
BASOPHILS NFR BLD AUTO: 0.9 %
BUN SERPL-MCNC: 15 MG/DL (ref 7–22)
CALCIUM SERPL-MCNC: 10 MG/DL (ref 8.5–10.5)
CHLORIDE SERPL-SCNC: 97 MEQ/L (ref 98–111)
CO2 SERPL-SCNC: 30 MEQ/L (ref 23–33)
CREAT SERPL-MCNC: 0.9 MG/DL (ref 0.4–1.2)
DEPRECATED RDW RBC AUTO: 52.4 FL (ref 35–45)
EOSINOPHIL NFR BLD AUTO: 0.9 %
EOSINOPHILS ABSOLUTE: 0.1 THOU/MM3 (ref 0–0.4)
ERYTHROCYTE [DISTWIDTH] IN BLOOD BY AUTOMATED COUNT: 13.4 % (ref 11.5–14.5)
GFR SERPL CREATININE-BSD FRML MDRD: > 60 ML/MIN/1.73M2
GLUCOSE BLD STRIP.AUTO-MCNC: 125 MG/DL (ref 70–108)
GLUCOSE BLD STRIP.AUTO-MCNC: 138 MG/DL (ref 70–108)
GLUCOSE BLD STRIP.AUTO-MCNC: 150 MG/DL (ref 70–108)
GLUCOSE BLD STRIP.AUTO-MCNC: 164 MG/DL (ref 70–108)
GLUCOSE SERPL-MCNC: 191 MG/DL (ref 70–108)
HCT VFR BLD AUTO: 46.6 % (ref 42–52)
HGB BLD-MCNC: 15.5 GM/DL (ref 14–18)
IMM GRANULOCYTES # BLD AUTO: 0.04 THOU/MM3 (ref 0–0.07)
IMM GRANULOCYTES NFR BLD AUTO: 0.6 %
LYMPHOCYTES ABSOLUTE: 1.4 THOU/MM3 (ref 1–4.8)
LYMPHOCYTES NFR BLD AUTO: 22.1 %
MCH RBC QN AUTO: 35.1 PG (ref 26–33)
MCHC RBC AUTO-ENTMCNC: 33.3 GM/DL (ref 32.2–35.5)
MCV RBC AUTO: 105.7 FL (ref 80–94)
MONOCYTES ABSOLUTE: 0.8 THOU/MM3 (ref 0.4–1.3)
MONOCYTES NFR BLD AUTO: 13 %
NEUTROPHILS NFR BLD AUTO: 62.5 %
NRBC BLD AUTO-RTO: 0 /100 WBC
PLATELET # BLD AUTO: 235 THOU/MM3 (ref 130–400)
PMV BLD AUTO: 9.9 FL (ref 9.4–12.4)
POTASSIUM SERPL-SCNC: 3.8 MEQ/L (ref 3.5–5.2)
RBC # BLD AUTO: 4.41 MILL/MM3 (ref 4.7–6.1)
SEGMENTED NEUTROPHILS ABSOLUTE COUNT: 4 THOU/MM3 (ref 1.8–7.7)
SODIUM SERPL-SCNC: 141 MEQ/L (ref 135–145)
WBC # BLD AUTO: 6.4 THOU/MM3 (ref 4.8–10.8)

## 2023-09-15 PROCEDURE — 2580000003 HC RX 258: Performed by: NURSE PRACTITIONER

## 2023-09-15 PROCEDURE — 36415 COLL VENOUS BLD VENIPUNCTURE: CPT

## 2023-09-15 PROCEDURE — 80048 BASIC METABOLIC PNL TOTAL CA: CPT

## 2023-09-15 PROCEDURE — G0378 HOSPITAL OBSERVATION PER HR: HCPCS

## 2023-09-15 PROCEDURE — 94760 N-INVAS EAR/PLS OXIMETRY 1: CPT

## 2023-09-15 PROCEDURE — 94640 AIRWAY INHALATION TREATMENT: CPT

## 2023-09-15 PROCEDURE — 82948 REAGENT STRIP/BLOOD GLUCOSE: CPT

## 2023-09-15 PROCEDURE — 6370000000 HC RX 637 (ALT 250 FOR IP): Performed by: NURSE PRACTITIONER

## 2023-09-15 PROCEDURE — 96372 THER/PROPH/DIAG INJ SC/IM: CPT

## 2023-09-15 PROCEDURE — 97530 THERAPEUTIC ACTIVITIES: CPT

## 2023-09-15 PROCEDURE — 97535 SELF CARE MNGMENT TRAINING: CPT

## 2023-09-15 PROCEDURE — 6360000002 HC RX W HCPCS: Performed by: NURSE PRACTITIONER

## 2023-09-15 PROCEDURE — 99232 SBSQ HOSP IP/OBS MODERATE 35: CPT | Performed by: HOSPITALIST

## 2023-09-15 PROCEDURE — 6370000000 HC RX 637 (ALT 250 FOR IP)

## 2023-09-15 PROCEDURE — 6370000000 HC RX 637 (ALT 250 FOR IP): Performed by: HOSPITALIST

## 2023-09-15 PROCEDURE — 85025 COMPLETE CBC W/AUTO DIFF WBC: CPT

## 2023-09-15 RX ADMIN — ALBUTEROL SULFATE 2 PUFF: 90 AEROSOL, METERED RESPIRATORY (INHALATION) at 09:32

## 2023-09-15 RX ADMIN — VALSARTAN 80 MG: 80 TABLET, FILM COATED ORAL at 08:34

## 2023-09-15 RX ADMIN — TICAGRELOR 90 MG: 90 TABLET ORAL at 08:33

## 2023-09-15 RX ADMIN — GUAIFENESIN 1200 MG: 600 TABLET, EXTENDED RELEASE ORAL at 21:45

## 2023-09-15 RX ADMIN — EMPAGLIFLOZIN 25 MG: 25 TABLET, FILM COATED ORAL at 08:34

## 2023-09-15 RX ADMIN — ATORVASTATIN CALCIUM 40 MG: 40 TABLET, FILM COATED ORAL at 08:33

## 2023-09-15 RX ADMIN — ALLOPURINOL 300 MG: 300 TABLET ORAL at 08:33

## 2023-09-15 RX ADMIN — Medication 400 MG: at 08:33

## 2023-09-15 RX ADMIN — Medication 1 TABLET: at 08:33

## 2023-09-15 RX ADMIN — TICAGRELOR 90 MG: 90 TABLET ORAL at 21:45

## 2023-09-15 RX ADMIN — FLUTICASONE PROPIONATE 1 PUFF: 110 AEROSOL, METERED RESPIRATORY (INHALATION) at 09:35

## 2023-09-15 RX ADMIN — ACETAMINOPHEN 650 MG: 325 TABLET ORAL at 10:16

## 2023-09-15 RX ADMIN — ESCITALOPRAM OXALATE 5 MG: 10 TABLET ORAL at 08:34

## 2023-09-15 RX ADMIN — TIOTROPIUM BROMIDE AND OLODATEROL 2 PUFF: 3.124; 2.736 SPRAY, METERED RESPIRATORY (INHALATION) at 09:35

## 2023-09-15 RX ADMIN — FLUTICASONE PROPIONATE 1 PUFF: 110 AEROSOL, METERED RESPIRATORY (INHALATION) at 21:36

## 2023-09-15 RX ADMIN — ISOSORBIDE MONONITRATE 30 MG: 30 TABLET, EXTENDED RELEASE ORAL at 08:34

## 2023-09-15 RX ADMIN — ALBUTEROL SULFATE 2 PUFF: 90 AEROSOL, METERED RESPIRATORY (INHALATION) at 21:36

## 2023-09-15 RX ADMIN — GUAIFENESIN 1200 MG: 600 TABLET, EXTENDED RELEASE ORAL at 08:34

## 2023-09-15 RX ADMIN — BUMETANIDE 2 MG: 1 TABLET ORAL at 08:34

## 2023-09-15 RX ADMIN — ENOXAPARIN SODIUM 30 MG: 100 INJECTION SUBCUTANEOUS at 08:34

## 2023-09-15 RX ADMIN — BUMETANIDE 2 MG: 1 TABLET ORAL at 17:18

## 2023-09-15 RX ADMIN — SODIUM CHLORIDE, PRESERVATIVE FREE 10 ML: 5 INJECTION INTRAVENOUS at 08:34

## 2023-09-15 RX ADMIN — PANTOPRAZOLE SODIUM 40 MG: 40 TABLET, DELAYED RELEASE ORAL at 05:09

## 2023-09-15 RX ADMIN — TAMSULOSIN HYDROCHLORIDE 0.4 MG: 0.4 CAPSULE ORAL at 08:34

## 2023-09-15 RX ADMIN — PRAMIPEXOLE DIHYDROCHLORIDE 0.12 MG: 0.25 TABLET ORAL at 17:18

## 2023-09-15 RX ADMIN — ENOXAPARIN SODIUM 30 MG: 100 INJECTION SUBCUTANEOUS at 21:44

## 2023-09-15 ASSESSMENT — PAIN DESCRIPTION - ORIENTATION: ORIENTATION: MID;LEFT

## 2023-09-15 ASSESSMENT — PAIN DESCRIPTION - DESCRIPTORS: DESCRIPTORS: ACHING

## 2023-09-15 ASSESSMENT — PAIN SCALES - GENERAL: PAINLEVEL_OUTOF10: 7

## 2023-09-15 ASSESSMENT — PAIN DESCRIPTION - LOCATION: LOCATION: HEAD;SHOULDER

## 2023-09-15 NOTE — CARE COORDINATION
9/15/23, 2:19 PM EDT    DISCHARGE ON GOING [address] day: 0  Location: 8A-11/011-A Reason for admit: Syncope and collapse [R55]  Facial laceration, initial encounter Nathaly Yoder  Fall, initial encounter [W19. XXXA]  Syncope, unspecified syncope type [R55]   Procedure: 9/11 CXR: Small left pleural effusion with asymmetric elevation of the left   hemidiaphragm and overlying atelectasis is similar to previous exam. No   acute finding identified. 9/11 CT Head: No acute intracranial findings   9/ CT Chest WO: 1. No acute traumatic finding. 2. Left basilar atelectasis and possibly superimposed consolidation. Correlate for pneumonia. Follow-up to resolution recommended. 9/12 EEG: This EEG was normal in wakefulness and sleep. 9/12 MRI Brain W WO: 1. Mild atrophy and probable ischemic changes in the white matter. 2. No evidence of an acute infarct. 3. Mild inflammatory changes in ethmoid air cells and mastoid air cells bilaterally. 4. Small sebaceous cyst in the suboccipital region on the left side  Barriers to Discharge: Hospitalist and therapy following. Psych to follow prn. Continue to work on discharge plan. PCP: Estefania Whitt MD   %  Patient Goals/Plan/Treatment Preferences: Danielle Stephens is from home alone. Has cane and CPAP. SW following to assist with discharge needs. Referral was made to Wendy Gomez

## 2023-09-15 NOTE — ED NOTES
Lac Repair    Date/Time: 9/15/2023 1:12 PM    Performed by: Perry Mann MD  Authorized by: Keny Rick MD    Consent:     Consent obtained:  Verbal    Consent given by:  Patient    Risks, benefits, and alternatives were discussed: yes      Risks discussed:  Infection, retained foreign body, pain, poor cosmetic result, need for additional repair, nerve damage, poor wound healing, vascular damage and tendon damage    Alternatives discussed:  No treatment, delayed treatment, observation and referral  Universal protocol:     Procedure explained and questions answered to patient or proxy's satisfaction: yes      Patient identity confirmed:  Verbally with patient and arm band  Anesthesia:     Anesthesia method:  Local infiltration    Local anesthetic:  Lidocaine 1% WITH epi  Laceration details:     Location:  Face    Face location:  Forehead  Pre-procedure details:     Preparation:  Patient was prepped and draped in usual sterile fashion  Exploration:     Limited defect created (wound extended): no      Hemostasis achieved with:  Direct pressure and epinephrine    Wound exploration: entire depth of wound visualized      Contaminated: no    Treatment:     Area cleansed with:  Saline    Amount of cleaning:  Standard    Irrigation solution:  Sterile saline    Irrigation method:  Syringe    Debridement:  None    Undermining:  None    Scar revision: no    Skin repair:     Repair method:  Sutures    Suture size:  5-0    Suture material:  Nylon    Suture technique:  Simple interrupted    Number of sutures:  5  Approximation:     Approximation:  Loose  Repair type:     Repair type:  Simple  Post-procedure details:     Dressing:  Open (no dressing) (Steri-Strips)    Procedure completion:  Inga Hurtado MD  Resident  09/15/23 1872

## 2023-09-15 NOTE — CARE COORDINATION
9/15/23, 5:10 PM EDT    DISCHARGE PLANNING EVALUATION    Spoke with Malik Jackson at [facility], they will accept patient. Pre-cert started. Will call nurses station if pre-cert comes back over the weekend.

## 2023-09-15 NOTE — TELEPHONE ENCOUNTER
Spoke to son Oswald Nation and he wanted to know what all is going on with his father. Since he was in the hospital.   I told him I will need to see him post discharge from the hospital.   He states that he is going to a facility for rehab. Will need to see him after he is done with his rehab.

## 2023-09-15 NOTE — DISCHARGE INSTR - COC
Continuity of Care Form    Patient Name: Cliff Felipe   :  1960  MRN:  [id number]    Admit date:  2023  Discharge date:  2023      Code Status Order: Full Code   Advance Directives:     Admitting Physician:  No admitting provider for patient encounter. PCP: Candace Thompson MD    Discharging Nurse: Rakesh Alcaraz [facility] Unit/Room#: 8A-11/011-A  Discharging Unit Phone Number: [phone]      Emergency Contact:   Extended Emergency Contact Information  Primary Emergency Contact:  Justice Hawley Phone: [phone]  Relation: Child    Past Surgical History:  Past Surgical History:   Procedure Laterality Date    CARDIAC CATHETERIZATION  2016    CATARACT REMOVAL Right     COLONOSCOPY      CORONARY ARTERY BYPASS GRAFT  2016    Double bypass, Dr. Nanette Garcia Right 2007    Neck    CYSTOSCOPY N/A 2021    CYSTOSCOPY WITH BILAT RETROGRADE PyELOGRAM performed by Jaron Chong MD at [address] [place] [address] CATH LAB PROCEDURE      EYE SURGERY Right     Retinal surgery x 3    HERNIA REPAIR Right     Inguinal    PTCA  01/15/2018    ROTATOR CUFF REPAIR Right 2017    TONSILLECTOMY  child    TOTAL KNEE ARTHROPLASTY Left 10/24/2016    Dr. Annie Franco       Immunization History:   Immunization History   Administered Date(s) Administered    COVID-19, J&J, (age 18y+), IM, 0.5 mL 2021    Influenza 2012    Influenza Virus Vaccine 2011, 2014, 10/14/2015    Influenza, AFLURIA (age 1 yrs+), FLUZONE, (age 10 mo+), MDV, 0.5mL 2017, 2020    Influenza, Quadv, 6 mo and older, IM (Fluzone, Flulaval) 10/03/2018    Influenza, Triv, 3 Years and older, IM (Afluria (5 yrs and older) 2016    Pneumococcal, PPSV23, PNEUMOVAX 21, (age 2y+), SC/IM, 0.5mL 2019    TDaP, ADACEL (age 6y-58y), BOOSTRIX (age 10y+), IM, 0.5mL 2012       Active Problems:  Patient Active Problem List   Diagnosis Code    GERD

## 2023-09-16 LAB
ANION GAP SERPL CALC-SCNC: 14 MEQ/L (ref 8–16)
BASOPHILS ABSOLUTE: 0.1 THOU/MM3 (ref 0–0.1)
BASOPHILS NFR BLD AUTO: 1.1 %
BUN SERPL-MCNC: 17 MG/DL (ref 7–22)
CALCIUM SERPL-MCNC: 9.4 MG/DL (ref 8.5–10.5)
CHLORIDE SERPL-SCNC: 100 MEQ/L (ref 98–111)
CO2 SERPL-SCNC: 27 MEQ/L (ref 23–33)
CREAT SERPL-MCNC: 1 MG/DL (ref 0.4–1.2)
DEPRECATED RDW RBC AUTO: 51.9 FL (ref 35–45)
EOSINOPHIL NFR BLD AUTO: 0.7 %
EOSINOPHILS ABSOLUTE: 0 THOU/MM3 (ref 0–0.4)
ERYTHROCYTE [DISTWIDTH] IN BLOOD BY AUTOMATED COUNT: 13.7 % (ref 11.5–14.5)
GFR SERPL CREATININE-BSD FRML MDRD: > 60 ML/MIN/1.73M2
GLUCOSE BLD STRIP.AUTO-MCNC: 147 MG/DL (ref 70–108)
GLUCOSE BLD STRIP.AUTO-MCNC: 186 MG/DL (ref 70–108)
GLUCOSE BLD STRIP.AUTO-MCNC: 190 MG/DL (ref 70–108)
GLUCOSE BLD STRIP.AUTO-MCNC: 214 MG/DL (ref 70–108)
GLUCOSE SERPL-MCNC: 160 MG/DL (ref 70–108)
HCT VFR BLD AUTO: 43.7 % (ref 42–52)
HGB BLD-MCNC: 14.4 GM/DL (ref 14–18)
IMM GRANULOCYTES # BLD AUTO: 0.03 THOU/MM3 (ref 0–0.07)
IMM GRANULOCYTES NFR BLD AUTO: 0.6 %
LYMPHOCYTES ABSOLUTE: 1.5 THOU/MM3 (ref 1–4.8)
LYMPHOCYTES NFR BLD AUTO: 27.9 %
MCH RBC QN AUTO: 34.2 PG (ref 26–33)
MCHC RBC AUTO-ENTMCNC: 33 GM/DL (ref 32.2–35.5)
MCV RBC AUTO: 103.8 FL (ref 80–94)
MONOCYTES ABSOLUTE: 0.9 THOU/MM3 (ref 0.4–1.3)
MONOCYTES NFR BLD AUTO: 15.8 %
NEUTROPHILS NFR BLD AUTO: 53.9 %
NRBC BLD AUTO-RTO: 0 /100 WBC
PLATELET # BLD AUTO: 206 THOU/MM3 (ref 130–400)
PMV BLD AUTO: 9.8 FL (ref 9.4–12.4)
POTASSIUM SERPL-SCNC: 3.3 MEQ/L (ref 3.5–5.2)
RBC # BLD AUTO: 4.21 MILL/MM3 (ref 4.7–6.1)
REASON FOR REJECTION: NORMAL
REJECTED TEST: NORMAL
SEGMENTED NEUTROPHILS ABSOLUTE COUNT: 2.9 THOU/MM3 (ref 1.8–7.7)
SODIUM SERPL-SCNC: 141 MEQ/L (ref 135–145)
WBC # BLD AUTO: 5.4 THOU/MM3 (ref 4.8–10.8)

## 2023-09-16 PROCEDURE — 94640 AIRWAY INHALATION TREATMENT: CPT

## 2023-09-16 PROCEDURE — 85025 COMPLETE CBC W/AUTO DIFF WBC: CPT

## 2023-09-16 PROCEDURE — 96372 THER/PROPH/DIAG INJ SC/IM: CPT

## 2023-09-16 PROCEDURE — 82948 REAGENT STRIP/BLOOD GLUCOSE: CPT

## 2023-09-16 PROCEDURE — 36415 COLL VENOUS BLD VENIPUNCTURE: CPT

## 2023-09-16 PROCEDURE — 6370000000 HC RX 637 (ALT 250 FOR IP): Performed by: HOSPITALIST

## 2023-09-16 PROCEDURE — 80048 BASIC METABOLIC PNL TOTAL CA: CPT

## 2023-09-16 PROCEDURE — 6370000000 HC RX 637 (ALT 250 FOR IP)

## 2023-09-16 PROCEDURE — 2580000003 HC RX 258: Performed by: NURSE PRACTITIONER

## 2023-09-16 PROCEDURE — G0378 HOSPITAL OBSERVATION PER HR: HCPCS

## 2023-09-16 PROCEDURE — 99232 SBSQ HOSP IP/OBS MODERATE 35: CPT | Performed by: HOSPITALIST

## 2023-09-16 PROCEDURE — 6370000000 HC RX 637 (ALT 250 FOR IP): Performed by: NURSE PRACTITIONER

## 2023-09-16 PROCEDURE — 6360000002 HC RX W HCPCS: Performed by: NURSE PRACTITIONER

## 2023-09-16 RX ADMIN — TRAZODONE HYDROCHLORIDE 100 MG: 100 TABLET ORAL at 21:46

## 2023-09-16 RX ADMIN — ENOXAPARIN SODIUM 30 MG: 100 INJECTION SUBCUTANEOUS at 08:13

## 2023-09-16 RX ADMIN — FLUTICASONE PROPIONATE 1 PUFF: 110 AEROSOL, METERED RESPIRATORY (INHALATION) at 08:06

## 2023-09-16 RX ADMIN — ALBUTEROL SULFATE 2 PUFF: 90 AEROSOL, METERED RESPIRATORY (INHALATION) at 08:06

## 2023-09-16 RX ADMIN — BUMETANIDE 2 MG: 1 TABLET ORAL at 08:14

## 2023-09-16 RX ADMIN — TICAGRELOR 90 MG: 90 TABLET ORAL at 08:14

## 2023-09-16 RX ADMIN — METOPROLOL SUCCINATE 25 MG: 25 TABLET, EXTENDED RELEASE ORAL at 08:14

## 2023-09-16 RX ADMIN — PANTOPRAZOLE SODIUM 40 MG: 40 TABLET, DELAYED RELEASE ORAL at 06:15

## 2023-09-16 RX ADMIN — ALLOPURINOL 300 MG: 300 TABLET ORAL at 08:14

## 2023-09-16 RX ADMIN — Medication 1 TABLET: at 08:14

## 2023-09-16 RX ADMIN — ENOXAPARIN SODIUM 30 MG: 100 INJECTION SUBCUTANEOUS at 21:46

## 2023-09-16 RX ADMIN — ACETAMINOPHEN 650 MG: 325 TABLET ORAL at 06:20

## 2023-09-16 RX ADMIN — ACETAMINOPHEN 650 MG: 325 TABLET ORAL at 17:43

## 2023-09-16 RX ADMIN — VALSARTAN 80 MG: 80 TABLET, FILM COATED ORAL at 08:14

## 2023-09-16 RX ADMIN — Medication 400 MG: at 08:14

## 2023-09-16 RX ADMIN — FLUTICASONE PROPIONATE 1 PUFF: 110 AEROSOL, METERED RESPIRATORY (INHALATION) at 16:56

## 2023-09-16 RX ADMIN — ACETAMINOPHEN 650 MG: 325 TABLET ORAL at 12:20

## 2023-09-16 RX ADMIN — SODIUM CHLORIDE, PRESERVATIVE FREE 10 ML: 5 INJECTION INTRAVENOUS at 08:13

## 2023-09-16 RX ADMIN — ATORVASTATIN CALCIUM 40 MG: 40 TABLET, FILM COATED ORAL at 08:14

## 2023-09-16 RX ADMIN — SODIUM CHLORIDE, PRESERVATIVE FREE 10 ML: 5 INJECTION INTRAVENOUS at 21:46

## 2023-09-16 RX ADMIN — ISOSORBIDE MONONITRATE 30 MG: 30 TABLET, EXTENDED RELEASE ORAL at 08:14

## 2023-09-16 RX ADMIN — PRAMIPEXOLE DIHYDROCHLORIDE 0.12 MG: 0.25 TABLET ORAL at 17:43

## 2023-09-16 RX ADMIN — TICAGRELOR 90 MG: 90 TABLET ORAL at 21:46

## 2023-09-16 RX ADMIN — ESCITALOPRAM OXALATE 5 MG: 10 TABLET ORAL at 08:14

## 2023-09-16 RX ADMIN — TAMSULOSIN HYDROCHLORIDE 0.4 MG: 0.4 CAPSULE ORAL at 08:15

## 2023-09-16 RX ADMIN — ALBUTEROL SULFATE 2 PUFF: 90 AEROSOL, METERED RESPIRATORY (INHALATION) at 16:56

## 2023-09-16 RX ADMIN — GUAIFENESIN 1200 MG: 600 TABLET, EXTENDED RELEASE ORAL at 21:46

## 2023-09-16 RX ADMIN — GUAIFENESIN 1200 MG: 600 TABLET, EXTENDED RELEASE ORAL at 08:13

## 2023-09-16 RX ADMIN — BUMETANIDE 2 MG: 1 TABLET ORAL at 17:43

## 2023-09-16 RX ADMIN — TIOTROPIUM BROMIDE AND OLODATEROL 2 PUFF: 3.124; 2.736 SPRAY, METERED RESPIRATORY (INHALATION) at 08:06

## 2023-09-16 RX ADMIN — EMPAGLIFLOZIN 25 MG: 25 TABLET, FILM COATED ORAL at 08:14

## 2023-09-16 ASSESSMENT — PAIN DESCRIPTION - ORIENTATION
ORIENTATION: LEFT

## 2023-09-16 ASSESSMENT — PAIN - FUNCTIONAL ASSESSMENT
PAIN_FUNCTIONAL_ASSESSMENT: PREVENTS OR INTERFERES SOME ACTIVE ACTIVITIES AND ADLS
PAIN_FUNCTIONAL_ASSESSMENT: PREVENTS OR INTERFERES SOME ACTIVE ACTIVITIES AND ADLS

## 2023-09-16 ASSESSMENT — PAIN SCALES - GENERAL
PAINLEVEL_OUTOF10: 5
PAINLEVEL_OUTOF10: 1
PAINLEVEL_OUTOF10: 7
PAINLEVEL_OUTOF10: 7

## 2023-09-16 ASSESSMENT — PAIN DESCRIPTION - FREQUENCY
FREQUENCY: CONTINUOUS
FREQUENCY: INTERMITTENT

## 2023-09-16 ASSESSMENT — PAIN DESCRIPTION - LOCATION
LOCATION: SHOULDER

## 2023-09-16 ASSESSMENT — PAIN DESCRIPTION - DESCRIPTORS
DESCRIPTORS: ACHING
DESCRIPTORS: ACHING;THROBBING

## 2023-09-16 ASSESSMENT — PAIN DESCRIPTION - PAIN TYPE: TYPE: ACUTE PAIN

## 2023-09-16 ASSESSMENT — PAIN DESCRIPTION - ONSET: ONSET: ON-GOING

## 2023-09-17 LAB
ANION GAP SERPL CALC-SCNC: 13 MEQ/L (ref 8–16)
BASOPHILS ABSOLUTE: 0.1 THOU/MM3 (ref 0–0.1)
BASOPHILS NFR BLD AUTO: 1 %
BUN SERPL-MCNC: 16 MG/DL (ref 7–22)
CALCIUM SERPL-MCNC: 9.2 MG/DL (ref 8.5–10.5)
CHLORIDE SERPL-SCNC: 98 MEQ/L (ref 98–111)
CO2 SERPL-SCNC: 28 MEQ/L (ref 23–33)
CREAT SERPL-MCNC: 1 MG/DL (ref 0.4–1.2)
DEPRECATED RDW RBC AUTO: 52 FL (ref 35–45)
EOSINOPHIL NFR BLD AUTO: 0.7 %
EOSINOPHILS ABSOLUTE: 0 THOU/MM3 (ref 0–0.4)
ERYTHROCYTE [DISTWIDTH] IN BLOOD BY AUTOMATED COUNT: 13.5 % (ref 11.5–14.5)
GFR SERPL CREATININE-BSD FRML MDRD: > 60 ML/MIN/1.73M2
GLUCOSE BLD STRIP.AUTO-MCNC: 133 MG/DL (ref 70–108)
GLUCOSE BLD STRIP.AUTO-MCNC: 157 MG/DL (ref 70–108)
GLUCOSE BLD STRIP.AUTO-MCNC: 230 MG/DL (ref 70–108)
GLUCOSE BLD STRIP.AUTO-MCNC: 237 MG/DL (ref 70–108)
GLUCOSE SERPL-MCNC: 194 MG/DL (ref 70–108)
HCT VFR BLD AUTO: 42.6 % (ref 42–52)
HGB BLD-MCNC: 14.2 GM/DL (ref 14–18)
IMM GRANULOCYTES # BLD AUTO: 0.05 THOU/MM3 (ref 0–0.07)
IMM GRANULOCYTES NFR BLD AUTO: 0.9 %
LYMPHOCYTES ABSOLUTE: 1.4 THOU/MM3 (ref 1–4.8)
LYMPHOCYTES NFR BLD AUTO: 23.8 %
MCH RBC QN AUTO: 34.9 PG (ref 26–33)
MCHC RBC AUTO-ENTMCNC: 33.3 GM/DL (ref 32.2–35.5)
MCV RBC AUTO: 104.7 FL (ref 80–94)
MONOCYTES ABSOLUTE: 1.1 THOU/MM3 (ref 0.4–1.3)
MONOCYTES NFR BLD AUTO: 19.1 %
NEUTROPHILS NFR BLD AUTO: 54.5 %
NRBC BLD AUTO-RTO: 0 /100 WBC
PLATELET # BLD AUTO: 223 THOU/MM3 (ref 130–400)
PMV BLD AUTO: 10 FL (ref 9.4–12.4)
POTASSIUM SERPL-SCNC: 4.2 MEQ/L (ref 3.5–5.2)
RBC # BLD AUTO: 4.07 MILL/MM3 (ref 4.7–6.1)
SEGMENTED NEUTROPHILS ABSOLUTE COUNT: 3.2 THOU/MM3 (ref 1.8–7.7)
SODIUM SERPL-SCNC: 139 MEQ/L (ref 135–145)
WBC # BLD AUTO: 5.8 THOU/MM3 (ref 4.8–10.8)

## 2023-09-17 PROCEDURE — 80048 BASIC METABOLIC PNL TOTAL CA: CPT

## 2023-09-17 PROCEDURE — 94640 AIRWAY INHALATION TREATMENT: CPT

## 2023-09-17 PROCEDURE — 85025 COMPLETE CBC W/AUTO DIFF WBC: CPT

## 2023-09-17 PROCEDURE — 36415 COLL VENOUS BLD VENIPUNCTURE: CPT

## 2023-09-17 PROCEDURE — 6360000002 HC RX W HCPCS: Performed by: NURSE PRACTITIONER

## 2023-09-17 PROCEDURE — G0378 HOSPITAL OBSERVATION PER HR: HCPCS

## 2023-09-17 PROCEDURE — 99232 SBSQ HOSP IP/OBS MODERATE 35: CPT | Performed by: HOSPITALIST

## 2023-09-17 PROCEDURE — 96372 THER/PROPH/DIAG INJ SC/IM: CPT

## 2023-09-17 PROCEDURE — 6370000000 HC RX 637 (ALT 250 FOR IP): Performed by: HOSPITALIST

## 2023-09-17 PROCEDURE — 6370000000 HC RX 637 (ALT 250 FOR IP): Performed by: NURSE PRACTITIONER

## 2023-09-17 PROCEDURE — 94760 N-INVAS EAR/PLS OXIMETRY 1: CPT

## 2023-09-17 PROCEDURE — 2580000003 HC RX 258: Performed by: NURSE PRACTITIONER

## 2023-09-17 PROCEDURE — 82948 REAGENT STRIP/BLOOD GLUCOSE: CPT

## 2023-09-17 PROCEDURE — 6370000000 HC RX 637 (ALT 250 FOR IP)

## 2023-09-17 RX ADMIN — Medication 1 TABLET: at 09:49

## 2023-09-17 RX ADMIN — FLUTICASONE PROPIONATE 1 PUFF: 110 AEROSOL, METERED RESPIRATORY (INHALATION) at 16:30

## 2023-09-17 RX ADMIN — GUAIFENESIN 1200 MG: 600 TABLET, EXTENDED RELEASE ORAL at 09:50

## 2023-09-17 RX ADMIN — TIOTROPIUM BROMIDE AND OLODATEROL 2 PUFF: 3.124; 2.736 SPRAY, METERED RESPIRATORY (INHALATION) at 07:45

## 2023-09-17 RX ADMIN — ESCITALOPRAM OXALATE 5 MG: 10 TABLET ORAL at 09:49

## 2023-09-17 RX ADMIN — ACETAMINOPHEN 650 MG: 325 TABLET ORAL at 10:07

## 2023-09-17 RX ADMIN — ISOSORBIDE MONONITRATE 30 MG: 30 TABLET, EXTENDED RELEASE ORAL at 09:49

## 2023-09-17 RX ADMIN — FLUTICASONE PROPIONATE 1 PUFF: 110 AEROSOL, METERED RESPIRATORY (INHALATION) at 07:45

## 2023-09-17 RX ADMIN — PRAMIPEXOLE DIHYDROCHLORIDE 0.12 MG: 0.25 TABLET ORAL at 19:08

## 2023-09-17 RX ADMIN — ENOXAPARIN SODIUM 30 MG: 100 INJECTION SUBCUTANEOUS at 09:49

## 2023-09-17 RX ADMIN — TAMSULOSIN HYDROCHLORIDE 0.4 MG: 0.4 CAPSULE ORAL at 09:49

## 2023-09-17 RX ADMIN — METOPROLOL SUCCINATE 25 MG: 25 TABLET, EXTENDED RELEASE ORAL at 09:50

## 2023-09-17 RX ADMIN — TICAGRELOR 90 MG: 90 TABLET ORAL at 21:03

## 2023-09-17 RX ADMIN — ATORVASTATIN CALCIUM 40 MG: 40 TABLET, FILM COATED ORAL at 09:49

## 2023-09-17 RX ADMIN — ALBUTEROL SULFATE 2 PUFF: 90 AEROSOL, METERED RESPIRATORY (INHALATION) at 07:45

## 2023-09-17 RX ADMIN — ALBUTEROL SULFATE 2 PUFF: 90 AEROSOL, METERED RESPIRATORY (INHALATION) at 16:30

## 2023-09-17 RX ADMIN — SODIUM CHLORIDE, PRESERVATIVE FREE 10 ML: 5 INJECTION INTRAVENOUS at 09:50

## 2023-09-17 RX ADMIN — EMPAGLIFLOZIN 25 MG: 25 TABLET, FILM COATED ORAL at 09:49

## 2023-09-17 RX ADMIN — BUMETANIDE 2 MG: 1 TABLET ORAL at 09:50

## 2023-09-17 RX ADMIN — VALSARTAN 80 MG: 80 TABLET, FILM COATED ORAL at 09:49

## 2023-09-17 RX ADMIN — ACETAMINOPHEN 650 MG: 325 TABLET ORAL at 19:11

## 2023-09-17 RX ADMIN — ALLOPURINOL 300 MG: 300 TABLET ORAL at 09:49

## 2023-09-17 RX ADMIN — PANTOPRAZOLE SODIUM 40 MG: 40 TABLET, DELAYED RELEASE ORAL at 06:05

## 2023-09-17 RX ADMIN — Medication 400 MG: at 09:49

## 2023-09-17 RX ADMIN — TICAGRELOR 90 MG: 90 TABLET ORAL at 09:49

## 2023-09-17 RX ADMIN — GUAIFENESIN 1200 MG: 600 TABLET, EXTENDED RELEASE ORAL at 21:03

## 2023-09-17 RX ADMIN — SODIUM CHLORIDE, PRESERVATIVE FREE 10 ML: 5 INJECTION INTRAVENOUS at 21:03

## 2023-09-17 ASSESSMENT — PAIN DESCRIPTION - FREQUENCY: FREQUENCY: INTERMITTENT

## 2023-09-17 ASSESSMENT — PAIN - FUNCTIONAL ASSESSMENT
PAIN_FUNCTIONAL_ASSESSMENT: ACTIVITIES ARE NOT PREVENTED

## 2023-09-17 ASSESSMENT — PAIN DESCRIPTION - DESCRIPTORS
DESCRIPTORS: SORE
DESCRIPTORS: SORE
DESCRIPTORS: ACHING;DISCOMFORT

## 2023-09-17 ASSESSMENT — PAIN DESCRIPTION - PAIN TYPE: TYPE: ACUTE PAIN

## 2023-09-17 ASSESSMENT — PAIN SCALES - GENERAL
PAINLEVEL_OUTOF10: 1
PAINLEVEL_OUTOF10: 2
PAINLEVEL_OUTOF10: 1
PAINLEVEL_OUTOF10: 2

## 2023-09-17 ASSESSMENT — PAIN DESCRIPTION - LOCATION
LOCATION: THROAT
LOCATION: BACK
LOCATION: THROAT

## 2023-09-17 ASSESSMENT — PAIN DESCRIPTION - ONSET: ONSET: ON-GOING

## 2023-09-17 ASSESSMENT — PAIN DESCRIPTION - ORIENTATION: ORIENTATION: POSTERIOR

## 2023-09-17 ASSESSMENT — PAIN SCALES - WONG BAKER: WONGBAKER_NUMERICALRESPONSE: 0

## 2023-09-18 VITALS
HEIGHT: 71 IN | TEMPERATURE: 97.7 F | RESPIRATION RATE: 17 BRPM | SYSTOLIC BLOOD PRESSURE: 136 MMHG | OXYGEN SATURATION: 97 % | BODY MASS INDEX: 32.17 KG/M2 | HEART RATE: 65 BPM | WEIGHT: 229.8 LBS | DIASTOLIC BLOOD PRESSURE: 78 MMHG

## 2023-09-18 LAB
GLUCOSE BLD STRIP.AUTO-MCNC: 158 MG/DL (ref 70–108)
GLUCOSE BLD STRIP.AUTO-MCNC: 161 MG/DL (ref 70–108)
GLUCOSE BLD STRIP.AUTO-MCNC: 221 MG/DL (ref 70–108)

## 2023-09-18 PROCEDURE — 99239 HOSP IP/OBS DSCHRG MGMT >30: CPT | Performed by: HOSPITALIST

## 2023-09-18 PROCEDURE — 6370000000 HC RX 637 (ALT 250 FOR IP): Performed by: NURSE PRACTITIONER

## 2023-09-18 PROCEDURE — G0378 HOSPITAL OBSERVATION PER HR: HCPCS

## 2023-09-18 PROCEDURE — 6370000000 HC RX 637 (ALT 250 FOR IP)

## 2023-09-18 PROCEDURE — 94640 AIRWAY INHALATION TREATMENT: CPT

## 2023-09-18 PROCEDURE — 82948 REAGENT STRIP/BLOOD GLUCOSE: CPT

## 2023-09-18 PROCEDURE — 2580000003 HC RX 258: Performed by: NURSE PRACTITIONER

## 2023-09-18 PROCEDURE — 6370000000 HC RX 637 (ALT 250 FOR IP): Performed by: HOSPITALIST

## 2023-09-18 RX ORDER — ESCITALOPRAM OXALATE 5 MG/1
5 TABLET ORAL DAILY
Qty: 30 TABLET | Refills: 3
Start: 2023-09-19

## 2023-09-18 RX ADMIN — ATORVASTATIN CALCIUM 40 MG: 40 TABLET, FILM COATED ORAL at 09:27

## 2023-09-18 RX ADMIN — FLUTICASONE PROPIONATE 1 PUFF: 110 AEROSOL, METERED RESPIRATORY (INHALATION) at 07:40

## 2023-09-18 RX ADMIN — GUAIFENESIN 1200 MG: 600 TABLET, EXTENDED RELEASE ORAL at 09:27

## 2023-09-18 RX ADMIN — TICAGRELOR 90 MG: 90 TABLET ORAL at 09:27

## 2023-09-18 RX ADMIN — INSULIN LISPRO 1 UNITS: 100 INJECTION, SOLUTION INTRAVENOUS; SUBCUTANEOUS at 12:37

## 2023-09-18 RX ADMIN — ACETAMINOPHEN 650 MG: 325 TABLET ORAL at 09:27

## 2023-09-18 RX ADMIN — ESCITALOPRAM OXALATE 5 MG: 10 TABLET ORAL at 09:27

## 2023-09-18 RX ADMIN — Medication 400 MG: at 09:27

## 2023-09-18 RX ADMIN — VALSARTAN 80 MG: 80 TABLET, FILM COATED ORAL at 09:27

## 2023-09-18 RX ADMIN — ISOSORBIDE MONONITRATE 30 MG: 30 TABLET, EXTENDED RELEASE ORAL at 09:27

## 2023-09-18 RX ADMIN — Medication 1 TABLET: at 09:27

## 2023-09-18 RX ADMIN — EMPAGLIFLOZIN 25 MG: 25 TABLET, FILM COATED ORAL at 09:27

## 2023-09-18 RX ADMIN — METOPROLOL SUCCINATE 25 MG: 25 TABLET, EXTENDED RELEASE ORAL at 09:27

## 2023-09-18 RX ADMIN — BUMETANIDE 2 MG: 1 TABLET ORAL at 09:27

## 2023-09-18 RX ADMIN — PANTOPRAZOLE SODIUM 40 MG: 40 TABLET, DELAYED RELEASE ORAL at 06:00

## 2023-09-18 RX ADMIN — ALBUTEROL SULFATE 2 PUFF: 90 AEROSOL, METERED RESPIRATORY (INHALATION) at 07:40

## 2023-09-18 RX ADMIN — SODIUM CHLORIDE, PRESERVATIVE FREE 10 ML: 5 INJECTION INTRAVENOUS at 09:27

## 2023-09-18 RX ADMIN — TIOTROPIUM BROMIDE AND OLODATEROL 2 PUFF: 3.124; 2.736 SPRAY, METERED RESPIRATORY (INHALATION) at 07:41

## 2023-09-18 RX ADMIN — TAMSULOSIN HYDROCHLORIDE 0.4 MG: 0.4 CAPSULE ORAL at 09:27

## 2023-09-18 RX ADMIN — ALLOPURINOL 300 MG: 300 TABLET ORAL at 09:27

## 2023-09-18 ASSESSMENT — PAIN DESCRIPTION - ONSET: ONSET: SUDDEN

## 2023-09-18 ASSESSMENT — PAIN DESCRIPTION - ORIENTATION: ORIENTATION: MID;LOWER

## 2023-09-18 ASSESSMENT — PAIN SCALES - GENERAL
PAINLEVEL_OUTOF10: 0
PAINLEVEL_OUTOF10: 3
PAINLEVEL_OUTOF10: 0

## 2023-09-18 ASSESSMENT — PAIN DESCRIPTION - DESCRIPTORS: DESCRIPTORS: ACHING;DISCOMFORT

## 2023-09-18 ASSESSMENT — PAIN - FUNCTIONAL ASSESSMENT: PAIN_FUNCTIONAL_ASSESSMENT: ACTIVITIES ARE NOT PREVENTED

## 2023-09-18 ASSESSMENT — PAIN DESCRIPTION - DIRECTION: RADIATING_TOWARDS: DENIES

## 2023-09-18 ASSESSMENT — PAIN DESCRIPTION - LOCATION: LOCATION: BACK

## 2023-09-18 ASSESSMENT — PAIN SCALES - WONG BAKER: WONGBAKER_NUMERICALRESPONSE: 2

## 2023-09-18 ASSESSMENT — PAIN DESCRIPTION - FREQUENCY: FREQUENCY: INTERMITTENT

## 2023-09-18 ASSESSMENT — PAIN DESCRIPTION - PAIN TYPE: TYPE: ACUTE PAIN

## 2023-09-18 NOTE — CARE COORDINATION
9/18/23, 10:13 AM EDT    DISCHARGE PLANNING EVALUATION    Left a message for Jillian Del Cid with [facility] asking about precert coming back. Provided a call back number. 10:35 AM- Received a call back from Jillian Del Cid, he reports that patient's precert has been approved and he can admit today. JOSE L spoke to Marydel, he is going to talk to family and see what time they can transport him and then let JOSE L know. JOSE L will update Jillian Del Cid when we have a transport time. Updated RN Nahid Frankel.

## 2023-09-18 NOTE — CARE COORDINATION
9/18/23, 10:40 AM EDT    Patient goals/plan/ treatment preferences discussed by  and . Patient goals/plan/ treatment preferences reviewed with patient/ family. Patient/ family verbalize understanding of discharge plan and are in agreement with goal/plan/treatment preferences. Understanding was demonstrated using the teach back method. AVS provided by RN at time of discharge, which includes all necessary medical information pertaining to the patients current course of illness, treatment, post-discharge goals of care, and treatment preferences. Services At/After Discharge: [facility] (SNF), Nursing service, OT, and PT- [address]       IMM Letter  Observation Status Letter date given[de-identified] 09/12/23  Observation Status Letter time given[de-identified] 0004  Observation Status Letter given to Patient/Family/Significant other/Guardian/POA/by[de-identified] patient registration       Noe Nolasco will be discharged today to [facility], where he will be skilled under his BCBS Medicare benefit. Patient's family will transport sometime today, patient is to update SW with timeframe.

## 2023-09-18 NOTE — PLAN OF CARE
Problem: Discharge Planning  Goal: Discharge to home or other facility with appropriate resources  Outcome: Progressing     Problem: Pain  Goal: Verbalizes/displays adequate comfort level or baseline comfort level  Outcome: Progressing     Problem: Safety - Adult  Goal: Free from fall injury  Outcome: Progressing     Problem: ABCDS Injury Assessment  Goal: Absence of physical injury  Outcome: Progressing     Problem: Respiratory - Adult  Goal: Clear lung sounds  Description: Clear lung sounds  9/14/2023 0744 by Ryan Pete RCP  Outcome: Progressing     Problem: Chronic Conditions and Co-morbidities  Goal: Patient's chronic conditions and co-morbidity symptoms are monitored and maintained or improved  Outcome: Progressing
Problem: Discharge Planning  Goal: Discharge to home or other facility with appropriate resources  Outcome: Progressing     Problem: Pain  Goal: Verbalizes/displays adequate comfort level or baseline comfort level  Outcome: Progressing     Problem: Safety - Adult  Goal: Free from fall injury  Outcome: Progressing     Problem: ABCDS Injury Assessment  Goal: Absence of physical injury  Outcome: Progressing     Problem: Respiratory - Adult  Goal: Clear lung sounds  Description: Clear lung sounds  9/15/2023 1051 by Celina Golden RCP  Outcome: Progressing     Problem: Chronic Conditions and Co-morbidities  Goal: Patient's chronic conditions and co-morbidity symptoms are monitored and maintained or improved  Outcome: Progressing
Problem: Discharge Planning  Goal: Discharge to home or other facility with appropriate resources  Outcome: Progressing  Flowsheets (Taken 9/12/2023 0313)  Discharge to home or other facility with appropriate resources: Arrange for needed discharge resources and transportation as appropriate     Problem: Pain  Goal: Verbalizes/displays adequate comfort level or baseline comfort level  Outcome: Progressing     Problem: Safety - Adult  Goal: Free from fall injury  Outcome: Progressing     Problem: ABCDS Injury Assessment  Goal: Absence of physical injury  Outcome: Progressing     POC initiated at this time
Problem: Discharge Planning  Goal: Discharge to home or other facility with appropriate resources  Outcome: Progressing  Flowsheets (Taken 9/18/2023 1041)  Discharge to home or other facility with appropriate resources:   Identify barriers to discharge with patient and caregiver   Arrange for needed discharge resources and transportation as appropriate   Identify discharge learning needs (meds, wound care, etc)   Arrange for interpreters to assist at discharge as needed   Refer to discharge planning if patient needs post-hospital services based on physician order or complex needs related to functional status, cognitive ability or social support system     Problem: Pain  Goal: Verbalizes/displays adequate comfort level or baseline comfort level  Outcome: Progressing  Flowsheets (Taken 9/16/2023 2141 by Tabitha Obrien RN)  Verbalizes/displays adequate comfort level or baseline comfort level:   Encourage patient to monitor pain and request assistance   Assess pain using appropriate pain scale   Administer analgesics based on type and severity of pain and evaluate response   Implement non-pharmacological measures as appropriate and evaluate response   Consider cultural and social influences on pain and pain management   Notify Licensed Independent Practitioner if interventions unsuccessful or patient reports new pain     Problem: Safety - Adult  Goal: Free from fall injury  Outcome: Progressing  Flowsheets (Taken 9/18/2023 1041)  Free From Fall Injury:   Instruct family/caregiver on patient safety   Based on caregiver fall risk screen, instruct family/caregiver to ask for assistance with transferring infant if caregiver noted to have fall risk factors     Problem: ABCDS Injury Assessment  Goal: Absence of physical injury  Outcome: Progressing  Flowsheets (Taken 9/18/2023 1041)  Absence of Physical Injury: Implement safety measures based on patient assessment     Problem: Chronic Conditions and Co-morbidities  Goal:
Problem: Respiratory - Adult  Goal: Clear lung sounds  Description: Clear lung sounds  9/12/2023 1708 by Hugo Liang RCP  Outcome: Progressing   Patient agreed on goals
Problem: Respiratory - Adult  Goal: Clear lung sounds  Description: Clear lung sounds  9/13/2023 1722 by Gena Gill, AUGUST  Outcome: Progressing    Continue tx's to improve breath sounds, increase aeration and decrease WOB.
Problem: Respiratory - Adult  Goal: Clear lung sounds  Description: Clear lung sounds  9/14/2023 1731 by Alana Arriaga RCP  Outcome: Progressing
Problem: Respiratory - Adult  Goal: Clear lung sounds  Description: Clear lung sounds  9/15/2023 2141 by Vicky Smart RCP  Outcome: Progressing   Patient continues on scheduled breathing treatments, patient mutually agrees on goals.
Problem: Respiratory - Adult  Goal: Clear lung sounds  Description: Clear lung sounds  9/16/2023 0810 by Rosalio Street RCP  Outcome: Progressing   Patient agreed on goals
Problem: Respiratory - Adult  Goal: Clear lung sounds  Description: Clear lung sounds  9/16/2023 0810 by Sandee Chavez RCP  Outcome: Progressing   Patient agreed on goals
Problem: Respiratory - Adult  Goal: Clear lung sounds  Description: Clear lung sounds  9/17/2023 0755 by Dacia Lowery RCP  Outcome: Progressing    Continue tx's to improve breath sounds, increase aeration and decrease WOB.
Problem: Respiratory - Adult  Goal: Clear lung sounds  Description: Clear lung sounds  Outcome: Progressing   Continue tx's to improve breath sounds, increase aeration and decrease WOB.
Problem: Respiratory - Adult  Goal: Clear lung sounds  Description: Clear lung sounds  Outcome: Progressing   Patient agreed on goals
Problem: Respiratory - Adult  Goal: Clear lung sounds  Description: Clear lung sounds  Outcome: Progressing   Patient agreed on goals
Problem: Respiratory - Adult  Goal: Clear lung sounds  Description: Clear lung sounds  Outcome: Progressing   Patient is receiving fluticasone, albuterol, and tiotropium-olodaterol to promote and preserve a clear, open airway. Patient breath sounds improved post-treatment and will continue to improve with further therapy. Patient mutually agreed on goals.
Problem: Respiratory - Adult  Goal: Clear lung sounds  Description: Clear lung sounds  Outcome: Progressing  Note: Pt had no questions on purpose or side effects of medication   Will continue with treatment/assessment as ordered   Pt mutually agreed upon goals
improved  Outcome: Progressing  Flowsheets (Taken 9/16/2023 2148)  Care Plan - Patient's Chronic Conditions and Co-Morbidity Symptoms are Monitored and Maintained or Improved:   Monitor and assess patient's chronic conditions and comorbid symptoms for stability, deterioration, or improvement   Collaborate with multidisciplinary team to address chronic and comorbid conditions and prevent exacerbation or deterioration   Update acute care plan with appropriate goals if chronic or comorbid symptoms are exacerbated and prevent overall improvement and discharge       Care plan reviewed with patient. Patient verbalized understanding of the plan of care and contributed to goal setting.

## 2023-09-25 ENCOUNTER — CARE COORDINATION (OUTPATIENT)
Dept: CARE COORDINATION | Age: 63
End: 2023-09-25

## 2023-09-25 ENCOUNTER — CARE COORDINATION (OUTPATIENT)
Dept: CASE MANAGEMENT | Age: 63
End: 2023-09-25

## 2023-09-25 DIAGNOSIS — R55 SYNCOPE, UNSPECIFIED SYNCOPE TYPE: Primary | ICD-10-CM

## 2023-09-25 PROCEDURE — 1111F DSCHRG MED/CURRENT MED MERGE: CPT

## 2023-09-25 NOTE — CARE COORDINATION
Confirmed with [facility] that referral has been received. No SOC date yet. Will notify CTN at this time.

## 2023-09-25 NOTE — CARE COORDINATION
[facility] Care Transitions Initial Follow Up Call    Call within 2 business days of discharge: Yes    Patient Current Location:  Home: [address] [facility]    Care Transition Nurse contacted the patient by telephone to perform post hospital discharge assessment. Verified name and  with patient as identifiers. Provided introduction to self, and explanation of the Care Transition Nurse role. Patient: Nancy Stephens Patient : 1960   MRN: <[id number]>  Reason for Admission: Syncope   Discharge Date: 23 RARS: No data recorded    Last Discharge [address]       Date Complaint Diagnosis Description Type Department Provider    23 Fall Syncope, unspecified syncope type . .. ED to Hosp-Admission (Discharged) (ADMITTED) Katharina Robles MD; Caitlin Romero MD... Was this an external facility discharge? Yes, 23  Discharge Facility: [facility] to be reviewed by the provider   Additional needs identified to be addressed with provider: No  none               Method of communication with provider: none. Patient states he is doing pretty well. Denies sob, cp, pain, dizziness. States his throat on the lower left side is constantly aching. States he takes Tylenol. It is effective. Patient advised to let PCP know if it continues. He states he has a f/u appointment 10/2. He also states he has a headache which Tylenol is also effective. No congestion reported. Appetite good. Medication reviewed. Will confirm [facility] [address] [facility] referral received. Patient states he has a appointment with  on Aging today to see what programs he qualifies for. Patient is agreeable to future calls. Care Transition Nurse reviewed medical action plan with patient who verbalized understanding. The patient was given an opportunity to ask questions and does not have any further questions or concerns at this time. Were discharge instructions available to patient? Yes.  Reviewed

## 2023-10-02 ENCOUNTER — OFFICE VISIT (OUTPATIENT)
Dept: FAMILY MEDICINE CLINIC | Age: 63
End: 2023-10-02

## 2023-10-02 VITALS
HEART RATE: 60 BPM | RESPIRATION RATE: 16 BRPM | BODY MASS INDEX: 45.97 KG/M2 | HEIGHT: 61 IN | WEIGHT: 243.5 LBS | SYSTOLIC BLOOD PRESSURE: 124 MMHG | DIASTOLIC BLOOD PRESSURE: 62 MMHG

## 2023-10-02 DIAGNOSIS — J18.1 LEFT LOWER LOBE CONSOLIDATION (HCC): ICD-10-CM

## 2023-10-02 DIAGNOSIS — J44.9 CHRONIC OBSTRUCTIVE PULMONARY DISEASE, UNSPECIFIED COPD TYPE (HCC): ICD-10-CM

## 2023-10-02 DIAGNOSIS — I50.32 CHF (CONGESTIVE HEART FAILURE), NYHA CLASS II, CHRONIC, DIASTOLIC (HCC): ICD-10-CM
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
## 2023-10-02 DIAGNOSIS — F33.9 EPISODE OF RECURRENT MAJOR DEPRESSIVE DISORDER, UNSPECIFIED DEPRESSION EPISODE SEVERITY (HCC): ICD-10-CM

## 2023-10-02 DIAGNOSIS — E78.5 HYPERLIPIDEMIA, UNSPECIFIED HYPERLIPIDEMIA TYPE: ICD-10-CM

## 2023-10-02 DIAGNOSIS — I25.10 CORONARY ARTERY DISEASE INVOLVING NATIVE CORONARY ARTERY OF NATIVE HEART WITHOUT ANGINA PECTORIS: ICD-10-CM

## 2023-10-02 DIAGNOSIS — I10 ESSENTIAL HYPERTENSION: ICD-10-CM

## 2023-10-02 DIAGNOSIS — E11.65 UNCONTROLLED TYPE 2 DIABETES MELLITUS WITH HYPERGLYCEMIA (HCC): ICD-10-CM

## 2023-10-02 PROCEDURE — 1123F ACP DISCUSS/DSCN MKR DOCD: CPT | Performed by: FAMILY MEDICINE

## 2023-10-02 PROCEDURE — 99214 OFFICE O/P EST MOD 30 MIN: CPT | Performed by: FAMILY MEDICINE

## 2023-10-02 ASSESSMENT — ENCOUNTER SYMPTOMS
NAUSEA: 0
BLOOD IN STOOL: 0
COUGH: 0
CHEST TIGHTNESS: 0
CONSTIPATION: 0
DIARRHEA: 0
ABDOMINAL PAIN: 0
VOMITING: 0
EYES NEGATIVE: 1
SHORTNESS OF BREATH: 0

## 2023-10-02 NOTE — PROGRESS NOTES
(MUCINEX) 600 MG extended release tablet Take 2 tablets by mouth 2 times daily As needed      blood glucose test strips (PRODIGY NO CODING BLOOD GLUC) strip USE TO TEST BLOOD GLUCOSE ONCE DAILY. 100 each 0    nitroGLYCERIN (NITROSTAT) 0.4 MG SL tablet Place 1 tablet under the tongue every 5 minutes as needed for Chest pain 25 tablet 3    Blood Glucose Monitoring Suppl (PRODIGY AUTOCODE BLOOD GLUCOSE) w/Device KIT       albuterol sulfate (PROAIR RESPICLICK) 454 (90 Base) MCG/ACT aerosol powder inhalation Inhale 2 puffs into the lungs every 6 hours as needed for Wheezing or Shortness of Breath 1 each 3     No current facility-administered medications for this visit. Orders Placed This Encounter   Procedures    XR CHEST (2 VW)     Standing Status:   Future     Standing Expiration Date:   10/1/2024     Lab Results   Component Value Date    LABA1C 7.6 (A) 08/14/2023    LABA1C 7.6 05/10/2023    LABA1C 8.4 (H) 02/21/2023     No results found for: \"[id number]\"  No results found for this visit on 10/02/23. Continue to monitor blood sugars 1 times a day. Keep log of blood sugars and bring with you to the next appointment. Discussed use, benefit, and side effects of prescribed medications. Allpatient questions answered. Pt voiced understanding. Instructed to continue currentmedications, diet and exercise. Patient agreed with treatment plan. Patient states that he does need a new referral for psychiatrist but did not have a provider preference. I told him that we need him to get a provider that is covered with his insurance. He will verify provider covered under his insurance and give us a call back with the name so referral can be made. Review blood work and testing performed at the emergency room and also during his hospital stay. Patient is encouraged to continue to follow with that his AA meetings and to refrain from drinking alcohol.     Discussed patient with the patient and his son Dani Shirley that it

## 2023-10-04 ENCOUNTER — TELEPHONE (OUTPATIENT)
Dept: FAMILY MEDICINE CLINIC | Age: 63
End: 2023-10-04

## 2023-10-04 ENCOUNTER — CARE COORDINATION (OUTPATIENT)
Dept: CARE COORDINATION | Age: 63
End: 2023-10-04

## 2023-10-04 DIAGNOSIS — F10.20 ALCOHOL USE DISORDER, SEVERE, DEPENDENCE (HCC): ICD-10-CM

## 2023-10-04 DIAGNOSIS — R29.6 FALLS FREQUENTLY: Primary | ICD-10-CM

## 2023-10-04 DIAGNOSIS — R41.3 MEMORY PROBLEM: ICD-10-CM

## 2023-10-04 DIAGNOSIS — F10.10 ALCOHOL ABUSE: ICD-10-CM

## 2023-10-04 SDOH — ECONOMIC STABILITY: INCOME INSECURITY: IN THE LAST 12 MONTHS, WAS THERE A TIME WHEN YOU WERE NOT ABLE TO PAY THE MORTGAGE OR RENT ON TIME?: NO

## 2023-10-04 SDOH — ECONOMIC STABILITY: HOUSING INSECURITY: IN THE LAST 12 MONTHS, HOW MANY PLACES HAVE YOU LIVED?: 1

## 2023-10-04 SDOH — ECONOMIC STABILITY: TRANSPORTATION INSECURITY
IN THE PAST 12 MONTHS, HAS LACK OF TRANSPORTATION KEPT YOU FROM MEETINGS, WORK, OR FROM GETTING THINGS NEEDED FOR DAILY LIVING?: NO

## 2023-10-04 SDOH — ECONOMIC STABILITY: TRANSPORTATION INSECURITY
IN THE PAST 12 MONTHS, HAS THE LACK OF TRANSPORTATION KEPT YOU FROM MEDICAL APPOINTMENTS OR FROM GETTING MEDICATIONS?: NO

## 2023-10-04 ASSESSMENT — LIFESTYLE VARIABLES
HOW OFTEN DO YOU HAVE A DRINK CONTAINING ALCOHOL: NEVER
HOW MANY STANDARD DRINKS CONTAINING ALCOHOL DO YOU HAVE ON A TYPICAL DAY: 10 OR MORE

## 2023-10-04 ASSESSMENT — SOCIAL DETERMINANTS OF HEALTH (SDOH)
HOW OFTEN DO YOU GET TOGETHER WITH FRIENDS OR RELATIVES?: MORE THAN THREE TIMES A WEEK
IN A TYPICAL WEEK, HOW MANY TIMES DO YOU TALK ON THE PHONE WITH FAMILY, FRIENDS, OR NEIGHBORS?: MORE THAN THREE TIMES A WEEK
HOW OFTEN DO YOU ATTEND CHURCH OR RELIGIOUS SERVICES?: NEVER
DO YOU BELONG TO ANY CLUBS OR ORGANIZATIONS SUCH AS CHURCH GROUPS UNIONS, FRATERNAL OR ATHLETIC GROUPS, OR SCHOOL GROUPS?: YES

## 2023-10-04 ASSESSMENT — ENCOUNTER SYMPTOMS: DYSPNEA ASSOCIATED WITH: EXERTION

## 2023-10-04 NOTE — CARE COORDINATION
Ambulatory Care Coordination Note  10/4/2023    Patient Current Location:  Home: [address] [address]    ACM contacted the patient by telephone. Verified name and  with patient as identifiers. Provided introduction to self, and explanation of the ACM role. ACM: Wilian Babb RN    Challenges to be reviewed by the provider   Additional needs identified to be addressed with provider: No  none               Method of communication with provider: none. Spoke with Kathy Leeanna self/role  Was CTN referral from [facility] discharge  Had a very detailed conversation with him today regarding health and recent concerns  Discussed and completed SDOH  Discussed and completed ACP, he has documents from [facility] and will be getting them completed and uploaded in chart  Currently moved in with mother to help take care of her  Active [place], OT discharge, PT ariannaal today  Discussed fall history, uses can  Discussed neurology referral and it appears that referral was denied, will inform PCP  Discussed and offered RPM and he was in agreement to enroll  PCP is affiliate office who manages DM, but CHF is monitored by [facility] CHF clinic'  He is currently active with AA and has been sober for 1 month    Plan  Referral to RPM  Ask for another referral for neurology  Assist with ACP documents if needed  Mail out zone tools  Provide education to help manage chronic conditions  Encourage continued sobriety efforts      Offered patient enrollment in the Remote Patient Monitoring (RPM) program for in-home monitoring: Yes, patient enrolled:     Remote Patient Monitoring Enrollment Note      Date/Time: 10/4/2023 2:02 PM    Offered patient enrollment in the [facility] Remote Patient Monitoring (RPM) program for in home monitoring for CHF, COPD, and HTN. Patient accepted RPM services.     Patient will be monitoring the following daily:  blood pressure reading, pulse ox reading, pulse ox heart rate, survey response, and

## 2023-10-04 NOTE — CARE COORDINATION
Dr. Patrice Rayo, I have started working with Regina Boeck and I noticed that a referral to neurology at [facility] was placed on 9-27-23. It appears taht that referral has been closed due to \" office not currently accepting new patients\". Regina Boeck would like a new referral placed by PCP for neurology. He states he is willing to see Dr. Richard Huertas in [facility] if you agree with that. Please advise and place new referral if agreed. Thank you!

## 2023-10-04 NOTE — CARE COORDINATION
Remote Patient Kit Ordering Note      Date/Time:  10/4/2023 2:54 PM      [x] [facility] confirmed patient shipping address  [x] Patient will receive package over the next 1-3 business days. Someone 21 years or older must be present to sign for UPS delivery. [x] HRS will contact patient within 24 hours, an [address] [place] [address] will call the patient directly: If the patient does not answer, HRS will follow up with the clinical team notifying them about the unsuccessful attempt to contact the patient. HRS will make three call attempts to the patient. Provide patient with [facility] Virtual install number is: [phone]. [x] LPN will contact patient once equipment is active to welcome them to the program.                                                         [x] Hours of RPM monitoring - Monday-Friday [phone]; encourage patient to get vitals entered by [facility] each day to have the alert addressed same day. [x][facility] mailed RPM Patient flyer to patient. All questions answered at this time. LPN made aware the RPM kit has been ordered. EMTP notified patient of RPM equipment order.

## 2023-10-04 NOTE — TELEPHONE ENCOUNTER
Susie Silva informed by Phone that referral was sent to Dr. Krys Monzon in [place]. That office will call him with an appt. Time.       Dr. Aditi Workman [phone]    Fax [phone]

## 2023-10-05 ENCOUNTER — TELEPHONE (OUTPATIENT)
Dept: FAMILY MEDICINE CLINIC | Age: 63
End: 2023-10-05

## 2023-10-05 ENCOUNTER — HOSPITAL ENCOUNTER (OUTPATIENT)
Dept: GENERAL RADIOLOGY | Age: 63
Discharge: HOME | End: 2023-10-05
Payer: MEDICARE

## 2023-10-05 ENCOUNTER — HOSPITAL ENCOUNTER (OUTPATIENT)
Age: 63
Discharge: HOME | End: 2023-10-05
Payer: MEDICARE
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
## 2023-10-05 DIAGNOSIS — J18.1 LEFT LOWER LOBE CONSOLIDATION (HCC): ICD-10-CM

## 2023-10-05 DIAGNOSIS — M79.602 LEFT ARM PAIN: Primary | ICD-10-CM

## 2023-10-05 DIAGNOSIS — M25.532 LEFT WRIST PAIN: ICD-10-CM

## 2023-10-05 PROCEDURE — 71046 X-RAY EXAM CHEST 2 VIEWS: CPT

## 2023-10-05 NOTE — TELEPHONE ENCOUNTER
Patient thought he was getting an x-ray of his left arm but they only did a CXR. He is still having some pain since his fall. C/O pain left forearm,elbow and wrist.    Please call Abbi Urrutia back.

## 2023-10-06 NOTE — TELEPHONE ENCOUNTER
He had an x-ray of his shoulder when he was at the hospital that showed arthritis. Does he want to see ortho?

## 2023-10-09 ENCOUNTER — CARE COORDINATION (OUTPATIENT)
Dept: CARE COORDINATION | Age: 63
End: 2023-10-09

## 2023-10-09 NOTE — CARE COORDINATION
Remote Alert Monitoring Note  Rpm alert to be reviewed by the provider   red alert   pulse ox reading (90%)   Additional needs to be addressed by N/A: No      Date/Time:  10/9/2023 11:20 AM  Patient Current Location: [facility] contacted patient by telephone. Verified patients name and  as identifiers. Background: Pt enrolled in RPM r/t CHF, HTN, COPD. Refer to 911 immediately if:  Patient unresponsive or unable to provide history  Change in cognition or sudden confusion  Patient unable to respond in complete sentences  Intense chest pain/tightness  Any concern for any clinical emergency  Red Alert: Provider response time of 1 hr required for any red alert requiring intervention  Yellow Alert: Provider response time of 3hr required for any escalated yellow alert  O2 Triage  Are you having any Chest Pain? no   Are you having any Shortness of Breath? no   Swelling in your hands or feet? no   Are you having any other health concerns or issues? no   Clinical Interventions: Reviewed and followed up on alerts and treatments-Pt speaking in full sentences, no apparent distress. States he has not used scheduled inhalers yet today and rarely requires rescue inhaler. He is agreeable to recheck oxygen level at this time with updated reading of 93%. Pt educated that if oxygen level is below 92% and he is in no distress, he should reposition oximeter and recheck metric. Verbalizes understanding. Plan/Follow Up: Will continue to review, monitor and address alerts with follow up based on severity of symptoms and risk factors.

## 2023-10-10 ENCOUNTER — CARE COORDINATION (OUTPATIENT)
Dept: CARE COORDINATION | Age: 63
End: 2023-10-10

## 2023-10-10 NOTE — CARE COORDINATION
Received email from [facility] stating patient required a kit exchange due to malfunctioning BP monitor. Kit exchange completed and patient is notified of process and is agreeable.

## 2023-10-10 NOTE — CARE COORDINATION
Remote Alert Monitoring Note  Rpm alert to be reviewed by the provider   red alert   blood pressure reading (258/164), pulse ox reading (89%), and weight (increase of 3 lbs x 1 day, 5 lbs x 7 days)   Additional needs to be addressed by N/A: No      Date/Time:  10/10/2023 8:54 AM  [facility] Verified patients name and  as identifiers. Background: Pt enrolled in RPM r/t CHF, HTN, COPD. Refer to 911 immediately if:  Patient unresponsive or unable to provide history  Change in cognition or sudden confusion  Patient unable to respond in complete sentences  Intense chest pain/tightness  Any concern for any clinical emergency  Red Alert: Provider response time of 1 hr required for any red alert requiring intervention  Yellow Alert: Provider response time of 3hr required for any escalated yellow alert  BP Triage  Are you having any Chest Pain? no   Are you having any Shortness of Breath? no   Do you have a headache or have any vision changes? no   Are you having any numbness or tingling? no   Are you having any other health concerns or issues? no   O2 Triage  Are you having any Chest Pain? no   Are you having any Shortness of Breath? no   Swelling in your hands or feet? no   Are you having any other health concerns or issues? no   Clinical Interventions: Reviewed and followed up on alerts and treatments-Spoke with pt who is in no apparent distress. Reviewed HTN medications and pt verbalizes that he took Valsartan, Bumex, Brilinta, Imdur and Toprol XL about the same time that he checked his BP. He is getting very high readings with BP monitor and batteries removed to reset monitor. Monitor is giving error code of \"cuff error\". Pt is going to reach out to tech support for assist.  He has not used scheduled inhalers as of this time, but rechecked oxygen level with updated reading of 93%. Oxygen level now within RPM parameters.   Weight alert due to multiple

## 2023-10-10 NOTE — CARE COORDINATION
10/10/23 11:16 AM Patient is currently enrolled in Remote Patient Monitoring for CHF, COPD, and HTN. Pt called back to update this nurse that he spoke with tech support and they determined that he is going to require a new kit due to equipment failure. Pt is aware to seek medical attention for any new chest pain, numbness/tingling, SOB/dyspnea or vision change. He will continue to update oxygen and weight until new kit arrives. Plan/Follow Up: Will continue to review, monitor and address alerts with follow up based on severity of symptoms and risk factors.

## 2023-10-11 ENCOUNTER — HOSPITAL ENCOUNTER (OUTPATIENT)
Age: 63
Discharge: HOME | End: 2023-10-11
Payer: MEDICARE

## 2023-10-11 ENCOUNTER — HOSPITAL ENCOUNTER (OUTPATIENT)
Dept: GENERAL RADIOLOGY | Age: 63
Discharge: HOME | End: 2023-10-11
Payer: MEDICARE

## 2023-10-11 ENCOUNTER — CARE COORDINATION (OUTPATIENT)
Dept: CARE COORDINATION | Age: 63
End: 2023-10-11

## 2023-10-11 DIAGNOSIS — M79.602 LEFT ARM PAIN: ICD-10-CM

## 2023-10-11 DIAGNOSIS — F33.1 MAJOR DEPRESSIVE DISORDER, RECURRENT EPISODE, MODERATE (HCC): Primary | ICD-10-CM
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
## 2023-10-11 DIAGNOSIS — M25.532 LEFT WRIST PAIN: ICD-10-CM

## 2023-10-11 PROCEDURE — 73090 X-RAY EXAM OF FOREARM: CPT

## 2023-10-11 PROCEDURE — 73080 X-RAY EXAM OF ELBOW: CPT

## 2023-10-11 PROCEDURE — 73130 X-RAY EXAM OF HAND: CPT

## 2023-10-11 NOTE — CARE COORDINATION
Dr. Shae Bernal would like a referral to [facility] psychiatry office. States now that Christine Gaming is back in network, that office should be covered by his insurance. If you agree, could you please place that referral.  Thank you!

## 2023-10-11 NOTE — CARE COORDINATION
Ambulatory Care Coordination Note  10/11/2023    Patient Current Location:  Home: [address] [place] [id number]     ACM contacted the patient by telephone. Verified name and  with patient as identifiers. Provided introduction to self, and explanation of the ACM role. Challenges to be reviewed by the provider   Additional needs identified to be addressed with provider: No  none               Method of communication with provider: none. ACM: Belgica Og, RN    Spoke with Jay Smiley for continued Care Coordination follow up  Discussed follow up from last call  Referral placed to Dr. Severo Rose for neurology, has not heard from they yet to set up appt  Active with RPM but having issues with blood pressure cuff, he is working with RPM team to get new kit. Would like PCP referral placed for psychiatry office with [facility], will ask PCP for this  States chronic conditions at baseline  No falls    Plan  Ask for referral for psychology office   Ensure appt made with neurology, Dr. Severo Rose office  [address] with RPM team on equipment turn in/replacement  Encourage vital sign tracking and reporting      Remote Patient Monitoring Welcome Note  Date/Time:  10/11/2023 2:09 PM  Patient Current Location: Home: [address] [place] [id number]  Verified patients name and  as identifiers.   Completed and confirmed the following:   Emergency Contact: Pat Olmstead  [x] Patient received all RPM equipment (tablet, scale, blood pressure device and cuff, and pulse oximeter)  Cuff Size: regular (9.05\"-15.74\")    Weight Scale:  regular (<330lbs)                    [x] Instructed patient keep box for use when returning equipment                                                          [x] Reviewed Patient Welcome Letter with patient                         [x] Reviewed expectations for patient and care team  Monitoring hours M-F 9-4pm  Completing monitoring by 12pm on  so that alerts can be responded to in the same

## 2023-10-12 ENCOUNTER — TELEPHONE (OUTPATIENT)
Dept: FAMILY MEDICINE CLINIC | Age: 63
End: 2023-10-12

## 2023-10-12 ENCOUNTER — CARE COORDINATION (OUTPATIENT)
Dept: CARE COORDINATION | Facility: CLINIC | Age: 63
End: 2023-10-12

## 2023-10-12 DIAGNOSIS — J18.1 LEFT LOWER LOBE CONSOLIDATION (HCC): Primary | ICD-10-CM
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
## 2023-10-12 NOTE — TELEPHONE ENCOUNTER
----- Message from Maria Ines Singh MD sent at 10/11/2023  3:04 PM EDT -----  Please let him know that his chest x-ray is improving but is not back to normal yet we will repeat another one in 2 weeks.

## 2023-10-12 NOTE — CARE COORDINATION
Remote Patient Monitoring Note      Date/Time:  10/12/2023 1:25 PM  Patient Current Location: [place]  LPN contacted patient by telephone regarding yellow alert received for no BP or weight taken x 2 days. Verified patients name and  as identifiers. Background: Pt enrolled in RPM r/t CHF, HTN, and COPD. Clinical Interventions: Reviewed and followed up on alerts and treatments-Pt states he is unable to complete metrics as this time due to awaiting new equipment to arrive. Pt states he was instructed by [facility] tech support to \"box up\" current RPM equipment and that new equipment will arrive \"maybe sometime today\". Pt v/u of when to notify provider of changes / concerns and when to seek emergent medical care. Plan/Follow Up: Will continue to review, monitor and address alerts with follow up based on severity of symptoms and risk factors.

## 2023-10-13 ENCOUNTER — TELEPHONE (OUTPATIENT)
Dept: FAMILY MEDICINE CLINIC | Age: 63
End: 2023-10-13

## 2023-10-13 ENCOUNTER — CARE COORDINATION (OUTPATIENT)
Dept: CARE COORDINATION | Age: 63
End: 2023-10-13

## 2023-10-13 RX ORDER — ESCITALOPRAM OXALATE 5 MG/1
5 TABLET ORAL DAILY
Qty: 90 TABLET | Refills: 0 | Status: SHIPPED | OUTPATIENT
Start: 2023-10-13

## 2023-10-13 NOTE — TELEPHONE ENCOUNTER
----- Message from Rosa Isela Bellamy MD sent at 10/13/2023  8:09 AM EDT -----  Please let him know that his x-ray of the left elbow showed soft tissue swelling and moderate arthritic changes, no fractures.

## 2023-10-13 NOTE — CARE COORDINATION
Remote Patient Monitoring Note  Date/Time:  10/13/2023 1:23 PM  Patient Current Location: [place]  LPN contacted patient by telephone regarding yellow alert received for no BP or weight x > 2 days. Verified patients name and  as identifiers. Background: Pt enrolled in RPM r/t CHF, HTN, COPD. Clinical Interventions: Reviewed and followed up on alerts and treatments-Spoke with pr and he states he has not received new RPM kit yet. This nurse reached out to  and requested she call pt to follow up. Plan/Follow Up: Will continue to review, monitor and address alerts with follow up based on severity of symptoms and risk factors.

## 2023-10-13 NOTE — TELEPHONE ENCOUNTER
Pt called requesting a 90 day Rx for Lexapro 5 mg one daily. Pt is out. Pt also said that he was told to check with [address] because we thought there was a Lexapro Rx there but he called there and they did not have it.     [employer]

## 2023-10-13 NOTE — TELEPHONE ENCOUNTER
The pharmacy is requesting a refill of the below medication which has been pended for you:     Requested Prescriptions     Pending Prescriptions Disp Refills    escitalopram (LEXAPRO) 5 MG tablet 90 tablet 1     Sig: Take 1 tablet by mouth daily       Last Appointment Date: 10/2/2023  Next Appointment Date: 11/20/2023    Allergies   Allergen Reactions    Zoloft [Sertraline Hcl] Other (See Comments)     Headaches, sweats, bad dreams

## 2023-10-13 NOTE — TELEPHONE ENCOUNTER
----- Message from Andrei Rivers MD sent at 10/13/2023  8:09 AM EDT -----  Please let him know that the x-ray of the hand showed degenerative changes without acute fracture or dislocation

## 2023-10-16 ENCOUNTER — TELEPHONE (OUTPATIENT)
Dept: FAMILY MEDICINE CLINIC | Age: 63
End: 2023-10-16

## 2023-10-16 ENCOUNTER — CARE COORDINATION (OUTPATIENT)
Dept: CARE COORDINATION | Age: 63
End: 2023-10-16

## 2023-10-16 NOTE — TELEPHONE ENCOUNTER
Please let him know that the referral that we made was declined and this is the note we received. The patient may contact [employer] directly at [phone]. Lisset Whitten also offers open access walk-in hours Per Tamie Bocanegra after review: Due to the patient complexity, I don't feel comfortable taking this patient. *Referral declinedMonday-Friday. Additional community resources include: Alejandra Gaspar [phone]), Georgia Bentons [phone]) and [facility] [phone]). We apologize for any inconvenience this may cause    He may verify with his insurance for any other providers covered under his insurance.

## 2023-10-16 NOTE — CARE COORDINATION
Remote Patient Monitoring Note  Date/Time:  10/16/2023 1:16 PM  Patient Current Location: Home: [address] [facility] [address]  RN  contacted patient by telephone regarding No VS x6 days. Verified patients name and  as identifiers. Background: PT enrolled in RPM r/t CHF, HTN, COPD  Clinical Interventions: Reviewed and followed up on alerts and treatments-RN spoke to pt regarding no VS x6 days and inquired if pt had received new RPM equipment. Pt has not received as of this date. Message sent to [facility] to request status update. Plan/Follow Up: Will continue to review, monitor and address alerts with follow up based on severity of symptoms and risk factors.

## 2023-10-17 ENCOUNTER — CARE COORDINATION (OUTPATIENT)
Dept: CARE COORDINATION | Age: 63
End: 2023-10-17

## 2023-10-17 NOTE — CARE COORDINATION
Remote Patient Monitoring Note  Date/Time:  10/17/2023 3:45 PM  Patient Current Location: [place]  LPN contacted patient by telephone regarding yellow alert received for no BP or weight X > 2 days. Verified patients name and  as identifiers. Background: Pt enrolled in RPM r/t CHF, HTN, COPD. Clinical Interventions: Reviewed and followed up on alerts and treatments-Spoke with pt who states he has been staying with his mom and keeps missing UPS delivery of replacement kit. He thinks package was delivered today and is going to drive to his home to see if it is on porch. Plan/Follow Up: Will continue to review, monitor and address alerts with follow up based on severity of symptoms and risk factors.

## 2023-10-18 ENCOUNTER — CARE COORDINATION (OUTPATIENT)
Dept: CARE COORDINATION | Age: 63
End: 2023-10-18

## 2023-10-18 NOTE — CARE COORDINATION
RPM team,      Perla Noble has been having barriers getting new equipment from his home. He is currently staying with his mother and is missing UPS coming to his other home. He is asking if [employer] can deliver equipment to his mothers home where he is currently staying. The address is : [address] [place] [address],[address], [address]. Please advise. Thank you!
associated orthostatic hypotension: Neg, CHF associated PND: Neg, CHF associated shortness of breath: Neg, CHF associated weakness: Neg      Symptom course: stable     , and   COPD Assessment    Does the patient understand envrionmental exposure?: Yes  Is the patient able to verbalize Rescue vs. Long Acting medications?: Yes  Does the patient have a nebulizer?: No  Does the patient use a space with inhaled medications?: No            Symptoms:     Symptom course: stable  Change in chronic cough?: No/At Baseline  Change in sputum?: No/At Baseline           Offered patient enrollment in the Remote Patient Monitoring (RPM) program for in-home monitoring:  enrolled . Lab Results       None                 Goals Addressed                      This Visit's Progress      Conditions and Symptoms (pt-stated)   On track      I will schedule office visits, as directed by my provider. I will keep my appointment or reschedule if I have to cancel. I will notify my provider of any barriers to my plan of care. I will follow my Zone Management tool to seek urgent or emergent care. I will notify my provider of any symptoms that indicate a worsening of my condition.     Barriers: overwhelmed by complexity of regimen  Plan for overcoming my barriers: family and ACM support  Confidence: 9/10  Anticipated Goal Completion Date: 1-4-24                Future Appointments   Date Time Provider [address]   11/14/2023  2:00 PM ASHLEIGH Fall - CNP N [facility] CHF [facility]   11/20/2023 12:00 PM Ambar España MD [address] [place] [address]

## 2023-10-25 ENCOUNTER — CARE COORDINATION (OUTPATIENT)
Dept: CARE COORDINATION | Age: 63
End: 2023-10-25

## 2023-10-25 ENCOUNTER — NURSE ONLY (OUTPATIENT)
Dept: FAMILY MEDICINE CLINIC | Age: 63
End: 2023-10-25

## 2023-10-25 ENCOUNTER — CARE COORDINATION (OUTPATIENT)
Dept: PRIMARY CARE CLINIC | Age: 63
End: 2023-10-25

## 2023-10-25 VITALS — DIASTOLIC BLOOD PRESSURE: 88 MMHG | SYSTOLIC BLOOD PRESSURE: 136 MMHG | HEART RATE: 80 BPM

## 2023-10-25 DIAGNOSIS — Z23 NEED FOR IMMUNIZATION AGAINST INFLUENZA: Primary | ICD-10-CM

## 2023-10-25 PROCEDURE — 90688 IIV4 VACCINE SPLT 0.5 ML IM: CPT | Performed by: FAMILY MEDICINE

## 2023-10-25 PROCEDURE — G0008 ADMIN INFLUENZA VIRUS VAC: HCPCS | Performed by: FAMILY MEDICINE

## 2023-10-25 NOTE — CARE COORDINATION
RPM equipment exchange order completed again due to MIREILLE Vizcaino advising of BP monitor malfunctioning.

## 2023-10-25 NOTE — CARE COORDINATION
Remote Alert Monitoring Note  Rpm alert to be reviewed by the provider   red alert   blood pressure reading (171/116) and pulse ox reading (88%)   Additional needs to be addressed by N/A: No      Date/Time:  10/25/2023 10:25 AM  Patient Current Location: [place]  LPN contacted patient by telephone. Verified patients name and  as identifiers. Background: Pt enrolled in RPM r/t CHF, HTN, COPD. Refer to 911 immediately if:  Patient unresponsive or unable to provide history  Change in cognition or sudden confusion  Patient unable to respond in complete sentences  Intense chest pain/tightness  Any concern for any clinical emergency  Red Alert: Provider response time of 1 hr required for any red alert requiring intervention  Yellow Alert: Provider response time of 3hr required for any escalated yellow alert  BP Triage  Are you having any Chest Pain? no   Are you having any Shortness of Breath? no   Do you have a headache or have any vision changes? no   Are you having any numbness or tingling? no   Are you having any other health concerns or issues? no   O2 Triage  Are you having any Chest Pain? no   Are you having any Shortness of Breath? no   Swelling in your hands or feet? no   Are you having any other health concerns or issues? no   Clinical Interventions: Reviewed and followed up on alerts and treatments-Pt speaking in full sentences, no apparent distress. Agreeable to recheck oxygen level at this time with updated reading of 92%. Pt instructed that if reading is below 92% and he is in no distress, he should rub hands together, reposition oximeter and recheck metric. States he took HTN medications (Valsartan 80 mg daily, Brilinta 90 mg BID, Bumex 2 mg BID, Imdur 30 mg daily and Toprol xl 25 mg  2 tabs daily) around the same time he checked BP, 9:50 am.  Discussed process for placing cuff and pt agreeable to recheck BP at this time with updated reading of  239/145.   Pt is non smoker and states he has had one cup

## 2023-10-25 NOTE — PROGRESS NOTES
Immunizations Administered       Name Date Dose Route    Influenza, AFLURIA (age 1 yrs+), FLUZONE, (age 10 mo+), MDV, 0.5mL 10/25/2023 0.5 mL Intramuscular    Site: Deltoid- Right    Lot: [id number]    NDC: 84323-476-93

## 2023-10-25 NOTE — CARE COORDINATION
10/25/23 2:54 pm Patient is currently enrolled in Remote Patient Monitoring for CHF, COPD, and HTN. Pt called to update that he had taken RPM BP monitor to PCP office and they were able to determine that he had been putting cuff on incorrectly. Also compared reading to office BP monitor. Office BP monitor showing BP to be 136/88 and RPM monitor showing 95/60. Request sent to  for replacement kit. Pt made aware. Plan/Follow Up: Will continue to review, monitor and address alerts with follow up based on severity of symptoms and risk factors.

## 2023-10-27 ENCOUNTER — CARE COORDINATION (OUTPATIENT)
Dept: CARE COORDINATION | Age: 63
End: 2023-10-27

## 2023-10-27 ASSESSMENT — ENCOUNTER SYMPTOMS: DYSPNEA ASSOCIATED WITH: EXERTION

## 2023-10-27 NOTE — CARE COORDINATION
Ambulatory Care Coordination Note  10/27/2023    Patient Current Location:  Home: [address] [facility] [address]     ACM contacted the patient by telephone. Verified name and  with patient as identifiers. Provided introduction to self, and explanation of the ACM role. Challenges to be reviewed by the provider   Additional needs identified to be addressed with provider: No  none               Method of communication with provider: none. ACM: Wilian Babb, RN    Spoke with Navneet Marie for continued Care Coordination  States he is doing well for review  States he has follow up appt scheduled with [facility] for behavioral health support scheduled 23  Blood sugars are running good, not able to report today's reading states he hasn't checked it yet  RPM equipment was having inaccurate readings and interventions placed to ensure blood pressures are back in range, which they are  Discussed red alert for RPM and had him recheck pulse rate and O2 level  States he is asymptomatic and doing well  Continues to follow up with CHF clinic  Goes to [address] [place] [address] for support    Plan  Reinforce education completed  Encourage continued RPM monitoring  Follow up with alerts  Ensure appt with [employer] completed  Reinforce importance of early symptom recognition and reporting to prevent exacerbation and unnecessary hospitalization  Diabetes Assessment    Medic Alert ID: No  Meal Planning: Avoidance of concentrated sweets   How often do you test your blood sugar?: Daily   Do you have barriers with adherence to non-pharmacologic self-management interventions?  (Nutrition/Exercise/Self-Monitoring): No   Have you ever had to go to the ED for symptoms of low blood sugar?: No       Do you have hyperglycemia symptoms?: No   Do you have hypoglycemia symptoms?: No   Blood Sugar Monitoring Regimen: Once a Day   Blood Sugar Trends: No Change         and   Congestive Heart Failure Assessment    Are you currently restricting fluids?: No

## 2023-10-27 NOTE — CARE COORDINATION
Remote Alert Monitoring Note  Rpm alert to be reviewed by the provider   red alert   pulse ox reading (90%)   Additional needs to be addressed by N/A: No      Date/Time:  10/27/2023 10:15 AM  Patient Current Location: [facility] contacted patient by telephone. Verified patients name and  as identifiers. Background: Pt enrolled in RPM r/t CHF, HTN, COPD. Refer to 911 immediately if:  Patient unresponsive or unable to provide history  Change in cognition or sudden confusion  Patient unable to respond in complete sentences  Intense chest pain/tightness  Any concern for any clinical emergency  Red Alert: Provider response time of 1 hr required for any red alert requiring intervention  Yellow Alert: Provider response time of 3hr required for any escalated yellow alert  O2 Triage  Are you having any Chest Pain? no   Are you having any Shortness of Breath? no   Swelling in your hands or feet? no   Are you having any other health concerns or issues? no   Clinical Interventions: Reviewed and followed up on alerts and treatments-Pt speaking in full sentences, no apparent distress. Offers no complaints at this time and agreeable to recheck oxygen level with updated reading of  93%. Oxygen level now within RPM parameters. Pt has not used scheduled inhalers, verbalizes uses Anoro ellipta and arnuity elipta inhalers. He has an albuterol rescue inhaler, but states he does not require often. Education given that if oxygen level below 92% and he is in no distress, he should reposition oximeter and recheck metric. Pt verbalizes understanding. Plan/Follow Up: Will continue to review, monitor and address alerts with follow up based on severity of symptoms and risk factors.

## 2023-10-30 ENCOUNTER — CARE COORDINATION (OUTPATIENT)
Dept: CARE COORDINATION | Age: 63
End: 2023-10-30

## 2023-10-30 DIAGNOSIS — G25.81 RLS (RESTLESS LEGS SYNDROME): ICD-10-CM

## 2023-10-30 DIAGNOSIS — E11.65 UNCONTROLLED TYPE 2 DIABETES MELLITUS WITH HYPERGLYCEMIA (HCC): ICD-10-CM

## 2023-10-30 NOTE — CARE COORDINATION
Remote Alert Monitoring Note  Rpm alert to be reviewed by the provider   red alert   blood pressure heart rate (122) and pulse ox reading (91%)   Additional needs to be addressed by N/A: No      Date/Time:  10/30/2023 9:52 AM  Patient Current Location: [place]  LPN contacted patient by telephone. Verified patients name and  as identifiers. Background: Pt enrolled in RPM r/t CHF, HTN, COPD. Refer to 911 immediately if:  Patient unresponsive or unable to provide history  Change in cognition or sudden confusion  Patient unable to respond in complete sentences  Intense chest pain/tightness  Any concern for any clinical emergency  Red Alert: Provider response time of 1 hr required for any red alert requiring intervention  Yellow Alert: Provider response time of 3hr required for any escalated yellow alert  O2 Triage  Are you having any Chest Pain? no   Are you having any Shortness of Breath? no   Swelling in your hands or feet? no   Are you having any other health concerns or issues? no    HR Triage  Are you having any Chest Pain? no   Are you having any Shortness of Breath? no   Are you having any dizziness? no   Are you feeling more fatigued or tired than normal? no   Are you having any other health concerns or issues? no   Clinical Interventions: Reviewed and followed up on alerts and treatments-Spoke with pt who is in no apparent distress. Speaking in full sentence, denies any SOB/dyspnea, edema. He is agreeable to recheck metrics at this time with updated BP heart rate of 57 and oxygen level of 94%. Pt states his oximeter has been difficult to get reading. If continues, this nurse will request new kit from .    Plan/Follow Up: Will continue to review, monitor and address alerts with follow up based on severity of symptoms and risk factors.

## 2023-10-31 NOTE — TELEPHONE ENCOUNTER
The pharmacy is requesting a refill of the below medication which has been pended for you:     Requested Prescriptions     Pending Prescriptions Disp Refills    metFORMIN (GLUCOPHAGE) 500 MG tablet [Pharmacy Med Name: METFORMIN 500 MG TABS 500 Tablet] 180 tablet 1     Sig: TAKE 2 TABLETS BY MOUTH TWICE DAILY WITH MEALS    isosorbide mononitrate (IMDUR) 30 MG extended release tablet [Pharmacy Med Name: ISOSORBIDE MN ER 30MG *MONO 30 Tablet] 90 tablet 1     Sig: TAKE 1 TABLET BY MOUTH EVERY DAY    pramipexole (MIRAPEX) 0.125 MG tablet [Pharmacy Med Name: PRAMIPEXOLE 0.125 MG TABLET 0.125 Tablet] 90 tablet 1     Sig: TAKE 1 TABLET BY MOUTH EACH EVENING    valsartan (DIOVAN) 80 MG tablet [Pharmacy Med Name: VALSARTAN 80 MG TABS 80 Tablet] 90 tablet 1     Sig: TAKE 1 TABLET BY MOUTH DAILY    allopurinol (ZYLOPRIM) 300 MG tablet [Pharmacy Med Name: ALLOPURINOL 300 MG TABS 300 Tablet] 90 tablet 1     Sig: TAKE 1 TABLET BY MOUTH ONCE DAILY       Last Appointment Date: 10/2/2023  Next Appointment Date: 11/20/2023    Allergies   Allergen Reactions    Zoloft [Sertraline Hcl] Other (See Comments)     Headaches, sweats, bad dreams

## 2023-11-01 RX ORDER — ISOSORBIDE MONONITRATE 30 MG/1
TABLET, EXTENDED RELEASE ORAL
Qty: 90 TABLET | Refills: 0 | Status: SHIPPED | OUTPATIENT
Start: 2023-11-01

## 2023-11-01 RX ORDER — PRAMIPEXOLE DIHYDROCHLORIDE 0.12 MG/1
TABLET ORAL
Qty: 90 TABLET | Refills: 1 | Status: SHIPPED | OUTPATIENT
Start: 2023-11-01

## 2023-11-01 RX ORDER — ALLOPURINOL 300 MG/1
TABLET ORAL
Qty: 90 TABLET | Refills: 1 | Status: SHIPPED | OUTPATIENT
Start: 2023-11-01

## 2023-11-01 RX ORDER — VALSARTAN 80 MG/1
80 TABLET ORAL DAILY
Qty: 90 TABLET | Refills: 1 | Status: SHIPPED | OUTPATIENT
Start: 2023-11-01

## 2023-11-02 ENCOUNTER — CARE COORDINATION (OUTPATIENT)
Dept: CARE COORDINATION | Age: 63
End: 2023-11-02

## 2023-11-02 NOTE — CARE COORDINATION
Remote Alert Monitoring Note  Rpm alert to be reviewed by the provider   red alert   pulse ox reading (90%)   Additional needs to be addressed by N/A: No      Date/Time:  2023 10:48 AM  Patient Current Location: [facility] contacted patient by telephone. Verified patients name and  as identifiers. Background: Pt enrolled in RPM r/t CHF, HTN, COPD. Refer to 911 immediately if:  Patient unresponsive or unable to provide history  Change in cognition or sudden confusion  Patient unable to respond in complete sentences  Intense chest pain/tightness  Any concern for any clinical emergency  Red Alert: Provider response time of 1 hr required for any red alert requiring intervention  Yellow Alert: Provider response time of 3hr required for any escalated yellow alert  O2 Triage  Are you having any Chest Pain? no   Are you having any Shortness of Breath? no   Swelling in your hands or feet? no   Are you having any other health concerns or issues? no   Clinical Interventions: Reviewed and followed up on alerts and treatments-Pt speaking in full sentences, no apparent distress. Verbalizes that he has used Arnuity Ellipta and [employer] inhalers this morning as ordered. Has not required use of rescue inhaler. Pt denies any new/increasing cough/congestion and agreeable to recheck oxygen level at this time with updated reading of 92%. Oxygen level now within RPM parameters. .    Plan/Follow Up: Will continue to review, monitor and address alerts with follow up based on severity of symptoms and risk factors.

## 2023-11-03 ENCOUNTER — CARE COORDINATION (OUTPATIENT)
Dept: CARE COORDINATION | Age: 63
End: 2023-11-03

## 2023-11-03 NOTE — CARE COORDINATION
Remote Alert Monitoring Note  Rpm alert to be reviewed by the provider   red alert   pulse ox reading (91%) and weight (increase of 3 lbs x 1 day, 5 lbs x 7 days)   Additional needs to be addressed by N/A: No      Date/Time:  11/3/2023 11:23 AM  Patient Current Location: [address] [place], [place] [address]N contacted patient by telephone. Verified patients name and  as identifiers. Background: Pt enrolled in RPM r/t CHF, HTN, COPD. Refer to 911 immediately if:  Patient unresponsive or unable to provide history  Change in cognition or sudden confusion  Patient unable to respond in complete sentences  Intense chest pain/tightness  Any concern for any clinical emergency  Red Alert: Provider response time of 1 hr required for any red alert requiring intervention  Yellow Alert: Provider response time of 3hr required for any escalated yellow alert  O2 Triage  Are you having any Chest Pain? no   Are you having any Shortness of Breath? no   Swelling in your hands or feet? no   Are you having any other health concerns or issues? no    Weight Scale Triage  Was your weight obtained upon rising/waking today? yes   Was your weight obtained after voiding and/or use of the bathroom today? yes   Did you weigh yourself in the same amount of clothing today, compared to how you typically do? yes   Was the scale bumped or moved prior to today's weight? no   Is your scale on a flat/hard surface? yes   Did you obtain your weight with shoes on? no   If yes, is this something you normally do during your daily weights? NA   Were you standing up straight on the scale today? yes   Were you leaning on anything while obtaining your weight today? no   Clinical Interventions: Reviewed and followed up on alerts and treatments-Pt speaking in full sentences, no apparent distress. Denies any SOB/dyspnea, new/increasing edema. Pt is followed by [facility] CHF clinic with next appointment on 23.   He states he had a large dinner last night and expected weight to be up

## 2023-11-03 NOTE — CARE COORDINATION
11/03/23 3:56 PM Patient is currently enrolled in Remote Patient Monitoring for CHF, COPD, and HTN. Pt has not updated weight as of this time. Plan/Follow Up: Will continue to review, monitor and address alerts with follow up based on severity of symptoms and risk factors.

## 2023-11-08 ENCOUNTER — CARE COORDINATION (OUTPATIENT)
Dept: CARE COORDINATION | Age: 63
End: 2023-11-08

## 2023-11-08 NOTE — CARE COORDINATION
Attempted to reach patient for continued Care Coordination follow up and education. Patient was unavailable at the time of my call, and a generic voicemail message was left asking patient to return my call at [phone].

## 2023-11-08 NOTE — CARE COORDINATION
Remote Alert Monitoring Note  Rpm alert to be reviewed by the provider   red alert   pulse ox reading (91%) and weight (incease of 3 lbs x 1 day)   Additional needs to be addressed by N/A: No      Date/Time:  2023 10:08 AM  [facility] Verified patients name and  as identifiers. Background: Pt enrolled in RPM r/t CHF, HTN, COPD. Refer to 911 immediately if:  Patient unresponsive or unable to provide history  Change in cognition or sudden confusion  Patient unable to respond in complete sentences  Intense chest pain/tightness  Any concern for any clinical emergency  Red Alert: Provider response time of 1 hr required for any red alert requiring intervention  Yellow Alert: Provider response time of 3hr required for any escalated yellow alert  O2 Triage  Are you having any Chest Pain? no   Are you having any Shortness of Breath? no   Swelling in your hands or feet? no   Are you having any other health concerns or issues? no      Weight Scale Triage  Was your weight obtained upon rising/waking today? yes   Was your weight obtained after voiding and/or use of the bathroom today? yes   Did you weigh yourself in the same amount of clothing today, compared to how you typically do? no   Was the scale bumped or moved prior to today's weight? no   Is your scale on a flat/hard surface? yes   Did you obtain your weight with shoes on? no   If yes, is this something you normally do during your daily weights? NA   Were you standing up straight on the scale today? yes   Were you leaning on anything while obtaining your weight today? no   Clinical Interventions: Reviewed and followed up on alerts and treatments-Pt speaking in full sentences, no apparent distress. Denies any SOB/dyspnea, new/increasing edema. Pt states he had not used inhalers prior to checking oxygen level. Agreeable to recheck at this time with updated reading of 93%.   Discussed weight increase and

## 2023-11-13 ENCOUNTER — CARE COORDINATION (OUTPATIENT)
Dept: CARE COORDINATION | Age: 63
End: 2023-11-13

## 2023-11-13 NOTE — CARE COORDINATION
Remote Alert Monitoring Note  Rpm alert to be reviewed by the provider   red alert   weight (increase of 3 lbs x 1 day, 5 lbs x 7 days)   Additional needs to be addressed by N/A: No      Date/Time:  2023 12:47 PM  Patient Current Location: [place]  LPN contacted patient by telephone. Verified patients name and  as identifiers. Background: Pt enrolled in RPM r/t CHF, HTN, COPD. Refer to 911 immediately if:  Patient unresponsive or unable to provide history  Change in cognition or sudden confusion  Patient unable to respond in complete sentences  Intense chest pain/tightness  Any concern for any clinical emergency  Red Alert: Provider response time of 1 hr required for any red alert requiring intervention  Yellow Alert: Provider response time of 3hr required for any escalated yellow alert  Weight Scale Triage  Was your weight obtained upon rising/waking today? yes   Was your weight obtained after voiding and/or use of the bathroom today? yes   Did you weigh yourself in the same amount of clothing today, compared to how you typically do? no   Was the scale bumped or moved prior to today's weight? no   Is your scale on a flat/hard surface? yes   Did you obtain your weight with shoes on? yes   If yes, is this something you normally do during your daily weights? no   Were you standing up straight on the scale today? yes   Were you leaning on anything while obtaining your weight today? no   Clinical Interventions: Reviewed and followed up on alerts and treatments-Spoke with pt who is in no apparent distress. Denies any SOB/dyspnea, new/increasing edema. States he has been eating more than he should over the weekend and today he weighed in heavier clothing and shoes. Verbalizes taking Bumex 2 mg BID with good results. States he will start watching how much he is eating and will remember to obtain weight prior to dressing. Plan/Follow Up:  Will continue to review, monitor and address alerts with follow up based

## 2023-11-14 ENCOUNTER — OFFICE VISIT (OUTPATIENT)
Dept: CARDIOLOGY CLINIC | Age: 63
End: 2023-11-14
Payer: MEDICARE

## 2023-11-14 ENCOUNTER — CARE COORDINATION (OUTPATIENT)
Dept: CARE COORDINATION | Age: 63
End: 2023-11-14

## 2023-11-14 VITALS
BODY MASS INDEX: 34.58 KG/M2 | DIASTOLIC BLOOD PRESSURE: 60 MMHG | OXYGEN SATURATION: 94 % | HEART RATE: 64 BPM | WEIGHT: 247 LBS | SYSTOLIC BLOOD PRESSURE: 136 MMHG | HEIGHT: 71 IN

## 2023-11-14 DIAGNOSIS — R60.0 EDEMA OF BOTH LOWER LEGS: ICD-10-CM

## 2023-11-14 DIAGNOSIS — G47.33 OBSTRUCTIVE SLEEP APNEA ON CPAP: ICD-10-CM

## 2023-11-14 DIAGNOSIS — I50.30 HEART FAILURE WITH PRESERVED EJECTION FRACTION, NYHA CLASS II (HCC): Primary | ICD-10-CM

## 2023-11-14 PROCEDURE — 3074F SYST BP LT 130 MM HG: CPT | Performed by: NURSE PRACTITIONER

## 2023-11-14 PROCEDURE — 99214 OFFICE O/P EST MOD 30 MIN: CPT | Performed by: NURSE PRACTITIONER

## 2023-11-14 PROCEDURE — 1123F ACP DISCUSS/DSCN MKR DOCD: CPT | Performed by: NURSE PRACTITIONER

## 2023-11-14 PROCEDURE — 3078F DIAST BP <80 MM HG: CPT | Performed by: NURSE PRACTITIONER

## 2023-11-14 RX ORDER — METOLAZONE 2.5 MG/1
2.5 TABLET ORAL DAILY
Qty: 2 TABLET | Refills: 0 | Status: SHIPPED | OUTPATIENT
Start: 2023-11-14 | End: 2023-11-16

## 2023-11-14 ASSESSMENT — ENCOUNTER SYMPTOMS
ABDOMINAL DISTENTION: 0
SHORTNESS OF BREATH: 1
VOMITING: 0
COUGH: 0
NAUSEA: 0

## 2023-11-14 NOTE — PATIENT INSTRUCTIONS
You may receive a survey regarding the care you received during your visit. Your input is valuable to us. We encourage you to complete and return your survey. We hope you will choose us in the future for your healthcare needs. Your nurses today were Alesia and [employer].   Office hours:   Mon-Thurs 8-4:30  Friday 8-12  Phone: [phone]    Continue:  Continue current medications  Daily weights and record  Fluid restriction of 2 Liters per day  Limit sodium in diet to around 8051-9777 mg/day  Monitor BP  Activity as tolerated     Call the [address] for any of the following symptoms:   Weight gain of 2-3 pounds in 1 day or 5 pounds in 1 week  Increased shortness of breath  Shortness of breath while laying down  Cough  Chest pain  Swelling in feet, ankles or legs  Bloating in abdomen  Fatigue

## 2023-11-14 NOTE — CARE COORDINATION
Remote Alert Monitoring Note  Rpm alert to be reviewed by the provider   red alert   weight (increase of 5 lbs x 7 days)   Additional needs to be addressed by PCP:  Pt is enrolled in Remote Patient Monitoring r/t CHF, HTN, COPD. Reviewed weight increase with pt yesterday who stated that  he had been eating more than he should over the weekend and had weighed in heavier clothing and shoes. His weight is up an additional 2.5 lbs today. Pt denies any SOB/dyspnea, new/increasing edema. He verbalizes taking Valsartan 80 mg daily and Bumex 2 mg BID with good results. Pt has a scheduled CHF clinic visit today at 2 pm.  RPM metrics added for review. Date/Time:  2023 10:54 AM  Patient Current Location: [facility] contacted patient by telephone. Verified patients name and  as identifiers. Background: Pt enrolled in RPM r/t CHF, HTN, COPD. Refer to 911 immediately if:  Patient unresponsive or unable to provide history  Change in cognition or sudden confusion  Patient unable to respond in complete sentences  Intense chest pain/tightness  Any concern for any clinical emergency  Red Alert: Provider response time of 1 hr required for any red alert requiring intervention  Yellow Alert: Provider response time of 3hr required for any escalated yellow alert  Weight Scale Triage  Was your weight obtained upon rising/waking today? yes   Was your weight obtained after voiding and/or use of the bathroom today? yes   Did you weigh yourself in the same amount of clothing today, compared to how you typically do? yes   Was the scale bumped or moved prior to today's weight? no   Is your scale on a flat/hard surface? yes   Did you obtain your weight with shoes on? no   If yes, is this something you normally do during your daily weights? NA   Were you standing up straight on the scale today?  yes   Were you leaning on anything while obtaining your weight today? no   Clinical Interventions: Reviewed and followed up on

## 2023-11-15 ENCOUNTER — CARE COORDINATION (OUTPATIENT)
Dept: CARE COORDINATION | Age: 63
End: 2023-11-15

## 2023-11-15 ASSESSMENT — ENCOUNTER SYMPTOMS: DYSPNEA ASSOCIATED WITH: EXERTION

## 2023-11-15 NOTE — CARE COORDINATION
associated dyspnea on exertion: Pos, CHF associated fatigue: Neg, CHF associated orthostatic hypotension: Neg, CHF associated PND: Neg, CHF associated shortness of breath: Neg, CHF associated weakness: Neg      Symptom course: improving  Patient-reported weight (lb):  (Comment: being monitored by RPM)     , and   COPD Assessment    Does the patient understand envrionmental exposure?: Yes  Is the patient able to verbalize Rescue vs. Long Acting medications?: Yes  Does the patient have a nebulizer?: No  Does the patient use a space with inhaled medications?: No            Symptoms:     Symptom course: stable  Breathlessness: exertion  Change in chronic cough?: No/At Baseline  Change in sputum?: No/At Baseline           Offered patient enrollment in the Remote Patient Monitoring (RPM) program for in-home monitoring: Yes, patient already enrolled. Lab Results       None            Care Coordination Interventions    Referral from Primary Care Provider: No  Suggested Interventions and Community Resources  Fall Risk Prevention: Completed  Disease Specific Clinic: Completed (Comment: CHF clinic)  Home Health Services: Completed  Occupational Therapy: Completed  Physical Therapy: Completed  Registered Dietician: Not Started  Senior Services: Declined  Zone Management Tools: Completed          Goals Addressed                      This Visit's Progress      Conditions and Symptoms (pt-stated)   On track      I will schedule office visits, as directed by my provider. I will keep my appointment or reschedule if I have to cancel. I will notify my provider of any barriers to my plan of care. I will follow my Zone Management tool to seek urgent or emergent care. I will notify my provider of any symptoms that indicate a worsening of my condition.     Barriers: overwhelmed by complexity of regimen  Plan for overcoming my barriers: family and ACM support  Confidence: 9/10  Anticipated Goal Completion Date: 1-4-24

## 2023-11-15 NOTE — CARE COORDINATION
Remote Alert Monitoring Note  Rpm alert to be reviewed by the provider   red alert   weight (increase of 5 lbs x 7 days)   Additional needs to be addressed by N/A: No      Date/Time:  11/15/2023 10:56 AM  Patient Current Location: [place]  Background: Pt enrolled in RPM r/t CHF, HTN, COPD. Refer to 911 immediately if:  Patient unresponsive or unable to provide history  Change in cognition or sudden confusion  Patient unable to respond in complete sentences  Intense chest pain/tightness  Any concern for any clinical emergency  Red Alert: Provider response time of 1 hr required for any red alert requiring intervention  Yellow Alert: Provider response time of 3hr required for any escalated yellow alert  Clinical Interventions: Reviewed and followed up on alerts and treatments-Reviewed weight increase with pt yesterday. He was seen by CHF clinic yesterday afternoon and ordered to take Metolazone 2.5 mg x 2 days with extra dose of 20 meq of potassium. Weight today down 2.5 lbs today. Plan/Follow Up: Will continue to review, monitor and address alerts with follow up based on severity of symptoms and risk factors.

## 2023-11-16 ENCOUNTER — OFFICE VISIT (OUTPATIENT)
Dept: CARDIOLOGY CLINIC | Age: 63
End: 2023-11-16
Payer: MEDICARE

## 2023-11-16 ENCOUNTER — CARE COORDINATION (OUTPATIENT)
Dept: CARE COORDINATION | Age: 63
End: 2023-11-16

## 2023-11-16 VITALS
HEIGHT: 71 IN | SYSTOLIC BLOOD PRESSURE: 132 MMHG | DIASTOLIC BLOOD PRESSURE: 68 MMHG | BODY MASS INDEX: 34.86 KG/M2 | HEART RATE: 64 BPM | WEIGHT: 249 LBS

## 2023-11-16 DIAGNOSIS — I10 PRIMARY HYPERTENSION: ICD-10-CM

## 2023-11-16 DIAGNOSIS — I25.810 CORONARY ARTERY DISEASE INVOLVING CORONARY BYPASS GRAFT OF NATIVE HEART WITHOUT ANGINA PECTORIS: Primary | ICD-10-CM

## 2023-11-16 DIAGNOSIS — E78.01 FAMILIAL HYPERCHOLESTEROLEMIA: ICD-10-CM

## 2023-11-16 PROCEDURE — 3078F DIAST BP <80 MM HG: CPT | Performed by: NUCLEAR MEDICINE

## 2023-11-16 PROCEDURE — 99214 OFFICE O/P EST MOD 30 MIN: CPT | Performed by: NUCLEAR MEDICINE

## 2023-11-16 PROCEDURE — 1123F ACP DISCUSS/DSCN MKR DOCD: CPT | Performed by: NUCLEAR MEDICINE

## 2023-11-16 PROCEDURE — 3074F SYST BP LT 130 MM HG: CPT | Performed by: NUCLEAR MEDICINE

## 2023-11-16 NOTE — CARE COORDINATION
Remote Patient Monitoring Note  Date/Time:  11/16/2023 10:41 AM  Patient Current Location: [place]  LPN noted red alert received for weight increase (5 lbs x  days). Background: Pt enrolled in [facility] r/t CHF, HTN, COPD. Clinical Interventions: Reviewed and followed up on alerts and treatments-Reviewed weight increase with pt on 11/14/23. He was seen by CHF clinic that same afternoon and ordered to take Metolazone 2.5 mg x 2 days with extra dose of 20 meq of potassium. Weight today down 5.5 lbs today from 11/14/23. Plan/Follow Up: Will continue to review, monitor and address alerts with follow up based on severity of symptoms and risk factors.

## 2023-11-17 ENCOUNTER — CARE COORDINATION (OUTPATIENT)
Dept: CARE COORDINATION | Age: 63
End: 2023-11-17

## 2023-11-17 NOTE — CARE COORDINATION
Remote Alert Monitoring Note  Rpm alert to be reviewed by the provider   red alert   weight (inrcease of 3 lbs x 1 day, 5 lbs x 7 days)   Additional needs to be addressed by N/A: No      Date/Time:  2023 10:47 AM  Patient Current Location: [place]  LPN contacted patient by telephone. Verified patients name and  as identifiers. Background: Pt enrolled in [facility]PM r/t CHF, HTN, COPD. Refer to 911 immediately if:  Patient unresponsive or unable to provide history  Change in cognition or sudden confusion  Patient unable to respond in complete sentences  Intense chest pain/tightness  Any concern for any clinical emergency  Red Alert: Provider response time of 1 hr required for any red alert requiring intervention  Yellow Alert: Provider response time of 3hr required for any escalated yellow alert  Weight Scale Triage  Was your weight obtained upon rising/waking today? no   Was your weight obtained after voiding and/or use of the bathroom today? yes   Did you weigh yourself in the same amount of clothing today, compared to how you typically do? yes   Was the scale bumped or moved prior to today's weight? no   Is your scale on a flat/hard surface? yes   Did you obtain your weight with shoes on? no   If yes, is this something you normally do during your daily weights? NA   Were you standing up straight on the scale today? yes   Were you leaning on anything while obtaining your weight today? no   Clinical Interventions: Reviewed and followed up on alerts and treatments-Reviewed weight increase with pt on 23. He was seen by CHF clinic that same afternoon and ordered to take Metolazone 2.5 mg x 2 days with extra dose of 20 meq of potassium. Weight today down 2 lbs today from 23. Spoke with pt today who is in no apparent distress. Denies any SOB/dyspnea, new/increasing edema. Noted two weights recorded today with first weight being 9 lbs less than second weight.   Pt states he had a misstep with first weight and

## 2023-11-20 ENCOUNTER — OFFICE VISIT (OUTPATIENT)
Dept: FAMILY MEDICINE CLINIC | Age: 63
End: 2023-11-20

## 2023-11-20 VITALS
HEIGHT: 71 IN | BODY MASS INDEX: 34.75 KG/M2 | WEIGHT: 248.2 LBS | RESPIRATION RATE: 16 BRPM | DIASTOLIC BLOOD PRESSURE: 66 MMHG | SYSTOLIC BLOOD PRESSURE: 110 MMHG | HEART RATE: 60 BPM

## 2023-11-20 DIAGNOSIS — K21.9 GASTROESOPHAGEAL REFLUX DISEASE, UNSPECIFIED WHETHER ESOPHAGITIS PRESENT: ICD-10-CM

## 2023-11-20 DIAGNOSIS — F33.9 EPISODE OF RECURRENT MAJOR DEPRESSIVE DISORDER, UNSPECIFIED DEPRESSION EPISODE SEVERITY (HCC): ICD-10-CM

## 2023-11-20 DIAGNOSIS — E78.5 HYPERLIPIDEMIA, UNSPECIFIED HYPERLIPIDEMIA TYPE: ICD-10-CM

## 2023-11-20 DIAGNOSIS — J44.9 CHRONIC OBSTRUCTIVE PULMONARY DISEASE, UNSPECIFIED COPD TYPE (HCC): ICD-10-CM

## 2023-11-20 DIAGNOSIS — Z23 IMMUNIZATION DUE: ICD-10-CM

## 2023-11-20 DIAGNOSIS — E11.65 UNCONTROLLED TYPE 2 DIABETES MELLITUS WITH HYPERGLYCEMIA (HCC): Primary | ICD-10-CM

## 2023-11-20 DIAGNOSIS — I10 PRIMARY HYPERTENSION: ICD-10-CM

## 2023-11-20 LAB
CHP ED QC CHECK: ABNORMAL
GLUCOSE BLD-MCNC: 179 MG/DL
HBA1C MFR BLD: 7.1 %

## 2023-11-20 ASSESSMENT — ENCOUNTER SYMPTOMS
EYES NEGATIVE: 1
CHEST TIGHTNESS: 0
SHORTNESS OF BREATH: 0
BLURRED VISION: 0
VOMITING: 0
COUGH: 0
DIARRHEA: 0
BLOOD IN STOOL: 0
ORTHOPNEA: 0
NAUSEA: 0
ABDOMINAL PAIN: 0
CONSTIPATION: 0

## 2023-11-20 NOTE — PROGRESS NOTES
Immunizations Administered       Name Date Dose Route    Pneumococcal, PCV20, PREVNAR 21, (age 6w+), IM, 0.5mL 11/20/2023 0.5 mL Intramuscular    Site: Deltoid- Right    Lot: [id number]    NDC: 9349-2098-05
to the next appointment. Discussed use, benefit, and side effects of prescribed medications. Allpatient questions answered. Pt voiced understanding. Instructed to continue currentmedications, diet and exercise. Patient agreed with treatment plan. Return in about 4 months (around 3/20/2024), or if symptoms worsen or fail to improve, for DM . Allergies, Problem List, Medications, Medical History, Family History, Surgical History and Tobacco History reviewed by provider.

## 2023-11-28 ENCOUNTER — CARE COORDINATION (OUTPATIENT)
Dept: CARE COORDINATION | Age: 63
End: 2023-11-28

## 2023-11-28 NOTE — CARE COORDINATION
Remote Patient Monitoring Note  Date/Time:  11/28/2023 11:39 AM  Patient Current Location: [place]  LPN attempted to contact patient by telephone regarding red alert received for weight increase (3 lbs x 1 day, 5 lbs x 7 days). Background: Pt enrolled in [facility] r/t CHF, HTN, COPD. Clinical Interventions: Reviewed and followed up on alerts and treatments-Per HRS chart review, inaccurate weight recorded yesterday of 63.4 lbs. Weight today is up 2 lbs from 7 day look back and currently stable. Inaccurate weight removed from RPM metrics. Attempted to reach pt to review with no answer at this time. Left HIPAA compliant voice message requesting a return call. Plan/Follow Up: Will continue to review, monitor and address alerts with follow up based on severity of symptoms and risk factors.

## 2023-11-28 NOTE — CARE COORDINATION
11/28/23 3:56 PM Patient is currently enrolled in Remote Patient Monitoring for CHF, COPD, and HTN. No return call received as of this time. Plan/Follow Up: Will continue to review, monitor and address alerts with follow up based on severity of symptoms and risk factors.

## 2023-11-28 NOTE — CARE COORDINATION
Attempted to reach patient for continued Care Coordination follow up and education. Patient was unavailable at the time of my call, and a generic voicemail message was left asking patient to return my call at [phone].

## 2023-11-29 DIAGNOSIS — R33.8 BENIGN PROSTATIC HYPERPLASIA WITH URINARY RETENTION: ICD-10-CM

## 2023-11-29 DIAGNOSIS — E11.65 UNCONTROLLED TYPE 2 DIABETES MELLITUS WITH HYPERGLYCEMIA (HCC): ICD-10-CM

## 2023-11-29 DIAGNOSIS — F33.1 MAJOR DEPRESSIVE DISORDER, RECURRENT EPISODE, MODERATE (HCC): ICD-10-CM

## 2023-11-29 DIAGNOSIS — N40.1 BENIGN PROSTATIC HYPERPLASIA WITH URINARY RETENTION: ICD-10-CM

## 2023-11-29 NOTE — TELEPHONE ENCOUNTER
Date of last visit:  11/20/2023  Date of next visit:  3/20/2024    Requested Prescriptions     Pending Prescriptions Disp Refills    traZODone (DESYREL) 100 MG tablet [Pharmacy Med Name: TRAZODONE 100 MG TABS 100 Tablet] 30 tablet 10     Sig: TAKE 1 TABLET BY MOUTH EACH EVENING AS NEEDED FOR SLEEP    albuterol sulfate HFA (PROVENTIL;VENTOLIN;PROAIR) 108 (90 Base) MCG/ACT inhaler [Pharmacy Med Name: ALBUTEROL HFA *PROA* 90MCG 108 (90 BAS Aerosol] 8.5 g 10     Sig: INHALE TWO(2) PUFFS INTO LUNGS EVERY SIX(6) HOURS AS NEEDED FOR WHEEZING    bumetanide (BUMEX) 2 MG tablet [Pharmacy Med Name: BUMETANIDE 2 MG TABLET 2 Tablet] 60 tablet 10     Sig: TAKE 1 TABLET BY MOUTH IN THE MORNING AND 1 TABLET BEFORE BEDTIME    metoprolol succinate (TOPROL XL) 25 MG extended release tablet [Pharmacy Med Name: METOPROLOL SUC ER 25MG TAB 25 Tablet] 60 tablet 10     Sig: TAKE TWO (2) TABLETS BY MOUTH ONCE DAILY    omeprazole (PRILOSEC) 20 MG delayed release capsule [Pharmacy Med Name: OMEPRAZOLE DR 20MG CAP 20 Capsule] 30 capsule 10     Sig: TAKE 1 CAPSULE BY MOUTH ONCE DAILY

## 2023-11-29 NOTE — TELEPHONE ENCOUNTER
Date of last visit:  11/20/2023  Date of next visit:  3/20/2024    Requested Prescriptions     Pending Prescriptions Disp Refills    fluticasone (FLONASE) 50 MCG/ACT nasal spray [Pharmacy Med Name: FLUTICASONE 50MCG NASAL 16 50 KINGSLEY] 16 g 10     Sig: INSTILL TWO (2) SPRAYS IN EACH NOSTRIL ONCE DAILY AS NEEDED FOR RHINITIS OR ALLERGIES    atorvastatin (LIPITOR) 40 MG tablet [Pharmacy Med Name: ATORVASTATIN 40MG TAB 40 Tablet] 30 tablet 10     Sig: TAKE 1 TABLET BY MOUTH EVERY DAY    ticagrelor (BRILINTA) 90 MG TABS tablet [Pharmacy Med Name: BRILINTA 90 MG TABS 90 Tablet] 60 tablet 10     Sig: TAKE 1 TABLET BY MOUTH TWICE DAILY

## 2023-11-30 RX ORDER — METOLAZONE 2.5 MG/1
2.5 TABLET ORAL DAILY
Qty: 2 TABLET | Refills: 10 | OUTPATIENT
Start: 2023-11-30 | End: 2023-12-02

## 2023-11-30 RX ORDER — TAMSULOSIN HYDROCHLORIDE 0.4 MG/1
CAPSULE ORAL
Qty: 30 CAPSULE | Refills: 1 | Status: SHIPPED | OUTPATIENT
Start: 2023-11-30

## 2023-11-30 NOTE — TELEPHONE ENCOUNTER
The pharmacy is  requesting a refill of the below medication which has been pended for you:     Requested Prescriptions     Pending Prescriptions Disp Refills    metFORMIN (GLUCOPHAGE) 500 MG tablet [Pharmacy Med Name: METFORMIN 500 MG TABS 500 Tablet] 360 tablet 1     Sig: TAKE 2 TABLETS BY MOUTH TWICE DAILY WITH MEALS       Last Appointment Date: 11/20/2023  Next Appointment Date: 3/20/2024    Allergies   Allergen Reactions    Zoloft [Sertraline Hcl] Other (See Comments)     Headaches, sweats, bad dreams

## 2023-11-30 NOTE — TELEPHONE ENCOUNTER
Andrei Munoz called requesting a refill on the following medications:  Requested Prescriptions     Pending Prescriptions Disp Refills    tamsulosin (FLOMAX) 0.4 MG capsule [Pharmacy Med Name: TAMSULOSIN 0.4 MG CAPS 0.4 Capsule] 30 capsule 10     Sig: TAKE 1 CAPSULE BY MOUTH DAILY TO FACILITATE PASSAGE OF URETERAL STONE     Pharmacy verified:  [email or web address]      Date of last visit: 09/26/2022  Date of next visit (if applicable): 01/08/20224

## 2023-12-01 ENCOUNTER — CARE COORDINATION (OUTPATIENT)
Dept: CARE COORDINATION | Age: 63
End: 2023-12-01

## 2023-12-01 RX ORDER — OMEPRAZOLE 20 MG/1
CAPSULE, DELAYED RELEASE ORAL
Qty: 30 CAPSULE | Refills: 3 | Status: SHIPPED | OUTPATIENT
Start: 2023-12-01

## 2023-12-01 RX ORDER — BUMETANIDE 2 MG/1
TABLET ORAL
Qty: 60 TABLET | Refills: 3 | Status: SHIPPED | OUTPATIENT
Start: 2023-12-01

## 2023-12-01 RX ORDER — METOPROLOL SUCCINATE 25 MG/1
TABLET, EXTENDED RELEASE ORAL
Qty: 60 TABLET | Refills: 3 | Status: SHIPPED | OUTPATIENT
Start: 2023-12-01

## 2023-12-01 RX ORDER — ATORVASTATIN CALCIUM 40 MG/1
TABLET, FILM COATED ORAL
Qty: 30 TABLET | Refills: 3 | Status: SHIPPED | OUTPATIENT
Start: 2023-12-01

## 2023-12-01 RX ORDER — TRAZODONE HYDROCHLORIDE 100 MG/1
TABLET ORAL
Qty: 30 TABLET | Refills: 3 | Status: SHIPPED | OUTPATIENT
Start: 2023-12-01

## 2023-12-01 RX ORDER — FLUTICASONE PROPIONATE 50 MCG
SPRAY, SUSPENSION (ML) NASAL
Qty: 16 G | Refills: 3 | Status: SHIPPED | OUTPATIENT
Start: 2023-12-01

## 2023-12-01 RX ORDER — ALBUTEROL SULFATE 90 UG/1
AEROSOL, METERED RESPIRATORY (INHALATION)
Qty: 8.5 G | Refills: 3 | Status: SHIPPED | OUTPATIENT
Start: 2023-12-01

## 2023-12-01 NOTE — CARE COORDINATION
12/01/23 3:56 PM Patient is currently enrolled in Remote Patient Monitoring for CHF, COPD, and HTN. No return phone call received as of this time. Will route to [facility] to update. Plan/Follow Up: Will continue to review, monitor and address alerts with follow up based on severity of symptoms and risk factors.

## 2023-12-01 NOTE — CARE COORDINATION
Remote Patient Monitoring Note  Date/Time:  12/1/2023 1:35 PM  Patient Current Location: [place]  LPN attempted to contact patient by telephone regarding yellow alert received for no BP or weight x > 2 days. Background: Pt enrolled in [facility] r/t CHF, HTN, COPD. Clinical Interventions: Reviewed and followed up on alerts and treatments-Left HIPAA compliant voice message requesting pt to update metrics for review. Plan/Follow Up: Will continue to review, monitor and address alerts with follow up based on severity of symptoms and risk factors.

## 2023-12-04 ENCOUNTER — CARE COORDINATION (OUTPATIENT)
Dept: CARE COORDINATION | Age: 63
End: 2023-12-04

## 2023-12-04 NOTE — CARE COORDINATION
Remote Patient Monitoring Note  Date/Time:  2023 1:01 PM  Patient Current Location: [place]  LPN contacted patient by telephone regarding yellow alert received for no BP or weight x > 2 days. Verified patients name and  as identifiers. Background: Pt enrolled in [facility] r/t CHF, HTN, COPD. Clinical Interventions: Reviewed and followed up on alerts and treatments-Spoke with pt who states he has not been feeling well for the last several day, but doing better today. He will try to update metrics later this afternoon. Plan/Follow Up: Will continue to review, monitor and address alerts with follow up based on severity of symptoms and risk factors.

## 2023-12-05 ENCOUNTER — CARE COORDINATION (OUTPATIENT)
Dept: CARE COORDINATION | Age: 63
End: 2023-12-05

## 2023-12-05 NOTE — CARE COORDINATION
Remote Alert Monitoring Note  Rpm alert to be reviewed by the provider   red alert   weight (increase of 3 lbs x 1 day, 5 lbs x 7 days)   Additional needs to be addressed by N/A: No      Date/Time:  2023 12:20 PM  Patient Current Location: [place]  LPN contacted patient by telephone. Verified patients name and  as identifiers. Background: Pt enrolled in [facility] r/t CHF, HTN, COPD. Refer to 911 immediately if:  Patient unresponsive or unable to provide history  Change in cognition or sudden confusion  Patient unable to respond in complete sentences  Intense chest pain/tightness  Any concern for any clinical emergency  Red Alert: Provider response time of 1 hr required for any red alert requiring intervention  Yellow Alert: Provider response time of 3hr required for any escalated yellow alert  Weight Scale Triage  Was your weight obtained upon rising/waking today? yes   Was your weight obtained after voiding and/or use of the bathroom today? no   Did you weigh yourself in the same amount of clothing today, compared to how you typically do? yes   Was the scale bumped or moved prior to today's weight? Yes, slides under shelf when not in use   Is your scale on a flat/hard surface? yes   Did you obtain your weight with shoes on? slippers   If yes, is this something you normally do during your daily weights? yes   Were you standing up straight on the scale today? yes   Were you leaning on anything while obtaining your weight today? no   Clinical Interventions: Reviewed and followed up on alerts and treatments-Spoke with pt who states he has not been feeling well for last several days. Offered to call office to see if appointment available and pt states he started feeling better yesterday and will call later if he feels he needs to be seen. Pt states still weak and had difficulty getting balance when stepping on scale. Stated weights recorded this morning of 224 lbs and 237.5 lbs were not accurate.   Last weight of

## 2023-12-06 ENCOUNTER — CARE COORDINATION (OUTPATIENT)
Dept: CARE COORDINATION | Age: 63
End: 2023-12-06

## 2023-12-06 ASSESSMENT — ENCOUNTER SYMPTOMS: DYSPNEA ASSOCIATED WITH: EXERTION

## 2023-12-06 NOTE — CARE COORDINATION
Remote Patient Monitoring Note  Date/Time:  12/6/2023 11:43 AM  [facility] for weight increase (3 lbs x 1 day, 5 lbs x 7 days). Background: Pt enrolled in [facility] r/t CHF, HTN, COPD. Clinical Interventions: Reviewed and followed up on alerts and treatments-Reviewed weight increase with pt yesterday who stated he had not been feeling well, and was having trouble with balance when stepping on scale due to weakness. Offered to reach out to PCP for appointment and pt declined. Noted two weights recorded today with first weight decreased 4 lbs from yesterday. Second weight of 235 lbs consistent with previous readings. Pt denied any new/increasing edema with review yesterday. Will remove inaccurate weight from RPM metrics. Plan/Follow Up: Will continue to review, monitor and address alerts with follow up based on severity of symptoms and risk factors.

## 2023-12-06 NOTE — CARE COORDINATION
follow my Zone Management tool to seek urgent or emergent care. I will notify my provider of any symptoms that indicate a worsening of my condition.     Barriers: overwhelmed by complexity of regimen  Plan for overcoming my barriers: family and ACM support  Confidence: 9/10  Anticipated Goal Completion Date: 1-4-24                Future Appointments   Date Time Provider [address]   1/8/2024  2:00 PM Brianne Brown MD [address]   3/20/2024  2:00 PM Sandhya Mann MD [facility]   5/14/2024  2:00 PM ASHLEIGH Sheridan - CNP N [facility] CHF [facility]   11/22/2024  1:00 PM Mary Vazquez MD N [address] [facility] [address]

## 2023-12-07 ENCOUNTER — CARE COORDINATION (OUTPATIENT)
Dept: CARE COORDINATION | Age: 63
End: 2023-12-07

## 2023-12-07 NOTE — CARE COORDINATION
Remote Alert Monitoring Note  Rpm alert to be reviewed by the provider   red alert   pulse ox reading (91%)   Additional needs to be addressed by N/A: No      Date/Time:  2023 10:33 AM  Patient Current Location: [facility] contacted patient by telephone. Verified patients name and  as identifiers. Background: Pt enrolled in [facility] r/t CHF, HTN, COPD. Refer to 911 immediately if:  Patient unresponsive or unable to provide history  Change in cognition or sudden confusion  Patient unable to respond in complete sentences  Intense chest pain/tightness  Any concern for any clinical emergency  Red Alert: Provider response time of 1 hr required for any red alert requiring intervention  Yellow Alert: Provider response time of 3hr required for any escalated yellow alert  O2 Triage  Are you having any Chest Pain? no   Are you having any Shortness of Breath? no   Swelling in your hands or feet? no   Are you having any other health concerns or issues? no   Clinical Interventions: Reviewed and followed up on alerts and treatments-Pt speaking in full sentences, no apparent distress. Denies any SOB/dyspnea at this time. States he is feeling much better today. Using Flonase nasal spray as ordered. He is agreeable to recheck oxygen level at this time with updated reading of 92%. Oxygen level now within RPM parameters. Pt reminded that if oxygen level below 92% and he is in no distress, he should rub hands briskly to warm and reposition oximeter. Pt verbalizes understanding. Plan/Follow Up: Will continue to review, monitor and address alerts with follow up based on severity of symptoms and risk factors.

## 2023-12-08 VITALS
HEART RATE: 69 BPM | DIASTOLIC BLOOD PRESSURE: 71 MMHG | BODY MASS INDEX: 32.85 KG/M2 | OXYGEN SATURATION: 92 % | WEIGHT: 235.5 LBS | SYSTOLIC BLOOD PRESSURE: 108 MMHG

## 2023-12-11 ENCOUNTER — CARE COORDINATION (OUTPATIENT)
Dept: CARE COORDINATION | Age: 63
End: 2023-12-11

## 2023-12-11 NOTE — CARE COORDINATION
Date/Time:  12/11/2023 1:25 PM  LPN attempted to reach patient by telephone regarding yellow/red alerts in remote patient monitoring program. Left HIPPA compliant message requesting a return call. Will attempt to reach patient again.

## 2023-12-11 NOTE — CARE COORDINATION
Remote Alert Monitoring Note  Rpm alert to be reviewed by the provider   red alert and yellow alert   weight (red alert for increase of 5 lbs x 7 days) yellow alert for (no BP x > 2 days)  Additional needs to be addressed by N/A: No      Date/Time:  12/11/2023 1:23 PM  Patient Current Location: [place]  Background: Pt enrolled in [facility] r/t CHF, HTN, COPD. Clinical Interventions: Reviewed and followed up on alerts and treatments-Pt did not updated daily BP or oxygen levels today. No return call to discuss red alert received for weight increase. Will route to [facility] to update. Plan/Follow Up: Will continue to review, monitor and address alerts with follow up based on severity of symptoms and risk factors.

## 2023-12-14 ENCOUNTER — CARE COORDINATION (OUTPATIENT)
Dept: CARE COORDINATION | Age: 63
End: 2023-12-14

## 2023-12-14 NOTE — CARE COORDINATION
12/14/23 3:43 PM Patient is currently enrolled in Remote Patient Monitoring for CHF, COPD, and HTN. No return call received as of this time. Plan/Follow Up: Will continue to review, monitor and address alerts with follow up based on severity of symptoms and risk factors.

## 2023-12-14 NOTE — CARE COORDINATION
Remote Alert Monitoring Note  Rpm alert to be reviewed by the provider   red alert   weight (increase of 3 lbs x 1 day, 5 lbs x 7 days)   Additional needs to be addressed by N/A: No      Date/Time:  12/14/2023 10:59 AM  Patient Current Location: [place]  Background: Pt enrolled in [facility] r/t CHF, HTN, COPD. Clinical Interventions: Reviewed and followed up on alerts and treatments-Pt called this nurse and left voice message to update that second weight today of 253 lbs was taken after dressing to include motorcycle jacket, concealed carry vest, gun, knives, boots, etc.  He stated he was just curious as to how much all of his daily clothing and items added to him. First weight recorded today of 237.5 lbs was obtained wearing normal clothing that he usually wears when obtaining weight. Weight currently stable. Attempted to call pt back to review medications with no answer at this time. Left HIPAA compliant voice message requesting return call. Will remove second weight from RPM metrics. Plan/Follow Up: Will continue to review, monitor and address alerts with follow up based on severity of symptoms and risk factors.

## 2023-12-15 ENCOUNTER — CARE COORDINATION (OUTPATIENT)
Dept: CASE MANAGEMENT | Age: 63
End: 2023-12-15

## 2023-12-15 NOTE — CARE COORDINATION
Remote Alert Monitoring Note  Rpm alert to be reviewed by the provider   red alert   pulse ox reading (92) and pulse ox heart rate (49)   Additional needs to be addressed by N/A: No                    Date/Time:  12/15/2023 1:11 PM  Patient Current Location: Home: [address] [facility] [address]  CTN contacted patient by telephone. Verified patients name and  as identifiers. Background: Pt. Enrolled in RPM for CHF, COPD, HTN  Refer to 911 immediately if:  Patient unresponsive or unable to provide history  Change in cognition or sudden confusion  Patient unable to respond in complete sentences  Intense chest pain/tightness  Any concern for any clinical emergency  Red Alert: Provider response time of 1 hr required for any red alert requiring intervention  Yellow Alert: Provider response time of 3hr required for any escalated yellow alert    O2 Triage  Are you having any Chest Pain? no   Are you having any Shortness of Breath? no   Swelling in your hands or feet? no     Are you having any other health concerns or issues? no      HR Triage  Are you having any Chest Pain? no   Are you having any Shortness of Breath? no   Are you having any dizziness? no   Are you feeling more fatigued or tired than normal? no   Are you having any other health concerns or issues? no      Clinical Interventions: Reviewed and followed up on alerts and treatments-Pt. Says he is feeling fine, says he thinks pulse ox/pulse runs low depending on which hand he takes it. Re=checked and is 91/69. No other action needed at this time and he voices no concerns. Plan/Follow Up: Will continue to review, monitor and address alerts with follow up based on severity of symptoms and risk factors.

## 2023-12-26 ENCOUNTER — CARE COORDINATION (OUTPATIENT)
Dept: CARE COORDINATION | Age: 63
End: 2023-12-26

## 2023-12-26 NOTE — CARE COORDINATION
Date/Time:  12/26/2023 12:09 PM  LPN attempted to reach patient by telephone regarding red alert in remote patient monitoring program. Left HIPPA compliant message requesting a return call. Will attempt to reach patient again.

## 2023-12-26 NOTE — CARE COORDINATION
Remote Alert Monitoring Note  Rpm alert to be reviewed by the provider   red alert   pulse ox reading (90%) and weight (3 lbs x 1 day, 5 lbs x 7 days)   Additional needs to be addressed by N/A: No      Date/Time:  2023 12:10 PM  Patient Current Location: [place]  Pt returned call at this time. Verified patients name and  as identifiers. Background: Pt enrolled in RPM r/t CHF, HTN, COPD. Refer to 911 immediately if:  Patient unresponsive or unable to provide history  Change in cognition or sudden confusion  Patient unable to respond in complete sentences  Intense chest pain/tightness  Any concern for any clinical emergency  Red Alert: Provider response time of 1 hr required for any red alert requiring intervention  Yellow Alert: Provider response time of 3hr required for any escalated yellow alert  O2 Triage  Are you having any Chest Pain? no   Are you having any Shortness of Breath? no   Swelling in your hands or feet? no   Are you having any other health concerns or issues? no   Weight Scale Triage  Was your weight obtained upon rising/waking today? yes   Was your weight obtained after voiding and/or use of the bathroom today? yes   Did you weigh yourself in the same amount of clothing today, compared to how you typically do? yes   Was the scale bumped or moved prior to today's weight? no   Is your scale on a flat/hard surface? yes   Did you obtain your weight with shoes on? no   If yes, is this something you normally do during your daily weights? NA   Were you standing up straight on the scale today? yes   Were you leaning on anything while obtaining your weight today? yes   Clinical Interventions: Reviewed and followed up on alerts and treatments-Spoke with pt who states he lost balance with first weight of 231 lbs. Rechecked weight with reading of 237 lbs. Weight is currently down 0.5 lbs from 7 day look back on 23. Pt verbalizes taking Bumex and valsartan as ordered.   He does have some swelling

## 2023-12-27 ENCOUNTER — CARE COORDINATION (OUTPATIENT)
Dept: CARE COORDINATION | Age: 63
End: 2023-12-27

## 2023-12-27 NOTE — CARE COORDINATION
Remote Alert Monitoring Note  Rpm alert to be reviewed by the provider   red alert   pulse ox reading (90%)   Additional needs to be addressed by N/A: No      Date/Time:  2023 10:27 AM  Patient Current Location: [facility] contacted patient by telephone. Verified patients name and  as identifiers. Background: Pt enrolled in RPM r/t CHF, HTN, COPD. Refer to 911 immediately if:  Patient unresponsive or unable to provide history  Change in cognition or sudden confusion  Patient unable to respond in complete sentences  Intense chest pain/tightness  Any concern for any clinical emergency  Red Alert: Provider response time of 1 hr required for any red alert requiring intervention  Yellow Alert: Provider response time of 3hr required for any escalated yellow alert  O2 Triage  Are you having any Chest Pain? no   Are you having any Shortness of Breath? no   Swelling in your hands or feet? Yes to feet which is improved   Are you having any other health concerns or issues? no   Clinical Interventions: Reviewed and followed up on alerts and treatments-Pt speaking in full sentences, no apparent distress. Denies any SOB/dyspnea, cough or increased secretions at this time. States he has used scheduled Arnuity Ellipta and Anoro Ellipta inhalers this morning as ordered and has not required use of prn inhalers. He has been having an increasingly difficult time getting reading with RPM oximeter and nurse recommended pt try changing batteries in oximeter. He states he is unable to do so at this time, but will find batteries and recheck oxygen level later today. Nurse also provided education to rub hands together to warm prior to placing oximeter. Pt verbalizes understanding. Plan/Follow Up: Will continue to review, monitor and address alerts with follow up based on severity of symptoms and risk factors.

## 2023-12-27 NOTE — CARE COORDINATION
12/27/23 12:32 PM Patient is currently enrolled in Remote Patient Monitoring for CHF, COPD, and HTN. Pt rechecked oxygen level at 12:11 PM with updated reading of 92%. Oxygen level now within RPM parameters. Plan/Follow Up: Will continue to review, monitor and address alerts with follow up based on severity of symptoms and risk factors.

## 2023-12-27 NOTE — CARE COORDINATION
Ambulatory Care Coordination Note  2023    Patient Current Location:  Home: [address] [facility]     YULIANA DIGGS contacted the patient by telephone. Verified name and  with patient as identifiers. Provided introduction to self, and explanation of the YULIANA DIGGS role. Challenges to be reviewed by the provider   Additional needs identified to be addressed with provider: No  none               Method of communication with provider: none. I spoke with the patient for continued Care Coordination follow up and education. Patient remains active with RPM.  Denies any SOB/dyspnea, cough or increased secretions at this time. We discussed Zone management tools for chronic disease(s) and patient denies current questions re: s/sx at this time. Patient states swelling in legs have improved. Patient has been busy with his mother as she was admitted in hospital and now home. Educated on how to identify sx's that are worse than the baseline and the importance of early symptom recognition and reporting to prevent exacerbation which may lead to ED visits and hospital admissions. I advised patient to contact PCP office if needed. No further needs at this time.       Lab Results       None            Care Coordination Interventions    Referral from Primary Care Provider: No  Suggested Interventions and Community Resources  Fall Risk Prevention: Completed  Disease Specific Clinic: Completed (Comment: CHF clinic)  Home Health Services: Completed  Occupational Therapy: Completed  Physical Therapy: Completed  Registered Dietician: Not Started  Senior Services: Declined  Zone Management Tools: Completed          Goals Addressed    None         Future Appointments   Date Time Provider [address]   2024  2:00 PM Karin Brown MD [address]   3/20/2024  2:00 PM Ganesh Marc MD [facility]   2024  2:00 PM ASHLEIGH Sanchez - CNP [facility]

## 2023-12-29 DIAGNOSIS — J44.9 STAGE 3 SEVERE COPD BY GOLD CLASSIFICATION (HCC): ICD-10-CM

## 2023-12-29 DIAGNOSIS — J43.2 CENTRILOBULAR EMPHYSEMA (HCC): ICD-10-CM

## 2024-01-02 ENCOUNTER — CARE COORDINATION (OUTPATIENT)
Dept: CARE COORDINATION | Facility: CLINIC | Age: 64
End: 2024-01-02

## 2024-01-02 RX ORDER — UMECLIDINIUM BROMIDE AND VILANTEROL TRIFENATATE 62.5; 25 UG/1; UG/1
POWDER RESPIRATORY (INHALATION)
Qty: 60 EACH | Refills: 10 | Status: SHIPPED | OUTPATIENT
Start: 2024-01-02

## 2024-01-02 NOTE — CARE COORDINATION
Remote Alert Monitoring Note  Rpm alert to be reviewed by the provider   red alert   pulse ox reading (87%)   Additional needs to be addressed by N/A: No                    Date/Time:  2024 9:04 AM  Patient Current Location: [facility] contacted patient by telephone. Verified patients name and  as identifiers.  Background: Pt enrolled in RPM r/t CHF, HTN, and COPD.  Refer to 911 immediately if:  Patient unresponsive or unable to provide history  Change in cognition or sudden confusion  Patient unable to respond in complete sentences  Intense chest pain/tightness  Any concern for any clinical emergency  Red Alert: Provider response time of 1 hr required for any red alert requiring intervention  Yellow Alert: Provider response time of 3hr required for any escalated yellow alert    O2 Triage  Are you having any Chest Pain? no   Are you having any Shortness of Breath? no   Swelling in your hands or feet? no     Are you having any other health concerns or issues? no      Clinical Interventions: Reviewed and followed up on alerts and treatments-Pt speaking in full sentences, denies CP, SOB, and new or worsening edema. Pt reports compliance with scheduled inhalers, has not required use of prn inhalers, and confirms batteries in pulse oximeter have been changed. Pt is agreeable to recheck pulse ox at later time and contacted HRS tech support if reading continues to remain below 92%. Pt states he has had RPM equipment replaced x2 and if equipment needs to be replaced again he no longer wants to participate in RPM. Pt verbalizes understanding of when to notify provider(s) of changes / concerns and when to seek emergent medical care.   Escalated alert to RN-FYI update provided   Plan/Follow Up: Will continue to review, monitor and address alerts with follow up based on severity of symptoms and risk factors.

## 2024-01-02 NOTE — TELEPHONE ENCOUNTER
Received refill request for anoro. Medication was last ordered by phyllis. Medication was last ordered on 2.9.23 with 10 refills.   Patient was last seen in the office 6.30.22. Patient has a scheduled follow up 1.22.24.   Medication needs to be sent to [facility].

## 2024-01-02 NOTE — CARE COORDINATION
Remote Patient Monitoring Note      Date/Time:  1/2/2024 10:07 AM  Patient Current Location: [place]  Pulse ox recheck of 93% noted in HRS and is within RPM parameters. Pt previously denied acute distress, verbalized understanding of when to notify provider(s) of changes / concerns, and when to seek emergent medical care. No further outreach by this LPN indicated at this time.     Background:  Pt enrolled in RPM r/t CHF, HTN, and COPD.  Clinical Interventions: Escalated alert to RN-FYI update provided      Plan/Follow Up: Will continue to review, monitor and address alerts with follow up based on severity of symptoms and risk factors.

## 2024-01-03 ENCOUNTER — CARE COORDINATION (OUTPATIENT)
Dept: CARE COORDINATION | Facility: CLINIC | Age: 64
End: 2024-01-03

## 2024-01-03 NOTE — CARE COORDINATION
Remote Alert Monitoring Note  Rpm alert to be reviewed by the provider   red alert   pulse ox reading (90%)   Additional needs to be addressed by N/A: No                    Date/Time:  1/3/2024 11:15 AM  Patient Current Location: [facility] contacted patient by telephone. Verified patients name and  as identifiers.  Background: Pt enrolled in RPM r/t CHF, HTN, and COPD.  Refer to 911 immediately if:  Patient unresponsive or unable to provide history  Change in cognition or sudden confusion  Patient unable to respond in complete sentences  Intense chest pain/tightness  Any concern for any clinical emergency  Red Alert: Provider response time of 1 hr required for any red alert requiring intervention  Yellow Alert: Provider response time of 3hr required for any escalated yellow alert    O2 Triage  Are you having any Chest Pain? no   Are you having any Shortness of Breath? no   Swelling in your hands or feet? no     Are you having any other health concerns or issues? no      Clinical Interventions: Reviewed and followed up on alerts and treatments-Pt speaking in complete sentences, denies CP, SOB, and new or worsening edema at this time. Pt reports compliance with scheduled inhalers, has not required use of PRN inhalers, and is agreeable to recheck pulse ox when he returns home. Pt is aware of RPM monitoring hours. Pt previously reported changing pulse oximeter batteries, having RPM equipment replaced x 2, and that if equipment needs to be replaced again he no longer wants to participate in RPM. RN ERMIAS aware. See RPM note from 24. Pt verbalizes understanding of when to notify provider(s) of changes / concerns and when to seek emergent medical care.   Plan/Follow Up: Will continue to review, monitor and address alerts with follow up based on severity of symptoms and risk factors.

## 2024-01-03 NOTE — CARE COORDINATION
Remote Alert Monitoring Note  Rpm alert to be reviewed by the provider   red alert   pulse ox reading (88%)   Additional needs to be addressed by N/A: No                    Date/Time:  1/3/2024 1:35 PM  Patient Current Location: Home: [address] [place] [address]  LPN attempted to contact patient by telephone. Left HIPAA compliant message requesting a return call. Return call from pt received. Verified patients name and  as identifiers.  Background: Pt enrolled in RPM r/t CHF, HTN, and COPD.  Refer to 911 immediately if:  Patient unresponsive or unable to provide history  Change in cognition or sudden confusion  Patient unable to respond in complete sentences  Intense chest pain/tightness  Any concern for any clinical emergency  Red Alert: Provider response time of 1 hr required for any red alert requiring intervention  Yellow Alert: Provider response time of 3hr required for any escalated yellow alert    O2 Triage  Are you having any Chest Pain? no   Are you having any Shortness of Breath? no   Swelling in your hands or feet? no     Are you having any other health concerns or issues? no      Clinical Interventions: Reviewed and followed up on alerts and treatments-Pt speaking in complete sentences, continues to deny acute distress / is asymptomatic. Pt states he was outside in the cold prior to rechecking pulse ox. Pt is agreeable to warm hands and recheck pulse ox at this time. Updated reading of 93% reported and 92% noted in HRS. Pt declined assistance with notify HRS tech support of potential need for pulse oximeter replacement and states \"let's see how this one does for a few days\". Pt again verbalizes understanding of when to notify provider(s) of changes / concerns and when to seek emergent medical care.   Plan/Follow Up: Will continue to review, monitor and address alerts with follow up based on severity of symptoms and risk factors.

## 2024-01-08 ENCOUNTER — OFFICE VISIT (OUTPATIENT)
Dept: UROLOGY | Age: 64
End: 2024-01-08
Payer: MEDICARE

## 2024-01-08 VITALS — WEIGHT: 237 LBS | HEIGHT: 71 IN | BODY MASS INDEX: 33.18 KG/M2 | RESPIRATION RATE: 16 BRPM

## 2024-01-08 DIAGNOSIS — R97.20 ELEVATED PSA: ICD-10-CM

## 2024-01-08 DIAGNOSIS — R33.8 BENIGN PROSTATIC HYPERPLASIA WITH URINARY RETENTION: Primary | ICD-10-CM

## 2024-01-08 DIAGNOSIS — N40.1 BENIGN PROSTATIC HYPERPLASIA WITH URINARY RETENTION: Primary | ICD-10-CM

## 2024-01-08 PROCEDURE — 1123F ACP DISCUSS/DSCN MKR DOCD: CPT | Performed by: UROLOGY

## 2024-01-08 PROCEDURE — 99214 OFFICE O/P EST MOD 30 MIN: CPT | Performed by: UROLOGY

## 2024-01-08 RX ORDER — TAMSULOSIN HYDROCHLORIDE 0.4 MG/1
0.4 CAPSULE ORAL DAILY
Qty: 90 CAPSULE | Refills: 3 | Status: SHIPPED | OUTPATIENT
Start: 2024-01-08 | End: 2025-01-02

## 2024-01-08 NOTE — PROGRESS NOTES
Dr. Elias Brown Jr, MD MD  [facility] Consultation / Follow up Visit    Patient:  Andrei Munoz  YOB: 1960  Date: 1/8/2024  Consult requested from Malka Slois MD     HISTORY OF PRESENT ILLNESS:   The patient is a 63 y.o. male who presents today for follow-up for the following problem(s):  Bladder mass, hematuria.  Overall the problem(s) : are worsening.  Associated Symptoms: No dysuria, gross hematuria.   Pain Severity:      Today visit:   1/8/24   Follows with us for gross hematuria s/p cysto RGPG, no mass seen.  Hematuria resolved.   - good candidate for Urolift  BPH with LUTs - is controlled on Flomax.  AUASS: 9/2.   PSA: 3.09      6/14/21   Has history of gross hematuria with clots, and was found to have a possible bladder mass on CT scan from ER visit.  He has unfortunately developed acute urinary retention and has catheter in place.    BPH with LUTs - is found to be in retention.     Summary of old records:   (Patient's old records, notes and chart reviewed and summarized above.)    Last several PSA's:  Lab Results   Component Value Date    PSA 3.09 (H) 01/13/2023    PSA 3.08 (H) 02/12/2021    PSA 2.24 (H) 06/05/2019       Last total testosterone:  No results found for: \"TESTOSTERONE\"    Urinalysis today:  No results found for this visit on 01/08/24.        Last BUN and creatinine:  Lab Results   Component Value Date    BUN 16 09/17/2023     Lab Results   Component Value Date    CREATININE 1.0 09/17/2023       Imaging Reviewed during this Office Visit:   (results were independently reviewed by physician and radiology report verified)    PAST MEDICAL, FAMILY AND SOCIAL HISTORY:  Past Medical History:   Diagnosis Date    CHF (congestive heart failure) (HCC)     COPD (chronic obstructive pulmonary disease) (HCC)     Coronary artery disease involving native coronary artery of native heart without angina pectoris     Depression     Diabetes mellitus type 2,

## 2024-01-09 ENCOUNTER — CARE COORDINATION (OUTPATIENT)
Dept: CARE COORDINATION | Facility: CLINIC | Age: 64
End: 2024-01-09

## 2024-01-09 NOTE — CARE COORDINATION
Remote Patient Monitoring Note      Date/Time:  2024 3:56 PM  Patient Current Location: [facility] contacted patient by telephone regarding yellow alert received for no BP taken x 2 days. Verified patients name and  as identifiers.    Background: Pt enrolled in RPM r/t CHF, HTN, and COPD.  Clinical Interventions: Discussed RPM adherence with pt. Pt verbalizes understanding, reports he has been extremely busy with taking care of his mother, and states, \"I will try to get back on track tomorrow\". Pt is aware of RPM monitoring hours, verbalizes understanding of when to notify provider(s) of changes / concerns, and when to seek emergent medical care.     Plan/Follow Up: Will continue to review, monitor and address alerts with follow up based on severity of symptoms and risk factors.

## 2024-01-10 ENCOUNTER — CARE COORDINATION (OUTPATIENT)
Dept: CARE COORDINATION | Age: 64
End: 2024-01-10

## 2024-01-10 NOTE — CARE COORDINATION
Spoke very briefly with Jaciel as he was attending an AA meeting. States he is doing well and asked if I could follow up at another time.

## 2024-01-10 NOTE — CARE COORDINATION
Date/Time:  1/10/2024 1:30 PM  LPN attempted to reach patient by telephone regarding red alert in remote patient monitoring program. Left HIPPA compliant message requesting a return call. Will attempt to reach patient again.

## 2024-01-11 ENCOUNTER — CARE COORDINATION (OUTPATIENT)
Dept: CARE COORDINATION | Age: 64
End: 2024-01-11

## 2024-01-11 NOTE — CARE COORDINATION
Remote Alert Monitoring Note  Rpm alert to be reviewed by the provider   red alert   weight (increase of 3 lbs x 1 day)   Additional needs to be addressed by N/A: No      Date/Time:  2024 12:07 PM  Patient Current Location: [facility] contacted patient by telephone. Verified patients name and  as identifiers.  Background: Pt enrolled in RPM r/t CHF, HTN, COPD.  Refer to 911 immediately if:  Patient unresponsive or unable to provide history  Change in cognition or sudden confusion  Patient unable to respond in complete sentences  Intense chest pain/tightness  Any concern for any clinical emergency  Red Alert: Provider response time of 1 hr required for any red alert requiring intervention  Yellow Alert: Provider response time of 3hr required for any escalated yellow alert  Weight Scale Triage  Was your weight obtained upon rising/waking today? no   Was your weight obtained after voiding and/or use of the bathroom today? yes   Did you weigh yourself in the same amount of clothing today, compared to how you typically do? yes   Was the scale bumped or moved prior to today's weight? no   Is your scale on a flat/hard surface? yes   Did you obtain your weight with shoes on? no   If yes, is this something you normally do during your daily weights? yes   Were you standing up straight on the scale today? yes   Were you leaning on anything while obtaining your weight today? no   Clinical Interventions: Reviewed and followed up on alerts and treatments-Spoke with pt who denies any SOB/dyspnea, new/increasing edema.  Pt verbalizes taking Bumex 2 mg BID and Valsartan 80 mg daily per orders.  Reviewed weights from yesterday and first weight entered was 233.5 lbs with second weight consistent with daily weights this week.  Pt states he lost his footing and first weight was not correct.  Will remove incorrect weight from RPM metrics.  Weight is currently stable.    Plan/Follow Up: Will continue to review, monitor and address

## 2024-01-12 ENCOUNTER — CARE COORDINATION (OUTPATIENT)
Dept: CARE COORDINATION | Facility: CLINIC | Age: 64
End: 2024-01-12

## 2024-01-12 NOTE — CARE COORDINATION
Remote Patient Monitoring Note      Date/Time:  1/12/2024 1:19 PM  Patient Current Location: [place]  LPN attempted to contact patient by telephone regarding red alert received for weight increase (of 3 lbs In Last Day). Left HIPAA compliant message requesting a return call.     Background: Pt enrolled in RPM r/t CHF, HTN, and COPD.     Plan/Follow Up: Will continue to review, monitor and address alerts with follow up based on severity of symptoms and risk factors.

## 2024-01-12 NOTE — CARE COORDINATION
Remote Alert Monitoring Note  Rpm alert to be reviewed by the provider   red alert   weight (Increase of 3 lbs in last day)   Additional needs to be addressed by N/A: No                    Date/Time:  2024 1:21 PM  Patient Current Location: [place]  Incoming / return call from pt received. Verified patients name and  as identifiers.  Background: Pt enrolled in RPM r/t CHF, HTN, and COPD.   Refer to 911 immediately if:  Patient unresponsive or unable to provide history  Change in cognition or sudden confusion  Patient unable to respond in complete sentences  Intense chest pain/tightness  Any concern for any clinical emergency  Red Alert: Provider response time of 1 hr required for any red alert requiring intervention  Yellow Alert: Provider response time of 3hr required for any escalated yellow alert    Weight Scale Triage  Was your weight obtained upon rising/waking today? no   Was your weight obtained after voiding and/or use of the bathroom today? yes   Did you weigh yourself in the same amount of clothing today, compared to how you typically do? no   Was the scale bumped or moved prior to today's weight? no   Is your scale on a flat/hard surface? yes   Did you obtain your weight with shoes on? no   If yes, is this something you normally do during your daily weights? NA   Were you standing up straight on the scale today? Yes for 3rd recorded weight. No for 1st and 2nd recorded weight.    Were you leaning on anything while obtaining your weight today? Yes for 1st and 2nd no for 3rd recorded weight      Clinical Interventions: Reviewed and followed up on alerts and treatments-Discussed alert for weight increase and multiple recorded weights with pt. Pt denies CP, SOB, and new or worsening edema. Pt reports compliance with Bumex 2 mg BID, reports 1st and 2nd recorded weights are inaccurate due to leaning on filing cabinet, and 3rd recorded weight of 235.5# is accurate. 2.5# decrease in last day noted. Inaccurate

## 2024-01-17 ENCOUNTER — CARE COORDINATION (OUTPATIENT)
Dept: CARE COORDINATION | Facility: CLINIC | Age: 64
End: 2024-01-17

## 2024-01-17 ENCOUNTER — CARE COORDINATION (OUTPATIENT)
Dept: CARE COORDINATION | Age: 64
End: 2024-01-17

## 2024-01-17 NOTE — PROGRESS NOTES
[facility]    Patient: LOPEZ CALVILLO, 63 y.o.   : 1960    Patient of Dr. Elam     Subjective     Chief Complaint   Patient presents with    Follow-up     19 month copd f/u cxr 10-5-23, ct chest 23, pft 23        HPI  Lopez is here for follow up for Severe COPD. Overall patient reports respiratory symptoms have been stable since last appointment. Patient reports good compliance with inhaled medications (Anoro and Arnuity). Patient has not required albuterol. Patient reports physical limitation due to respiratory symptoms. PMH significant for COPD, centrilobular emphysema, allergic rhinitis (on flonase), CHF, CAD, history of blood clots, depression, DM2, HLD, HTN, FAISAL on CPAP, OA, and S/P CABG.     Patient was last evaluated by ASHLEIGH Patterson CNP in 2022.      CT Chest 23 - left basilar atelectasis, asymmetric elevated of left hemidiaphragm    Chest x-ray 10/5/23 - mild chronic fibrotic stranding along the left hemidiaphragm and mild superimposed atelectasis/pneumonia     Expectorants: Mucinex       Complaints: cough  Onset Duration: Over a year  Alleviating factors:  mucinex  Associated symptoms: yellow sputum - very thick and getting stuck  Pertinent negatives: Shortness of Breath, Cough, Hemoptysis, Wheezing, and Chest Tightness  Risk factors for lung disease: animal exposure    Progress History:   Using inhalers? Yes Anoro, Arnuity, and as needed albuterol   Are they helpful? Yes   Any recent exacerbations? No  Last PFT: 2021  Last 6 MWT: 2020, did not qualify for supplemental O2  A1AT: MM - normal alleles     Follows with RADHA Nguyen for FAISAL on CPAP - has not seen since        Substance Use History:  Tobacco: 50 pack year, quit in   History of cocaine abuse and marijuana use     Social History:  Patient job history: Retired,  in the past  He has had exposure to aerosolized

## 2024-01-17 NOTE — CARE COORDINATION
RPM team,      Jaciel is ready for graduation from RPM monitoring.  Could you please reach out and provide support to help get equipment turned back in.  Thank you!    Remote Patient Monitoring Graduation      Date/Time:  1/17/2024 11:18 AM  Patient Current Location: Home: [address] [place] [address]  Patient has graduated from the Remote Patient Monitoring program on 1/17/2024.   RPM goals have been met at this time.      Patient has been provided instruction on process to return RPM equipment and RPM has been deactivated.     Patient has AC's contact information for any further questions, concerns, or needs.   Time Spent: 4 minutes

## 2024-01-17 NOTE — CARE COORDINATION
Remote Patient Monitoring Note      Date/Time:  2024 1:05 PM  Patient Current Location: [place]  LPN contacted patient by telephone regarding yellow alert received for no BP or weight taken x 2 days. Verified patients name and  as identifiers.    Background:  Pt enrolled in RPM r/t CHF, HTN, and COPD  Clinical Interventions:  Discussed RPM adherence with pt. Pt verbalizes understanding and reports he is graduating from RPM program. Pt has recently spoken with ACM and boxed up RPM equipment. No further outreach / action by this LPN indicated at this time.        Plan/Follow Up: Will continue to review, monitor and address alerts with follow up based on severity of symptoms and risk factors.

## 2024-01-17 NOTE — CARE COORDINATION
Left message to return phone call to complete Care Coordination follow up.  Will discuss graduation from [facility] as he is currently on day 98 of activation.

## 2024-01-22 ENCOUNTER — OFFICE VISIT (OUTPATIENT)
Dept: PULMONOLOGY | Age: 64
End: 2024-01-22
Payer: MEDICARE

## 2024-01-22 VITALS
DIASTOLIC BLOOD PRESSURE: 80 MMHG | HEART RATE: 64 BPM | WEIGHT: 255.6 LBS | SYSTOLIC BLOOD PRESSURE: 120 MMHG | TEMPERATURE: 97.9 F | HEIGHT: 71 IN | OXYGEN SATURATION: 96 % | BODY MASS INDEX: 35.78 KG/M2

## 2024-01-22 DIAGNOSIS — J43.2 CENTRILOBULAR EMPHYSEMA (HCC): ICD-10-CM

## 2024-01-22 DIAGNOSIS — J41.1 MUCOPURULENT CHRONIC BRONCHITIS (HCC): ICD-10-CM

## 2024-01-22 DIAGNOSIS — J44.9 STAGE 2 MODERATE COPD BY GOLD CLASSIFICATION (HCC): Primary | ICD-10-CM

## 2024-01-22 DIAGNOSIS — J98.11 ATELECTASIS: ICD-10-CM

## 2024-01-22 DIAGNOSIS — G47.33 OSA ON CPAP: ICD-10-CM

## 2024-01-22 DIAGNOSIS — Z87.891 PERSONAL HISTORY OF TOBACCO USE: ICD-10-CM

## 2024-01-22 PROCEDURE — 3079F DIAST BP 80-89 MM HG: CPT

## 2024-01-22 PROCEDURE — A9999 DME SUPPLY OR ACCESSORY, NOS: HCPCS

## 2024-01-22 PROCEDURE — G0296 VISIT TO DETERM LDCT ELIG: HCPCS

## 2024-01-22 PROCEDURE — 99214 OFFICE O/P EST MOD 30 MIN: CPT

## 2024-01-22 PROCEDURE — 3074F SYST BP LT 130 MM HG: CPT

## 2024-01-22 PROCEDURE — 1123F ACP DISCUSS/DSCN MKR DOCD: CPT

## 2024-01-22 ASSESSMENT — ENCOUNTER SYMPTOMS
COUGH: 1
RHINORRHEA: 0
CHEST TIGHTNESS: 0
SINUS PAIN: 0
SHORTNESS OF BREATH: 0
SINUS PRESSURE: 0
WHEEZING: 0

## 2024-01-22 NOTE — PATIENT INSTRUCTIONS
follow-up, he or she will help you understand what to do next.  After a lung cancer screening, you can go back to your usual activities right away.  A lung cancer screening test can't tell if you have lung cancer. If your results are positive, your doctor can't tell whether an abnormal finding is a harmless nodule, cancer, or something else without doing more tests.  What can you do to help prevent lung cancer?  Some lung cancers can't be prevented. But if you smoke, quitting smoking is the best step you can take to prevent lung cancer. If you want to quit, your doctor can recommend medicines or other ways to help.  Follow-up care is a key part of your treatment and safety. Be sure to make and go to all appointments, and call your doctor if you are having problems. It's also a good idea to know your test results and keep a list of the medicines you take.  Where can you learn more?  Go to https://www.[employer].net/patientEd and enter Q940 to learn more about \"Learning About Lung Cancer Screening.\"  Current as of: February 28, 2023               Content Version: 13.9  © 8052-2180 [employer]   Care instructions adapted under license by [employer]. If you have questions about a medical condition or this instruction, always ask your healthcare professional. [employer] disclaims any warranty or liability for your use of this information.

## 2024-01-24 ENCOUNTER — CARE COORDINATION (OUTPATIENT)
Dept: CARE COORDINATION | Age: 64
End: 2024-01-24

## 2024-01-24 NOTE — CARE COORDINATION
Dr. Solis, I have been working with Jaciel on Care Coordination program to provide support and education to help manage chronic conditions.  Pt has met those goals and all education has been completed with no new barriers noted.  I would like to graduate pt from Care Coordination at this time pending PCP approval.  Do you approve of this discharge?  Please advise. Thank you.

## 2024-01-31 RX ORDER — ISOSORBIDE MONONITRATE 30 MG/1
TABLET, EXTENDED RELEASE ORAL
Qty: 90 TABLET | Refills: 0 | Status: SHIPPED | OUTPATIENT
Start: 2024-01-31

## 2024-01-31 NOTE — TELEPHONE ENCOUNTER
Pt informed. SUBJECTIVE  Cindy Ramirez is 71 y.o. female  Uncorrected distance visual acuity was 20/20 -1 in the right eye and 20/20 in the left eye.   Chief Complaint   Patient presents with    Post-op Evaluation     Pt here for Yag OU follow up 12/27/2023 with Dr. Mitchell. Pt states she can see much better now. Pt denies pain and discomfort.           HPI     Post-op Evaluation     Additional comments: Pt here for Yag OU follow up 12/27/2023 with Dr. Mitchell. Pt states she can see much better now. Pt denies pain and   discomfort.            Comments    Hard of hearing   SH- Lost Son after 8/22 phaco, unexpectedly     Possible steroid response to Durezol  H/O SCL WEAR 60 YEARS  Phaco OD +12.5 SN60WF / CDE: 9.00 / 8/22/19   Phaco OS +10.5 SN60WF / CDE: 18.98 / 9/12/2019            Last edited by Deidre Tapia on 1/31/2024  1:51 PM.         Assessment /Plan :  1. Pseudophakia of both eyes         Good Vision after YAG OU.   No flare or Cell present OU   RTC 1 Years Full Exam.

## 2024-01-31 NOTE — TELEPHONE ENCOUNTER
The pharmacy is  requesting a refill of the below medication which has been pended for you:     Requested Prescriptions     Pending Prescriptions Disp Refills    isosorbide mononitrate (IMDUR) 30 MG extended release tablet [Pharmacy Med Name: ISOSORBIDE [place] ER 30MG *MONO 30 Tablet] 90 tablet 1     Sig: TAKE 1 TABLET BY MOUTH EVERY DAY       Last Appointment Date: 11/20/2023  Next Appointment Date: 3/20/2024    Allergies   Allergen Reactions    Zoloft [Sertraline Hcl] Other (See Comments)     Headaches, sweats, bad dreams

## 2024-02-02 RX ORDER — ESCITALOPRAM OXALATE 5 MG/1
5 TABLET ORAL DAILY
Qty: 90 TABLET | Refills: 0 | Status: SHIPPED | OUTPATIENT
Start: 2024-02-02

## 2024-02-02 NOTE — TELEPHONE ENCOUNTER
Date of last visit:  11/20/2023  Date of next visit:  3/20/2024    Requested Prescriptions     Pending Prescriptions Disp Refills    escitalopram (LEXAPRO) 5 MG tablet 90 tablet 1     Sig: Take 1 tablet by mouth daily

## 2024-02-08 ENCOUNTER — TELEPHONE (OUTPATIENT)
Dept: FAMILY MEDICINE CLINIC | Age: 64
End: 2024-02-08

## 2024-02-08 NOTE — TELEPHONE ENCOUNTER
Pt called req we fax last 2 office notes and copy of pt summary to Neurologist we did referral to Oct 2023.    Records faxed to    [phone]

## 2024-02-09 NOTE — PROGRESS NOTES
Negative.    Genitourinary: Negative.    Musculoskeletal: Negative.  Negative for arthralgias and back pain.   Skin: Negative.    Allergic/Immunologic: Negative.    Neurological: Negative.  Negative for dizziness and light-headedness.   Psychiatric/Behavioral: Negative.     All other systems reviewed and are negative.       Physical Exam:    BMI:  Body mass index is 35.17 kg/m².    Wt Readings from Last 3 Encounters:   02/12/24 114.4 kg (252 lb 3.2 oz)   01/22/24 115.9 kg (255 lb 9.6 oz)   01/08/24 107.5 kg (237 lb)     Weight lost 13 lbs over 2 years  Vitals: /62 (Site: Left Upper Arm, Position: Sitting, Cuff Size: Large Adult)   Pulse 64   Temp 97.8 °F (36.6 °C) (Oral)   Ht 1.803 m (5' 11\")   Wt 114.4 kg (252 lb 3.2 oz)   SpO2 91% Comment: r/a  BMI 35.17 kg/m²       Physical Exam  Constitutional:       Appearance: Normal appearance. He is normal weight.   HENT:      Head: Normocephalic and atraumatic.      Right Ear: External ear normal.      Left Ear: External ear normal.      Nose: Nose normal.   Eyes:      Extraocular Movements: Extraocular movements intact.      Conjunctiva/sclera: Conjunctivae normal.      Pupils: Pupils are equal, round, and reactive to light.   Pulmonary:      Effort: Pulmonary effort is normal.   Musculoskeletal:      Cervical back: Normal range of motion and neck supple.   Neurological:      General: No focal deficit present.      Mental Status: He is alert and oriented to person, place, and time.   Psychiatric:         Attention and Perception: Attention and perception normal.         Mood and Affect: Mood and affect normal.         Speech: Speech normal.         Behavior: Behavior normal. Behavior is cooperative.         Thought Content: Thought content normal.         Cognition and Memory: Cognition normal.         Judgment: Judgment normal.           ASSESSMENT/DIAGNOSIS     Diagnosis Orders   1. FAISAL on CPAP        2. Severe obesity (BMI 35.0-39.9) with comorbidity (HCC)

## 2024-02-12 ENCOUNTER — OFFICE VISIT (OUTPATIENT)
Dept: PULMONOLOGY | Age: 64
End: 2024-02-12
Payer: MEDICARE

## 2024-02-12 VITALS
DIASTOLIC BLOOD PRESSURE: 62 MMHG | SYSTOLIC BLOOD PRESSURE: 122 MMHG | HEART RATE: 64 BPM | OXYGEN SATURATION: 91 % | BODY MASS INDEX: 35.31 KG/M2 | HEIGHT: 71 IN | TEMPERATURE: 97.8 F | WEIGHT: 252.2 LBS

## 2024-02-12 DIAGNOSIS — G47.33 OSA ON CPAP: Primary | ICD-10-CM

## 2024-02-12 DIAGNOSIS — E66.01 SEVERE OBESITY (BMI 35.0-39.9) WITH COMORBIDITY (HCC): ICD-10-CM

## 2024-02-12 DIAGNOSIS — G47.09 OTHER INSOMNIA: ICD-10-CM

## 2024-02-12 DIAGNOSIS — J44.9 STAGE 3 SEVERE COPD BY GOLD CLASSIFICATION (HCC): ICD-10-CM

## 2024-02-12 PROCEDURE — 3074F SYST BP LT 130 MM HG: CPT | Performed by: PHYSICIAN ASSISTANT

## 2024-02-12 PROCEDURE — 3078F DIAST BP <80 MM HG: CPT | Performed by: PHYSICIAN ASSISTANT

## 2024-02-12 PROCEDURE — 99213 OFFICE O/P EST LOW 20 MIN: CPT | Performed by: PHYSICIAN ASSISTANT

## 2024-02-12 PROCEDURE — 1123F ACP DISCUSS/DSCN MKR DOCD: CPT | Performed by: PHYSICIAN ASSISTANT

## 2024-02-23 ENCOUNTER — HOSPITAL ENCOUNTER (EMERGENCY)
Age: 64
Discharge: HOME | End: 2024-02-23
Payer: MEDICARE

## 2024-02-23 VITALS
DIASTOLIC BLOOD PRESSURE: 76 MMHG | TEMPERATURE: 98.5 F | OXYGEN SATURATION: 93 % | HEIGHT: 71 IN | HEART RATE: 78 BPM | SYSTOLIC BLOOD PRESSURE: 134 MMHG | BODY MASS INDEX: 32.9 KG/M2 | WEIGHT: 235 LBS | RESPIRATION RATE: 20 BRPM

## 2024-02-23 DIAGNOSIS — B34.9 VIRAL ILLNESS: Primary | ICD-10-CM

## 2024-02-23 PROCEDURE — 99213 OFFICE O/P EST LOW 20 MIN: CPT

## 2024-02-23 PROCEDURE — 99212 OFFICE O/P EST SF 10 MIN: CPT

## 2024-02-23 ASSESSMENT — LIFESTYLE VARIABLES
HOW OFTEN DO YOU HAVE A DRINK CONTAINING ALCOHOL: NEVER
HOW MANY STANDARD DRINKS CONTAINING ALCOHOL DO YOU HAVE ON A TYPICAL DAY: PATIENT DOES NOT DRINK

## 2024-02-23 ASSESSMENT — ENCOUNTER SYMPTOMS
SORE THROAT: 1
VOMITING: 0
EYE DISCHARGE: 0
TROUBLE SWALLOWING: 0
COUGH: 1
RHINORRHEA: 0
SHORTNESS OF BREATH: 0
DIARRHEA: 0
NAUSEA: 0
EYE REDNESS: 0

## 2024-02-23 ASSESSMENT — PAIN - FUNCTIONAL ASSESSMENT: PAIN_FUNCTIONAL_ASSESSMENT: NONE - DENIES PAIN

## 2024-02-23 NOTE — ED NOTES
Pt presents to [facility] for c/o sore throat, cough, fever, headache, and body aches x 5 days. Pt declined Flu testing at this time.     Victorino Bright, ELKINN  02/23/24 5943

## 2024-02-23 NOTE — ED PROVIDER NOTES
[facility] URGENT CARE  Urgent Care Encounter      CHIEF COMPLAINT       Chief Complaint   Patient presents with    Fever    Headache    Generalized Body Aches    Cough    Pharyngitis       Nurses Notes reviewed and I agree except as noted in the HPI.  HISTORY OF PRESENT ILLNESS   Andrei Munoz is a 64 y.o. male who presents urgent care for flulike symptoms.  Patient reports onset of symptoms 5 to 6 days ago.  He states his symptoms include fever, body aches, sore throat, cough, and headache.  He has been taking care of his symptoms with Tylenol and ibuprofen, Mucinex Flonase, and NyQuil.  He reports he figured he better come get checked out as today he woke up and he figured he be better and symptoms continue.    REVIEW OF SYSTEMS     Review of Systems   Constitutional:  Positive for fever. Negative for chills, diaphoresis and fatigue.   HENT:  Positive for sore throat. Negative for congestion, ear pain, rhinorrhea and trouble swallowing.    Eyes:  Negative for discharge and redness.   Respiratory:  Positive for cough. Negative for shortness of breath.    Cardiovascular:  Negative for chest pain.   Gastrointestinal:  Negative for diarrhea, nausea and vomiting.   Genitourinary:  Negative for decreased urine volume.   Musculoskeletal:  Positive for myalgias. Negative for neck pain and neck stiffness.   Skin:  Negative for rash.   Neurological:  Positive for headaches.   Hematological:  Negative for adenopathy.   Psychiatric/Behavioral:  Negative for sleep disturbance.        PAST MEDICAL HISTORY         Diagnosis Date    CHF (congestive heart failure) ([facility])     COPD (chronic obstructive pulmonary disease) ([facility])     Coronary artery disease involving native coronary artery of native heart without angina pectoris     Depression     Diabetes mellitus type 2, diet-controlled ([facility]) 6/1/2018    GERD (gastroesophageal reflux disease) 3/21/2012    Hernia     Hx of blood clots 1990's    right knee due to

## 2024-02-28 DIAGNOSIS — E11.65 UNCONTROLLED TYPE 2 DIABETES MELLITUS WITH HYPERGLYCEMIA (HCC): ICD-10-CM

## 2024-02-29 NOTE — TELEPHONE ENCOUNTER
Date of last visit:  11/20/2023  Date of next visit:  3/20/2024    Requested Prescriptions     Pending Prescriptions Disp Refills    JARDIANCE 25 MG tablet [Pharmacy Med Name: JARDIANCE 25 MG TABLET 25 Tablet] 30 tablet 5     Sig: TAKE 1 TABLET BY MOUTH DAILY

## 2024-03-01 RX ORDER — EMPAGLIFLOZIN 25 MG/1
25 TABLET, FILM COATED ORAL DAILY
Qty: 30 TABLET | Refills: 3 | Status: SHIPPED | OUTPATIENT
Start: 2024-03-01

## 2024-03-07 ENCOUNTER — TELEPHONE (OUTPATIENT)
Dept: FAMILY MEDICINE CLINIC | Age: 64
End: 2024-03-07

## 2024-03-07 NOTE — TELEPHONE ENCOUNTER
Leda from [facility] called and wanted to make Dr Solis aware of pt's elevated blood sugar from his labs yesterday. I spoke with the pt to see if he was fasting before he had his blood work and he said that he was fasting for 12 hours.

## 2024-03-08 NOTE — TELEPHONE ENCOUNTER
Please let him know that we need to be careful with fruits as they also have sugar, it is natural sugar but it will still increased her BS

## 2024-03-08 NOTE — TELEPHONE ENCOUNTER
Yes he is taking his medications. He said that the day before he had two pounds of grapes, oranges and a couple apples.

## 2024-03-20 ENCOUNTER — HOSPITAL ENCOUNTER (EMERGENCY)
Age: 64
Discharge: HOME | End: 2024-03-20
Payer: MEDICARE

## 2024-03-20 VITALS
OXYGEN SATURATION: 92 % | SYSTOLIC BLOOD PRESSURE: 121 MMHG | DIASTOLIC BLOOD PRESSURE: 69 MMHG | TEMPERATURE: 98 F | HEART RATE: 82 BPM | RESPIRATION RATE: 20 BRPM

## 2024-03-20 DIAGNOSIS — J40 BRONCHITIS: Primary | ICD-10-CM

## 2024-03-20 LAB
FLUAV AG SPEC QL: NEGATIVE
FLUBV AG SPEC QL: NEGATIVE
S PYO AG THROAT QL: NEGATIVE
SARS-COV-2 RDRP RESP QL NAA+PROBE: NOT  DETECTED

## 2024-03-20 PROCEDURE — 87804 INFLUENZA ASSAY W/OPTIC: CPT

## 2024-03-20 PROCEDURE — 87651 STREP A DNA AMP PROBE: CPT

## 2024-03-20 PROCEDURE — 87635 SARS-COV-2 COVID-19 AMP PRB: CPT

## 2024-03-20 PROCEDURE — 99213 OFFICE O/P EST LOW 20 MIN: CPT | Performed by: EMERGENCY MEDICINE

## 2024-03-20 PROCEDURE — 99213 OFFICE O/P EST LOW 20 MIN: CPT

## 2024-03-20 RX ORDER — BENZONATATE 200 MG/1
200 CAPSULE ORAL 3 TIMES DAILY PRN
Qty: 30 CAPSULE | Refills: 0 | Status: SHIPPED | OUTPATIENT
Start: 2024-03-20 | End: 2024-03-27

## 2024-03-20 RX ORDER — PREDNISONE 20 MG/1
40 TABLET ORAL DAILY
Qty: 5 TABLET | Refills: 0 | Status: SHIPPED | OUTPATIENT
Start: 2024-03-20 | End: 2024-03-20

## 2024-03-20 RX ORDER — PREDNISONE 20 MG/1
40 TABLET ORAL DAILY
Qty: 10 TABLET | Refills: 0 | Status: SHIPPED | OUTPATIENT
Start: 2024-03-20 | End: 2024-03-25

## 2024-03-20 ASSESSMENT — PAIN - FUNCTIONAL ASSESSMENT
PAIN_FUNCTIONAL_ASSESSMENT: ACTIVITIES ARE NOT PREVENTED
PAIN_FUNCTIONAL_ASSESSMENT: 0-10

## 2024-03-20 ASSESSMENT — PAIN DESCRIPTION - PAIN TYPE: TYPE: ACUTE PAIN

## 2024-03-20 ASSESSMENT — ENCOUNTER SYMPTOMS
DIARRHEA: 0
SINUS PRESSURE: 0
NAUSEA: 0
CHEST TIGHTNESS: 0
SINUS PAIN: 0
SORE THROAT: 1
RHINORRHEA: 1
SHORTNESS OF BREATH: 1
WHEEZING: 0
VOMITING: 0
COUGH: 1

## 2024-03-20 ASSESSMENT — PAIN DESCRIPTION - FREQUENCY: FREQUENCY: INTERMITTENT

## 2024-03-20 ASSESSMENT — PAIN DESCRIPTION - LOCATION: LOCATION: HEAD

## 2024-03-20 ASSESSMENT — PAIN SCALES - GENERAL: PAINLEVEL_OUTOF10: 3

## 2024-03-20 ASSESSMENT — PAIN DESCRIPTION - ONSET: ONSET: PROGRESSIVE

## 2024-03-20 ASSESSMENT — PAIN DESCRIPTION - DESCRIPTORS: DESCRIPTORS: PRESSURE

## 2024-03-20 NOTE — ED PROVIDER NOTES
[facility] URGENT CARE  Urgent Care Encounter       CHIEF COMPLAINT       Chief Complaint   Patient presents with    Cough    Pharyngitis    Nasal Congestion    Fever       Nurses Notes reviewed and I agree except as noted in the HPI.  HISTORY OF PRESENT ILLNESS   Andrei Munoz is a 64 y.o. male who presents with complaints of cough, mild shortness of breath, rhinorrhea, fever.  Symptoms x 3 days.  Taking DayQuil and NyQuil with some improvement.  Patient does have a history of COPD/CHF.  States no sputum production.  No recent weight gain.  No leg swelling.  No chest pain.  He has been using his albuterol inhaler with some improvement        HPI    REVIEW OF SYSTEMS     Review of Systems   Constitutional:  Positive for fever. Negative for activity change and fatigue.   HENT:  Positive for congestion, rhinorrhea and sore throat. Negative for sinus pressure and sinus pain.    Respiratory:  Positive for cough and shortness of breath (mild). Negative for chest tightness and wheezing.    Cardiovascular:  Negative for chest pain and leg swelling.   Gastrointestinal:  Negative for diarrhea, nausea and vomiting.   Neurological:  Negative for dizziness and headaches.       PAST MEDICAL HISTORY         Diagnosis Date    CHF (congestive heart failure) ([facility])     COPD (chronic obstructive pulmonary disease) ([facility])     Coronary artery disease involving native coronary artery of native heart without angina pectoris     Depression     Diabetes mellitus type 2, diet-controlled ([facility]) 6/1/2018    GERD (gastroesophageal reflux disease) 3/21/2012    Hernia     Hx of blood clots 1990's    right knee due to injury    Hx of cocaine abuse ([facility])     Hyperglycemia 09/09    Hyperlipidemia     Hypertension     FAISAL on CPAP     Osteoarthritis 11/07    S/P CABG x 2 07/06/2016    S/P PTCA: 1/15/2018: Stent diagonal branch Xience 3.0 x 12 mm. 1/15/2018    1/15/2018: Stent diagonal branch Xience 3.0 x 12 mm. Dr. Cely Crowell  acute bronchitis.  He is currently without respiratory distress.  States he is slightly more short of breath than his typical.  O2 saturation 92 to 93%.  Will place patient on prednisone for which is likely bronchitis.  Advised to use his albuterol as needed.  Tessalon for cough.  Follow-up family physician return here if any worsening in symptoms.  Return if symptoms do not improve in additional 3 to 4 days        PATIENT REFERRED TO:  Malka Solis MD  [address] [address] [address]      DISCHARGE MEDICATIONS:  Discharge Medication List as of 3/20/2024  3:47 PM        START taking these medications    Details   benzonatate (TESSALON) 200 MG capsule Take 1 capsule by mouth 3 times daily as needed for Cough, Disp-30 capsule, R-0Normal             Discharge Medication List as of 3/20/2024  3:47 PM        STOP taking these medications       losartan (COZAAR) 25 MG tablet Comments:   Reason for Stopping:               Discharge Medication List as of 3/20/2024  3:47 PM        CONTINUE these medications which have CHANGED    Details   predniSONE (DELTASONE) 20 MG tablet Take 2 tablets by mouth daily for 5 days, Disp-10 tablet, R-0Normal             ASHLEIGH Appiah CNP    (Please note that portions of this note were completed with a voice recognition program. Efforts were made to edit the dictations but occasionally words are mis-transcribed.)           Esau Mcmahno, ASHLEIGH - CNP  03/20/24 0540

## 2024-03-20 NOTE — DISCHARGE INSTRUCTIONS
Prednisone as directed    Drink plenty of water    Tessalon as directed as needed for cough    You may continue DayQuil/NyQuil if needed    Return for new or worsening

## 2024-03-25 ENCOUNTER — HOSPITAL ENCOUNTER (INPATIENT)
Age: 64
LOS: 2 days | Discharge: HOME HEALTH | DRG: 193 | End: 2024-03-27
Attending: NURSE PRACTITIONER | Admitting: INTERNAL MEDICINE
Payer: MEDICARE

## 2024-03-25 ENCOUNTER — APPOINTMENT (OUTPATIENT)
Dept: GENERAL RADIOLOGY | Age: 64
DRG: 193 | End: 2024-03-25
Payer: MEDICARE

## 2024-03-25 ENCOUNTER — TELEPHONE (OUTPATIENT)
Dept: FAMILY MEDICINE CLINIC | Age: 64
End: 2024-03-25

## 2024-03-25 DIAGNOSIS — J18.9 PNEUMONIA DUE TO INFECTIOUS ORGANISM, UNSPECIFIED LATERALITY, UNSPECIFIED PART OF LUNG: ICD-10-CM

## 2024-03-25 DIAGNOSIS — J96.01 ACUTE RESPIRATORY FAILURE WITH HYPOXIA (HCC): Primary | ICD-10-CM

## 2024-03-25 DIAGNOSIS — E11.65 POORLY CONTROLLED DIABETES MELLITUS (HCC): ICD-10-CM

## 2024-03-25 LAB
ALBUMIN SERPL BCG-MCNC: 4.5 G/DL (ref 3.5–5.1)
ALP SERPL-CCNC: 166 U/L (ref 38–126)
ALT SERPL W/O P-5'-P-CCNC: 15 U/L (ref 11–66)
ANION GAP SERPL CALC-SCNC: 20 MEQ/L (ref 8–16)
ANION GAP SERPL CALC-SCNC: 20 MEQ/L (ref 8–16)
AST SERPL-CCNC: 12 U/L (ref 5–40)
B-OH-BUTYR SERPL-MSCNC: 12.9 MG/DL (ref 0.2–2.81)
BASE EXCESS BLDA CALC-SCNC: 4.3 MMOL/L (ref -2–3)
BASOPHILS ABSOLUTE: 0.1 THOU/MM3 (ref 0–0.1)
BASOPHILS NFR BLD AUTO: 0.6 %
BILIRUB SERPL-MCNC: 0.6 MG/DL (ref 0.3–1.2)
BILIRUB UR QL STRIP.AUTO: NEGATIVE
BUN SERPL-MCNC: 24 MG/DL (ref 7–22)
BUN SERPL-MCNC: 26 MG/DL (ref 7–22)
CALCIUM SERPL-MCNC: 9.6 MG/DL (ref 8.5–10.5)
CALCIUM SERPL-MCNC: 9.9 MG/DL (ref 8.5–10.5)
CHARACTER UR: CLEAR
CHLORIDE SERPL-SCNC: 89 MEQ/L (ref 98–111)
CHLORIDE SERPL-SCNC: 92 MEQ/L (ref 98–111)
CO2 SERPL-SCNC: 21 MEQ/L (ref 23–33)
CO2 SERPL-SCNC: 26 MEQ/L (ref 23–33)
COLLECTED BY:: ABNORMAL
COLOR: YELLOW
CREAT SERPL-MCNC: 0.8 MG/DL (ref 0.4–1.2)
CREAT SERPL-MCNC: 1 MG/DL (ref 0.4–1.2)
DEPRECATED RDW RBC AUTO: 49.2 FL (ref 35–45)
EOSINOPHIL NFR BLD AUTO: 0.3 %
EOSINOPHILS ABSOLUTE: 0 THOU/MM3 (ref 0–0.4)
ERYTHROCYTE [DISTWIDTH] IN BLOOD BY AUTOMATED COUNT: 14.3 % (ref 11.5–14.5)
FLUAV RNA RESP QL NAA+PROBE: NOT DETECTED
FLUBV RNA RESP QL NAA+PROBE: NOT DETECTED
GFR SERPL CREATININE-BSD FRML MDRD: 83 ML/MIN/1.73M2
GFR SERPL CREATININE-BSD FRML MDRD: > 90 ML/MIN/1.73M2
GLUCOSE BLD STRIP.AUTO-MCNC: 387 MG/DL (ref 70–108)
GLUCOSE BLD STRIP.AUTO-MCNC: 391 MG/DL (ref 70–108)
GLUCOSE BLD STRIP.AUTO-MCNC: 560 MG/DL (ref 70–108)
GLUCOSE SERPL-MCNC: 444 MG/DL (ref 70–108)
GLUCOSE SERPL-MCNC: 529 MG/DL (ref 70–108)
GLUCOSE UR QL STRIP.AUTO: >= 1000 MG/DL
HCO3 BLDA-SCNC: 30 MMOL/L (ref 23–28)
HCT VFR BLD AUTO: 48.5 % (ref 42–52)
HGB BLD-MCNC: 16.6 GM/DL (ref 14–18)
HGB UR QL STRIP.AUTO: NEGATIVE
IMM GRANULOCYTES # BLD AUTO: 0.11 THOU/MM3 (ref 0–0.07)
IMM GRANULOCYTES NFR BLD AUTO: 1 %
KETONES UR QL STRIP.AUTO: NEGATIVE
LACTATE SERPL-SCNC: 1.7 MMOL/L (ref 0.5–2)
LYMPHOCYTES ABSOLUTE: 1.4 THOU/MM3 (ref 1–4.8)
LYMPHOCYTES NFR BLD AUTO: 12.9 %
MCH RBC QN AUTO: 32.2 PG (ref 26–33)
MCHC RBC AUTO-ENTMCNC: 34.2 GM/DL (ref 32.2–35.5)
MCV RBC AUTO: 94.2 FL (ref 80–94)
MONOCYTES ABSOLUTE: 0.9 THOU/MM3 (ref 0.4–1.3)
MONOCYTES NFR BLD AUTO: 8.5 %
NEUTROPHILS NFR BLD AUTO: 76.7 %
NITRITE UR QL STRIP: NEGATIVE
NRBC BLD AUTO-RTO: 0 /100 WBC
NT-PROBNP SERPL IA-MCNC: 303.7 PG/ML (ref 0–124)
OSMOLALITY SERPL CALC.SUM OF ELEC: 298.8 MOSMOL/KG (ref 275–300)
PCO2 TEMP ADJ BLDMV: 44 MMHG (ref 41–51)
PH BLDMV: 7.43 [PH] (ref 7.31–7.41)
PH UR STRIP.AUTO: 7 [PH] (ref 5–9)
PLATELET # BLD AUTO: 270 THOU/MM3 (ref 130–400)
PMV BLD AUTO: 11.5 FL (ref 9.4–12.4)
PO2 BLDMV: 29 MMHG (ref 25–40)
POTASSIUM SERPL-SCNC: 3.9 MEQ/L (ref 3.5–5.2)
POTASSIUM SERPL-SCNC: 4.2 MEQ/L (ref 3.5–5.2)
PROCALCITONIN SERPL IA-MCNC: 0.07 NG/ML (ref 0.01–0.09)
PROT SERPL-MCNC: 8.4 G/DL (ref 6.1–8)
PROT UR STRIP.AUTO-MCNC: NEGATIVE MG/DL
RBC # BLD AUTO: 5.15 MILL/MM3 (ref 4.7–6.1)
SAO2 % BLDMV: 58 %
SARS-COV-2 RNA RESP QL NAA+PROBE: NOT DETECTED
SEGMENTED NEUTROPHILS ABSOLUTE COUNT: 8.1 THOU/MM3 (ref 1.8–7.7)
SODIUM SERPL-SCNC: 133 MEQ/L (ref 135–145)
SODIUM SERPL-SCNC: 135 MEQ/L (ref 135–145)
SP GR UR REFRACT.AUTO: 1.02 (ref 1–1.03)
TROPONIN, HIGH SENSITIVITY: 27 NG/L (ref 0–12)
TSH SERPL DL<=0.005 MIU/L-ACNC: 0.87 UIU/ML (ref 0.4–4.2)
UROBILINOGEN, URINE: 0.2 EU/DL (ref 0–1)
WBC # BLD AUTO: 10.6 THOU/MM3 (ref 4.8–10.8)
WBC #/AREA URNS HPF: NEGATIVE /[HPF]

## 2024-03-25 PROCEDURE — 83880 ASSAY OF NATRIURETIC PEPTIDE: CPT

## 2024-03-25 PROCEDURE — 36415 COLL VENOUS BLD VENIPUNCTURE: CPT

## 2024-03-25 PROCEDURE — 84484 ASSAY OF TROPONIN QUANT: CPT

## 2024-03-25 PROCEDURE — 82803 BLOOD GASES ANY COMBINATION: CPT

## 2024-03-25 PROCEDURE — 83605 ASSAY OF LACTIC ACID: CPT

## 2024-03-25 PROCEDURE — 99285 EMERGENCY DEPT VISIT HI MDM: CPT

## 2024-03-25 PROCEDURE — 6360000002 HC RX W HCPCS: Performed by: NURSE PRACTITIONER

## 2024-03-25 PROCEDURE — 2580000003 HC RX 258: Performed by: NURSE PRACTITIONER

## 2024-03-25 PROCEDURE — 99223 1ST HOSP IP/OBS HIGH 75: CPT | Performed by: INTERNAL MEDICINE

## 2024-03-25 PROCEDURE — 80053 COMPREHEN METABOLIC PANEL: CPT

## 2024-03-25 PROCEDURE — 82948 REAGENT STRIP/BLOOD GLUCOSE: CPT

## 2024-03-25 PROCEDURE — 6370000000 HC RX 637 (ALT 250 FOR IP): Performed by: NURSE PRACTITIONER

## 2024-03-25 PROCEDURE — 84145 PROCALCITONIN (PCT): CPT

## 2024-03-25 PROCEDURE — 2700000000 HC OXYGEN THERAPY PER DAY

## 2024-03-25 PROCEDURE — 6370000000 HC RX 637 (ALT 250 FOR IP): Performed by: STUDENT IN AN ORGANIZED HEALTH CARE EDUCATION/TRAINING PROGRAM

## 2024-03-25 PROCEDURE — 94761 N-INVAS EAR/PLS OXIMETRY MLT: CPT

## 2024-03-25 PROCEDURE — 71046 X-RAY EXAM CHEST 2 VIEWS: CPT

## 2024-03-25 PROCEDURE — 1200000003 HC TELEMETRY R&B

## 2024-03-25 PROCEDURE — 87636 SARSCOV2 & INF A&B AMP PRB: CPT

## 2024-03-25 PROCEDURE — 93005 ELECTROCARDIOGRAM TRACING: CPT | Performed by: NURSE PRACTITIONER

## 2024-03-25 PROCEDURE — 96375 TX/PRO/DX INJ NEW DRUG ADDON: CPT

## 2024-03-25 PROCEDURE — 2580000003 HC RX 258: Performed by: STUDENT IN AN ORGANIZED HEALTH CARE EDUCATION/TRAINING PROGRAM

## 2024-03-25 PROCEDURE — 82010 KETONE BODYS QUAN: CPT

## 2024-03-25 PROCEDURE — 96365 THER/PROPH/DIAG IV INF INIT: CPT

## 2024-03-25 PROCEDURE — 84443 ASSAY THYROID STIM HORMONE: CPT

## 2024-03-25 PROCEDURE — 94640 AIRWAY INHALATION TREATMENT: CPT

## 2024-03-25 PROCEDURE — 6360000002 HC RX W HCPCS: Performed by: STUDENT IN AN ORGANIZED HEALTH CARE EDUCATION/TRAINING PROGRAM

## 2024-03-25 PROCEDURE — 81003 URINALYSIS AUTO W/O SCOPE: CPT

## 2024-03-25 PROCEDURE — 85025 COMPLETE CBC W/AUTO DIFF WBC: CPT

## 2024-03-25 RX ORDER — ALBUTEROL SULFATE 2.5 MG/3ML
5 SOLUTION RESPIRATORY (INHALATION) EVERY 4 HOURS PRN
Status: DISCONTINUED | OUTPATIENT
Start: 2024-03-25 | End: 2024-03-25

## 2024-03-25 RX ORDER — IBUPROFEN 600 MG/1
1 TABLET ORAL PRN
Status: DISCONTINUED | OUTPATIENT
Start: 2024-03-25 | End: 2024-03-27 | Stop reason: HOSPADM

## 2024-03-25 RX ORDER — ENOXAPARIN SODIUM 100 MG/ML
30 INJECTION SUBCUTANEOUS 2 TIMES DAILY
Status: DISCONTINUED | OUTPATIENT
Start: 2024-03-25 | End: 2024-03-27 | Stop reason: HOSPADM

## 2024-03-25 RX ORDER — MAGNESIUM SULFATE IN WATER 40 MG/ML
2000 INJECTION, SOLUTION INTRAVENOUS PRN
Status: DISCONTINUED | OUTPATIENT
Start: 2024-03-25 | End: 2024-03-27 | Stop reason: HOSPADM

## 2024-03-25 RX ORDER — METOPROLOL SUCCINATE 50 MG/1
50 TABLET, EXTENDED RELEASE ORAL
Status: DISCONTINUED | OUTPATIENT
Start: 2024-03-25 | End: 2024-03-27 | Stop reason: HOSPADM

## 2024-03-25 RX ORDER — ACETAMINOPHEN 325 MG/1
650 TABLET ORAL EVERY 6 HOURS PRN
Status: DISCONTINUED | OUTPATIENT
Start: 2024-03-25 | End: 2024-03-27 | Stop reason: HOSPADM

## 2024-03-25 RX ORDER — SODIUM CHLORIDE 0.9 % (FLUSH) 0.9 %
10 SYRINGE (ML) INJECTION PRN
Status: DISCONTINUED | OUTPATIENT
Start: 2024-03-25 | End: 2024-03-27 | Stop reason: HOSPADM

## 2024-03-25 RX ORDER — INSULIN LISPRO 100 [IU]/ML
0-16 INJECTION, SOLUTION INTRAVENOUS; SUBCUTANEOUS
Status: DISCONTINUED | OUTPATIENT
Start: 2024-03-25 | End: 2024-03-27 | Stop reason: HOSPADM

## 2024-03-25 RX ORDER — TRAZODONE HYDROCHLORIDE 100 MG/1
100 TABLET ORAL NIGHTLY
Status: DISCONTINUED | OUTPATIENT
Start: 2024-03-25 | End: 2024-03-27 | Stop reason: HOSPADM

## 2024-03-25 RX ORDER — SODIUM CHLORIDE 0.9 % (FLUSH) 0.9 %
5-40 SYRINGE (ML) INJECTION EVERY 12 HOURS SCHEDULED
Status: DISCONTINUED | OUTPATIENT
Start: 2024-03-25 | End: 2024-03-27 | Stop reason: HOSPADM

## 2024-03-25 RX ORDER — IPRATROPIUM BROMIDE AND ALBUTEROL SULFATE 2.5; .5 MG/3ML; MG/3ML
1 SOLUTION RESPIRATORY (INHALATION)
Status: DISCONTINUED | OUTPATIENT
Start: 2024-03-25 | End: 2024-03-25

## 2024-03-25 RX ORDER — TAMSULOSIN HYDROCHLORIDE 0.4 MG/1
0.4 CAPSULE ORAL DAILY
Status: DISCONTINUED | OUTPATIENT
Start: 2024-03-26 | End: 2024-03-27 | Stop reason: HOSPADM

## 2024-03-25 RX ORDER — DEXTROSE MONOHYDRATE 100 MG/ML
INJECTION, SOLUTION INTRAVENOUS CONTINUOUS PRN
Status: DISCONTINUED | OUTPATIENT
Start: 2024-03-25 | End: 2024-03-27 | Stop reason: HOSPADM

## 2024-03-25 RX ORDER — POTASSIUM CHLORIDE 20 MEQ/1
40 TABLET, EXTENDED RELEASE ORAL PRN
Status: DISCONTINUED | OUTPATIENT
Start: 2024-03-25 | End: 2024-03-27 | Stop reason: HOSPADM

## 2024-03-25 RX ORDER — ACETAMINOPHEN 650 MG/1
650 SUPPOSITORY RECTAL EVERY 6 HOURS PRN
Status: DISCONTINUED | OUTPATIENT
Start: 2024-03-25 | End: 2024-03-27 | Stop reason: HOSPADM

## 2024-03-25 RX ORDER — INSULIN LISPRO 100 [IU]/ML
8 INJECTION, SOLUTION INTRAVENOUS; SUBCUTANEOUS ONCE
Status: COMPLETED | OUTPATIENT
Start: 2024-03-25 | End: 2024-03-25

## 2024-03-25 RX ORDER — ALBUTEROL SULFATE 90 UG/1
2 AEROSOL, METERED RESPIRATORY (INHALATION) EVERY 4 HOURS PRN
Status: DISCONTINUED | OUTPATIENT
Start: 2024-03-25 | End: 2024-03-27 | Stop reason: HOSPADM

## 2024-03-25 RX ORDER — BUMETANIDE 1 MG/1
2 TABLET ORAL 2 TIMES DAILY
Status: DISCONTINUED | OUTPATIENT
Start: 2024-03-25 | End: 2024-03-27 | Stop reason: HOSPADM

## 2024-03-25 RX ORDER — ATORVASTATIN CALCIUM 40 MG/1
40 TABLET, FILM COATED ORAL
Status: DISCONTINUED | OUTPATIENT
Start: 2024-03-25 | End: 2024-03-27 | Stop reason: HOSPADM

## 2024-03-25 RX ORDER — POLYETHYLENE GLYCOL 3350 17 G/17G
17 POWDER, FOR SOLUTION ORAL DAILY PRN
Status: DISCONTINUED | OUTPATIENT
Start: 2024-03-25 | End: 2024-03-27 | Stop reason: HOSPADM

## 2024-03-25 RX ORDER — CALCIUM GLUCONATE 20 MG/ML
2000 INJECTION, SOLUTION INTRAVENOUS PRN
Status: DISCONTINUED | OUTPATIENT
Start: 2024-03-25 | End: 2024-03-27 | Stop reason: HOSPADM

## 2024-03-25 RX ORDER — PANTOPRAZOLE SODIUM 40 MG/1
40 TABLET, DELAYED RELEASE ORAL
Status: DISCONTINUED | OUTPATIENT
Start: 2024-03-26 | End: 2024-03-27 | Stop reason: HOSPADM

## 2024-03-25 RX ORDER — PRAMIPEXOLE DIHYDROCHLORIDE 0.25 MG/1
0.12 TABLET ORAL NIGHTLY
Status: DISCONTINUED | OUTPATIENT
Start: 2024-03-25 | End: 2024-03-27 | Stop reason: HOSPADM

## 2024-03-25 RX ORDER — SODIUM CHLORIDE 9 MG/ML
INJECTION, SOLUTION INTRAVENOUS PRN
Status: DISCONTINUED | OUTPATIENT
Start: 2024-03-25 | End: 2024-03-27 | Stop reason: HOSPADM

## 2024-03-25 RX ORDER — POTASSIUM CHLORIDE 7.45 MG/ML
10 INJECTION INTRAVENOUS PRN
Status: DISCONTINUED | OUTPATIENT
Start: 2024-03-25 | End: 2024-03-27 | Stop reason: HOSPADM

## 2024-03-25 RX ORDER — ONDANSETRON 4 MG/1
4 TABLET, ORALLY DISINTEGRATING ORAL EVERY 8 HOURS PRN
Status: DISCONTINUED | OUTPATIENT
Start: 2024-03-25 | End: 2024-03-27 | Stop reason: HOSPADM

## 2024-03-25 RX ORDER — ESCITALOPRAM OXALATE 10 MG/1
5 TABLET ORAL DAILY
Status: DISCONTINUED | OUTPATIENT
Start: 2024-03-25 | End: 2024-03-27 | Stop reason: HOSPADM

## 2024-03-25 RX ORDER — INSULIN LISPRO 100 [IU]/ML
0-4 INJECTION, SOLUTION INTRAVENOUS; SUBCUTANEOUS NIGHTLY
Status: DISCONTINUED | OUTPATIENT
Start: 2024-03-25 | End: 2024-03-27 | Stop reason: HOSPADM

## 2024-03-25 RX ORDER — 0.9 % SODIUM CHLORIDE 0.9 %
1000 INTRAVENOUS SOLUTION INTRAVENOUS ONCE
Status: COMPLETED | OUTPATIENT
Start: 2024-03-25 | End: 2024-03-25

## 2024-03-25 RX ORDER — ISOSORBIDE MONONITRATE 30 MG/1
30 TABLET, EXTENDED RELEASE ORAL
Status: DISCONTINUED | OUTPATIENT
Start: 2024-03-25 | End: 2024-03-27 | Stop reason: HOSPADM

## 2024-03-25 RX ORDER — ONDANSETRON 2 MG/ML
4 INJECTION INTRAMUSCULAR; INTRAVENOUS EVERY 6 HOURS PRN
Status: DISCONTINUED | OUTPATIENT
Start: 2024-03-25 | End: 2024-03-27 | Stop reason: HOSPADM

## 2024-03-25 RX ORDER — VALSARTAN 80 MG/1
80 TABLET ORAL DAILY
Status: DISCONTINUED | OUTPATIENT
Start: 2024-03-25 | End: 2024-03-27 | Stop reason: HOSPADM

## 2024-03-25 RX ORDER — INSULIN GLARGINE 100 [IU]/ML
10 INJECTION, SOLUTION SUBCUTANEOUS
Status: COMPLETED | OUTPATIENT
Start: 2024-03-25 | End: 2024-03-25

## 2024-03-25 RX ORDER — ALLOPURINOL 300 MG/1
300 TABLET ORAL DAILY
Status: DISCONTINUED | OUTPATIENT
Start: 2024-03-26 | End: 2024-03-27 | Stop reason: HOSPADM

## 2024-03-25 RX ADMIN — INSULIN GLARGINE 10 UNITS: 100 INJECTION, SOLUTION SUBCUTANEOUS at 15:26

## 2024-03-25 RX ADMIN — BUMETANIDE 2 MG: 1 TABLET ORAL at 16:34

## 2024-03-25 RX ADMIN — IPRATROPIUM BROMIDE AND ALBUTEROL SULFATE 1 DOSE: .5; 3 SOLUTION RESPIRATORY (INHALATION) at 13:22

## 2024-03-25 RX ADMIN — ESCITALOPRAM OXALATE 5 MG: 10 TABLET ORAL at 16:34

## 2024-03-25 RX ADMIN — ENOXAPARIN SODIUM 30 MG: 100 INJECTION SUBCUTANEOUS at 20:04

## 2024-03-25 RX ADMIN — INSULIN LISPRO 8 UNITS: 100 INJECTION, SOLUTION INTRAVENOUS; SUBCUTANEOUS at 19:00

## 2024-03-25 RX ADMIN — VALSARTAN 80 MG: 80 TABLET, FILM COATED ORAL at 20:04

## 2024-03-25 RX ADMIN — PRAMIPEXOLE DIHYDROCHLORIDE 0.12 MG: 0.25 TABLET ORAL at 20:02

## 2024-03-25 RX ADMIN — WATER 125 MG: 1 INJECTION INTRAMUSCULAR; INTRAVENOUS; SUBCUTANEOUS at 12:33

## 2024-03-25 RX ADMIN — TICAGRELOR 90 MG: 90 TABLET ORAL at 20:04

## 2024-03-25 RX ADMIN — SODIUM CHLORIDE 1000 ML: 9 INJECTION, SOLUTION INTRAVENOUS at 13:50

## 2024-03-25 RX ADMIN — INSULIN LISPRO 16 UNITS: 100 INJECTION, SOLUTION INTRAVENOUS; SUBCUTANEOUS at 15:26

## 2024-03-25 RX ADMIN — METOPROLOL SUCCINATE 50 MG: 50 TABLET, EXTENDED RELEASE ORAL at 20:04

## 2024-03-25 RX ADMIN — AZITHROMYCIN DIHYDRATE 500 MG: 500 INJECTION, POWDER, LYOPHILIZED, FOR SOLUTION INTRAVENOUS at 12:46

## 2024-03-25 RX ADMIN — INSULIN LISPRO 4 UNITS: 100 INJECTION, SOLUTION INTRAVENOUS; SUBCUTANEOUS at 20:05

## 2024-03-25 RX ADMIN — ACETAMINOPHEN 650 MG: 325 TABLET ORAL at 20:03

## 2024-03-25 RX ADMIN — ATORVASTATIN CALCIUM 40 MG: 40 TABLET, FILM COATED ORAL at 20:04

## 2024-03-25 RX ADMIN — SODIUM CHLORIDE, PRESERVATIVE FREE 10 ML: 5 INJECTION INTRAVENOUS at 20:05

## 2024-03-25 RX ADMIN — ISOSORBIDE MONONITRATE 30 MG: 30 TABLET, EXTENDED RELEASE ORAL at 20:04

## 2024-03-25 RX ADMIN — TRAZODONE HYDROCHLORIDE 100 MG: 100 TABLET ORAL at 20:04

## 2024-03-25 NOTE — ED NOTES
ED to inpatient nurses report      Chief Complaint:  Chief Complaint   Patient presents with    Shortness of Breath     Present to ED from: home    MOA:     LOC: alert and orientated to name, place, date  Mobility: Independent  Oxygen Baseline: RA    Current needs required: 2L nasal cannula     Code Status:   Prior    What abnormal results were found and what did you give/do to treat them? None  Any procedures or intervention occur? None    Mental Status:  Level of Consciousness: Alert (0)    Psych Assessment:        Vitals:  Patient Vitals for the past 24 hrs:   BP Temp Pulse Resp SpO2 Height Weight   03/25/24 1336 123/63 -- 74 15 91 % -- --   03/25/24 1323 -- -- 76 14 95 % -- --   03/25/24 1130 (!) 160/67 97.8 °F (36.6 °C) 80 18 (!) 88 % 1.803 m (5' 11\") 113.4 kg (250 lb)        LDAs:   Peripheral IV 03/25/24 Left Antecubital (Active)       Ambulatory Status:  Presents to emergency department  because of falls (Syncope, seizure, or loss of consciousness): No, Age > 70: No, Altered Mental Status, Intoxication with alcohol or substance confusion (Disorientation, impaired judgment, poor safety awaremess, or inability to follow instructions): No, Impaired Mobility: Ambulates or transfers with assistive devices or assistance; Unable to ambulate or transer.: No, Nursing Judgement: No    Diagnosis:  DISPOSITION Decision To Admit 03/25/2024 01:41:35 PM   Final diagnoses:   None        Consults:  None     Pain Score:       C-SSRS:   Risk of Suicide: No Risk    Sepsis Screening:  Sepsis Risk Score: 1.47    [place] Fall Risk:  [place] 1 Fall Risk  Presents to emergency department  because of falls (Syncope, seizure, or loss of consciousness): No  Age > 70: No  Altered Mental Status, Intoxication with alcohol or substance confusion (Disorientation, impaired judgment, poor safety awaremess, or inability to follow instructions): No  Impaired Mobility: Ambulates or transfers with assistive devices or assistance; Unable to ambulate

## 2024-03-25 NOTE — ED NOTES
Patient resting in bed. Respirations easy and unlabored. Patient's flow rate titrated to 1 L/min for patient comfort. Covid/flu swab collected and tolerated well. Call light in reach. Patient denies any further requests at this time.

## 2024-03-25 NOTE — TELEPHONE ENCOUNTER
----- Message from Andrei Munoz sent at 3/24/2024  9:39 PM EDT -----  Regarding: oxygen level  Contact: [phone]  my ox% has been around 84.   I was diagnosed with Bronchitis a couple days ago at urgent care.  do I need home oxygen??

## 2024-03-25 NOTE — H&P
History & Physical       Patient: Andrei Munoz  YOB: 1960    MRN: [id number]     Acct: [id number]    PCP: Malka Solis MD    Date of Admission: 3/25/2024    Date of Service: Patient seen / examined on 03/25/24 and admitted to Inpatient with expected LOS less than two midnights due to medical therapy.       ASSESSMENT / PLAN:  Acute hypoxic respiratory failure: Secondary to PNA.  Possible COPD exacerbation versus bronchitis, endorses SOB and productive cough. Presented hypoxic SpO2 88%, reportedly 82% at home.  CXR negative.  Check respiratory culture and pneumonia panel.  Started on azithromycin for anti-inflammatory properties.  Defer steroids for now given hyperglycemia.  Continue wean as tolerated for rescue > 92%.  Encourage aggressive pulmonary hygiene with incentive spirometer and Acapella.  Repeat CXR check ABG if respiratory status worsens.  PNA: Likely CAP, suspect viral etiology.  Check influenza A/B and COVID-19.  Pneumonia panel ordered.  On azithromycin.  Will hold steroids for now given hypoglycemia, see below.  Hyperglycemia: Suspect secondary to steroid use and reactive process.  Denies any prior history of DKA.  Mild AG.  Check lactic acid, BHB, and UA for ketones.  S/p Lantus 10 units x 1, started on HD SS.  Will titrate accordingly  Chronic HFpEF, NYHA II: ICM.  Not exacerbation.  Last ECHO EF 50%, mild global hypokinesis of LV 9/23.  Home GDMT includes BB, diuretic with metolazone, SGLT2i.  2 g sodium 2 L fluid restriction.  Monitor daily weights.  Strict SOFY's.  Obstructive CAD: S/p CABG x 2 and stent 1/2018.  On Brilinta and Imdur  HTN: Continue valsartan 80 mg p.o. daily with holding parameters  HLD: Continue Lipitor 40 mg p.o. daily  NIDDMII: Last HgbA1c 7.1 11/2023.  Home antidiabetic regimen includes metformin 1000 g p.o. twice daily  COPD, moderate: Gold stage II.  No home O2.  Not exacerbation.  Last PFTs FEV1 54%, FEV1/FVC 77% 8/23.  Follows outpatient  Normocephalic / atraumatic. PERRL. EOM intact. Conjunctivae appear normal.  Neck: Supple. No JVD.  Respiratory: Mildly increased respiratory effort on 2 L NC.. CTAB. No wheezes / rales / rhonchi.  Cardiovascular: RRR. Normal S1/S2. No murmurs / rubs / gallops.  Abdomen: Soft / non-tender / non-distended. BS present.  Musculoskeletal: No cyanosis or edema.  Skin: Warm / dry. Normal turgor.  Neurologic: A/O x 3. Speech normal. Answers questions appropriately. CN intact. No obvious focal neurologic deficits.  Psychiatric: Thought content / judgment / insight appear appropriate.  Capillary refill: Brisk bilaterally.  Peripheral pulses: +2 bilaterally.    Labs:   Results for orders placed or performed during the hospital encounter of 03/25/24   CBC with Auto Differential   Result Value Ref Range    WBC 10.6 4.8 - 10.8 thou/mm3    RBC 5.15 4.70 - 6.10 mill/mm3    Hemoglobin 16.6 14.0 - 18.0 gm/dl    Hematocrit 48.5 42.0 - 52.0 %    MCV 94.2 (H) 80.0 - 94.0 fL    MCH 32.2 26.0 - 33.0 pg    MCHC 34.2 32.2 - 35.5 gm/dl    RDW-CV 14.3 11.5 - 14.5 %    RDW-SD 49.2 (H) 35.0 - 45.0 fL    Platelets 270 130 - 400 thou/mm3    MPV 11.5 9.4 - 12.4 fL    Seg Neutrophils 76.7 %    Lymphocytes 12.9 %    Monocytes 8.5 %    Eosinophils 0.3 %    Basophils 0.6 %    Immature Granulocytes 1.0 %    Segs Absolute 8.1 (H) 1.8 - 7.7 thou/mm3    Lymphocytes Absolute 1.4 1.0 - 4.8 thou/mm3    Monocytes Absolute 0.9 0.4 - 1.3 thou/mm3    Eosinophils Absolute 0.0 0.0 - 0.4 thou/mm3    Basophils Absolute 0.1 0.0 - 0.1 thou/mm3    Immature Grans (Abs) 0.11 (H) 0.00 - 0.07 thou/mm3    nRBC 0 /100 wbc   Comprehensive Metabolic Panel w/ Reflex to MG   Result Value Ref Range    Glucose 529 (HH) 70 - 108 mg/dL    Creatinine 1.0 0.4 - 1.2 mg/dL    BUN 26 (H) 7 - 22 mg/dL    Sodium 135 135 - 145 meq/L    Potassium reflex Magnesium 3.9 3.5 - 5.2 meq/L    Chloride 89 (L) 98 - 111 meq/L    CO2 26 23 - 33 meq/L    Calcium 9.9 8.5 - 10.5 mg/dL    AST 12 5 - 40 U/L

## 2024-03-25 NOTE — PLAN OF CARE
Problem: Discharge Planning  Goal: Discharge to home or other facility with appropriate resources  Outcome: Progressing  Flowsheets (Taken 3/25/2024 1506)  Discharge to home or other facility with appropriate resources: Identify barriers to discharge with patient and caregiver     Problem: Safety - Adult  Goal: Free from fall injury  Outcome: Progressing  Flowsheets (Taken 3/25/2024 1644)  Free From Fall Injury: Instruct family/caregiver on patient safety     Problem: ABCDS Injury Assessment  Goal: Absence of physical injury  Outcome: Progressing  Flowsheets (Taken 3/25/2024 1644)  Absence of Physical Injury: Implement safety measures based on patient assessment   Care plan reviewed with patient.  Patient verbalizes understanding of the plan of care and contribute to goal setting.

## 2024-03-25 NOTE — TELEPHONE ENCOUNTER
Called patient and let him know he needed to go to hospital.  He said he has many house hold projects.  I advised patient that it was a dire situation, and he may not have a couple hr.  Patient needs to go to er asap.  Patient said his 02 levels were lowering than 84%

## 2024-03-25 NOTE — RT PROTOCOL NOTE
RT Inhaler-Nebulizer Bronchodilator Protocol Note    There is a bronchodilator order in the chart from a provider indicating to follow the RT Bronchodilator Protocol and there is an “Initiate RT Inhaler-Nebulizer Bronchodilator Protocol” order as well (see protocol at bottom of note).    CXR Findings:  No results found.    The findings from the last RT Protocol Assessment were as follows:   History Pulmonary Disease: Chronic pulmonary disease  Respiratory Pattern: Regular pattern and RR 12-20 bpm  Breath Sounds: Slightly diminished and/or crackles  Cough: Strong, spontaneous, non-productive  Indication for Bronchodilator Therapy:    Bronchodilator Assessment Score: 4    Patient wants PRN as at home.    Aerosolized bronchodilator medication orders have been revised according to the RT Inhaler-Nebulizer Bronchodilator Protocol below.    Respiratory Therapist to perform RT Therapy Protocol Assessment initially then follow the protocol.  Repeat RT Therapy Protocol Assessment PRN for score 0-3 or on second treatment, BID, and PRN for scores above 3.    No Indications - adjust the frequency to every 6 hours PRN wheezing or bronchospasm, if no treatments needed after 48 hours then discontinue using Per Protocol order mode.     If indication present, adjust the RT bronchodilator orders based on the Bronchodilator Assessment Score as indicated below.  Use Inhaler orders unless patient has one or more of the following: on home nebulizer, not able to hold breath for 10 seconds, is not alert and oriented, cannot activate and use MDI correctly, or respiratory rate 25 breaths per minute or more, then use the equivalent nebulizer order(s) with same Frequency and PRN reasons based on the score.  If a patient is on this medication at home then do not decrease Frequency below that used at home.    0-3 - enter or revise RT bronchodilator order(s) to equivalent RT Bronchodilator order with Frequency of every 4 hours PRN for wheezing or  increased work of breathing using Per Protocol order mode.        4-6 - enter or revise RT Bronchodilator order(s) to two equivalent RT bronchodilator orders with one order with BID Frequency and one order with Frequency of every 4 hours PRN wheezing or increased work of breathing using Per Protocol order mode.        7-10 - enter or revise RT Bronchodilator order(s) to two equivalent RT bronchodilator orders with one order with TID Frequency and one order with Frequency of every 4 hours PRN wheezing or increased work of breathing using Per Protocol order mode.       11-13 - enter or revise RT Bronchodilator order(s) to one equivalent RT bronchodilator order with QID Frequency and an Albuterol order with Frequency of every 4 hours PRN wheezing or increased work of breathing using Per Protocol order mode.      Greater than 13 - enter or revise RT Bronchodilator order(s) to one equivalent RT bronchodilator order with every 4 hours Frequency and an Albuterol order with Frequency of every 2 hours PRN wheezing or increased work of breathing using Per Protocol order mode.     RT to enter RT Home Evaluation for COPD & MDI Assessment order using Per Protocol order mode.    Electronically signed by Gena Gill RCP on 3/25/2024 at 4:28 PM

## 2024-03-25 NOTE — ED PROVIDER NOTES
[facility]  EMERGENCY DEPARTMENT ENCOUNTER          Pt Name: Andrei Munoz  MRN: [id number]  Birthdate 1960  Date of evaluation: 3/25/2024  Emergency Physician: Luann Orellana MD    CHIEF COMPLAINT       Chief Complaint   Patient presents with    Shortness of Breath     History obtained from the patient.      HISTORY OF PRESENT ILLNESS    HPI  Andrei Munoz is a 64 y.o. male who presents to the emergency department for evaluation of shortness of breath. Pt states he has been short of breath for the last 5 days and has had a productive yellow cough.  Patient states that he had taken his oxygen level at home and had been in the low 80s.  Patient states that he does not normally wear oxygen.  The patient has no other acute complaints at this time.            PAST MEDICAL AND SURGICAL HISTORY     Past Medical History:   Diagnosis Date    CHF (congestive heart failure) ([facility])     COPD (chronic obstructive pulmonary disease) ([facility])     Coronary artery disease involving native coronary artery of native heart without angina pectoris     Depression     Diabetes mellitus type 2, diet-controlled ([facility]) 6/1/2018    GERD (gastroesophageal reflux disease) 3/21/2012    Hernia     Hx of blood clots 1990's    right knee due to injury    Hx of cocaine abuse ([facility])     Hyperglycemia 09/09    Hyperlipidemia     Hypertension     FAISAL on CPAP     Osteoarthritis 11/07    S/P CABG x 2 07/06/2016    S/P PTCA: 1/15/2018: Stent diagonal branch Xience 3.0 x 12 mm. 1/15/2018    1/15/2018: Stent diagonal branch Xience 3.0 x 12 mm. Dr. Ta    Thumb injury 08/2015    Right thumb cut with table saw, no treatment needed     Past Surgical History:   Procedure Laterality Date    CARDIAC CATHETERIZATION  06/27/2016    CATARACT REMOVAL Right 2013    COLONOSCOPY  04/08    CORONARY ARTERY BYPASS GRAFT  07/06/2016    Double bypass, Dr. Harris    CYST REMOVAL Right 02/2007    Neck    CYSTOSCOPY N/A 7/16/2021     **OR** potassium bicarb-citric acid (EFFER-K) effervescent tablet 40 mEq, 40 mEq, Oral, PRN **OR** potassium chloride 10 mEq/100 mL IVPB (Peripheral Line), 10 mEq, IntraVENous, PRN, Jackie, Rafael, DO    magnesium sulfate 2000 mg in 50 mL IVPB premix, 2,000 mg, IntraVENous, PRN, Jackie, Rafael, DO    sodium phosphate 15 mmol in sodium chloride 0.9 % 250 mL IVPB, 15 mmol, IntraVENous, PRN, Jackie, Rafael, DO    calcium gluconate 2,000 mg in sodium chloride 100 mL, 2,000 mg, IntraVENous, PRN, Jackie, Rafael, DO    glucose chewable tablet 16 g, 4 tablet, Oral, PRN, Jackie, Rafael, DO    dextrose bolus 10% 125 mL, 125 mL, IntraVENous, PRN **OR** dextrose bolus 10% 250 mL, 250 mL, IntraVENous, PRN, Jackie, Rafael, DO    Glucagon Emergency KIT 1 mg, 1 mg, SubCUTAneous, PRN, Jackie, Rafael, DO    dextrose 10 % infusion, , IntraVENous, Continuous PRN, Jackie, Rafael, DO    insulin lispro (HUMALOG) injection vial 0-16 Units, 0-16 Units, SubCUTAneous, TID WC, Jackie, Rafael, DO, 16 Units at 03/25/24 1526    insulin lispro (HUMALOG) injection vial 0-4 Units, 0-4 Units, SubCUTAneous, Nightly, Jackie, Rafael, DO, 4 Units at 03/25/24 2005    sodium chloride flush 0.9 % injection 5-40 mL, 5-40 mL, IntraVENous, 2 times per day, Jackie, Rafael, DO, 10 mL at 03/25/24 2005    sodium chloride flush 0.9 % injection 10 mL, 10 mL, IntraVENous, PRN, Jackie, Rafael, DO    0.9 % sodium chloride infusion, , IntraVENous, PRN, Jackie, Rafael, DO    enoxaparin Sodium (LOVENOX) injection 30 mg, 30 mg, SubCUTAneous, BID, Jackie, Rafael, DO, 30 mg at 03/25/24 2004    ondansetron (ZOFRAN-ODT) disintegrating tablet 4 mg, 4 mg, Oral, Q8H PRN **OR** ondansetron (ZOFRAN) injection 4 mg, 4 mg, IntraVENous, Q6H PRN, Jackie, Rafael, DO    polyethylene glycol (GLYCOLAX) packet 17 g, 17 g, Oral, Daily PRN, Jackie, Rafael, DO    acetaminophen (TYLENOL) tablet 650 mg, 650 mg, Oral, Q6H PRN, 650 mg at 03/25/24 2003 **OR** acetaminophen (TYLENOL) suppository 650 mg, 650 mg, Rectal, Q6H PRN, Jackie, Rafael, DO    [START ON 3/26/2024] azithromycin

## 2024-03-25 NOTE — ED TRIAGE NOTES
Patient present to ED from home. Patient c/c of SOB. Patient states oxygen saturation was 82% on home machine. States he was seen at urgent care last week and was diagnosed with bronchitis. Pt denies any pain and states he feels SOB. Pt reports baseline SPO2 90-94%. PMH Stage 4 COPD. SPO2 upon arrival 89-90% on RA. Pt placed on 2L nasal cannula for pt comfort. SPO2 remains at 90% on 2L nasal cannula. EKG completed. IV access established. Labs collected.

## 2024-03-26 ENCOUNTER — APPOINTMENT (OUTPATIENT)
Dept: CT IMAGING | Age: 64
DRG: 193 | End: 2024-03-26
Payer: MEDICARE

## 2024-03-26 LAB
ANION GAP SERPL CALC-SCNC: 17 MEQ/L (ref 8–16)
BUN SERPL-MCNC: 25 MG/DL (ref 7–22)
CALCIUM SERPL-MCNC: 9.4 MG/DL (ref 8.5–10.5)
CHLORIDE SERPL-SCNC: 96 MEQ/L (ref 98–111)
CO2 SERPL-SCNC: 24 MEQ/L (ref 23–33)
CREAT SERPL-MCNC: 1 MG/DL (ref 0.4–1.2)
DEPRECATED RDW RBC AUTO: 50.9 FL (ref 35–45)
ERYTHROCYTE [DISTWIDTH] IN BLOOD BY AUTOMATED COUNT: 14.5 % (ref 11.5–14.5)
GFR SERPL CREATININE-BSD FRML MDRD: 83 ML/MIN/1.73M2
GLUCOSE BLD STRIP.AUTO-MCNC: 197 MG/DL (ref 70–108)
GLUCOSE BLD STRIP.AUTO-MCNC: 219 MG/DL (ref 70–108)
GLUCOSE BLD STRIP.AUTO-MCNC: 274 MG/DL (ref 70–108)
GLUCOSE BLD STRIP.AUTO-MCNC: 417 MG/DL (ref 70–108)
GLUCOSE BLD STRIP.AUTO-MCNC: 421 MG/DL (ref 70–108)
GLUCOSE BLD STRIP.AUTO-MCNC: 479 MG/DL (ref 70–108)
GLUCOSE SERPL-MCNC: 229 MG/DL (ref 70–108)
HCT VFR BLD AUTO: 47.7 % (ref 42–52)
HGB BLD-MCNC: 15.8 GM/DL (ref 14–18)
MCH RBC QN AUTO: 31.9 PG (ref 26–33)
MCHC RBC AUTO-ENTMCNC: 33.1 GM/DL (ref 32.2–35.5)
MCV RBC AUTO: 96.4 FL (ref 80–94)
PLATELET # BLD AUTO: 248 THOU/MM3 (ref 130–400)
PMV BLD AUTO: 11.4 FL (ref 9.4–12.4)
POTASSIUM SERPL-SCNC: 4.5 MEQ/L (ref 3.5–5.2)
RBC # BLD AUTO: 4.95 MILL/MM3 (ref 4.7–6.1)
SODIUM SERPL-SCNC: 137 MEQ/L (ref 135–145)
WBC # BLD AUTO: 13.9 THOU/MM3 (ref 4.8–10.8)

## 2024-03-26 PROCEDURE — 82948 REAGENT STRIP/BLOOD GLUCOSE: CPT

## 2024-03-26 PROCEDURE — 36415 COLL VENOUS BLD VENIPUNCTURE: CPT

## 2024-03-26 PROCEDURE — 94669 MECHANICAL CHEST WALL OSCILL: CPT

## 2024-03-26 PROCEDURE — 6370000000 HC RX 637 (ALT 250 FOR IP): Performed by: STUDENT IN AN ORGANIZED HEALTH CARE EDUCATION/TRAINING PROGRAM

## 2024-03-26 PROCEDURE — 6360000004 HC RX CONTRAST MEDICATION: Performed by: STUDENT IN AN ORGANIZED HEALTH CARE EDUCATION/TRAINING PROGRAM

## 2024-03-26 PROCEDURE — 2580000003 HC RX 258: Performed by: STUDENT IN AN ORGANIZED HEALTH CARE EDUCATION/TRAINING PROGRAM

## 2024-03-26 PROCEDURE — 85027 COMPLETE CBC AUTOMATED: CPT

## 2024-03-26 PROCEDURE — 99233 SBSQ HOSP IP/OBS HIGH 50: CPT | Performed by: STUDENT IN AN ORGANIZED HEALTH CARE EDUCATION/TRAINING PROGRAM

## 2024-03-26 PROCEDURE — 94761 N-INVAS EAR/PLS OXIMETRY MLT: CPT

## 2024-03-26 PROCEDURE — 1200000003 HC TELEMETRY R&B

## 2024-03-26 PROCEDURE — 94640 AIRWAY INHALATION TREATMENT: CPT

## 2024-03-26 PROCEDURE — 71260 CT THORAX DX C+: CPT

## 2024-03-26 PROCEDURE — 80048 BASIC METABOLIC PNL TOTAL CA: CPT

## 2024-03-26 PROCEDURE — 93010 ELECTROCARDIOGRAM REPORT: CPT | Performed by: INTERNAL MEDICINE

## 2024-03-26 PROCEDURE — 6360000002 HC RX W HCPCS: Performed by: STUDENT IN AN ORGANIZED HEALTH CARE EDUCATION/TRAINING PROGRAM

## 2024-03-26 PROCEDURE — 6370000000 HC RX 637 (ALT 250 FOR IP)

## 2024-03-26 RX ORDER — 0.9 % SODIUM CHLORIDE 0.9 %
1000 INTRAVENOUS SOLUTION INTRAVENOUS ONCE
Status: COMPLETED | OUTPATIENT
Start: 2024-03-26 | End: 2024-03-26

## 2024-03-26 RX ORDER — INSULIN GLARGINE 100 [IU]/ML
15 INJECTION, SOLUTION SUBCUTANEOUS DAILY
Status: DISCONTINUED | OUTPATIENT
Start: 2024-03-27 | End: 2024-03-26

## 2024-03-26 RX ORDER — IPRATROPIUM BROMIDE AND ALBUTEROL SULFATE 2.5; .5 MG/3ML; MG/3ML
1 SOLUTION RESPIRATORY (INHALATION)
Status: DISCONTINUED | OUTPATIENT
Start: 2024-03-26 | End: 2024-03-27 | Stop reason: HOSPADM

## 2024-03-26 RX ORDER — INSULIN GLARGINE 100 [IU]/ML
18 INJECTION, SOLUTION SUBCUTANEOUS DAILY
Status: DISCONTINUED | OUTPATIENT
Start: 2024-03-27 | End: 2024-03-27 | Stop reason: HOSPADM

## 2024-03-26 RX ORDER — INSULIN LISPRO 100 [IU]/ML
4 INJECTION, SOLUTION INTRAVENOUS; SUBCUTANEOUS ONCE
Status: COMPLETED | OUTPATIENT
Start: 2024-03-26 | End: 2024-03-26

## 2024-03-26 RX ADMIN — INSULIN LISPRO 4 UNITS: 100 INJECTION, SOLUTION INTRAVENOUS; SUBCUTANEOUS at 22:21

## 2024-03-26 RX ADMIN — SODIUM CHLORIDE 1000 ML: 9 INJECTION, SOLUTION INTRAVENOUS at 21:06

## 2024-03-26 RX ADMIN — ISOSORBIDE MONONITRATE 30 MG: 30 TABLET, EXTENDED RELEASE ORAL at 20:35

## 2024-03-26 RX ADMIN — IPRATROPIUM BROMIDE AND ALBUTEROL SULFATE 1 DOSE: .5; 3 SOLUTION RESPIRATORY (INHALATION) at 18:11

## 2024-03-26 RX ADMIN — SODIUM CHLORIDE, PRESERVATIVE FREE 10 ML: 5 INJECTION INTRAVENOUS at 08:39

## 2024-03-26 RX ADMIN — ACETAMINOPHEN 650 MG: 325 TABLET ORAL at 13:15

## 2024-03-26 RX ADMIN — INSULIN LISPRO 8 UNITS: 100 INJECTION, SOLUTION INTRAVENOUS; SUBCUTANEOUS at 13:14

## 2024-03-26 RX ADMIN — METOPROLOL SUCCINATE 50 MG: 50 TABLET, EXTENDED RELEASE ORAL at 20:35

## 2024-03-26 RX ADMIN — TICAGRELOR 90 MG: 90 TABLET ORAL at 08:48

## 2024-03-26 RX ADMIN — ENOXAPARIN SODIUM 30 MG: 100 INJECTION SUBCUTANEOUS at 20:35

## 2024-03-26 RX ADMIN — TIOTROPIUM BROMIDE AND OLODATEROL 2 PUFF: 3.124; 2.736 SPRAY, METERED RESPIRATORY (INHALATION) at 09:51

## 2024-03-26 RX ADMIN — IOPAMIDOL 80 ML: 755 INJECTION, SOLUTION INTRAVENOUS at 09:18

## 2024-03-26 RX ADMIN — ATORVASTATIN CALCIUM 40 MG: 40 TABLET, FILM COATED ORAL at 20:35

## 2024-03-26 RX ADMIN — TICAGRELOR 90 MG: 90 TABLET ORAL at 20:35

## 2024-03-26 RX ADMIN — TRAZODONE HYDROCHLORIDE 100 MG: 100 TABLET ORAL at 21:04

## 2024-03-26 RX ADMIN — EMPAGLIFLOZIN 25 MG: 25 TABLET, FILM COATED ORAL at 08:42

## 2024-03-26 RX ADMIN — ALLOPURINOL 300 MG: 300 TABLET ORAL at 08:42

## 2024-03-26 RX ADMIN — ESCITALOPRAM OXALATE 5 MG: 10 TABLET ORAL at 08:43

## 2024-03-26 RX ADMIN — PANTOPRAZOLE SODIUM 40 MG: 40 TABLET, DELAYED RELEASE ORAL at 04:08

## 2024-03-26 RX ADMIN — BUMETANIDE 2 MG: 1 TABLET ORAL at 17:24

## 2024-03-26 RX ADMIN — ENOXAPARIN SODIUM 30 MG: 100 INJECTION SUBCUTANEOUS at 08:39

## 2024-03-26 RX ADMIN — TAMSULOSIN HYDROCHLORIDE 0.4 MG: 0.4 CAPSULE ORAL at 08:42

## 2024-03-26 RX ADMIN — PRAMIPEXOLE DIHYDROCHLORIDE 0.12 MG: 0.25 TABLET ORAL at 20:35

## 2024-03-26 RX ADMIN — INSULIN LISPRO 4 UNITS: 100 INJECTION, SOLUTION INTRAVENOUS; SUBCUTANEOUS at 08:39

## 2024-03-26 RX ADMIN — VALSARTAN 80 MG: 80 TABLET, FILM COATED ORAL at 08:42

## 2024-03-26 RX ADMIN — IPRATROPIUM BROMIDE AND ALBUTEROL SULFATE 1 DOSE: .5; 3 SOLUTION RESPIRATORY (INHALATION) at 21:49

## 2024-03-26 RX ADMIN — AZITHROMYCIN DIHYDRATE 500 MG: 500 INJECTION, POWDER, LYOPHILIZED, FOR SOLUTION INTRAVENOUS at 13:20

## 2024-03-26 RX ADMIN — BUMETANIDE 2 MG: 1 TABLET ORAL at 08:42

## 2024-03-26 RX ADMIN — INSULIN LISPRO 4 UNITS: 100 INJECTION, SOLUTION INTRAVENOUS; SUBCUTANEOUS at 20:35

## 2024-03-26 RX ADMIN — SODIUM CHLORIDE, PRESERVATIVE FREE 10 ML: 5 INJECTION INTRAVENOUS at 20:36

## 2024-03-26 NOTE — PLAN OF CARE
Problem: Discharge Planning  Goal: Discharge to home or other facility with appropriate resources  3/25/2024 2042 by Jose Angel Mitchell RN  Outcome: Progressing  Flowsheets (Taken 3/25/2024 1506 by Jess Oconnor RN)  Discharge to home or other facility with appropriate resources: Identify barriers to discharge with patient and caregiver  3/25/2024 1644 by Jess Oconnor RN  Outcome: Progressing  Flowsheets (Taken 3/25/2024 1506)  Discharge to home or other facility with appropriate resources: Identify barriers to discharge with patient and caregiver     Problem: Safety - Adult  Goal: Free from fall injury  3/25/2024 2042 by Jose Angel Mitchell RN  Outcome: Progressing  Flowsheets (Taken 3/25/2024 1644 by Jess Oconnor RN)  Free From Fall Injury: Instruct family/caregiver on patient safety  3/25/2024 1644 by Jess Oconnor RN  Outcome: Progressing  Flowsheets (Taken 3/25/2024 1644)  Free From Fall Injury: Instruct family/caregiver on patient safety     Problem: ABCDS Injury Assessment  Goal: Absence of physical injury  3/25/2024 2042 by Jose Angel Mitchell RN  Outcome: Progressing  Flowsheets (Taken 3/25/2024 1644 by Jess Oconnor RN)  Absence of Physical Injury: Implement safety measures based on patient assessment  3/25/2024 1644 by Jess Oconnor RN  Outcome: Progressing  Flowsheets (Taken 3/25/2024 1644)  Absence of Physical Injury: Implement safety measures based on patient assessment

## 2024-03-26 NOTE — CARE COORDINATION
Case Management Assessment  Initial Evaluation    Date/Time of Evaluation: 3/26/2024 11:04 AM  Assessment Completed by: Rene Flores RN    If patient is discharged prior to next notation, then this note serves as note for discharge by case management.    Patient Name: Andrei Munoz                   YOB: 1960  Diagnosis: Poorly controlled diabetes mellitus (HCC) [E11.65]  Acute respiratory failure with hypoxia (HCC) [J96.01]  Pneumonia, unspecified organism [J18.9]  Pneumonia due to infectious organism, unspecified laterality, unspecified part of lung [J18.9]                   Date / Time: 3/25/2024 11:19 AM  Location: [address] [place] [address]     Patient Admission Status: Inpatient   Readmission Risk Low 0-14, Mod 15-19), High > 20: No data recorded  Current PCP: Malka Solis MD  PCP verified by ? Yes    Chart Reviewed: Yes      History Provided by: Patient  Patient Orientation: Alert and Oriented    Patient Cognition: Alert    Hospitalization in the last 30 days (Readmission):  No    If yes, Readmission Assessment in  Navigator will be completed.    Advance Directives:      Code Status: Full Code   Patient's Primary Decision Maker is: Legal Next of Kin      Discharge Planning:    Patient lives with: Parent Type of Home: House  Primary Care Giver: Self  Patient Support Systems include: Parent, DONOVAN/Passport   Current Financial resources: Medicare, Medicaid  Current community resources: ECF/Home Care  Current services prior to admission: DONOVAN/Passport            Current DME:              Type of Home Care services:  None    ADLS  Prior functional level: Assistance with the following:, Other (see comment) (has aide services from [employer])  Current functional level: Independent in ADLs/IADLs    Family can provide assistance at DC: No  Would you like Case Management to discuss the discharge plan with any other family members/significant others, and if so, who? No  Plans to Return to Present Housing:  [phone]      Notes:    Factors facilitating achievement of predicted outcomes: Cooperative, Pleasant, and Has homemaker services    Barriers to discharge: Medical complications    Additional Case Management Notes: -560, WBC 13.9. Hypertensive, last 139/101. IV zithromax, inhalers, DM management.     Procedure: 3/25 cxray: Small left-sided pleural effusion with adjacent atelectasis/infiltrate     The Plan for Transition of Care is related to the following treatment goals of Poorly controlled diabetes mellitus (HCC) [E11.65]  Acute respiratory failure with hypoxia (HCC) [J96.01]  Pneumonia, unspecified organism [J18.9]  Pneumonia due to infectious organism, unspecified laterality, unspecified part of lung [J18.9]    Patient Goals/Plan/Treatment Preferences: Met with Jaciel, he plans to return home with his mother (he is her caretaker). He has [employer] nursing and aide services for himself. Denies further needs at this time. SW consulted.   Transportation/Food Security/Housekeeping Addressed: No issues identified.     Rene Flores RN  Case Management Department

## 2024-03-26 NOTE — PLAN OF CARE
Problem: Discharge Planning  Goal: Discharge to home or other facility with appropriate resources  3/26/2024 1003 by Mitch Taveras, RN  Outcome: Progressing     Problem: Safety - Adult  Goal: Free from fall injury  3/26/2024 1003 by Mitch Taveras, RN  Outcome: Progressing  Flowsheets (Taken 3/26/2024 1003)  Free From Fall Injury: Instruct family/caregiver on patient safety     Problem: ABCDS Injury Assessment  Goal: Absence of physical injury  3/26/2024 1003 by Mitch Taveras, RN  Outcome: Progressing  Flowsheets (Taken 3/25/2024 1644 by Jess Oconnor, RN)  Absence of Physical Injury: Implement safety measures based on patient assessment

## 2024-03-26 NOTE — CARE COORDINATION
DISCHARGE PLANNING EVALUATION  3/26/24, 12:07 PM EDT    Reason for Referral: has PASSPORT Services  Mental Status: pt is alert and oriented   Decision Making: pt is capable of making own decisions   Family/Social/Home Environment: pt resides with his mom in a one story home.  Pt states he has PASSPORT KYLIE Britt.  Services include [employer] meals, aides 3 days a week for housekeeping, personal care.  Pt has an RN 1 day a week for medications.  Pt does drive along with his brother.  Current Services including food security, transportation and housekeeping: see above  Current Equipment:[employer]  Payment Source:[facility] Medicare/Medicaid   Concerns or Barriers to Discharge: none noted  Post-acute (PAC) provider list was provided to patient. Patient was informed of their freedom to choose PAC provider. Discussed and offered to show the patient the relevant PAC Providers quality and resource use measures on Medicare Compare web site via computer based on patient's goals of care and treatment preferences. Questions regarding selection process were answered.      Teach Back Method used with pt regarding care plan   Patient verbalized understanding of the plan of care and contribute to goal setting.       Patient goals, treatment preferences and discharge plan: SW met with pt to discuss discharge POC.  Pt plans on returning home with his mom (caregiver for her).  Pt is unsure of agency that provides RN/Aides.  SW left message with pts ALFRED Britt to confirm services.  Pt denies any additional needs.   Pt stated his brother or sister would transport home.     Electronically signed by TING Luke on 3/26/2024 at 12:07 PM       1:42 PM update: received call from Lorenza([phone])with ALFRED, pt is current with Jordy GOOD (RN/Aide)

## 2024-03-26 NOTE — PROGRESS NOTES
Hospitalist Progress Note      Patient:  Andrei Munoz    Unit/Bed:6K-12/012-A  YOB: 1960  MRN: [id number]   Acct: [id number]   PCP: Malka Solis MD  Date of Admission: 3/25/2024    Assessment/Plan:  Middle-aged male with COPD admitted for acute hypoxic respiratory failure likely due to viral disease with possible mild COPD exacerbation.  Weaned off oxygen.  If remains stable overnight, discharge tomorrow.       Acute hypoxic respiratory failure: Likely recovering from a viral disease, symptom onset about a week ago.  Chest x-ray/CT imaging not consistent with bacterial pneumonia. Possible COPD exacerbation versus bronchitis, endorses SOB and productive cough. Presented hypoxic SpO2 88%, reportedly 82% at home.  CXR negative.  Infectious workup pending.  Started on azithromycin for anti-inflammatory properties. Acute hypoxia resolved.  Currently on room air saturating above.  SpO2 goal >90%.  Continue incentive spirometer and Acapella.  No indication for antibiotics.     COPD, moderate: Gold stage II.  No home O2.  Possible mild exacerbation related to viral pneumonia. Last PFTs FEV1 54%, FEV1/FVC 77% 8/23. Start on Duonebs. Steroids deferred in the setting of hyperglycemia/ recent outpatient therapy. Follows outpatient with Dr. Elam, pulmonology.  Continue home inhalers on discharge. Complete 3 days of Zithromax.    Hyperglycemia: Suspect secondary to recent steroid use and reactive process.  Denies any prior history of DKA. BHB elevated at 12 with high anion gap but acidosis resolved. Negative UA for ketones.  No lactic acidosis. S/p Lantus 10 units x 1, started on HD SS.  Reassess and adjust insulin therapy accordingly. ?Dehydration causing the AGMA. Will give a 1L bolus of IVNS.  NIDDMII: Last HgbA1c 7.1 in 11/2023.  Home antidiabetic regimen includes metformin 1000 g p.o. twice daily.  On SSI with hypoglycemia protocol.     Chronic  visualized. The visualized skeletal structures appear intact.     1. Elevation of the left hemidiaphragm is unchanged. The left lower lobe consolidation has improved removed. Mild patchy right lower lobe and right middle lobe airspace opacity with mild peribronchial thickening could indicate acute airspace infection/inflammation. A 3 month follow-up noncontrast CT thorax is advised at the completion of medical treatment to ensure resolution. 2. Stable 5 mm subpleural right lower lobe pulmonary nodule. Centrilobular emphysema and chronic findings are discussed. **This report has been created using voice recognition software.  It may contain minor errors which are inherent in voice recognition technology.** Final report electronically signed by Dr Ambar Hirsch on 3/26/2024 1:02 PM    XR CHEST (2 VW)    Result Date: 3/25/2024  PROCEDURE: XR CHEST (2 VW) CLINICAL INFORMATION: Shortness of breath TECHNIQUE: PA and lateral chest radiographs. COMPARISON: PA and lateral chest radiographs 5/20/2023 FINDINGS: Median sternotomy wires are again noted. Cardiac silhouette is at the upper limits of normal in size. Atherosclerotic calcifications are present in the thoracic aorta. There is stable elevation of the left diaphragm. There is suggestion of left costophrenic angle blunting. Hazy and reticular opacities are present at the left lung base. Degenerative changes in the thoracic spine are poorly visualized. There are surgical changes of proximal right humerus.     Small left-sided pleural effusion with adjacent atelectasis/infiltrate. **This report has been created using voice recognition software. It may contain minor errors which are inherent in voice recognition technology.** Final report electronically signed by Dr. Mukesh Larson on 3/25/2024 1:27 PM          Electronically signed by Jim Joy MD 3/26/2024

## 2024-03-26 NOTE — PLAN OF CARE
Problem: Respiratory - Adult  Goal: Clear lung sounds  Outcome: Progressing  Note:  Patient lung sounds are considered normal for their current lung condition. No signs of distress noted. Current treatment regimen appropriate

## 2024-03-27 ENCOUNTER — APPOINTMENT (OUTPATIENT)
Age: 64
DRG: 193 | End: 2024-03-27
Attending: STUDENT IN AN ORGANIZED HEALTH CARE EDUCATION/TRAINING PROGRAM
Payer: MEDICARE

## 2024-03-27 VITALS
BODY MASS INDEX: 34.48 KG/M2 | RESPIRATION RATE: 16 BRPM | HEIGHT: 71 IN | OXYGEN SATURATION: 90 % | SYSTOLIC BLOOD PRESSURE: 126 MMHG | WEIGHT: 246.3 LBS | DIASTOLIC BLOOD PRESSURE: 72 MMHG | TEMPERATURE: 97.5 F | HEART RATE: 76 BPM

## 2024-03-27 LAB
ANION GAP SERPL CALC-SCNC: 17 MEQ/L (ref 8–16)
BUN SERPL-MCNC: 28 MG/DL (ref 7–22)
CALCIUM SERPL-MCNC: 8.8 MG/DL (ref 8.5–10.5)
CHLORIDE SERPL-SCNC: 100 MEQ/L (ref 98–111)
CO2 SERPL-SCNC: 23 MEQ/L (ref 23–33)
CREAT SERPL-MCNC: 0.9 MG/DL (ref 0.4–1.2)
ECHO AO ASC DIAM: 3.6 CM
ECHO AO ASCENDING AORTA INDEX: 1.57 CM/M2
ECHO AV CUSP MM: 2.5 CM
ECHO AV PEAK GRADIENT: 13 MMHG
ECHO AV PEAK VELOCITY: 1.8 M/S
ECHO AV VELOCITY RATIO: 0.67
ECHO BSA: 2.38 M2
ECHO IVC PROX: 2.1 CM
ECHO LA DIAMETER INDEX: 1.61 CM/M2
ECHO LA DIAMETER: 3.7 CM
ECHO LV E' LATERAL VELOCITY: 10 CM/S
ECHO LV E' SEPTAL VELOCITY: 8 CM/S
ECHO LV FRACTIONAL SHORTENING: 31 % (ref 28–44)
ECHO LV INTERNAL DIMENSION DIASTOLE INDEX: 2.13 CM/M2
ECHO LV INTERNAL DIMENSION DIASTOLIC: 4.9 CM (ref 4.2–5.9)
ECHO LV INTERNAL DIMENSION SYSTOLIC INDEX: 1.48 CM/M2
ECHO LV INTERNAL DIMENSION SYSTOLIC: 3.4 CM
ECHO LV ISOVOLUMETRIC RELAXATION TIME (IVRT): 63 MS
ECHO LV IVSD: 1.2 CM (ref 0.6–1)
ECHO LV MASS 2D: 226.4 G (ref 88–224)
ECHO LV MASS INDEX 2D: 98.4 G/M2 (ref 49–115)
ECHO LV POSTERIOR WALL DIASTOLIC: 1.2 CM (ref 0.6–1)
ECHO LV RELATIVE WALL THICKNESS RATIO: 0.49
ECHO LVOT PEAK GRADIENT: 6 MMHG
ECHO LVOT PEAK VELOCITY: 1.2 M/S
ECHO MV A VELOCITY: 0.82 M/S
ECHO MV E DECELERATION TIME (DT): 280 MS
ECHO MV E VELOCITY: 1.11 M/S
ECHO MV E/A RATIO: 1.35
ECHO MV E/E' LATERAL: 11.1
ECHO MV E/E' RATIO (AVERAGED): 12.49
ECHO PV MAX VELOCITY: 1 M/S
ECHO PV PEAK GRADIENT: 4 MMHG
ECHO RV INTERNAL DIMENSION: 3.4 CM
ECHO RV TAPSE: 1 CM (ref 1.7–?)
ECHO TV E WAVE: 0.3 M/S
GFR SERPL CREATININE-BSD FRML MDRD: > 90 ML/MIN/1.73M2
GLUCOSE BLD STRIP.AUTO-MCNC: 233 MG/DL (ref 70–108)
GLUCOSE BLD STRIP.AUTO-MCNC: 245 MG/DL (ref 70–108)
GLUCOSE BLD STRIP.AUTO-MCNC: 408 MG/DL (ref 70–108)
GLUCOSE SERPL-MCNC: 246 MG/DL (ref 70–108)
POTASSIUM SERPL-SCNC: 4.3 MEQ/L (ref 3.5–5.2)
SODIUM SERPL-SCNC: 140 MEQ/L (ref 135–145)
WBC # BLD AUTO: 12.9 THOU/MM3 (ref 4.8–10.8)

## 2024-03-27 PROCEDURE — 6360000002 HC RX W HCPCS: Performed by: STUDENT IN AN ORGANIZED HEALTH CARE EDUCATION/TRAINING PROGRAM

## 2024-03-27 PROCEDURE — 6370000000 HC RX 637 (ALT 250 FOR IP): Performed by: STUDENT IN AN ORGANIZED HEALTH CARE EDUCATION/TRAINING PROGRAM

## 2024-03-27 PROCEDURE — 82948 REAGENT STRIP/BLOOD GLUCOSE: CPT

## 2024-03-27 PROCEDURE — 94640 AIRWAY INHALATION TREATMENT: CPT

## 2024-03-27 PROCEDURE — 80048 BASIC METABOLIC PNL TOTAL CA: CPT

## 2024-03-27 PROCEDURE — 93306 TTE W/DOPPLER COMPLETE: CPT

## 2024-03-27 PROCEDURE — 2580000003 HC RX 258: Performed by: STUDENT IN AN ORGANIZED HEALTH CARE EDUCATION/TRAINING PROGRAM

## 2024-03-27 PROCEDURE — 85048 AUTOMATED LEUKOCYTE COUNT: CPT

## 2024-03-27 PROCEDURE — 93306 TTE W/DOPPLER COMPLETE: CPT | Performed by: NUCLEAR MEDICINE

## 2024-03-27 PROCEDURE — 99239 HOSP IP/OBS DSCHRG MGMT >30: CPT | Performed by: NURSE PRACTITIONER

## 2024-03-27 PROCEDURE — 94669 MECHANICAL CHEST WALL OSCILL: CPT

## 2024-03-27 PROCEDURE — 36415 COLL VENOUS BLD VENIPUNCTURE: CPT

## 2024-03-27 PROCEDURE — 6370000000 HC RX 637 (ALT 250 FOR IP): Performed by: NURSE PRACTITIONER

## 2024-03-27 RX ORDER — AZITHROMYCIN 250 MG/1
TABLET, FILM COATED ORAL
Qty: 6 TABLET | Refills: 0 | Status: SHIPPED | OUTPATIENT
Start: 2024-03-27 | End: 2024-04-03 | Stop reason: ALTCHOICE

## 2024-03-27 RX ORDER — INSULIN LISPRO 100 [IU]/ML
5 INJECTION, SOLUTION INTRAVENOUS; SUBCUTANEOUS ONCE
Status: COMPLETED | OUTPATIENT
Start: 2024-03-27 | End: 2024-03-27

## 2024-03-27 RX ADMIN — ENOXAPARIN SODIUM 30 MG: 100 INJECTION SUBCUTANEOUS at 07:45

## 2024-03-27 RX ADMIN — IPRATROPIUM BROMIDE AND ALBUTEROL SULFATE 1 DOSE: .5; 3 SOLUTION RESPIRATORY (INHALATION) at 12:56

## 2024-03-27 RX ADMIN — BUMETANIDE 2 MG: 1 TABLET ORAL at 07:45

## 2024-03-27 RX ADMIN — ACETAMINOPHEN 650 MG: 325 TABLET ORAL at 07:45

## 2024-03-27 RX ADMIN — TICAGRELOR 90 MG: 90 TABLET ORAL at 07:45

## 2024-03-27 RX ADMIN — TAMSULOSIN HYDROCHLORIDE 0.4 MG: 0.4 CAPSULE ORAL at 07:45

## 2024-03-27 RX ADMIN — VALSARTAN 80 MG: 80 TABLET, FILM COATED ORAL at 07:45

## 2024-03-27 RX ADMIN — SODIUM CHLORIDE, PRESERVATIVE FREE 10 ML: 5 INJECTION INTRAVENOUS at 07:46

## 2024-03-27 RX ADMIN — ESCITALOPRAM OXALATE 5 MG: 10 TABLET ORAL at 07:45

## 2024-03-27 RX ADMIN — AZITHROMYCIN DIHYDRATE 500 MG: 500 INJECTION, POWDER, LYOPHILIZED, FOR SOLUTION INTRAVENOUS at 12:17

## 2024-03-27 RX ADMIN — INSULIN LISPRO 4 UNITS: 100 INJECTION, SOLUTION INTRAVENOUS; SUBCUTANEOUS at 07:46

## 2024-03-27 RX ADMIN — INSULIN LISPRO 5 UNITS: 100 INJECTION, SOLUTION INTRAVENOUS; SUBCUTANEOUS at 12:14

## 2024-03-27 RX ADMIN — EMPAGLIFLOZIN 25 MG: 25 TABLET, FILM COATED ORAL at 07:45

## 2024-03-27 RX ADMIN — ALLOPURINOL 300 MG: 300 TABLET ORAL at 07:45

## 2024-03-27 RX ADMIN — INSULIN LISPRO 16 UNITS: 100 INJECTION, SOLUTION INTRAVENOUS; SUBCUTANEOUS at 11:31

## 2024-03-27 RX ADMIN — PANTOPRAZOLE SODIUM 40 MG: 40 TABLET, DELAYED RELEASE ORAL at 05:31

## 2024-03-27 RX ADMIN — IPRATROPIUM BROMIDE AND ALBUTEROL SULFATE 1 DOSE: .5; 3 SOLUTION RESPIRATORY (INHALATION) at 09:02

## 2024-03-27 RX ADMIN — INSULIN GLARGINE 18 UNITS: 100 INJECTION, SOLUTION SUBCUTANEOUS at 07:47

## 2024-03-27 ASSESSMENT — PAIN DESCRIPTION - LOCATION: LOCATION: HEAD

## 2024-03-27 ASSESSMENT — PAIN DESCRIPTION - DESCRIPTORS: DESCRIPTORS: ACHING

## 2024-03-27 ASSESSMENT — PAIN SCALES - GENERAL: PAINLEVEL_OUTOF10: 4

## 2024-03-27 NOTE — PLAN OF CARE
Problem: Discharge Planning  Goal: Discharge to home or other facility with appropriate resources  3/26/2024 2232 by Jess Oconnor RN  Outcome: Progressing  Flowsheets (Taken 3/26/2024 2033)  Discharge to home or other facility with appropriate resources: Identify barriers to discharge with patient and caregiver  3/26/2024 1213 by Tanesha Gil LSW  Outcome: Progressing  3/26/2024 1003 by Mitch Taveras RN  Outcome: Progressing  3/26/2024 1003 by Mitch Taveras RN  Outcome: Progressing  Flowsheets (Taken 3/26/2024 1003)  Discharge to home or other facility with appropriate resources: Identify barriers to discharge with patient and caregiver     Problem: Safety - Adult  Goal: Free from fall injury  3/26/2024 2232 by Jess Oconnor RN  Outcome: Progressing  Flowsheets (Taken 3/26/2024 1003 by Mitch Taveras RN)  Free From Fall Injury: Instruct family/caregiver on patient safety  3/26/2024 1003 by Mitch Taveras RN  Outcome: Progressing  Flowsheets (Taken 3/26/2024 1003)  Free From Fall Injury: Instruct family/caregiver on patient safety     Problem: ABCDS Injury Assessment  Goal: Absence of physical injury  3/26/2024 2232 by Jess Oconnor RN  Outcome: Progressing  Flowsheets (Taken 3/25/2024 1644)  Absence of Physical Injury: Implement safety measures based on patient assessment  3/26/2024 1003 by Mitch Taveras RN  Outcome: Progressing  Flowsheets (Taken 3/25/2024 1644 by Jess Oconnor RN)  Absence of Physical Injury: Implement safety measures based on patient assessment     Problem: Chronic Conditions and Co-morbidities  Goal: Patient's chronic conditions and co-morbidity symptoms are monitored and maintained or improved  Outcome: Progressing  Flowsheets (Taken 3/26/2024 2033)  Care Plan - Patient's Chronic Conditions and Co-Morbidity Symptoms are Monitored and Maintained or Improved: Monitor and assess patient's chronic conditions and comorbid symptoms for stability, deterioration, or

## 2024-03-27 NOTE — PLAN OF CARE
Problem: Respiratory - Adult  Goal: Achieves optimal ventilation and oxygenation  Outcome: Progressing  Flowsheets (Taken 3/27/2024 0904)  Achieves optimal ventilation and oxygenation:   Assess for changes in respiratory status   Respiratory therapy support as indicated   Assess and instruct to report shortness of breath or any respiratory difficulty

## 2024-03-27 NOTE — PROGRESS NOTES
Pt was about going home. He was encouraged and anointed.    03/27/24 1718   Encounter Summary   Encounter Overview/Reason  Initial Encounter   Service Provided For: Patient   Referral/Consult From: [facility]   Support System Family members   Last Encounter  03/27/24  (Anointed)   Complexity of Encounter Low   Begin Time 1255   End Time  1303   Total Time Calculated 8 min   Spiritual/Emotional needs   Type Spiritual Support   Rituals, Rites and Sacraments   Type Anointing   Assessment/Intervention/Outcome   Assessment Hopeful   Intervention Empowerment   Outcome Encouraged

## 2024-03-27 NOTE — CARE COORDINATION
3/27/24, 12:01 PM EDT    Patient goals/plan/ treatment preferences discussed by  and .  Patient goals/plan/ treatment preferences reviewed with patient/ family.  Patient/ family verbalize understanding of discharge plan and are in agreement with goal/plan/treatment preferences.  Understanding was demonstrated using the teach back method.  AVS provided by RN at time of discharge, which includes all necessary medical information pertaining to the patients current course of illness, treatment, post-discharge goals of care, and treatment preferences.     Services At/After Discharge: Home Health, Aide services, Meals on wheels, and Nursing service       IMM Letter  IMM Letter given to Patient/Family/Significant other/Guardian/POA/by:: staff  IMM Letter date given:: 03/25/24  IMM Letter time given:: 1441     Pt is being discharged home today with current Passport services.  SW notified pts passport KYLEI Lorenza, she will resume pts current services.  Pt stated his brother will be picking him up.  Pt denied any additional needs.  RN is aware.

## 2024-03-27 NOTE — PROGRESS NOTES
Discharge teaching and instructions for diagnosis/procedure of pneumonia completed with patient using teachback method. AVS reviewed. Printed prescriptions given to patient. Patient voiced understanding regarding prescriptions, follow up appointments, and care of self at home. Discharged ambulatory to  independent living per self

## 2024-03-27 NOTE — DISCHARGE SUMMARY
Hospital Medicine Discharge Summary      Patient Identification:   Andrei Munoz   : 1960  MRN: [id number]   Account: [id number]      Patient's PCP: Malka Solis MD    Admit Date: 3/25/2024     Discharge Date: 3/27/2024      Admitting Physician: Pedro Sanders DO     Discharge Physician: Zack Smith, APRN - CNP     Discharge Diagnoses and Hospital Course:    Presented to the ED with SOB.    Acute hypoxic respiratory failure, resolved. Likely recovering from a viral disease, symptom onset about a week ago.  Chest x-ray/CT imaging not consistent with bacterial pneumonia. Presented hypoxic SpO2 88%, reportedly 82% at home.  CXR negative. Encouraged incentive spirometer and Acapella.       COPD, moderate with exacerbation.  Gold stage II. No home O2.  Last PFTs FEV1 54%, FEV1/FVC 77% . Start on Duonebs. Steroids deferred in the setting of hyperglycemia/ recent outpatient therapy. Follows outpatient with Dr. Elam, pulmonology.  Continue home inhalers on discharge. Zithromax rx given to complete course.     NIDDMII: Acute hyperglycemia noted with recent steroid use. Last HgbA1c 7.1 in 2023. Insulin given when here. Ok to resume home regimen on discharge.      Chronic HFpEF, NYHA II: ICM.  Compensated. Last ECHO EF 50%, mild global hypokinesis of LV .  Home GDMT includes BB, diuretic with metolazone, SGLT2i.  2 g sodium 2 L fluid restriction.  Monitor UOP strictly with daily weights.      FAISAL: On home CPAP     Obstructive CAD: S/p CABG x 2 and stent 2018.  On Brilinta and Imdur    HTN: Continue valsartan 80 mg p.o. daily with holding parameters    HLD: Continue Lipitor 40 mg p.o. daily     GERD: Started pantoprazole 40 mg p.o. daily    RLS: Continue Mirapex 0.125 mg p.o. q. Chest    BPH: continue tamsulosin 0.4 mg p.o. daily.  Follows outpatient with Dr. Brown, urology.    GABY/MDD/insomnia: Continue Lexapro 5 mg p.o. daily and trazodone 100 mg p.o. nightly.    Obesity. BMI  diet    Follow-up visits:   PCP 5-7 days      Discharge Medications:        Medication List        START taking these medications      azithromycin 250 MG tablet  Commonly known as: ZITHROMAX  500mg on day 1 followed by 250mg on days 2 - 5            CONTINUE taking these medications      albuterol sulfate  (90 Base) MCG/ACT inhaler  Commonly known as: PROVENTIL;VENTOLIN;PROAIR  INHALE TWO(2) PUFFS INTO LUNGS EVERY SIX(6) HOURS AS NEEDED FOR WHEEZING     allopurinol 300 MG tablet  Commonly known as: ZYLOPRIM  TAKE 1 TABLET BY MOUTH ONCE DAILY     Anoro Ellipta 62.5-25 MCG/ACT inhaler  Generic drug: umeclidinium-vilanterol  INHALE ONE (1) PUFF BY MOUTH ONCE DAILY     Arnuity Ellipta 100 MCG/ACT Aepb  Generic drug: fluticasone  INHALE 1 PUFF BY MOUTH INTO THE LUNGS DAILY     atorvastatin 40 MG tablet  Commonly known as: LIPITOR  TAKE 1 TABLET BY MOUTH EVERY DAY     bumetanide 2 MG tablet  Commonly known as: BUMEX  TAKE 1 TABLET BY MOUTH IN THE MORNING AND 1 TABLET BEFORE BEDTIME     CINNAMON PO     escitalopram 5 MG tablet  Commonly known as: LEXAPRO  Take 1 tablet by mouth daily     FISH OIL-KRILL OIL PO     fluticasone 50 MCG/ACT nasal spray  Commonly known as: FLONASE  INSTILL TWO (2) SPRAYS IN EACH NOSTRIL ONCE DAILY AS NEEDED FOR RHINITIS OR ALLERGIES     guaiFENesin 600 MG extended release tablet  Commonly known as: MUCINEX     isosorbide mononitrate 30 MG extended release tablet  Commonly known as: IMDUR  TAKE 1 TABLET BY MOUTH EVERY DAY     Jardiance 25 MG tablet  Generic drug: empagliflozin  TAKE 1 TABLET BY MOUTH DAILY     magnesium oxide 400 (240 Mg) MG tablet  Commonly known as: MAG-OX  Take 1 tablet by mouth daily     metFORMIN 500 MG tablet  Commonly known as: GLUCOPHAGE  TAKE 2 TABLETS BY MOUTH TWICE DAILY WITH MEALS     metOLazone 2.5 MG tablet  Commonly known as: ZAROXOLYN  Take 1 tablet by mouth daily for 2 days     metoprolol succinate 25 MG extended release tablet  Commonly known as: TOPROL

## 2024-03-27 NOTE — PROGRESS NOTES
[facility]  OCCUPATIONAL THERAPY MISSED TREATMENT NOTE  STRZ RENAL TELEMETRY 6K  6K-12/012-A      Date: 3/27/2024  Patient Name: Andrei Munoz        CSN: [id number]   : 1960  (64 y.o.)  Gender: male                REASON FOR MISSED TREATMENT:  patient declined OT eval stating he has no OT needs or concerns when returning home. OT to discontinue order. Please re-order if patient needs change.

## 2024-03-28 ENCOUNTER — CARE COORDINATION (OUTPATIENT)
Dept: CASE MANAGEMENT | Age: 64
End: 2024-03-28

## 2024-03-28 DIAGNOSIS — F33.1 MAJOR DEPRESSIVE DISORDER, RECURRENT EPISODE, MODERATE (HCC): ICD-10-CM

## 2024-03-28 DIAGNOSIS — J18.9 PNEUMONIA DUE TO INFECTIOUS ORGANISM, UNSPECIFIED LATERALITY, UNSPECIFIED PART OF LUNG: Primary | ICD-10-CM

## 2024-03-28 LAB
EKG ATRIAL RATE: 82 BPM
EKG P AXIS: -24 DEGREES
EKG P-R INTERVAL: 168 MS
EKG Q-T INTERVAL: 386 MS
EKG QRS DURATION: 100 MS
EKG QTC CALCULATION (BAZETT): 450 MS
EKG R AXIS: 8 DEGREES
EKG T AXIS: 18 DEGREES
EKG VENTRICULAR RATE: 82 BPM

## 2024-03-28 NOTE — CARE COORDINATION
CT IMAGING RM1  [facility] STRZ OUT EXP STR Rad/Card   9/23/2024  1:30 PM Nidhi Sharpe, APRN - CNP N Pul Med [facility]   11/22/2024  1:00 PM Leidy Damico MD N SRPX Heart [facility]   1/13/2025  3:00 PM Elias Brown Jr., MD N [place] [facility]   2/10/2025  1:30 PM [facility]       Care Transition Nurse provided contact information.  Plan for follow-up call in 5-7 days based on severity of symptoms and risk factors.  Plan for next call: symptom management-new or worsening sxs pneumonia, CHF, COPD, HTN  self management-pO2, BP, BS  follow-up appointment-PCP 4/3/24->review  RPM set up?    Clare Keenan RN

## 2024-03-29 RX ORDER — TRAZODONE HYDROCHLORIDE 100 MG/1
TABLET ORAL
Qty: 30 TABLET | Refills: 0 | Status: SHIPPED | OUTPATIENT
Start: 2024-03-29

## 2024-03-29 RX ORDER — ATORVASTATIN CALCIUM 40 MG/1
TABLET, FILM COATED ORAL
Qty: 30 TABLET | Refills: 0 | Status: SHIPPED | OUTPATIENT
Start: 2024-03-29

## 2024-03-29 RX ORDER — BUMETANIDE 2 MG/1
TABLET ORAL
Qty: 60 TABLET | Refills: 0 | Status: SHIPPED | OUTPATIENT
Start: 2024-03-29

## 2024-03-29 RX ORDER — METOPROLOL SUCCINATE 25 MG/1
TABLET, EXTENDED RELEASE ORAL
Qty: 60 TABLET | Refills: 0 | Status: SHIPPED | OUTPATIENT
Start: 2024-03-29

## 2024-03-29 RX ORDER — FLUTICASONE PROPIONATE 50 MCG
SPRAY, SUSPENSION (ML) NASAL
Qty: 16 G | Refills: 0 | Status: SHIPPED | OUTPATIENT
Start: 2024-03-29

## 2024-03-29 RX ORDER — ALBUTEROL SULFATE 90 UG/1
AEROSOL, METERED RESPIRATORY (INHALATION)
Qty: 8.5 G | Refills: 0 | Status: SHIPPED | OUTPATIENT
Start: 2024-03-29

## 2024-03-29 RX ORDER — OMEPRAZOLE 20 MG/1
CAPSULE, DELAYED RELEASE ORAL
Qty: 30 CAPSULE | Refills: 0 | Status: SHIPPED | OUTPATIENT
Start: 2024-03-29

## 2024-03-29 NOTE — TELEPHONE ENCOUNTER
The pharmacy is  requesting a refill of the below medication which has been pended for you:     Requested Prescriptions     Pending Prescriptions Disp Refills    omeprazole (PRILOSEC) 20 MG delayed release capsule [Pharmacy Med Name: OMEPRAZOLE DR 20MG CAP 20 Capsule] 30 capsule 0     Sig: TAKE 1 CAPSULE BY MOUTH ONCE DAILY       Last Appointment Date: 11/20/2023  Next Appointment Date: Visit date not found    Allergies   Allergen Reactions    Zoloft [Sertraline Hcl] Other (See Comments)     Headaches, sweats, bad dreams

## 2024-03-29 NOTE — TELEPHONE ENCOUNTER
The pharmacy is  requesting a refill of the below medication which has been pended for you:     Requested Prescriptions     Pending Prescriptions Disp Refills    bumetanide (BUMEX) 2 MG tablet [Pharmacy Med Name: BUMETANIDE 2 MG TABLET 2 Tablet] 60 tablet 0     Sig: TAKE ONE (1) TABLET BY MOUTH TWICE DAILY IN THE MORNING AND BEFORE BEDTIME    metoprolol succinate (TOPROL XL) 25 MG extended release tablet [Pharmacy Med Name: METOPROLOL SUC ER 25MG TAB 25 Tablet] 60 tablet 0     Sig: TAKE 2 TABLETS BY MOUTH ONCE DAILY    ticagrelor (BRILINTA) 90 MG TABS tablet [Pharmacy Med Name: BRILINTA 90 MG TABS 90 Tablet] 60 tablet 0     Sig: TAKE 1 TABLET BY MOUTH TWICE DAILY    fluticasone (FLONASE) 50 MCG/ACT nasal spray [Pharmacy Med Name: FLUTICASONE 50MCG NASAL 16 50 KINGSLEY] 16 g 0     Sig: INSTILL TWO (2) SPRAYS IN EACH NOSTRIL ONCE DAILY AS NEEDED FOR RHINITIS OR ALLERGIES    atorvastatin (LIPITOR) 40 MG tablet [Pharmacy Med Name: ATORVASTATIN 40MG TABLET 40 Tablet] 30 tablet 0     Sig: TAKE 1 TABLET BY MOUTH EVERY DAY    traZODone (DESYREL) 100 MG tablet [Pharmacy Med Name: TRAZODONE 100 MG TABS 100 Tablet] 30 tablet 0     Sig: TAKE 1 TABLET BY MOUTH EACH EVENING AS NEEDED FOR SLEEP    albuterol sulfate HFA (PROVENTIL;VENTOLIN;PROAIR) 108 (90 Base) MCG/ACT inhaler [Pharmacy Med Name: ALBUTEROL HFA *PROA* 90MCG 108 (90 BAS Aerosol] 8.5 g 0     Sig: INHALE 2 PUFFS BY MOUTH EVERY 6 HOURS AS NEEDED FOR WHEEZING       Last Appointment Date: 11/20/2023  Next Appointment Date: 3/29/2024    Allergies   Allergen Reactions    Zoloft [Sertraline Hcl] Other (See Comments)     Headaches, sweats, bad dreams

## 2024-04-03 ENCOUNTER — OFFICE VISIT (OUTPATIENT)
Dept: FAMILY MEDICINE CLINIC | Age: 64
End: 2024-04-03

## 2024-04-03 VITALS
RESPIRATION RATE: 18 BRPM | HEIGHT: 71 IN | SYSTOLIC BLOOD PRESSURE: 136 MMHG | WEIGHT: 251 LBS | BODY MASS INDEX: 35.14 KG/M2 | HEART RATE: 76 BPM | DIASTOLIC BLOOD PRESSURE: 84 MMHG

## 2024-04-03 DIAGNOSIS — Z09 HOSPITAL DISCHARGE FOLLOW-UP: Primary | ICD-10-CM

## 2024-04-03 SDOH — ECONOMIC STABILITY: FOOD INSECURITY: WITHIN THE PAST 12 MONTHS, THE FOOD YOU BOUGHT JUST DIDN'T LAST AND YOU DIDN'T HAVE MONEY TO GET MORE.: NEVER TRUE

## 2024-04-03 SDOH — ECONOMIC STABILITY: INCOME INSECURITY: HOW HARD IS IT FOR YOU TO PAY FOR THE VERY BASICS LIKE FOOD, HOUSING, MEDICAL CARE, AND HEATING?: NOT HARD AT ALL

## 2024-04-03 SDOH — ECONOMIC STABILITY: FOOD INSECURITY: WITHIN THE PAST 12 MONTHS, YOU WORRIED THAT YOUR FOOD WOULD RUN OUT BEFORE YOU GOT MONEY TO BUY MORE.: NEVER TRUE

## 2024-04-03 ASSESSMENT — PATIENT HEALTH QUESTIONNAIRE - PHQ9
7. TROUBLE CONCENTRATING ON THINGS, SUCH AS READING THE NEWSPAPER OR WATCHING TELEVISION: NOT AT ALL
2. FEELING DOWN, DEPRESSED OR HOPELESS: SEVERAL DAYS
SUM OF ALL RESPONSES TO PHQ QUESTIONS 1-9: 2
3. TROUBLE FALLING OR STAYING ASLEEP: NOT AT ALL
SUM OF ALL RESPONSES TO PHQ QUESTIONS 1-9: 2
6. FEELING BAD ABOUT YOURSELF - OR THAT YOU ARE A FAILURE OR HAVE LET YOURSELF OR YOUR FAMILY DOWN: NOT AT ALL
8. MOVING OR SPEAKING SO SLOWLY THAT OTHER PEOPLE COULD HAVE NOTICED. OR THE OPPOSITE, BEING SO FIGETY OR RESTLESS THAT YOU HAVE BEEN MOVING AROUND A LOT MORE THAN USUAL: NOT AT ALL
4. FEELING TIRED OR HAVING LITTLE ENERGY: NOT AT ALL
9. THOUGHTS THAT YOU WOULD BE BETTER OFF DEAD, OR OF HURTING YOURSELF: NOT AT ALL
SUM OF ALL RESPONSES TO PHQ9 QUESTIONS 1 & 2: 2
SUM OF ALL RESPONSES TO PHQ QUESTIONS 1-9: 2
1. LITTLE INTEREST OR PLEASURE IN DOING THINGS: SEVERAL DAYS
SUM OF ALL RESPONSES TO PHQ QUESTIONS 1-9: 2
10. IF YOU CHECKED OFF ANY PROBLEMS, HOW DIFFICULT HAVE THESE PROBLEMS MADE IT FOR YOU TO DO YOUR WORK, TAKE CARE OF THINGS AT HOME, OR GET ALONG WITH OTHER PEOPLE: NOT DIFFICULT AT ALL

## 2024-04-03 ASSESSMENT — ENCOUNTER SYMPTOMS
EYE PAIN: 0
TROUBLE SWALLOWING: 0
BACK PAIN: 0
ABDOMINAL PAIN: 0
SINUS PAIN: 0
NAUSEA: 0
SINUS PRESSURE: 0
SORE THROAT: 0
CONSTIPATION: 0
COUGH: 1
CHEST TIGHTNESS: 0
BLOOD IN STOOL: 0

## 2024-04-03 NOTE — PROGRESS NOTES
Post-Discharge Transitional Care Follow Up      Andrei Munoz   YOB: 1960    Date of Office Visit:  4/3/2024  Date of Hospital Admission: 3/25/24  Date of Hospital Discharge: 3/27/24  Readmission Risk Score (high >=14%. Medium >=10%):Readmission Risk Score: 12.3      Care management risk score Rising risk (score 2-5) and Complex Care (Scores >=6): No Risk Score On File     Non face to face  following discharge, date last encounter closed (first attempt may have been earlier): 03/28/2024     Call initiated 2 business days of discharge: Yes     IMPRESSION            PLAN    Subjective:   HPI    Inpatient course: Discharge summary reviewed- see chart.    Interval history/Current status: Was admitted on March 25 and discharged March 27 with underlying pneumonia.  Did have an episode of significant hypoxia that corrected.  He was having some significant thick mucus cough being present.  Home on Zithromax.  Using his inhalers.  While in the hospital CT of the chest noted patchy infiltrates right side none on the left initial chest x-ray questionable left side.  Small right 5 mm nodule right lung areas suggested follow-up 3 months CT scan.    No  oxygen  for  home       Pulse ox   good  room  air     Patient Active Problem List   Diagnosis    GERD (gastroesophageal reflux disease)    HTN (hypertension)    Osteoarthritis    Drug abuse in remission (HCC)    Obstructive sleep apnea on CPAP    Coronary artery disease involving native coronary artery of native heart without angina pectoris    S/P CABG x 2    S/P PTCA: 1/15/2018: Stent diagonal branch Xience 3.0 x 12 mm.    Stage 3 severe COPD by GOLD classification ([facility])    Diabetes mellitus type 2, diet-controlled (HCC)    Obesity (BMI 30-39.9)    CHF (congestive heart failure), NYHA class II, chronic, diastolic (HCC)    Major depressive disorder, recurrent episode, moderate (HCC)    GABY (generalized anxiety disorder)    Centrilobular emphysema ([facility])

## 2024-04-04 ENCOUNTER — CARE COORDINATION (OUTPATIENT)
Dept: CARE COORDINATION | Age: 64
End: 2024-04-04

## 2024-04-04 ENCOUNTER — CARE COORDINATION (OUTPATIENT)
Dept: CASE MANAGEMENT | Age: 64
End: 2024-04-04

## 2024-04-04 NOTE — CARE COORDINATION
Care Transitions Follow Up Call    Patient Current Location:  Home: [address] [place] [address]    Care Transition Nurse contacted the patient by telephone to follow up after admission on 3/25/24.  Verified name and  with patient as identifiers.    Patient: Andrei Munoz  Patient : 1960   MRN: <[id number]>  Reason for Admission: ac resp failure w/ hypoxia pneumonia   Discharge Date: 3/27/24 RARS: Readmission Risk Score: 12.3      Needs to be reviewed by the provider   Additional needs identified to be addressed with provider: No  none             Method of communication with provider: none.    Contacted pt for care transition follow up.  Jaciel states he is still not back 100% but coming along.  States he is able to do activities that need to be done around the house.  Not overexerting himself.  Continues to have a productive cough and drainage at times.  Sputum is now clear. No c/o chest pain/discomfort, pressure, tightness, increased shortness of breath.  /62.  Reports SpO2 was 89-90% earlier.  Reports SpO2 went up to 92%.  He is aware of the importance of maintaining levels in the 90's.  Continues to have slight \"puffiness\" in BLE.   Keep legs elevated as much as possible.  Denies having any urinary issues.  Has not checked blood sugar today.  He informed CTN that his mom is still currently in rehab.  Hoping she gets discharged but Saturday because his brother will be getting  then.  Aware of when to contact providers.  He is enrolled in RPM.  No questions or concerns at this time.      Addressed changes since last contact:  none  Discussed follow-up appointments. If no appointment was previously scheduled, appointment scheduling offered: Yes.   Is follow up appointment scheduled within 7 days of discharge? Yes.    Follow Up  Future Appointments   Date Time Provider Department Center   2024  6:00 PM STR MRI RM1 STRZ MRI STR Rad/Card   2024  6:30 PM STR MRI RM1 STRZ MRI STR

## 2024-04-04 NOTE — CARE COORDINATION
Date/Time:  4/4/2024 1:48 PM    Update: Updated SP02 remains between 89%-90%.  ACM and PCP updated.

## 2024-04-04 NOTE — CARE COORDINATION
Remote Alert Monitoring Note      Date/Time:  2024 11:06 AM  Patient Current Location: Home: [address] [place] [id number]    LPN contacted patient by telephone regarding red alert received for pulse ox reading (87%). Verified patients name and  as identifiers.    Rpm alert to be reviewed by the provider    Red Alert FYI  SP02 87%    Pt is asymptomatic and in no apparent distress, speaking in full sentences.  Patient states he is feeling OK, just recovering from pneumonia. After using his inhalers his Sp02 remains 89%-90%.  Please advise with any changes.                     Background: CHF, High Blood-Pressure, COPD, Pneumonia, Diabetes     Refer to 911 immediately if:  Patient unresponsive or unable to provide history  Change in cognition or sudden confusion  Patient unable to respond in complete sentences  Intense chest pain/tightness  Any concern for any clinical emergency  Red Alert: Provider response time of 1 hr required for any red alert requiring intervention  Yellow Alert: Provider response time of 3hr required for any escalated yellow alert    O2 Triage  Are you having any Chest Pain? no   Are you having any Shortness of Breath? no   Swelling in your hands or feet? no     Are you having any other health concerns or issues? Recovering from Pneumonia       Have you taken your medications as instructed by your doctor today? Yes       Clinical Interventions: Reviewed and followed up on alerts and treatments-. Pt is asymptomatic and in no apparent distress, speaking in full sentences.  Patient states he is feeling OK, just recovering from pneumonia.  He states he is going to use his inhaler, warm hands and recheck his SP02 afterward.  Will await update.    Signs and symptoms of CHF discussed with patient, such as feeling more tired than normal, feeling short of breath, coughing that increases when lying down, sudden weight gain, swelling of the feet, legs or belly.  Patient verbalized understanding to

## 2024-04-04 NOTE — CARE COORDINATION
Despite encouragement,Jaciel states he is fine and does not feel the need for an office visit at this time.  He states he feels fine and if his readings drop below 86% then he will let someone know. Patient educated on the importance of keeping SP02 above 92%. Patient became irritated with conversation.

## 2024-04-08 ENCOUNTER — CARE COORDINATION (OUTPATIENT)
Dept: CARE COORDINATION | Age: 64
End: 2024-04-08

## 2024-04-08 NOTE — CARE COORDINATION
24 3:28 PM Patient is currently enrolled in Remote Patient Monitoring for Pneumonia, CHF, COPD, Diabetes, and HTN.  Outreach to pt due to no temp or glucose entered.  Pt name and  verified as identifiers.  Pt states he was unaware he needed to record metrics.  He will enter tomorrow when he enters rest of RPM metrics.  Plan/Follow Up: Will continue to review, monitor and address alerts with follow up based on severity of symptoms and risk factors.

## 2024-04-09 ENCOUNTER — CARE COORDINATION (OUTPATIENT)
Dept: CARE COORDINATION | Age: 64
End: 2024-04-09

## 2024-04-09 NOTE — CARE COORDINATION
Remote Patient Monitoring Note      Date/Time:  4/9/2024 8:37 AM    LPN attempted to contact patient by telephone regarding red alert received for glucose reading (343) and weight increase (249).     Background:  CHF, High Blood-Pressure, COPD, Pneumonia, Diabetes     Clinical Interventions:  HIPAA compliant message left on  requesting a return call.    Plan/Follow Up: Will continue to review, monitor and address alerts with follow up based on severity of symptoms and risk factors.       Meka Iraheta LPN  [facility]/ CTN/ Remote Patient Monitoring  [phone]

## 2024-04-09 NOTE — CARE COORDINATION
Remote Alert Monitoring Note      Date/Time:  2024 9:54 AM  Patient Current Location: Home: [address] [place], [place] [id number]    LPN contacted patient by telephone regarding red alert received for glucose reading (343) and weight increase (249lb). Verified patients name and  as identifiers.    Rpm alert to be reviewed by the provider                         Background:  CHF, High Blood-Pressure, COPD, Pneumonia, Diabetes     Refer to 911 immediately if:  Patient unresponsive or unable to provide history  Change in cognition or sudden confusion  Patient unable to respond in complete sentences  Intense chest pain/tightness  Any concern for any clinical emergency  Red Alert: Provider response time of 1 hr required for any red alert requiring intervention  Yellow Alert: Provider response time of 3hr required for any escalated yellow alert    Hyperglycemia Triage  Are you having any vision changes? no   Are you having any increased thirst? no   Do you feel sweaty/clammy? no   Are you having increased urination? no   Are you having increased fatigue? no       Have you taken your medications as instructed by your doctor today? Yes   Weight Triage  Are you weighing any different than you did yesterday? (time of day, clothes and shoes on or off, etc)? no   Do you have any shortness of breath? no   Do you have any swelling in your hands of feet? no   Are you having any other health concerns or issues? no       Clinical Interventions: Reviewed and followed up on alerts and treatments-. Pt is asymptomatic and in no apparent distress, speaking in full sentences.  Patient states he is \"fine\".  He denies any swelling or SOB despite 2lb weight gain overnight.  He states his \"weight will be down later today.\"  Multiple glucose entries received.  He states 343 was the correct result. Patient cut call short. ACM updated, will monitor.        Plan/Follow Up: Will continue to review, monitor and address alerts with follow up based

## 2024-04-10 ENCOUNTER — CARE COORDINATION (OUTPATIENT)
Dept: CASE MANAGEMENT | Age: 64
End: 2024-04-10

## 2024-04-10 NOTE — CARE COORDINATION
Completed      Disease Association: Completed        Do you have any ongoing symptoms?: Yes   Patient-reported symptoms: Shortness of Breath, Cough   Do you have all of your prescriptions and are they filled?: Yes   Were you discharged with any Home Care or Post Acute Services or do you currently have any active services?: Yes   Post Acute Services: Home Health (Comment: [facility])         Patient DME: [employer]   Do you have any needs or concerns that I can assist you with?: No             Follow Up Appointment:      Future Appointments         Provider Specialty Dept Phone    4/16/2024 6:00 PM (Arrive by 5:30 PM) STR MRI RM1 Radiology [phone]    4/16/2024 6:30 PM (Arrive by 6:00 PM) STR MRI RM1 Radiology [phone]    5/3/2024 12:00 PM Malka Solis MD Family Medicine [phone]    5/14/2024 2:00 PM Guillermina Courtney APRN - CNP Cardiology [phone]    9/19/2024 1:00 PM (Arrive by 12:30 PM) STR CT IMAGING 1  [facility] Radiology [phone]    9/23/2024 1:30 PM Nidhi Sharpe, APRN - CNP Pulmonology [phone]    11/22/2024 1:00 PM Leidy Damico MD Cardiology [phone]    1/13/2025 3:00 PM Elias Brown Jr., MD Urology [phone]    2/10/2025 1:30 PM Bernadette Leon, [facility] Pulmonology [phone]            Care Transition Nurse provided contact information.  Plan for follow-up call in 2-5 days based on severity of symptoms and risk factors.  Plan for next call: symptom management-cough, SOB, SpO2, swelling, BS, any new or worsening symptoms      Marifer Jean RN

## 2024-04-12 ENCOUNTER — CARE COORDINATION (OUTPATIENT)
Dept: CARE COORDINATION | Age: 64
End: 2024-04-12

## 2024-04-12 VITALS
HEART RATE: 64 BPM | BODY MASS INDEX: 34.03 KG/M2 | WEIGHT: 244 LBS | OXYGEN SATURATION: 92 % | SYSTOLIC BLOOD PRESSURE: 124 MMHG | DIASTOLIC BLOOD PRESSURE: 76 MMHG | TEMPERATURE: 97.2 F

## 2024-04-12 NOTE — CARE COORDINATION
Remote Alert Monitoring Note      Date/Time:  2024 10:19 AM  Patient Current Location: Home: [address] [place] [address]    LPN contacted patient by telephone regarding red alert received for glucose reading (322). Verified patients name and  as identifiers.    Rpm alert to be reviewed by the provider                         Background: CHF, High Blood-Pressure, COPD, Pneumonia, Diabetes     Refer to 911 immediately if:  Patient unresponsive or unable to provide history  Change in cognition or sudden confusion  Patient unable to respond in complete sentences  Intense chest pain/tightness  Any concern for any clinical emergency  Red Alert: Provider response time of 1 hr required for any red alert requiring intervention  Yellow Alert: Provider response time of 3hr required for any escalated yellow alert    Hyperglycemia Triage  Are you having any vision changes? no   Are you having any increased thirst? no   Do you feel sweaty/clammy? no   Are you having increased urination? no   Are you having increased fatigue? no       Have you taken your medications as instructed by your doctor today? Yes       Clinical Interventions: Reviewed and followed up on alerts and treatments-. Pt is asymptomatic and in no apparent distress, speaking in full sentences.  Patient states he is feeling fine and has recently taken his prescribed medications.         Plan/Follow Up: Will continue to review, monitor and address alerts with follow up based on severity of symptoms and risk factors.    MIREILLE Childs [facility]/ CTN/ Remote Patient monitoring  [phone]

## 2024-04-15 ENCOUNTER — CARE COORDINATION (OUTPATIENT)
Dept: CASE MANAGEMENT | Age: 64
End: 2024-04-15

## 2024-04-15 NOTE — CARE COORDINATION
Date/Time:  4/15/2024 12:34 PM  LPN attempted to reach patient by telephone regarding red alert in remote patient monitoring program. Left HIPPA compliant message requesting a return call. Will attempt to reach patient again.     enrolled in RPM for CHF HTN COPD PNEUMONIA AND DM   Second call to pt to request recheck of glucose reading.

## 2024-04-15 NOTE — CARE COORDINATION
Remote Patient Monitoring Note  Date/Time:  4/15/2024 2:42 PM  Patient Current Location: [facility] [address]  LPN contacted patient by telephone regarding red alert received for glucose reading (012/366). Verified patients name and  as identifiers.  Background: ENROLLED IN RPM FOR r CHF HTN COPD PNEUMONIA AND DM   Clinical Interventions: Reviewed and followed up on alerts and treatments-alert escalated to PCP at   135pm  RPM red alert for high glucose reading. Pt is on Jardiance and Metformin. Asymptomatic. Alert escalated to Brenda Rowe CNP  Plan/Follow Up: Will continue to review, monitor and address alerts with follow up based on severity of symptoms and risk factors.

## 2024-04-15 NOTE — CARE COORDINATION
Remote Patient Monitoring Note  Date/Time:  4/15/2024 1:26 PM  Patient Current Location: [facility]: [facility] [address]  LPN contacted patient by telephone regarding red alert received for glucose reading (454). Verified patients name and  as identifiers.  Background: enrolled in RPM for CHF HTN COPD PNEUMONIA AND DM   Clinical Interventions: Reviewed and followed up on alerts and treatments-return call to Jaciel requesting recheck of Glucose   new reading 366. Pt states he feels fine. Asymptomatic  Plan/Follow Up: Will continue to review, monitor and address alerts with follow up based on severity of symptoms and risk factors.      Alert esclated to PCP  METRICS x 7 days  SENT TO PCP

## 2024-04-15 NOTE — CARE COORDINATION
Remote Alert Monitoring Note    Date/Time:  4/15/2024 10:32 AM  Patient Current Location: [place]  Verified patients name and  as identifiers.    Rpm alert to be reviewed by the provider   red alert  glucose reading (454) and weight (increase of 5 pounds in 7 days)  Additional needs to be addressed by provider: No                   LPN contacted patient by telephone regarding red alert received for weight 247#  glucose 454#    Background: enrolled in RPM for CHF HTN COPD PNEUMONIA AND DM  Refer to 911 immediately if:  Patient unresponsive or unable to provide history  Change in cognition or sudden confusion  Patient unable to respond in complete sentences  Intense chest pain/tightness  Any concern for any clinical emergency  Red Alert: Provider response time of 1 hr required for any red alert requiring intervention  Yellow Alert: Provider response time of 3hr required for any escalated yellow alert    Patient Chief Complaint:  Weight Scale Triage  Was your weight obtained upon rising/waking today? yes   Was your weight obtained after voiding and/or use of the bathroom today? yes   Did you weigh yourself in the same amount of clothing today, compared to how you typically do? yes   Was the scale bumped or moved prior to today's weight? no   Is your scale on a flat/hard surface? yes   Did you obtain your weight with shoes on? no   If yes, is this something you normally do during your daily weights? yes   Were you standing up straight on the scale today? yes   Were you leaning on anything while obtaining your weight today? no     Hyperglycemia:none  Clinical Interventions: Reviewed and followed up on alerts and treatments-Spoke to Jcaiel regarding RPM red alert for weight increase of 5 pounds in 7 days and increased glucose reading. Jaciel states \" I'm fine\" reports he drank alcohol over the weekend and that resulted in high glucose reading. Pt is asymptomatic. He takes Jardiance and Metformin.   Will recheck glucose

## 2024-04-15 NOTE — CARE COORDINATION
Verbal order, patient needs to be seen soon.  Appt scheduled for Wed April 17th @ 2:40pm.  Patient will call back tomorrow if he is unable to keep that appt.

## 2024-04-16 ENCOUNTER — CARE COORDINATION (OUTPATIENT)
Dept: CASE MANAGEMENT | Age: 64
End: 2024-04-16

## 2024-04-16 ENCOUNTER — HOSPITAL ENCOUNTER (OUTPATIENT)
Dept: MRI IMAGING | Age: 64
Discharge: HOME | End: 2024-04-16
Payer: MEDICARE

## 2024-04-16 ENCOUNTER — TELEPHONE (OUTPATIENT)
Dept: FAMILY MEDICINE CLINIC | Age: 64
End: 2024-04-16

## 2024-04-16 ENCOUNTER — TELEPHONE (OUTPATIENT)
Dept: CARDIOLOGY CLINIC | Age: 64
End: 2024-04-16

## 2024-04-16 ENCOUNTER — CARE COORDINATION (OUTPATIENT)
Dept: CARE COORDINATION | Age: 64
End: 2024-04-16

## 2024-04-16 DIAGNOSIS — M48.02 DEGENERATIVE CERVICAL SPINAL STENOSIS: ICD-10-CM

## 2024-04-16 DIAGNOSIS — G62.9 POLYNEUROPATHY, UNSPECIFIED: ICD-10-CM

## 2024-04-16 PROCEDURE — 72141 MRI NECK SPINE W/O DYE: CPT

## 2024-04-16 PROCEDURE — 72148 MRI LUMBAR SPINE W/O DYE: CPT

## 2024-04-16 NOTE — TELEPHONE ENCOUNTER
Pt reporting to care coordination that his legs have been more swollen. Please call for additional symptoms, weight, and what he is taking. Thank you

## 2024-04-16 NOTE — TELEPHONE ENCOUNTER
Spoke with  patient   Denies SOB, bloating  Took bumex three times yesterday   Stated that's what he plans on doing again today  He may do this a couple times a month  Normally bid

## 2024-04-16 NOTE — CARE COORDINATION
Care Transitions Follow Up Call    Patient Current Location:  Home: [address] [place] [address]    Care Transition Nurse contacted the patient by telephone to follow up after admission on 3/25/24.  Verified name and  with patient as identifiers.    Patient: Andrei Munoz  Patient : 1960   MRN: [id number]  Reason for Admission: pneumonia, CHF  Discharge Date: 3/27/24 RARS: Readmission Risk Score: 12.3      Needs to be reviewed by the provider   Additional needs identified to be addressed with provider: No  none             Method of communication with provider: none.    CTN call to Jaciel today and he says he is feeling ok. He says he's had a lot of phone calls today concerned about him.  Says swelling in LE same-taking extra Bumex today.  We discussed calling CHF clinic for new or worsening sxs or seeking ER medical attention. He expressed understanding.  He had to [facility] PCP HFU to 5/3/24 d/t appt. Conflict w/ Ortho tomorrow.  HH active.  This writer sent info on [facility] addiction  service via [employer].  He was appreciative of this and will be looking into this asap.  BLOOD SUGAR=344 today.  Denies drinking alcohol since last weekend.  MRI spine tonight at [facility].  Denies need for transportation.  We discussed the analogy of putting the Oxygen on yourself before putting it on others if the plane is going down. He expresses he understands this and wants to prioritize his health. He is appreciative of the care and attention he is receiving. Will continue to follow.    Addressed changes since last contact:  none  Discussed follow-up appointments. If no appointment was previously scheduled, appointment scheduling offered: Yes.   Is follow up appointment scheduled within 7 days of discharge? Yes.    Follow Up  Future Appointments   Date Time Provider Department Center   2024  6:00 PM STR MRI RM1 STRZ MRI STR Rad/Card   2024  6:30 PM STR MRI RM1 STRZ MRI STR Rad/Card   5/3/2024 12:00 PM Will

## 2024-04-16 NOTE — CARE COORDINATION
Remote Alert Monitoring Note      Date/Time:  2024 1:42 PM  Patient Current Location: Home: [address] [place] [address]    LPN contacted patient by telephone regarding red alert received for glucose reading (344). Verified patients name and  as identifiers.    Rpm alert to be reviewed by the provider                         Background:  CHF, High Blood-Pressure, COPD, Pneumonia, Diabetes     Refer to 911 immediately if:  Patient unresponsive or unable to provide history  Change in cognition or sudden confusion  Patient unable to respond in complete sentences  Intense chest pain/tightness  Any concern for any clinical emergency  Red Alert: Provider response time of 1 hr required for any red alert requiring intervention  Yellow Alert: Provider response time of 3hr required for any escalated yellow alert    Hyperglycemia Triage  Are you having any vision changes? no   Are you having any increased thirst? no   Do you feel sweaty/clammy? no   Are you having increased urination? no   Are you having increased fatigue? no       Have you taken your medications as instructed by your doctor today? Yes       Clinical Interventions: Reviewed and followed up on alerts and treatments-. Pt is asymptomatic and in no apparent distress, speaking in full sentences.  Per previous note, patient did update PCP on todays glucose reading.  He also rescheduled his appt tomorrow and will be seeing them on .  He states he feels fine and is going to give up beer drinking in an attempt to control his glucose.        Plan/Follow Up: Will continue to review, monitor and address alerts with follow up based on severity of symptoms and risk factors.    MIREILLE Childs [facility]/ CTN/ Remote Patient monitoring  [phone]

## 2024-04-16 NOTE — TELEPHONE ENCOUNTER
Pt called and cancelled the appt for tomorrow.  Said that he has an appt at [facility] tomorrow at 1:30.  I tried to reschedule however the next available appt is May 3rd which he already has an appt that day.  He said his sugar was 340 today again.  He also said that he was going to try to give up drinking beer to get his sugars down.  I said that would be a good idea.

## 2024-04-17 ENCOUNTER — CARE COORDINATION (OUTPATIENT)
Dept: CASE MANAGEMENT | Age: 64
End: 2024-04-17

## 2024-04-17 NOTE — TELEPHONE ENCOUNTER
Please verify that he is taking all his meds as prescribed.  We need him to keep log of his blood sugars and he rally needs appt as soon as possible. We could use a same day appt is needed to be.

## 2024-04-17 NOTE — CARE COORDINATION
-Remote Alert Monitoring Note      Date/Time:  2024 3:35 PM  Patient Current Location: Home: [address] [place], [place] [id number]  Verified patients name and  as identifiers.    Rpm alert to be reviewed by the provider   red alert  weight (increase of 5 pounds in 7 days)  Additional needs to be addressed by provider: No                   LPN contacted patient by telephone regarding red alert received for weight increase    Background: enrolled in RPM for CHF HTN COPD PNEUMONIA AND DM  Refer to 911 immediately if:  Patient unresponsive or unable to provide history  Change in cognition or sudden confusion  Patient unable to respond in complete sentences  Intense chest pain/tightness  Any concern for any clinical emergency  Red Alert: Provider response time of 1 hr required for any red alert requiring intervention  Yellow Alert: Provider response time of 3hr required for any escalated yellow alert    Patient Chief Complaint:  Weight Scale Triage  Was your weight obtained upon rising/waking today? YES   Was your weight obtained after voiding and/or use of the bathroom today? yes   Did you weigh yourself in the same amount of clothing today, compared to how you typically do? yes   Was the scale bumped or moved prior to today's weight? no   Is your scale on a flat/hard surface? yes   Did you obtain your weight with shoes on? no   If yes, is this something you normally do during your daily weights? yes   Were you standing up straight on the scale today? yes   Were you leaning on anything while obtaining your weight today? yes       Clinical Interventions: Reviewed and followed up on alerts and treatments-spoke to Jaciel regarding RPM alert for weight increase. Pt states he often holds on to the counter to balance himself  while standing on the scale  . Weight of 239# yesterday was an inaccurate reading. Removed 239# in HRS ,. Weight today is stable at 242.5#  Plan/Follow Up: Will continue to review, monitor and address

## 2024-04-19 ENCOUNTER — CARE COORDINATION (OUTPATIENT)
Dept: CARE COORDINATION | Age: 64
End: 2024-04-19

## 2024-04-19 ENCOUNTER — CARE COORDINATION (OUTPATIENT)
Dept: CASE MANAGEMENT | Age: 64
End: 2024-04-19

## 2024-04-19 NOTE — CARE COORDINATION
Remote Alert Monitoring Note      Date/Time:  2024 11:45 AM  Patient Current Location: Home: [address] [place] [id number]    LPN contacted patient by telephone regarding red alert received for glucose reading (340). Verified patients name and  as identifiers.    Rpm alert to be reviewed by the provider                         Background: CHF, High Blood-Pressure, COPD, Pneumonia, Diabetes     Refer to 911 immediately if:  Patient unresponsive or unable to provide history  Change in cognition or sudden confusion  Patient unable to respond in complete sentences  Intense chest pain/tightness  Any concern for any clinical emergency  Red Alert: Provider response time of 1 hr required for any red alert requiring intervention  Yellow Alert: Provider response time of 3hr required for any escalated yellow alert    Hyperglycemia Triage  Are you having any vision changes? no   Are you having any increased thirst? no   Do you feel sweaty/clammy? no   Are you having increased urination? no   Are you having increased fatigue? no       Have you taken your medications as instructed by your doctor today? Yes       Clinical Interventions: Reviewed and followed up on alerts and treatments-. Pt is asymptomatic and in no apparent distress, speaking in full sentences.  He states he is feeling fine and has taken prescribed medications.  He is scheduled to see PCP on .  ACM updated. Will monitor.        Plan/Follow Up: Will continue to review, monitor and address alerts with follow up based on severity of symptoms and risk factors.    MIREILLE Childs [facility]/ CTN/ Remote Patient monitoring  [phone]

## 2024-04-19 NOTE — CARE COORDINATION
Care Transitions Note    Follow Up Call      Patient Current Location:  Home: [facility] [place] [address]    Care Transition Nurse contacted the patient by telephone. Verified name and  as identifiers.    Additional needs identified to be addressed with provider   Blood sugar 340 this morning.  He did not recheck his blood sugars.                   Method of communication with provider: none. RPM team is following    Care Summary Note: Contacted pt for care transition follow up.  Spoke briefly with pt.  Jaciel states things are fine.  Reports he is not coughing as much but still bringing up mucous.  Reports mucous varies in color from clear to light yellow.  May become short of breath when overexerting himself but otherwise doing fine.  SpO2 today was 92%.  Denies having any c/o chest pain/discomfort, pressure, tightness.  Reports he may have swelling in his legs and ankles at times.  Encouraged to keep legs elevated as much as possible.  Weight has been holding steady.  Weight today was 245 lbs.  Reports he is not gaining any weight but losing if anything.  His blood sugars were elevated today at 340.  Denies having any s/s of hyperglycemia.  He confirmed taking his medications as prescribed.  Discussed referral to RD.  He declines and states it is not what he is eating.  States his blood sugars are elevated because he may drink a beer here and there as well as a tequila shot.  He is aware that he should cut out the alcohol.  States he was a recovering alcoholic.  He would like to stop drinking again and plans to discuss treatment with PCP on Monday, 24. Discussed when to contact providers as well as when to report to the ED.  No questions or needs from CTN at this time.      Addressed changes since last contact:  Education: s/s and when to contact providers  Home Health: [facility]        Advance Care Planning:   Does patient have an Advance Directive:  not on file .    Medication Review:  No changes

## 2024-04-22 ENCOUNTER — CARE COORDINATION (OUTPATIENT)
Dept: FAMILY MEDICINE CLINIC | Age: 64
End: 2024-04-22

## 2024-04-22 ENCOUNTER — OFFICE VISIT (OUTPATIENT)
Dept: FAMILY MEDICINE CLINIC | Age: 64
End: 2024-04-22

## 2024-04-22 ENCOUNTER — TELEPHONE (OUTPATIENT)
Dept: FAMILY MEDICINE CLINIC | Age: 64
End: 2024-04-22

## 2024-04-22 VITALS
BODY MASS INDEX: 36.09 KG/M2 | HEART RATE: 68 BPM | HEIGHT: 71 IN | SYSTOLIC BLOOD PRESSURE: 124 MMHG | RESPIRATION RATE: 16 BRPM | DIASTOLIC BLOOD PRESSURE: 76 MMHG | WEIGHT: 257.8 LBS

## 2024-04-22 DIAGNOSIS — F33.9 EPISODE OF RECURRENT MAJOR DEPRESSIVE DISORDER, UNSPECIFIED DEPRESSION EPISODE SEVERITY (HCC): ICD-10-CM

## 2024-04-22 DIAGNOSIS — I10 PRIMARY HYPERTENSION: ICD-10-CM

## 2024-04-22 DIAGNOSIS — L60.9 NAIL LESION: ICD-10-CM

## 2024-04-22 DIAGNOSIS — K21.9 GASTROESOPHAGEAL REFLUX DISEASE, UNSPECIFIED WHETHER ESOPHAGITIS PRESENT: ICD-10-CM

## 2024-04-22 DIAGNOSIS — J44.9 CHRONIC OBSTRUCTIVE PULMONARY DISEASE, UNSPECIFIED COPD TYPE (HCC): ICD-10-CM

## 2024-04-22 DIAGNOSIS — E11.65 UNCONTROLLED TYPE 2 DIABETES MELLITUS WITH HYPERGLYCEMIA (HCC): Primary | ICD-10-CM

## 2024-04-22 DIAGNOSIS — F19.11 DRUG ABUSE IN REMISSION (HCC): ICD-10-CM

## 2024-04-22 DIAGNOSIS — F10.20 ALCOHOLISM (HCC): ICD-10-CM

## 2024-04-22 LAB
CHP ED QC CHECK: ABNORMAL
GLUCOSE BLD-MCNC: 250 MG/DL
HBA1C MFR BLD: 13.9 %

## 2024-04-22 PROCEDURE — 83036 HEMOGLOBIN GLYCOSYLATED A1C: CPT | Performed by: FAMILY MEDICINE

## 2024-04-22 PROCEDURE — 82962 GLUCOSE BLOOD TEST: CPT | Performed by: FAMILY MEDICINE

## 2024-04-22 PROCEDURE — 1123F ACP DISCUSS/DSCN MKR DOCD: CPT | Performed by: FAMILY MEDICINE

## 2024-04-22 PROCEDURE — 82044 UR ALBUMIN SEMIQUANTITATIVE: CPT | Performed by: FAMILY MEDICINE

## 2024-04-22 PROCEDURE — 99214 OFFICE O/P EST MOD 30 MIN: CPT | Performed by: FAMILY MEDICINE

## 2024-04-22 RX ORDER — INSULIN GLARGINE 100 [IU]/ML
10 INJECTION, SOLUTION SUBCUTANEOUS NIGHTLY
Qty: 1 ADJUSTABLE DOSE PRE-FILLED PEN SYRINGE | Refills: 3 | Status: SHIPPED | OUTPATIENT
Start: 2024-04-22

## 2024-04-22 RX ORDER — INSULIN LISPRO 100 [IU]/ML
INJECTION, SOLUTION INTRAVENOUS; SUBCUTANEOUS
Qty: 1 ADJUSTABLE DOSE PRE-FILLED PEN SYRINGE | Refills: 2 | Status: SHIPPED | OUTPATIENT
Start: 2024-04-22

## 2024-04-22 ASSESSMENT — ENCOUNTER SYMPTOMS
COUGH: 0
DIARRHEA: 0
VOMITING: 0
CONSTIPATION: 0
CHEST TIGHTNESS: 0
SHORTNESS OF BREATH: 0
EYES NEGATIVE: 1
ABDOMINAL PAIN: 0
NAUSEA: 0
BLOOD IN STOOL: 0

## 2024-04-22 NOTE — PROGRESS NOTES
Date: 4/22/2024    Andrei Munoz is a 64 y.o. male who presents today for:  Chief Complaint   Patient presents with    Gastroesophageal Reflux    Diabetes    Hypertension  Started drinking a couple of weeks ago. He states that he is drinking every day since. He is eating whatever he wants and not careful about his sugars. Eating a lot of fruits and meats.   He has appt with neurosurgery because of his back   He want to go to [facility] [phone]       Current regimen: oral agent (dual therapy)  Current monitoring regimen: home blood tests - 1  Home blood sugar trends: AM -360 when he checks on and off.   Any episodes of hypoglycemia? No  Current exercise: No  Compliance with treatment: Very poor  Eye exam current (within one year): Yes  Any history of foot problems? he states that he has some numbness to his feet.   Last foot exam: 4/22/24  Cardiovascular risk factors: advanced age (older than 55 for men, 65 for women), diabetes mellitus, dyslipidemia, hypertension, male gender, and obesity (BMI >= 30 kg/m2).   Immunizations up to date: Flu Yes   Pneumonia Yes  Taking ASA: No   If not why?     HPI:     Gastroesophageal Reflux  He reports no abdominal pain, no chest pain, no coughing or no nausea.   Diabetes  Pertinent negatives for hypoglycemia include no dizziness or headaches. Pertinent negatives for diabetes include no chest pain and no weakness.   Hypertension  Pertinent negatives include no chest pain, headaches, palpitations or shortness of breath.       has a current medication list which includes the following prescription(s): bumetanide, metoprolol succinate, ticagrelor, fluticasone, atorvastatin, trazodone, albuterol sulfate hfa, omeprazole, jardiance, escitalopram, isosorbide mononitrate, tamsulosin, anoro ellipta, metformin, pramipexole, valsartan, allopurinol, cinnamon, fish oil-krill oil, arnuity ellipta, multiple vitamin, multiple vitamins-minerals, potassium

## 2024-04-22 NOTE — CARE COORDINATION
Per Dr Solis's request:  Met with Jaciel today to discuss his A1c 13.9, with last three 7.1, 7.6,7.6. Glucose 250. Is on Jardiance and Metformin. He will now start insulin.   Known alcoholic-admitted when discussing diet and food, that the tequila he drinks doesn't help with sugars either. Stated that he asked Dr. Solis to refer him to [facility], a new addiction service ambulatory center in [place].   Printed material given \"Know Your Numbers\" and \"Building a Balanced Meal\". He basically knows all this information and is going to attempt to do better. Understands the complications from Diabetes and with his current numbers, if continues places him at high risk.  [place] that Selvin Gibson does not need referral and phoned him to give him contact information-he said he had this, good to know all he has to do is call, and plans on doing so when we end call.  Appreciative of discussion today.

## 2024-04-23 ENCOUNTER — CARE COORDINATION (OUTPATIENT)
Dept: CASE MANAGEMENT | Age: 64
End: 2024-04-23

## 2024-04-23 PROBLEM — F33.9 EPISODE OF RECURRENT MAJOR DEPRESSIVE DISORDER (HCC): Status: ACTIVE | Noted: 2020-08-28

## 2024-04-23 PROBLEM — F33.1 MAJOR DEPRESSIVE DISORDER, RECURRENT EPISODE, MODERATE (HCC): Status: RESOLVED | Noted: 2020-08-28 | Resolved: 2024-04-23

## 2024-04-23 NOTE — CARE COORDINATION
Care Transitions Follow Up Call    Patient Current Location:  Home: [address] [place] [id number]    Care Transition Nurse contacted the patient by telephone to follow up after admission on 3/25/24.  Verified name and  with patient as identifiers.    Patient: Andrei Munoz  Patient : 1960   MRN: [id number]  Reason for Admission: ac resp failure w/ hypoxia pneumonia CHF  Discharge Date: 3/27/24 RARS: Readmission Risk Score: 12.3      Needs to be reviewed by the provider   Additional needs identified to be addressed with provider: No  none             Method of communication with provider: none.    CTN call to Jaciel today and he says he is feeling ok. C/o occ cough->phlegm in his throat that's hard to cough out. Drinking fluids. Also, still drinking alcohol.  RPM metrics wt.=246 KV=856/62 HR=61  BLOOD SUGAR=346  PCP f/u 24-> A1c=13.9 CC met w/ him about his DM. F/u 5/3/24  He is scheduled to see therapist at [facility] addiction services tomorrow 24.  Denies need for transportation.  [place] [facility]/aide active.  Says his feet are swollen today- same as last week but no worse.  Denies inc. Sob, chest pain, dizziness, wheezing, fever, chills, n/v/d, abd bloating, malaise.  Neurosurgery appt. 24 for result of MRI spine 24.  We discussed risks for surgery d/t his current poor health disposition. He expressed understanding and says he is trying his best to take care of himself and appreciates the care he is being given.  Eating, drinking & sleeping ok. Denies problems w/ urination/bowels.  Says he is busy caring for his mother and she seems to be doing better.  No other concerns voiced at this time. Will continue to follow.          Addressed changes since last contact:   [facility] addiction [facility] 24  Discussed follow-up appointments. If no appointment was previously scheduled, appointment scheduling offered: Yes.   Is follow up appointment scheduled within 7 days of discharge? 
Declined

## 2024-04-25 ENCOUNTER — CARE COORDINATION (OUTPATIENT)
Dept: CARE COORDINATION | Age: 64
End: 2024-04-25

## 2024-04-25 NOTE — CARE COORDINATION
Remote Alert Monitoring Note      Date/Time:  2024 10:07 AM  Patient Current Location: Home: [address] [place] [id number]    LPN contacted patient by telephone regarding red alert received for glucose reading (320). Verified patients name and  as identifiers.    Rpm alert to be reviewed by the provider                         Background: CHF, High Blood-Pressure, COPD, Pneumonia, Diabetes     Refer to 911 immediately if:  Patient unresponsive or unable to provide history  Change in cognition or sudden confusion  Patient unable to respond in complete sentences  Intense chest pain/tightness  Any concern for any clinical emergency  Red Alert: Provider response time of 1 hr required for any red alert requiring intervention  Yellow Alert: Provider response time of 3hr required for any escalated yellow alert        Hyperglycemia:none  Have you taken your medications as instructed by your doctor today? Yes       Clinical Interventions: Reviewed and followed up on alerts and treatments-. Pt is asymptomatic and in no apparent distress, speaking in full sentences.  Patient states he is feeling fine and denies any adverse sxs.  Will continue to monitor and ACM updated.        Plan/Follow Up: Will continue to review, monitor and address alerts with follow up based on severity of symptoms and risk factors.    MIREILLE Childs [facility]/ CTN/ Remote Patient monitoring  [phone]

## 2024-04-26 ENCOUNTER — OFFICE VISIT (OUTPATIENT)
Dept: NEUROSURGERY | Age: 64
End: 2024-04-26

## 2024-04-26 VITALS
HEART RATE: 69 BPM | SYSTOLIC BLOOD PRESSURE: 100 MMHG | WEIGHT: 246 LBS | HEIGHT: 71 IN | BODY MASS INDEX: 34.44 KG/M2 | DIASTOLIC BLOOD PRESSURE: 64 MMHG

## 2024-04-26 DIAGNOSIS — M48.061 BILATERAL STENOSIS OF LATERAL RECESS OF LUMBAR SPINE: ICD-10-CM

## 2024-04-26 DIAGNOSIS — M48.02 CERVICAL SPINAL STENOSIS: Primary | ICD-10-CM

## 2024-04-26 DIAGNOSIS — G25.81 RLS (RESTLESS LEGS SYNDROME): ICD-10-CM

## 2024-04-26 DIAGNOSIS — F33.1 MAJOR DEPRESSIVE DISORDER, RECURRENT EPISODE, MODERATE (HCC): ICD-10-CM

## 2024-04-26 DIAGNOSIS — M43.16 SPONDYLOLISTHESIS OF LUMBAR REGION: ICD-10-CM

## 2024-04-26 RX ORDER — PEN NEEDLE, DIABETIC 32GX 5/32"
1 NEEDLE, DISPOSABLE MISCELLANEOUS DAILY
COMMUNITY
Start: 2024-04-22

## 2024-04-26 RX ORDER — NALTREXONE HYDROCHLORIDE 50 MG/1
50 TABLET, FILM COATED ORAL DAILY
COMMUNITY
Start: 2024-04-24

## 2024-04-26 ASSESSMENT — ENCOUNTER SYMPTOMS
CHEST TIGHTNESS: 0
SHORTNESS OF BREATH: 0
APNEA: 0
BACK PAIN: 0

## 2024-04-26 NOTE — PROGRESS NOTES
Pulmonary:      Effort: Pulmonary effort is normal.   Abdominal:      General: Bowel sounds are normal.   Musculoskeletal:         General: Normal range of motion.   Skin:     General: Skin is warm and dry.   Neurological:      Mental Status: He is alert and oriented to person, place, and time.      Gait: Gait normal.      Comments: Peripheral neuropathies secondary to poorly controlled diabetes    Transient dexterity issues not present at exam today.    Demonstrates excellent range of cervical motion and demonstrated his ability to rise from a seated position with little or no difficulty.   Psychiatric:         Mood and Affect: Mood normal.         Behavior: Behavior normal.         Thought Content: Thought content normal.         Judgment: Judgment normal.         Reviewed MRI lumbar and cervical spine imaged on 4/17/2024    Cervical spine stenosis at C4-5 and C5-6, considered severe are noted along with degenerative changes of the lumbar spine most pronounced at L4-5 and L5-S1.              Lab Results   Component Value Date    WBC 12.9 (H) 03/27/2024    HGB 15.8 03/26/2024    HCT 47.7 03/26/2024     03/26/2024    CHOL 175 02/21/2023    TRIG 236 (H) 02/21/2023    HDL 59 02/21/2023    ALT 15 03/25/2024    AST 12 03/25/2024     03/27/2024    K 4.3 03/27/2024     03/27/2024    CREATININE 0.9 03/27/2024    BUN 28 (H) 03/27/2024    CO2 23 03/27/2024    TSH 0.869 03/25/2024    PSA 3.09 (H) 01/13/2023    INR 1.07 09/11/2023    LABA1C 13.9 04/22/2024       Assessment and Plan      Diagnosis Orders   1. Cervical spinal stenosis        2. Bilateral stenosis of lateral recess of lumbar spine            We reviewed lumbar and cervical imaging highlighting multiple areas of listhesis in the lumbar spine secondary to degenerative disc disease along with neuroforaminal narrowing and a right greater than left distribution at multiple levels.  Cervical spine imaging reveals cervical spinal stenosis most

## 2024-04-29 RX ORDER — FLUTICASONE PROPIONATE 50 MCG
SPRAY, SUSPENSION (ML) NASAL
Qty: 16 G | Refills: 5 | Status: SHIPPED | OUTPATIENT
Start: 2024-04-29

## 2024-04-29 RX ORDER — PRAMIPEXOLE DIHYDROCHLORIDE 0.12 MG/1
TABLET ORAL
Qty: 90 TABLET | Refills: 1 | Status: SHIPPED | OUTPATIENT
Start: 2024-04-29

## 2024-04-29 RX ORDER — ATORVASTATIN CALCIUM 40 MG/1
TABLET, FILM COATED ORAL
Qty: 30 TABLET | Refills: 5 | Status: SHIPPED | OUTPATIENT
Start: 2024-04-29

## 2024-04-29 RX ORDER — ISOSORBIDE MONONITRATE 30 MG/1
TABLET, EXTENDED RELEASE ORAL
Qty: 90 TABLET | Refills: 1 | Status: SHIPPED | OUTPATIENT
Start: 2024-04-29

## 2024-04-29 RX ORDER — OMEPRAZOLE 20 MG/1
CAPSULE, DELAYED RELEASE ORAL
Qty: 30 CAPSULE | Refills: 5 | Status: SHIPPED | OUTPATIENT
Start: 2024-04-29

## 2024-04-29 RX ORDER — ALLOPURINOL 300 MG/1
TABLET ORAL
Qty: 90 TABLET | Refills: 1 | Status: SHIPPED | OUTPATIENT
Start: 2024-04-29

## 2024-04-29 RX ORDER — BUMETANIDE 2 MG/1
TABLET ORAL
Qty: 60 TABLET | Refills: 5 | Status: SHIPPED | OUTPATIENT
Start: 2024-04-29

## 2024-04-29 RX ORDER — VALSARTAN 80 MG/1
80 TABLET ORAL DAILY
Qty: 90 TABLET | Refills: 1 | Status: SHIPPED | OUTPATIENT
Start: 2024-04-29

## 2024-04-29 RX ORDER — TRAZODONE HYDROCHLORIDE 100 MG/1
TABLET ORAL
Qty: 30 TABLET | Refills: 5 | Status: SHIPPED | OUTPATIENT
Start: 2024-04-29

## 2024-04-29 RX ORDER — METOPROLOL SUCCINATE 25 MG/1
TABLET, EXTENDED RELEASE ORAL
Qty: 60 TABLET | Refills: 5 | Status: SHIPPED | OUTPATIENT
Start: 2024-04-29

## 2024-04-29 RX ORDER — POTASSIUM CHLORIDE 20 MEQ/1
40 TABLET, EXTENDED RELEASE ORAL DAILY
Qty: 180 TABLET | Refills: 0 | Status: SHIPPED | OUTPATIENT
Start: 2024-04-29

## 2024-04-29 NOTE — TELEPHONE ENCOUNTER
Date of last visit:  4/22/2024  Date of next visit:  5/3/2024    Requested Prescriptions     Pending Prescriptions Disp Refills    fluticasone (FLONASE) 50 MCG/ACT nasal spray [Pharmacy Med Name: FLUTICASONE 50MCG NASAL 16 50 KINGSLEY] 16 g 5     Sig: INSTILL TWO (2) SPRAYS IN EACH NOSTRIL ONCE DAILY AS NEEDED FOR RHINITIS OR ALLERGIES.    ticagrelor (BRILINTA) 90 MG TABS tablet 60 tablet 5     Sig: TAKE 1 TABLET BY MOUTH TWICE DAILY    omeprazole (PRILOSEC) 20 MG delayed release capsule [Pharmacy Med Name: OMEPRAZOLE DR 20MG CAP 20 Capsule] 30 capsule 5     Sig: TAKE 1 CAPSULE BY MOUTH ONCE DAILY    traZODone (DESYREL) 100 MG tablet [Pharmacy Med Name: TRAZODONE 100 MG TABS 100 Tablet] 30 tablet 5     Sig: TAKE 1 TABLET BY MOUTH EACH EVENING AS NEEDED FOR SLEEP    atorvastatin (LIPITOR) 40 MG tablet [Pharmacy Med Name: ATORVASTATIN 40MG TABLET 40 Tablet] 30 tablet 5     Sig: TAKE 1 TABLET BY MOUTH EVERY DAY    metoprolol succinate (TOPROL XL) 25 MG extended release tablet [Pharmacy Med Name: METOPROLOL SUC ER 25MG TAB 25 Tablet] 60 tablet 5     Sig: TAKE 2 TABLETS BY MOUTH ONCE DAILY    bumetanide (BUMEX) 2 MG tablet [Pharmacy Med Name: BUMETANIDE 2 MG TABLET 2 Tablet] 60 tablet 5     Sig: TAKE 1 TABLET BY MOUTH TWICE DAILY IN THE MORNING AND BEFORE BEDTIME

## 2024-04-29 NOTE — TELEPHONE ENCOUNTER
The pharmacy is requesting a refill of the below medication which has been pended for you:     Requested Prescriptions     Pending Prescriptions Disp Refills    isosorbide mononitrate (IMDUR) 30 MG extended release tablet [Pharmacy Med Name: ISOSORBIDE MN ER 30MG *MONO 30 Tablet] 90 tablet 1     Sig: TAKE 1 TABLET BY MOUTH EVERY DAY    pramipexole (MIRAPEX) 0.125 MG tablet [Pharmacy Med Name: PRAMIPEXOLE 0.125 MG TABLET 0.125 Tablet] 90 tablet 1     Sig: TAKE 1 TABLET BY MOUTH EACH EVENING    allopurinol (ZYLOPRIM) 300 MG tablet [Pharmacy Med Name: ALLOPURINOL 300 MG TABS 300 Tablet] 90 tablet 1     Sig: TAKE 1 TABLET BY MOUTH ONCE DAILY    valsartan (DIOVAN) 80 MG tablet [Pharmacy Med Name: VALSARTAN 80 MG TABLET 80 Tablet] 90 tablet 1     Sig: TAKE 1 TABLET BY MOUTH DAILY       Last Appointment Date: 4/22/2024  Next Appointment Date: 5/3/2024    Allergies   Allergen Reactions    Zoloft [Sertraline Hcl] Other (See Comments)     Headaches, sweats, bad dreams

## 2024-04-29 NOTE — TELEPHONE ENCOUNTER
Dr Solis could you please verify last note written. I do not see Fransico requested on this encounter. There are four other meds to be signed.

## 2024-04-30 ENCOUNTER — CARE COORDINATION (OUTPATIENT)
Dept: CASE MANAGEMENT | Age: 64
End: 2024-04-30

## 2024-04-30 ENCOUNTER — CARE COORDINATION (OUTPATIENT)
Dept: CARE COORDINATION | Age: 64
End: 2024-04-30

## 2024-04-30 NOTE — CARE COORDINATION
Remote Patient Monitoring Note      Date/Time:  4/30/2024 10:49 AM    LPN attempted to contact patient by telephone regarding red alert received for glucose reading (319) and weight increase (247.5lb).     Background: CHF, High Blood-Pressure, COPD, Pneumonia, Diabetes     Clinical Interventions:   HIPAA compliant message left on  requesting a return call.    Plan/Follow Up: Will continue to review, monitor and address alerts with follow up based on severity of symptoms and risk factors.       Meka Iraheta LPN  [facility]/ CTN/ Remote Patient Monitoring  [phone]

## 2024-04-30 NOTE — CARE COORDINATION
Care Transitions Follow Up Call:Final Call    Patient Current Location:  Home: [address] [place] [id number]    Care Transition Nurse contacted the patient by telephone to follow up after admission on 3/25/24.  Verified name and  with patient as identifiers.    Patient: Andrei Munoz  Patient : 1960   MRN: [id number]  Reason for Admission:  ac resp failure w/ hypoxia pneumonia CHF  Discharge Date: 3/27/24 RARS: Readmission Risk Score: 12.3      Needs to be reviewed by the provider   Additional needs identified to be addressed with provider: No  none             Method of communication with provider: none.    CTN call to Jaciel today and he says he is feeling pretty good overall.  Denies inc. Sob, chest pain, cough, fever, chills, malaise, n/v/d, dizziness.  Says BLE swelling comes and goes. Wears neuropathy socks during the day and elevates as much a  possible.  Will take an extra water pill if needed.  RPM active: wt.=247.5 (up) NO=416/56 pO2=90% HR= 55  BLOOD SUGAR=247 (improving)  Says he stopped drinking alcohol 5 days ago. Started OP addiction services 24. They prescribed a drug to help diminish cravings.  [facility] recovery group info sent to pt. [facility]. He says he would love to go but can't leave his mother but will consider.  Neurosurgery 24-> referred to PMR 24. Expresses how impressed he was w/ Cy Roberson.  PCP f/u 5/3/24  Denies need for transportation.  Eating, drinking & sleeping ok. Denies problems w/ urination/bowels.  No other concerns voiced at this time.  Informed him of CTN final call and handoff to [facility] Narda JEFFERY. He expressed appreciation for the care.        Addressed changes since last contact:  medications-per OP rehab  Discussed follow-up appointments. If no appointment was previously scheduled, appointment scheduling offered: Yes.   Is follow up appointment scheduled within 7 days of discharge? Yes.    Follow Up  Future Appointments   Date Time Provider

## 2024-05-01 RX ORDER — ALBUTEROL SULFATE 90 UG/1
AEROSOL, METERED RESPIRATORY (INHALATION)
Qty: 8.5 G | Refills: 4 | Status: SHIPPED | OUTPATIENT
Start: 2024-05-01

## 2024-05-01 RX ORDER — ESCITALOPRAM OXALATE 5 MG/1
5 TABLET ORAL DAILY
Qty: 90 TABLET | Refills: 0 | Status: SHIPPED | OUTPATIENT
Start: 2024-05-01

## 2024-05-01 NOTE — TELEPHONE ENCOUNTER
The pharmacy is  requesting a refill of the below medication which has been pended for you:     Requested Prescriptions     Pending Prescriptions Disp Refills    albuterol sulfate HFA (PROVENTIL;VENTOLIN;PROAIR) 108 (90 Base) MCG/ACT inhaler [Pharmacy Med Name: ALBUTEROL HFA *PROA* 90MCG 108 (90 BAS Aerosol] 8.5 g 10     Sig: INHALE TWO (2) PUFFS BY MOUTH EVERY 6 HOURS AS NEEDED FOR WHEEZING       Last Appointment Date: Visit date not found  Next Appointment Date: Visit date not found    Allergies   Allergen Reactions    Zoloft [Sertraline Hcl] Other (See Comments)     Headaches, sweats, bad dreams

## 2024-05-02 ENCOUNTER — CARE COORDINATION (OUTPATIENT)
Dept: CARE COORDINATION | Age: 64
End: 2024-05-02

## 2024-05-02 NOTE — CARE COORDINATION
Remote Alert Monitoring Note      Date/Time:  2024 10:51 AM  Patient Current Location: Home: [address] [place] [id number]    LPN contacted patient by telephone regarding red alert received for blood pressure heart rate (47) and pulse ox heart rate (49). Verified patients name and  as identifiers.    Rpm alert to be reviewed by the provider                         Background: CHF, High Blood-Pressure, COPD, Pneumonia, Diabetes     Refer to 911 immediately if:  Patient unresponsive or unable to provide history  Change in cognition or sudden confusion  Patient unable to respond in complete sentences  Intense chest pain/tightness  Any concern for any clinical emergency  Red Alert: Provider response time of 1 hr required for any red alert requiring intervention  Yellow Alert: Provider response time of 3hr required for any escalated yellow alert        HR Triage  Are you having any Chest Pain? no   Are you having any Shortness of Breath? no   Are you having any dizziness? no   Are you feeling more fatigued or tired than normal? no   Are you having any other health concerns or issues? no           Clinical Interventions: Reviewed and followed up on alerts and treatments-. Pt is asymptomatic and in no apparent distress, speaking in full sentences.  He states he is feeling fine and was able to recheck his HR.  Updated reading wnl, 57.  Weight remains baseline for patient.        Plan/Follow Up: Will continue to review, monitor and address alerts with follow up based on severity of symptoms and risk factors.    MIREILLE Childs [facility]/ CTN/ Remote Patient monitoring  [phone]

## 2024-05-03 ENCOUNTER — CARE COORDINATION (OUTPATIENT)
Dept: CARE COORDINATION | Age: 64
End: 2024-05-03

## 2024-05-03 ENCOUNTER — TELEPHONE (OUTPATIENT)
Dept: FAMILY MEDICINE CLINIC | Age: 64
End: 2024-05-03

## 2024-05-03 NOTE — TELEPHONE ENCOUNTER
Patient stated that he is scheduled with Dr Tamez for cataract removal on Wednesday the 8th and he needs to be cleared by his PCP prior to that.  He thought is appt.today was at 1:30pm and he was going to discuss this with Dr Wlil vela.    Advised him we are not sure we can get him cleared by Wednesday and he may need to re-schedule the procedure.  He seemed fine with that.      Please advise so we can schedule accordingly.

## 2024-05-03 NOTE — CARE COORDINATION
Remote Patient Monitoring Note    2nd Attempt  Date/Time:  5/3/2024 2:39 PM    LPN attempted to contact patient by telephone regarding red alert received for glucose reading (322) .     Background: CHF, High Blood-Pressure, COPD, Pneumonia, Diabetes     Clinical Interventions:   HIPAA compliant message left on  requesting a return call.  Attempted to reach EC as well, no answer.  ACM updated.    Plan/Follow Up: Will continue to review, monitor and address alerts with follow up based on severity of symptoms and risk factors.       Meka Iraheta LPN  [facility]/ CTN/ Remote Patient Monitoring  [phone]

## 2024-05-03 NOTE — TELEPHONE ENCOUNTER
He need to verify with cardiology for pre-op as well please.   Also we need him to be sober to be able to do surgery.   Did he [facility] app[facility] with the [facility]?

## 2024-05-03 NOTE — CARE COORDINATION
Remote Patient Monitoring Note      Date/Time:  5/3/2024 12:22 PM      LPN attempted to contact patient by telephone regarding red alert received for glucose reading (322) .     Background: CHF, High Blood-Pressure, COPD, Pneumonia, Diabetes     Clinical Interventions:   HIPAA compliant message left on  requesting a return call.    Plan/Follow Up: Will continue to review, monitor and address alerts with follow up based on severity of symptoms and risk factors.       MIREILLE Childs [facility]/ CTN/ Remote Patient Monitoring  [phone]

## 2024-05-06 ENCOUNTER — CARE COORDINATION (OUTPATIENT)
Dept: CARE COORDINATION | Age: 64
End: 2024-05-06

## 2024-05-06 NOTE — CARE COORDINATION
Remote Alert Monitoring Note      Date/Time:  2024 12:12 PM  Patient Current Location: Home: [facility] [address]    LPN contacted patient by telephone regarding red alert received for glucose reading (361). Verified patients name and  as identifiers.    Rpm alert to be reviewed by the provider                         Background: CHF, High Blood-Pressure, COPD, Pneumonia, Diabetes     Refer to 911 immediately if:  Patient unresponsive or unable to provide history  Change in cognition or sudden confusion  Patient unable to respond in complete sentences  Intense chest pain/tightness  Any concern for any clinical emergency  Red Alert: Provider response time of 1 hr required for any red alert requiring intervention  Yellow Alert: Provider response time of 3hr required for any escalated yellow alert        Hyperglycemia:none  Have you taken your medications as instructed by your doctor today? Yes       Clinical Interventions: Reviewed and followed up on alerts and treatments-. Pt is asymptomatic and in no apparent distress, speaking in full sentences.  Patient states he is feeling fine and has taken his prescribed medications. He is agreeable to recheck glucose later today.  ACM updated.        Plan/Follow Up: Will continue to review, monitor and address alerts with follow up based on severity of symptoms and risk factors.    MIREILLE Childs [facility]/ CTN/ Remote Patient monitoring  [phone]

## 2024-05-06 NOTE — TELEPHONE ENCOUNTER
I spoke with pt and he said that he will Cardiology and let us know if they will clear him. He said that he is seeing someone at [facility] for the addiction. Dr Solis are you going to clear him here or do you want to see what Cardiology says first?

## 2024-05-06 NOTE — CARE COORDINATION
Left voice mail for patient she needs an appointment before her medication will be refilled.   Remote Patient Monitoring Note      Date/Time:  5/6/2024 10:27 AM    LPN attempted to contact patient by telephone regarding red alert received for glucose reading (361).     Background: CHF, High Blood-Pressure, COPD, Pneumonia, Diabetes     Clinical Interventions:   HIPAA compliant message left on  requesting a return call.    Plan/Follow Up: Will continue to review, monitor and address alerts with follow up based on severity of symptoms and risk factors.       MIREILLE Childs [facility]/ CTN/ Remote Patient Monitoring  [phone]

## 2024-05-07 ENCOUNTER — CARE COORDINATION (OUTPATIENT)
Dept: CARE COORDINATION | Age: 64
End: 2024-05-07

## 2024-05-07 ASSESSMENT — ENCOUNTER SYMPTOMS: DYSPNEA ASSOCIATED WITH: EXERTION

## 2024-05-07 NOTE — CARE COORDINATION
Ambulatory Care Coordination Note     2024 11:06 AM     Patient Current Location:  Home: [facility] [address]  [place] [address]     This patient was received as a referral from Care Transition Nurse.    ACM contacted the patient by telephone. Verified name and  with patient as identifiers. Provided introduction to self, and explanation of the ACM role.   Patient accepted care management services at this time.          ACM: Danelle Avilez RN     Challenges to be reviewed by the provider   Additional needs identified to be addressed with provider No  none               Method of communication with provider: none.    Care Summary Note: Spoke with Jaciel  Re introduced self/role as I have worked with him in the past  CTN referral and active with RPM  Discussed alerts for RPM today including weight and pulse.  Pulse rate showing low on pulse ox but normal on blood pressure cuff  Weight is up today but Jaciel asymptomatic and states he is feeling well with no changes from baseline  No longer working with [facility] for behavioral health support, states wasn't a good fit for him  He is currently enrolled in [employer] and has support from them  New patient appt for pain management this week on  follow up appts in place  Was agreeable to ACM follow up while RPM in place    Plan  Provide education to help manage health  Encourage continued RPM monitoring  Ensure follow up appts completed  Reinforce importance of early symptom recognition and reporting to prevent exacerbation and unnecessary hospitalization    Offered patient enrollment in the Remote Patient Monitoring (RPM) program for in-home monitoring: Yes, patient enrolled; current status is activated and monitoring.     Assessments Completed:   Diabetes Assessment    Medic Alert ID: No  Meal Planning: Avoidance of concentrated sweets   How often do you test your blood sugar?: Daily   Do you have barriers with adherence to non-pharmacologic self-management

## 2024-05-08 ENCOUNTER — CARE COORDINATION (OUTPATIENT)
Dept: CARE COORDINATION | Age: 64
End: 2024-05-08

## 2024-05-08 NOTE — CARE COORDINATION
Remote Alert Monitoring Note      Date/Time:  2024 10:44 AM  Patient Current Location: [place]: [facility] [place] [id number]    LPN contacted patient by telephone regarding red alert received for glucose reading (391) and weight increase (249lb). Verified patients name and  as identifiers.    Rpm alert to be reviewed by the provider                         Background: CHF, High Blood-Pressure, COPD, Pneumonia, Diabetes     Refer to 911 immediately if:  Patient unresponsive or unable to provide history  Change in cognition or sudden confusion  Patient unable to respond in complete sentences  Intense chest pain/tightness  Any concern for any clinical emergency  Red Alert: Provider response time of 1 hr required for any red alert requiring intervention  Yellow Alert: Provider response time of 3hr required for any escalated yellow alert        Hyperglycemia:none  Have you taken your medications as instructed by your doctor today? Yes   Weight Triage  Are you weighing any different than you did yesterday? (time of day, clothes and shoes on or off, etc)? no   Do you have any shortness of breath? no   Do you have any swelling in your hands of feet? no   Are you having any other health concerns or issues? no       Clinical Interventions: Reviewed and followed up on alerts and treatments-. Pt is asymptomatic and in no apparent distress, speaking in full sentences.  Patent states since starting the Depade he has noticed an increase in appetite.  He denies any swelling or SOB.  His glucose was 391 this am.  Patient states this is due to a glass of cranberry juice he had at HS.  He did take he 8 units of coverage as directed.  ACM updated.  Will monitor.    Signs and symptoms of CHF discussed with patient, such as feeling more tired than normal, feeling short of breath, coughing that increases when lying down, sudden weight gain, swelling of the feet, legs or belly.  Patient verbalized understanding to notify physician

## 2024-05-09 ENCOUNTER — CARE COORDINATION (OUTPATIENT)
Dept: CARE COORDINATION | Age: 64
End: 2024-05-09

## 2024-05-09 NOTE — CARE COORDINATION
Remote Alert Monitoring Note      Date/Time:  2024 9:36 AM  Patient Current Location: Home: [address] [place], [place] [id number]    LPN contacted patient by telephone regarding red alert received for blood pressure heart rate (43) and pulse ox heart rate (44) and Glucose 361. Verified patients name and  as identifiers.    Rpm alert to be reviewed by the provider    Red Alert HR 43/44  Glucose 361  FYI    Pt is asymptomatic and in no apparent distress, speaking in full sentences.  Patient cheerful and states he is feeling fine.  He denies any fatigue and states he took all prescribed medications.  He did recheck his HR while on the call and got a reading of 47.  HR has been fluctuating on the lower side for the past week.  Please see metric hx below.  Please note glucose readings as well.                   Background: CHF, High Blood-Pressure, COPD, Pneumonia, Diabetes     Refer to 911 immediately if:  Patient unresponsive or unable to provide history  Change in cognition or sudden confusion  Patient unable to respond in complete sentences  Intense chest pain/tightness  Any concern for any clinical emergency  Red Alert: Provider response time of 1 hr required for any red alert requiring intervention  Yellow Alert: Provider response time of 3hr required for any escalated yellow alert        HR Triage  Are you having any Chest Pain? no   Are you having any Shortness of Breath? no   Are you having any dizziness? no   Are you feeling more fatigued or tired than normal? no   Are you having any other health concerns or issues? no     Have you taken your medications as instructed by your doctor today? Yes     Clinical Interventions: Reviewed and followed up on alerts and treatments-. Pt is asymptomatic and in no apparent distress, speaking in full sentences.  Patient cheerful and states he is feeling fine.  He denies any fatigue and states he took all prescribed medications.  He did recheck his HR while on the call and got

## 2024-05-10 NOTE — CARE COORDINATION
Please verify if he is taking his meds and trying to follow his diet better and staying away from alcohol.

## 2024-05-13 ENCOUNTER — CARE COORDINATION (OUTPATIENT)
Dept: CARE COORDINATION | Age: 64
End: 2024-05-13

## 2024-05-13 NOTE — CARE COORDINATION
Remote Patient Monitoring Note      Date/Time:  5/13/2024 2:21 PM    LPN attempted to contact patient by telephone regarding red alert received for blood pressure heart rate (47), glucose reading (361), pulse ox heart rate (48), and temperature reading (96.9).      Background: CHF, High Blood-Pressure, COPD, Pneumonia, Diabetes     Clinical Interventions:  VM full unable to LM. Attempted to reach EC and no answer, no VM. ACM updated.     Plan/Follow Up: Will continue to review, monitor and address alerts with follow up based on severity of symptoms and risk factors.       Meka Iraheta LPN  [facility]/ CTN/ Remote Patient Monitoring  [phone]

## 2024-05-13 NOTE — CARE COORDINATION
Remote Patient Monitoring Note      Date/Time:  5/13/2024 12:52 PM    LPN attempted to contact patient by telephone regarding red alert received for blood pressure heart rate (47), glucose reading (361), pulse ox heart rate (48), and temperature reading (96.9).     Background: CHF, High Blood-Pressure, COPD, Pneumonia, Diabetes    Clinical Interventions:  VM full unable to LM.     Plan/Follow Up: Will continue to review, monitor and address alerts with follow up based on severity of symptoms and risk factors.       Meka Iraheta LPN  [facility]/ CTN/ Remote Patient Monitoring  [phone]

## 2024-05-14 ENCOUNTER — CARE COORDINATION (OUTPATIENT)
Dept: CARE COORDINATION | Age: 64
End: 2024-05-14

## 2024-05-14 ENCOUNTER — OFFICE VISIT (OUTPATIENT)
Dept: CARDIOLOGY CLINIC | Age: 64
End: 2024-05-14
Payer: MEDICARE

## 2024-05-14 VITALS
WEIGHT: 249 LBS | DIASTOLIC BLOOD PRESSURE: 64 MMHG | RESPIRATION RATE: 18 BRPM | BODY MASS INDEX: 34.86 KG/M2 | HEART RATE: 53 BPM | HEIGHT: 71 IN | OXYGEN SATURATION: 92 % | SYSTOLIC BLOOD PRESSURE: 110 MMHG

## 2024-05-14 DIAGNOSIS — G47.33 OBSTRUCTIVE SLEEP APNEA ON CPAP: ICD-10-CM

## 2024-05-14 DIAGNOSIS — I50.32 CHRONIC DIASTOLIC CONGESTIVE HEART FAILURE, NYHA CLASS 2 (HCC): Primary | ICD-10-CM

## 2024-05-14 DIAGNOSIS — R00.1 BRADYCARDIA: ICD-10-CM

## 2024-05-14 DIAGNOSIS — R60.0 EDEMA OF BOTH LOWER LEGS: ICD-10-CM

## 2024-05-14 PROCEDURE — 3078F DIAST BP <80 MM HG: CPT | Performed by: NURSE PRACTITIONER

## 2024-05-14 PROCEDURE — 99214 OFFICE O/P EST MOD 30 MIN: CPT | Performed by: NURSE PRACTITIONER

## 2024-05-14 PROCEDURE — 1123F ACP DISCUSS/DSCN MKR DOCD: CPT | Performed by: NURSE PRACTITIONER

## 2024-05-14 PROCEDURE — 3074F SYST BP LT 130 MM HG: CPT | Performed by: NURSE PRACTITIONER

## 2024-05-14 ASSESSMENT — ENCOUNTER SYMPTOMS
COUGH: 0
SHORTNESS OF BREATH: 1
VOMITING: 0
NAUSEA: 0

## 2024-05-14 NOTE — CARE COORDINATION
Remote Alert Monitoring Note      Date/Time:  2024 1:45 PM  Patient Current Location: Home: [address] [place] [id number]    LPN contacted patient by telephone regarding red alert received for blood pressure heart rate (45) and weight increase (252.5lb). Verified patients name and  as identifiers.    Rpm alert to be reviewed by the provider                        Background: CHF, High Blood-Pressure, COPD, Pneumonia, Diabetes     Refer to 911 immediately if:  Patient unresponsive or unable to provide history  Change in cognition or sudden confusion  Patient unable to respond in complete sentences  Intense chest pain/tightness  Any concern for any clinical emergency  Red Alert: Provider response time of 1 hr required for any red alert requiring intervention  Yellow Alert: Provider response time of 3hr required for any escalated yellow alert       Triage  Are you having any Chest Pain? no   Are you having any Shortness of Breath? no   Are you having any dizziness? no   Are you feeling more fatigued or tired than normal? no        Have you taken your medications as instructed by your doctor today? Yes   Weight Triage  Are you weighing any different than you did yesterday? (time of day, clothes and shoes on or off, etc)? no   Do you have any shortness of breath? no   Do you have any swelling in your hands of feet? no   Are you having any other health concerns or issues? no       Clinical Interventions: Reviewed and followed up on alerts and treatments-. Pt is asymptomatic and in no apparent distress, speaking in full sentences.  He states he hasn't been eating any more than normal, but does eat a lot of fresh fruit.  He feels since starting the Naltroxone 3 weeks ago he has been slowly gaining.  He denies any alcohol use though.  HR 45, will monitor, patient remains asymptomatic.    Signs and symptoms of CHF discussed with patient, such as feeling more tired than normal, feeling short of breath, coughing that

## 2024-05-14 NOTE — PROGRESS NOTES
Orders   1. Chronic diastolic congestive heart failure, NYHA class 2, stage C (HCC)        2. Edema of both lower legs        3. Bradycardia        4. Obstructive sleep apnea on CPAP          Continue:  GDMT:   ACE/ARB/ARNI - Valsartan 80 daily   BB - Toprol 50 daily   Diuretic - Bumex 2 mg BID  AA - none  SGLT2 -  Jardiance 25 daily  Vasodilator - Imdur 30 daily  Other - ASA, Brilinta, Potassium 40 daily, mag 400 daily      HFpEF 50-55% 2024  CAD s/p CABG and PCI - brilinta   FAISAL on CPAP  Hx of cocaine abuse  Alcohol abuse  Stage 4 COPD  Bradycardia - no symptoms, will double check that he is taking the correct dose of his Toprol and call office if HR staying in the 40s    Stable, leg swelling is unchanged. Good urine output. Some improvement of his WONG. No changes today. May need to decrease toprol. 6 month f/u.     Lab reviewed - K 4.3 Cr 0.9 mag 2.1 hgb 15.8    ECHO 2024: mild LA dilation calcification of mitral valve  CATH 2018: PCI to diag    No medication changes today    Call office if HR is staying in the 40s and/or symptoms    Continue diet/fluid adherence  Continue daily wts.  F/U w/ Cardiology  F/U in clinic in 6 month    Tolerating above noted HF meds, no ill side effects noted. Will continue to monitor kidney function and electrolytes. Will optimize as tolerated.   Pt is compliant NOT medications.    Total visit time of 25 minutes has been spent with patient on education of symptoms, management, medication, and plan of care; as well as review of chart: labs, ECHO, radiology reports, etc.   I personally spent more then 50% of the appt time face to face with the patient.  Daily weights  Fluid restriction of 2 Liters per day  Limit sodium in diet to around 4406-1040 mg/day  Monitor BP  Activity as tolerated         Patient was instructed to call the Heart Failure Clinic for any changes in symptoms as noted in AVS.      Return in about 6 months (around 11/14/2024). or sooner if needed     Patient given

## 2024-05-14 NOTE — PATIENT INSTRUCTIONS
You may receive a survey regarding the care you received during your visit.  Your input is valuable to us.  We encourage you to complete and return your survey.  We hope you will choose us in the future for your healthcare needs.    Your nurses today were Megan.  Office hours:   Mon-Thurs 8-4:30  Friday 8-12  Phone: [phone]    Continue:  Continue current medications  Daily weights and record  Fluid restriction of 2 Liters per day  Limit sodium in diet to around 9699-8804 mg/day  Monitor BP  Activity as tolerated     Call the Heart Failure Clinic for any of the following symptoms:   Weight gain of -3 pounds in 1 day or 5 pounds in 1 week  Increased shortness of breath  Shortness of breath while laying down  Cough  Chest pain  Swelling in feet, ankles or legs  Bloating in abdomen  Fatigue

## 2024-05-15 ENCOUNTER — TELEPHONE (OUTPATIENT)
Dept: CARDIOLOGY CLINIC | Age: 64
End: 2024-05-15

## 2024-05-15 NOTE — TELEPHONE ENCOUNTER
Pt called and said he is needing cataract surgery on left eye, pt will need clearance, told pt he may need to be seen prior to surgery. Surgery is not scheduled as of now, pt had to cancel it due to needing clearance. Last appt with Sumi was 11/16/2023 for 1 year f/u. Please advise.

## 2024-05-16 ENCOUNTER — CARE COORDINATION (OUTPATIENT)
Dept: CARE COORDINATION | Age: 64
End: 2024-05-16

## 2024-05-16 NOTE — CARE COORDINATION
Remote Alert Monitoring Note      Date/Time:  2024 10:26 AM  Patient Current Location: Home: [address] [place] [id number]    LPN contacted patient by telephone regarding red alert received for blood pressure reading (). Verified patients name and  as identifiers.    Rpm alert to be reviewed by the provider                         Background: CHF, High Blood-Pressure, COPD, Pneumonia, Diabetes     Refer to 911 immediately if:  Patient unresponsive or unable to provide history  Change in cognition or sudden confusion  Patient unable to respond in complete sentences  Intense chest pain/tightness  Any concern for any clinical emergency  Red Alert: Provider response time of 1 hr required for any red alert requiring intervention  Yellow Alert: Provider response time of 3hr required for any escalated yellow alert        BP Triage  Are you having any Chest Pain? no   Are you having any Shortness of Breath? no   Do you have a headache or have any vision changes? no   Are you having any numbness or tingling? no   Are you having any other health concerns or issues? no     Have you taken your medications as instructed by your doctor today? Yes       Clinical Interventions: Reviewed and followed up on alerts and treatments-. Pt is asymptomatic and in no apparent distress, speaking in full sentences.  Patient feels his BP cuff is acting up.  He keeps receiving \"error\" messages along with low readings.  He denies any dizziness or lightheadedness and states he is feeling fine.  He is going to try changing the batteries and possibly comparing readings with his mothers cuff.  Will await update.         Plan/Follow Up: Will continue to review, monitor and address alerts with follow up based on severity of symptoms and risk factors.    MIREILLE Childs [facility]/ CTN/ Remote Patient monitoring  [phone]

## 2024-05-17 ENCOUNTER — TELEPHONE (OUTPATIENT)
Dept: FAMILY MEDICINE CLINIC | Age: 64
End: 2024-05-17

## 2024-05-17 ENCOUNTER — CARE COORDINATION (OUTPATIENT)
Dept: CARE COORDINATION | Age: 64
End: 2024-05-17

## 2024-05-17 NOTE — CARE COORDINATION
Remote Alert Monitoring Note      Date/Time:  2024 1:32 PM  Patient Current Location: Home: [facility] [place] [facility] [address]    LPN contacted patient by telephone regarding red alert received for weight increase (251lb). Verified patients name and  as identifiers.    Rpm alert to be reviewed by the provider                         Background: CHF, High Blood-Pressure, COPD, Pneumonia, Diabetes     Refer to 911 immediately if:  Patient unresponsive or unable to provide history  Change in cognition or sudden confusion  Patient unable to respond in complete sentences  Intense chest pain/tightness  Any concern for any clinical emergency  Red Alert: Provider response time of 1 hr required for any red alert requiring intervention  Yellow Alert: Provider response time of 3hr required for any escalated yellow alert            Weight Triage  Are you weighing any different than you did yesterday? (time of day, clothes and shoes on or off, etc)? no   Do you have any shortness of breath? no   Do you have any swelling in your hands of feet? no   Are you having any other health concerns or issues? no       Clinical Interventions: Reviewed and followed up on alerts and treatments-. Pt is asymptomatic and in no apparent distress, speaking in full sentences.  Patient denies any Swelling or SOB and states he is feeling fine.  He notes weight fluctuates often despite weighing the same everyday.  ACM updated.      Signs and symptoms of CHF discussed with patient, such as feeling more tired than normal, feeling short of breath, coughing that increases when lying down, sudden weight gain, swelling of the feet, legs or belly.  Patient verbalized understanding to notify physician office if these symptoms occur.    Plan/Follow Up: Will continue to review, monitor and address alerts with follow up based on severity of symptoms and risk factors.    MIREILLE Childs [facility]/ CTN/ Remote Patient

## 2024-05-17 NOTE — TELEPHONE ENCOUNTER
Pt is having cataract removal surgery, does not know what date they are waiting for dd to clear him until they get it scheduled. Dr. Tamez is doing the surgery. Pt would like to know if dr will clear him ?

## 2024-05-20 ENCOUNTER — CARE COORDINATION (OUTPATIENT)
Dept: CARE COORDINATION | Age: 64
End: 2024-05-20

## 2024-05-20 NOTE — CARE COORDINATION
Remote Alert Monitoring Note      Date/Time:  2024 1:11 PM  Patient Current Location: Home: [address] [place], [place] [id number]    LPN contacted patient by telephone regarding red alert received for glucose reading (344), temperature reading (96.9), and weight increase (252lb). Verified patients name and  as identifiers.    Rpm alert to be reviewed by the provider                         Background:  CHF, High Blood-Pressure, COPD, Pneumonia, Diabetes     Refer to 911 immediately if:  Patient unresponsive or unable to provide history  Change in cognition or sudden confusion  Patient unable to respond in complete sentences  Intense chest pain/tightness  Any concern for any clinical emergency  Red Alert: Provider response time of 1 hr required for any red alert requiring intervention  Yellow Alert: Provider response time of 3hr required for any escalated yellow alert        Hyperglycemia:none  Have you taken your medications as instructed by your doctor today? Yes   Weight Triage  Are you weighing any different than you did yesterday? (time of day, clothes and shoes on or off, etc)? no   Do you have any shortness of breath? no   Do you have any swelling in your hands of feet? no   Are you having any other health concerns or issues? no       Clinical Interventions: Reviewed and followed up on alerts and treatments-. Pt is asymptomatic and in no apparent distress, speaking in full sentences.  Patient states he did reweigh himself after the initial weight and got a normal reading of 246.  Reading updated in HRS. Glucose continues to be elevated at 344.  Patient does not follow diet and does enjoy many fresh fruits and juices daily. Patient is aware that those should be limited.  He also reports his BP readings are not connecting via [employer]. Reading today was 106/78, writer to update tech support to issues update to assist with connectivity.  ACM updated.        Plan/Follow Up: Will continue to review, monitor and

## 2024-05-23 ENCOUNTER — CARE COORDINATION (OUTPATIENT)
Dept: CARE COORDINATION | Age: 64
End: 2024-05-23

## 2024-05-23 NOTE — CARE COORDINATION
Remote Alert Monitoring Note      Date/Time:  2024 11:06 AM  Patient Current Location: Home: [address] [place], [place] [id number]    LPN contacted patient by telephone regarding red alert received for weight increase (251lb). Verified patients name and  as identifiers.    Rpm alert to be reviewed by the provider                         Background: CHF, High Blood-Pressure, COPD, Pneumonia, Diabetes        Refer to 911 immediately if:  Patient unresponsive or unable to provide history  Change in cognition or sudden confusion  Patient unable to respond in complete sentences  Intense chest pain/tightness  Any concern for any clinical emergency  Red Alert: Provider response time of 1 hr required for any red alert requiring intervention  Yellow Alert: Provider response time of 3hr required for any escalated yellow alert        Have you taken your medications as instructed by your doctor today? Yes   Weight Triage  Are you weighing any different than you did yesterday? (time of day, clothes and shoes on or off, etc)? no   Do you have any shortness of breath? no   Do you have any swelling in your hands of feet? Mild feet   Are you having any other health concerns or issues? no       Clinical Interventions: Reviewed and followed up on alerts and treatments-. Pt is in no apparent distress, speaking in full sentences.  Patient states he is feeling fine and is confident his mild pedal edema will resolve throughout the day. Weight is constantly fluctuating between 245 and 252. Signs and symptoms of CHF discussed with patient, such as feeling more tired than normal, feeling short of breath, coughing that increases when lying down, sudden weight gain, swelling of the feet, legs or belly.  Patient verbalized understanding to notify physician office if these symptoms occur.    Plan/Follow Up: Will continue to review, monitor and address alerts with follow up based on severity of symptoms and risk factors.    Meka Iraheta,

## 2024-05-23 NOTE — CARE COORDINATION
Ambulatory Care Coordination Note  2024    Patient Current Location:  Home: [address] [place] [id number]     YULIANA DIGGS contacted the patient by telephone. Verified name and  with patient as identifiers. Provided introduction to self, and explanation of the YULIANA DIGGS role.     Challenges to be reviewed by the provider   Additional needs identified to be addressed with provider: No  none               Method of communication with provider: none.    I spoke with the patient for continued Care Coordination follow up and education.  Patient states he is doing well.  RPM alert earlier today. Weight is constantly fluctuating between 245 and 252. Signs and symptoms of CHF discussed with patient, such as feeling more tired than normal, feeling short of breath, coughing that increases when lying down, sudden weight gain, swelling of the feet, legs or belly.  Educated on how to identify sx's that are worse than the baseline and the importance of early symptom recognition and reporting to prevent exacerbation which may lead to ED visits and hospital admissions.  I advised patient to contact PCP office if needed.  No further needs at this time.       Lab Results       None            Care Coordination Interventions    Referral from Primary Care Provider: No  Suggested Interventions and Community Resources  [facility]  Home Health Services: Not Started  Occupational Therapy: Not Started  Physical Therapy: Not Started          Goals Addressed    None         Future Appointments   Date Time Provider Department Center   6/10/2024  3:30 PM Cy Roberson PA-C N SRPXNEURSU [facility]   2024  1:00 PM STR CT IMAGING RM1  [facility] OUT EXP STR Rad/Card   2024  2:30 PM Guillermina Courtney APRN - CNP N SRPX CHF [facility]   2024  1:30 PM Nidhi Sharpe APRN - CNP N Pulm Med [facility]   2024  1:00 PM Leidy Damico MD N SRPX Heart [facility]   2025  3:00 PM Stephanie

## 2024-05-28 DIAGNOSIS — E11.65 UNCONTROLLED TYPE 2 DIABETES MELLITUS WITH HYPERGLYCEMIA (HCC): ICD-10-CM

## 2024-05-29 ENCOUNTER — CARE COORDINATION (OUTPATIENT)
Dept: CARE COORDINATION | Age: 64
End: 2024-05-29

## 2024-05-29 RX ORDER — POTASSIUM CHLORIDE 20 MEQ/1
40 TABLET, EXTENDED RELEASE ORAL DAILY
Qty: 60 TABLET | Refills: 10 | Status: SHIPPED | OUTPATIENT
Start: 2024-05-29

## 2024-05-29 NOTE — TELEPHONE ENCOUNTER
Date of last visit:  4/22/2024  Date of next visit:  Visit date not found    Requested Prescriptions     Pending Prescriptions Disp Refills    ticagrelor (BRILINTA) 90 MG TABS tablet [Pharmacy Med Name: BRILINTA 90 MG TABS 90 Tablet] 60 tablet 10     Sig: TAKE 1 TABLET BY MOUTH TWICE DAILY    metFORMIN (GLUCOPHAGE) 500 MG tablet [Pharmacy Med Name: METFORMIN 500 MG TABS 500 Tablet] 120 tablet 10     Sig: TAKE TWO (2) TABLETS BY MOUTH TWICE DAILY WITH MEALS

## 2024-05-29 NOTE — CARE COORDINATION
Remote Patient Monitoring Note      Date/Time:  5/29/2024 3:02 PM    LPN attempted to contact patient by telephone regarding yellow alert received for no metrics for 5 days  .     Background: CHF, High Blood-Pressure, COPD, Pneumonia, Diabetes     Clinical Interventions: Patient states he has boxed up all the equipment and sent it back already.  He states someone contacted him and told him to do so.  ACM updated.    Plan/Follow Up: Will continue to review, monitor and address alerts with follow up based on severity of symptoms and risk factors.       Meka Iraheta LPN  [facility]/ CTN/ Remote Patient Monitoring  [phone]

## 2024-06-04 ENCOUNTER — CARE COORDINATION (OUTPATIENT)
Dept: CARE COORDINATION | Age: 64
End: 2024-06-04

## 2024-06-04 NOTE — CARE COORDINATION
RPM team,       Jaciel states he turned back on [employer]PM equipment and that [employer] came and picked it up.  Could you please follow up on this and remove him from my active panel.  Thank you!

## 2024-06-07 ENCOUNTER — OFFICE VISIT (OUTPATIENT)
Dept: FAMILY MEDICINE CLINIC | Age: 64
End: 2024-06-07

## 2024-06-07 ENCOUNTER — HOSPITAL ENCOUNTER (OUTPATIENT)
Dept: GENERAL RADIOLOGY | Age: 64
Discharge: HOME | End: 2024-06-07
Payer: MEDICARE

## 2024-06-07 ENCOUNTER — HOSPITAL ENCOUNTER (OUTPATIENT)
Age: 64
Discharge: HOME | End: 2024-06-07
Payer: MEDICARE

## 2024-06-07 VITALS
DIASTOLIC BLOOD PRESSURE: 72 MMHG | RESPIRATION RATE: 16 BRPM | WEIGHT: 255.4 LBS | BODY MASS INDEX: 35.76 KG/M2 | HEIGHT: 71 IN | HEART RATE: 84 BPM | SYSTOLIC BLOOD PRESSURE: 132 MMHG

## 2024-06-07 DIAGNOSIS — I10 PRIMARY HYPERTENSION: ICD-10-CM

## 2024-06-07 DIAGNOSIS — E11.65 UNCONTROLLED TYPE 2 DIABETES MELLITUS WITH HYPERGLYCEMIA (HCC): Primary | ICD-10-CM

## 2024-06-07 DIAGNOSIS — M43.16 SPONDYLOLISTHESIS OF LUMBAR REGION: ICD-10-CM

## 2024-06-07 DIAGNOSIS — J44.9 CHRONIC OBSTRUCTIVE PULMONARY DISEASE, UNSPECIFIED COPD TYPE (HCC): ICD-10-CM

## 2024-06-07 DIAGNOSIS — F10.20 ALCOHOLISM (HCC): ICD-10-CM

## 2024-06-07 DIAGNOSIS — K21.9 GASTROESOPHAGEAL REFLUX DISEASE, UNSPECIFIED WHETHER ESOPHAGITIS PRESENT: ICD-10-CM

## 2024-06-07 DIAGNOSIS — F33.1 MAJOR DEPRESSIVE DISORDER, RECURRENT EPISODE, MODERATE (HCC): ICD-10-CM

## 2024-06-07 PROCEDURE — 72120 X-RAY BEND ONLY L-S SPINE: CPT

## 2024-06-07 RX ORDER — PEN NEEDLE, DIABETIC 32GX 5/32"
NEEDLE, DISPOSABLE MISCELLANEOUS
Qty: 300 EACH | Refills: 0 | Status: SHIPPED | OUTPATIENT
Start: 2024-06-07

## 2024-06-07 NOTE — PROGRESS NOTES
Date: 6/7/2024    Andrei Munoz is a 64 y.o. male who presents today for:  Chief Complaint   Patient presents with    Pre-op Exam for cataract surgery at Dr Tamez's office.   He did go to [facility] and was started on Naltrexone. He states that it is not working to decrease his cravings for alcohol.  He last drink last night. He is drinking on and off.        Current regimen: insulin injection  Current monitoring regimen: home blood tests - 3  Home blood sugar trends: he states that his sugars are coming down. They are now in the 130-mid 200 from 300-400's  Any episodes of hypoglycemia? No  Current exercise: he states that he is trying to walk more.   Compliance with treatment:  he states that he is doing better   Eye exam current (within one year): Yes  Any history of foot problems? No  Last foot exam: 4/24  Cardiovascular risk factors: advanced age (older than 55 for men, 65 for women), diabetes mellitus, dyslipidemia, hypertension, male gender, obesity (BMI >= 30 kg/m2), and sedentary lifestyle.     HPI:     HPI    has a current medication list which includes the following prescription(s): droplet pen needles, potassium chloride, ticagrelor, metformin, escitalopram, albuterol sulfate hfa, isosorbide mononitrate, pramipexole, allopurinol, fluticasone, omeprazole, trazodone, atorvastatin, valsartan, metoprolol succinate, bumetanide, naltrexone, lantus solostar, insulin lispro (1 unit dial), jardiance, tamsulosin, anoro ellipta, fish oil-krill oil, arnuity ellipta, multiple vitamin, multiple vitamins-minerals, magnesium oxide, guaifenesin, prodigy no coding blood gluc, nitroglycerin, and prodigy autocode blood glucose.    Allergies   Allergen Reactions    Zoloft [Sertraline Hcl] Other (See Comments)     Headaches, sweats, bad dreams       Social History     Tobacco Use    Smoking status: Former     Current packs/day: 0.00     Average packs/day: 2.0 packs/day for 25.0 years (50.0 ttl pk-yrs)     Types: Cigars,

## 2024-06-11 ENCOUNTER — CARE COORDINATION (OUTPATIENT)
Dept: CARE COORDINATION | Age: 64
End: 2024-06-11

## 2024-06-26 DIAGNOSIS — J44.9 STAGE 3 SEVERE COPD BY GOLD CLASSIFICATION (HCC): ICD-10-CM

## 2024-06-26 DIAGNOSIS — J43.2 CENTRILOBULAR EMPHYSEMA (HCC): ICD-10-CM

## 2024-06-26 DIAGNOSIS — E11.65 UNCONTROLLED TYPE 2 DIABETES MELLITUS WITH HYPERGLYCEMIA (HCC): ICD-10-CM

## 2024-06-27 RX ORDER — FLUTICASONE FUROATE 100 UG/1
POWDER RESPIRATORY (INHALATION)
Qty: 30 EACH | Refills: 11 | Status: SHIPPED | OUTPATIENT
Start: 2024-06-27

## 2024-06-27 NOTE — TELEPHONE ENCOUNTER
The pharmacy is requesting a refill of the below medication which has been pended for you:     Requested Prescriptions     Pending Prescriptions Disp Refills    ticagrelor (BRILINTA) 90 MG TABS tablet [Pharmacy Med Name: BRILINTA 90 MG TABS 90 Tablet] 60 tablet 5     Sig: TAKE 1 TABLET BY MOUTH TWICE DAILY    JARDIANCE 25 MG tablet [Pharmacy Med Name: JARDIANCE 25 MG TABLET 25 Tablet] 30 tablet 5     Sig: TAKE 1 TABLET BY MOUTH DAILY       Last Appointment Date: 6/7/2024  Next Appointment Date: 7/26/2024    Allergies   Allergen Reactions    Zoloft [Sertraline Hcl] Other (See Comments)     Headaches, sweats, bad dreams

## 2024-06-27 NOTE — TELEPHONE ENCOUNTER
Patient reports that they need a refill for Arnuity     Last office appointment 01/22/24    Patient is scheduled for follow-up in office on 09/23/24.    Last seen by Nidhi Sharpe CNP    Medication refill routed to established provider Nidhi Sharpe CNP     Patient requests 30 day supply.     Preferred pharmacy is [facility]

## 2024-06-28 RX ORDER — EMPAGLIFLOZIN 25 MG/1
25 TABLET, FILM COATED ORAL DAILY
Qty: 30 TABLET | Refills: 5 | Status: SHIPPED | OUTPATIENT
Start: 2024-06-28

## 2024-07-09 ENCOUNTER — OFFICE VISIT (OUTPATIENT)
Dept: PHYSICAL MEDICINE AND REHAB | Age: 64
End: 2024-07-09
Payer: MEDICARE

## 2024-07-09 VITALS
HEIGHT: 71 IN | DIASTOLIC BLOOD PRESSURE: 74 MMHG | BODY MASS INDEX: 35.74 KG/M2 | WEIGHT: 255.29 LBS | SYSTOLIC BLOOD PRESSURE: 128 MMHG

## 2024-07-09 DIAGNOSIS — G89.4 CHRONIC PAIN SYNDROME: ICD-10-CM

## 2024-07-09 DIAGNOSIS — E11.42 DIABETIC POLYNEUROPATHY ASSOCIATED WITH TYPE 2 DIABETES MELLITUS (HCC): Primary | ICD-10-CM

## 2024-07-09 DIAGNOSIS — M79.2 NEUROPATHIC PAIN: ICD-10-CM

## 2024-07-09 PROCEDURE — 3046F HEMOGLOBIN A1C LEVEL >9.0%: CPT | Performed by: NURSE PRACTITIONER

## 2024-07-09 PROCEDURE — 3078F DIAST BP <80 MM HG: CPT | Performed by: NURSE PRACTITIONER

## 2024-07-09 PROCEDURE — 99215 OFFICE O/P EST HI 40 MIN: CPT | Performed by: NURSE PRACTITIONER

## 2024-07-09 PROCEDURE — 3074F SYST BP LT 130 MM HG: CPT | Performed by: NURSE PRACTITIONER

## 2024-07-09 PROCEDURE — 1123F ACP DISCUSS/DSCN MKR DOCD: CPT | Performed by: NURSE PRACTITIONER

## 2024-07-09 NOTE — PROGRESS NOTES
[facility] PROFESSIONAL SERVS  [facility] NEUROSCIENCE AND REHABILITATION [address]  [place] [id number]  Dept: [phone]  Dept Fax: [phone]  Loc: [phone]    Visit Date: 7/9/2024    Functionality Assessment/Goals Worksheet     On a scale of 0 (Does not Interfere) to 10 (Completely Interferes)     1.  Which number describes how during the past week pain has interfered with       the following:  A.  General Activity:  3  B.  Mood: 2  C.  Walking Ability:  3  D.  Normal Work (Includes both work outside the home and housework):  3  E.  Relations with Other People:   2  F.  Sleep:   0  G.  Enjoyment of Life:   2    2.  Patient Prefers to Take their Pain Medications:     []  On a regular basis   [x]  Only when necessary    []  Does not take pain medications    3.  What are the Patient's Goals/Expectations for Visiting Pain Management?     [x]  Learn about my pain    []  Receive Medication   []  Physical Therapy     []  Treat Depression   []  Receive Injections    []  Treat Sleep   []  Deal with Anxiety and Stress   []  Treat Opoid Dependence/Addiction   []  Other:  
07/06/2016    S/P PTCA: 1/15/2018: Stent diagonal branch Xience 3.0 x 12 mm. 1/15/2018    1/15/2018: Stent diagonal branch Xience 3.0 x 12 mm. Dr. Ta    Thumb injury 08/2015    Right thumb cut with table saw, no treatment needed       Past Surgical History:   Procedure Laterality Date    CARDIAC CATHETERIZATION  06/27/2016    CATARACT REMOVAL Right 2013    COLONOSCOPY  04/08    CORONARY ARTERY BYPASS GRAFT  07/06/2016    Double bypass, Dr. Harris    CYST REMOVAL Right 02/2007    Neck    CYSTOSCOPY N/A 7/16/2021    CYSTOSCOPY WITH BILAT RETROGRADE PyELOGRAM performed by Elias Brown Jr., MD at [facility] OR    DIAGNOSTIC CARDIAC CATH LAB PROCEDURE      EYE SURGERY Right     Retinal surgery x 3    HERNIA REPAIR Right     Inguinal    PTCA  01/15/2018    ROTATOR CUFF REPAIR Right 11/16/2017    TONSILLECTOMY  child    TOTAL KNEE ARTHROPLASTY Left 10/24/2016    Dr. Bergeron       Family History   Problem Relation Age of Onset    Heart Disease Mother     High Blood Pressure Mother     Obesity Mother     Diabetes Mother     Heart Disease Father     Cancer Father         colon/prostate    Colon Cancer Father     Prostate Cancer Father     Arthritis Father     Diabetes Brother          Medications & Allergies:   Current Outpatient Medications   Medication Instructions    albuterol sulfate HFA (PROVENTIL;VENTOLIN;PROAIR) 108 (90 Base) MCG/ACT inhaler INHALE TWO (2) PUFFS BY MOUTH EVERY 6 HOURS AS NEEDED FOR WHEEZING    allopurinol (ZYLOPRIM) 300 MG tablet TAKE 1 TABLET BY MOUTH ONCE DAILY    atorvastatin (LIPITOR) 40 MG tablet TAKE 1 TABLET BY MOUTH EVERY DAY    Blood Glucose Monitoring Suppl (PRODIGY AUTOCODE BLOOD GLUCOSE) w/Device KIT No dose, route, or frequency recorded.    blood glucose test strips (PRODIGY NO CODING BLOOD GLUC) strip USE TO TEST BLOOD GLUCOSE ONCE DAILY.    bumetanide (BUMEX) 2 MG tablet TAKE 1 TABLET BY MOUTH TWICE DAILY IN THE MORNING AND BEFORE BEDTIME    DROPLET PEN NEEDLES 32G X 4 MM MISC Use to

## 2024-07-26 ENCOUNTER — OFFICE VISIT (OUTPATIENT)
Dept: FAMILY MEDICINE CLINIC | Age: 64
End: 2024-07-26

## 2024-07-26 VITALS
DIASTOLIC BLOOD PRESSURE: 64 MMHG | HEART RATE: 60 BPM | WEIGHT: 253.4 LBS | SYSTOLIC BLOOD PRESSURE: 118 MMHG | BODY MASS INDEX: 36.28 KG/M2 | RESPIRATION RATE: 16 BRPM | HEIGHT: 70 IN

## 2024-07-26 DIAGNOSIS — R91.1 RIGHT LOWER LOBE PULMONARY NODULE: ICD-10-CM

## 2024-07-26 DIAGNOSIS — Z00.00 MEDICARE ANNUAL WELLNESS VISIT, SUBSEQUENT: ICD-10-CM

## 2024-07-26 DIAGNOSIS — L72.0 EPIDERMAL CYST OF NECK: ICD-10-CM

## 2024-07-26 DIAGNOSIS — R55 SYNCOPE, UNSPECIFIED SYNCOPE TYPE: ICD-10-CM

## 2024-07-26 DIAGNOSIS — F10.20 ALCOHOLISM (HCC): ICD-10-CM

## 2024-07-26 DIAGNOSIS — E11.65 UNCONTROLLED TYPE 2 DIABETES MELLITUS WITH HYPERGLYCEMIA (HCC): Primary | ICD-10-CM

## 2024-07-26 DIAGNOSIS — I10 PRIMARY HYPERTENSION: ICD-10-CM

## 2024-07-26 DIAGNOSIS — J44.9 CHRONIC OBSTRUCTIVE PULMONARY DISEASE, UNSPECIFIED COPD TYPE (HCC): ICD-10-CM

## 2024-07-26 PROBLEM — G20.A1 PARKINSON'S DISEASE (HCC): Status: RESOLVED | Noted: 2023-04-24 | Resolved: 2024-07-26

## 2024-07-26 ASSESSMENT — PATIENT HEALTH QUESTIONNAIRE - PHQ9
4. FEELING TIRED OR HAVING LITTLE ENERGY: SEVERAL DAYS
10. IF YOU CHECKED OFF ANY PROBLEMS, HOW DIFFICULT HAVE THESE PROBLEMS MADE IT FOR YOU TO DO YOUR WORK, TAKE CARE OF THINGS AT HOME, OR GET ALONG WITH OTHER PEOPLE: SOMEWHAT DIFFICULT
SUM OF ALL RESPONSES TO PHQ QUESTIONS 1-9: 4
5. POOR APPETITE OR OVEREATING: NOT AT ALL
8. MOVING OR SPEAKING SO SLOWLY THAT OTHER PEOPLE COULD HAVE NOTICED. OR THE OPPOSITE, BEING SO FIGETY OR RESTLESS THAT YOU HAVE BEEN MOVING AROUND A LOT MORE THAN USUAL: NOT AT ALL
SUM OF ALL RESPONSES TO PHQ QUESTIONS 1-9: 4
SUM OF ALL RESPONSES TO PHQ QUESTIONS 1-9: 4
3. TROUBLE FALLING OR STAYING ASLEEP: SEVERAL DAYS
2. FEELING DOWN, DEPRESSED OR HOPELESS: SEVERAL DAYS
SUM OF ALL RESPONSES TO PHQ QUESTIONS 1-9: 4
9. THOUGHTS THAT YOU WOULD BE BETTER OFF DEAD, OR OF HURTING YOURSELF: NOT AT ALL
SUM OF ALL RESPONSES TO PHQ9 QUESTIONS 1 & 2: 2
6. FEELING BAD ABOUT YOURSELF - OR THAT YOU ARE A FAILURE OR HAVE LET YOURSELF OR YOUR FAMILY DOWN: NOT AT ALL
7. TROUBLE CONCENTRATING ON THINGS, SUCH AS READING THE NEWSPAPER OR WATCHING TELEVISION: NOT AT ALL
1. LITTLE INTEREST OR PLEASURE IN DOING THINGS: SEVERAL DAYS

## 2024-07-26 ASSESSMENT — LIFESTYLE VARIABLES
HOW OFTEN DURING THE LAST YEAR HAVE YOU BEEN UNABLE TO REMEMBER WHAT HAPPENED THE NIGHT BEFORE BECAUSE YOU HAD BEEN DRINKING: LESS THAN MONTHLY
HOW OFTEN DURING THE LAST YEAR HAVE YOU FOUND THAT YOU WERE NOT ABLE TO STOP DRINKING ONCE YOU HAD STARTED: MONTHLY
HAS A RELATIVE, FRIEND, DOCTOR, OR ANOTHER HEALTH PROFESSIONAL EXPRESSED CONCERN ABOUT YOUR DRINKING OR SUGGESTED YOU CUT DOWN: YES, DURING THE PAST YEAR
HOW OFTEN DURING THE LAST YEAR HAVE YOU NEEDED AN ALCOHOLIC DRINK FIRST THING IN THE MORNING TO GET YOURSELF GOING AFTER A NIGHT OF HEAVY DRINKING: MONTHLY
HOW OFTEN DO YOU HAVE A DRINK CONTAINING ALCOHOL: 2-3 TIMES A WEEK
HAVE YOU OR SOMEONE ELSE BEEN INJURED AS A RESULT OF YOUR DRINKING: NO
HOW OFTEN DURING THE LAST YEAR HAVE YOU HAD A FEELING OF GUILT OR REMORSE AFTER DRINKING: NEVER
HOW MANY STANDARD DRINKS CONTAINING ALCOHOL DO YOU HAVE ON A TYPICAL DAY: 5 OR 6
HOW OFTEN DURING THE LAST YEAR HAVE YOU FAILED TO DO WHAT WAS NORMALLY EXPECTED FROM YOU BECAUSE OF DRINKING: LESS THAN MONTHLY

## 2024-07-26 NOTE — PROGRESS NOTES
Date: 7/26/2024    Andrei Munoz is a 64 y.o. male who presents today for:  Chief Complaint   Patient presents with    Gastroesophageal Reflux    Hypertension    Diabetes    Medicare AWV    Fall he states that he just does not know how he fell. He states that he was not drinking, he was sober. He states that he can be fine and next thing he falls.      DOI 7-22-24    Shoulder Pain     Right  He is going to neurology Dr Narayan and she ordered several tests.        Current regimen: Lantus, Jardiance, Metformin  Current monitoring regimen: home blood tests - 3  Home blood sugar trends: AM BS he is only monitoring once a day 160-220 in the AM   Any episodes of hypoglycemia? No  Current exercise: he walks 2 times a week   Compliance with treatment: Fair  Eye exam current (within one year): Yes  Any history of foot problems? Yes  Last foot exam: 4/24  Cardiovascular risk factors: advanced age (older than 55 for men, 65 for women), diabetes mellitus, dyslipidemia, hypertension, male gender, and obesity (BMI >= 30 kg/m2).   Immunizations up to date: Flu Yes   Pneumonia Yes  Taking ASA: Yes   If not why?     HPI:     HPI    has a current medication list which includes the following prescription(s): ticagrelor, jardiance, arnuity ellipta, droplet pen needles, potassium chloride, metformin, escitalopram, albuterol sulfate hfa, isosorbide mononitrate, pramipexole, allopurinol, fluticasone, omeprazole, trazodone, atorvastatin, valsartan, metoprolol succinate, bumetanide, naltrexone, lantus solostar, insulin lispro (1 unit dial), tamsulosin, anoro ellipta, fish oil-krill oil, multiple vitamin, multiple vitamins-minerals, magnesium oxide, prodigy no coding blood gluc, nitroglycerin, and prodigy autocode blood glucose.    Allergies   Allergen Reactions    Zoloft [Sertraline Hcl] Other (See Comments)     Headaches, sweats, bad dreams       Social History     Tobacco Use    Smoking status: Former     Current packs/day: 0.00

## 2024-07-29 ENCOUNTER — APPOINTMENT (OUTPATIENT)
Dept: GENERAL RADIOLOGY | Age: 64
End: 2024-07-29
Payer: MEDICARE

## 2024-07-29 ENCOUNTER — HOSPITAL ENCOUNTER (EMERGENCY)
Age: 64
Discharge: HOME | End: 2024-07-29
Payer: MEDICARE

## 2024-07-29 ENCOUNTER — TELEPHONE (OUTPATIENT)
Dept: FAMILY MEDICINE CLINIC | Age: 64
End: 2024-07-29

## 2024-07-29 ENCOUNTER — HOSPITAL ENCOUNTER (OUTPATIENT)
Age: 64
Discharge: HOME | End: 2024-07-29
Payer: MEDICARE

## 2024-07-29 VITALS
OXYGEN SATURATION: 92 % | HEART RATE: 75 BPM | SYSTOLIC BLOOD PRESSURE: 137 MMHG | DIASTOLIC BLOOD PRESSURE: 73 MMHG | HEIGHT: 71 IN | WEIGHT: 250 LBS | BODY MASS INDEX: 35 KG/M2 | TEMPERATURE: 98.2 F | RESPIRATION RATE: 16 BRPM

## 2024-07-29 DIAGNOSIS — R33.8 BENIGN PROSTATIC HYPERPLASIA WITH URINARY RETENTION: ICD-10-CM

## 2024-07-29 DIAGNOSIS — N40.1 BENIGN PROSTATIC HYPERPLASIA WITH URINARY RETENTION: ICD-10-CM

## 2024-07-29 DIAGNOSIS — S22.41XA CLOSED FRACTURE OF MULTIPLE RIBS OF RIGHT SIDE, INITIAL ENCOUNTER: Primary | ICD-10-CM

## 2024-07-29 DIAGNOSIS — R97.20 ELEVATED PSA: ICD-10-CM

## 2024-07-29 DIAGNOSIS — E11.65 UNCONTROLLED TYPE 2 DIABETES MELLITUS WITH HYPERGLYCEMIA (HCC): ICD-10-CM

## 2024-07-29 LAB
DEPRECATED MEAN GLUCOSE BLD GHB EST-ACNC: 213 MG/DL (ref 70–126)
HBA1C MFR BLD HPLC: 9.1 % (ref 4.4–6.4)
PSA SERPL-MCNC: 3.71 NG/ML (ref 0–1)

## 2024-07-29 PROCEDURE — 99284 EMERGENCY DEPT VISIT MOD MDM: CPT

## 2024-07-29 PROCEDURE — 6370000000 HC RX 637 (ALT 250 FOR IP): Performed by: PHYSICIAN ASSISTANT

## 2024-07-29 PROCEDURE — 36415 COLL VENOUS BLD VENIPUNCTURE: CPT

## 2024-07-29 PROCEDURE — 83036 HEMOGLOBIN GLYCOSYLATED A1C: CPT

## 2024-07-29 PROCEDURE — 73030 X-RAY EXAM OF SHOULDER: CPT

## 2024-07-29 PROCEDURE — 84153 ASSAY OF PSA TOTAL: CPT

## 2024-07-29 PROCEDURE — 71101 X-RAY EXAM UNILAT RIBS/CHEST: CPT

## 2024-07-29 RX ORDER — TRAMADOL HYDROCHLORIDE 50 MG/1
50 TABLET ORAL ONCE
Status: COMPLETED | OUTPATIENT
Start: 2024-07-29 | End: 2024-07-29

## 2024-07-29 RX ORDER — OXYCODONE HYDROCHLORIDE AND ACETAMINOPHEN 5; 325 MG/1; MG/1
1 TABLET ORAL ONCE
Status: COMPLETED | OUTPATIENT
Start: 2024-07-29 | End: 2024-07-29

## 2024-07-29 RX ORDER — OXYCODONE HYDROCHLORIDE AND ACETAMINOPHEN 5; 325 MG/1; MG/1
1 TABLET ORAL EVERY 6 HOURS PRN
Qty: 20 TABLET | Refills: 0 | Status: SHIPPED | OUTPATIENT
Start: 2024-07-29 | End: 2024-08-03

## 2024-07-29 RX ADMIN — OXYCODONE HYDROCHLORIDE AND ACETAMINOPHEN 1 TABLET: 5; 325 TABLET ORAL at 22:31

## 2024-07-29 RX ADMIN — TRAMADOL HYDROCHLORIDE 50 MG: 50 TABLET ORAL at 21:12

## 2024-07-29 ASSESSMENT — PAIN SCALES - GENERAL: PAINLEVEL_OUTOF10: 5

## 2024-07-29 ASSESSMENT — PAIN - FUNCTIONAL ASSESSMENT: PAIN_FUNCTIONAL_ASSESSMENT: 0-10

## 2024-07-29 NOTE — PROGRESS NOTES
[facility] PHYSICIANS [place] [facility]  [facility] NEUROSURGERY  [address]  [facility] [address]  Dept: [phone]  Loc: [phone]    2024    Andrei Munoz (:  1960) is a 64 y.o. male,Established patient, here for evaluation of the following chief complaint(s):  Follow-up (Follow up, PM,XR)    Subjective   HPI   Andrei Munoz is a 64 y.o. male  presenting today for scheduled follow up for imaging review.  Initially seen in clinic by Cy Roberson PA-C  on 24 for lumbar and cervical stenosis at which time he was referral to pain management and a flex-ext lumbar XR ordered.     He arrives today ambulating independently and unaccompanied. Recent lumbar XR demonstrated no instability w extensive DDD. He has seen pain management, but they have no plans for intervention. He denies any neck / back pain. No radicular type pain. Does have LE numbness/tingling related to diabetic neuropathy.  He does have some rib pain and was seen in the ED on  and dx with multiple right sided rib fractures. He reports a recent fall with LOC. Notes frequent falls, but LOC is rather uncommon. He does not feel his legs give out or he loses balance prompting these falls. Recent EMG results not available. He continues to express his desire to avoid surgery if at all possible. Denies saddle anesthesia and urinary incontinence. Denies UE or LE weakness.     Review of Systems   Cardiovascular:  Positive for chest pain (recent rib fracture).   Musculoskeletal:  Positive for arthralgias. Negative for back pain and gait problem.   Skin:  Negative for wound.   Neurological:  Positive for syncope. Negative for dizziness, seizures, weakness and light-headedness.   Psychiatric/Behavioral:  Negative for agitation, behavioral problems and confusion.       Objective   /68 (Site: Left Upper Arm, Position: Sitting, Cuff Size: Large Adult)   Pulse 61   Ht 1.803 m (5' 11\")   Wt

## 2024-07-29 NOTE — TELEPHONE ENCOUNTER
Pt is scheduled on 8-20-24 at [facility] for CT and Cardiac Event Monitor at 210 pm. Pt will need to arrive 30 minutes early [facility]/ After the CT he will need to go to [address] [place] [address] [facility] & Vascular for the event monitor. Pt is not to have anything to eat or drink 4 hrs prior to testing.    A PA was completed on this and it was approved from 7-29-24 to 10-26-24 with the approval # [id number]    Pt informed.        Referral to Dr Lewis faxed along with ofc notes, ins cards and demographics. They will call pt to schedule. Pt informed. .

## 2024-07-29 NOTE — ED TRIAGE NOTES
Pt arrives ambulatory from lobby right shoulder pain. Pt states he fell last week. Pt states pain is a 5 out of 0-10 at this time.

## 2024-07-30 ENCOUNTER — OFFICE VISIT (OUTPATIENT)
Dept: NEUROSURGERY | Age: 64
End: 2024-07-30
Payer: MEDICARE

## 2024-07-30 VITALS
SYSTOLIC BLOOD PRESSURE: 112 MMHG | DIASTOLIC BLOOD PRESSURE: 68 MMHG | BODY MASS INDEX: 35 KG/M2 | HEIGHT: 71 IN | HEART RATE: 61 BPM | WEIGHT: 250 LBS

## 2024-07-30 DIAGNOSIS — M48.061 SPINAL STENOSIS OF LUMBAR REGION WITHOUT NEUROGENIC CLAUDICATION: ICD-10-CM

## 2024-07-30 DIAGNOSIS — M43.16 SPONDYLOLISTHESIS OF LUMBAR REGION: ICD-10-CM

## 2024-07-30 DIAGNOSIS — M51.36 DEGENERATIVE DISC DISEASE, LUMBAR: ICD-10-CM

## 2024-07-30 DIAGNOSIS — R29.6 FALLS FREQUENTLY: ICD-10-CM

## 2024-07-30 DIAGNOSIS — M50.30 DDD (DEGENERATIVE DISC DISEASE), CERVICAL: ICD-10-CM

## 2024-07-30 DIAGNOSIS — M48.02 CERVICAL SPINAL STENOSIS: Primary | ICD-10-CM

## 2024-07-30 DIAGNOSIS — M48.061 BILATERAL STENOSIS OF LATERAL RECESS OF LUMBAR SPINE: ICD-10-CM

## 2024-07-30 PROCEDURE — 99213 OFFICE O/P EST LOW 20 MIN: CPT

## 2024-07-30 PROCEDURE — 1123F ACP DISCUSS/DSCN MKR DOCD: CPT

## 2024-07-30 PROCEDURE — 3078F DIAST BP <80 MM HG: CPT

## 2024-07-30 PROCEDURE — 3074F SYST BP LT 130 MM HG: CPT

## 2024-07-30 ASSESSMENT — ENCOUNTER SYMPTOMS: BACK PAIN: 0

## 2024-07-30 NOTE — ED PROVIDER NOTES
[facility] EMERGENCY DEPT      Pt Name: Andrei Munoz  MRN: [id number]  Birthdate 1960  Date of evaluation: 7/29/2024  Provider: Bernadette Arrington PA-C    CHIEF COMPLAINT       Chief Complaint   Patient presents with    Shoulder Injury       Nurses Notes reviewed and I agree except as noted in the HPI.      HISTORY OF PRESENT ILLNESS    Andrei Munoz is a 64 y.o. male who presents from home driven by self for right shoulder and rib pain.  Patient reports last week he fell out of bed and believes he landed on something.  Since then he has had right shoulder and right lateral upper rib pain that is not getting any better.  Patient has been seen by multiple providers for frequent falls.  Patient denies numbness, weakness, shortness of breath, chest pain, dizziness, head injury, headache, blurred vision, dizziness, nausea, vomiting, abdominal pain, or other complaints.  The patient is right-hand dominant.       PAST MEDICAL HISTORY    has a past medical history of CHF (congestive heart failure) ([facility]), COPD (chronic obstructive pulmonary disease) ([facility]), Coronary artery disease involving native coronary artery of native heart without angina pectoris, Depression, Diabetes mellitus type 2, diet-controlled ([facility]), GERD (gastroesophageal reflux disease), Hernia, Hx of blood clots, Hx of cocaine abuse ([facility]), Hyperglycemia, Hyperlipidemia, Hypertension, FAISAL on CPAP, Osteoarthritis, S/P CABG x 2, S/P PTCA: 1/15/2018: Stent diagonal branch Xience 3.0 x 12 mm., and Thumb injury.    SURGICAL HISTORY      has a past surgical history that includes Tonsillectomy (child); Colonoscopy (04/08); cyst removal (Right, 02/2007); hernia repair (Right); Cataract removal (Right, 2013); eye surgery (Right); Cardiac catheterization (06/27/2016); Total knee arthroplasty (Left, 10/24/2016); Coronary artery bypass graft (07/06/2016); Rotator cuff repair (Right, 11/16/2017); Diagnostic Cardiac Cath Lab Procedure; Percutaneous

## 2024-07-31 ENCOUNTER — TELEPHONE (OUTPATIENT)
Dept: FAMILY MEDICINE CLINIC | Age: 64
End: 2024-07-31

## 2024-07-31 NOTE — TELEPHONE ENCOUNTER
Pt informed.     Pt would like to know if he could have his cataract surgery and Dr Solis would like to speak with [facility] first.

## 2024-07-31 NOTE — TELEPHONE ENCOUNTER
----- Message from Malka Solis MD sent at 7/31/2024  1:28 PM EDT -----  Please let him know that his A1c is better at 9.1 compared to the previous 1 on April 22, 2024 that was 13.9.  However it it is still elevated, we need him to continue with very careful diet exercise as tolerated and medications and we will see him back for diabetic recheck in 3 months.  Please keep the appointment that he has scheduled for August

## 2024-08-02 ENCOUNTER — TELEPHONE (OUTPATIENT)
Dept: FAMILY MEDICINE CLINIC | Age: 64
End: 2024-08-02

## 2024-08-05 NOTE — TELEPHONE ENCOUNTER
I called Dr Tamez's office amd Dr Barragan will be calling Dr Solis on her cell phone as he is in surgery.

## 2024-08-14 NOTE — TELEPHONE ENCOUNTER
I called and spoke with Surgery at Dr Tamez's [facility] and they said that as long as the pt's sugar remains over 200 and even if he comes in the day of surgery and it is over 200 then they will not proceed with surgery. Dr Solis informed and pt was informed as well.

## 2024-08-15 NOTE — PATIENT INSTRUCTIONS
included within your After Visit Summary for your review.    Other Preventive Recommendations:    A preventive eye exam performed by an eye specialist is recommended every 1-2 years to screen for glaucoma; cataracts, macular degeneration, and other eye disorders.  A preventive dental visit is recommended every 6 months.  Try to get at least 150 minutes of exercise per week or 10,000 steps per day on a pedometer .  Order or download the FREE \"Exercise & Physical Activity: Your Everyday Guide\" from [employer]. Call 1-292.465.2222 or search [employer] online.  You need 7973-0693 mg of calcium and 1251-4437 IU of vitamin D per day. It is possible to meet your calcium requirement with diet alone, but a vitamin D supplement is usually necessary to meet this goal.  When exposed to the sun, use a sunscreen that protects against both UVA and UVB radiation with an SPF of 30 or greater. Reapply every 2 to 3 hours or after sweating, drying off with a towel, or swimming.  Always wear a seat belt when traveling in a car. Always wear a helmet when riding a bicycle or motorcycle.

## 2024-08-26 ENCOUNTER — OFFICE VISIT (OUTPATIENT)
Dept: FAMILY MEDICINE CLINIC | Age: 64
End: 2024-08-26

## 2024-08-26 ENCOUNTER — LAB (OUTPATIENT)
Dept: LAB | Age: 64
End: 2024-08-26

## 2024-08-26 VITALS
HEIGHT: 71 IN | BODY MASS INDEX: 35.5 KG/M2 | SYSTOLIC BLOOD PRESSURE: 126 MMHG | RESPIRATION RATE: 16 BRPM | WEIGHT: 253.6 LBS | HEART RATE: 64 BPM | DIASTOLIC BLOOD PRESSURE: 72 MMHG

## 2024-08-26 DIAGNOSIS — R21 RASH/SKIN ERUPTION: ICD-10-CM

## 2024-08-26 DIAGNOSIS — I10 PRIMARY HYPERTENSION: Primary | ICD-10-CM

## 2024-08-26 LAB
ALBUMIN SERPL BCG-MCNC: 3.9 G/DL (ref 3.5–5.1)
ALP SERPL-CCNC: 130 U/L (ref 38–126)
ALT SERPL W/O P-5'-P-CCNC: 37 U/L (ref 11–66)
ANION GAP SERPL CALC-SCNC: 12 MEQ/L (ref 8–16)
AST SERPL-CCNC: 25 U/L (ref 5–40)
BASOPHILS ABSOLUTE: 0.1 THOU/MM3 (ref 0–0.1)
BASOPHILS NFR BLD AUTO: 1.3 %
BILIRUB SERPL-MCNC: 0.4 MG/DL (ref 0.3–1.2)
BUN SERPL-MCNC: 22 MG/DL (ref 7–22)
CALCIUM SERPL-MCNC: 9.7 MG/DL (ref 8.5–10.5)
CHLORIDE SERPL-SCNC: 92 MEQ/L (ref 98–111)
CO2 SERPL-SCNC: 30 MEQ/L (ref 23–33)
CREAT SERPL-MCNC: 1.3 MG/DL (ref 0.4–1.2)
CRP SERPL-MCNC: 1.59 MG/DL (ref 0–1)
DEPRECATED RDW RBC AUTO: 57.1 FL (ref 35–45)
EOSINOPHIL NFR BLD AUTO: 0.9 %
EOSINOPHILS ABSOLUTE: 0.1 THOU/MM3 (ref 0–0.4)
ERYTHROCYTE [DISTWIDTH] IN BLOOD BY AUTOMATED COUNT: 16.1 % (ref 11.5–14.5)
ERYTHROCYTE [SEDIMENTATION RATE] IN BLOOD BY WESTERGREN METHOD: 7 MM/HR (ref 0–10)
GFR SERPL CREATININE-BSD FRML MDRD: 60 ML/MIN/1.73M2
GLUCOSE SERPL-MCNC: 163 MG/DL (ref 70–108)
HCT VFR BLD AUTO: 50.3 % (ref 42–52)
HGB BLD-MCNC: 16.4 GM/DL (ref 14–18)
IMM GRANULOCYTES # BLD AUTO: 0.41 THOU/MM3 (ref 0–0.07)
IMM GRANULOCYTES NFR BLD AUTO: 3.7 %
LYMPHOCYTES ABSOLUTE: 2.4 THOU/MM3 (ref 1–4.8)
LYMPHOCYTES NFR BLD AUTO: 22.1 %
MCH RBC QN AUTO: 31.3 PG (ref 26–33)
MCHC RBC AUTO-ENTMCNC: 32.6 GM/DL (ref 32.2–35.5)
MCV RBC AUTO: 96 FL (ref 80–94)
MONOCYTES ABSOLUTE: 1.1 THOU/MM3 (ref 0.4–1.3)
MONOCYTES NFR BLD AUTO: 10 %
NEUTROPHILS ABSOLUTE: 6.8 THOU/MM3 (ref 1.8–7.7)
NEUTROPHILS NFR BLD AUTO: 62 %
NRBC BLD AUTO-RTO: 0 /100 WBC
PLATELET # BLD AUTO: 264 THOU/MM3 (ref 130–400)
PMV BLD AUTO: 9.7 FL (ref 9.4–12.4)
POTASSIUM SERPL-SCNC: 5.2 MEQ/L (ref 3.5–5.2)
PROT SERPL-MCNC: 7.5 G/DL (ref 6.1–8)
RBC # BLD AUTO: 5.24 MILL/MM3 (ref 4.7–6.1)
SODIUM SERPL-SCNC: 134 MEQ/L (ref 135–145)
WBC # BLD AUTO: 11 THOU/MM3 (ref 4.8–10.8)

## 2024-08-26 PROCEDURE — 99213 OFFICE O/P EST LOW 20 MIN: CPT | Performed by: FAMILY MEDICINE

## 2024-08-26 PROCEDURE — 1123F ACP DISCUSS/DSCN MKR DOCD: CPT | Performed by: FAMILY MEDICINE

## 2024-08-26 RX ORDER — PREDNISONE 10 MG/1
TABLET ORAL
Qty: 16 TABLET | Refills: 0 | Status: SHIPPED | OUTPATIENT
Start: 2024-08-26

## 2024-08-26 ASSESSMENT — ENCOUNTER SYMPTOMS
NAUSEA: 0
BLOOD IN STOOL: 0
CHEST TIGHTNESS: 0
COUGH: 0
EYES NEGATIVE: 1
DIARRHEA: 0
ABDOMINAL PAIN: 0
VOMITING: 0
CONSTIPATION: 0
SHORTNESS OF BREATH: 0

## 2024-08-26 NOTE — PROGRESS NOTES
Date: 8/26/2024    Andrei Munoz is a 64 y.o. male who presents today for:  Chief Complaint   Patient presents with    Hypertension    Leg Swelling     Bilateral right worse than left. He has swelling for about 1 week.       Cellulitis     He has a rash on his right leg for about 1 week. He states that he noticed some redness and swelling and it progresivelly got worse but he states that today looks better than yesterday. He is using gold bond diabetic foot cream because his skin was dry and scalyhe states that the cream helps .  He also started taking prednisone from his dog , he is not sure about the dose but he feels that is helping.. ()We discussed how he is not to take his dog's medication .   He states that he had a fall about 5 weeks but he did not hurt his knee, but did fracture some ribs, he states the pain from that is better..        HPI:     Hypertension  This is a chronic problem. The current episode started more than 1 year ago. The problem is unchanged. The problem is controlled. Associated symptoms include peripheral edema. Pertinent negatives include no chest pain, headaches, neck pain, palpitations, PND or shortness of breath. Risk factors for coronary artery disease include diabetes mellitus, dyslipidemia, obesity, male gender and sedentary lifestyle. Past treatments include angiotensin blockers, beta blockers and diuretics. The current treatment provides significant improvement. Compliance problems include exercise.        has a current medication list which includes the following prescription(s): prednisone, ticagrelor, jardiance, arnuity ellipta, droplet pen needles, potassium chloride, metformin, escitalopram, albuterol sulfate hfa, isosorbide mononitrate, pramipexole, allopurinol, fluticasone, omeprazole, trazodone, atorvastatin, valsartan, metoprolol succinate, bumetanide, naltrexone, lantus solostar, insulin lispro (1 unit dial), tamsulosin, anoro ellipta, fish oil-krill oil, multiple  1     Standing Expiration Date:   8/26/2025       Continue current medications.    Return in about 2 days (around 8/28/2024), or if symptoms worsen or fail to improve.     Discussed use, benefit, and side effects of prescribed medications.  All patient questions answered.  Pt voiced understanding.  Instructed to continue current medications,diet and exercise.  Patient agreed with treatment plan.     Allergies, Problem List, Medications, Medical History, Family History, Surgical History and Tobacco History reviewed by provider.

## 2024-08-28 ENCOUNTER — OFFICE VISIT (OUTPATIENT)
Dept: FAMILY MEDICINE CLINIC | Age: 64
End: 2024-08-28

## 2024-08-28 VITALS
RESPIRATION RATE: 16 BRPM | BODY MASS INDEX: 34.55 KG/M2 | WEIGHT: 246.8 LBS | HEART RATE: 76 BPM | DIASTOLIC BLOOD PRESSURE: 82 MMHG | HEIGHT: 71 IN | SYSTOLIC BLOOD PRESSURE: 134 MMHG

## 2024-08-28 DIAGNOSIS — N28.9 RENAL INSUFFICIENCY: ICD-10-CM

## 2024-08-28 DIAGNOSIS — E87.1 HYPONATREMIA: ICD-10-CM

## 2024-08-28 DIAGNOSIS — R21 RASH/SKIN ERUPTION: Primary | ICD-10-CM

## 2024-08-28 DIAGNOSIS — D72.829 LEUKOCYTOSIS, UNSPECIFIED TYPE: ICD-10-CM

## 2024-08-28 PROCEDURE — 1123F ACP DISCUSS/DSCN MKR DOCD: CPT | Performed by: FAMILY MEDICINE

## 2024-08-28 PROCEDURE — 99213 OFFICE O/P EST LOW 20 MIN: CPT | Performed by: FAMILY MEDICINE

## 2024-08-28 RX ORDER — BUSPIRONE HYDROCHLORIDE 5 MG/1
5 TABLET ORAL 2 TIMES DAILY
Qty: 60 TABLET | Refills: 2 | Status: SHIPPED | OUTPATIENT
Start: 2024-08-28 | End: 2024-11-26

## 2024-08-28 ASSESSMENT — ENCOUNTER SYMPTOMS
NAUSEA: 0
EYES NEGATIVE: 1
SHORTNESS OF BREATH: 0
CHEST TIGHTNESS: 0
VOMITING: 0
CONSTIPATION: 0
DIARRHEA: 0
COUGH: 0
ABDOMINAL PAIN: 0
BLOOD IN STOOL: 0

## 2024-08-28 NOTE — PROGRESS NOTES
Date: 8/28/2024    Andrei Munoz is a 64 y.o. male who presents today for:  Chief Complaint   Patient presents with    Follow-up     Rash    Rash  He states that the has a friend moving in with him next week and she will help with taking care of his mother. He states that he gets anxious, uptight and he feels like he needs an increase on his meds.   He states that taking care of his mother is difficult.        HPI:     Anxiety  Presents for follow-up visit. Symptoms include depressed mood, excessive worry (he worries about his mother.), irritability (he states that it is hard to take care of his mother and sometimes he gets irritable), nervous/anxious behavior, restlessness and suicidal ideas. Patient reports no chest pain, confusion, dizziness, insomnia, nausea, palpitations, panic or shortness of breath. Episode frequency: he states that he feels like this about 1/2 of the days. The quality of sleep is good.           has a current medication list which includes the following prescription(s): buspirone, prednisone, ticagrelor, jardiance, arnuity ellipta, droplet pen needles, potassium chloride, metformin, escitalopram, albuterol sulfate hfa, isosorbide mononitrate, pramipexole, allopurinol, fluticasone, omeprazole, trazodone, atorvastatin, valsartan, metoprolol succinate, bumetanide, naltrexone, lantus solostar, insulin lispro (1 unit dial), tamsulosin, anoro ellipta, fish oil-krill oil, multiple vitamin, multiple vitamins-minerals, magnesium oxide, prodigy no coding blood gluc, nitroglycerin, and prodigy autocode blood glucose.    Allergies   Allergen Reactions    Zoloft [Sertraline Hcl] Other (See Comments)     Headaches, sweats, bad dreams       Social History     Tobacco Use    Smoking status: Former     Current packs/day: 0.00     Average packs/day: 2.0 packs/day for 25.0 years (50.0 ttl pk-yrs)     Types: Cigars, Cigarettes     Start date: 11/11/1989     Quit date: 11/11/2014     Years since quitting:

## 2024-09-04 ENCOUNTER — OFFICE VISIT (OUTPATIENT)
Dept: FAMILY MEDICINE CLINIC | Age: 64
End: 2024-09-04

## 2024-09-04 VITALS
WEIGHT: 248.8 LBS | BODY MASS INDEX: 34.83 KG/M2 | DIASTOLIC BLOOD PRESSURE: 70 MMHG | HEART RATE: 72 BPM | RESPIRATION RATE: 12 BRPM | SYSTOLIC BLOOD PRESSURE: 122 MMHG | HEIGHT: 71 IN

## 2024-09-04 DIAGNOSIS — R21 RASH/SKIN ERUPTION: ICD-10-CM

## 2024-09-04 DIAGNOSIS — M79.89 SWELLING OF BOTH LOWER EXTREMITIES: ICD-10-CM

## 2024-09-04 DIAGNOSIS — I10 PRIMARY HYPERTENSION: Primary | ICD-10-CM

## 2024-09-04 PROCEDURE — 1123F ACP DISCUSS/DSCN MKR DOCD: CPT | Performed by: FAMILY MEDICINE

## 2024-09-04 PROCEDURE — 99213 OFFICE O/P EST LOW 20 MIN: CPT | Performed by: FAMILY MEDICINE

## 2024-09-04 NOTE — PROGRESS NOTES
Date: 2024    Andrei Munoz is a 64 y.o. male who presents today for:  Chief Complaint   Patient presents with    1 week check    Rash     Legs he states that he is getting better.       HPI:     Rash  This is a new problem. The problem has been gradually improving since onset. The affected locations include the left lower leg and right lower leg. He was exposed to nothing. Pertinent negatives include no cough, diarrhea, fever, shortness of breath or vomiting. Past treatments include antibiotics.       has a current medication list which includes the following prescription(s): buspirone, prednisone, ticagrelor, jardiance, arnuity ellipta, droplet pen needles, potassium chloride, metformin, escitalopram, albuterol sulfate hfa, isosorbide mononitrate, pramipexole, allopurinol, fluticasone, omeprazole, trazodone, atorvastatin, valsartan, metoprolol succinate, bumetanide, naltrexone, lantus solostar, insulin lispro (1 unit dial), tamsulosin, anoro ellipta, fish oil-krill oil, multiple vitamin, multiple vitamins-minerals, magnesium oxide, prodigy no coding blood gluc, nitroglycerin, and prodigy autocode blood glucose.    Allergies   Allergen Reactions    Zoloft [Sertraline Hcl] Other (See Comments)     Headaches, sweats, bad dreams       Social History     Tobacco Use    Smoking status: Former     Current packs/day: 0.00     Average packs/day: 2.0 packs/day for 25.0 years (50.0 ttl pk-yrs)     Types: Cigars, Cigarettes     Start date: 1989     Quit date: 2014     Years since quittin.8     Passive exposure: Never    Smokeless tobacco: Never    Tobacco comments:     Quit smoking    Vaping Use    Vaping status: Never Used   Substance Use Topics    Alcohol use: Not Currently     Alcohol/week: 0.0 standard drinks of alcohol    Drug use: Not Currently     Frequency: 5.0 times per week     Types: Marijuana (Weed)     Comment: smokes pot once sushma while .former drug user, addiction treatment at [facility]

## 2024-09-19 ENCOUNTER — OFFICE VISIT (OUTPATIENT)
Dept: CARDIOLOGY CLINIC | Age: 64
End: 2024-09-19
Payer: MEDICARE

## 2024-09-19 ENCOUNTER — HOSPITAL ENCOUNTER (OUTPATIENT)
Dept: CT IMAGING | Age: 64
Discharge: HOME | End: 2024-09-19
Payer: MEDICARE

## 2024-09-19 VITALS
BODY MASS INDEX: 35.39 KG/M2 | HEIGHT: 71 IN | DIASTOLIC BLOOD PRESSURE: 50 MMHG | SYSTOLIC BLOOD PRESSURE: 100 MMHG | OXYGEN SATURATION: 93 % | WEIGHT: 252.8 LBS | HEART RATE: 81 BPM

## 2024-09-19 DIAGNOSIS — J41.1 MUCOPURULENT CHRONIC BRONCHITIS (HCC): ICD-10-CM

## 2024-09-19 DIAGNOSIS — J44.9 STAGE 2 MODERATE COPD BY GOLD CLASSIFICATION (HCC): ICD-10-CM

## 2024-09-19 DIAGNOSIS — L03.115 CELLULITIS OF RIGHT LOWER LEG: ICD-10-CM

## 2024-09-19 DIAGNOSIS — R60.0 EDEMA OF BOTH LOWER LEGS: ICD-10-CM

## 2024-09-19 DIAGNOSIS — G47.33 OBSTRUCTIVE SLEEP APNEA ON CPAP: ICD-10-CM

## 2024-09-19 DIAGNOSIS — I50.32 CHRONIC DIASTOLIC CONGESTIVE HEART FAILURE, NYHA CLASS 2 (HCC): Primary | ICD-10-CM

## 2024-09-19 DIAGNOSIS — J43.2 CENTRILOBULAR EMPHYSEMA (HCC): ICD-10-CM

## 2024-09-19 DIAGNOSIS — Z87.891 PERSONAL HISTORY OF TOBACCO USE: ICD-10-CM

## 2024-09-19 PROCEDURE — 71271 CT THORAX LUNG CANCER SCR C-: CPT

## 2024-09-19 PROCEDURE — 99214 OFFICE O/P EST MOD 30 MIN: CPT | Performed by: NURSE PRACTITIONER

## 2024-09-19 PROCEDURE — 1123F ACP DISCUSS/DSCN MKR DOCD: CPT | Performed by: NURSE PRACTITIONER

## 2024-09-19 PROCEDURE — 3078F DIAST BP <80 MM HG: CPT | Performed by: NURSE PRACTITIONER

## 2024-09-19 PROCEDURE — 3074F SYST BP LT 130 MM HG: CPT | Performed by: NURSE PRACTITIONER

## 2024-09-19 RX ORDER — SULFAMETHOXAZOLE/TRIMETHOPRIM 800-160 MG
1 TABLET ORAL 2 TIMES DAILY
Qty: 14 TABLET | Refills: 0 | Status: SHIPPED | OUTPATIENT
Start: 2024-09-19 | End: 2024-09-19

## 2024-09-19 RX ORDER — SULFAMETHOXAZOLE/TRIMETHOPRIM 800-160 MG
1 TABLET ORAL 2 TIMES DAILY
Qty: 14 TABLET | Refills: 0 | Status: SHIPPED | OUTPATIENT
Start: 2024-09-19 | End: 2024-09-26

## 2024-09-19 RX ORDER — POTASSIUM CHLORIDE 1500 MG/1
20 TABLET, EXTENDED RELEASE ORAL DAILY
Qty: 90 TABLET | Refills: 3 | Status: SHIPPED | OUTPATIENT
Start: 2024-09-19

## 2024-09-19 ASSESSMENT — ENCOUNTER SYMPTOMS
VOMITING: 0
NAUSEA: 0
COUGH: 0
ABDOMINAL DISTENTION: 0
SHORTNESS OF BREATH: 1

## 2024-09-25 ENCOUNTER — OFFICE VISIT (OUTPATIENT)
Dept: FAMILY MEDICINE CLINIC | Age: 64
End: 2024-09-25

## 2024-09-25 VITALS
SYSTOLIC BLOOD PRESSURE: 122 MMHG | BODY MASS INDEX: 34.02 KG/M2 | DIASTOLIC BLOOD PRESSURE: 76 MMHG | HEIGHT: 71 IN | HEART RATE: 84 BPM | WEIGHT: 243 LBS | RESPIRATION RATE: 20 BRPM

## 2024-09-25 DIAGNOSIS — E11.65 UNCONTROLLED TYPE 2 DIABETES MELLITUS WITH HYPERGLYCEMIA (HCC): ICD-10-CM

## 2024-09-25 DIAGNOSIS — L03.115 CELLULITIS OF RIGHT LOWER EXTREMITY: Primary | ICD-10-CM

## 2024-09-25 PROCEDURE — 99213 OFFICE O/P EST LOW 20 MIN: CPT | Performed by: FAMILY MEDICINE

## 2024-09-25 PROCEDURE — 1123F ACP DISCUSS/DSCN MKR DOCD: CPT | Performed by: FAMILY MEDICINE

## 2024-09-25 RX ORDER — INSULIN GLARGINE 100 [IU]/ML
10 INJECTION, SOLUTION SUBCUTANEOUS NIGHTLY
Qty: 1 ADJUSTABLE DOSE PRE-FILLED PEN SYRINGE | Refills: 3 | Status: SHIPPED | OUTPATIENT
Start: 2024-09-25

## 2024-09-25 RX ORDER — PEN NEEDLE, DIABETIC 32GX 5/32"
NEEDLE, DISPOSABLE MISCELLANEOUS
Qty: 300 EACH | Refills: 0 | Status: SHIPPED | OUTPATIENT
Start: 2024-09-25

## 2024-09-25 RX ORDER — CEPHALEXIN 500 MG/1
500 CAPSULE ORAL 3 TIMES DAILY
Qty: 30 CAPSULE | Refills: 0 | Status: ON HOLD | OUTPATIENT
Start: 2024-09-25 | End: 2024-09-28 | Stop reason: HOSPADM

## 2024-09-25 RX ORDER — INSULIN LISPRO 100 [IU]/ML
INJECTION, SOLUTION INTRAVENOUS; SUBCUTANEOUS
Qty: 1 ADJUSTABLE DOSE PRE-FILLED PEN SYRINGE | Refills: 2 | Status: SHIPPED | OUTPATIENT
Start: 2024-09-25

## 2024-09-25 ASSESSMENT — ENCOUNTER SYMPTOMS
DIARRHEA: 0
SHORTNESS OF BREATH: 0
CONSTIPATION: 0
ABDOMINAL PAIN: 0
CHEST TIGHTNESS: 0
EYES NEGATIVE: 1
COUGH: 0
BLOOD IN STOOL: 0
VOMITING: 0
NAUSEA: 0

## 2024-09-26 ENCOUNTER — HOSPITAL ENCOUNTER (INPATIENT)
Age: 64
LOS: 1 days | Discharge: HOME | End: 2024-09-28
Attending: NURSE PRACTITIONER
Payer: MEDICARE

## 2024-09-26 ENCOUNTER — APPOINTMENT (OUTPATIENT)
Dept: GENERAL RADIOLOGY | Age: 64
End: 2024-09-26
Payer: MEDICARE

## 2024-09-26 DIAGNOSIS — F10.10 ALCOHOL ABUSE: ICD-10-CM

## 2024-09-26 DIAGNOSIS — R79.89 ELEVATED LACTIC ACID LEVEL: ICD-10-CM

## 2024-09-26 DIAGNOSIS — E86.0 DEHYDRATION: Primary | ICD-10-CM

## 2024-09-26 DIAGNOSIS — J96.01 ACUTE RESPIRATORY FAILURE WITH HYPOXIA: ICD-10-CM

## 2024-09-26 DIAGNOSIS — J18.9 PNEUMONIA DUE TO INFECTIOUS ORGANISM, UNSPECIFIED LATERALITY, UNSPECIFIED PART OF LUNG: ICD-10-CM

## 2024-09-26 LAB
ALBUMIN SERPL BCG-MCNC: 3.9 G/DL (ref 3.5–5.1)
ALP SERPL-CCNC: 125 U/L (ref 38–126)
ALT SERPL W/O P-5'-P-CCNC: 73 U/L (ref 11–66)
AMPHETAMINES UR QL SCN: NEGATIVE
ANION GAP SERPL CALC-SCNC: 23 MEQ/L (ref 8–16)
APAP SERPL-MCNC: < 5 UG/ML (ref 0–20)
AST SERPL-CCNC: 84 U/L (ref 5–40)
BARBITURATES UR QL SCN: NEGATIVE
BASOPHILS ABSOLUTE: 0.1 THOU/MM3 (ref 0–0.1)
BASOPHILS NFR BLD AUTO: 0.8 %
BENZODIAZ UR QL SCN: NEGATIVE
BILIRUB CONJ SERPL-MCNC: < 0.1 MG/DL (ref 0.1–13.8)
BILIRUB SERPL-MCNC: 0.2 MG/DL (ref 0.3–1.2)
BILIRUB UR QL STRIP.AUTO: NEGATIVE
BUN SERPL-MCNC: 13 MG/DL (ref 7–22)
BZE UR QL SCN: NEGATIVE
CALCIUM SERPL-MCNC: 8.8 MG/DL (ref 8.5–10.5)
CANNABINOIDS UR QL SCN: POSITIVE
CHARACTER UR: CLEAR
CHLORIDE SERPL-SCNC: 99 MEQ/L (ref 98–111)
CO2 SERPL-SCNC: 18 MEQ/L (ref 23–33)
COLOR, UA: YELLOW
CREAT SERPL-MCNC: 1.5 MG/DL (ref 0.4–1.2)
DEPRECATED RDW RBC AUTO: 60.8 FL (ref 35–45)
EOSINOPHIL NFR BLD AUTO: 0.3 %
EOSINOPHILS ABSOLUTE: 0 THOU/MM3 (ref 0–0.4)
ERYTHROCYTE [DISTWIDTH] IN BLOOD BY AUTOMATED COUNT: 16.2 % (ref 11.5–14.5)
ETHANOL SERPL-MCNC: 0.24 % (ref 0–0.08)
FENTANYL: NEGATIVE
GFR SERPL CREATININE-BSD FRML MDRD: 51 ML/MIN/1.73M2
GLUCOSE BLD STRIP.AUTO-MCNC: 119 MG/DL (ref 70–108)
GLUCOSE SERPL-MCNC: 117 MG/DL (ref 70–108)
GLUCOSE UR QL STRIP.AUTO: >= 1000 MG/DL
HCT VFR BLD AUTO: 47.9 % (ref 42–52)
HGB BLD-MCNC: 15.5 GM/DL (ref 14–18)
HGB UR QL STRIP.AUTO: NEGATIVE
IMM GRANULOCYTES # BLD AUTO: 0.13 THOU/MM3 (ref 0–0.07)
IMM GRANULOCYTES NFR BLD AUTO: 1.4 %
KETONES UR QL STRIP.AUTO: NEGATIVE
LACTATE SERPL-SCNC: 5.7 MMOL/L (ref 0.5–2)
LYMPHOCYTES ABSOLUTE: 2.9 THOU/MM3 (ref 1–4.8)
LYMPHOCYTES NFR BLD AUTO: 30.4 %
MCH RBC QN AUTO: 32.8 PG (ref 26–33)
MCHC RBC AUTO-ENTMCNC: 32.4 GM/DL (ref 32.2–35.5)
MCV RBC AUTO: 101.5 FL (ref 80–94)
MONOCYTES ABSOLUTE: 0.9 THOU/MM3 (ref 0.4–1.3)
MONOCYTES NFR BLD AUTO: 9.2 %
NEUTROPHILS ABSOLUTE: 5.6 THOU/MM3 (ref 1.8–7.7)
NEUTROPHILS NFR BLD AUTO: 57.9 %
NITRITE UR QL STRIP: NEGATIVE
NRBC BLD AUTO-RTO: 0 /100 WBC
OPIATES UR QL SCN: NEGATIVE
OSMOLALITY SERPL CALC.SUM OF ELEC: 280.5 MOSMOL/KG (ref 275–300)
OXYCODONE: NEGATIVE
PCP UR QL SCN: NEGATIVE
PH UR STRIP.AUTO: 6 [PH] (ref 5–9)
PLATELET # BLD AUTO: 269 THOU/MM3 (ref 130–400)
PMV BLD AUTO: 9.7 FL (ref 9.4–12.4)
POTASSIUM SERPL-SCNC: 4.2 MEQ/L (ref 3.5–5.2)
PROT SERPL-MCNC: 6.7 G/DL (ref 6.1–8)
PROT UR STRIP.AUTO-MCNC: NEGATIVE MG/DL
RBC # BLD AUTO: 4.72 MILL/MM3 (ref 4.7–6.1)
SALICYLATES SERPL-MCNC: < 0.3 MG/DL (ref 2–10)
SODIUM SERPL-SCNC: 140 MEQ/L (ref 135–145)
SP GR UR REFRACT.AUTO: 1.02 (ref 1–1.03)
TSH SERPL DL<=0.005 MIU/L-ACNC: 1.79 UIU/ML (ref 0.4–4.2)
UROBILINOGEN, URINE: 0.2 EU/DL (ref 0–1)
WBC # BLD AUTO: 9.6 THOU/MM3 (ref 4.8–10.8)
WBC #/AREA URNS HPF: NEGATIVE /[HPF]

## 2024-09-26 PROCEDURE — 84443 ASSAY THYROID STIM HORMONE: CPT

## 2024-09-26 PROCEDURE — 71045 X-RAY EXAM CHEST 1 VIEW: CPT

## 2024-09-26 PROCEDURE — 82248 BILIRUBIN DIRECT: CPT

## 2024-09-26 PROCEDURE — 85025 COMPLETE CBC W/AUTO DIFF WBC: CPT

## 2024-09-26 PROCEDURE — 80053 COMPREHEN METABOLIC PANEL: CPT

## 2024-09-26 PROCEDURE — 83880 ASSAY OF NATRIURETIC PEPTIDE: CPT

## 2024-09-26 PROCEDURE — 99285 EMERGENCY DEPT VISIT HI MDM: CPT

## 2024-09-26 PROCEDURE — 2580000003 HC RX 258: Performed by: NURSE PRACTITIONER

## 2024-09-26 PROCEDURE — 80307 DRUG TEST PRSMV CHEM ANLYZR: CPT

## 2024-09-26 PROCEDURE — 6360000002 HC RX W HCPCS: Performed by: NURSE PRACTITIONER

## 2024-09-26 PROCEDURE — 84145 PROCALCITONIN (PCT): CPT

## 2024-09-26 PROCEDURE — 6370000000 HC RX 637 (ALT 250 FOR IP): Performed by: NURSE PRACTITIONER

## 2024-09-26 PROCEDURE — 81003 URINALYSIS AUTO W/O SCOPE: CPT

## 2024-09-26 PROCEDURE — 80143 DRUG ASSAY ACETAMINOPHEN: CPT

## 2024-09-26 PROCEDURE — 82077 ASSAY SPEC XCP UR&BREATH IA: CPT

## 2024-09-26 PROCEDURE — 87040 BLOOD CULTURE FOR BACTERIA: CPT

## 2024-09-26 PROCEDURE — 36415 COLL VENOUS BLD VENIPUNCTURE: CPT

## 2024-09-26 PROCEDURE — 82948 REAGENT STRIP/BLOOD GLUCOSE: CPT

## 2024-09-26 PROCEDURE — 93005 ELECTROCARDIOGRAM TRACING: CPT | Performed by: NURSE PRACTITIONER

## 2024-09-26 PROCEDURE — 96361 HYDRATE IV INFUSION ADD-ON: CPT

## 2024-09-26 PROCEDURE — 83605 ASSAY OF LACTIC ACID: CPT

## 2024-09-26 PROCEDURE — 80179 DRUG ASSAY SALICYLATE: CPT

## 2024-09-26 PROCEDURE — 96365 THER/PROPH/DIAG IV INF INIT: CPT

## 2024-09-26 RX ORDER — LANOLIN ALCOHOL/MO/W.PET/CERES
100 CREAM (GRAM) TOPICAL ONCE
Status: COMPLETED | OUTPATIENT
Start: 2024-09-26 | End: 2024-09-26

## 2024-09-26 RX ORDER — FOLIC ACID 1 MG/1
1 TABLET ORAL ONCE
Status: COMPLETED | OUTPATIENT
Start: 2024-09-26 | End: 2024-09-26

## 2024-09-26 RX ORDER — ALBUTEROL SULFATE 90 UG/1
INHALANT RESPIRATORY (INHALATION)
Qty: 8.5 G | Refills: 4 | Status: SHIPPED | OUTPATIENT
Start: 2024-09-26

## 2024-09-26 RX ORDER — 0.9 % SODIUM CHLORIDE 0.9 %
1000 INTRAVENOUS SOLUTION INTRAVENOUS ONCE
Status: COMPLETED | OUTPATIENT
Start: 2024-09-26 | End: 2024-09-26

## 2024-09-26 RX ORDER — MULTIVITAMIN WITH IRON
1 TABLET ORAL ONCE
Status: COMPLETED | OUTPATIENT
Start: 2024-09-26 | End: 2024-09-26

## 2024-09-26 RX ADMIN — Medication 1 TABLET: at 22:07

## 2024-09-26 RX ADMIN — FOLIC ACID 1 MG: 1 TABLET ORAL at 20:26

## 2024-09-26 RX ADMIN — Medication 100 MG: at 20:26

## 2024-09-26 RX ADMIN — AZITHROMYCIN MONOHYDRATE 500 MG: 500 INJECTION, POWDER, LYOPHILIZED, FOR SOLUTION INTRAVENOUS at 23:19

## 2024-09-26 RX ADMIN — SODIUM CHLORIDE 1000 ML: 9 INJECTION, SOLUTION INTRAVENOUS at 21:21

## 2024-09-26 ASSESSMENT — PAIN - FUNCTIONAL ASSESSMENT: PAIN_FUNCTIONAL_ASSESSMENT: NONE - DENIES PAIN

## 2024-09-26 NOTE — ED TRIAGE NOTES
PT to the ED for alcohol intoxication and and hallucinations. Family states he is a chronic alcoholic. PT states he's had at least half a gallon of tequila today. Family states he said he's been \"seeing colors\" and talking to people that aren't there since yesterday. PT cooperative on and off on arrival. At points PT is thrashing in bed and cursing at \\staff. EKG completed. PT alert and oriented.

## 2024-09-27 ENCOUNTER — APPOINTMENT (OUTPATIENT)
Dept: INTERVENTIONAL RADIOLOGY/VASCULAR | Age: 64
End: 2024-09-27
Payer: MEDICARE

## 2024-09-27 ENCOUNTER — APPOINTMENT (OUTPATIENT)
Dept: ULTRASOUND IMAGING | Age: 64
End: 2024-09-27
Payer: MEDICARE

## 2024-09-27 ENCOUNTER — APPOINTMENT (OUTPATIENT)
Dept: GENERAL RADIOLOGY | Age: 64
End: 2024-09-27
Payer: MEDICARE

## 2024-09-27 PROBLEM — F10.939 ALCOHOL WITHDRAWAL SYNDROME WITH COMPLICATION (HCC): Status: ACTIVE | Noted: 2024-09-27

## 2024-09-27 PROBLEM — R79.89 ELEVATED LACTIC ACID LEVEL: Status: ACTIVE | Noted: 2024-09-27

## 2024-09-27 PROBLEM — E86.0 DEHYDRATION: Status: ACTIVE | Noted: 2024-09-27

## 2024-09-27 LAB
ALBUMIN SERPL BCG-MCNC: 3.6 G/DL (ref 3.5–5.1)
ALP SERPL-CCNC: 109 U/L (ref 38–126)
ALT SERPL W/O P-5'-P-CCNC: 61 U/L (ref 11–66)
AMMONIA PLAS-MCNC: 30 UMOL/L (ref 11–60)
ANION GAP SERPL CALC-SCNC: 17 MEQ/L (ref 8–16)
ANION GAP SERPL CALC-SCNC: 17 MEQ/L (ref 8–16)
ANION GAP SERPL CALC-SCNC: 18 MEQ/L (ref 8–16)
APAP SERPL-MCNC: < 5 UG/ML (ref 0–20)
AST SERPL-CCNC: 44 U/L (ref 5–40)
B PERT DNA NPH QL NAA+PROBE: NOT DETECTED
B-OH-BUTYR SERPL-MSCNC: 3.07 MG/DL (ref 0.2–2.81)
BILIRUB SERPL-MCNC: 0.3 MG/DL (ref 0.3–1.2)
BORDETELLA PARAPERTUSSIS BY PCR: NOT DETECTED
BUN SERPL-MCNC: 14 MG/DL (ref 7–22)
BUN SERPL-MCNC: 15 MG/DL (ref 7–22)
BUN SERPL-MCNC: 15 MG/DL (ref 7–22)
C PNEUM DNA SPEC QL NAA+PROBE: NOT DETECTED
CA-I BLD ISE-SCNC: 1.06 MMOL/L (ref 1.12–1.32)
CALCIUM SERPL-MCNC: 8.3 MG/DL (ref 8.5–10.5)
CALCIUM SERPL-MCNC: 8.3 MG/DL (ref 8.5–10.5)
CALCIUM SERPL-MCNC: 8.7 MG/DL (ref 8.5–10.5)
CHLORIDE SERPL-SCNC: 96 MEQ/L (ref 98–111)
CHLORIDE SERPL-SCNC: 97 MEQ/L (ref 98–111)
CHLORIDE SERPL-SCNC: 97 MEQ/L (ref 98–111)
CO2 SERPL-SCNC: 21 MEQ/L (ref 23–33)
CO2 SERPL-SCNC: 23 MEQ/L (ref 23–33)
CO2 SERPL-SCNC: 24 MEQ/L (ref 23–33)
CREAT SERPL-MCNC: 1 MG/DL (ref 0.4–1.2)
CREAT SERPL-MCNC: 1.1 MG/DL (ref 0.4–1.2)
CREAT SERPL-MCNC: 1.2 MG/DL (ref 0.4–1.2)
CRP SERPL-MCNC: 0.48 MG/DL (ref 0–1)
ECHO BSA: 2.37 M2
EKG ATRIAL RATE: 68 BPM
EKG ATRIAL RATE: 75 BPM
EKG P AXIS: -10 DEGREES
EKG P AXIS: -28 DEGREES
EKG P-R INTERVAL: 198 MS
EKG P-R INTERVAL: 224 MS
EKG Q-T INTERVAL: 400 MS
EKG Q-T INTERVAL: 422 MS
EKG QRS DURATION: 102 MS
EKG QRS DURATION: 102 MS
EKG QTC CALCULATION (BAZETT): 446 MS
EKG QTC CALCULATION (BAZETT): 448 MS
EKG R AXIS: 15 DEGREES
EKG R AXIS: 5 DEGREES
EKG T AXIS: 13 DEGREES
EKG T AXIS: 14 DEGREES
EKG VENTRICULAR RATE: 68 BPM
EKG VENTRICULAR RATE: 75 BPM
FERRITIN SERPL IA-MCNC: 143 NG/ML (ref 22–322)
FLUAV RNA NPH QL NAA+PROBE: NOT DETECTED
FLUBV RNA NPH QL NAA+PROBE: NOT DETECTED
FOLATE SERPL-MCNC: 19.5 NG/ML (ref 4.8–24.2)
GFR SERPL CREATININE-BSD FRML MDRD: 66 ML/MIN/1.73M2
GFR SERPL CREATININE-BSD FRML MDRD: 74 ML/MIN/1.73M2
GFR SERPL CREATININE-BSD FRML MDRD: 83 ML/MIN/1.73M2
GLUCOSE BLD STRIP.AUTO-MCNC: 122 MG/DL (ref 70–108)
GLUCOSE BLD STRIP.AUTO-MCNC: 141 MG/DL (ref 70–108)
GLUCOSE BLD STRIP.AUTO-MCNC: 160 MG/DL (ref 70–108)
GLUCOSE BLD STRIP.AUTO-MCNC: 187 MG/DL (ref 70–108)
GLUCOSE BLD STRIP.AUTO-MCNC: 331 MG/DL (ref 70–108)
GLUCOSE SERPL-MCNC: 123 MG/DL (ref 70–108)
GLUCOSE SERPL-MCNC: 130 MG/DL (ref 70–108)
GLUCOSE SERPL-MCNC: 174 MG/DL (ref 70–108)
HADV DNA NPH QL NAA+PROBE: NOT DETECTED
HAV IGM SER QL: NEGATIVE
HBV CORE IGM SERPL QL IA: NEGATIVE
HBV SURFACE AG SERPL QL IA: NEGATIVE
HCOV 229E RNA SPEC QL NAA+PROBE: NOT DETECTED
HCOV HKU1 RNA SPEC QL NAA+PROBE: NOT DETECTED
HCOV NL63 RNA SPEC QL NAA+PROBE: NOT DETECTED
HCOV OC43 RNA SPEC QL NAA+PROBE: NOT DETECTED
HCV IGG SERPL QL IA: NEGATIVE
HGB RETIC QN AUTO: 37.7 PG (ref 28.2–35.7)
HMPV RNA NPH QL NAA+PROBE: NOT DETECTED
HPIV1 RNA NPH QL NAA+PROBE: NOT DETECTED
HPIV2 RNA NPH QL NAA+PROBE: NOT DETECTED
HPIV3 RNA NPH QL NAA+PROBE: NOT DETECTED
HPIV4 RNA NPH QL NAA+PROBE: NOT DETECTED
IMM RETICS NFR: 17.7 % (ref 2.3–13.4)
INR PPP: 0.97 (ref 0.85–1.13)
IRON SATN MFR SERPL: 26 % (ref 20–50)
IRON SERPL-MCNC: 63 UG/DL (ref 65–195)
LACTATE SERPL-SCNC: 1.1 MMOL/L (ref 0.5–2)
LACTATE SERPL-SCNC: 1.2 MMOL/L (ref 0.5–2)
LACTATE SERPL-SCNC: 1.4 MMOL/L (ref 0.5–2)
LACTATE SERPL-SCNC: 1.5 MMOL/L (ref 0.5–2)
LACTATE SERPL-SCNC: 1.6 MMOL/L (ref 0.5–2)
LACTATE SERPL-SCNC: 2.7 MMOL/L (ref 0.5–2)
LACTATE SERPL-SCNC: 4.7 MMOL/L (ref 0.5–2)
LIPASE SERPL-CCNC: 37.2 U/L (ref 5.6–51.3)
M PNEUMO DNA SPEC QL NAA+PROBE: NOT DETECTED
MAGNESIUM SERPL-MCNC: 1.7 MG/DL (ref 1.6–2.4)
NT-PROBNP SERPL IA-MCNC: 1577 PG/ML (ref 0–124)
OSMOLALITY SERPL: 287 MOSMOL/KG (ref 275–295)
PH BLDV: 7.43 [PH] (ref 7.31–7.41)
POTASSIUM SERPL-SCNC: 4 MEQ/L (ref 3.5–5.2)
POTASSIUM SERPL-SCNC: 4.1 MEQ/L (ref 3.5–5.2)
POTASSIUM SERPL-SCNC: 4.2 MEQ/L (ref 3.5–5.2)
PROCALCITONIN SERPL IA-MCNC: 0.13 NG/ML (ref 0.01–0.09)
PROT SERPL-MCNC: 6.3 G/DL (ref 6.1–8)
RETICS # AUTO: 98 THOU/MM3 (ref 20–115)
RETICS/RBC NFR AUTO: 2.2 % (ref 0.5–2)
RSV RNA NPH QL NAA+PROBE: NOT DETECTED
RV+EV RNA SPEC QL NAA+PROBE: NOT DETECTED
SALICYLATES SERPL-MCNC: < 0.3 MG/DL (ref 2–10)
SARS-COV-2 RNA NPH QL NAA+NON-PROBE: NOT DETECTED
SODIUM SERPL-SCNC: 135 MEQ/L (ref 135–145)
SODIUM SERPL-SCNC: 137 MEQ/L (ref 135–145)
SODIUM SERPL-SCNC: 138 MEQ/L (ref 135–145)
TIBC SERPL-MCNC: 246 UG/DL (ref 171–450)
TROPONIN, HIGH SENSITIVITY: 39 NG/L (ref 0–12)
TROPONIN, HIGH SENSITIVITY: 44 NG/L (ref 0–12)
VIT B12 SERPL-MCNC: 427 PG/ML (ref 211–911)

## 2024-09-27 PROCEDURE — 2580000003 HC RX 258: Performed by: NURSE PRACTITIONER

## 2024-09-27 PROCEDURE — 0202U NFCT DS 22 TRGT SARS-COV-2: CPT

## 2024-09-27 PROCEDURE — 85610 PROTHROMBIN TIME: CPT

## 2024-09-27 PROCEDURE — 76705 ECHO EXAM OF ABDOMEN: CPT

## 2024-09-27 PROCEDURE — 82948 REAGENT STRIP/BLOOD GLUCOSE: CPT

## 2024-09-27 PROCEDURE — 82140 ASSAY OF AMMONIA: CPT

## 2024-09-27 PROCEDURE — 83605 ASSAY OF LACTIC ACID: CPT

## 2024-09-27 PROCEDURE — 73590 X-RAY EXAM OF LOWER LEG: CPT

## 2024-09-27 PROCEDURE — 84484 ASSAY OF TROPONIN QUANT: CPT

## 2024-09-27 PROCEDURE — 82746 ASSAY OF FOLIC ACID SERUM: CPT

## 2024-09-27 PROCEDURE — 80143 DRUG ASSAY ACETAMINOPHEN: CPT

## 2024-09-27 PROCEDURE — 85046 RETICYTE/HGB CONCENTRATE: CPT

## 2024-09-27 PROCEDURE — 86140 C-REACTIVE PROTEIN: CPT

## 2024-09-27 PROCEDURE — 82607 VITAMIN B-12: CPT

## 2024-09-27 PROCEDURE — 6370000000 HC RX 637 (ALT 250 FOR IP): Performed by: NURSE PRACTITIONER

## 2024-09-27 PROCEDURE — 6360000002 HC RX W HCPCS: Performed by: INTERNAL MEDICINE

## 2024-09-27 PROCEDURE — 6360000002 HC RX W HCPCS: Performed by: NURSE PRACTITIONER

## 2024-09-27 PROCEDURE — 82010 KETONE BODYS QUAN: CPT

## 2024-09-27 PROCEDURE — 80074 ACUTE HEPATITIS PANEL: CPT

## 2024-09-27 PROCEDURE — 93010 ELECTROCARDIOGRAM REPORT: CPT | Performed by: NUCLEAR MEDICINE

## 2024-09-27 PROCEDURE — 83930 ASSAY OF BLOOD OSMOLALITY: CPT

## 2024-09-27 PROCEDURE — 83540 ASSAY OF IRON: CPT

## 2024-09-27 PROCEDURE — 82330 ASSAY OF CALCIUM: CPT

## 2024-09-27 PROCEDURE — 6370000000 HC RX 637 (ALT 250 FOR IP): Performed by: INTERNAL MEDICINE

## 2024-09-27 PROCEDURE — 1200000003 HC TELEMETRY R&B

## 2024-09-27 PROCEDURE — 82800 BLOOD PH: CPT

## 2024-09-27 PROCEDURE — 99223 1ST HOSP IP/OBS HIGH 75: CPT | Performed by: NURSE PRACTITIONER

## 2024-09-27 PROCEDURE — 96367 TX/PROPH/DG ADDL SEQ IV INF: CPT

## 2024-09-27 PROCEDURE — 83550 IRON BINDING TEST: CPT

## 2024-09-27 PROCEDURE — 80179 DRUG ASSAY SALICYLATE: CPT

## 2024-09-27 PROCEDURE — 93005 ELECTROCARDIOGRAM TRACING: CPT | Performed by: NURSE PRACTITIONER

## 2024-09-27 PROCEDURE — 82728 ASSAY OF FERRITIN: CPT

## 2024-09-27 PROCEDURE — 36415 COLL VENOUS BLD VENIPUNCTURE: CPT

## 2024-09-27 PROCEDURE — 83735 ASSAY OF MAGNESIUM: CPT

## 2024-09-27 PROCEDURE — 93970 EXTREMITY STUDY: CPT

## 2024-09-27 PROCEDURE — 83690 ASSAY OF LIPASE: CPT

## 2024-09-27 PROCEDURE — 80053 COMPREHEN METABOLIC PANEL: CPT

## 2024-09-27 RX ORDER — MULTIVITAMIN WITH IRON
1 TABLET ORAL DAILY
Status: DISCONTINUED | OUTPATIENT
Start: 2024-09-27 | End: 2024-09-28 | Stop reason: HOSPADM

## 2024-09-27 RX ORDER — INSULIN GLARGINE 100 [IU]/ML
10 INJECTION, SOLUTION SUBCUTANEOUS NIGHTLY
Status: DISCONTINUED | OUTPATIENT
Start: 2024-09-27 | End: 2024-09-28 | Stop reason: HOSPADM

## 2024-09-27 RX ORDER — LORAZEPAM 2 MG/ML
2 INJECTION INTRAMUSCULAR
Status: DISCONTINUED | OUTPATIENT
Start: 2024-09-27 | End: 2024-09-27

## 2024-09-27 RX ORDER — LORAZEPAM 2 MG/1
4 TABLET ORAL
Status: DISCONTINUED | OUTPATIENT
Start: 2024-09-27 | End: 2024-09-27

## 2024-09-27 RX ORDER — ALLOPURINOL 300 MG/1
300 TABLET ORAL DAILY
Status: DISCONTINUED | OUTPATIENT
Start: 2024-09-27 | End: 2024-09-28 | Stop reason: HOSPADM

## 2024-09-27 RX ORDER — POTASSIUM CHLORIDE 7.45 MG/ML
10 INJECTION INTRAVENOUS PRN
Status: DISCONTINUED | OUTPATIENT
Start: 2024-09-27 | End: 2024-09-28 | Stop reason: HOSPADM

## 2024-09-27 RX ORDER — INSULIN LISPRO 100 [IU]/ML
0-4 INJECTION, SOLUTION INTRAVENOUS; SUBCUTANEOUS NIGHTLY
Status: DISCONTINUED | OUTPATIENT
Start: 2024-09-27 | End: 2024-09-28 | Stop reason: HOSPADM

## 2024-09-27 RX ORDER — SODIUM CHLORIDE 0.9 % (FLUSH) 0.9 %
5-40 SYRINGE (ML) INJECTION EVERY 12 HOURS SCHEDULED
Status: DISCONTINUED | OUTPATIENT
Start: 2024-09-27 | End: 2024-09-28 | Stop reason: HOSPADM

## 2024-09-27 RX ORDER — NITROGLYCERIN 0.4 MG/1
0.4 TABLET SUBLINGUAL EVERY 5 MIN PRN
Status: DISCONTINUED | OUTPATIENT
Start: 2024-09-27 | End: 2024-09-28 | Stop reason: HOSPADM

## 2024-09-27 RX ORDER — ONDANSETRON 4 MG/1
4 TABLET, ORALLY DISINTEGRATING ORAL EVERY 8 HOURS PRN
Status: DISCONTINUED | OUTPATIENT
Start: 2024-09-27 | End: 2024-09-28 | Stop reason: HOSPADM

## 2024-09-27 RX ORDER — PHENOBARBITAL SODIUM 65 MG/ML
260 INJECTION, SOLUTION INTRAMUSCULAR; INTRAVENOUS
Status: DISCONTINUED | OUTPATIENT
Start: 2024-09-27 | End: 2024-09-28 | Stop reason: HOSPADM

## 2024-09-27 RX ORDER — BUMETANIDE 1 MG/1
2 TABLET ORAL 2 TIMES DAILY
Status: DISCONTINUED | OUTPATIENT
Start: 2024-09-28 | End: 2024-09-28 | Stop reason: HOSPADM

## 2024-09-27 RX ORDER — GLUCAGON 1 MG/ML
1 KIT INJECTION PRN
Status: DISCONTINUED | OUTPATIENT
Start: 2024-09-27 | End: 2024-09-28 | Stop reason: HOSPADM

## 2024-09-27 RX ORDER — INSULIN LISPRO 100 [IU]/ML
0-8 INJECTION, SOLUTION INTRAVENOUS; SUBCUTANEOUS
Status: DISCONTINUED | OUTPATIENT
Start: 2024-09-27 | End: 2024-09-28 | Stop reason: HOSPADM

## 2024-09-27 RX ORDER — LANOLIN ALCOHOL/MO/W.PET/CERES
100 CREAM (GRAM) TOPICAL DAILY
Status: DISCONTINUED | OUTPATIENT
Start: 2024-09-27 | End: 2024-09-28 | Stop reason: HOSPADM

## 2024-09-27 RX ORDER — LORAZEPAM 2 MG/ML
3 INJECTION INTRAMUSCULAR
Status: DISCONTINUED | OUTPATIENT
Start: 2024-09-27 | End: 2024-09-27

## 2024-09-27 RX ORDER — SODIUM CHLORIDE 9 MG/ML
INJECTION, SOLUTION INTRAVENOUS PRN
Status: DISCONTINUED | OUTPATIENT
Start: 2024-09-27 | End: 2024-09-28 | Stop reason: HOSPADM

## 2024-09-27 RX ORDER — SODIUM CHLORIDE 9 MG/ML
INJECTION, SOLUTION INTRAVENOUS CONTINUOUS
Status: ACTIVE | OUTPATIENT
Start: 2024-09-27 | End: 2024-09-27

## 2024-09-27 RX ORDER — METOPROLOL SUCCINATE 50 MG/1
50 TABLET, EXTENDED RELEASE ORAL DAILY
Status: DISCONTINUED | OUTPATIENT
Start: 2024-09-27 | End: 2024-09-28 | Stop reason: HOSPADM

## 2024-09-27 RX ORDER — TAMSULOSIN HYDROCHLORIDE 0.4 MG/1
0.4 CAPSULE ORAL DAILY
Status: DISCONTINUED | OUTPATIENT
Start: 2024-09-27 | End: 2024-09-28 | Stop reason: HOSPADM

## 2024-09-27 RX ORDER — PANTOPRAZOLE SODIUM 40 MG/1
40 TABLET, DELAYED RELEASE ORAL
Status: DISCONTINUED | OUTPATIENT
Start: 2024-09-27 | End: 2024-09-28 | Stop reason: HOSPADM

## 2024-09-27 RX ORDER — PRAMIPEXOLE DIHYDROCHLORIDE 0.25 MG/1
0.12 TABLET ORAL EVERY EVENING
Status: DISCONTINUED | OUTPATIENT
Start: 2024-09-27 | End: 2024-09-28 | Stop reason: HOSPADM

## 2024-09-27 RX ORDER — ALBUTEROL SULFATE 0.83 MG/ML
2.5 SOLUTION RESPIRATORY (INHALATION) EVERY 4 HOURS PRN
Status: DISCONTINUED | OUTPATIENT
Start: 2024-09-27 | End: 2024-09-28 | Stop reason: HOSPADM

## 2024-09-27 RX ORDER — SODIUM CHLORIDE 0.9 % (FLUSH) 0.9 %
5-40 SYRINGE (ML) INJECTION PRN
Status: DISCONTINUED | OUTPATIENT
Start: 2024-09-27 | End: 2024-09-28 | Stop reason: HOSPADM

## 2024-09-27 RX ORDER — POLYETHYLENE GLYCOL 3350 17 G/17G
17 POWDER, FOR SOLUTION ORAL DAILY PRN
Status: DISCONTINUED | OUTPATIENT
Start: 2024-09-27 | End: 2024-09-28 | Stop reason: HOSPADM

## 2024-09-27 RX ORDER — ALBUTEROL SULFATE 90 UG/1
2 INHALANT RESPIRATORY (INHALATION) EVERY 6 HOURS PRN
Status: DISCONTINUED | OUTPATIENT
Start: 2024-09-27 | End: 2024-09-27

## 2024-09-27 RX ORDER — ATORVASTATIN CALCIUM 40 MG/1
40 TABLET, FILM COATED ORAL NIGHTLY
Status: DISCONTINUED | OUTPATIENT
Start: 2024-09-27 | End: 2024-09-28 | Stop reason: HOSPADM

## 2024-09-27 RX ORDER — ONDANSETRON 2 MG/ML
4 INJECTION INTRAMUSCULAR; INTRAVENOUS EVERY 6 HOURS PRN
Status: DISCONTINUED | OUTPATIENT
Start: 2024-09-27 | End: 2024-09-28 | Stop reason: HOSPADM

## 2024-09-27 RX ORDER — BUMETANIDE 1 MG/1
2 TABLET ORAL 2 TIMES DAILY
Status: DISCONTINUED | OUTPATIENT
Start: 2024-09-27 | End: 2024-09-27

## 2024-09-27 RX ORDER — LORAZEPAM 2 MG/ML
4 INJECTION INTRAMUSCULAR
Status: DISCONTINUED | OUTPATIENT
Start: 2024-09-27 | End: 2024-09-27

## 2024-09-27 RX ORDER — ALBUTEROL SULFATE 0.83 MG/ML
2.5 SOLUTION RESPIRATORY (INHALATION) EVERY 6 HOURS PRN
Status: DISCONTINUED | OUTPATIENT
Start: 2024-09-27 | End: 2024-09-27

## 2024-09-27 RX ORDER — TRAZODONE HYDROCHLORIDE 100 MG/1
100 TABLET ORAL NIGHTLY
Status: DISCONTINUED | OUTPATIENT
Start: 2024-09-27 | End: 2024-09-28 | Stop reason: HOSPADM

## 2024-09-27 RX ORDER — LANOLIN ALCOHOL/MO/W.PET/CERES
400 CREAM (GRAM) TOPICAL DAILY
Status: DISCONTINUED | OUTPATIENT
Start: 2024-09-27 | End: 2024-09-28 | Stop reason: HOSPADM

## 2024-09-27 RX ORDER — LORAZEPAM 2 MG/1
2 TABLET ORAL
Status: DISCONTINUED | OUTPATIENT
Start: 2024-09-27 | End: 2024-09-27

## 2024-09-27 RX ORDER — ENOXAPARIN SODIUM 100 MG/ML
30 INJECTION SUBCUTANEOUS 2 TIMES DAILY
Status: DISCONTINUED | OUTPATIENT
Start: 2024-09-27 | End: 2024-09-28 | Stop reason: HOSPADM

## 2024-09-27 RX ORDER — MAGNESIUM SULFATE IN WATER 40 MG/ML
2000 INJECTION, SOLUTION INTRAVENOUS PRN
Status: DISCONTINUED | OUTPATIENT
Start: 2024-09-27 | End: 2024-09-28 | Stop reason: HOSPADM

## 2024-09-27 RX ORDER — PHENOBARBITAL SODIUM 65 MG/ML
130 INJECTION, SOLUTION INTRAMUSCULAR; INTRAVENOUS
Status: DISCONTINUED | OUTPATIENT
Start: 2024-09-27 | End: 2024-09-27

## 2024-09-27 RX ORDER — LORAZEPAM 2 MG/ML
0.5 INJECTION INTRAMUSCULAR ONCE
Status: COMPLETED | OUTPATIENT
Start: 2024-09-27 | End: 2024-09-27

## 2024-09-27 RX ORDER — ACETAMINOPHEN 650 MG/1
650 SUPPOSITORY RECTAL EVERY 6 HOURS PRN
Status: DISCONTINUED | OUTPATIENT
Start: 2024-09-27 | End: 2024-09-28 | Stop reason: HOSPADM

## 2024-09-27 RX ORDER — PHENOBARBITAL SODIUM 65 MG/ML
130 INJECTION, SOLUTION INTRAMUSCULAR; INTRAVENOUS
Status: DISCONTINUED | OUTPATIENT
Start: 2024-09-27 | End: 2024-09-27 | Stop reason: SDUPTHER

## 2024-09-27 RX ORDER — PHENOBARBITAL SODIUM 65 MG/ML
260 INJECTION, SOLUTION INTRAMUSCULAR; INTRAVENOUS
Status: DISCONTINUED | OUTPATIENT
Start: 2024-09-27 | End: 2024-09-27

## 2024-09-27 RX ORDER — PHENOBARBITAL SODIUM 65 MG/ML
130 INJECTION, SOLUTION INTRAMUSCULAR; INTRAVENOUS
Status: DISCONTINUED | OUTPATIENT
Start: 2024-09-27 | End: 2024-09-28 | Stop reason: HOSPADM

## 2024-09-27 RX ORDER — DEXTROSE MONOHYDRATE 100 MG/ML
INJECTION, SOLUTION INTRAVENOUS CONTINUOUS PRN
Status: DISCONTINUED | OUTPATIENT
Start: 2024-09-27 | End: 2024-09-28 | Stop reason: HOSPADM

## 2024-09-27 RX ORDER — ALBUTEROL SULFATE 90 UG/1
2 INHALANT RESPIRATORY (INHALATION) 2 TIMES DAILY
Status: DISCONTINUED | OUTPATIENT
Start: 2024-09-28 | End: 2024-09-28 | Stop reason: HOSPADM

## 2024-09-27 RX ORDER — LORAZEPAM 2 MG/ML
1 INJECTION INTRAMUSCULAR
Status: DISCONTINUED | OUTPATIENT
Start: 2024-09-27 | End: 2024-09-27

## 2024-09-27 RX ORDER — FLUTICASONE PROPIONATE 50 MCG
2 SPRAY, SUSPENSION (ML) NASAL DAILY
Status: DISCONTINUED | OUTPATIENT
Start: 2024-09-27 | End: 2024-09-28 | Stop reason: HOSPADM

## 2024-09-27 RX ORDER — ACETAMINOPHEN 325 MG/1
650 TABLET ORAL EVERY 6 HOURS PRN
Status: DISCONTINUED | OUTPATIENT
Start: 2024-09-27 | End: 2024-09-28 | Stop reason: HOSPADM

## 2024-09-27 RX ORDER — LORAZEPAM 1 MG/1
1 TABLET ORAL
Status: DISCONTINUED | OUTPATIENT
Start: 2024-09-27 | End: 2024-09-27

## 2024-09-27 RX ORDER — FOLIC ACID 1 MG/1
1 TABLET ORAL DAILY
Status: DISCONTINUED | OUTPATIENT
Start: 2024-09-27 | End: 2024-09-28 | Stop reason: HOSPADM

## 2024-09-27 RX ORDER — VALSARTAN 80 MG/1
80 TABLET ORAL NIGHTLY
Status: DISCONTINUED | OUTPATIENT
Start: 2024-09-27 | End: 2024-09-28 | Stop reason: HOSPADM

## 2024-09-27 RX ORDER — PHENOBARBITAL SODIUM 65 MG/ML
260 INJECTION, SOLUTION INTRAMUSCULAR; INTRAVENOUS
Status: DISCONTINUED | OUTPATIENT
Start: 2024-09-27 | End: 2024-09-27 | Stop reason: SDUPTHER

## 2024-09-27 RX ORDER — FLUTICASONE PROPIONATE 110 UG/1
1 AEROSOL, METERED RESPIRATORY (INHALATION)
Status: DISCONTINUED | OUTPATIENT
Start: 2024-09-27 | End: 2024-09-28 | Stop reason: HOSPADM

## 2024-09-27 RX ORDER — ISOSORBIDE MONONITRATE 30 MG/1
30 TABLET, EXTENDED RELEASE ORAL DAILY
Status: DISCONTINUED | OUTPATIENT
Start: 2024-09-27 | End: 2024-09-28 | Stop reason: HOSPADM

## 2024-09-27 RX ORDER — POTASSIUM CHLORIDE 1500 MG/1
40 TABLET, EXTENDED RELEASE ORAL PRN
Status: DISCONTINUED | OUTPATIENT
Start: 2024-09-27 | End: 2024-09-28 | Stop reason: HOSPADM

## 2024-09-27 RX ADMIN — Medication 1 TABLET: at 09:00

## 2024-09-27 RX ADMIN — TAMSULOSIN HYDROCHLORIDE 0.4 MG: 0.4 CAPSULE ORAL at 09:00

## 2024-09-27 RX ADMIN — INSULIN LISPRO 4 UNITS: 100 INJECTION, SOLUTION INTRAVENOUS; SUBCUTANEOUS at 21:14

## 2024-09-27 RX ADMIN — INSULIN GLARGINE 10 UNITS: 100 INJECTION, SOLUTION SUBCUTANEOUS at 21:14

## 2024-09-27 RX ADMIN — Medication 100 MG: at 09:00

## 2024-09-27 RX ADMIN — PRAMIPEXOLE DIHYDROCHLORIDE 0.12 MG: 0.25 TABLET ORAL at 17:29

## 2024-09-27 RX ADMIN — VALSARTAN 80 MG: 80 TABLET, FILM COATED ORAL at 21:14

## 2024-09-27 RX ADMIN — LORAZEPAM 0.5 MG: 2 INJECTION INTRAMUSCULAR; INTRAVENOUS at 03:37

## 2024-09-27 RX ADMIN — EMPAGLIFLOZIN 25 MG: 25 TABLET, FILM COATED ORAL at 09:00

## 2024-09-27 RX ADMIN — ATORVASTATIN CALCIUM 40 MG: 40 TABLET, FILM COATED ORAL at 21:14

## 2024-09-27 RX ADMIN — TRAZODONE HYDROCHLORIDE 100 MG: 100 TABLET ORAL at 21:15

## 2024-09-27 RX ADMIN — PHENOBARBITAL SODIUM 130 MG: 65 INJECTION INTRAMUSCULAR at 08:27

## 2024-09-27 RX ADMIN — FOLIC ACID 1 MG: 1 TABLET ORAL at 09:00

## 2024-09-27 RX ADMIN — ENOXAPARIN SODIUM 30 MG: 100 INJECTION SUBCUTANEOUS at 21:14

## 2024-09-27 RX ADMIN — PHENOBARBITAL SODIUM 260 MG: 65 INJECTION INTRAMUSCULAR at 15:40

## 2024-09-27 RX ADMIN — SODIUM CHLORIDE, PRESERVATIVE FREE 10 ML: 5 INJECTION INTRAVENOUS at 21:14

## 2024-09-27 RX ADMIN — TICAGRELOR 90 MG: 90 TABLET ORAL at 21:15

## 2024-09-27 RX ADMIN — ALLOPURINOL 300 MG: 300 TABLET ORAL at 09:00

## 2024-09-27 RX ADMIN — CEFTRIAXONE SODIUM 2000 MG: 2 INJECTION, POWDER, FOR SOLUTION INTRAMUSCULAR; INTRAVENOUS at 00:31

## 2024-09-27 RX ADMIN — TICAGRELOR 90 MG: 90 TABLET ORAL at 09:00

## 2024-09-27 RX ADMIN — Medication 400 MG: at 09:00

## 2024-09-27 RX ADMIN — PANTOPRAZOLE SODIUM 40 MG: 40 TABLET, DELAYED RELEASE ORAL at 03:44

## 2024-09-27 RX ADMIN — ENOXAPARIN SODIUM 30 MG: 100 INJECTION SUBCUTANEOUS at 09:00

## 2024-09-27 RX ADMIN — SODIUM CHLORIDE: 9 INJECTION, SOLUTION INTRAVENOUS at 05:55

## 2024-09-27 RX ADMIN — PHENOBARBITAL SODIUM 260 MG: 65 INJECTION INTRAMUSCULAR at 21:23

## 2024-09-27 ASSESSMENT — PAIN SCALES - GENERAL: PAINLEVEL_OUTOF10: 3

## 2024-09-27 ASSESSMENT — PAIN DESCRIPTION - LOCATION: LOCATION: KNEE

## 2024-09-27 ASSESSMENT — PAIN DESCRIPTION - ORIENTATION: ORIENTATION: RIGHT;LEFT

## 2024-09-27 NOTE — CARE COORDINATION
Case Management Assessment Initial Evaluation    Date/Time of Evaluation: 2024 6:51 AM  Assessment Completed by: Sindy Laguna RN    If patient is discharged prior to next notation, then this note serves as note for discharge by case management.    Patient Name: Andrei Munoz                   YOB: 1960  Diagnosis: Dehydration [E86.0]  Alcohol abuse [F10.10]  Elevated lactic acid level [R79.89]  Alcohol withdrawal syndrome with complication (HCC) [F10.939]  Pneumonia due to infectious organism, unspecified laterality, unspecified part of lung [J18.9]                   Date / Time: 2024  7:34 PM  Location: [address] [place] [address]     Patient Admission Status: Inpatient   Readmission Risk Low 0-14, Mod 15-19), High > 20: Readmission Risk Score: 16.7    Current PCP: Malka Solis MD  Health Care Decision Makers:   Primary Decision Maker: Louis Munoz - Child - [phone]    Additional Case Management Notes: Admitted through ED with alcohol intoxication and hallucinations. Pt admits to drinking 1/2 gallon of tequila and 6-12 beers a day. Alcohol level upon arrival 0.24. Also positive for Cannabinoids. Lactic Acid high of 5.7, now 2.7. IVF. CIWA, ativan prn. Rocephin iv daily. Zithromax iv x1.     Procedures: None    Imagin/26 PCXR: Chronically elevated left hemidiaphragm with left basilar   pleural/parenchymal opacities could represent small left effusion and/or   developing left basilar consolidation/infiltrate.   Tib Fib xray: pending   US abd: pending   Venous doppler BLE: pending    Patient Goals/Plan/Treatment Preferences: Spoke with pt. He is currently living at home with his mom. States he cares for her. He has a friend that is living in the basement of their house that helps care for his mom during the day, and he does it at night. Discussed ACP's, has new paperwork at bedside to fill out. He is current with [facility] (RN/Aide), home delivered meals.  States he goes to AA meetings \"sometimes\". SW consulted.      09/27/24 0910   Service Assessment   Patient Orientation Alert and Oriented   Cognition Alert   History Provided By Patient   Primary Caregiver Self   Accompanied By/Relationship n/a   Support Systems Children;Parent;Family Members;Home Care Staff;DONOVAN/Passport;Other (Comment)  ([employer] for Aide/RN. Home delivered meals)   Patient's Healthcare Decision Maker is: Legal Next of Kin  (Paperwork at bedside to fill out.)   PCP Verified by CM Yes   Last Visit to PCP Within last 3 months   Prior Functional Level Independent in ADLs/IADLs   Current Functional Level Independent in ADLs/IADLs   Can patient return to prior living arrangement Yes   Ability to make needs known: Fair   Family able to assist with home care needs: No   Would you like for me to discuss the discharge plan with any other family members/significant others, and if so, who? No   Financial Resources Medicare;Medicaid   Community Resources ECF/Home Care;Other (Comment)  ([employer]; [employer], Waiver program. States goes to AA \"sometimes\")   Social/Functional History   Active  Yes   Discharge Planning   Type of Residence House   Living Arrangements Parent   Current Services Prior To Admission C-pap;Durable Medical Equipment;Home Care;DONOVAN/Passport;Other (Comment)  ([employer]. Home Delivered meals.)   Current DME Prior to Arrival Cpap;Cane;Glucometer;Walker  [facility]   Potential Assistance Needed N/A   DME Ordered? No   Potential Assistance Purchasing Medications No   Type of Home Care Services Aide Services;Nursing Services;Meals on Wheels   Patient expects to be discharged to: House   Services At/After Discharge   Mode of Transport at Discharge Other (see comment)  (Family)   Confirm Follow Up Transport Self   Condition of Participation: Discharge Planning   The Plan for Transition of Care is related to the following treatment goals: \"feel better and get home\"

## 2024-09-27 NOTE — PROGRESS NOTES
Utilize [facility] alcohol withdrawal scale (Based on Viral Modified Alcohol Withdrawal Scale).  Tabulate score based on classifications including Tremor, Sweating, Hallucination, Orientation, and Agitation.    Tremor: 2  Sweatin  Hallucinations: 0  Orientation: 0  Agitation: 1  Total Score: 3  Action perform as described below     Tremor:  No tremor is 0 points.  Tremor on movement is 1 point.  Tremor at rest is 2 points.  Sweating: No Sweat 0 points. Moist is 1 point.  Drenching sweats is 2 points.  Hallucinations: No present 0 points. Dissuadable is 1 point. Not dissuadable is 2 points.  Orientation: Oriented 0 points. Vague/detached 1 point. Disoriented/no contact 2 points.  Agitation: Calm 0 points.  Anxious 1 point. Panicky 2 points.    Check scale every 2 hours.  Discontinue scoring with 4 consecutive scorings of 0.  Scale 0: No phenobarbital given.  Re-assess every 60 minutes as needed.   Scale 1-3: Phenobarbital 130 mg IV over 3 minutes. Re-assess every 60 minutes as needed.  May administer every 60 minutes to a maximum dose of phenobarbital 1040 mg in 24 hours!  Scale 4-8: Phenobarbital 260 mg IV over 5 minutes.  Re-assess every 60 minutes as needed. May administer every 60 minutes to a maximum dose of phenobarbital 1040mg in 24 hours!  Scale 9-10: Transfer to ICU (if not already in ICU).  Administer 10mg/kg phenobarbital IV over 60 minutes.  Maximum dose phenobarbital is 1040mg in 24 hours!

## 2024-09-27 NOTE — PROGRESS NOTES
Patient arrived to unit from ER via wheelchair. Patient transferred to [facility] and placed on continuous ICU bedside monitor. Patient admitted for Dehydration [E86.0]  Alcohol abuse [F10.10]  Elevated lactic acid level [R79.89]  Alcohol withdrawal syndrome with complication (HCC) [F10.939]  Pneumonia due to infectious organism, unspecified laterality, unspecified part of lung [J18.9]. Vitals obtained. See flowsheets. Patient's IV access includes 20G right arm. Current infusions and rates of infusion include none. Assessment completed by Janice. Two nurse skin assessment completed by Janice and Yamila. See flowsheets for assessment details. Policies and procedures of ICU able to be explained to patient at this time. Family member(s)/representative(s) present at time of admission include patient. Patient rights explained to family member(s)/representatives and patient, as able. Patient/patient's family member(s)/representative(s) Declined to have physician notified of their admission. All questions posed by patient's family member(s)/representative(s) and patient answered at this time.

## 2024-09-27 NOTE — PROCEDURES
PROCEDURE NOTE  Date: 9/27/2024   Name: Andrei Munoz  YOB: 1960    Procedures    12 lead EKG completed. Results handed to Janice FELDER.

## 2024-09-27 NOTE — PROGRESS NOTES
This  visited patient during follow up for Advance Care Planing documents with Andrei, a 64 year old male. During this encounter,  assisted patient to complete the document. Please see ACP document for detail report.

## 2024-09-27 NOTE — H&P
Hospitalist  History and Physical    Patient:  Andrei Munoz  MRN: [id number]    CHIEF COMPLAINT: Alcohol intoxication and hallucinations    History Obtained From:  patient, electronic medical record  PCP: Malka Solis MD    HISTORY OF PRESENT ILLNESS:   Andrei Munoz is a 64-year-old male presented to [facility] 9/26/2024 with chief complaint of alcohol intoxication and hallucinations.  Family identify AUD history.  \"He has had at least a half a gallon of tequila today \".  Patient has been having visual hallucinations for the past 24 hours.  Documentation identifies patient thrashing in bed and cursing at staff. POCT glucose 119.     Past Medical History:        Diagnosis Date    CHF (congestive heart failure) ([facility])     COPD (chronic obstructive pulmonary disease) ([facility])     Coronary artery disease involving native coronary artery of native heart without angina pectoris     Depression     Diabetes mellitus type 2, diet-controlled ([facility]) 6/1/2018    GERD (gastroesophageal reflux disease) 3/21/2012    Hernia     Hx of blood clots 1990's    right knee due to injury    Hx of cocaine abuse ([facility])     Hyperglycemia 09/09    Hyperlipidemia     Hypertension     FAISAL on CPAP     Osteoarthritis 11/07    S/P CABG x 2 07/06/2016    S/P PTCA: 1/15/2018: Stent diagonal branch Xience 3.0 x 12 mm. 1/15/2018    1/15/2018: Stent diagonal branch Xience 3.0 x 12 mm. Dr. Ta    Thumb injury 08/2015    Right thumb cut with table saw, no treatment needed   Former smoker  Stage III, severe COPD 6/29/2021 PFT-severe obstruction with air trapping decreased DLCO  FAISAL-CPAP  ASHD s/p CABG-2016  History of bradycardia  HFpEF TTE 3/2024 LVEF 50 to 55%, mild MR Dry Weigh #252  [facility] 2018 PCI/DANY diagonal  Essential hypertension  Syncope-dizziness  Diabetes mellitus type 2  GERD  VTE  Dyslipidemia  RLS  BPH with LUTS  Elevated PSA  Polysubstance use  Obesity  Frequent falls  Patient identifies himself as a recovering alcoholic and drug user

## 2024-09-27 NOTE — ACP (ADVANCE CARE PLANNING)
Advance Care Planning     Advance Care Planning Inpatient Note  Spiritual Care Department    Today's Date: 9/27/2024  Unit: [facility]    Received request from IDT Member.  Upon review of chart and communication with care team, Spiritual Care will defer advance care planning with patient at this time.. Patient was/were present in the room during visit.    Goals of ACP Conversation:  Discuss advance care planning documents    Health Care Decision Makers:       Primary Decision Maker: Louis Munoz - Child - [phone]  Summary:  Documented Next of Kin, per patient report    Advance Care Planning Documents (Patient Wishes):  Healthcare Power of /Advance Directive Appointment of Health Care Agent  Living Will/Advance Directive     Assessment:  Pt is a 64y.o. male, sitting in easychair a bit restless, in 3B-38. Jaciel is pleasant, sharing that he has AD's, but that they detail a family member as his decision-maker whom he no longer wishes to be in that position. Once better rested, he will contact [facility] to complete.    Interventions:  Provided education on documents for clarity and greater understanding  Left full, blank set of AD's and contact card with patient, for review and study.    Care Preferences Communicated:   No    Outcomes/Plan:  Pt will notify [facility] if/when ready to complete.    Electronically signed by Chaplain Marlen on 9/27/2024 at 10:50 AM

## 2024-09-27 NOTE — ED NOTES
PT resting in bed with family at bedside. VS assessed. Call light in reach. PT denies needs at this time.

## 2024-09-27 NOTE — ED PROVIDER NOTES
[facility]      EMERGENCY MEDICINE     Pt Name: Andrei Munoz  MRN: [id number]  Birthdate 1960  Date of evaluation: 9/26/2024  Provider: ASHLEIGH Coates CNP    CHIEF COMPLAINT       Chief Complaint   Patient presents with    Alcohol Intoxication    Hallucinations     HISTORY OF PRESENT ILLNESS   Andrei Munoz is a pleasant 64 y.o. male who presents to the emergency department from home with c/o alcohol intoxication and hallucinations.  Family is at the bedside and are expressed concern of patient's heavy drinking and his worsening hallucinations.  Patient states he is compliant with his medications but does report that he is struggling to care for himself.  Patient is currently residing with his 91-year-old mother and family is expressing concern due to his heavy drinking and his erratic behavior.  Patient states that he does want to quit drinking.  Denies chest pain.  Reports shortness of breath that is chronic.      History is obtained from:  patient, family member -  PASTMEDICAL HISTORY     Past Medical History:   Diagnosis Date    CHF (congestive heart failure) ([facility])     COPD (chronic obstructive pulmonary disease) ([facility])     Coronary artery disease involving native coronary artery of native heart without angina pectoris     Depression     Diabetes mellitus type 2, diet-controlled ([facility]) 6/1/2018    GERD (gastroesophageal reflux disease) 3/21/2012    Hernia     Hx of blood clots 1990's    right knee due to injury    Hx of cocaine abuse ([facility])     Hyperglycemia 09/09    Hyperlipidemia     Hypertension     FAISAL on CPAP     Osteoarthritis 11/07    S/P CABG x 2 07/06/2016    S/P PTCA: 1/15/2018: Stent diagonal branch Xience 3.0 x 12 mm. 1/15/2018    1/15/2018: Stent diagonal branch Xience 3.0 x 12 mm. Dr. Ta    Thumb injury 08/2015    Right thumb cut with table saw, no treatment needed       Patient Active Problem List   Diagnosis Code    GERD (gastroesophageal reflux disease) K21.9  (ZOFRAN) injection 4 mg (has no administration in time range)   polyethylene glycol (GLYCOLAX) packet 17 g (has no administration in time range)   acetaminophen (TYLENOL) tablet 650 mg (has no administration in time range)     Or   acetaminophen (TYLENOL) suppository 650 mg (has no administration in time range)   glucose chewable tablet 16 g (has no administration in time range)   dextrose bolus 10% 125 mL (has no administration in time range)     Or   dextrose bolus 10% 250 mL (has no administration in time range)   glucagon injection 1 mg (has no administration in time range)   dextrose 10 % infusion (has no administration in time range)   LORazepam (ATIVAN) tablet 1 mg (has no administration in time range)     Or   LORazepam (ATIVAN) injection 1 mg (has no administration in time range)     Or   LORazepam (ATIVAN) tablet 2 mg (has no administration in time range)     Or   LORazepam (ATIVAN) injection 2 mg (has no administration in time range)     Or   LORazepam (ATIVAN) tablet 3 mg (has no administration in time range)     Or   LORazepam (ATIVAN) injection 3 mg (has no administration in time range)     Or   LORazepam (ATIVAN) tablet 4 mg (has no administration in time range)     Or   LORazepam (ATIVAN) injection 4 mg (has no administration in time range)   albuterol (PROVENTIL) (2.5 MG/3ML) 0.083% nebulizer solution 2.5 mg (has no administration in time range)   bumetanide (BUMEX) tablet 2 mg (has no administration in time range)   0.9 % sodium chloride infusion ( IntraVENous New Bag 9/27/24 0555)     Followed by   0.9 % sodium chloride infusion (has no administration in time range)   cefTRIAXone (ROCEPHIN) 1,000 mg in sodium chloride 0.9 % 50 mL IVPB (mini-bag) (has no administration in time range)   multivitamin 1 tablet (1 tablet Oral Given 9/26/24 2207)   folic acid (FOLVITE) tablet 1 mg (1 mg Oral Given 9/26/24 2026)   thiamine tablet 100 mg (100 mg Oral Given 9/26/24 2026)   sodium chloride 0.9 % bolus 1,000

## 2024-09-27 NOTE — ACP (ADVANCE CARE PLANNING)
Advance Care Planning     Advance Care Planning Inpatient Note  [facility] Department    Today's Date:9/27/2024          Unit:[facility]      Received request from patient.  On review of chart and communication with care team, Patient decision Making abilities are not in question  . Patient was/were present in the room during visit.    Goals of ACP Conversation:  Discuss Advance Care Planing documents.    Health Care Decision Makers:     Primary Decision Maker: Louis Munoz Child   [phone]                First Alternate decision Maker:  Benny Munoz child [phone]                Second alternate Decision Maker  Tay Munoz Child [phone]     Summary:  Completed new documents.     Advance Care Planning Documents (Patient Wishes):  Healthcare Power of /Advance Directive Appointment of Health Care Agent      Assessment:  Andrei is a 64 year old male receiving care for Alcohol withdrawal syndrome with complication. Patient is calm, oriented, approachable and engaged in conversation with me during this visit. During visit, patient engaged in ACP conversation as he named his son Louis to be his Primary Decision Maker and named Benny and Tay to be his first and second alternate agents. ACP documents is completed at this time.     Interventions:  Assisted in the completion of document according to patient's wishes at this time.       Care Preferences Communicated:   ACP-SC Care Preference       yes              ACP, SC Hospitalization         Yes              Ventilation                No              Resuscitation           No               Intubation                 No               CPR                        Yes.    Outcomes/Plan:  New Advance Directive completed        Electronically signed by Chandan Tanner M.Div. [facility]. On 9/27/2024 at 4:30 PM

## 2024-09-27 NOTE — PLAN OF CARE
Problem: Discharge Planning  Goal: Discharge to home or other facility with appropriate resources  Outcome: Progressing  Flowsheets  Taken 9/27/2024 0240  Discharge to home or other facility with appropriate resources: Identify barriers to discharge with patient and caregiver  Taken 9/27/2024 0220  Discharge to home or other facility with appropriate resources: Identify barriers to discharge with patient and caregiver     Problem: Safety - Adult  Goal: Free from fall injury  Outcome: Progressing  Flowsheets (Taken 9/27/2024 0303)  Free From Fall Injury:   Instruct family/caregiver on patient safety   Based on caregiver fall risk screen, instruct family/caregiver to ask for assistance with transferring infant if caregiver noted to have fall risk factors     Problem: Skin/Tissue Integrity - Adult  Goal: Skin integrity remains intact  Outcome: Progressing  Flowsheets (Taken 9/27/2024 0303)  Skin Integrity Remains Intact:   Monitor for areas of redness and/or skin breakdown   Assess vascular access sites hourly   Every 4-6 hours minimum: Change oxygen saturation probe site     Problem: Gastrointestinal - Adult  Goal: Minimal or absence of nausea and vomiting  Outcome: Progressing  Flowsheets (Taken 9/27/2024 0303)  Minimal or absence of nausea and vomiting:   Advance diet as tolerated, if ordered   Nutrition consult to assist patient with adequate nutrition and appropriate food choices     Care plan reviewed with patient.  Patient verbalizes understanding of the plan of care and contributes to goal setting.

## 2024-09-27 NOTE — ED NOTES
PT resting in bed with family at bedside. PT alert. VS assessed. PT drinking ice water at this time

## 2024-09-27 NOTE — CARE COORDINATION
DISCHARGE PLANNING EVALUATION  9/27/24, 3:40 PM EDT    Reason for Referral: Current Passport  Decision Maker: Patient  Current Services: Passport.  Lorenza Larson [phone] is his CM. He has an aid and nurse through [place].  He also has meals and a lifeline.   New Services Requested: None  Family/ Social/ Home environment: From home with his 91 year old mother and there is a lady renting the basement apartment.  He helps his mother at night and the basement tenant helps her during the day.  He had three children who are also supportive.   Payment Source:Medicare and Medicaid  Transportation at Discharge: family  Post-acute (PAC) provider list was provided to patient. Patient was informed of their freedom to choose PAC provider. Discussed and offered to show the patient the relevant PAC Providers quality and resource use measures on Medicare Compare web site via computer based on patient's goals of care and treatment preferences. Questions regarding selection process were answered.      Teach Back Method used with Andrei regarding care plan and discharge planning.   Patient verbalized understanding of the plan of care and contribute to goal setting.       Patient preferences and discharge plan: Jaciel plans to return home with current services.  [facility].  Denies other needs.      Electronically signed by TING Canales on 9/27/2024 at 3:40 PM

## 2024-09-27 NOTE — PLAN OF CARE
Problem: Safety - Adult  Goal: Free from fall injury  9/27/2024 1000 by Madonna Perez, RN  Outcome: Progressing  Flowsheets (Taken 9/27/2024 1000)  Free From Fall Injury:   Instruct family/caregiver on patient safety   Based on caregiver fall risk screen, instruct family/caregiver to ask for assistance with transferring infant if caregiver noted to have fall risk factors  Note: Alarm on chair intact  9/27/2024 0303 by Janice Alaniz, RN  Outcome: Progressing  Flowsheets (Taken 9/27/2024 0303)  Free From Fall Injury:   Instruct family/caregiver on patient safety   Based on caregiver fall risk screen, instruct family/caregiver to ask for assistance with transferring infant if caregiver noted to have fall risk factors

## 2024-09-27 NOTE — PROGRESS NOTES
CIWA Assessment  BP: 124/101  Pulse: 75  Nausea and Vomiting: no nausea and no vomiting  Tactile Disturbances: none  Tremor: no tremor  Auditory Disturbances: not present  Paroxysmal Sweats: no sweat visible  Visual Disturbances: not present  Anxiety: no anxiety, at ease  Headache, Fullness in Head: none present  Agitation: normal activity  Orientation and Clouding of Sensorium: oriented and can do serial additions  CIWA-Ar Total: 0

## 2024-09-27 NOTE — PROGRESS NOTES
Pt is a 64y.o. male, sitting in easychair in 3B-38. Please refer to ACP note for context, re: AD's.     09/27/24 0845   Encounter Summary   Encounter Overview/Reason Advance Care Planning   Service Provided For Patient   Referral/Consult From Multi-disciplinary team   Support System Unknown   Last Encounter  09/27/24   Complexity of Encounter Low   Begin Time 0845   End Time  0850   Total Time Calculated 5 min   Advance Care Planning   Type ACP conversation   Assessment/Intervention/Outcome   Assessment Compromised coping;Anxious;Impaired resilience;Peaceful   Intervention Active listening;Prayer (assurance of)/[place];Explored/Affirmed feelings, thoughts, concerns   Outcome Engaged in conversation;Expressed Gratitude

## 2024-09-27 NOTE — ED NOTES
Orthostatic VS completed. PT resting back in bed. Respirations unlabored. Family remains at bedside. Second bag of fluids started per verbal order from Siri Pérez CNP

## 2024-09-27 NOTE — PROGRESS NOTES
CIWA Assessment  BP: (!) 140/76  Pulse: 75  Nausea and Vomiting: no nausea and no vomiting  Tactile Disturbances: none  Tremor: not visible, but can be felt fingertip to fingertip  Auditory Disturbances: not present  Paroxysmal Sweats: no sweat visible  Visual Disturbances: not present  Anxiety: 2  Headache, Fullness in Head: very mild  Agitation: normal activity  Orientation and Clouding of Sensorium: oriented and can do serial additions  CIWA-Ar Total: 4

## 2024-09-28 VITALS
HEART RATE: 75 BPM | OXYGEN SATURATION: 95 % | BODY MASS INDEX: 34.51 KG/M2 | HEIGHT: 71 IN | WEIGHT: 246.5 LBS | RESPIRATION RATE: 18 BRPM | TEMPERATURE: 97.7 F | DIASTOLIC BLOOD PRESSURE: 70 MMHG | SYSTOLIC BLOOD PRESSURE: 118 MMHG

## 2024-09-28 LAB
GLUCOSE BLD STRIP.AUTO-MCNC: 131 MG/DL (ref 70–108)
GLUCOSE BLD STRIP.AUTO-MCNC: 249 MG/DL (ref 70–108)
LACTATE SERPL-SCNC: 1 MMOL/L (ref 0.5–2)
LACTATE SERPL-SCNC: 1 MMOL/L (ref 0.5–2)
LACTATE SERPL-SCNC: 1.2 MMOL/L (ref 0.5–2)
LACTATE SERPL-SCNC: 1.3 MMOL/L (ref 0.5–2)

## 2024-09-28 PROCEDURE — 83605 ASSAY OF LACTIC ACID: CPT

## 2024-09-28 PROCEDURE — 36415 COLL VENOUS BLD VENIPUNCTURE: CPT

## 2024-09-28 PROCEDURE — 6370000000 HC RX 637 (ALT 250 FOR IP): Performed by: INTERNAL MEDICINE

## 2024-09-28 PROCEDURE — 94640 AIRWAY INHALATION TREATMENT: CPT

## 2024-09-28 PROCEDURE — 94761 N-INVAS EAR/PLS OXIMETRY MLT: CPT

## 2024-09-28 PROCEDURE — 99239 HOSP IP/OBS DSCHRG MGMT >30: CPT | Performed by: INTERNAL MEDICINE

## 2024-09-28 PROCEDURE — 2580000003 HC RX 258: Performed by: NURSE PRACTITIONER

## 2024-09-28 PROCEDURE — 6360000002 HC RX W HCPCS: Performed by: INTERNAL MEDICINE

## 2024-09-28 PROCEDURE — 6370000000 HC RX 637 (ALT 250 FOR IP): Performed by: NURSE PRACTITIONER

## 2024-09-28 PROCEDURE — 6360000002 HC RX W HCPCS: Performed by: NURSE PRACTITIONER

## 2024-09-28 PROCEDURE — 82948 REAGENT STRIP/BLOOD GLUCOSE: CPT

## 2024-09-28 RX ORDER — LANOLIN ALCOHOL/MO/W.PET/CERES
100 CREAM (GRAM) TOPICAL DAILY
Qty: 30 TABLET | Refills: 3 | Status: SHIPPED | OUTPATIENT
Start: 2024-09-29

## 2024-09-28 RX ORDER — DOXYCYCLINE HYCLATE 100 MG
100 TABLET ORAL 2 TIMES DAILY
Qty: 14 TABLET | Refills: 0 | Status: SHIPPED | OUTPATIENT
Start: 2024-09-28 | End: 2024-10-05

## 2024-09-28 RX ORDER — FOLIC ACID 1 MG/1
1 TABLET ORAL DAILY
Qty: 30 TABLET | Refills: 3 | Status: SHIPPED | OUTPATIENT
Start: 2024-09-29

## 2024-09-28 RX ADMIN — Medication 1 TABLET: at 09:52

## 2024-09-28 RX ADMIN — ENOXAPARIN SODIUM 30 MG: 100 INJECTION SUBCUTANEOUS at 09:49

## 2024-09-28 RX ADMIN — METOPROLOL SUCCINATE 50 MG: 50 TABLET, EXTENDED RELEASE ORAL at 09:46

## 2024-09-28 RX ADMIN — PHENOBARBITAL SODIUM 130 MG: 65 INJECTION INTRAMUSCULAR at 12:51

## 2024-09-28 RX ADMIN — CEFTRIAXONE SODIUM 1000 MG: 1 INJECTION, POWDER, FOR SOLUTION INTRAMUSCULAR; INTRAVENOUS at 01:04

## 2024-09-28 RX ADMIN — FOLIC ACID 1 MG: 1 TABLET ORAL at 09:51

## 2024-09-28 RX ADMIN — TICAGRELOR 90 MG: 90 TABLET ORAL at 09:50

## 2024-09-28 RX ADMIN — PHENOBARBITAL SODIUM 260 MG: 65 INJECTION INTRAMUSCULAR at 05:24

## 2024-09-28 RX ADMIN — FLUTICASONE PROPIONATE 2 SPRAY: 50 SPRAY, METERED NASAL at 09:50

## 2024-09-28 RX ADMIN — FLUTICASONE PROPIONATE 1 PUFF: 110 AEROSOL, METERED RESPIRATORY (INHALATION) at 08:25

## 2024-09-28 RX ADMIN — ALLOPURINOL 300 MG: 300 TABLET ORAL at 09:52

## 2024-09-28 RX ADMIN — PANTOPRAZOLE SODIUM 40 MG: 40 TABLET, DELAYED RELEASE ORAL at 05:24

## 2024-09-28 RX ADMIN — EMPAGLIFLOZIN 25 MG: 25 TABLET, FILM COATED ORAL at 09:51

## 2024-09-28 RX ADMIN — ISOSORBIDE MONONITRATE 30 MG: 30 TABLET, EXTENDED RELEASE ORAL at 09:46

## 2024-09-28 RX ADMIN — ALBUTEROL SULFATE 2 PUFF: 90 AEROSOL, METERED RESPIRATORY (INHALATION) at 08:25

## 2024-09-28 RX ADMIN — Medication 100 MG: at 09:52

## 2024-09-28 RX ADMIN — TAMSULOSIN HYDROCHLORIDE 0.4 MG: 0.4 CAPSULE ORAL at 09:51

## 2024-09-28 RX ADMIN — Medication 400 MG: at 09:52

## 2024-09-28 RX ADMIN — BUMETANIDE 2 MG: 1 TABLET ORAL at 09:46

## 2024-09-28 RX ADMIN — SODIUM CHLORIDE, PRESERVATIVE FREE 10 ML: 5 INJECTION INTRAVENOUS at 09:46

## 2024-09-28 RX ADMIN — TIOTROPIUM BROMIDE AND OLODATEROL 2 PUFF: 3.124; 2.736 SPRAY, METERED RESPIRATORY (INHALATION) at 08:25

## 2024-09-28 RX ADMIN — INSULIN LISPRO 2 UNITS: 100 INJECTION, SOLUTION INTRAVENOUS; SUBCUTANEOUS at 13:42

## 2024-09-28 ASSESSMENT — LIFESTYLE VARIABLES
HOW MANY STANDARD DRINKS CONTAINING ALCOHOL DO YOU HAVE ON A TYPICAL DAY: 10 OR MORE
HOW OFTEN DO YOU HAVE A DRINK CONTAINING ALCOHOL: 4 OR MORE TIMES A WEEK

## 2024-09-28 ASSESSMENT — PATIENT HEALTH QUESTIONNAIRE - PHQ9
SUM OF ALL RESPONSES TO PHQ QUESTIONS 1-9: 2
2. FEELING DOWN, DEPRESSED OR HOPELESS: MORE THAN HALF THE DAYS
SUM OF ALL RESPONSES TO PHQ QUESTIONS 1-9: 2
1. LITTLE INTEREST OR PLEASURE IN DOING THINGS: NOT AT ALL
SUM OF ALL RESPONSES TO PHQ QUESTIONS 1-9: 2
SUM OF ALL RESPONSES TO PHQ QUESTIONS 1-9: 2
SUM OF ALL RESPONSES TO PHQ9 QUESTIONS 1 & 2: 2

## 2024-09-28 NOTE — DISCHARGE SUMMARY
color flow and spectral analysis in the main portal vein, right and left portal veins, hepatic artery, inferior vena cava and all 3 hepatic veins. The pancreatic head and body are within normal limits. The tail is not well seen due to overlying bowel gas. The gallbladder is not distended. There is no pericholecystic fluid or gallbladder wall edema.  No gallstones are identified. The common bile duct is normal and measures 3 mm.  There is no hydronephrosis in the visualized aspects of the right kidney.     Hepatomegaly and hepatic steatosis. **This report has been created using voice recognition software. It may contain minor errors which are inherent in voice recognition technology.** Electronically signed by Dr Derek Tobar    Vascular duplex lower extremity venous bilateral    Result Date: 9/27/2024  PROCEDURE: VAS DUP LOWER EXTREMITY VENOUS BILATERAL CLINICAL INFORMATION: Edema left calf; Edema right calf COMPARISON: 12/13/2017 TECHNIQUE: Multiple grayscale and color flow images of the major veins of both lower extremities were obtained from the level of the groin to the level of the ankle. Multiple compression and augmentation maneuvers were performed and spectral Doppler waveforms were generated. .. FINDINGS: RIGHT LOWER EXTREMITY:All the deep veins of the right lower extremity are widely patent with normal phasic flow and normal compressibility. LEFT LOWER EXTREMITY:All the  deep veins of the left lower extremity are widely patent with normal phasic flow and normal compressibility.     Normal venous ultrasound. No evidence for deep venous thrombosis. **This report has been created using voice recognition software.  It may contain minor errors which are inherent in voice recognition technology.** Electronically signed by Dr. Mann Slater    XR TIBIA FIBULA RIGHT (2 VIEWS)    Result Date: 9/27/2024  PROCEDURE: XR TIBIA FIBULA RIGHT (2 VIEWS) CLINICAL INFORMATION: portable evaluate for gas. Diabetes.

## 2024-09-28 NOTE — PROGRESS NOTES
Utilize [facility] alcohol withdrawal scale (Based on Viral Modified Alcohol Withdrawal Scale).  Tabulate score based on classifications including Tremor, Sweating, Hallucination, Orientation, and Agitation.    Tremor: 1  Sweatin  Hallucinations: 0  Orientation: 0  Agitation: 1  Total Score: 2  Action perform as described below     Tremor:  No tremor is 0 points.  Tremor on movement is 1 point.  Tremor at rest is 2 points.  Sweating: No Sweat 0 points. Moist is 1 point.  Drenching sweats is 2 points.  Hallucinations: No present 0 points. Dissuadable is 1 point. Not dissuadable is 2 points.  Orientation: Oriented 0 points. Vague/detached 1 point. Disoriented/no contact 2 points.  Agitation: Calm 0 points.  Anxious 1 point. Panicky 2 points.    Check scale every 2 hours.  Discontinue scoring with 4 consecutive scorings of 0.  Scale 0: No phenobarbital given.  Re-assess every 60 minutes as needed.   Scale 1-3: Phenobarbital 130 mg IV over 3 minutes. Re-assess every 60 minutes as needed.  May administer every 60 minutes to a maximum dose of phenobarbital 1040 mg in 24 hours!  Scale 4-8: Phenobarbital 260 mg IV over 5 minutes.  Re-assess every 60 minutes as needed. May administer every 60 minutes to a maximum dose of phenobarbital 1040mg in 24 hours!  Scale 9-10: Transfer to ICU (if not already in ICU).  Administer 10mg/kg phenobarbital IV over 60 minutes.  Maximum dose phenobarbital is 1040mg in 24 hours!

## 2024-09-28 NOTE — PLAN OF CARE
Problem: Respiratory - Adult  Goal: Patient's breath sounds will be clear and equal  9/28/2024 0830 by Gena Gill RCP  Outcome: Progressing     Problem: Discharge Planning  Goal: Discharge to home or other facility with appropriate resources  Outcome: Completed     Problem: Safety - Adult  Goal: Free from fall injury  Outcome: Completed     Problem: Skin/Tissue Integrity - Adult  Goal: Skin integrity remains intact  Outcome: Completed     Problem: Gastrointestinal - Adult  Goal: Minimal or absence of nausea and vomiting  Outcome: Completed     Problem: Pain  Goal: Verbalizes/displays adequate comfort level or baseline comfort level  Outcome: Completed

## 2024-09-28 NOTE — RT PROTOCOL NOTE
RT Inhaler-Nebulizer Bronchodilator Protocol Note    There is a bronchodilator order in the chart from a provider indicating to follow the RT Bronchodilator Protocol and there is an “Initiate RT Inhaler-Nebulizer Bronchodilator Protocol” order as well (see protocol at bottom of note).    CXR Findings:  XR CHEST PORTABLE    Result Date: 9/26/2024  1. Chronically elevated left hemidiaphragm with left basilar pleural/parenchymal opacities could represent small left effusion and/or developing left basilar consolidation/infiltrate. This document has been electronically signed by: Isrrael Zheng DO on 09/26/2024 10:11 PM      The findings from the last RT Protocol Assessment were as follows:   History Pulmonary Disease: Chronic pulmonary disease  Respiratory Pattern: Regular pattern and RR 12-20 bpm  Breath Sounds: Slightly diminished and/or crackles  Cough: Strong, productive  Indication for Bronchodilator Therapy:    Bronchodilator Assessment Score: 5    Aerosolized bronchodilator medication orders have been revised according to the RT Inhaler-Nebulizer Bronchodilator Protocol below.    Respiratory Therapist to perform RT Therapy Protocol Assessment initially then follow the protocol.  Repeat RT Therapy Protocol Assessment PRN for score 0-3 or on second treatment, BID, and PRN for scores above 3.    No Indications - adjust the frequency to every 6 hours PRN wheezing or bronchospasm, if no treatments needed after 48 hours then discontinue using Per Protocol order mode.     If indication present, adjust the RT bronchodilator orders based on the Bronchodilator Assessment Score as indicated below.  Use Inhaler orders unless patient has one or more of the following: on home nebulizer, not able to hold breath for 10 seconds, is not alert and oriented, cannot activate and use MDI correctly, or respiratory rate 25 breaths per minute or more, then use the equivalent nebulizer order(s) with same Frequency and PRN reasons based on

## 2024-09-28 NOTE — PLAN OF CARE
Problem: Respiratory - Adult  Goal: Patient's breath sounds will be clear and equal  9/28/2024 0830 by Gena Gill, RCDARRELL  Outcome: Progressing    Continue tx's to improve breath sounds, increase aeration and decrease WOB.

## 2024-09-28 NOTE — PLAN OF CARE
Problem: Respiratory - Adult  Goal: Patient's breath sounds will be clear and equal  Outcome: Progressing

## 2024-09-28 NOTE — RT PROTOCOL NOTE
RT Inhaler-Nebulizer Bronchodilator Protocol Note    There is a bronchodilator order in the chart from a provider indicating to follow the RT Bronchodilator Protocol and there is an “Initiate RT Inhaler-Nebulizer Bronchodilator Protocol” order as well (see protocol at bottom of note).    CXR Findings:  XR CHEST PORTABLE    Result Date: 9/26/2024  1. Chronically elevated left hemidiaphragm with left basilar pleural/parenchymal opacities could represent small left effusion and/or developing left basilar consolidation/infiltrate. This document has been electronically signed by: Isrrael Zheng DO on 09/26/2024 10:11 PM      The findings from the last RT Protocol Assessment were as follows:   History Pulmonary Disease: Chronic pulmonary disease  Respiratory Pattern: Regular pattern and RR 12-20 bpm  Breath Sounds: Slightly diminished and/or crackles  Cough: Strong, spontaneous, non-productive  Indication for Bronchodilator Therapy:    Bronchodilator Assessment Score: 4    Aerosolized bronchodilator medication orders have been revised according to the RT Inhaler-Nebulizer Bronchodilator Protocol below.    Respiratory Therapist to perform RT Therapy Protocol Assessment initially then follow the protocol.  Repeat RT Therapy Protocol Assessment PRN for score 0-3 or on second treatment, BID, and PRN for scores above 3.    No Indications - adjust the frequency to every 6 hours PRN wheezing or bronchospasm, if no treatments needed after 48 hours then discontinue using Per Protocol order mode.     If indication present, adjust the RT bronchodilator orders based on the Bronchodilator Assessment Score as indicated below.  Use Inhaler orders unless patient has one or more of the following: on home nebulizer, not able to hold breath for 10 seconds, is not alert and oriented, cannot activate and use MDI correctly, or respiratory rate 25 breaths per minute or more, then use the equivalent nebulizer order(s) with same Frequency and PRN  reasons based on the score.  If a patient is on this medication at home then do not decrease Frequency below that used at home.    0-3 - enter or revise RT bronchodilator order(s) to equivalent RT Bronchodilator order with Frequency of every 4 hours PRN for wheezing or increased work of breathing using Per Protocol order mode.        4-6 - enter or revise RT Bronchodilator order(s) to two equivalent RT bronchodilator orders with one order with BID Frequency and one order with Frequency of every 4 hours PRN wheezing or increased work of breathing using Per Protocol order mode.        7-10 - enter or revise RT Bronchodilator order(s) to two equivalent RT bronchodilator orders with one order with TID Frequency and one order with Frequency of every 4 hours PRN wheezing or increased work of breathing using Per Protocol order mode.       11-13 - enter or revise RT Bronchodilator order(s) to one equivalent RT bronchodilator order with QID Frequency and an Albuterol order with Frequency of every 4 hours PRN wheezing or increased work of breathing using Per Protocol order mode.      Greater than 13 - enter or revise RT Bronchodilator order(s) to one equivalent RT bronchodilator order with every 4 hours Frequency and an Albuterol order with Frequency of every 2 hours PRN wheezing or increased work of breathing using Per Protocol order mode.     RT to enter RT Home Evaluation for COPD & MDI Assessment order using Per Protocol order mode.    Electronically signed by Celso Chau RCP on 9/27/2024 at 10:06 PM

## 2024-09-29 LAB
BACTERIA BLD AEROBE CULT: NORMAL
BACTERIA BLD AEROBE CULT: NORMAL

## 2024-09-30 ENCOUNTER — CARE COORDINATION (OUTPATIENT)
Dept: CASE MANAGEMENT | Age: 64
End: 2024-09-30

## 2024-09-30 DIAGNOSIS — F10.939 ALCOHOL WITHDRAWAL SYNDROME WITH COMPLICATION (HCC): Primary | ICD-10-CM

## 2024-09-30 RX ORDER — DOXYCYCLINE HYCLATE 100 MG
100 TABLET ORAL 2 TIMES DAILY
Qty: 14 TABLET | Refills: 0 | OUTPATIENT
Start: 2024-09-30 | End: 2024-10-07

## 2024-09-30 RX ORDER — FOLIC ACID 1 MG/1
1 TABLET ORAL DAILY
Qty: 30 TABLET | Refills: 0 | OUTPATIENT
Start: 2024-09-30

## 2024-09-30 RX ORDER — LANOLIN ALCOHOL/MO/W.PET/CERES
100 CREAM (GRAM) TOPICAL DAILY
Qty: 30 TABLET | Refills: 0 | OUTPATIENT
Start: 2024-09-30

## 2024-09-30 NOTE — TELEPHONE ENCOUNTER
Pt called back req a #30 also for    Thiamine 100 mg QD  Folic Acid 1 mg QD    [facility]

## 2024-09-30 NOTE — CARE COORDINATION
Care Transitions Note    Initial Call - Call within 2 business days of discharge: Yes    Patient Current Location:  Home: [address] [place] [id number]    Care Transition Nurse contacted the patient by telephone to perform post hospital discharge assessment, verified name and  as identifiers. Provided introduction to self, and explanation of the Care Transition Nurse role.     Patient: Andrei Munoz    Patient : 1960   MRN: [id number]    Reason for Admission: alcohol intox, hallucinations  Discharge Date: 24  RURS: Readmission Risk Score: 14.7      Last Discharge Facility       Date Complaint Diagnosis Description Type Department Provider    24 Alcohol Intoxication; Hallucinations Dehydration ... ED to Hosp-Admission (Discharged) (ADMITTED) Martinez Ruiz MD; [facility]...            Was this an external facility discharge? No    Additional needs identified to be addressed with provider   High priority: Doxycycline sent to mail in pharmacy. Needs sent to [facility] on [address].              Method of communication with provider: phone.    Patients top risk factors for readmission: alcohol withdrawal syndrome CHF, DM, CAD, HTN, COPD, Parkinsons, polysubstance abuse    Interventions to address risk factors:   alcohol withdrawal syndrome CHF, DM, CAD, HTN, COPD, Parkinsons, polysubstance abuse  [facility] nurse/aid weekly    Care Summary Note: CTN call to Jaciel today and he says he is feeling better. Says has not had any alcohol/substance since d/c.  Reached out to his AA sponsor and will be going to a meeting today.   [facility] Says he called and could not get through. Will keep trying.  Denies sob, chest pain, swelling, fever, chills, n/v/d, malaise, dizziness.  Says RLE cellulitis is unchanged since d/c. Meds reviewed and Doxycycline was sent to [facility].  This writer contacted PCP office and she will send to [facility] on [address] today.  PCP HFU

## 2024-09-30 NOTE — CARE COORDINATION
9/30/24, 8:24 AM EDT    Patient goals/plan/ treatment preferences discussed by  and .  Patient goals/plan/ treatment preferences reviewed with patient/ family.  Patient/ family verbalize understanding of discharge plan and are in agreement with goal/plan/treatment preferences.  Understanding was demonstrated using the teach back method.  AVS provided by RN at time of discharge, which includes all necessary medical information pertaining to the patients current course of illness, treatment, post-discharge goals of care, and treatment preferences.     Services At/After Discharge: Community Resources and Aide services       Jaciel was discharged over the weekend.  He returned home with current services from [employer].  Called and left a message for his [employer] KYLIE Britt [phone].

## 2024-10-01 RX ORDER — FOLIC ACID 1 MG/1
1 TABLET ORAL DAILY
Qty: 30 TABLET | Refills: 0 | Status: SHIPPED | OUTPATIENT
Start: 2024-10-01

## 2024-10-01 RX ORDER — LANOLIN ALCOHOL/MO/W.PET/CERES
100 CREAM (GRAM) TOPICAL DAILY
Qty: 30 TABLET | Refills: 0 | Status: SHIPPED | OUTPATIENT
Start: 2024-10-01

## 2024-10-01 RX ORDER — DOXYCYCLINE HYCLATE 100 MG
100 TABLET ORAL 2 TIMES DAILY
Qty: 14 TABLET | Refills: 0 | Status: SHIPPED | OUTPATIENT
Start: 2024-10-01 | End: 2024-10-08

## 2024-10-01 NOTE — TELEPHONE ENCOUNTER
Patient left message with answering service that he needs these prescriptions called to [facility] sent it to his Mail order pharmacy.    Dr Solis refused the original order because she thought it was a duplicate.

## 2024-10-02 ENCOUNTER — HOSPITAL ENCOUNTER (INPATIENT)
Age: 64
LOS: 3 days | Discharge: HOME | End: 2024-10-05
Attending: STUDENT IN AN ORGANIZED HEALTH CARE EDUCATION/TRAINING PROGRAM | Admitting: INTERNAL MEDICINE
Payer: MEDICARE

## 2024-10-02 ENCOUNTER — CARE COORDINATION (OUTPATIENT)
Dept: CASE MANAGEMENT | Age: 64
End: 2024-10-02

## 2024-10-02 ENCOUNTER — APPOINTMENT (OUTPATIENT)
Dept: GENERAL RADIOLOGY | Age: 64
End: 2024-10-02
Payer: MEDICARE

## 2024-10-02 DIAGNOSIS — R07.9 CHEST PAIN: ICD-10-CM

## 2024-10-02 DIAGNOSIS — R06.02 SHORTNESS OF BREATH: ICD-10-CM

## 2024-10-02 DIAGNOSIS — I48.91 ATRIAL FIBRILLATION WITH RAPID VENTRICULAR RESPONSE (HCC): Primary | ICD-10-CM

## 2024-10-02 DIAGNOSIS — I20.9 ANGINA PECTORIS (HCC): ICD-10-CM

## 2024-10-02 DIAGNOSIS — I48.0 PAROXYSMAL ATRIAL FIBRILLATION (HCC): ICD-10-CM

## 2024-10-02 DIAGNOSIS — F10.10 ETOH ABUSE: ICD-10-CM

## 2024-10-02 DIAGNOSIS — R07.9 CHEST PAIN, UNSPECIFIED TYPE: ICD-10-CM

## 2024-10-02 LAB
ANION GAP SERPL CALC-SCNC: 19 MEQ/L (ref 8–16)
APTT PPP: 28.9 SECONDS (ref 22–38)
BACTERIA BLD AEROBE CULT: NORMAL
BACTERIA BLD AEROBE CULT: NORMAL
BASOPHILS ABSOLUTE: 0.1 THOU/MM3 (ref 0–0.1)
BASOPHILS NFR BLD AUTO: 0.8 %
BUN SERPL-MCNC: 13 MG/DL (ref 7–22)
CALCIUM SERPL-MCNC: 9.8 MG/DL (ref 8.5–10.5)
CHLORIDE SERPL-SCNC: 100 MEQ/L (ref 98–111)
CO2 SERPL-SCNC: 21 MEQ/L (ref 23–33)
CREAT SERPL-MCNC: 0.9 MG/DL (ref 0.4–1.2)
DEPRECATED RDW RBC AUTO: 61.6 FL (ref 35–45)
EKG ATRIAL RATE: 82 BPM
EKG P AXIS: 22 DEGREES
EKG P-R INTERVAL: 184 MS
EKG Q-T INTERVAL: 334 MS
EKG Q-T INTERVAL: 378 MS
EKG QRS DURATION: 100 MS
EKG QRS DURATION: 102 MS
EKG QTC CALCULATION (BAZETT): 441 MS
EKG QTC CALCULATION (BAZETT): 524 MS
EKG R AXIS: 20 DEGREES
EKG R AXIS: 37 DEGREES
EKG T AXIS: -12 DEGREES
EKG T AXIS: -24 DEGREES
EKG VENTRICULAR RATE: 148 BPM
EKG VENTRICULAR RATE: 82 BPM
EOSINOPHIL NFR BLD AUTO: 0.2 %
EOSINOPHILS ABSOLUTE: 0 THOU/MM3 (ref 0–0.4)
ERYTHROCYTE [DISTWIDTH] IN BLOOD BY AUTOMATED COUNT: 16.5 % (ref 11.5–14.5)
FLUAV RNA RESP QL NAA+PROBE: NOT DETECTED
FLUBV RNA RESP QL NAA+PROBE: NOT DETECTED
GFR SERPL CREATININE-BSD FRML MDRD: > 90 ML/MIN/1.73M2
GLUCOSE BLD STRIP.AUTO-MCNC: 139 MG/DL (ref 70–108)
GLUCOSE BLD STRIP.AUTO-MCNC: 210 MG/DL (ref 70–108)
GLUCOSE SERPL-MCNC: 262 MG/DL (ref 70–108)
HCT VFR BLD AUTO: 51.5 % (ref 42–52)
HEPARIN UNFRACTIONATED: 0.07 U/ML (ref 0.3–0.7)
HGB BLD-MCNC: 16.7 GM/DL (ref 14–18)
IMM GRANULOCYTES # BLD AUTO: 0.08 THOU/MM3 (ref 0–0.07)
IMM GRANULOCYTES NFR BLD AUTO: 1 %
INR PPP: 0.92 (ref 0.85–1.13)
LYMPHOCYTES ABSOLUTE: 1.6 THOU/MM3 (ref 1–4.8)
LYMPHOCYTES NFR BLD AUTO: 19.8 %
MAGNESIUM SERPL-MCNC: 1.8 MG/DL (ref 1.6–2.4)
MCH RBC QN AUTO: 32.9 PG (ref 26–33)
MCHC RBC AUTO-ENTMCNC: 32.4 GM/DL (ref 32.2–35.5)
MCV RBC AUTO: 101.4 FL (ref 80–94)
MONOCYTES ABSOLUTE: 1 THOU/MM3 (ref 0.4–1.3)
MONOCYTES NFR BLD AUTO: 11.9 %
NEUTROPHILS ABSOLUTE: 5.5 THOU/MM3 (ref 1.8–7.7)
NEUTROPHILS NFR BLD AUTO: 66.3 %
NRBC BLD AUTO-RTO: 0 /100 WBC
NT-PROBNP SERPL IA-MCNC: 351.1 PG/ML (ref 0–124)
OSMOLALITY SERPL CALC.SUM OF ELEC: 288.6 MOSMOL/KG (ref 275–300)
PLATELET # BLD AUTO: 260 THOU/MM3 (ref 130–400)
PMV BLD AUTO: 10.2 FL (ref 9.4–12.4)
POTASSIUM SERPL-SCNC: 3.8 MEQ/L (ref 3.5–5.2)
PROCALCITONIN SERPL IA-MCNC: 0.08 NG/ML (ref 0.01–0.09)
RBC # BLD AUTO: 5.08 MILL/MM3 (ref 4.7–6.1)
SARS-COV-2 RNA RESP QL NAA+PROBE: NOT DETECTED
SODIUM SERPL-SCNC: 140 MEQ/L (ref 135–145)
T4 FREE SERPL-MCNC: 1.43 NG/DL (ref 0.93–1.68)
TROPONIN, HIGH SENSITIVITY: 26 NG/L (ref 0–12)
TROPONIN, HIGH SENSITIVITY: 27 NG/L (ref 0–12)
TSH SERPL DL<=0.005 MIU/L-ACNC: 1.53 UIU/ML (ref 0.4–4.2)
WBC # BLD AUTO: 8.3 THOU/MM3 (ref 4.8–10.8)

## 2024-10-02 PROCEDURE — 6370000000 HC RX 637 (ALT 250 FOR IP)

## 2024-10-02 PROCEDURE — 6360000002 HC RX W HCPCS

## 2024-10-02 PROCEDURE — 36415 COLL VENOUS BLD VENIPUNCTURE: CPT

## 2024-10-02 PROCEDURE — 99223 1ST HOSP IP/OBS HIGH 75: CPT | Performed by: INTERNAL MEDICINE

## 2024-10-02 PROCEDURE — 84484 ASSAY OF TROPONIN QUANT: CPT

## 2024-10-02 PROCEDURE — 85025 COMPLETE CBC W/AUTO DIFF WBC: CPT

## 2024-10-02 PROCEDURE — 93005 ELECTROCARDIOGRAM TRACING: CPT | Performed by: STUDENT IN AN ORGANIZED HEALTH CARE EDUCATION/TRAINING PROGRAM

## 2024-10-02 PROCEDURE — 83880 ASSAY OF NATRIURETIC PEPTIDE: CPT

## 2024-10-02 PROCEDURE — 84145 PROCALCITONIN (PCT): CPT

## 2024-10-02 PROCEDURE — 96374 THER/PROPH/DIAG INJ IV PUSH: CPT

## 2024-10-02 PROCEDURE — 96365 THER/PROPH/DIAG IV INF INIT: CPT

## 2024-10-02 PROCEDURE — 85610 PROTHROMBIN TIME: CPT

## 2024-10-02 PROCEDURE — 85520 HEPARIN ASSAY: CPT

## 2024-10-02 PROCEDURE — 87636 SARSCOV2 & INF A&B AMP PRB: CPT

## 2024-10-02 PROCEDURE — 99285 EMERGENCY DEPT VISIT HI MDM: CPT

## 2024-10-02 PROCEDURE — 83735 ASSAY OF MAGNESIUM: CPT

## 2024-10-02 PROCEDURE — 2060000000 HC ICU INTERMEDIATE R&B

## 2024-10-02 PROCEDURE — 2500000003 HC RX 250 WO HCPCS

## 2024-10-02 PROCEDURE — 94640 AIRWAY INHALATION TREATMENT: CPT

## 2024-10-02 PROCEDURE — 71045 X-RAY EXAM CHEST 1 VIEW: CPT

## 2024-10-02 PROCEDURE — 93010 ELECTROCARDIOGRAM REPORT: CPT | Performed by: INTERNAL MEDICINE

## 2024-10-02 PROCEDURE — 80048 BASIC METABOLIC PNL TOTAL CA: CPT

## 2024-10-02 PROCEDURE — 82948 REAGENT STRIP/BLOOD GLUCOSE: CPT

## 2024-10-02 PROCEDURE — 85730 THROMBOPLASTIN TIME PARTIAL: CPT

## 2024-10-02 PROCEDURE — 84439 ASSAY OF FREE THYROXINE: CPT

## 2024-10-02 PROCEDURE — 96375 TX/PRO/DX INJ NEW DRUG ADDON: CPT

## 2024-10-02 PROCEDURE — 84443 ASSAY THYROID STIM HORMONE: CPT

## 2024-10-02 RX ORDER — HEPARIN SODIUM 1000 [USP'U]/ML
4000 INJECTION, SOLUTION INTRAVENOUS; SUBCUTANEOUS PRN
Status: DISCONTINUED | OUTPATIENT
Start: 2024-10-02 | End: 2024-10-05

## 2024-10-02 RX ORDER — TAMSULOSIN HYDROCHLORIDE 0.4 MG/1
0.4 CAPSULE ORAL DAILY
Status: DISCONTINUED | OUTPATIENT
Start: 2024-10-02 | End: 2024-10-05 | Stop reason: HOSPADM

## 2024-10-02 RX ORDER — ALLOPURINOL 300 MG/1
300 TABLET ORAL DAILY
Status: DISCONTINUED | OUTPATIENT
Start: 2024-10-02 | End: 2024-10-05 | Stop reason: HOSPADM

## 2024-10-02 RX ORDER — DOXYCYCLINE HYCLATE 100 MG
100 TABLET ORAL EVERY 12 HOURS SCHEDULED
Status: DISCONTINUED | OUTPATIENT
Start: 2024-10-02 | End: 2024-10-05 | Stop reason: HOSPADM

## 2024-10-02 RX ORDER — VALSARTAN 80 MG/1
80 TABLET ORAL DAILY
Status: DISCONTINUED | OUTPATIENT
Start: 2024-10-02 | End: 2024-10-05 | Stop reason: HOSPADM

## 2024-10-02 RX ORDER — POTASSIUM CHLORIDE 7.45 MG/ML
10 INJECTION INTRAVENOUS PRN
Status: DISCONTINUED | OUTPATIENT
Start: 2024-10-02 | End: 2024-10-05 | Stop reason: HOSPADM

## 2024-10-02 RX ORDER — ACETAMINOPHEN 325 MG/1
650 TABLET ORAL EVERY 6 HOURS PRN
Status: DISCONTINUED | OUTPATIENT
Start: 2024-10-02 | End: 2024-10-04 | Stop reason: SDUPTHER

## 2024-10-02 RX ORDER — HEPARIN SODIUM 10000 [USP'U]/100ML
5-30 INJECTION, SOLUTION INTRAVENOUS CONTINUOUS
Status: DISCONTINUED | OUTPATIENT
Start: 2024-10-02 | End: 2024-10-05

## 2024-10-02 RX ORDER — SODIUM CHLORIDE 9 MG/ML
INJECTION, SOLUTION INTRAVENOUS PRN
Status: DISCONTINUED | OUTPATIENT
Start: 2024-10-02 | End: 2024-10-05 | Stop reason: HOSPADM

## 2024-10-02 RX ORDER — GLUCAGON 1 MG/ML
1 KIT INJECTION PRN
Status: DISCONTINUED | OUTPATIENT
Start: 2024-10-02 | End: 2024-10-05 | Stop reason: HOSPADM

## 2024-10-02 RX ORDER — MAGNESIUM SULFATE IN WATER 40 MG/ML
2000 INJECTION, SOLUTION INTRAVENOUS ONCE
Status: COMPLETED | OUTPATIENT
Start: 2024-10-02 | End: 2024-10-02

## 2024-10-02 RX ORDER — ALBUTEROL SULFATE 90 UG/1
2 INHALANT RESPIRATORY (INHALATION) EVERY 4 HOURS PRN
Status: DISCONTINUED | OUTPATIENT
Start: 2024-10-02 | End: 2024-10-05 | Stop reason: HOSPADM

## 2024-10-02 RX ORDER — PANTOPRAZOLE SODIUM 40 MG/1
40 TABLET, DELAYED RELEASE ORAL
Status: DISCONTINUED | OUTPATIENT
Start: 2024-10-03 | End: 2024-10-05 | Stop reason: HOSPADM

## 2024-10-02 RX ORDER — POTASSIUM CHLORIDE 1500 MG/1
40 TABLET, EXTENDED RELEASE ORAL PRN
Status: DISCONTINUED | OUTPATIENT
Start: 2024-10-02 | End: 2024-10-05 | Stop reason: HOSPADM

## 2024-10-02 RX ORDER — ALBUTEROL SULFATE 90 UG/1
2 INHALANT RESPIRATORY (INHALATION)
Status: DISCONTINUED | OUTPATIENT
Start: 2024-10-02 | End: 2024-10-02

## 2024-10-02 RX ORDER — ACETAMINOPHEN 650 MG/1
650 SUPPOSITORY RECTAL EVERY 6 HOURS PRN
Status: DISCONTINUED | OUTPATIENT
Start: 2024-10-02 | End: 2024-10-05 | Stop reason: HOSPADM

## 2024-10-02 RX ORDER — ESCITALOPRAM OXALATE 10 MG/1
5 TABLET ORAL DAILY
Status: DISCONTINUED | OUTPATIENT
Start: 2024-10-02 | End: 2024-10-05 | Stop reason: HOSPADM

## 2024-10-02 RX ORDER — PRAMIPEXOLE DIHYDROCHLORIDE 0.25 MG/1
0.12 TABLET ORAL EVERY EVENING
Status: DISCONTINUED | OUTPATIENT
Start: 2024-10-02 | End: 2024-10-05 | Stop reason: HOSPADM

## 2024-10-02 RX ORDER — ONDANSETRON 4 MG/1
4 TABLET, ORALLY DISINTEGRATING ORAL EVERY 8 HOURS PRN
Status: DISCONTINUED | OUTPATIENT
Start: 2024-10-02 | End: 2024-10-05 | Stop reason: HOSPADM

## 2024-10-02 RX ORDER — TRAZODONE HYDROCHLORIDE 100 MG/1
100 TABLET ORAL NIGHTLY
Status: DISCONTINUED | OUTPATIENT
Start: 2024-10-02 | End: 2024-10-05 | Stop reason: HOSPADM

## 2024-10-02 RX ORDER — POLYETHYLENE GLYCOL 3350 17 G/17G
17 POWDER, FOR SOLUTION ORAL DAILY PRN
Status: DISCONTINUED | OUTPATIENT
Start: 2024-10-02 | End: 2024-10-05 | Stop reason: HOSPADM

## 2024-10-02 RX ORDER — INSULIN LISPRO 100 [IU]/ML
0-4 INJECTION, SOLUTION INTRAVENOUS; SUBCUTANEOUS NIGHTLY
Status: DISCONTINUED | OUTPATIENT
Start: 2024-10-02 | End: 2024-10-05 | Stop reason: HOSPADM

## 2024-10-02 RX ORDER — FLUTICASONE PROPIONATE 110 UG/1
1 AEROSOL, METERED RESPIRATORY (INHALATION)
Status: DISCONTINUED | OUTPATIENT
Start: 2024-10-02 | End: 2024-10-05 | Stop reason: HOSPADM

## 2024-10-02 RX ORDER — SODIUM CHLORIDE 0.9 % (FLUSH) 0.9 %
5-40 SYRINGE (ML) INJECTION EVERY 12 HOURS SCHEDULED
Status: DISCONTINUED | OUTPATIENT
Start: 2024-10-02 | End: 2024-10-05 | Stop reason: HOSPADM

## 2024-10-02 RX ORDER — DEXTROSE MONOHYDRATE 100 MG/ML
INJECTION, SOLUTION INTRAVENOUS CONTINUOUS PRN
Status: DISCONTINUED | OUTPATIENT
Start: 2024-10-02 | End: 2024-10-05 | Stop reason: HOSPADM

## 2024-10-02 RX ORDER — HEPARIN SODIUM 1000 [USP'U]/ML
4000 INJECTION, SOLUTION INTRAVENOUS; SUBCUTANEOUS ONCE
Status: COMPLETED | OUTPATIENT
Start: 2024-10-02 | End: 2024-10-02

## 2024-10-02 RX ORDER — ONDANSETRON 2 MG/ML
4 INJECTION INTRAMUSCULAR; INTRAVENOUS EVERY 6 HOURS PRN
Status: DISCONTINUED | OUTPATIENT
Start: 2024-10-02 | End: 2024-10-05 | Stop reason: HOSPADM

## 2024-10-02 RX ORDER — MAGNESIUM SULFATE IN WATER 40 MG/ML
2000 INJECTION, SOLUTION INTRAVENOUS PRN
Status: DISCONTINUED | OUTPATIENT
Start: 2024-10-02 | End: 2024-10-05 | Stop reason: HOSPADM

## 2024-10-02 RX ORDER — HEPARIN SODIUM 1000 [USP'U]/ML
2000 INJECTION, SOLUTION INTRAVENOUS; SUBCUTANEOUS PRN
Status: DISCONTINUED | OUTPATIENT
Start: 2024-10-02 | End: 2024-10-05

## 2024-10-02 RX ORDER — SODIUM CHLORIDE 0.9 % (FLUSH) 0.9 %
5-40 SYRINGE (ML) INJECTION PRN
Status: DISCONTINUED | OUTPATIENT
Start: 2024-10-02 | End: 2024-10-05 | Stop reason: HOSPADM

## 2024-10-02 RX ORDER — INSULIN GLARGINE 100 [IU]/ML
10 INJECTION, SOLUTION SUBCUTANEOUS NIGHTLY
Status: DISCONTINUED | OUTPATIENT
Start: 2024-10-02 | End: 2024-10-05 | Stop reason: HOSPADM

## 2024-10-02 RX ORDER — ATORVASTATIN CALCIUM 40 MG/1
40 TABLET, FILM COATED ORAL NIGHTLY
Status: DISCONTINUED | OUTPATIENT
Start: 2024-10-02 | End: 2024-10-05 | Stop reason: HOSPADM

## 2024-10-02 RX ORDER — INSULIN LISPRO 100 [IU]/ML
0-4 INJECTION, SOLUTION INTRAVENOUS; SUBCUTANEOUS
Status: DISCONTINUED | OUTPATIENT
Start: 2024-10-02 | End: 2024-10-05 | Stop reason: HOSPADM

## 2024-10-02 RX ORDER — BUSPIRONE HYDROCHLORIDE 5 MG/1
5 TABLET ORAL 2 TIMES DAILY
Status: DISCONTINUED | OUTPATIENT
Start: 2024-10-02 | End: 2024-10-05 | Stop reason: HOSPADM

## 2024-10-02 RX ORDER — BUMETANIDE 1 MG/1
2 TABLET ORAL DAILY
Status: DISCONTINUED | OUTPATIENT
Start: 2024-10-02 | End: 2024-10-05 | Stop reason: HOSPADM

## 2024-10-02 RX ORDER — METOPROLOL SUCCINATE 25 MG/1
25 TABLET, EXTENDED RELEASE ORAL DAILY
Status: DISCONTINUED | OUTPATIENT
Start: 2024-10-02 | End: 2024-10-05 | Stop reason: HOSPADM

## 2024-10-02 RX ADMIN — TRAZODONE HYDROCHLORIDE 100 MG: 100 TABLET ORAL at 22:22

## 2024-10-02 RX ADMIN — Medication 2 PUFF: at 20:15

## 2024-10-02 RX ADMIN — INSULIN GLARGINE 10 UNITS: 100 INJECTION, SOLUTION SUBCUTANEOUS at 22:21

## 2024-10-02 RX ADMIN — PRAMIPEXOLE DIHYDROCHLORIDE 0.12 MG: 0.25 TABLET ORAL at 22:22

## 2024-10-02 RX ADMIN — HEPARIN SODIUM 4000 UNITS: 1000 INJECTION INTRAVENOUS; SUBCUTANEOUS at 16:19

## 2024-10-02 RX ADMIN — AMIODARONE HYDROCHLORIDE 1 MG/MIN: 1.8 INJECTION, SOLUTION INTRAVENOUS at 16:39

## 2024-10-02 RX ADMIN — DOXYCYCLINE HYCLATE 100 MG: 100 TABLET, COATED ORAL at 22:24

## 2024-10-02 RX ADMIN — HEPARIN SODIUM 8 UNITS/KG/HR: 10000 INJECTION, SOLUTION INTRAVENOUS at 16:22

## 2024-10-02 RX ADMIN — BUMETANIDE 2 MG: 1 TABLET ORAL at 22:23

## 2024-10-02 RX ADMIN — BUSPIRONE HYDROCHLORIDE 5 MG: 5 TABLET ORAL at 22:23

## 2024-10-02 RX ADMIN — Medication 1 PUFF: at 20:15

## 2024-10-02 RX ADMIN — AMIODARONE HYDROCHLORIDE 0.5 MG/MIN: 1.8 INJECTION, SOLUTION INTRAVENOUS at 22:04

## 2024-10-02 RX ADMIN — VALSARTAN 80 MG: 80 TABLET, FILM COATED ORAL at 22:24

## 2024-10-02 RX ADMIN — TICAGRELOR 90 MG: 90 TABLET ORAL at 22:22

## 2024-10-02 RX ADMIN — MAGNESIUM SULFATE HEPTAHYDRATE 2000 MG: 40 INJECTION, SOLUTION INTRAVENOUS at 15:51

## 2024-10-02 RX ADMIN — MAGNESIUM SULFATE HEPTAHYDRATE 2000 MG: 40 INJECTION, SOLUTION INTRAVENOUS at 19:05

## 2024-10-02 ASSESSMENT — HEART SCORE: ECG: NON-SPECIFC REPOLARIZATION DISTURBANCE/LBTB/PM

## 2024-10-02 ASSESSMENT — PAIN DESCRIPTION - PAIN TYPE: TYPE: ACUTE PAIN

## 2024-10-02 ASSESSMENT — PAIN DESCRIPTION - LOCATION
LOCATION: CHEST
LOCATION: HEAD

## 2024-10-02 ASSESSMENT — PAIN SCALES - GENERAL
PAINLEVEL_OUTOF10: 3
PAINLEVEL_OUTOF10: 3

## 2024-10-02 ASSESSMENT — PAIN DESCRIPTION - DESCRIPTORS: DESCRIPTORS: ACHING

## 2024-10-02 ASSESSMENT — PAIN - FUNCTIONAL ASSESSMENT
PAIN_FUNCTIONAL_ASSESSMENT: 0-10
PAIN_FUNCTIONAL_ASSESSMENT: ACTIVITIES ARE NOT PREVENTED

## 2024-10-02 NOTE — H&P
Hospitalist History and Physical Note  Internal Medicine Resident      Patient: Andrei Munoz 64 y.o. male      Unit/Bed: 42/042A    Admit Date: 10/2/2024      ASSESSMENT AND PLAN  Active Problems  Paroxysmal atrial fibrillation: [id number]:  patient arrived to ED complaining of nonradiating chest pain substernal in nature.  Patient started on heparin drip and amiodarone.  Patient converted to sinus rhythm.  Cardiology consulted.  COPD stage III: Last PFT on 6/29/2021 showed severe obstruction with air trapping, decreased DLCO.  CT scan 4 years ago showed bilateral perihilar lymphadenopathy.  CXR showed left lower lobe atelectasis/infiltrate.  Patient previously started on doxycycline 10/1.  Continue doxycycline for 5 days.  Continue albuterol, Flovent, Stiolto.  Obstructive sleep apnea: Patient noncompliant with CPAP.  Follow-up outpatient.  HFpEF: Echo 3/27/2024 shows EF of 50 to 55%.  Home medications include Toprol-XL 25 mg daily, valsartan 80 mg daily.  Bumex 2 mg oral daily with hold parameters.  Continue home meds.  Hypertension: Continue home medications.  Blood pressure stable.  DM2: Patient on metformin at home.  Metformin held and patient on Lantus 10 units nightly, lispro low-dose SSI.  Jardiance 25 mg daily, continue.  GERD: Continue Protonix.  Hyperlipidemia: Continue Lipitor 40 mg daily.  Restless leg syndrome: Continue pramipexole.  Polysubstance abuse: Noted per chart review.  History of marijuana, cocaine, LSD and alcohol use.  Has not drank alcohol in 7 days.  History of alcohol abuse: Patient had last drink 7 days ago.  No tremors noted.  Depression/anxiety: Continue Lexapro 5 mg daily, continue BuSpar 5 mg twice daily, continue trazodone 100 mg nightly.          LDA: []CVC / []PICC / []Midline / []Munson / []Drains / []Mediport / [x]None  Antibiotics: Doxycycline  Steroids: None  Labs (still needed?): [x]Yes / []No  IVF (still needed?): []Yes / [x]No    Level of care: [x]Step Down /

## 2024-10-02 NOTE — ED TRIAGE NOTES
Pt from lobby with c/o chest pain, onset while working out in his garage. Hx COPD and CHF. Pt appears in acute distress. Pitting edema noted in bilateral lower extremities. Pt states he was admitted 9/26 for alcohol withdrawal delirium and he thinks pneumonia. Pt states he uses CPAP at night, but denies oxygen use during the day. EKG complete. Pt placed on nasal cannula.

## 2024-10-02 NOTE — ED PROVIDER NOTES
[facility] EMERGENCY DEPT  EMERGENCY DEPARTMENT ENCOUNTER          Pt Name: Andrei Munoz  MRN: [id number]  Birthdate 1960  Date of evaluation: 10/2/2024  Physician: Dajuan Beck MD  Supervising Attending Physician: Chandan Neves MD       CHIEF COMPLAINT       Chief Complaint   Patient presents with    Chest Pain         HISTORY OF PRESENT ILLNESS    HPI  Andrei Munoz is a 64 y.o. male with PMH significant for COPD, CHF, CAD s/p stenting and CABG (on Brilinta), DMT2, h/o DVT, FAISAL (on CPAP at night) who presents to the emergency department from home for evaluation of acute onset shortness of breath.  States that he was working out in his garage when all of a sudden he started become extremely short of breath and experiencing nonradiating SSCP.  Also reports that he has had a productive white cough over the past month that has not worsened.  Does note that he typically drinks 0.5 gallon of tequila daily however has not had a drink for the past 7 days.  Does note a withdrawal history of DTs and diaphoresis.  Reports vaping as well as daily marijuana smoking.    Denies fever, chills, headache, lightheadedness, dizziness, vision changes, tinnitus, sore throat, neck pain/stiffness, abdominal pain, nausea, vomiting, constipation, diarrhea, dysuria, numbness/tingling/weakness in extremities.    The patient has no other acute complaints at this time.      PAST MEDICAL AND SURGICAL HISTORY     Past Medical History:   Diagnosis Date    CHF (congestive heart failure) ([facility])     COPD (chronic obstructive pulmonary disease) ([facility])     Coronary artery disease involving native coronary artery of native heart without angina pectoris     Depression     Diabetes mellitus type 2, diet-controlled ([facility]) 6/1/2018    GERD (gastroesophageal reflux disease) 3/21/2012    Hernia     Hx of blood clots 1990's    right knee due to injury    Hx of cocaine abuse ([facility])     Hyperglycemia 09/09    Hyperlipidemia      drips.  Also given magnesium.  Moderate risk heart score.  Hospitalist consulted for admission due to new-onset afib with RVR. Results discussed with patient. Symptoms likely 2/2 A-fib with RVR. Patient verbalized understanding and agreement to plan - admitted in stable condition. Refer to ED course for additional information.      ED COURSE   ED Medications administered this visit (None if left blank):   Medications   magnesium sulfate 2000 mg in 50 mL IVPB premix (2,000 mg IntraVENous New Bag 10/2/24 1551)   amiodarone (NEXTERONE) 360 mg in dextrose 5% 200 ml (has no administration in time range)     Followed by   amiodarone (NEXTERONE) 360 mg in dextrose 5% 200 ml (has no administration in time range)   heparin (porcine) injection 4,000 Units (has no administration in time range)   heparin (porcine) injection 2,000 Units (has no administration in time range)   heparin 25,000 units in dextrose 5% 250 mL (premix) infusion (8 Units/kg/hr × 111.6 kg IntraVENous New Bag 10/2/24 1622)   heparin (porcine) injection 4,000 Units (4,000 Units IntraVENous Given 10/2/24 1619)       ED Course as of 10/02/24 1935   Wed Oct 02, 2024   1541 WBC: 8.3  No leukocytosis [MA]   1541 Hemoglobin Quant: 16.7  No anemia [MA]   1541 EKG 12 Lead  A-fib with RVR at 148 bpm, , QTc 524, normal axis, no acute ST elevations or depressions [MA]   1602 Glucose(!): 262 [MA]   1602 Anion Gap(!): 19.0 [MA]   1602 NT Pro-BNP(!): 351.1  Mildly elevated however decreased from prior [MA]   1603 Troponin, High Sensitivity(!): 26  Elevated however decreased from prior [MA]   1603 No additional acute or emergent findings on BMP [MA]   1603 XR CHEST PORTABLE  Impression: LLL atelectasis/infiltrate however appears to be improving from prior (9/26) [MA]   1616 Magnesium: 1.8  WNL [MA]   1626 COVID and flu negative [MA]   1626 T4 Free: 1.43  WNL [MA]   1626 TSH, 3rd Generation: 1.530  WNL [MA]   1626 Procalcitonin: 0.08  WNL [MA]   1648 EKG 12

## 2024-10-02 NOTE — CARE COORDINATION
Care Transitions Note    Follow Up Call       Reason for Admission: alcohol intox, hallucinations     Attempted to reach patient for transitions of care follow up.  Unable to reach patient.      Patient returned call and stated [facility] and [facility] will be [facility] to follow up tomorrow.     Outreach Attempts:   HIPAA compliant voicemail left for patient.     Follow Up Appointment:   Future Appointments         Provider Specialty Dept Phone    10/4/2024 10:50 AM Malka Solis MD Family Medicine [phone]    10/30/2024 11:40 AM Malka Solis MD Family Medicine [phone]    11/22/2024 1:00 PM Leidy Damico MD Cardiology [phone]    1/13/2025 3:00 PM Elias Brown Jr., MD Urology [phone]    2/10/2025 1:30 PM Bernadette Leon PA-C Pulmonology [phone]    3/19/2025 2:30 PM Guillermina Courtney APRN - CNP Cardiology [phone]            Plan for follow-up call in 2-5 days based on severity of symptoms and risk factors. Plan for next call: symptom management-.  self management-.    Kathy Matthews LPN

## 2024-10-02 NOTE — ED NOTES
Pt HR noted to be 140-160. New EKG complete at this time. Dr Neves to bedside. Pt denies known hx Afib. Pt reports normally drinking 1/2 gallon of vodka per day but he has not had any alcohol x 1 week.

## 2024-10-02 NOTE — ED NOTES
"Debridement    Date/Time: 4/22/2022 7:59 AM  Performed by: Song Jett MD  Authorized by: Song Jett MD     Time out: Immediately prior to procedure a "time out" was called to verify the correct patient, procedure, equipment, support staff and site/side marked as required.    Consent Done?:  Yes (Written)  Local anesthesia used?: Yes    Local anesthetic:  Topical anesthetic    Wound Details:    Location:  Left leg    Type of Debridement:  Excisional       Length (cm):  1.5       Area (sq cm):  3.75       Width (cm):  2.5       Percent Debrided (%):  100       Depth (cm):  0.2       Total Area Debrided (sq cm):  3.75    Depth of debridement:  Subcutaneous tissue    Tissue debrided:  Subcutaneous    Devitalized tissue debrided:  Slough, Fibrin and Exudate    Instruments:  Curette    Additional wounds:  1    2nd Wound Details:     Location:  Left leg (posterior)    Type of Debridement:  Excisional       Length (cm):  1.7       Area (sq cm):  1.7       Width (cm):  1       Percent Debrided (%):  100       Depth (cm):  0.2       Total Area Debrided (sq cm):  1.7    Depth of debridement:  Subcutaneous tissue    Tissue debrided:  Subcutaneous    Devitalized tissue debrided:  Slough, Fibrin and Exudate    Instruments:  Curette    Bleeding:  Minimal  Hemostasis Achieved: Yes    Method Used:  Pressure  Patient tolerance:  Patient tolerated the procedure well with no immediate complications      " ED to inpatient nurses report      Chief Complaint:  Chief Complaint   Patient presents with    Chest Pain     Present to ED from: home    MOA:     LOC: alert and orientated to name, place, date  Mobility: Independent  Oxygen Baseline: RA    Current needs required: 3L NC     Code Status:   Full Code    What abnormal results were found and what did you give/do to treat them?   New onset Afib RVR  Chest pain w elevated troponin   Requiring oxygen   ETOH abuse     Mental Status:  Level of Consciousness: Alert (0)    Psych Assessment:        Vitals:  Patient Vitals for the past 24 hrs:   BP Temp Temp src Pulse Resp SpO2 Height Weight   10/02/24 1744 105/83 -- -- 83 29 93 % -- --   10/02/24 1640 -- -- -- 87 -- -- -- --   10/02/24 1636 -- -- -- (!) 158 28 93 % -- --   10/02/24 1628 -- -- -- (!) 126 (!) 35 93 % -- --   10/02/24 1552 -- -- -- (!) 145 30 92 % -- --   10/02/24 1533 -- -- -- (!) 131 -- -- -- --   10/02/24 1530 -- -- -- (!) 108 -- -- -- --   10/02/24 1529 105/67 -- -- (!) 159 -- -- -- --   10/02/24 1521 -- -- -- (!) 163 28 95 % -- --   10/02/24 1511 -- -- -- -- -- 92 % -- --   10/02/24 1503 130/78 97.5 °F (36.4 °C) Oral 84 23 (!) 86 % 1.803 m (5' 11\") 111.6 kg (246 lb)        LDAs:   Peripheral IV 10/02/24 Left Antecubital (Active)   Site Assessment Clean, dry & intact 10/02/24 1638   Line Status Infusing 10/02/24 1638   Phlebitis Assessment No symptoms 10/02/24 1509   Infiltration Assessment 0 10/02/24 1509   Dressing Status New dressing applied;Clean, dry & intact 10/02/24 1509   Dressing Type Transparent 10/02/24 1509       Peripheral IV 10/02/24 Left;Posterior Hand (Active)   Site Assessment Clean, dry & intact 10/02/24 1636   Line Status Infusing 10/02/24 1636   Phlebitis Assessment No symptoms 10/02/24 1627   Infiltration Assessment 0 10/02/24 1627   Dressing Status New dressing applied;Clean, dry & intact 10/02/24 1627   Dressing Type Transparent 10/02/24 1627       Ambulatory Status:  No data

## 2024-10-03 ENCOUNTER — APPOINTMENT (OUTPATIENT)
Age: 64
End: 2024-10-03
Attending: INTERNAL MEDICINE
Payer: MEDICARE

## 2024-10-03 ENCOUNTER — APPOINTMENT (OUTPATIENT)
Dept: NUCLEAR MEDICINE | Age: 64
End: 2024-10-03
Attending: INTERNAL MEDICINE
Payer: MEDICARE

## 2024-10-03 PROBLEM — F10.10 ETOH ABUSE: Status: ACTIVE | Noted: 2024-10-03

## 2024-10-03 LAB
ANION GAP SERPL CALC-SCNC: 12 MEQ/L (ref 8–16)
BILIRUB UR QL STRIP: NEGATIVE
BUN SERPL-MCNC: 11 MG/DL (ref 7–22)
CALCIUM SERPL-MCNC: 8.4 MG/DL (ref 8.5–10.5)
CHARACTER UR: CLEAR
CHLORIDE SERPL-SCNC: 100 MEQ/L (ref 98–111)
CO2 SERPL-SCNC: 24 MEQ/L (ref 23–33)
COLOR UR: YELLOW
CREAT SERPL-MCNC: 0.6 MG/DL (ref 0.4–1.2)
DEPRECATED RDW RBC AUTO: 62.4 FL (ref 35–45)
ECHO AV CUSP MM: 2.5 CM
ECHO BSA: 2.37 M2
ECHO BSA: 2.37 M2
ECHO LA AREA 2C: 15.5 CM2
ECHO LA AREA 4C: 16 CM2
ECHO LA DIAMETER INDEX: 1.52 CM/M2
ECHO LA DIAMETER: 3.5 CM
ECHO LA MAJOR AXIS: 5.6 CM
ECHO LA MINOR AXIS: 5.2 CM
ECHO LA VOL BP: 40 ML (ref 18–58)
ECHO LA VOL MOD A2C: 38 ML (ref 18–58)
ECHO LA VOL MOD A4C: 39 ML (ref 18–58)
ECHO LA VOL/BSA BIPLANE: 17 ML/M2 (ref 16–34)
ECHO LA VOLUME INDEX MOD A2C: 16 ML/M2 (ref 16–34)
ECHO LA VOLUME INDEX MOD A4C: 17 ML/M2 (ref 16–34)
ECHO LV EDV A4C: 152 ML
ECHO LV EDV INDEX A4C: 66 ML/M2
ECHO LV EJECTION FRACTION A4C: 50 %
ECHO LV EJECTION FRACTION BIPLANE: 50 % (ref 55–100)
ECHO LV ESV A4C: 75 ML
ECHO LV ESV INDEX A4C: 32 ML/M2
ECHO LV FRACTIONAL SHORTENING: 24 % (ref 28–44)
ECHO LV INTERNAL DIMENSION DIASTOLE INDEX: 2.12 CM/M2
ECHO LV INTERNAL DIMENSION DIASTOLIC: 4.9 CM (ref 4.2–5.9)
ECHO LV INTERNAL DIMENSION SYSTOLIC INDEX: 1.6 CM/M2
ECHO LV INTERNAL DIMENSION SYSTOLIC: 3.7 CM
ECHO LV IVSD: 1 CM (ref 0.6–1)
ECHO LV MASS 2D: 176 G (ref 88–224)
ECHO LV MASS INDEX 2D: 76.2 G/M2 (ref 49–115)
ECHO LV POSTERIOR WALL DIASTOLIC: 1 CM (ref 0.6–1)
ECHO LV RELATIVE WALL THICKNESS RATIO: 0.41
ECHO RV INTERNAL DIMENSION: 4.6 CM
EKG ATRIAL RATE: 65 BPM
EKG P-R INTERVAL: 216 MS
EKG Q-T INTERVAL: 454 MS
EKG QRS DURATION: 106 MS
EKG QTC CALCULATION (BAZETT): 472 MS
EKG R AXIS: 15 DEGREES
EKG T AXIS: -11 DEGREES
EKG VENTRICULAR RATE: 65 BPM
ERYTHROCYTE [DISTWIDTH] IN BLOOD BY AUTOMATED COUNT: 16.7 % (ref 11.5–14.5)
GFR SERPL CREATININE-BSD FRML MDRD: > 90 ML/MIN/1.73M2
GLUCOSE BLD STRIP.AUTO-MCNC: 146 MG/DL (ref 70–108)
GLUCOSE BLD STRIP.AUTO-MCNC: 167 MG/DL (ref 70–108)
GLUCOSE BLD STRIP.AUTO-MCNC: 227 MG/DL (ref 70–108)
GLUCOSE BLD STRIP.AUTO-MCNC: 237 MG/DL (ref 70–108)
GLUCOSE SERPL-MCNC: 163 MG/DL (ref 70–108)
GLUCOSE UR QL STRIP.AUTO: >= 1000 MG/DL
HCT VFR BLD AUTO: 47.1 % (ref 42–52)
HEPARIN UNFRACTIONATED: 0.14 U/ML (ref 0.3–0.7)
HEPARIN UNFRACTIONATED: 0.21 U/ML (ref 0.3–0.7)
HEPARIN UNFRACTIONATED: 0.3 U/ML (ref 0.3–0.7)
HEPARIN UNFRACTIONATED: 0.31 U/ML (ref 0.3–0.7)
HGB BLD-MCNC: 15.5 GM/DL (ref 14–18)
HGB UR QL STRIP.AUTO: NEGATIVE
KETONES UR QL STRIP.AUTO: NEGATIVE
LEUKOCYTE ESTERASE UR QL STRIP.AUTO: NEGATIVE
MAGNESIUM SERPL-MCNC: 2.3 MG/DL (ref 1.6–2.4)
MCH RBC QN AUTO: 33.5 PG (ref 26–33)
MCHC RBC AUTO-ENTMCNC: 32.9 GM/DL (ref 32.2–35.5)
MCV RBC AUTO: 101.7 FL (ref 80–94)
NITRITE UR QL STRIP.AUTO: NEGATIVE
NUC STRESS EJECTION FRACTION: 49 %
OSMOLALITY SERPL CALC.SUM OF ELEC: 274.9 MOSMOL/KG (ref 275–300)
PH UR STRIP.AUTO: 5.5 [PH] (ref 5–9)
PHOSPHATE SERPL-MCNC: 3.6 MG/DL (ref 2.4–4.7)
PLATELET # BLD AUTO: 231 THOU/MM3 (ref 130–400)
PMV BLD AUTO: 10.2 FL (ref 9.4–12.4)
POTASSIUM SERPL-SCNC: 3.4 MEQ/L (ref 3.5–5.2)
PROT UR STRIP.AUTO-MCNC: NEGATIVE MG/DL
RBC # BLD AUTO: 4.63 MILL/MM3 (ref 4.7–6.1)
SODIUM SERPL-SCNC: 136 MEQ/L (ref 135–145)
SP GR UR REFRACT.AUTO: 1.01 (ref 1–1.03)
STRESS BASELINE DIAS BP: 63 MMHG
STRESS BASELINE HR: 67 BPM
STRESS BASELINE SYS BP: 102 MMHG
STRESS ESTIMATED WORKLOAD: 1 METS
STRESS PEAK DIAS BP: 63 MMHG
STRESS PEAK SYS BP: 107 MMHG
STRESS PERCENT HR ACHIEVED: 55 %
STRESS POST PEAK HR: 85 BPM
STRESS RATE PRESSURE PRODUCT: 9095 BPM*MMHG
STRESS STAGE 1 DURATION: 1 MIN:SEC
STRESS STAGE 1 HR: 74 BPM
STRESS STAGE 2 BP: NORMAL MMHG
STRESS STAGE 2 DURATION: 1 MIN:SEC
STRESS STAGE 2 HR: 80 BPM
STRESS STAGE 3 BP: NORMAL MMHG
STRESS STAGE 3 DURATION: 1 MIN:SEC
STRESS STAGE 3 HR: 76 BPM
STRESS STAGE RECOVERY 1 BP: NORMAL MMHG
STRESS STAGE RECOVERY 1 DURATION: 1 MIN:SEC
STRESS STAGE RECOVERY 1 HR: 75 BPM
STRESS STAGE RECOVERY 2 BP: NORMAL MMHG
STRESS STAGE RECOVERY 2 DURATION: 1 MIN:SEC
STRESS STAGE RECOVERY 2 HR: 75 BPM
STRESS STAGE RECOVERY 3 BP: NORMAL MMHG
STRESS STAGE RECOVERY 3 DURATION: 1 MIN:SEC
STRESS STAGE RECOVERY 3 HR: 77 BPM
STRESS STAGE RECOVERY 4 BP: NORMAL MMHG
STRESS STAGE RECOVERY 4 DURATION: 1 MIN:SEC
STRESS STAGE RECOVERY 4 HR: 73 BPM
STRESS TARGET HR: 154 BPM
TID: 1.48
UROBILINOGEN UR QL STRIP.AUTO: 0.2 EU/DL (ref 0–1)
WBC # BLD AUTO: 6.3 THOU/MM3 (ref 4.8–10.8)

## 2024-10-03 PROCEDURE — 2500000003 HC RX 250 WO HCPCS

## 2024-10-03 PROCEDURE — 78452 HT MUSCLE IMAGE SPECT MULT: CPT | Performed by: INTERNAL MEDICINE

## 2024-10-03 PROCEDURE — 6370000000 HC RX 637 (ALT 250 FOR IP)

## 2024-10-03 PROCEDURE — 3430000000 HC RX DIAGNOSTIC RADIOPHARMACEUTICAL: Performed by: INTERNAL MEDICINE

## 2024-10-03 PROCEDURE — 99233 SBSQ HOSP IP/OBS HIGH 50: CPT | Performed by: INTERNAL MEDICINE

## 2024-10-03 PROCEDURE — 93010 ELECTROCARDIOGRAM REPORT: CPT | Performed by: INTERNAL MEDICINE

## 2024-10-03 PROCEDURE — 6360000004 HC RX CONTRAST MEDICATION: Performed by: INTERNAL MEDICINE

## 2024-10-03 PROCEDURE — 84100 ASSAY OF PHOSPHORUS: CPT

## 2024-10-03 PROCEDURE — 82948 REAGENT STRIP/BLOOD GLUCOSE: CPT

## 2024-10-03 PROCEDURE — 93018 CV STRESS TEST I&R ONLY: CPT | Performed by: INTERNAL MEDICINE

## 2024-10-03 PROCEDURE — 99223 1ST HOSP IP/OBS HIGH 75: CPT | Performed by: INTERNAL MEDICINE

## 2024-10-03 PROCEDURE — C8923 2D TTE W OR W/O FOL W/CON,CO: HCPCS

## 2024-10-03 PROCEDURE — 85027 COMPLETE CBC AUTOMATED: CPT

## 2024-10-03 PROCEDURE — 81003 URINALYSIS AUTO W/O SCOPE: CPT

## 2024-10-03 PROCEDURE — 6360000002 HC RX W HCPCS: Performed by: INTERNAL MEDICINE

## 2024-10-03 PROCEDURE — 36415 COLL VENOUS BLD VENIPUNCTURE: CPT

## 2024-10-03 PROCEDURE — 6360000002 HC RX W HCPCS

## 2024-10-03 PROCEDURE — 93017 CV STRESS TEST TRACING ONLY: CPT

## 2024-10-03 PROCEDURE — 94640 AIRWAY INHALATION TREATMENT: CPT

## 2024-10-03 PROCEDURE — 93016 CV STRESS TEST SUPVJ ONLY: CPT | Performed by: INTERNAL MEDICINE

## 2024-10-03 PROCEDURE — 80048 BASIC METABOLIC PNL TOTAL CA: CPT

## 2024-10-03 PROCEDURE — 93307 TTE W/O DOPPLER COMPLETE: CPT | Performed by: INTERNAL MEDICINE

## 2024-10-03 PROCEDURE — 2580000003 HC RX 258: Performed by: INTERNAL MEDICINE

## 2024-10-03 PROCEDURE — 93005 ELECTROCARDIOGRAM TRACING: CPT | Performed by: INTERNAL MEDICINE

## 2024-10-03 PROCEDURE — 83735 ASSAY OF MAGNESIUM: CPT

## 2024-10-03 PROCEDURE — 85520 HEPARIN ASSAY: CPT

## 2024-10-03 PROCEDURE — 78452 HT MUSCLE IMAGE SPECT MULT: CPT

## 2024-10-03 PROCEDURE — A9500 TC99M SESTAMIBI: HCPCS | Performed by: INTERNAL MEDICINE

## 2024-10-03 PROCEDURE — 2580000003 HC RX 258

## 2024-10-03 PROCEDURE — 94761 N-INVAS EAR/PLS OXIMETRY MLT: CPT

## 2024-10-03 PROCEDURE — 2060000000 HC ICU INTERMEDIATE R&B

## 2024-10-03 RX ORDER — TETRAKIS(2-METHOXYISOBUTYLISOCYANIDE)COPPER(I) TETRAFLUOROBORATE 1 MG/ML
9.2 INJECTION, POWDER, LYOPHILIZED, FOR SOLUTION INTRAVENOUS
Status: COMPLETED | OUTPATIENT
Start: 2024-10-03 | End: 2024-10-03

## 2024-10-03 RX ORDER — SODIUM CHLORIDE 0.9 % (FLUSH) 0.9 %
5-40 SYRINGE (ML) INJECTION EVERY 12 HOURS SCHEDULED
Status: DISCONTINUED | OUTPATIENT
Start: 2024-10-03 | End: 2024-10-04 | Stop reason: SDUPTHER

## 2024-10-03 RX ORDER — AMIODARONE HYDROCHLORIDE 200 MG/1
200 TABLET ORAL DAILY
Qty: 30 TABLET | Refills: 0 | Status: SHIPPED | OUTPATIENT
Start: 2024-10-03 | End: 2024-10-05

## 2024-10-03 RX ORDER — REGADENOSON 0.08 MG/ML
0.4 INJECTION, SOLUTION INTRAVENOUS
Status: COMPLETED | OUTPATIENT
Start: 2024-10-03 | End: 2024-10-03

## 2024-10-03 RX ORDER — SODIUM CHLORIDE 0.9 % (FLUSH) 0.9 %
5-40 SYRINGE (ML) INJECTION PRN
Status: DISCONTINUED | OUTPATIENT
Start: 2024-10-03 | End: 2024-10-04 | Stop reason: SDUPTHER

## 2024-10-03 RX ORDER — SODIUM CHLORIDE 9 MG/ML
INJECTION, SOLUTION INTRAVENOUS PRN
Status: DISCONTINUED | OUTPATIENT
Start: 2024-10-03 | End: 2024-10-04 | Stop reason: SDUPTHER

## 2024-10-03 RX ORDER — TETRAKIS(2-METHOXYISOBUTYLISOCYANIDE)COPPER(I) TETRAFLUOROBORATE 1 MG/ML
31.1 INJECTION, POWDER, LYOPHILIZED, FOR SOLUTION INTRAVENOUS
Status: COMPLETED | OUTPATIENT
Start: 2024-10-03 | End: 2024-10-03

## 2024-10-03 RX ADMIN — HEPARIN SODIUM 2000 UNITS: 1000 INJECTION INTRAVENOUS; SUBCUTANEOUS at 09:32

## 2024-10-03 RX ADMIN — ESCITALOPRAM OXALATE 5 MG: 10 TABLET ORAL at 09:34

## 2024-10-03 RX ADMIN — SODIUM CHLORIDE, PRESERVATIVE FREE 10 ML: 5 INJECTION INTRAVENOUS at 09:36

## 2024-10-03 RX ADMIN — BUMETANIDE 2 MG: 1 TABLET ORAL at 09:35

## 2024-10-03 RX ADMIN — Medication 31.1 MILLICURIE: at 11:00

## 2024-10-03 RX ADMIN — ALLOPURINOL 300 MG: 300 TABLET ORAL at 09:35

## 2024-10-03 RX ADMIN — INSULIN GLARGINE 10 UNITS: 100 INJECTION, SOLUTION SUBCUTANEOUS at 20:13

## 2024-10-03 RX ADMIN — Medication 9.2 MILLICURIE: at 09:58

## 2024-10-03 RX ADMIN — HEPARIN SODIUM 2000 UNITS: 1000 INJECTION INTRAVENOUS; SUBCUTANEOUS at 01:48

## 2024-10-03 RX ADMIN — TICAGRELOR 90 MG: 90 TABLET ORAL at 09:34

## 2024-10-03 RX ADMIN — TICAGRELOR 90 MG: 90 TABLET ORAL at 20:13

## 2024-10-03 RX ADMIN — TAMSULOSIN HYDROCHLORIDE 0.4 MG: 0.4 CAPSULE ORAL at 09:35

## 2024-10-03 RX ADMIN — BUSPIRONE HYDROCHLORIDE 5 MG: 5 TABLET ORAL at 20:14

## 2024-10-03 RX ADMIN — PERFLUTREN 10 ML: 6.52 INJECTION, SUSPENSION INTRAVENOUS at 14:56

## 2024-10-03 RX ADMIN — Medication 1 PUFF: at 21:35

## 2024-10-03 RX ADMIN — DOXYCYCLINE HYCLATE 100 MG: 100 TABLET, COATED ORAL at 20:15

## 2024-10-03 RX ADMIN — INSULIN LISPRO 1 UNITS: 100 INJECTION, SOLUTION INTRAVENOUS; SUBCUTANEOUS at 12:23

## 2024-10-03 RX ADMIN — AMIODARONE HYDROCHLORIDE 0.5 MG/MIN: 1.8 INJECTION, SOLUTION INTRAVENOUS at 23:07

## 2024-10-03 RX ADMIN — HEPARIN SODIUM 12 UNITS/KG/HR: 10000 INJECTION, SOLUTION INTRAVENOUS at 16:17

## 2024-10-03 RX ADMIN — DOXYCYCLINE HYCLATE 100 MG: 100 TABLET, COATED ORAL at 09:37

## 2024-10-03 RX ADMIN — Medication 1 PUFF: at 09:37

## 2024-10-03 RX ADMIN — ATORVASTATIN CALCIUM 40 MG: 40 TABLET, FILM COATED ORAL at 20:15

## 2024-10-03 RX ADMIN — REGADENOSON 0.4 MG: 0.08 INJECTION, SOLUTION INTRAVENOUS at 11:01

## 2024-10-03 RX ADMIN — PRAMIPEXOLE DIHYDROCHLORIDE 0.12 MG: 0.25 TABLET ORAL at 17:52

## 2024-10-03 RX ADMIN — METOPROLOL SUCCINATE 25 MG: 25 TABLET, FILM COATED, EXTENDED RELEASE ORAL at 09:35

## 2024-10-03 RX ADMIN — PANTOPRAZOLE SODIUM 40 MG: 40 TABLET, DELAYED RELEASE ORAL at 06:24

## 2024-10-03 RX ADMIN — INSULIN LISPRO 1 UNITS: 100 INJECTION, SOLUTION INTRAVENOUS; SUBCUTANEOUS at 17:52

## 2024-10-03 RX ADMIN — TRAZODONE HYDROCHLORIDE 100 MG: 100 TABLET ORAL at 20:14

## 2024-10-03 RX ADMIN — BUSPIRONE HYDROCHLORIDE 5 MG: 5 TABLET ORAL at 09:36

## 2024-10-03 RX ADMIN — ACETAMINOPHEN 650 MG: 325 TABLET ORAL at 12:20

## 2024-10-03 RX ADMIN — TIOTROPIUM BROMIDE AND OLODATEROL 2 PUFF: 3.124; 2.736 SPRAY, METERED RESPIRATORY (INHALATION) at 09:37

## 2024-10-03 RX ADMIN — EMPAGLIFLOZIN 25 MG: 25 TABLET, FILM COATED ORAL at 09:34

## 2024-10-03 RX ADMIN — VALSARTAN 80 MG: 80 TABLET, FILM COATED ORAL at 09:37

## 2024-10-03 ASSESSMENT — PAIN DESCRIPTION - LOCATION: LOCATION: HEAD

## 2024-10-03 ASSESSMENT — PAIN - FUNCTIONAL ASSESSMENT: PAIN_FUNCTIONAL_ASSESSMENT: ACTIVITIES ARE NOT PREVENTED

## 2024-10-03 ASSESSMENT — PAIN SCALES - GENERAL
PAINLEVEL_OUTOF10: 0
PAINLEVEL_OUTOF10: 3

## 2024-10-03 ASSESSMENT — PAIN DESCRIPTION - DESCRIPTORS: DESCRIPTORS: ACHING

## 2024-10-03 NOTE — PLAN OF CARE
Problem: Discharge Planning  Goal: Discharge to home or other facility with appropriate resources  10/3/2024 0910 by Precious Wesley, TING  Outcome: Progressing  See SW note

## 2024-10-03 NOTE — PLAN OF CARE
Problem: Respiratory - Adult  Goal: Lung sounds clear or within normal limits for patient  10/3/2024 0944 by Joseluis Larios, RCP  Outcome: Progressing       Patient mutually agreed on goals.

## 2024-10-03 NOTE — PLAN OF CARE
Problem: Respiratory - Adult  Goal: Lung sounds clear or within normal limits for patient  Outcome: Progressing

## 2024-10-03 NOTE — RT PROTOCOL NOTE
RT Inhaler-Nebulizer Bronchodilator Protocol Note    There is a bronchodilator order in the chart from a provider indicating to follow the RT Bronchodilator Protocol and there is an “Initiate RT Inhaler-Nebulizer Bronchodilator Protocol” order as well (see protocol at bottom of note).    CXR Findings:  XR CHEST PORTABLE    Result Date: 10/2/2024  Left lower lung atelectasis/infiltrate. **This report has been created using voice recognition software. It may contain minor errors which are inherent in voice recognition technology.** Electronically signed by Dr. Mukesh Larson      The findings from the last RT Protocol Assessment were as follows:   History Pulmonary Disease: Chronic pulmonary disease  Respiratory Pattern: Regular pattern and RR 12-20 bpm  Breath Sounds: Clear breath sounds  Cough: Strong, spontaneous, non-productive  Indication for Bronchodilator Therapy:    Bronchodilator Assessment Score: 2    Aerosolized bronchodilator medication orders have been revised according to the RT Inhaler-Nebulizer Bronchodilator Protocol below.    Respiratory Therapist to perform RT Therapy Protocol Assessment initially then follow the protocol.  Repeat RT Therapy Protocol Assessment PRN for score 0-3 or on second treatment, BID, and PRN for scores above 3.    No Indications - adjust the frequency to every 6 hours PRN wheezing or bronchospasm, if no treatments needed after 48 hours then discontinue using Per Protocol order mode.     If indication present, adjust the RT bronchodilator orders based on the Bronchodilator Assessment Score as indicated below.  Use Inhaler orders unless patient has one or more of the following: on home nebulizer, not able to hold breath for 10 seconds, is not alert and oriented, cannot activate and use MDI correctly, or respiratory rate 25 breaths per minute or more, then use the equivalent nebulizer order(s) with same Frequency and PRN reasons based on the score.  If a patient is on this

## 2024-10-03 NOTE — CARE COORDINATION
Case Management Assessment Initial Evaluation    Date/Time of Evaluation: 10/3/2024 8:40 AM  Assessment Completed by: Mann Duff RN    If patient is discharged prior to next notation, then this note serves as note for discharge by case management.    Patient Name: Andrei Munoz                   YOB: 1960  Diagnosis: Shortness of breath [R06.02]  ETOH abuse [F10.10]  Atrial fibrillation with rapid ventricular response (HCC) [I48.91]  Atrial fibrillation with RVR (HCC) [I48.91]  Chest pain, unspecified type [R07.9]                   Date / Time: 10/2/2024  2:59 PM  Location: [address] [place] [address]     Patient Admission Status: Inpatient   Readmission Risk Low 0-14, Mod 15-19), High > 20: Readmission Risk Score: 21.5    Current PCP: Malka Solis MD  Health Care Decision Makers:   Primary Decision Maker: Louis Munoz [facility] - [phone]  Procedures:  10/3 Stress Test (+) Ischemia/Infarction  10/4 Cardiac Cath  Additional Case Management Notes:   CP/A-fib  History: CHF, COPD, DM, CABG, PCI, Cannabis/Cocaine/Alcohol/LSD Use, Parkinson/s  Amiodarone/Heparin gtts  CPAP at night  Patient Goals/Plan/Treatment Preferences: caregiver for mother brian [place] KYLIE Britt @ [phone], [facility] (nsg, aides), goes to , has ERS, Meals Program, cane, walker, CPAP (does not always use), son/PODELGADO Myers, current CHF Clinic, still drives; Addiction Services following    10/3/24, 1:29 PM EDT    Patient goals/plan/ treatment preferences discussed by  and .  Patient goals/plan/ treatment preferences reviewed with patient/ family.  Patient/ family verbalize understanding of discharge plan and are in agreement with goal/plan/treatment preferences.  Understanding was demonstrated using the teach back method.  AVS provided by RN at time of discharge, which includes all necessary medical information pertaining to the patients current course of illness, treatment, post-discharge goals  of care, and treatment preferences.     Services At/After Discharge: Community Resources and Outpatient              10/03/24 0810   Service Assessment   Patient Orientation Alert and Oriented   Cognition Alert   History Provided By Patient;Medical Record   Primary Caregiver Self   Accompanied By/Relationship none   Support Systems Parent;Family Members;Children   Patient's Healthcare Decision Maker is: Legal Next of Kin   PCP Verified by CM Yes   Last Visit to PCP Within last 3 months   Prior Functional Level Independent in ADLs/IADLs   Current Functional Level Independent in ADLs/IADLs   Can patient return to prior living arrangement Yes   Ability to make needs known: Good   Family able to assist with home care needs: No   Would you like for me to discuss the discharge plan with any other family members/significant others, and if so, who? No   Financial Resources Medicaid;Medicare   Community Resources ECF/Home Care   CM/SW Referral ADLs/IADLs   Social/Functional History   Lives With Parent;Family   Type of Home House   Home Layout One level   Active  Yes   Discharge Planning   Type of Residence House   Living Arrangements Parent   Current Services Prior To Admission Durable Medical Equipment;C-pap   Current DME Prior to Arrival Walker;Cane;Cpap   Potential Assistance Needed N/A   DME Ordered? No   Potential Assistance Purchasing Medications No   Type of Home Care Services Aide Services;OT;PT;Nursing Services;Skilled Therapy;Safety Alert;Meals on Wheels   Patient expects to be discharged to: House   Follow Up Appointment: Best Day/Time    (pm)   One/Two Story Residence One story   History of falls? 1   Services At/After Discharge   Transition of Care Consult (CM Consult) Discharge Planning   Services At/After Discharge Home Health;PT;OT;Safety alert;Nursing services;Meals on wheels;Community Resources;Aide services   [place] Resource Information Provided? No   Mode of Transport at Discharge Other (see

## 2024-10-03 NOTE — CARE COORDINATION
10/03/24 1001   Readmission Assessment   Number of Days since last admission? 1-7 days   Previous Disposition Home with Home Health   Who is being Interviewed Patient   What was the patient's/caregiver's perception as to why they think they needed to return back to the hospital? Other (Comment)  (a-fib)   Did you visit your Primary Care Physician after you left the hospital, before you returned this time? No   Why weren't you able to visit your PCP? Other (Comment)  (readmitted too soon)   Did you see a specialist, such as Cardiac, Pulmonary, Orthopedic Physician, etc. after you left the hospital? No   Who advised the patient to return to the hospital? Self-referral   Does the patient report anything that got in the way of taking their medications? No   In our efforts to provide the best possible care to you and others like you, can you think of anything that we could have done to help you after you left the hospital the first time, so that you might not have needed to return so soon? Other (Comment)  (no)

## 2024-10-03 NOTE — H&P
Hospitalist History and Physical Note  Internal Medicine Resident      Patient: Andrei Munoz 64 y.o. male      Unit/Bed: K-01/001-A    Admit Date: 10/2/2024      ASSESSMENT AND PLAN  Active Problems  New onset paroxysmal atrial fibrillation: [id number]: 5.  10% risk of stroke/TIA/thromboembolism.  Patient arrived to ED complaining of nonradiating chest pain substernal in nature.  Patient started on heparin drip and amiodarone.  Patient converted to sinus rhythm.  Cardiology consulted.  HFpEF: Echo 3/27/2024 shows EF of 50 to 55%.  Home medications include Toprol-XL 25 mg daily, valsartan 80 mg daily.  Bumex 2 mg oral daily with hold parameters.  Continue home meds. performed stress test on 10/3.  Stress test performed and was positive for myocardial ischemia and infarction, LV perfusion abnormal, evidence of inducible ischemia.  Planning for LHC on 10/4.  COPD stage III: Last PFT on 6/29/2021 showed severe obstruction with air trapping, decreased DLCO.  CT scan 4 years ago showed bilateral perihilar lymphadenopathy.  CXR showed left lower lobe atelectasis/infiltrate.  Patient previously started on doxycycline 10/1.  Continue doxycycline for 5 days.  Continue albuterol, Flovent, Stiolto.  Obstructive sleep apnea: Patient noncompliant with CPAP.  Follow-up outpatient.  Hypertension: Continue home medications.  Blood pressure stable.  DM2: Patient on metformin at home.  Metformin held and patient on Lantus 10 units nightly, lispro low-dose SSI.  Jardiance 25 mg daily, continue.  GERD: Continue Protonix.  Hyperlipidemia: Continue Lipitor 40 mg daily.  Restless leg syndrome: Continue pramipexole.  Polysubstance abuse: Noted per chart review.  History of marijuana, cocaine, LSD and alcohol use.  Has not drank alcohol in 7 days.  History of alcohol abuse: Patient had last drink 7 days ago.  No tremors noted.  Depression/anxiety: Continue Lexapro 5 mg daily, continue BuSpar 5 mg twice daily, continue trazodone  movements.   Skin: Warm and dry. No rashes or lesions.  Neurologic:  No focal sensory/motor deficits in the upper or lower extremities. Cranial nerves:  grossly non-focal 2-12.     Psychiatric: Alert and oriented, normal insight and thought content.   Capillary Refill: Brisk,< 3 seconds.  Peripheral Pulses: +2 palpable, equal bilaterally.       Labs/Radiology: See chart or assessment above.     Electronically signed by García Huerta MD on 10/3/2024 at 6:37 AM  Case was discussed with Attending, Dr. Hernandez.

## 2024-10-03 NOTE — FLOWSHEET NOTE
10/03/24 1229   Treatment Team Notification   Reason for Communication Evaluate  (returned from stress test may we resume diet?)   Name of Team Member Notified Dr. Hernandez   Treatment Team Role Attending Provider   Method of Communication Secure Message   Response Waiting for response

## 2024-10-03 NOTE — CONSULTS
[facility]'s  Consult    Patient's Name/Date of Birth: Andrei Munoz / 1960 (64 y.o.)    Date: October 3, 2024     Referring Provider: Tony Hernandez MD    CHIEF COMPLAINT:      HPI: This is a pleasant 64 y.o. male PMH of htn, dm, hld, COPD stage III, CAD s/p failed CABG 2016 and PCI 2018, chronic diastolic HF  EF 50-55% NYHA 2, h/o alcohol and polysubstance abuse presented with substernal chest pain on and off since 1 month. He describes the pain as indigestion, non radiating, worsened with exertion. No associated diaphoresis, n/v, no change in baseline sob. He states he has been under a lot of stress lately and consuming more alcohol lately.    HR upon admission was found to be in 160s  EKG-Atrial fibrillation with RVR,     Last echo 3/24: EF 50-55%  Trop 26,27  TSH 1.5, K-3.8, Mg 1.8    Patient was started on heparin drip and amiodarone drip inpatient     All labs, EKG's, diagnostic testing and images as well as cardiac cath, stress testing were reviewed during this encounter    Past Medical History:   Diagnosis Date    CHF (congestive heart failure) (HCC)     COPD (chronic obstructive pulmonary disease) ([facility])     Coronary artery disease involving native coronary artery of native heart without angina pectoris     Depression     Diabetes mellitus type 2, diet-controlled (HCC) 6/1/2018    GERD (gastroesophageal reflux disease) 3/21/2012    Hernia     Hx of blood clots 1990's    right knee due to injury    Hx of cocaine abuse ([facility])     Hyperglycemia 09/09    Hyperlipidemia     Hypertension     FAISAL on CPAP     Osteoarthritis 11/07    S/P CABG x 2 07/06/2016    S/P PTCA: 1/15/2018: Stent diagonal branch Xience 3.0 x 12 mm. 1/15/2018    1/15/2018: Stent diagonal branch Xience 3.0 x 12 mm. Dr. Ta    Thumb injury 08/2015    Right thumb cut with table saw, no treatment needed     Past Surgical History:   Procedure Laterality Date    CARDIAC CATHETERIZATION  06/27/2016     evaluated this patient and have completed at least one if not all key elements of the E/M (history, physical exam, and MDM).  Additional findings are as noted. I agree with the chief complaint, past medical history, past surgical history, allergies, medications, social and family history as documented unless otherwise noted.     Above findings and plan of care were discussed with patient, questions were answered, agreeable to plan.     Thank you for allowing me to participate in the care of this patient.  Please let me know if I can be of any further assistance.    Electronically signed by Hans Cochran MD, FACC, [facility] on 10/3/2024 at 1:25 PM  Interventional Cardiology - [facility]

## 2024-10-03 NOTE — PROGRESS NOTES
Evaluated [employer] pricing for Savita Sellers, [facility]    Eliquis and Xarelto are both covered 100% by patient's Medicaid plan.    Thank you,  Guera Nino, [facility] - Prescription Assistance ([phone]) 10/3/2024,8:41 AM

## 2024-10-03 NOTE — CARE COORDINATION
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 He reported that he is able to manage at home.   Payment Source:[employer] Medicare and Medicaid   Transportation at Discharge: friend or family  Post-acute (PAC) provider list was provided to patient. Patient was informed of their freedom to choose PAC provider. Discussed and offered to show the patient the relevant PAC Providers quality and resource use measures on Medicare Compare web site via computer based on patient's goals of care and treatment preferences. Questions regarding selection process were answered.      Teach Back Method used with patient regarding care plan and needs  Patient verbalized understanding of the plan of care and contribute to goal setting.       Patient preferences and discharge plan: Patient plans to return home with his mother and current Passport Services. He reported that he receives aides 3 days a week and a RN weekly. He reported that his CM is Lorenza.     [facility].     Electronically signed by TING Tai on 10/3/2024 at 9:07 AM

## 2024-10-03 NOTE — PROGRESS NOTES
Pt was in bed and alone at the time of the visit. He was dealing with A-Fib issues. He was encouraged   10/03/24 1511   Encounter Summary   Service Provided For Patient   Referral/Consult From [facility]   Support System Family members   Last Encounter  10/03/24  (Anointed)   Complexity of Encounter Low   Begin Time 0830   End Time  0836   Total Time Calculated 6 min   Spiritual/Emotional needs   Type Spiritual Support   Rituals, Rites and Sacraments   Type Anointing   Assessment/Intervention/Outcome   Assessment Hopeful   Intervention Empowerment   Outcome Encouraged      and anointed.

## 2024-10-03 NOTE — PLAN OF CARE
Problem: Chronic Conditions and Co-morbidities  Goal: Patient's chronic conditions and co-morbidity symptoms are monitored and maintained or improved  Outcome: Progressing  Flowsheets (Taken 10/3/2024 0232)  Care Plan - Patient's Chronic Conditions and Co-Morbidity Symptoms are Monitored and Maintained or Improved:   Monitor and assess patient's chronic conditions and comorbid symptoms for stability, deterioration, or improvement   Collaborate with multidisciplinary team to address chronic and comorbid conditions and prevent exacerbation or deterioration   Update acute care plan with appropriate goals if chronic or comorbid symptoms are exacerbated and prevent overall improvement and discharge     Problem: Discharge Planning  Goal: Discharge to home or other facility with appropriate resources  Outcome: Progressing  Flowsheets (Taken 10/3/2024 0232)  Discharge to home or other facility with appropriate resources:   Identify barriers to discharge with patient and caregiver   Arrange for needed discharge resources and transportation as appropriate   Identify discharge learning needs (meds, wound care, etc)   Refer to discharge planning if patient needs post-hospital services based on physician order or complex needs related to functional status, cognitive ability or social support system     Problem: Pain  Goal: Verbalizes/displays adequate comfort level or baseline comfort level  Outcome: Progressing  Flowsheets (Taken 10/3/2024 0232)  Verbalizes/displays adequate comfort level or baseline comfort level:   Encourage patient to monitor pain and request assistance   Assess pain using appropriate pain scale   Administer analgesics based on type and severity of pain and evaluate response   Implement non-pharmacological measures as appropriate and evaluate response   Consider cultural and social influences on pain and pain management   Notify Licensed Independent Practitioner if interventions unsuccessful or patient

## 2024-10-04 PROBLEM — E78.5 HYPERLIPIDEMIA LDL GOAL <50: Status: ACTIVE | Noted: 2024-10-04

## 2024-10-04 PROBLEM — I25.84 CORONARY ARTERY DISEASE DUE TO CALCIFIED CORONARY LESION: Status: ACTIVE | Noted: 2024-10-04

## 2024-10-04 PROBLEM — I25.10 CORONARY ARTERY DISEASE DUE TO CALCIFIED CORONARY LESION: Status: ACTIVE | Noted: 2024-10-04

## 2024-10-04 PROBLEM — E78.1 HYPERTRIGLYCERIDEMIA: Status: ACTIVE | Noted: 2024-10-04

## 2024-10-04 LAB
ANION GAP SERPL CALC-SCNC: 12 MEQ/L (ref 8–16)
BUN SERPL-MCNC: 12 MG/DL (ref 7–22)
CALCIUM SERPL-MCNC: 8.7 MG/DL (ref 8.5–10.5)
CHLORIDE SERPL-SCNC: 102 MEQ/L (ref 98–111)
CHOLEST SERPL-MCNC: 123 MG/DL (ref 100–199)
CO2 SERPL-SCNC: 26 MEQ/L (ref 23–33)
CREAT SERPL-MCNC: 0.8 MG/DL (ref 0.4–1.2)
DEPRECATED RDW RBC AUTO: 60.7 FL (ref 35–45)
ECHO BSA: 2.37 M2
ERYTHROCYTE [DISTWIDTH] IN BLOOD BY AUTOMATED COUNT: 16.6 % (ref 11.5–14.5)
GFR SERPL CREATININE-BSD FRML MDRD: > 90 ML/MIN/1.73M2
GLUCOSE BLD STRIP.AUTO-MCNC: 137 MG/DL (ref 70–108)
GLUCOSE BLD STRIP.AUTO-MCNC: 161 MG/DL (ref 70–108)
GLUCOSE BLD STRIP.AUTO-MCNC: 167 MG/DL (ref 70–108)
GLUCOSE BLD STRIP.AUTO-MCNC: 235 MG/DL (ref 70–108)
GLUCOSE SERPL-MCNC: 156 MG/DL (ref 70–108)
HCT VFR BLD AUTO: 44.1 % (ref 42–52)
HDLC SERPL-MCNC: 53 MG/DL
HEPARIN UNFRACTIONATED: 0.44 U/ML (ref 0.3–0.7)
HGB BLD-MCNC: 14.5 GM/DL (ref 14–18)
LDLC SERPL CALC-MCNC: 45 MG/DL
MCH RBC QN AUTO: 32.9 PG (ref 26–33)
MCHC RBC AUTO-ENTMCNC: 32.9 GM/DL (ref 32.2–35.5)
MCV RBC AUTO: 100 FL (ref 80–94)
PLATELET # BLD AUTO: 226 THOU/MM3 (ref 130–400)
PMV BLD AUTO: 10.2 FL (ref 9.4–12.4)
POTASSIUM SERPL-SCNC: 3.4 MEQ/L (ref 3.5–5.2)
RBC # BLD AUTO: 4.41 MILL/MM3 (ref 4.7–6.1)
SODIUM SERPL-SCNC: 140 MEQ/L (ref 135–145)
TRIGL SERPL-MCNC: 126 MG/DL (ref 0–199)
WBC # BLD AUTO: 5.2 THOU/MM3 (ref 4.8–10.8)

## 2024-10-04 PROCEDURE — 6370000000 HC RX 637 (ALT 250 FOR IP)

## 2024-10-04 PROCEDURE — 93459 L HRT ART/GRFT ANGIO: CPT | Performed by: INTERNAL MEDICINE

## 2024-10-04 PROCEDURE — C1894 INTRO/SHEATH, NON-LASER: HCPCS | Performed by: INTERNAL MEDICINE

## 2024-10-04 PROCEDURE — 85027 COMPLETE CBC AUTOMATED: CPT

## 2024-10-04 PROCEDURE — 99232 SBSQ HOSP IP/OBS MODERATE 35: CPT | Performed by: NURSE PRACTITIONER

## 2024-10-04 PROCEDURE — 85520 HEPARIN ASSAY: CPT

## 2024-10-04 PROCEDURE — 6360000004 HC RX CONTRAST MEDICATION: Performed by: INTERNAL MEDICINE

## 2024-10-04 PROCEDURE — 82948 REAGENT STRIP/BLOOD GLUCOSE: CPT

## 2024-10-04 PROCEDURE — 6360000002 HC RX W HCPCS: Performed by: INTERNAL MEDICINE

## 2024-10-04 PROCEDURE — 99153 MOD SED SAME PHYS/QHP EA: CPT | Performed by: INTERNAL MEDICINE

## 2024-10-04 PROCEDURE — 2709999900 HC NON-CHARGEABLE SUPPLY: Performed by: INTERNAL MEDICINE

## 2024-10-04 PROCEDURE — B2151ZZ FLUOROSCOPY OF LEFT HEART USING LOW OSMOLAR CONTRAST: ICD-10-PCS | Performed by: INTERNAL MEDICINE

## 2024-10-04 PROCEDURE — 99233 SBSQ HOSP IP/OBS HIGH 50: CPT | Performed by: INTERNAL MEDICINE

## 2024-10-04 PROCEDURE — 94640 AIRWAY INHALATION TREATMENT: CPT

## 2024-10-04 PROCEDURE — C1760 CLOSURE DEV, VASC: HCPCS | Performed by: INTERNAL MEDICINE

## 2024-10-04 PROCEDURE — 93458 L HRT ARTERY/VENTRICLE ANGIO: CPT | Performed by: INTERNAL MEDICINE

## 2024-10-04 PROCEDURE — 6360000002 HC RX W HCPCS

## 2024-10-04 PROCEDURE — 36415 COLL VENOUS BLD VENIPUNCTURE: CPT

## 2024-10-04 PROCEDURE — 2580000003 HC RX 258

## 2024-10-04 PROCEDURE — 2060000000 HC ICU INTERMEDIATE R&B

## 2024-10-04 PROCEDURE — 80061 LIPID PANEL: CPT

## 2024-10-04 PROCEDURE — 2500000003 HC RX 250 WO HCPCS

## 2024-10-04 PROCEDURE — 99152 MOD SED SAME PHYS/QHP 5/>YRS: CPT | Performed by: INTERNAL MEDICINE

## 2024-10-04 PROCEDURE — C1769 GUIDE WIRE: HCPCS | Performed by: INTERNAL MEDICINE

## 2024-10-04 PROCEDURE — 2500000003 HC RX 250 WO HCPCS: Performed by: INTERNAL MEDICINE

## 2024-10-04 PROCEDURE — 80048 BASIC METABOLIC PNL TOTAL CA: CPT

## 2024-10-04 PROCEDURE — 4A023N7 MEASUREMENT OF CARDIAC SAMPLING AND PRESSURE, LEFT HEART, PERCUTANEOUS APPROACH: ICD-10-PCS | Performed by: INTERNAL MEDICINE

## 2024-10-04 RX ORDER — IOPAMIDOL 755 MG/ML
INJECTION, SOLUTION INTRAVASCULAR PRN
Status: DISCONTINUED | OUTPATIENT
Start: 2024-10-04 | End: 2024-10-04 | Stop reason: HOSPADM

## 2024-10-04 RX ORDER — POTASSIUM CHLORIDE 1500 MG/1
40 TABLET, EXTENDED RELEASE ORAL ONCE
Status: COMPLETED | OUTPATIENT
Start: 2024-10-04 | End: 2024-10-04

## 2024-10-04 RX ORDER — ATROPINE SULFATE 0.4 MG/ML
0.5 INJECTION, SOLUTION INTRAVENOUS
Status: DISCONTINUED | OUTPATIENT
Start: 2024-10-04 | End: 2024-10-05 | Stop reason: HOSPADM

## 2024-10-04 RX ORDER — SODIUM CHLORIDE 0.9 % (FLUSH) 0.9 %
5-40 SYRINGE (ML) INJECTION PRN
Status: DISCONTINUED | OUTPATIENT
Start: 2024-10-04 | End: 2024-10-04 | Stop reason: SDUPTHER

## 2024-10-04 RX ORDER — FENTANYL CITRATE 50 UG/ML
INJECTION, SOLUTION INTRAMUSCULAR; INTRAVENOUS PRN
Status: DISCONTINUED | OUTPATIENT
Start: 2024-10-04 | End: 2024-10-04 | Stop reason: HOSPADM

## 2024-10-04 RX ORDER — 0.9 % SODIUM CHLORIDE 0.9 %
250 INTRAVENOUS SOLUTION INTRAVENOUS ONCE
Status: DISCONTINUED | OUTPATIENT
Start: 2024-10-04 | End: 2024-10-04

## 2024-10-04 RX ORDER — MIDAZOLAM HYDROCHLORIDE 1 MG/ML
INJECTION INTRAMUSCULAR; INTRAVENOUS PRN
Status: DISCONTINUED | OUTPATIENT
Start: 2024-10-04 | End: 2024-10-04 | Stop reason: HOSPADM

## 2024-10-04 RX ORDER — SODIUM CHLORIDE 9 MG/ML
INJECTION, SOLUTION INTRAVENOUS CONTINUOUS
Status: DISCONTINUED | OUTPATIENT
Start: 2024-10-04 | End: 2024-10-04 | Stop reason: SDUPTHER

## 2024-10-04 RX ORDER — SODIUM CHLORIDE 9 MG/ML
INJECTION, SOLUTION INTRAVENOUS CONTINUOUS
Status: ACTIVE | OUTPATIENT
Start: 2024-10-04 | End: 2024-10-04

## 2024-10-04 RX ORDER — SODIUM CHLORIDE 9 MG/ML
INJECTION, SOLUTION INTRAVENOUS PRN
Status: DISCONTINUED | OUTPATIENT
Start: 2024-10-04 | End: 2024-10-04 | Stop reason: SDUPTHER

## 2024-10-04 RX ORDER — SODIUM CHLORIDE 0.9 % (FLUSH) 0.9 %
5-40 SYRINGE (ML) INJECTION EVERY 12 HOURS SCHEDULED
Status: DISCONTINUED | OUTPATIENT
Start: 2024-10-04 | End: 2024-10-04 | Stop reason: SDUPTHER

## 2024-10-04 RX ORDER — ACETAMINOPHEN 325 MG/1
650 TABLET ORAL EVERY 4 HOURS PRN
Status: DISCONTINUED | OUTPATIENT
Start: 2024-10-04 | End: 2024-10-05 | Stop reason: HOSPADM

## 2024-10-04 RX ADMIN — AMIODARONE HYDROCHLORIDE 0.5 MG/MIN: 1.8 INJECTION, SOLUTION INTRAVENOUS at 12:11

## 2024-10-04 RX ADMIN — INSULIN GLARGINE 10 UNITS: 100 INJECTION, SOLUTION SUBCUTANEOUS at 21:50

## 2024-10-04 RX ADMIN — PRAMIPEXOLE DIHYDROCHLORIDE 0.12 MG: 0.25 TABLET ORAL at 17:22

## 2024-10-04 RX ADMIN — TAMSULOSIN HYDROCHLORIDE 0.4 MG: 0.4 CAPSULE ORAL at 09:35

## 2024-10-04 RX ADMIN — POTASSIUM CHLORIDE 40 MEQ: 1500 TABLET, EXTENDED RELEASE ORAL at 09:35

## 2024-10-04 RX ADMIN — POTASSIUM CHLORIDE 40 MEQ: 1500 TABLET, EXTENDED RELEASE ORAL at 06:06

## 2024-10-04 RX ADMIN — TICAGRELOR 90 MG: 90 TABLET ORAL at 09:35

## 2024-10-04 RX ADMIN — DOXYCYCLINE HYCLATE 100 MG: 100 TABLET, COATED ORAL at 09:35

## 2024-10-04 RX ADMIN — TICAGRELOR 90 MG: 90 TABLET ORAL at 21:53

## 2024-10-04 RX ADMIN — Medication 1 PUFF: at 21:18

## 2024-10-04 RX ADMIN — TIOTROPIUM BROMIDE AND OLODATEROL 2 PUFF: 3.124; 2.736 SPRAY, METERED RESPIRATORY (INHALATION) at 08:20

## 2024-10-04 RX ADMIN — SODIUM CHLORIDE: 9 INJECTION, SOLUTION INTRAVENOUS at 10:39

## 2024-10-04 RX ADMIN — SODIUM CHLORIDE, PRESERVATIVE FREE 10 ML: 5 INJECTION INTRAVENOUS at 21:55

## 2024-10-04 RX ADMIN — HEPARIN SODIUM 12 UNITS/KG/HR: 10000 INJECTION, SOLUTION INTRAVENOUS at 12:12

## 2024-10-04 RX ADMIN — VALSARTAN 80 MG: 80 TABLET, FILM COATED ORAL at 09:35

## 2024-10-04 RX ADMIN — ALLOPURINOL 300 MG: 300 TABLET ORAL at 09:35

## 2024-10-04 RX ADMIN — Medication 1 PUFF: at 08:20

## 2024-10-04 RX ADMIN — ATORVASTATIN CALCIUM 40 MG: 40 TABLET, FILM COATED ORAL at 21:54

## 2024-10-04 RX ADMIN — TRAZODONE HYDROCHLORIDE 100 MG: 100 TABLET ORAL at 21:54

## 2024-10-04 RX ADMIN — ESCITALOPRAM OXALATE 5 MG: 10 TABLET ORAL at 09:35

## 2024-10-04 RX ADMIN — BUMETANIDE 2 MG: 1 TABLET ORAL at 09:35

## 2024-10-04 RX ADMIN — BUSPIRONE HYDROCHLORIDE 5 MG: 5 TABLET ORAL at 21:53

## 2024-10-04 RX ADMIN — BUSPIRONE HYDROCHLORIDE 5 MG: 5 TABLET ORAL at 09:35

## 2024-10-04 RX ADMIN — EMPAGLIFLOZIN 25 MG: 25 TABLET, FILM COATED ORAL at 09:35

## 2024-10-04 RX ADMIN — DOXYCYCLINE HYCLATE 100 MG: 100 TABLET, COATED ORAL at 21:52

## 2024-10-04 RX ADMIN — PANTOPRAZOLE SODIUM 40 MG: 40 TABLET, DELAYED RELEASE ORAL at 06:06

## 2024-10-04 RX ADMIN — METOPROLOL SUCCINATE 25 MG: 25 TABLET, FILM COATED, EXTENDED RELEASE ORAL at 09:50

## 2024-10-04 ASSESSMENT — PATIENT HEALTH QUESTIONNAIRE - PHQ9
SUM OF ALL RESPONSES TO PHQ QUESTIONS 1-9: 2
2. FEELING DOWN, DEPRESSED OR HOPELESS: SEVERAL DAYS
SUM OF ALL RESPONSES TO PHQ QUESTIONS 1-9: 2
1. LITTLE INTEREST OR PLEASURE IN DOING THINGS: SEVERAL DAYS
SUM OF ALL RESPONSES TO PHQ9 QUESTIONS 1 & 2: 2
SUM OF ALL RESPONSES TO PHQ QUESTIONS 1-9: 2
SUM OF ALL RESPONSES TO PHQ QUESTIONS 1-9: 2

## 2024-10-04 ASSESSMENT — LIFESTYLE VARIABLES
HOW OFTEN DO YOU HAVE A DRINK CONTAINING ALCOHOL: 2-3 TIMES A WEEK
HOW MANY STANDARD DRINKS CONTAINING ALCOHOL DO YOU HAVE ON A TYPICAL DAY: 10 OR MORE

## 2024-10-04 NOTE — PROGRESS NOTES
Cardiology Progress Note      Patient:  Andrei Munoz  YOB: 1960  MRN: [id number]   Acct: [id number]  Admit Date:  10/2/2024  Primary Cardiologist: Leidy Damico MD  Seen by Dr. Cochran     Per prior cardiology consult note-  CHIEF COMPLAINT:        HPI: This is a pleasant 64 y.o. male PMH of htn, dm, hld, COPD stage III, CAD s/p failed CABG 2016 and PCI 2018, chronic diastolic HF  EF 50-55% NYHA 2, h/o alcohol and polysubstance abuse presented with substernal chest pain on and off since 1 month. He describes the pain as indigestion, non radiating, worsened with exertion. No associated diaphoresis, n/v, no change in baseline sob. He states he has been under a lot of stress lately and consuming more alcohol lately.     HR upon admission was found to be in 160s  EKG-Atrial fibrillation with RVR,      Last echo 3/24: EF 50-55%  Trop 26,27  TSH 1.5, K-3.8, Mg 1.8     Patient was started on heparin drip and amiodarone drip inpatient      All labs, EKG's, diagnostic testing and images as well as cardiac cath, stress testing were reviewed during this encounter    Subjective (Events in last 24 hours):   Pt sitting at bedside   Daughter at bedside     Pt has no chest pain or SOB   He denies palpitations     Tele SR this am -- on IV amiodarone / heparin   BP stable     Waiting for cath     Objective:   /67   Pulse 61   Temp 98.6 °F (37 °C) (Oral)   Resp 20   Ht 1.803 m (5' 11\")   Wt 109.8 kg (242 lb 1.6 oz)   SpO2 97%   BMI 33.77 kg/m²        TELEMETRY: SR no ectopy HR 60's     Physical Exam:  General Appearance: alert and oriented to person, place and time, in no acute distress  Cardiovascular: normal rate, regular rhythm, normal S1 and S2, no murmurs, rubs, clicks, or gallops, distal pulses intact,   Pulmonary/Chest: clear to auscultation bilaterally- no wheezes, rales or rhonchi, normal air movement, no respiratory distress  Abdomen: soft, non-tender, non-distended, normal bowel  after   - cath 1/2018: Successful PCI/stenting of the 90% calcified lesion at the take off of the diagonal branch using DANY Xience 3.0 x 12 mm.      Hx COPD     DM II  - uncontrolled   A1c 9.1  FAISAL - ON PAP     Hx diastolic chf - stable     ETOH abuse       Plan:  Cath today - keep amiodarone until CAD known   Pt needs OAC but need abstinence of ETOH use   Follow          Electronically signed by ASHLEIGH Steward - CNP on 10/4/2024 at 11:38 AM

## 2024-10-04 NOTE — PROGRESS NOTES
[facility]. Pt lying in bed. No concerns at time.       Head to Toe Assessment    [facility] Student Nurse  Head to Toe Assessment Performed at 0820  Skin  General skin appearance / Description: warm, pale, dry   Incisions / Wounds: Left hand and right forearm 20g IV infusing. No redness or swelling present.   Scattered ecchymosis and redness bilateral legs. Bilateral knee scars from prior surgeries.     Neuro/Head  LOC: A+O x 4  Speech: Appropriate and clear  Eyes PERRLA 3 to 2 mm  Symmetry of face / tongue: symmetrical   JVD of neck: absent     Chest  Heart sounds / Apical Pulse: Regular rate and rhythm   Dyspnea: Wears BiPAP at night. Baseline oxygen saturation 88-92%  Lung sounds: Diminished in bases. No crackles or wheezing.   Cough: absent     Abdomen/ Pelvis  Bowel sounds: active   Passing flatus: yes  Palpation / Inspection: round, soft, non-tender   Last BM: 10/03/24  Stool assessment: soft and brown   Any GI issues: Denies   Urinary issues: Denies  Continence: Voiding   Urine color / turbidity: yellow, clear    Extremities  Edema: +1 non-pitting bilateral legs   Altered Sensation: denies   Upper extremity push / pulls: strong bilaterally   Left Arm Radial Pulse: 2+   Left Upper extremity Capillary Refill: <3 sec   Right Arm Radial Pulse: 2+   Right Upper extremity Capillary Refill: <3 sec  Lower extremity pedal push / pulls: strong bilaterally  Left pedal pulse: 1+   Left posterior tibialis pulse: 2+   Left lower extremity capillary refill: <3 sec   Right pedal pulse: 1+   Right posterior tibialis pulse: 2+   Right lower extremity capillary refill: <3 sec  Drift of extremities: absent  Pain: Denies         1350 Reassessment done. No changes from prior assessment.   1430 Reported off to primary RNRavindra.  Electronically signed by Anai Lagos on 10/4/24 at 2:30 PM EDT

## 2024-10-04 NOTE — CONSULTS
Brief Intervention and Referral to Treatment Summary    Patient was provided PHQ-9, AUDIT-C and DAST Screening:      PHQ-9 Score: 2  AUDIT-C Score:  11  DAST Score:  1    Patient’s substance use is considered     Harmful      Patient’s depression is considered:     Mild    Brief Education Was Provided    Patient was not receptive      Brief Intervention Is Provided (Only for AUDIT-C or DAST)     Patient reports readiness to decrease and/or stop use and a plan was discussed

## 2024-10-04 NOTE — CARE COORDINATION
10/4/24, 9:25 AM EDT    Patient goals/plan/ treatment preferences discussed by  and .  Patient goals/plan/ treatment preferences reviewed with patient/ family.  Patient/ family verbalize understanding of discharge plan and are in agreement with goal/plan/treatment preferences.  Understanding was demonstrated using the teach back method.  AVS provided by RN at time of discharge, which includes all necessary medical information pertaining to the patients current course of illness, treatment, post-discharge goals of care, and treatment preferences.     Services At/After Discharge: Outpatient resources, Passport Services.     Patient plans to discharge home with his mother and current [employer]port [employer].     SW received a call from Lyndsay Britt CM, and she reported that patient's [facility] will not be sending a RN out and are discharging him from their RN services. She reported that this is due to patient cancelling. She reported that if patient needs a HH RN SW will have to use another agency.     JOSE L spoke with patient about discharge planning and notified him that [facility] will not be sending a RN out to his home anymore, due to him missing his visits or cancelling. He reported that, that is not what happened and they would tell him they would come on a Tuesday with no call when they had scheduled a visit on Thursday. SW asked if he would like another HH RN. He reported that he does not need one and denied setting up another RN. Confirmed plan to return home with mother.     RN made aware and discharge instructions placed on chart.

## 2024-10-04 NOTE — BRIEF OP NOTE
[facility]  Sedation/Analgesia Post Sedation Record        Pt Name: Andrei Munoz  MRN: [id number]  YOB: 1960  Procedure Performed By: Michael Cotter MD MD, FACROSLYN, XIANG, BILL  Primary Care Physician: Malka Solis MD    POST-PROCEDURE    Sedation/Anesthesia:  Local Anesthesia and IV Conscious Sedation with continuous O2 monitoring    Estimated Blood Loss: 10 cc     Specimens Removed:  [x]None []Other:      Disposition of Specimen:  []Pathology []Other        Complications:   [x]None Immediate []Other:       Procedure performed: Left heart cath    Post Procedure Diagnosis/Findings: Native vessel CAD with patent LIMA to LAD       Recommendations:    Medical treatment  Routine post-cath care.               Michael Cotter MD MD, FACROSLYN, XIANG, BILL  Electronically signed 10/4/2024 at 4:14 PM

## 2024-10-04 NOTE — PLAN OF CARE
Problem: Respiratory - Adult  Goal: Achieves optimal ventilation and oxygenation  Outcome: Progressing  Flowsheets (Taken 10/4/2024 5903)  Achieves optimal ventilation and oxygenation:   Assess for changes in respiratory status   Respiratory therapy support as indicated   Assess and instruct to report shortness of breath or any respiratory difficulty

## 2024-10-04 NOTE — PLAN OF CARE
Problem: Chronic Conditions and Co-morbidities  Goal: Patient's chronic conditions and co-morbidity symptoms are monitored and maintained or improved  Outcome: Progressing  Flowsheets (Taken 10/4/2024 0132)  Care Plan - Patient's Chronic Conditions and Co-Morbidity Symptoms are Monitored and Maintained or Improved:   Monitor and assess patient's chronic conditions and comorbid symptoms for stability, deterioration, or improvement   Collaborate with multidisciplinary team to address chronic and comorbid conditions and prevent exacerbation or deterioration   Update acute care plan with appropriate goals if chronic or comorbid symptoms are exacerbated and prevent overall improvement and discharge     Problem: Discharge Planning  Goal: Discharge to home or other facility with appropriate resources  Outcome: Progressing  Flowsheets (Taken 10/4/2024 0132)  Discharge to home or other facility with appropriate resources:   Identify barriers to discharge with patient and caregiver   Arrange for needed discharge resources and transportation as appropriate   Identify discharge learning needs (meds, wound care, etc)   Refer to discharge planning if patient needs post-hospital services based on physician order or complex needs related to functional status, cognitive ability or social support system     Problem: Pain  Goal: Verbalizes/displays adequate comfort level or baseline comfort level  Outcome: Progressing  Flowsheets (Taken 10/4/2024 0132)  Verbalizes/displays adequate comfort level or baseline comfort level:   Encourage patient to monitor pain and request assistance   Assess pain using appropriate pain scale   Administer analgesics based on type and severity of pain and evaluate response   Implement non-pharmacological measures as appropriate and evaluate response   Consider cultural and social influences on pain and pain management   Notify Licensed Independent Practitioner if interventions unsuccessful or patient

## 2024-10-04 NOTE — PROGRESS NOTES
Hospitalist History and Physical Note  Internal Medicine Resident      Patient: Andrei Munoz 64 y.o. male      Unit/Bed: K-01/001-A    Admit Date: 10/2/2024      ASSESSMENT AND PLAN  Active Problems  HFpEF: Echo 3/27/2024 shows EF of 50 to 55%.  Echo 10/3/2024 shows EF of 50 to 55% with mildly dilated right ventricle and mildly reduced RV systolic function.  Home medications include Toprol-XL 25 mg daily, valsartan 80 mg daily.  Bumex 2 mg oral daily with hold parameters.  Continue home meds. performed stress test on 10/3.  Stress test performed and was positive for myocardial ischemia and infarction, LV perfusion abnormal, evidence of inducible ischemia.  Planning for LHC on 10/4.  New onset paroxysmal atrial fibrillation: [id number]: 5.  10% risk of stroke/TIA/thromboembolism.  Patient arrived to ED complaining of nonradiating chest pain substernal in nature.  Patient started on heparin drip and amiodarone.  Patient converted to sinus rhythm.  Cardiology consulted and planning for LHC 10/4.  COPD stage III: Last PFT on 6/29/2021 showed severe obstruction with air trapping, decreased DLCO.  CT scan 4 years ago showed bilateral perihilar lymphadenopathy.  CXR showed left lower lobe atelectasis/infiltrate.  Patient previously started on doxycycline 10/1.  Continue doxycycline for 5 additional days.  Continue albuterol, Flovent, Stiolto.  Obstructive sleep apnea: Patient noncompliant with CPA previously..  Follow-up outpatient.  Patient seen wearing CPAP overnight and reported no issues.  Hypertension: Continue home medications.  Blood pressure stable.  DM2: Patient on metformin at home.  Metformin held and patient on Lantus 10 units nightly, lispro low-dose SSI.  Jardiance 25 mg daily, continue.  GERD: Continue Protonix.  Hyperlipidemia: Continue Lipitor 40 mg daily.  Restless leg syndrome: Continue pramipexole.  Polysubstance abuse: Noted per chart review.  History of marijuana, cocaine, LSD and alcohol  age.  Eyes:  PERRL. Conjunctivae/corneas clear.  HENT: Head normal appearing. Nares normal. Oral mucosa moist.  Hearing intact.   Neck: Supple, with full range of motion. Trachea midline.  No gross JVD appreciated.  Respiratory:  Normal effort. Clear to auscultation, without rales or wheezes or rhonchi.  Cardiovascular: Normal rate, regular rhythm with normal S1/S2 without murmurs.    +1 bilateral LE edema.   Abdomen: Soft, non-tender, non-distended with normal bowel sounds.  Musculoskeletal: No joint swelling or tenderness. Normal tone. No abnormal movements.   Skin: Warm and dry. No rashes or lesions.  Neurologic:  No focal sensory/motor deficits in the upper or lower extremities. Cranial nerves:  grossly non-focal 2-12.     Psychiatric: Alert and oriented, normal insight and thought content.   Capillary Refill: Brisk,< 3 seconds.  Peripheral Pulses: +2 palpable, equal bilaterally.       Labs/Radiology: See chart or assessment above.     Electronically signed by García Huerta MD on 10/4/2024 at 7:14 AM  Case was discussed with Attending, Dr. Hernandez.

## 2024-10-05 VITALS
DIASTOLIC BLOOD PRESSURE: 66 MMHG | RESPIRATION RATE: 16 BRPM | WEIGHT: 242.1 LBS | BODY MASS INDEX: 33.89 KG/M2 | SYSTOLIC BLOOD PRESSURE: 112 MMHG | HEART RATE: 65 BPM | TEMPERATURE: 97.9 F | HEIGHT: 71 IN | OXYGEN SATURATION: 92 %

## 2024-10-05 LAB
ANION GAP SERPL CALC-SCNC: 12 MEQ/L (ref 8–16)
BUN SERPL-MCNC: 10 MG/DL (ref 7–22)
CALCIUM SERPL-MCNC: 9 MG/DL (ref 8.5–10.5)
CHLORIDE SERPL-SCNC: 101 MEQ/L (ref 98–111)
CO2 SERPL-SCNC: 25 MEQ/L (ref 23–33)
CREAT SERPL-MCNC: 0.7 MG/DL (ref 0.4–1.2)
DEPRECATED RDW RBC AUTO: 62.9 FL (ref 35–45)
ERYTHROCYTE [DISTWIDTH] IN BLOOD BY AUTOMATED COUNT: 16.6 % (ref 11.5–14.5)
GFR SERPL CREATININE-BSD FRML MDRD: > 90 ML/MIN/1.73M2
GLUCOSE BLD STRIP.AUTO-MCNC: 158 MG/DL (ref 70–108)
GLUCOSE BLD STRIP.AUTO-MCNC: 177 MG/DL (ref 70–108)
GLUCOSE SERPL-MCNC: 129 MG/DL (ref 70–108)
HCT VFR BLD AUTO: 45 % (ref 42–52)
HEPARIN UNFRACTIONATED: < 0.04 U/ML (ref 0.3–0.7)
HGB BLD-MCNC: 14.7 GM/DL (ref 14–18)
MCH RBC QN AUTO: 33.4 PG (ref 26–33)
MCHC RBC AUTO-ENTMCNC: 32.7 GM/DL (ref 32.2–35.5)
MCV RBC AUTO: 102.3 FL (ref 80–94)
PLATELET # BLD AUTO: 235 THOU/MM3 (ref 130–400)
PMV BLD AUTO: 10 FL (ref 9.4–12.4)
POTASSIUM SERPL-SCNC: 4.1 MEQ/L (ref 3.5–5.2)
RBC # BLD AUTO: 4.4 MILL/MM3 (ref 4.7–6.1)
SODIUM SERPL-SCNC: 138 MEQ/L (ref 135–145)
WBC # BLD AUTO: 5.6 THOU/MM3 (ref 4.8–10.8)

## 2024-10-05 PROCEDURE — 94761 N-INVAS EAR/PLS OXIMETRY MLT: CPT

## 2024-10-05 PROCEDURE — 6370000000 HC RX 637 (ALT 250 FOR IP): Performed by: INTERNAL MEDICINE

## 2024-10-05 PROCEDURE — 85520 HEPARIN ASSAY: CPT

## 2024-10-05 PROCEDURE — 36415 COLL VENOUS BLD VENIPUNCTURE: CPT

## 2024-10-05 PROCEDURE — 99232 SBSQ HOSP IP/OBS MODERATE 35: CPT | Performed by: NURSE PRACTITIONER

## 2024-10-05 PROCEDURE — 82948 REAGENT STRIP/BLOOD GLUCOSE: CPT

## 2024-10-05 PROCEDURE — 80048 BASIC METABOLIC PNL TOTAL CA: CPT

## 2024-10-05 PROCEDURE — 94640 AIRWAY INHALATION TREATMENT: CPT

## 2024-10-05 PROCEDURE — 85027 COMPLETE CBC AUTOMATED: CPT

## 2024-10-05 PROCEDURE — 6360000002 HC RX W HCPCS

## 2024-10-05 PROCEDURE — 99239 HOSP IP/OBS DSCHRG MGMT >30: CPT | Performed by: INTERNAL MEDICINE

## 2024-10-05 PROCEDURE — 6370000000 HC RX 637 (ALT 250 FOR IP)

## 2024-10-05 PROCEDURE — 2500000003 HC RX 250 WO HCPCS

## 2024-10-05 RX ORDER — AMIODARONE HYDROCHLORIDE 200 MG/1
200 TABLET ORAL DAILY
Qty: 30 TABLET | Refills: 0 | Status: SHIPPED | OUTPATIENT
Start: 2024-10-05 | End: 2024-11-04

## 2024-10-05 RX ORDER — RIVAROXABAN 10 MG/1
20 TABLET, FILM COATED ORAL
Qty: 30 TABLET | Refills: 0 | Status: SHIPPED | OUTPATIENT
Start: 2024-10-05 | End: 2024-10-05

## 2024-10-05 RX ORDER — AMIODARONE HYDROCHLORIDE 200 MG/1
200 TABLET ORAL DAILY
Status: DISCONTINUED | OUTPATIENT
Start: 2024-10-05 | End: 2024-10-05 | Stop reason: HOSPADM

## 2024-10-05 RX ORDER — RIVAROXABAN 10 MG/1
20 TABLET, FILM COATED ORAL
Qty: 30 TABLET | Refills: 0 | Status: SHIPPED | OUTPATIENT
Start: 2024-10-05

## 2024-10-05 RX ADMIN — AMIODARONE HYDROCHLORIDE 200 MG: 200 TABLET ORAL at 10:02

## 2024-10-05 RX ADMIN — PANTOPRAZOLE SODIUM 40 MG: 40 TABLET, DELAYED RELEASE ORAL at 10:02

## 2024-10-05 RX ADMIN — TAMSULOSIN HYDROCHLORIDE 0.4 MG: 0.4 CAPSULE ORAL at 10:03

## 2024-10-05 RX ADMIN — METOPROLOL SUCCINATE 25 MG: 25 TABLET, FILM COATED, EXTENDED RELEASE ORAL at 10:04

## 2024-10-05 RX ADMIN — AMIODARONE HYDROCHLORIDE 0.5 MG/MIN: 1.8 INJECTION, SOLUTION INTRAVENOUS at 04:55

## 2024-10-05 RX ADMIN — RIVAROXABAN 20 MG: 20 TABLET, FILM COATED ORAL at 10:02

## 2024-10-05 RX ADMIN — DOXYCYCLINE HYCLATE 100 MG: 100 TABLET, COATED ORAL at 10:03

## 2024-10-05 RX ADMIN — ALLOPURINOL 300 MG: 300 TABLET ORAL at 10:03

## 2024-10-05 RX ADMIN — VALSARTAN 80 MG: 80 TABLET, FILM COATED ORAL at 10:03

## 2024-10-05 RX ADMIN — TIOTROPIUM BROMIDE AND OLODATEROL 2 PUFF: 3.124; 2.736 SPRAY, METERED RESPIRATORY (INHALATION) at 09:14

## 2024-10-05 RX ADMIN — Medication 1 PUFF: at 09:16

## 2024-10-05 RX ADMIN — ALBUTEROL SULFATE 2 PUFF: 90 AEROSOL, METERED RESPIRATORY (INHALATION) at 09:15

## 2024-10-05 RX ADMIN — BUSPIRONE HYDROCHLORIDE 5 MG: 5 TABLET ORAL at 10:03

## 2024-10-05 RX ADMIN — EMPAGLIFLOZIN 25 MG: 25 TABLET, FILM COATED ORAL at 10:05

## 2024-10-05 RX ADMIN — ESCITALOPRAM OXALATE 5 MG: 10 TABLET ORAL at 10:04

## 2024-10-05 RX ADMIN — BUMETANIDE 2 MG: 1 TABLET ORAL at 10:04

## 2024-10-05 ASSESSMENT — PAIN SCALES - GENERAL
PAINLEVEL_OUTOF10: 0
PAINLEVEL_OUTOF10: 0

## 2024-10-05 NOTE — PLAN OF CARE
Problem: Respiratory - Adult  Goal: Lung sounds clear or within normal limits for patient  Outcome: Progressing     Problem: Respiratory - Adult  Goal: Achieves optimal ventilation and oxygenation  Outcome: Progressing    Patient mutually agreed on goals.

## 2024-10-05 NOTE — PLAN OF CARE
Discharge instructions reviewed with patient. Dr Hernandez stated that there was no need for aspirin or plavix at discharge. Pt is discharging with Brhelderta and Lipitor.

## 2024-10-05 NOTE — DISCHARGE INSTRUCTIONS
-Follow-up with primary care provider on 10/7/2024  -Follow-up with cardiology on 11/22/2024 as schedule  -Start taking Xarelto 10 mg 2 tablets by mouth daily with breakfast  -Continue taking doxycycline with last dose on 10/7/2024  -Start taking amiodarone 200 mg once daily    Groin Care Instructions        Normal Observation: You may or may not experience these.    Soreness or tenderness that may last a few weeks.    Possible bruising that could last a few weeks and up to one month.   Formation of a small lump (dime to quarter size) that should last only a few weeks.    Care of your incision  You may shower 24 hours after the procedure. Wet the dressing thoroughly and gently remove the bandage from the hospital during showering. It is easier to remove this way.             Gently clean your site daily using soap and water while standing in the shower. Dry thoroughly.   Do not apply powders or lotions to the site for 2 weeks.   Keep the site clean and dry to prevent infection.    Do not sit in a bathtub or a pool of water for 7 days.    Inspect the site daily.    Activity   You may resume normal activity in 2 days, including driving, letting pain be your     guide.   Limit lifting over 5 pounds (half gallon of milk) to one week or until site heals.  Limit vigorous activity (contact sports) to two weeks time.  You will be able to return to work in 1-3 days.    Call our office immediately if you experience any of the following…  Significant bleeding does not stop after 10 minutes of applying firm pressure directly over incision.   Increased swelling of groin or leg.   Unusual pain at groin or down that leg.   Signs of infection: redness, warmth to touch, drainage, poorly healing incision, fever, or chills.Groin Care Instructions        Normal Observation: You may or may not experience these.    Soreness or tenderness that may last a few weeks.    Possible bruising that could last a few weeks and up to one month.    Formation of a small lump (dime to quarter size) that should last only a few weeks.    Care of your incision  You may shower 24 hours after the procedure. Wet the dressing thoroughly and gently remove the bandage from the hospital during showering. It is easier to remove this way.             Gently clean your site daily using soap and water while standing in the shower. Dry thoroughly.   Do not apply powders or lotions to the site for 2 weeks.   Keep the site clean and dry to prevent infection.    Do not sit in a bathtub or a pool of water for 7 days.    Inspect the site daily.    Activity   You may resume normal activity in 2 days, including driving, letting pain be your     guide.   Limit lifting over 5 pounds (half gallon of milk) to one week or until site heals.  Limit vigorous activity (contact sports) to two weeks time.  You will be able to return to work in 1-3 days.    Call our office immediately if you experience any of the following…  Significant bleeding does not stop after 10 minutes of applying firm pressure directly over incision.   Increased swelling of groin or leg.   Unusual pain at groin or down that leg.   Signs of infection: redness, warmth to touch, drainage, poorly healing incision, fever, or chills.

## 2024-10-05 NOTE — DISCHARGE SUMMARY
Resident Discharge Summary (Hospitalist)      Patient: Andrei Munoz 64 y.o. male  : 1960  MRN: [id number]   Account: [id number]   Patient's PCP: Malka Solis MD    Admit Date: 10/2/2024   Discharge Date:   10/5/2024    Admitting Physician: Tony Hernandez MD  Discharge Physician: García Huerta MD       Discharge Diagnoses:  HFpEF: Echo 3/27/2024 shows EF of 50 to 55%.  Echo 10/3/2024 shows EF of 50 to 55% with mildly dilated right ventricle and mildly reduced RV systolic function.   Performed stress test on 10/3.  Stress test performed and was positive for myocardial ischemia and infarction, LV perfusion abnormal, evidence of inducible ischemia.  LHC on 10/4 showed native vessel CAD with patent LIMA to LAD graft, known prior occlusion of SVG to OM1 grafts and EF 50%. Continue home medications.  New onset paroxysmal atrial fibrillation: [id number]: 5.  10% risk of stroke/TIA/thromboembolism.  Patient arrived to ED complaining of nonradiating chest pain substernal in nature.  Converted to NSR by amiodarone drip started in ED.  Continue amiodarone 200 mg daily.  COPD stage III: Last PFT on 2021 showed severe obstruction with air trapping, decreased DLCO.  CT scan 4 years ago showed bilateral perihilar lymphadenopathy.  CXR showed left lower lobe atelectasis/infiltrate.  Patient previously started on doxycycline 10/1.  Continue doxycycline with end date of 10/7/2024.  Obstructive sleep apnea: Patient noncompliant with CPAP previously.  Compliant with CPAP in hospital.  Follow-up outpatient.    Hypertension: Continue home medications.  Follow-up with PCP.  DM2: Continue home metformin.  Jardiance 25 mg daily, continue.  GERD: Continue Protonix.  Hyperlipidemia: Continue Lipitor 40 mg daily.  Restless leg syndrome: Continue pramipexole.  Polysubstance abuse: Noted per chart review.  History of marijuana, cocaine, LSD and alcohol use.  Has not drank alcohol in 7 days prior to  32G X 4 MM Misc  Generic drug: Insulin Pen Needle  Use to inject insulin three times a day DX E11.65     escitalopram 5 MG tablet  Commonly known as: LEXAPRO  take 1 tablet by mouth once daily     fluticasone 50 MCG/ACT nasal spray  Commonly known as: FLONASE  INSTILL TWO (2) SPRAYS IN EACH NOSTRIL ONCE DAILY AS NEEDED FOR RHINITIS OR ALLERGIES.     insulin lispro (1 Unit Dial) 100 UNIT/ML Sopn  Commonly known as: HumaLOG KwikPen  Sliding Scale  No Insulin, 151-200 2 units, 201-250 4 units, 251-300 6 units, 301-350 8 units, 351-400 10 units. If over 400 please call PCP.     isosorbide mononitrate 30 MG extended release tablet  Commonly known as: IMDUR  TAKE 1 TABLET BY MOUTH EVERY DAY     Jardiance 25 MG tablet  Generic drug: empagliflozin  TAKE 1 TABLET BY MOUTH DAILY     Lantus SoloStar 100 UNIT/ML injection pen  Generic drug: insulin glargine  Inject 10 Units into the skin nightly     magnesium oxide 400 (240 Mg) MG tablet  Commonly known as: MAG-OX  Take 1 tablet by mouth daily     metFORMIN 500 MG tablet  Commonly known as: GLUCOPHAGE  TAKE 2 TABLETS BY MOUTH TWICE DAILY WITH MEALS     metoprolol succinate 25 MG extended release tablet  Commonly known as: TOPROL XL  TAKE 2 TABLETS BY MOUTH ONCE DAILY     MULTIVITAMIN ADULT PO     omeprazole 20 MG delayed release capsule  Commonly known as: PRILOSEC  TAKE 1 CAPSULE BY MOUTH ONCE DAILY     potassium chloride 20 MEQ extended release tablet  Commonly known as: KLOR-CON M  Take 1 tablet by mouth daily     pramipexole 0.125 MG tablet  Commonly known as: MIRAPEX  TAKE 1 TABLET BY MOUTH EACH EVENING     Prodigy Autocode Blood Glucose w/Device Kit     Prodigy No Coding Blood Gluc strip  Generic drug: blood glucose test strips  USE TO TEST BLOOD GLUCOSE ONCE DAILY.     tamsulosin 0.4 MG capsule  Commonly known as: FLOMAX  Take 1 capsule by mouth daily Take one capsule daily to facilitate passage of ureteral stone     ticagrelor 90 MG Tabs tablet  Commonly known as:

## 2024-10-05 NOTE — PROGRESS NOTES
Rounded on patient and found his whole IV pump turned off. Pt  has Amio infusing, pt states he did not turn off pump. Pt educated on medications that are infusing and notifying staff if there are any issues or alarms on pump.

## 2024-10-05 NOTE — PROGRESS NOTES
Cardiology Progress Note      Patient:  Andrei Munoz  YOB: 1960  MRN: [id number]   Acct: [id number]  Admit Date:  10/2/2024  Primary Cardiologist: Leidy Damico MD  Seen by Dr. Cochran     Per prior cardiology consult note-  CHIEF COMPLAINT:        HPI: This is a pleasant 64 y.o. male PMH of htn, dm, hld, COPD stage III, CAD s/p failed CABG 2016 and PCI 2018, chronic diastolic HF  EF 50-55% NYHA 2, h/o alcohol and polysubstance abuse presented with substernal chest pain on and off since 1 month. He describes the pain as indigestion, non radiating, worsened with exertion. No associated diaphoresis, n/v, no change in baseline sob. He states he has been under a lot of stress lately and consuming more alcohol lately.     HR upon admission was found to be in 160s  EKG-Atrial fibrillation with RVR,      Last echo 3/24: EF 50-55%  Trop 26,27  TSH 1.5, K-3.8, Mg 1.8     Patient was started on heparin drip and amiodarone drip inpatient      All labs, EKG's, diagnostic testing and images as well as cardiac cath, stress testing were reviewed during this encounter    Subjective (Events in last 24 hours):   Denies chest discomfort or dyspnea. Breathing stable. No lightheadedness or dizziness; no syncope or near syncope. No heart racing or palpitations. No swelling or water retention issues. Tolerating medications. No bleeding on Xarelto. Up in room and ambulating without issue.  R groin site dressing D & I, no bleeding, swelling or hematoma      10/4/2024  Pt sitting at bedside   Daughter at bedside     Pt has no chest pain or SOB   He denies palpitations     Tele SR this am -- on IV amiodarone / heparin   BP stable     Waiting for cath     Objective:   /66   Pulse 65   Temp 97.9 °F (36.6 °C) (Oral)   Resp 16   Ht 1.803 m (5' 11\")   Wt 109.8 kg (242 lb 1.6 oz)   SpO2 92%   BMI 33.77 kg/m²        TELEMETRY: SR no ectopy HR 60's     Physical Exam:  General Appearance: alert and oriented  ECG demonstrates normal sinus rhythm.    Stress Test: A pharmacological stress test was performed using regadenoson (Lexiscan).      Left Heart Cath: 10/4/24  Conclusion      Native vessel CAD with patent LIMA to LAD graft    Known prior occlusion of SVG to OM1 grafts    EF 50%   Recommendations    1. Medical management  2. Lipid lowering therapy  3. Aggressive risk factor modification  4. Gentle hydration  5. Monitor access site closely   6. Routine post cath care    All questions and concerns were addressed and patient is in agreement with plan.     Complications    Complications documented before study signed (10/4/2024  5:31 PM)     No complications were associated with this study.   Documented by Michael Cotter MD - 10/4/2024  5:31 PM        Coronary Findings    Diagnostic  Dominance: Right  Left Anterior Descending   Prox LAD to Mid LAD lesion, 100% stenosed.      Left Circumflex   Ost Cx to Mid Cx lesion, 50% stenosed.      Second Obtuse Marginal Branch   2nd Mrg lesion, 100% stenosed.      Right Coronary Artery   Prox RCA lesion, 35% stenosed.      LIMA Graft To Mid LAD   The graft was visualized by angiography.      Intervention     No interventions have been documented.     Left Heart    Left Ventricle The estimated EF = 50 - 55%.     Coronary Diagram    Diagnostic  Dominance: Right    Intervention        Pressures Rest     Systolic (mmHg) Diastolic (mmHg) Mean (mmHg) EDP (mmHg)   AO 99    50    70           8     20       100    1     14        Lab Data:    Cardiac Enzymes:  No results for input(s): \"CKTOTAL\", \"CKMB\", \"CKMBINDEX\", \"TROPONINI\" in the last 72 hours.    CBC:   Lab Results   Component Value Date/Time    WBC 5.6 10/05/2024 04:34 AM    RBC 4.40 10/05/2024 04:34 AM    HGB 14.7 10/05/2024 04:34 AM    HGB 14.2 06/01/2012 08:10 AM    HCT 45.0 10/05/2024 04:34 AM     10/05/2024 04:34 AM       CMP:    Lab Results   Component Value Date/Time     10/05/2024 04:34 AM    K 4.1

## 2024-10-05 NOTE — PROGRESS NOTES
Cardiology Progress Note      Patient:  Andrei Munoz  YOB: 1960  MRN: [id number]   Acct: [id number]  Admit Date:  10/2/2024  Primary Cardiologist: Leidy Damico MD  Seen by Dr. Cochran     Per prior cardiology consult note-  CHIEF COMPLAINT:        HPI: This is a pleasant 64 y.o. male PMH of htn, dm, hld, COPD stage III, CAD s/p failed CABG 2016 and PCI 2018, chronic diastolic HF  EF 50-55% NYHA 2, h/o alcohol and polysubstance abuse presented with substernal chest pain on and off since 1 month. He describes the pain as indigestion, non radiating, worsened with exertion. No associated diaphoresis, n/v, no change in baseline sob. He states he has been under a lot of stress lately and consuming more alcohol lately.     HR upon admission was found to be in 160s  EKG-Atrial fibrillation with RVR,      Last echo 3/24: EF 50-55%  Trop 26,27  TSH 1.5, K-3.8, Mg 1.8     Patient was started on heparin drip and amiodarone drip inpatient      All labs, EKG's, diagnostic testing and images as well as cardiac cath, stress testing were reviewed during this encounter    Subjective (Events in last 24 hours):   Denies chest discomfort or dyspnea. Breathing stable. No lightheadedness or dizziness; no syncope or near syncope. No heart racing or palpitations. No swelling or water retention issues. Tolerating medications. No bleeding on Xarelto. Up in room and ambulating without issue.  R groin site dressing D & I, no bleeding, swelling or hematoma      10/4/2024  Pt sitting at bedside   Daughter at bedside     Pt has no chest pain or SOB   He denies palpitations     Tele SR this am -- on IV amiodarone / heparin   BP stable     Waiting for cath     Objective:   /66   Pulse 65   Temp 97.9 °F (36.6 °C) (Oral)   Resp 16   Ht 1.803 m (5' 11\")   Wt 109.8 kg (242 lb 1.6 oz)   SpO2 92%   BMI 33.77 kg/m²        TELEMETRY: SR no ectopy HR 60's     Physical Exam:  General Appearance: alert and oriented

## 2024-10-05 NOTE — PLAN OF CARE
Problem: Chronic Conditions and Co-morbidities  Goal: Patient's chronic conditions and co-morbidity symptoms are monitored and maintained or improved  Outcome: Adequate for Discharge     Problem: Discharge Planning  Goal: Discharge to home or other facility with appropriate resources  Outcome: Adequate for Discharge     Problem: Pain  Goal: Verbalizes/displays adequate comfort level or baseline comfort level  Outcome: Adequate for Discharge     Problem: Safety - Adult  Goal: Free from fall injury  Outcome: Adequate for Discharge     Problem: ABCDS Injury Assessment  Goal: Absence of physical injury  Outcome: Adequate for Discharge     Problem: Respiratory - Adult  Goal: Achieves optimal ventilation and oxygenation  10/5/2024 0642 by Bronson Oro RN  Outcome: Adequate for Discharge  10/5/2024 0623 by Johana Bryant RCP  Outcome: Progressing

## 2024-10-07 ENCOUNTER — CARE COORDINATION (OUTPATIENT)
Dept: CASE MANAGEMENT | Age: 64
End: 2024-10-07

## 2024-10-07 ENCOUNTER — HOSPITAL ENCOUNTER (INPATIENT)
Age: 64
LOS: 2 days | Discharge: HOME | DRG: 885 | End: 2024-10-10
Attending: EMERGENCY MEDICINE | Admitting: PSYCHIATRY & NEUROLOGY
Payer: MEDICARE

## 2024-10-07 ENCOUNTER — TELEPHONE (OUTPATIENT)
Dept: FAMILY MEDICINE CLINIC | Age: 64
End: 2024-10-07

## 2024-10-07 DIAGNOSIS — R45.851 SUICIDAL IDEATION: Primary | ICD-10-CM

## 2024-10-07 LAB
ALBUMIN SERPL BCG-MCNC: 4.4 G/DL (ref 3.5–5.1)
ALP SERPL-CCNC: 104 U/L (ref 38–126)
ALT SERPL W/O P-5'-P-CCNC: 46 U/L (ref 11–66)
ANION GAP SERPL CALC-SCNC: 22 MEQ/L (ref 8–16)
AST SERPL-CCNC: 33 U/L (ref 5–40)
BASOPHILS ABSOLUTE: 0.1 THOU/MM3 (ref 0–0.1)
BASOPHILS NFR BLD AUTO: 1.2 %
BILIRUB SERPL-MCNC: 0.3 MG/DL (ref 0.3–1.2)
BUN SERPL-MCNC: 26 MG/DL (ref 7–22)
CALCIUM SERPL-MCNC: 9.7 MG/DL (ref 8.5–10.5)
CHLORIDE SERPL-SCNC: 95 MEQ/L (ref 98–111)
CO2 SERPL-SCNC: 19 MEQ/L (ref 23–33)
CREAT SERPL-MCNC: 1.9 MG/DL (ref 0.4–1.2)
DEPRECATED RDW RBC AUTO: 59.9 FL (ref 35–45)
EOSINOPHIL NFR BLD AUTO: 0.2 %
EOSINOPHILS ABSOLUTE: 0 THOU/MM3 (ref 0–0.4)
ERYTHROCYTE [DISTWIDTH] IN BLOOD BY AUTOMATED COUNT: 16.3 % (ref 11.5–14.5)
ETHANOL SERPL-MCNC: 0.15 % (ref 0–0.08)
FLUAV RNA RESP QL NAA+PROBE: NOT DETECTED
FLUBV RNA RESP QL NAA+PROBE: NOT DETECTED
GFR SERPL CREATININE-BSD FRML MDRD: 38 ML/MIN/1.73M2
GLUCOSE SERPL-MCNC: 160 MG/DL (ref 70–108)
HCT VFR BLD AUTO: 50.9 % (ref 42–52)
HGB BLD-MCNC: 16.9 GM/DL (ref 14–18)
IMM GRANULOCYTES # BLD AUTO: 0.07 THOU/MM3 (ref 0–0.07)
IMM GRANULOCYTES NFR BLD AUTO: 0.7 %
LYMPHOCYTES ABSOLUTE: 2.4 THOU/MM3 (ref 1–4.8)
LYMPHOCYTES NFR BLD AUTO: 22.8 %
MCH RBC QN AUTO: 33.3 PG (ref 26–33)
MCHC RBC AUTO-ENTMCNC: 33.2 GM/DL (ref 32.2–35.5)
MCV RBC AUTO: 100.4 FL (ref 80–94)
MONOCYTES ABSOLUTE: 1.5 THOU/MM3 (ref 0.4–1.3)
MONOCYTES NFR BLD AUTO: 14.4 %
NEUTROPHILS ABSOLUTE: 6.4 THOU/MM3 (ref 1.8–7.7)
NEUTROPHILS NFR BLD AUTO: 60.7 %
NRBC BLD AUTO-RTO: 0 /100 WBC
OSMOLALITY SERPL CALC.SUM OF ELEC: 280.1 MOSMOL/KG (ref 275–300)
PLATELET # BLD AUTO: 298 THOU/MM3 (ref 130–400)
PMV BLD AUTO: 10.3 FL (ref 9.4–12.4)
POTASSIUM SERPL-SCNC: 4.1 MEQ/L (ref 3.5–5.2)
PROT SERPL-MCNC: 7.6 G/DL (ref 6.1–8)
RBC # BLD AUTO: 5.07 MILL/MM3 (ref 4.7–6.1)
SARS-COV-2 RNA RESP QL NAA+PROBE: NOT DETECTED
SODIUM SERPL-SCNC: 136 MEQ/L (ref 135–145)
WBC # BLD AUTO: 10.6 THOU/MM3 (ref 4.8–10.8)

## 2024-10-07 PROCEDURE — 80307 DRUG TEST PRSMV CHEM ANLYZR: CPT

## 2024-10-07 PROCEDURE — 99285 EMERGENCY DEPT VISIT HI MDM: CPT

## 2024-10-07 PROCEDURE — 87636 SARSCOV2 & INF A&B AMP PRB: CPT

## 2024-10-07 PROCEDURE — 82077 ASSAY SPEC XCP UR&BREATH IA: CPT

## 2024-10-07 PROCEDURE — 80053 COMPREHEN METABOLIC PANEL: CPT

## 2024-10-07 PROCEDURE — 6370000000 HC RX 637 (ALT 250 FOR IP): Performed by: EMERGENCY MEDICINE

## 2024-10-07 PROCEDURE — 36415 COLL VENOUS BLD VENIPUNCTURE: CPT

## 2024-10-07 PROCEDURE — 93005 ELECTROCARDIOGRAM TRACING: CPT | Performed by: EMERGENCY MEDICINE

## 2024-10-07 PROCEDURE — 94761 N-INVAS EAR/PLS OXIMETRY MLT: CPT

## 2024-10-07 PROCEDURE — 2700000000 HC OXYGEN THERAPY PER DAY

## 2024-10-07 PROCEDURE — 94640 AIRWAY INHALATION TREATMENT: CPT

## 2024-10-07 PROCEDURE — 2580000003 HC RX 258: Performed by: EMERGENCY MEDICINE

## 2024-10-07 PROCEDURE — 85025 COMPLETE CBC W/AUTO DIFF WBC: CPT

## 2024-10-07 RX ORDER — 0.9 % SODIUM CHLORIDE 0.9 %
2000 INTRAVENOUS SOLUTION INTRAVENOUS ONCE
Status: COMPLETED | OUTPATIENT
Start: 2024-10-07 | End: 2024-10-08

## 2024-10-07 RX ORDER — IPRATROPIUM BROMIDE AND ALBUTEROL SULFATE 2.5; .5 MG/3ML; MG/3ML
1 SOLUTION RESPIRATORY (INHALATION) ONCE
Status: COMPLETED | OUTPATIENT
Start: 2024-10-07 | End: 2024-10-07

## 2024-10-07 RX ADMIN — SODIUM CHLORIDE 2000 ML: 9 INJECTION, SOLUTION INTRAVENOUS at 22:01

## 2024-10-07 RX ADMIN — IPRATROPIUM BROMIDE AND ALBUTEROL SULFATE 1 DOSE: .5; 3 SOLUTION RESPIRATORY (INHALATION) at 21:38

## 2024-10-07 ASSESSMENT — ENCOUNTER SYMPTOMS
COUGH: 0
SORE THROAT: 0
EYE PAIN: 0
SHORTNESS OF BREATH: 0
RHINORRHEA: 0
ABDOMINAL PAIN: 0

## 2024-10-07 ASSESSMENT — PAIN - FUNCTIONAL ASSESSMENT
PAIN_FUNCTIONAL_ASSESSMENT: NONE - DENIES PAIN
PAIN_FUNCTIONAL_ASSESSMENT: NONE - DENIES PAIN

## 2024-10-07 NOTE — CARE COORDINATION
Care Transitions Note    Initial Call - Call within 2 business days of discharge: Yes    Attempted to reach patient for transitions of care follow up. Unable to reach patient.    Outreach Attempts:   HIPAA compliant voicemail left for patient.     1st attempt to contact pt for initial care transition follow up.  Unable to reach pt.  Left message with contact information and request for call back.      Patient: Andrei Munoz    Patient : 1960   MRN: [id number]    Reason for Admission: Chest pain, AFIB with RVR  Discharge Date: 10/5/24  RURS: Readmission Risk Score: 22.4    Last Discharge Facility       Date Complaint Diagnosis Description Type Department Provider    10/2/24 Chest Pain Atrial fibrillation with rapid ventricular response (HCC) ... ED to Hosp-Admission (Discharged) (ADMITTED) ROHIT AnayaK Tony Hernandez MD; Cahndan Neves ...            Was this an external facility discharge? No    Follow Up Appointment:   Patient does not have a follow up appointment scheduled at time of call.   Future Appointments         Provider Specialty Dept Phone    10/30/2024 11:40 AM Malka Solis MD Family Medicine [phone]    2024 1:00 PM Leidy Damico MD Cardiology [phone]    2025 3:00 PM Elias Brown Jr., MD Urology [phone]    2/10/2025 1:30 PM Bernadette Leon PA-C Pulmonology [phone]    3/19/2025 2:30 PM Guillermina Courtney APRN - CNP Cardiology [phone]            Plan for follow-up on next business day.      Marifer Jean RN

## 2024-10-07 NOTE — TELEPHONE ENCOUNTER
----- Message from Dr. Malka Solis MD sent at 10/7/2024 12:52 PM EDT -----  Please let him know that we need to repeat his potassium.

## 2024-10-08 ENCOUNTER — CARE COORDINATION (OUTPATIENT)
Dept: CASE MANAGEMENT | Age: 64
End: 2024-10-08

## 2024-10-08 PROBLEM — I48.20 ATRIAL FIBRILLATION, CHRONIC (HCC): Status: ACTIVE | Noted: 2024-10-08

## 2024-10-08 PROBLEM — F33.2 SEVERE RECURRENT MAJOR DEPRESSION WITHOUT PSYCHOTIC FEATURES (HCC): Status: ACTIVE | Noted: 2024-10-08

## 2024-10-08 PROBLEM — F33.0 MDD (MAJOR DEPRESSIVE DISORDER), RECURRENT EPISODE, MILD (HCC): Status: ACTIVE | Noted: 2024-10-08

## 2024-10-08 LAB
AMPHETAMINES UR QL SCN: NEGATIVE
ANION GAP SERPL CALC-SCNC: 18 MEQ/L (ref 8–16)
BARBITURATES UR QL SCN: POSITIVE
BENZODIAZ UR QL SCN: NEGATIVE
BUN SERPL-MCNC: 23 MG/DL (ref 7–22)
BZE UR QL SCN: NEGATIVE
CALCIUM SERPL-MCNC: 8.7 MG/DL (ref 8.5–10.5)
CANNABINOIDS UR QL SCN: POSITIVE
CHLORIDE SERPL-SCNC: 100 MEQ/L (ref 98–111)
CO2 SERPL-SCNC: 20 MEQ/L (ref 23–33)
CREAT SERPL-MCNC: 1.4 MG/DL (ref 0.4–1.2)
EKG ATRIAL RATE: 62 BPM
EKG ATRIAL RATE: 69 BPM
EKG P AXIS: -2 DEGREES
EKG P-R INTERVAL: 184 MS
EKG P-R INTERVAL: 224 MS
EKG Q-T INTERVAL: 448 MS
EKG Q-T INTERVAL: 454 MS
EKG QRS DURATION: 102 MS
EKG QRS DURATION: 114 MS
EKG QTC CALCULATION (BAZETT): 460 MS
EKG QTC CALCULATION (BAZETT): 480 MS
EKG R AXIS: 11 DEGREES
EKG R AXIS: 15 DEGREES
EKG T AXIS: 31 DEGREES
EKG T AXIS: 6 DEGREES
EKG VENTRICULAR RATE: 62 BPM
EKG VENTRICULAR RATE: 69 BPM
ETHANOL SERPL-MCNC: 0.07 % (ref 0–0.08)
FENTANYL: NEGATIVE
GFR SERPL CREATININE-BSD FRML MDRD: 55 ML/MIN/1.73M2
GLUCOSE BLD STRIP.AUTO-MCNC: 163 MG/DL (ref 70–108)
GLUCOSE BLD STRIP.AUTO-MCNC: 168 MG/DL (ref 70–108)
GLUCOSE BLD STRIP.AUTO-MCNC: 212 MG/DL (ref 70–108)
GLUCOSE SERPL-MCNC: 133 MG/DL (ref 70–108)
OPIATES UR QL SCN: NEGATIVE
OSMOLALITY SERPL CALC.SUM OF ELEC: 281.3 MOSMOL/KG (ref 275–300)
OXYCODONE: NEGATIVE
PCP UR QL SCN: NEGATIVE
POTASSIUM SERPL-SCNC: 3.6 MEQ/L (ref 3.5–5.2)
SODIUM SERPL-SCNC: 138 MEQ/L (ref 135–145)

## 2024-10-08 PROCEDURE — 80048 BASIC METABOLIC PNL TOTAL CA: CPT

## 2024-10-08 PROCEDURE — 6370000000 HC RX 637 (ALT 250 FOR IP): Performed by: PHYSICIAN ASSISTANT

## 2024-10-08 PROCEDURE — 99233 SBSQ HOSP IP/OBS HIGH 50: CPT | Performed by: PHYSICIAN ASSISTANT

## 2024-10-08 PROCEDURE — 6370000000 HC RX 637 (ALT 250 FOR IP): Performed by: PSYCHIATRY & NEUROLOGY

## 2024-10-08 PROCEDURE — 82948 REAGENT STRIP/BLOOD GLUCOSE: CPT

## 2024-10-08 PROCEDURE — 90792 PSYCH DIAG EVAL W/MED SRVCS: CPT | Performed by: PSYCHIATRY & NEUROLOGY

## 2024-10-08 PROCEDURE — 1240000000 HC EMOTIONAL WELLNESS R&B

## 2024-10-08 PROCEDURE — 94640 AIRWAY INHALATION TREATMENT: CPT

## 2024-10-08 PROCEDURE — 36415 COLL VENOUS BLD VENIPUNCTURE: CPT

## 2024-10-08 PROCEDURE — 82077 ASSAY SPEC XCP UR&BREATH IA: CPT

## 2024-10-08 PROCEDURE — 93005 ELECTROCARDIOGRAM TRACING: CPT | Performed by: PSYCHIATRY & NEUROLOGY

## 2024-10-08 RX ORDER — ATORVASTATIN CALCIUM 40 MG/1
40 TABLET, FILM COATED ORAL DAILY
Status: DISCONTINUED | OUTPATIENT
Start: 2024-10-08 | End: 2024-10-10 | Stop reason: HOSPADM

## 2024-10-08 RX ORDER — ACETAMINOPHEN 325 MG/1
650 TABLET ORAL EVERY 4 HOURS PRN
Status: DISCONTINUED | OUTPATIENT
Start: 2024-10-08 | End: 2024-10-10 | Stop reason: HOSPADM

## 2024-10-08 RX ORDER — METOPROLOL SUCCINATE 50 MG/1
50 TABLET, EXTENDED RELEASE ORAL DAILY
Status: DISCONTINUED | OUTPATIENT
Start: 2024-10-08 | End: 2024-10-10 | Stop reason: HOSPADM

## 2024-10-08 RX ORDER — PRAMIPEXOLE DIHYDROCHLORIDE 0.25 MG/1
0.12 TABLET ORAL NIGHTLY
Status: DISCONTINUED | OUTPATIENT
Start: 2024-10-08 | End: 2024-10-10 | Stop reason: HOSPADM

## 2024-10-08 RX ORDER — TRAZODONE HYDROCHLORIDE 100 MG/1
100 TABLET ORAL NIGHTLY
Status: DISCONTINUED | OUTPATIENT
Start: 2024-10-08 | End: 2024-10-10 | Stop reason: HOSPADM

## 2024-10-08 RX ORDER — ALLOPURINOL 300 MG/1
300 TABLET ORAL DAILY
Status: DISCONTINUED | OUTPATIENT
Start: 2024-10-08 | End: 2024-10-10 | Stop reason: HOSPADM

## 2024-10-08 RX ORDER — LANOLIN ALCOHOL/MO/W.PET/CERES
100 CREAM (GRAM) TOPICAL DAILY
Status: DISCONTINUED | OUTPATIENT
Start: 2024-10-08 | End: 2024-10-10 | Stop reason: HOSPADM

## 2024-10-08 RX ORDER — DEXTROSE MONOHYDRATE 100 MG/ML
INJECTION, SOLUTION INTRAVENOUS CONTINUOUS PRN
Status: DISCONTINUED | OUTPATIENT
Start: 2024-10-08 | End: 2024-10-10 | Stop reason: HOSPADM

## 2024-10-08 RX ORDER — CHLORDIAZEPOXIDE HYDROCHLORIDE 10 MG/1
10 CAPSULE, GELATIN COATED ORAL 3 TIMES DAILY
Status: DISCONTINUED | OUTPATIENT
Start: 2024-10-08 | End: 2024-10-10 | Stop reason: HOSPADM

## 2024-10-08 RX ORDER — FOLIC ACID 1 MG/1
1 TABLET ORAL DAILY
Status: DISCONTINUED | OUTPATIENT
Start: 2024-10-08 | End: 2024-10-10 | Stop reason: HOSPADM

## 2024-10-08 RX ORDER — TRAZODONE HYDROCHLORIDE 50 MG/1
50 TABLET, FILM COATED ORAL NIGHTLY PRN
Status: DISCONTINUED | OUTPATIENT
Start: 2024-10-08 | End: 2024-10-10 | Stop reason: HOSPADM

## 2024-10-08 RX ORDER — HYDROXYZINE HYDROCHLORIDE 25 MG/1
50 TABLET, FILM COATED ORAL 3 TIMES DAILY PRN
Status: DISCONTINUED | OUTPATIENT
Start: 2024-10-08 | End: 2024-10-10 | Stop reason: HOSPADM

## 2024-10-08 RX ORDER — LANOLIN ALCOHOL/MO/W.PET/CERES
400 CREAM (GRAM) TOPICAL DAILY
Status: DISCONTINUED | OUTPATIENT
Start: 2024-10-08 | End: 2024-10-10 | Stop reason: HOSPADM

## 2024-10-08 RX ORDER — MAGNESIUM HYDROXIDE/ALUMINUM HYDROXICE/SIMETHICONE 120; 1200; 1200 MG/30ML; MG/30ML; MG/30ML
30 SUSPENSION ORAL EVERY 6 HOURS PRN
Status: DISCONTINUED | OUTPATIENT
Start: 2024-10-08 | End: 2024-10-10 | Stop reason: HOSPADM

## 2024-10-08 RX ORDER — METOPROLOL SUCCINATE 50 MG/1
50 TABLET, EXTENDED RELEASE ORAL 2 TIMES DAILY
Status: DISCONTINUED | OUTPATIENT
Start: 2024-10-08 | End: 2024-10-08 | Stop reason: ALTCHOICE

## 2024-10-08 RX ORDER — ISOSORBIDE MONONITRATE 30 MG/1
30 TABLET, EXTENDED RELEASE ORAL DAILY
Status: DISCONTINUED | OUTPATIENT
Start: 2024-10-08 | End: 2024-10-10 | Stop reason: HOSPADM

## 2024-10-08 RX ORDER — TAMSULOSIN HYDROCHLORIDE 0.4 MG/1
0.4 CAPSULE ORAL DAILY
Status: DISCONTINUED | OUTPATIENT
Start: 2024-10-08 | End: 2024-10-10 | Stop reason: HOSPADM

## 2024-10-08 RX ORDER — INSULIN LISPRO 100 [IU]/ML
0-4 INJECTION, SOLUTION INTRAVENOUS; SUBCUTANEOUS NIGHTLY
Status: DISCONTINUED | OUTPATIENT
Start: 2024-10-08 | End: 2024-10-10 | Stop reason: HOSPADM

## 2024-10-08 RX ORDER — AMIODARONE HYDROCHLORIDE 200 MG/1
200 TABLET ORAL DAILY
Status: DISCONTINUED | OUTPATIENT
Start: 2024-10-08 | End: 2024-10-10 | Stop reason: HOSPADM

## 2024-10-08 RX ORDER — PANTOPRAZOLE SODIUM 40 MG/1
40 TABLET, DELAYED RELEASE ORAL
Status: DISCONTINUED | OUTPATIENT
Start: 2024-10-09 | End: 2024-10-10 | Stop reason: HOSPADM

## 2024-10-08 RX ORDER — INSULIN GLARGINE 100 [IU]/ML
10 INJECTION, SOLUTION SUBCUTANEOUS NIGHTLY
Status: DISCONTINUED | OUTPATIENT
Start: 2024-10-08 | End: 2024-10-10 | Stop reason: HOSPADM

## 2024-10-08 RX ORDER — FLUTICASONE PROPIONATE 110 UG/1
2 AEROSOL, METERED RESPIRATORY (INHALATION)
Status: DISCONTINUED | OUTPATIENT
Start: 2024-10-08 | End: 2024-10-08 | Stop reason: ALTCHOICE

## 2024-10-08 RX ORDER — BUMETANIDE 1 MG/1
2 TABLET ORAL DAILY
Status: DISCONTINUED | OUTPATIENT
Start: 2024-10-08 | End: 2024-10-10 | Stop reason: HOSPADM

## 2024-10-08 RX ORDER — BUSPIRONE HYDROCHLORIDE 5 MG/1
5 TABLET ORAL 2 TIMES DAILY
Status: DISCONTINUED | OUTPATIENT
Start: 2024-10-08 | End: 2024-10-10 | Stop reason: HOSPADM

## 2024-10-08 RX ORDER — MULTIVITAMIN WITH IRON
1 TABLET ORAL DAILY
Status: DISCONTINUED | OUTPATIENT
Start: 2024-10-08 | End: 2024-10-10 | Stop reason: HOSPADM

## 2024-10-08 RX ORDER — IBUPROFEN 400 MG/1
400 TABLET, FILM COATED ORAL EVERY 6 HOURS PRN
Status: DISCONTINUED | OUTPATIENT
Start: 2024-10-08 | End: 2024-10-10 | Stop reason: HOSPADM

## 2024-10-08 RX ORDER — ALBUTEROL SULFATE 90 UG/1
1 INHALANT RESPIRATORY (INHALATION) EVERY 4 HOURS PRN
Status: DISCONTINUED | OUTPATIENT
Start: 2024-10-08 | End: 2024-10-10 | Stop reason: HOSPADM

## 2024-10-08 RX ORDER — POTASSIUM CHLORIDE 1500 MG/1
20 TABLET, EXTENDED RELEASE ORAL DAILY
Status: DISCONTINUED | OUTPATIENT
Start: 2024-10-08 | End: 2024-10-10 | Stop reason: HOSPADM

## 2024-10-08 RX ORDER — ESCITALOPRAM OXALATE 10 MG/1
5 TABLET ORAL DAILY
Status: DISCONTINUED | OUTPATIENT
Start: 2024-10-08 | End: 2024-10-10 | Stop reason: HOSPADM

## 2024-10-08 RX ORDER — FLUTICASONE PROPIONATE 110 UG/1
1 AEROSOL, METERED RESPIRATORY (INHALATION)
Status: DISCONTINUED | OUTPATIENT
Start: 2024-10-08 | End: 2024-10-10 | Stop reason: HOSPADM

## 2024-10-08 RX ORDER — INSULIN LISPRO 100 [IU]/ML
0-8 INJECTION, SOLUTION INTRAVENOUS; SUBCUTANEOUS
Status: DISCONTINUED | OUTPATIENT
Start: 2024-10-08 | End: 2024-10-10 | Stop reason: HOSPADM

## 2024-10-08 RX ORDER — NICOTINE 21 MG/24HR
1 PATCH, TRANSDERMAL 24 HOURS TRANSDERMAL DAILY
Status: DISCONTINUED | OUTPATIENT
Start: 2024-10-08 | End: 2024-10-10 | Stop reason: HOSPADM

## 2024-10-08 RX ORDER — FLUTICASONE PROPIONATE 50 MCG
1 SPRAY, SUSPENSION (ML) NASAL DAILY
Status: DISCONTINUED | OUTPATIENT
Start: 2024-10-08 | End: 2024-10-10 | Stop reason: HOSPADM

## 2024-10-08 RX ORDER — DOXYCYCLINE HYCLATE 100 MG
100 TABLET ORAL 2 TIMES DAILY
Status: COMPLETED | OUTPATIENT
Start: 2024-10-08 | End: 2024-10-08

## 2024-10-08 RX ORDER — VALSARTAN 80 MG/1
80 TABLET ORAL DAILY
Status: DISCONTINUED | OUTPATIENT
Start: 2024-10-08 | End: 2024-10-10 | Stop reason: HOSPADM

## 2024-10-08 RX ORDER — DOXYCYCLINE HYCLATE 100 MG
100 TABLET ORAL DAILY
Status: DISCONTINUED | OUTPATIENT
Start: 2024-10-08 | End: 2024-10-08 | Stop reason: ALTCHOICE

## 2024-10-08 RX ORDER — GLUCAGON 1 MG/ML
1 KIT INJECTION PRN
Status: DISCONTINUED | OUTPATIENT
Start: 2024-10-08 | End: 2024-10-10 | Stop reason: HOSPADM

## 2024-10-08 RX ADMIN — FOLIC ACID 1 MG: 1 TABLET ORAL at 14:15

## 2024-10-08 RX ADMIN — ESCITALOPRAM OXALATE 5 MG: 10 TABLET ORAL at 12:56

## 2024-10-08 RX ADMIN — ACETAMINOPHEN 650 MG: 325 TABLET ORAL at 11:22

## 2024-10-08 RX ADMIN — TRAZODONE HYDROCHLORIDE 100 MG: 100 TABLET ORAL at 22:06

## 2024-10-08 RX ADMIN — VALSARTAN 80 MG: 80 TABLET ORAL at 14:15

## 2024-10-08 RX ADMIN — POTASSIUM CHLORIDE 20 MEQ: 1500 TABLET, EXTENDED RELEASE ORAL at 14:24

## 2024-10-08 RX ADMIN — METFORMIN HYDROCHLORIDE 1000 MG: 500 TABLET ORAL at 17:01

## 2024-10-08 RX ADMIN — EMPAGLIFLOZIN 25 MG: 25 TABLET, FILM COATED ORAL at 14:19

## 2024-10-08 RX ADMIN — ATORVASTATIN CALCIUM 40 MG: 40 TABLET, FILM COATED ORAL at 22:05

## 2024-10-08 RX ADMIN — Medication 400 MG: at 14:15

## 2024-10-08 RX ADMIN — CHLORDIAZEPOXIDE HYDROCHLORIDE 10 MG: 10 CAPSULE ORAL at 22:05

## 2024-10-08 RX ADMIN — PRAMIPEXOLE DIHYDROCHLORIDE 0.12 MG: 0.25 TABLET ORAL at 22:19

## 2024-10-08 RX ADMIN — ISOSORBIDE MONONITRATE 30 MG: 30 TABLET, EXTENDED RELEASE ORAL at 14:14

## 2024-10-08 RX ADMIN — TICAGRELOR 90 MG: 90 TABLET ORAL at 22:05

## 2024-10-08 RX ADMIN — METOPROLOL SUCCINATE 50 MG: 50 TABLET, EXTENDED RELEASE ORAL at 14:14

## 2024-10-08 RX ADMIN — BUSPIRONE HYDROCHLORIDE 5 MG: 5 TABLET ORAL at 12:56

## 2024-10-08 RX ADMIN — Medication 1 TABLET: at 14:15

## 2024-10-08 RX ADMIN — CHLORDIAZEPOXIDE HYDROCHLORIDE 10 MG: 10 CAPSULE ORAL at 14:16

## 2024-10-08 RX ADMIN — DOXYCYCLINE HYCLATE 100 MG: 100 TABLET, COATED ORAL at 22:05

## 2024-10-08 RX ADMIN — DOXYCYCLINE HYCLATE 100 MG: 100 TABLET, COATED ORAL at 14:12

## 2024-10-08 RX ADMIN — INSULIN GLARGINE 10 UNITS: 100 INJECTION, SOLUTION SUBCUTANEOUS at 22:12

## 2024-10-08 RX ADMIN — TICAGRELOR 90 MG: 90 TABLET ORAL at 14:24

## 2024-10-08 RX ADMIN — ALLOPURINOL 300 MG: 300 TABLET ORAL at 14:13

## 2024-10-08 RX ADMIN — RIVAROXABAN 20 MG: 20 TABLET, FILM COATED ORAL at 17:03

## 2024-10-08 RX ADMIN — BUMETANIDE 2 MG: 1 TABLET ORAL at 12:56

## 2024-10-08 RX ADMIN — ACETAMINOPHEN 650 MG: 325 TABLET ORAL at 22:05

## 2024-10-08 RX ADMIN — AMIODARONE HYDROCHLORIDE 200 MG: 200 TABLET ORAL at 14:13

## 2024-10-08 RX ADMIN — FLUTICASONE PROPIONATE 1 PUFF: 110 AEROSOL, METERED RESPIRATORY (INHALATION) at 23:01

## 2024-10-08 RX ADMIN — BUSPIRONE HYDROCHLORIDE 5 MG: 5 TABLET ORAL at 22:05

## 2024-10-08 ASSESSMENT — PATIENT HEALTH QUESTIONNAIRE - PHQ9
2. FEELING DOWN, DEPRESSED OR HOPELESS: SEVERAL DAYS
SUM OF ALL RESPONSES TO PHQ QUESTIONS 1-9: 2
1. LITTLE INTEREST OR PLEASURE IN DOING THINGS: SEVERAL DAYS
SUM OF ALL RESPONSES TO PHQ QUESTIONS 1-9: 2
SUM OF ALL RESPONSES TO PHQ9 QUESTIONS 1 & 2: 2

## 2024-10-08 ASSESSMENT — PAIN - FUNCTIONAL ASSESSMENT
PAIN_FUNCTIONAL_ASSESSMENT: ACTIVITIES ARE NOT PREVENTED
PAIN_FUNCTIONAL_ASSESSMENT: ACTIVITIES ARE NOT PREVENTED

## 2024-10-08 ASSESSMENT — PAIN SCALES - GENERAL
PAINLEVEL_OUTOF10: 3
PAINLEVEL_OUTOF10: 6
PAINLEVEL_OUTOF10: 0
PAINLEVEL_OUTOF10: 7

## 2024-10-08 ASSESSMENT — LIFESTYLE VARIABLES
HOW MANY STANDARD DRINKS CONTAINING ALCOHOL DO YOU HAVE ON A TYPICAL DAY: 10 OR MORE
HOW OFTEN DO YOU HAVE A DRINK CONTAINING ALCOHOL: 4 OR MORE TIMES A WEEK
HOW OFTEN DO YOU HAVE A DRINK CONTAINING ALCOHOL: 4 OR MORE TIMES A WEEK
HOW MANY STANDARD DRINKS CONTAINING ALCOHOL DO YOU HAVE ON A TYPICAL DAY: 10 OR MORE

## 2024-10-08 ASSESSMENT — PAIN DESCRIPTION - PAIN TYPE
TYPE: ACUTE PAIN
TYPE: CHRONIC PAIN

## 2024-10-08 ASSESSMENT — PAIN DESCRIPTION - LOCATION
LOCATION: HEAD
LOCATION: NECK

## 2024-10-08 ASSESSMENT — SLEEP AND FATIGUE QUESTIONNAIRES
AVERAGE NUMBER OF SLEEP HOURS: 8
DO YOU HAVE DIFFICULTY SLEEPING: YES
DO YOU USE A SLEEP AID: YES

## 2024-10-08 ASSESSMENT — PAIN DESCRIPTION - ONSET
ONSET: GRADUAL
ONSET: GRADUAL

## 2024-10-08 ASSESSMENT — PAIN DESCRIPTION - FREQUENCY
FREQUENCY: INTERMITTENT
FREQUENCY: INTERMITTENT

## 2024-10-08 ASSESSMENT — PAIN DESCRIPTION - DESCRIPTORS: DESCRIPTORS: ACHING

## 2024-10-08 NOTE — CONSULTS
(IMDUR) 30 MG extended release tablet TAKE 1 TABLET BY MOUTH EVERY DAY 90 tablet 1    pramipexole (MIRAPEX) 0.125 MG tablet TAKE 1 TABLET BY MOUTH EACH EVENING 90 tablet 1    allopurinol (ZYLOPRIM) 300 MG tablet TAKE 1 TABLET BY MOUTH ONCE DAILY 90 tablet 1    fluticasone (FLONASE) 50 MCG/ACT nasal spray INSTILL TWO (2) SPRAYS IN EACH NOSTRIL ONCE DAILY AS NEEDED FOR RHINITIS OR ALLERGIES. 16 g 5    omeprazole (PRILOSEC) 20 MG delayed release capsule TAKE 1 CAPSULE BY MOUTH ONCE DAILY 30 capsule 5    traZODone (DESYREL) 100 MG tablet TAKE 1 TABLET BY MOUTH EACH EVENING AS NEEDED FOR SLEEP 30 tablet 5    atorvastatin (LIPITOR) 40 MG tablet TAKE 1 TABLET BY MOUTH EVERY DAY 30 tablet 5    valsartan (DIOVAN) 80 MG tablet TAKE 1 TABLET BY MOUTH DAILY 90 tablet 1    metoprolol succinate (TOPROL XL) 25 MG extended release tablet TAKE 2 TABLETS BY MOUTH ONCE DAILY 60 tablet 5    bumetanide (BUMEX) 2 MG tablet TAKE 1 TABLET BY MOUTH TWICE DAILY IN THE MORNING AND BEFORE BEDTIME 60 tablet 5    tamsulosin (FLOMAX) 0.4 MG capsule Take 1 capsule by mouth daily Take one capsule daily to facilitate passage of ureteral stone 90 capsule 3    umeclidinium-vilanterol (ANORO ELLIPTA) 62.5-25 MCG/ACT inhaler INHALE ONE (1) PUFF BY MOUTH ONCE DAILY 60 each 10    Multiple Vitamin (MULTIVITAMIN ADULT PO) Take by mouth      magnesium oxide (MAG-OX) 400 (240 Mg) MG tablet Take 1 tablet by mouth daily 90 tablet 3    blood glucose test strips (PRODIGY NO CODING BLOOD GLUC) strip USE TO TEST BLOOD GLUCOSE ONCE DAILY. 100 each 0    nitroGLYCERIN (NITROSTAT) 0.4 MG SL tablet Place 1 tablet under the tongue every 5 minutes as needed for Chest pain (Patient not taking: Reported on 10/2/2024) 25 tablet 3    Blood Glucose Monitoring Suppl (PRODIGY AUTOCODE BLOOD GLUCOSE) w/Device KIT          Allergies:    Zoloft [sertraline hcl]    Social History:    reports that he quit smoking about 9 years ago. His smoking use included cigars and cigarettes. He

## 2024-10-08 NOTE — PATIENT CARE CONFERENCE
Behavioral Health  Initial Interdisciplinary Treatment Plan NOTE    REVIEW DATE AND TIME: 10/8/24 7346    PATIENT was IN TREATMENT TEAM.  See Multidisciplinary Treatment Team sheet for participants.    ADMISSION TYPE:   Admission Type: Voluntary    REASON FOR ADMISSION:         Estimated Length of Stay Update:  3-5 days  Estimated Discharge Date Update: 10/10/24    Patient Strengths/Barriers  Strengths (Must Choose Two): Stable housing, Support from family  Barriers: Support of organized community  Addictive Behavior:   Medical Problems:  Past Medical History:   Diagnosis Date    CHF (congestive heart failure) ([facility])     COPD (chronic obstructive pulmonary disease) ([facility])     Coronary artery disease involving native coronary artery of native heart without angina pectoris     Depression     Diabetes mellitus type 2, diet-controlled ([facility]) 6/1/2018    GERD (gastroesophageal reflux disease) 3/21/2012    Hernia     Hx of blood clots 1990's    right knee due to injury    Hx of cocaine abuse ([facility])     Hyperglycemia 09/09    Hyperlipidemia     Hypertension     FAISAL on CPAP     Osteoarthritis 11/07    S/P CABG x 2 07/06/2016    S/P PTCA: 1/15/2018: Stent diagonal branch Xience 3.0 x 12 mm. 1/15/2018    1/15/2018: Stent diagonal branch Xience 3.0 x 12 mm. Dr. Ta    Thumb injury 08/2015    Right thumb cut with table saw, no treatment needed       EDUCATION:   Learner Progress Toward Treatment Goals: Reviewed results and recommendations of this team, Reviewed group plan and strategies, Reviewed signs, symptoms and risk of self harm and violent behavior, and Reviewed goals and plan of care    Method: Individual    Outcome: Verbalized understanding and Demonstrated Understanding    PATIENT GOALS: Want to be sober and stay sober    OQ PRIORITIES IDENTIFIED BY THE PATIENT ON ADMISSION: (These are the symptoms that the patient has identified that are impacting them the most at the time of admission based on their own input on the

## 2024-10-08 NOTE — GROUP NOTE
Group Therapy Note    Date: 10/8/2024    Group Start Time: 1330  Group End Time: 1445  Group Topic: Healthy Living/Wellness    [facility] Adult Psych 7E    Carol Munguia LPN        Group Therapy Note    Attendees: 8       Notes:  attended    Status After Intervention:  Improved    Participation Level: Active Listener and Interactive    Participation Quality: Appropriate, Attentive, and Sharing      Speech:  normal      Thought Process/Content: Logical      Affective Functioning: Flat      Level of consciousness:  Alert, Oriented x4, and Attentive      Response to Learning: Able to verbalize current knowledge/experience, Able to verbalize/acknowledge new learning, Able to retain information, and Capable of insight      Endings: None Reported    Modes of Intervention: Education, Support, and Socialization      Discipline Responsible: nursing students      Signature:  Carol Munguia LPN

## 2024-10-08 NOTE — H&P
Department of Psychiatry  Attending Physician Psychiatric Assessment     Reason for Admission to Psychiatric Unit:  Threat to self requiring 24 hour professional observation  Alcohol Use Disorder  Concerns about patient's safety in the community    CHIEF COMPLAINT:  Alcohol use and Suicidal Ideations    History obtained from:  patient, electronic medical record and family members    HISTORY OF PRESENT ILLNESS:    Andrei Munoz is a 64 y.o. male with significant past psychiatric history of Restless Leg Syndrome, Polysubstance use, and Major Depressive Disorder who presented to the ED for alcohol use and suicidal ideations. Patient arrived to the ED in a wheelchair by [place] police. We were consulted and the patient was admitted to the psych unit for suicidal ideations. The patient stated he drank a half gallon of tequila before he came in a couple of beers. He arrived on 10/7 with a LG of 0.15 and recheck was 0.07. The patient states that he typically drinks a half gallon of tequila and a 12 pack of beer every night. He states his drinking off and on. There will be stents of drinking for a couple of months and then he will attempt to be sober for a couple of weeks before he starts drinking again. Patient states that before yesterday he was sober for 2 weeks. Patient has a history of alcohol withdrawal symptoms in the past admitting to sweating, shaking, seizures, and hallucinations.    Patient states that his mood has been bad for the last 3 years since his wife  him. At this point he has been having more bad days than good with good days being few and far between. This is when his drinking had picked up. Patient has also had many new medical conditions that have affected his mood. Patient states he still has a good relationship with his 3 adult children who give him good support. SIG-E-CAPS for the last month was +5/7 with decreased interests, feelings of guilt, decreased energy, slowed movements, and

## 2024-10-08 NOTE — ED NOTES
Medicated per MAR. Even and unlabored respirations. Call light in reach. Continuous observation in place.

## 2024-10-08 NOTE — ED PROVIDER NOTES
[facility] EMERGENCY DEPT  EMERGENCY DEPARTMENT ENCOUNTER      Pt Name: Andrei Munoz  MRN: [id number]  Birthdate 1960  Date of evaluation: 10/7/2024  Provider: Kash Rodriguez DO  8:25 PM    CHIEF COMPLAINT     No chief complaint on file.        HISTORY OF PRESENT ILLNESS    Andrei Munoz is a 64 y.o. male who presents to the emergency department with a chief complaint of depression. Patient is denying any suicidal or homicidal plan, but states he is done with being unhappy.  He states that he has had thoughts of hurting his brother but that his brother is a \"dickhead.\"  Patient states that he is a daily drinker, and has drank about a 5th of tequila today.  He denies any other symptoms.     HPI    Nursing Notes were reviewed.    REVIEW OF SYSTEMS       Review of Systems   Constitutional:  Negative for chills and fever.   HENT:  Negative for rhinorrhea and sore throat.    Eyes:  Negative for pain and visual disturbance.   Respiratory:  Negative for cough and shortness of breath.    Cardiovascular:  Negative for chest pain.   Gastrointestinal:  Negative for abdominal pain.   Endocrine: Negative for polydipsia and polyuria.   Genitourinary:  Negative for dysuria and flank pain.   Musculoskeletal:  Negative for arthralgias and myalgias.   Skin:  Negative for rash and wound.   Neurological:  Negative for dizziness, weakness and light-headedness.   Psychiatric/Behavioral:  Negative for suicidal ideas.        Except as noted above the remainder of the review of systems was reviewed and negative.       PAST MEDICAL HISTORY     Past Medical History:   Diagnosis Date    CHF (congestive heart failure) ([facility])     COPD (chronic obstructive pulmonary disease) ([facility])     Coronary artery disease involving native coronary artery of native heart without angina pectoris     Depression     Diabetes mellitus type 2, diet-controlled ([facility]) 6/1/2018    GERD (gastroesophageal reflux disease) 3/21/2012    Hernia     Hx of blood 
Notable for the following components:    Est, Glom Filt Rate 55 (*)     All other components within normal limits   COVID-19 & INFLUENZA COMBO   URINE DRUG SCREEN   ETHANOL   OSMOLALITY   ETHANOL   OSMOLALITY     Patient has not a longer intoxicated.  He had significant improvement of his creatinine with hydration.  Will medically clear the patient for psychiatric evaluation    2:06 AM  Patient to be admitted to our inpatient psychiatric unit.    Clinical impression: Suicidal ideation, alcohol intoxication    DISPOSITION Decision To Admit 10/08/2024 02:06:10 AM  Condition at Disposition: Data Unavailable       Radha Gomez MD  10/08/24 0206

## 2024-10-08 NOTE — ED NOTES
Updated on POC. Even and unlabored respirations. Patient placed in safe room that is ligature resistant with continuous monitoring in place. Provider notified, requested an assessment by behavioral health . Explained suicide prevention precautions to the patient including constant observer.

## 2024-10-08 NOTE — PROCEDURES
PROCEDURE NOTE  Date: 10/8/2024   Name: Andrei Munoz  YOB: 1960    Procedures  12 lead EKG completed. Results handed to Clare FELDER. Janice GRAVES

## 2024-10-08 NOTE — BH NOTE
INPATIENT RECREATIONAL THERAPY  ADULT BEHAVIORAL SERVICES  EVALUATION    REFERRING PHYSICIAN: Octavio Sanabria MD   DIAGNOSIS:  MDD recurrent    PRECAUTIONS:  Standard   HISTORY OF PRESENT ILLNESS/INJURY:   a 64 y.o. male with significant past psychiatric history of Restless Leg Syndrome, Polysubstance use, and Major Depressive Disorder who presented to the ED for alcohol use and suicidal ideations. Patient arrived to the ED in a wheelchair by campus police. We were consulted and the patient was admitted to the psych unit for suicidal ideations.  PMH:  Please see medical chart for prior medical history, allergies, and medicatio    HISTORY OF PSYCHIATRIC TREATMENT:  Documented hx of inpatient hospitalization  YOB: 1960   GENDER:  male   MARITAL STATUS:    EMPLOYMENT STATUS:  On disability   LIVING SITUATION/SUPPORT:  Lives with mother in [place], [place]   EDUCATIONAL LEVEL: Graduated from  and some college   MEDICATION/DRUG USE:  Pt. Reports drinking 3-4 beers, and 6-8 shots, and utilize marijuana weekly.   LEISURE INTERESTS:  gardening/going for walks/ watching tv/ listening to music/motorcycle riding   ACTIVITY PREFERENCE: no preference   ACTIVITY TYPES:  indoor/outdoor/active/active/passive  COGNITION: A&xO4    COPING: poor  ATTENTION: fair  RELAXATION: poor  SELF-ESTEEM: poor  MOTIVATION:  poor    SOCIAL SKILLS:  fair-participates in groups and socializes with peers on the unit  FRUSTRATION TOLERANCE:  no documented violent or aggressive behaviors observed at this time.   ATTENTION SEEKING:  no attention seeking behaviors observed at this time   COOPERATION: pt. Was pleasant and agreeable to the TR assessment   AFFECT: congruent   APPEARANCE: pt. Appears to have fair grooming and hygiene and wears hospital scrubs    HEARING:  WNL  VISION:   WNL  VERBAL COMMUNICATION: no impairments noted at this time  WRITTEN COMMUNICATION:  no impairments noted at this time    COORDINATION:  no

## 2024-10-08 NOTE — GROUP NOTE
Group Therapy Note    Date: 10/8/2024    Group Start Time: 0900  Group End Time: 0930  Group Topic: Community Meeting    [facility] 7E    Mic Eubanks OT        Group Therapy Note    Attendees: 9       Patient's Goal:  Get some things situated on the phone.     Notes:  Pt is an active participant in group conversation with verbal cues and prompting from writer. Without verbal prompt, patient is an active listener.      Status After Intervention:  Improved    Participation Level: Active Listener and Interactive    Participation Quality: Appropriate, Attentive, Sharing, Supportive, and Inappropriate      Speech:  normal      Thought Process/Content: Logical  Linear      Affective Functioning: Congruent      Mood: euthymic      Level of consciousness:  Alert, Oriented x4, and Attentive      Response to Learning: Able to verbalize current knowledge/experience, Able to verbalize/acknowledge new learning, Able to retain information, Capable of insight, and Able to change behavior      Endings: None Reported    Modes of Intervention: Education, Support, Socialization, Exploration, Clarifying, and Problem-solving      Discipline Responsible: Recreational Therapist      Signature:  Mic Eubanks OT

## 2024-10-08 NOTE — ED TRIAGE NOTES
Pt to ED via police with c/o suicidal ideation. Police state pt called the saying he is having thoughts of killing himself. However, pt currently stating he is having no suicidal thoughts and \"I just want to stop feeling so crappy all the time\". Pt is drunk. Pt is hypotensive. Pt is hypoxic. Pt states he wants to hurt his brother, but seems to be in joking matter. Even and unlabored respirations. Patient placed in safe room that is ligature resistant with continuous monitoring in place. Provider notified, requested an assessment by behavioral health . Patient belongings secured in a locked lockers outside of the room. Explained suicide prevention precautions to the patient including constant observer.

## 2024-10-08 NOTE — CARE COORDINATION
Pt readmitted to Psych unit on 10/7/24.      CT program will be closed and CTN signing off.      Marifer Jean RN

## 2024-10-08 NOTE — ED NOTES
ED to inpatient nurses report      Chief Complaint:  No chief complaint on file.    Present to ED from: home    MOA:     LOC: alert and orientated to name, place, date  Mobility: Requires assistance * 1  Oxygen Baseline: room air    Current needs required: NC 4L due to sleeping     Code Status:   Prior    What abnormal results were found and what did you give/do to treat them? Suicidal ideation, hypotensive  Any procedures or intervention occur? Continuous observation, suicide precautions    Mental Status:  Level of Consciousness: Alert (0)    Psych Assessment:        Vitals:  Patient Vitals for the past 24 hrs:   BP Temp Temp src Pulse Resp SpO2   10/08/24 0124 (!) 112/59 -- -- 63 25 95 %   10/08/24 0024 93/60 -- -- (!) 106 19 92 %   10/07/24 2338 105/64 -- -- 69 22 95 %   10/07/24 2209 108/60 -- -- 60 22 95 %   10/07/24 2108 97/66 -- -- 61 23 96 %   10/07/24 2038 108/83 -- -- 63 20 (!) 89 %   10/07/24 2012 (!) 85/65 98.3 °F (36.8 °C) Oral 68 18 93 %        LDAs:   Peripheral IV 10/07/24 Distal;Right;Anterior Forearm (Active)       Ambulatory Status:  No data recorded    Diagnosis:  DISPOSITION Decision To Admit 10/08/2024 02:06:10 AM   Final diagnoses:   Suicidal ideation        Consults:  None     Pain Score:  Pain Assessment  Pain Assessment: None - Denies Pain    C-SSRS:   Risk of Suicide: Low Risk    Sepsis Screening:       [place] Fall Risk:       Swallow Screening        Preferred Language:   English      ALLERGIES     Zoloft [sertraline hcl]    SURGICAL HISTORY       Past Surgical History:   Procedure Laterality Date    CARDIAC CATHETERIZATION  06/27/2016    CARDIAC PROCEDURE N/A 10/4/2024    Left heart cath / coronary angiography performed by Michael Cotter MD at [facility] CARDIAC CATH LAB    CATARACT REMOVAL Right 2013    COLONOSCOPY  04/08    CORONARY ARTERY BYPASS GRAFT  07/06/2016    Double bypass, Dr. Harris    CYST REMOVAL Right 02/2007    Neck    CYSTOSCOPY N/A 7/16/2021    CYSTOSCOPY WITH BILAT

## 2024-10-08 NOTE — GROUP NOTE
Group Therapy Note    Date: 10/8/2024    Group Start Time: 1000  Group End Time: 1030  Group Topic: Recreational    [facility] Adult Psych 7E    Mic Eubanks OT        Group Therapy Note    Attendees: 8       Patient's Goal: To identify feelings associated with mental health     Notes: Pt. Participated in the co-treat (with social work) group discussion on wearing a mask in public to hide their mental health. Pt's were educated on potential triggers or warning signs that they may experience during stressful situations. Members were given handouts to put these thoughts on paper to use as a visualization aid. Pt. Will continue to be encouraged to participate in planned TR sessions.     Status After Intervention:  Improved    Participation Level: Active Listener and Interactive    Participation Quality: Appropriate, Attentive, Sharing, and Supportive      Speech:  normal      Thought Process/Content: Logical  Linear      Affective Functioning: Congruent      Mood: euthymic      Level of consciousness:  Alert, Oriented x4, and Attentive      Response to Learning: Able to verbalize current knowledge/experience, Able to verbalize/acknowledge new learning, Able to retain information, Capable of insight, Able to change behavior, and Progressing to goal      Endings: None Reported    Modes of Intervention: Education, Support, Socialization, Exploration, Clarifying, Problem-solving, and Activity      Discipline Responsible: Recreational Therapist      Signature:  Mic Eubanks OT

## 2024-10-09 LAB
ANION GAP SERPL CALC-SCNC: 15 MEQ/L (ref 8–16)
BUN SERPL-MCNC: 15 MG/DL (ref 7–22)
CALCIUM SERPL-MCNC: 9 MG/DL (ref 8.5–10.5)
CHLORIDE SERPL-SCNC: 101 MEQ/L (ref 98–111)
CO2 SERPL-SCNC: 24 MEQ/L (ref 23–33)
CREAT SERPL-MCNC: 1 MG/DL (ref 0.4–1.2)
GFR SERPL CREATININE-BSD FRML MDRD: 83 ML/MIN/1.73M2
GLUCOSE BLD STRIP.AUTO-MCNC: 146 MG/DL (ref 70–108)
GLUCOSE BLD STRIP.AUTO-MCNC: 190 MG/DL (ref 70–108)
GLUCOSE BLD STRIP.AUTO-MCNC: 210 MG/DL (ref 70–108)
GLUCOSE BLD STRIP.AUTO-MCNC: 225 MG/DL (ref 70–108)
GLUCOSE SERPL-MCNC: 155 MG/DL (ref 70–108)
POTASSIUM SERPL-SCNC: 4.7 MEQ/L (ref 3.5–5.2)
SODIUM SERPL-SCNC: 140 MEQ/L (ref 135–145)

## 2024-10-09 PROCEDURE — 36415 COLL VENOUS BLD VENIPUNCTURE: CPT

## 2024-10-09 PROCEDURE — 82948 REAGENT STRIP/BLOOD GLUCOSE: CPT

## 2024-10-09 PROCEDURE — 80048 BASIC METABOLIC PNL TOTAL CA: CPT

## 2024-10-09 PROCEDURE — 6370000000 HC RX 637 (ALT 250 FOR IP): Performed by: PHYSICIAN ASSISTANT

## 2024-10-09 PROCEDURE — 94640 AIRWAY INHALATION TREATMENT: CPT

## 2024-10-09 PROCEDURE — 99232 SBSQ HOSP IP/OBS MODERATE 35: CPT | Performed by: PSYCHIATRY & NEUROLOGY

## 2024-10-09 PROCEDURE — 6370000000 HC RX 637 (ALT 250 FOR IP): Performed by: PSYCHIATRY & NEUROLOGY

## 2024-10-09 PROCEDURE — 1240000000 HC EMOTIONAL WELLNESS R&B

## 2024-10-09 PROCEDURE — 94761 N-INVAS EAR/PLS OXIMETRY MLT: CPT

## 2024-10-09 RX ADMIN — INSULIN LISPRO 2 UNITS: 100 INJECTION, SOLUTION INTRAVENOUS; SUBCUTANEOUS at 08:37

## 2024-10-09 RX ADMIN — METFORMIN HYDROCHLORIDE 1000 MG: 500 TABLET ORAL at 08:31

## 2024-10-09 RX ADMIN — ATORVASTATIN CALCIUM 40 MG: 40 TABLET, FILM COATED ORAL at 20:35

## 2024-10-09 RX ADMIN — BUMETANIDE 2 MG: 1 TABLET ORAL at 08:30

## 2024-10-09 RX ADMIN — EMPAGLIFLOZIN 25 MG: 25 TABLET, FILM COATED ORAL at 08:32

## 2024-10-09 RX ADMIN — TAMSULOSIN HYDROCHLORIDE 0.4 MG: 0.4 CAPSULE ORAL at 08:31

## 2024-10-09 RX ADMIN — METFORMIN HYDROCHLORIDE 1000 MG: 500 TABLET ORAL at 19:06

## 2024-10-09 RX ADMIN — VALSARTAN 80 MG: 80 TABLET ORAL at 08:29

## 2024-10-09 RX ADMIN — CHLORDIAZEPOXIDE HYDROCHLORIDE 10 MG: 10 CAPSULE ORAL at 08:37

## 2024-10-09 RX ADMIN — FLUTICASONE PROPIONATE 1 PUFF: 110 AEROSOL, METERED RESPIRATORY (INHALATION) at 21:17

## 2024-10-09 RX ADMIN — PANTOPRAZOLE SODIUM 40 MG: 40 TABLET, DELAYED RELEASE ORAL at 08:39

## 2024-10-09 RX ADMIN — BUSPIRONE HYDROCHLORIDE 5 MG: 5 TABLET ORAL at 20:35

## 2024-10-09 RX ADMIN — TRAZODONE HYDROCHLORIDE 100 MG: 100 TABLET ORAL at 20:38

## 2024-10-09 RX ADMIN — ESCITALOPRAM OXALATE 5 MG: 10 TABLET ORAL at 08:31

## 2024-10-09 RX ADMIN — TICAGRELOR 90 MG: 90 TABLET ORAL at 08:39

## 2024-10-09 RX ADMIN — POTASSIUM CHLORIDE 20 MEQ: 1500 TABLET, EXTENDED RELEASE ORAL at 08:40

## 2024-10-09 RX ADMIN — RIVAROXABAN 20 MG: 20 TABLET, FILM COATED ORAL at 19:06

## 2024-10-09 RX ADMIN — ISOSORBIDE MONONITRATE 30 MG: 30 TABLET, EXTENDED RELEASE ORAL at 08:32

## 2024-10-09 RX ADMIN — AMIODARONE HYDROCHLORIDE 200 MG: 200 TABLET ORAL at 08:32

## 2024-10-09 RX ADMIN — BUSPIRONE HYDROCHLORIDE 5 MG: 5 TABLET ORAL at 08:31

## 2024-10-09 RX ADMIN — TIOTROPIUM BROMIDE AND OLODATEROL 2 PUFF: 3.124; 2.736 SPRAY, METERED RESPIRATORY (INHALATION) at 09:40

## 2024-10-09 RX ADMIN — CHLORDIAZEPOXIDE HYDROCHLORIDE 10 MG: 10 CAPSULE ORAL at 20:35

## 2024-10-09 RX ADMIN — Medication 1 TABLET: at 08:32

## 2024-10-09 RX ADMIN — ALLOPURINOL 300 MG: 300 TABLET ORAL at 08:31

## 2024-10-09 RX ADMIN — CHLORDIAZEPOXIDE HYDROCHLORIDE 10 MG: 10 CAPSULE ORAL at 12:52

## 2024-10-09 RX ADMIN — FOLIC ACID 1 MG: 1 TABLET ORAL at 08:32

## 2024-10-09 RX ADMIN — INSULIN GLARGINE 10 UNITS: 100 INJECTION, SOLUTION SUBCUTANEOUS at 20:35

## 2024-10-09 RX ADMIN — Medication 100 MG: at 08:31

## 2024-10-09 RX ADMIN — Medication 400 MG: at 08:31

## 2024-10-09 RX ADMIN — FLUTICASONE PROPIONATE 1 PUFF: 110 AEROSOL, METERED RESPIRATORY (INHALATION) at 09:40

## 2024-10-09 RX ADMIN — TICAGRELOR 90 MG: 90 TABLET ORAL at 20:35

## 2024-10-09 RX ADMIN — METOPROLOL SUCCINATE 50 MG: 50 TABLET, EXTENDED RELEASE ORAL at 08:30

## 2024-10-09 RX ADMIN — PRAMIPEXOLE DIHYDROCHLORIDE 0.12 MG: 0.25 TABLET ORAL at 20:35

## 2024-10-09 ASSESSMENT — PAIN SCALES - GENERAL: PAINLEVEL_OUTOF10: 0

## 2024-10-09 NOTE — BH NOTE
Group Therapy Note    Date: 10/8/2024  Start Time: 2000  End Time:  2020  Number of Participants: 1    Type of Group: Wrap-Up    Wellness Binder Information  Module Name:    Session Number:      Patient's Goal:  go with the flow    Notes:  working on goal    Status After Intervention:  Improved    Participation Level: Active Listener    Participation Quality: Appropriate      Speech:  normal      Thought Process/Content: Logical      Affective Functioning: Congruent      Mood: anxious      Level of consciousness:  Oriented x4      Response to Learning: Able to verbalize current knowledge/experience      Endings: None Reported    Modes of Intervention: Education      Discipline Responsible: Registered Nurse      Signature:  Shannon Ross RN

## 2024-10-10 VITALS
HEART RATE: 60 BPM | SYSTOLIC BLOOD PRESSURE: 130 MMHG | OXYGEN SATURATION: 93 % | HEIGHT: 71 IN | RESPIRATION RATE: 16 BRPM | DIASTOLIC BLOOD PRESSURE: 78 MMHG | BODY MASS INDEX: 33.6 KG/M2 | WEIGHT: 240 LBS | TEMPERATURE: 98.2 F

## 2024-10-10 LAB
GLUCOSE BLD STRIP.AUTO-MCNC: 153 MG/DL (ref 70–108)
GLUCOSE BLD STRIP.AUTO-MCNC: 160 MG/DL (ref 70–108)

## 2024-10-10 PROCEDURE — 6370000000 HC RX 637 (ALT 250 FOR IP): Performed by: PHYSICIAN ASSISTANT

## 2024-10-10 PROCEDURE — 94640 AIRWAY INHALATION TREATMENT: CPT

## 2024-10-10 PROCEDURE — 82948 REAGENT STRIP/BLOOD GLUCOSE: CPT

## 2024-10-10 PROCEDURE — 6370000000 HC RX 637 (ALT 250 FOR IP): Performed by: PSYCHIATRY & NEUROLOGY

## 2024-10-10 PROCEDURE — 5130000000 HC BRIDGE APPOINTMENT

## 2024-10-10 PROCEDURE — 94761 N-INVAS EAR/PLS OXIMETRY MLT: CPT

## 2024-10-10 RX ADMIN — Medication 400 MG: at 08:12

## 2024-10-10 RX ADMIN — TICAGRELOR 90 MG: 90 TABLET ORAL at 08:12

## 2024-10-10 RX ADMIN — VALSARTAN 80 MG: 80 TABLET ORAL at 08:12

## 2024-10-10 RX ADMIN — TIOTROPIUM BROMIDE AND OLODATEROL 2 PUFF: 3.124; 2.736 SPRAY, METERED RESPIRATORY (INHALATION) at 10:35

## 2024-10-10 RX ADMIN — BUSPIRONE HYDROCHLORIDE 5 MG: 5 TABLET ORAL at 08:13

## 2024-10-10 RX ADMIN — POTASSIUM CHLORIDE 20 MEQ: 1500 TABLET, EXTENDED RELEASE ORAL at 08:15

## 2024-10-10 RX ADMIN — FLUTICASONE PROPIONATE 1 PUFF: 110 AEROSOL, METERED RESPIRATORY (INHALATION) at 10:35

## 2024-10-10 RX ADMIN — ESCITALOPRAM OXALATE 5 MG: 10 TABLET ORAL at 08:12

## 2024-10-10 RX ADMIN — EMPAGLIFLOZIN 25 MG: 25 TABLET, FILM COATED ORAL at 08:13

## 2024-10-10 RX ADMIN — BUMETANIDE 2 MG: 1 TABLET ORAL at 08:11

## 2024-10-10 RX ADMIN — Medication 100 MG: at 08:12

## 2024-10-10 RX ADMIN — PANTOPRAZOLE SODIUM 40 MG: 40 TABLET, DELAYED RELEASE ORAL at 08:12

## 2024-10-10 RX ADMIN — METFORMIN HYDROCHLORIDE 1000 MG: 500 TABLET ORAL at 08:11

## 2024-10-10 RX ADMIN — AMIODARONE HYDROCHLORIDE 200 MG: 200 TABLET ORAL at 08:11

## 2024-10-10 RX ADMIN — ISOSORBIDE MONONITRATE 30 MG: 30 TABLET, EXTENDED RELEASE ORAL at 08:11

## 2024-10-10 RX ADMIN — METOPROLOL SUCCINATE 50 MG: 50 TABLET, EXTENDED RELEASE ORAL at 08:11

## 2024-10-10 RX ADMIN — CHLORDIAZEPOXIDE HYDROCHLORIDE 10 MG: 10 CAPSULE ORAL at 08:11

## 2024-10-10 RX ADMIN — TAMSULOSIN HYDROCHLORIDE 0.4 MG: 0.4 CAPSULE ORAL at 08:11

## 2024-10-10 RX ADMIN — Medication 1 TABLET: at 08:12

## 2024-10-10 RX ADMIN — ALLOPURINOL 300 MG: 300 TABLET ORAL at 08:10

## 2024-10-10 RX ADMIN — FOLIC ACID 1 MG: 1 TABLET ORAL at 08:12

## 2024-10-10 NOTE — PROGRESS NOTES
Physician Progress Note      PATIENT:               LOPEZ CALVILLO  CSN #:                  [id number]  :                       1960  ADMIT DATE:       10/2/2024 2:59 PM  DISCH DATE:        10/5/2024 2:28 PM  RESPONDING  PROVIDER #:        García Huerta MD          QUERY TEXT:    Pt admitted with paroxysmal atrial fibrillation.  Noted documentation of   myocardial ischemia and infarction.  If possible, please document in progress   notes and discharge summary:  The medical record reflects the following:  Risk Factors: 64-year-old male with past medical history of COPD, CHF, CAD s/p   stenting and CABG, hyperlipidemia, who presented to the ED for evaluation of   shortness of breath and chest pain.  Patient reports a constant substernal   pressure that is nonradiating.  Clinical Indicators: Per notes \"Stress test performed and was positive for   myocardial ischemia and infarction, LV perfusion abnormal, evidence of   inducible ischemia.\"  In EKG report 10/03/2024 states that, Possible Lateral infarct  Treatment: Cardiology consult, Heparin gtt, EKG, Stress test, Left heart cath    Thank you,  Maylin Guzman RN, BSN, CDI  [email or web address]  .  Options provided:  -- MI is clinically significant, Please specify type of MI  -- MI not clinically significant  -- Other - I will add my own diagnosis  -- Disagree - Not applicable / Not valid  -- Disagree - Clinically unable to determine / Unknown  -- Refer to Clinical Documentation Reviewer    PROVIDER RESPONSE TEXT:    This patient has clinically significant MI.    Query created by: Maylin Guzman on 10/9/2024 9:58 AM      Electronically signed by:  García Huerta MD 10/10/2024 9:37 AM

## 2024-10-10 NOTE — GROUP NOTE
Group Note    Date: 10/10/2024    Group Start Time: 0900  Group End Time: 1000  Group Topic: Community Meeting    [facility] 7E    Daisy Kellogg RN        Note    Attendees: 10       Patient's Goal:  Get back to meetings and take my sobriety seriously.      Status After Intervention:  Improved    Participation Level: Interactive    Participation Quality: Attentive      Speech:  normal      Thought Process/Content: Logical      Affective Functioning: Congruent      Mood: cooperative    Level of consciousness:  Oriented x4      Response to Learning: Able to verbalize current knowledge/experience    Modes of Intervention: Support      Discipline Responsible: Registered Nurse      Signature:  Daisy Kellogg RN     Fever/Cough/Sore Throa/Malaise/Headache

## 2024-10-10 NOTE — PLAN OF CARE
Problem: Coping  Goal: Pt/Family able to verbalize concerns and demonstrate effective coping strategies  Description: INTERVENTIONS:  1. Assist patient/family to identify coping skills, available support systems and cultural and spiritual values  2. Provide emotional support, including active listening and acknowledgement of concerns of patient and caregivers  3. Reduce environmental stimuli, as able  4. Instruct patient/family in relaxation techniques, as appropriate  5. Assess for spiritual pain/suffering and initiate Spiritual Care, Psychosocial Clinical Specialist consults as needed  Outcome: Progressing  Flowsheets (Taken 10/8/2024 1604)  Patient/family able to verbalize anxieties, fears, and concerns, and demonstrate effective coping:   Assist patient/family to identify coping skills, available support systems and cultural and spiritual values   Provide emotional support, including active listening and acknowledgement of concerns of patient and caregivers  Note: Pt has attended 6/6 group therapy sessions offered thus far today on the unit. Pt is engaging in group therapy conversation independently and socializes appropriately with his peers. Pt has been seen out on the unit interacting with others and participating in independent recreation resources out on the unit appropriately. Plan to continue to monitor progress and encourage group therapy participation and socialization on the unit.       
  Problem: Respiratory - Adult  Goal: Achieves optimal ventilation and oxygenation  10/9/2024 0525 by Deepali Boles RCP  Outcome: Progressing     
  Problem: Respiratory - Adult  Goal: Clear lung sounds  10/9/2024 2119 by Deepali Boles RCP  Outcome: Progressing     
  Problem: Respiratory - Adult  Goal: Clear lung sounds  10/9/2024 2119 by Deepali Boles RCP  Outcome: Progressing     Problem: Safety - Adult  Goal: Free from fall injury  10/10/2024 1006 by Yara Trammell RN  Outcome: Completed  10/9/2024 2257 by Norm Aguila RN  Outcome: Progressing  Note: Pt is free from falls this evening     Problem: Anxiety  Goal: Will report anxiety at manageable levels  Description: INTERVENTIONS:  1. Administer medication as ordered  2. Teach and rehearse alternative coping skills  3. Provide emotional support with 1:1 interaction with staff  10/10/2024 1006 by Yara Trammell RN  Outcome: Completed  10/9/2024 2257 by Norm Aguila RN  Outcome: Progressing  Note: Pt denied anxiety this evening     Problem: Coping  Goal: Pt/Family able to verbalize concerns and demonstrate effective coping strategies  Description: INTERVENTIONS:  1. Assist patient/family to identify coping skills, available support systems and cultural and spiritual values  2. Provide emotional support, including active listening and acknowledgement of concerns of patient and caregivers  3. Reduce environmental stimuli, as able  4. Instruct patient/family in relaxation techniques, as appropriate  5. Assess for spiritual pain/suffering and initiate Spiritual Care, Psychosocial Clinical Specialist consults as needed  10/10/2024 1006 by Yara Trammell RN  Outcome: Completed  Flowsheets (Taken 10/10/2024 0800)  Patient/family able to verbalize anxieties, fears, and concerns, and demonstrate effective coping: Provide emotional support, including active listening and acknowledgement of concerns of patient and caregivers  10/9/2024 2257 by Norm Aguila RN  Outcome: Progressing  Flowsheets (Taken 10/9/2024 2257)  Patient/family able to verbalize anxieties, fears, and concerns, and demonstrate effective coping: Provide emotional support, including active listening and acknowledgement of concerns of patient and 
  Problem: Respiratory - Adult  Goal: Clear lung sounds  Outcome: Progressing  Note: Patient had clear and diminished breath sounds throughout all lung fields. Continue taking inhalers as ordered to improve aeration.     
  Problem: Safety - Adult  Goal: Free from fall injury  10/8/2024 2146 by Shannon Ross, RN  Outcome: Progressing  Note: Pt remained safe and free of harm this shift  10/8/2024 1606 by Clare Valdez, RN  Outcome: Progressing  Flowsheets (Taken 10/8/2024 1606)  Free From Fall Injury: Instruct family/caregiver on patient safety  Note: No falls noted.      Problem: Anxiety  Goal: Will report anxiety at manageable levels  Description: INTERVENTIONS:  1. Administer medication as ordered  2. Teach and rehearse alternative coping skills  3. Provide emotional support with 1:1 interaction with staff  10/8/2024 2146 by Shannon Ross, RN  Outcome: Progressing  Note: Patient states mood is 7/10 with low anxiety and depression  10/8/2024 1606 by Clare Valdez, RN  Outcome: Progressing  Flowsheets (Taken 10/8/2024 1606)  Will report anxiety at manageable levels:   Administer medication as ordered   Provide emotional support with 1:1 interaction with staff  Note: Anxiety denied at this time.     Problem: Coping  Goal: Pt/Family able to verbalize concerns and demonstrate effective coping strategies  Description: INTERVENTIONS:  1. Assist patient/family to identify coping skills, available support systems and cultural and spiritual values  2. Provide emotional support, including active listening and acknowledgement of concerns of patient and caregivers  3. Reduce environmental stimuli, as able  4. Instruct patient/family in relaxation techniques, as appropriate  5. Assess for spiritual pain/suffering and initiate Spiritual Care, Psychosocial Clinical Specialist consults as needed  10/8/2024 2146 by Shannon Ross, RN  Outcome: Progressing  Note: The treatment plan is his coping skill  10/8/2024 1606 by Clare Valdez, RN  Outcome: Progressing  10/8/2024 1604 by Mic Eubanks, OT  Outcome: Progressing  Flowsheets (Taken 10/8/2024 1604)  Patient/family able to verbalize anxieties, fears, and concerns, and demonstrate 
  Problem: Safety - Adult  Goal: Free from fall injury  10/9/2024 2257 by Norm Aguila RN  Outcome: Progressing  Note: Pt is free from falls this evening  10/9/2024 1834 by Clare Valdez, RN  Outcome: Progressing  Flowsheets (Taken 10/8/2024 1606)  Free From Fall Injury: Instruct family/caregiver on patient safety  Note: No falls noted.      Problem: Anxiety  Goal: Will report anxiety at manageable levels  Description: INTERVENTIONS:  1. Administer medication as ordered  2. Teach and rehearse alternative coping skills  3. Provide emotional support with 1:1 interaction with staff  10/9/2024 2257 by Norm Aguila RN  Outcome: Progressing  Note: Pt denied anxiety this evening  10/9/2024 1834 by Clare Valdez, RN  Outcome: Adequate for Discharge  Flowsheets (Taken 10/8/2024 1606)  Will report anxiety at manageable levels:   Administer medication as ordered   Provide emotional support with 1:1 interaction with staff  Note: Patient denies anxiety at this time.     Problem: Coping  Goal: Pt/Family able to verbalize concerns and demonstrate effective coping strategies  Description: INTERVENTIONS:  1. Assist patient/family to identify coping skills, available support systems and cultural and spiritual values  2. Provide emotional support, including active listening and acknowledgement of concerns of patient and caregivers  3. Reduce environmental stimuli, as able  4. Instruct patient/family in relaxation techniques, as appropriate  5. Assess for spiritual pain/suffering and initiate Spiritual Care, Psychosocial Clinical Specialist consults as needed  10/9/2024 2257 by Norm Aguila RN  Outcome: Progressing  Flowsheets (Taken 10/9/2024 2257)  Patient/family able to verbalize anxieties, fears, and concerns, and demonstrate effective coping: Provide emotional support, including active listening and acknowledgement of concerns of patient and caregivers  10/9/2024 1834 by Clare Valdez, RN  Outcome: 
Hospitalist group is following for medical management.  Recently diagnosed with new onset A-fib - continue on Xarelto and Amiodarone as well as metoprolol.     Repeat BMP is noted to have an DYANA, CR as high as 1.9 had trended down to 1.4 encourage p.o. fluids.    Electronically signed by Korina Bonner PA-C on 10/9/2024 at 2:47 PM      
peers. Pt has been seen out on the unit interacting with others and participating in independent recreation resources out on the unit appropriately. Plan to continue to monitor progress and encourage group therapy participation and socialization on the unit.       Problem: Depression/Self Harm  Goal: Effect of psychiatric condition will be minimized and patient will be protected from self harm  Description: INTERVENTIONS:  1. Assess impact of patient's symptoms on level of functioning, self care needs and offer support as indicated  2. Assess patient/family knowledge of depression, impact on illness and need for teaching  3. Provide emotional support, presence and reassurance  4. Assess for possible suicidal thoughts or ideation. If patient expresses suicidal thoughts or statements do not leave alone, initiate Suicide Precautions, move to a room close to the nursing station and obtain sitter  5. Initiate consults as appropriate with Mental Health Professional, Spiritual Care, Psychosocial CNS, and consider a recommendation to the LIP for a Psychiatric Consultation  Outcome: Progressing  Flowsheets (Taken 10/8/2024 1606)  Effect of psychiatric condition will be minimized and patient will be protected from self harm:   Assess for suicidal thoughts or ideation. If patient expresses suicidal thoughts or statements do not leave alone, initiate Suicide Precautions, move near nurse station, obtain sitter   Provide emotional support, presence and reassurance  Note: Suicidal thoughts denied at this time. Depression denied.      Problem: Drug Abuse/Detox  Goal: Will have no detox symptoms and will verbalize plan for changing drug-related behavior  Description: INTERVENTIONS:  1. Administer medication as ordered  2. Monitor physical status  3. Provide emotional support with 1:1 interaction with staff  4. Encourage  recovery focused treatment   Outcome: Progressing  Flowsheets (Taken 10/8/2024 1606)  Will have no detox symptoms 
staff  3. Encourage involvement in milieu/groups/activities  4. Monitor for social isolation  Outcome: Progressing  Note: Patient denies depression, mood rated as 8/10, with 10 being the best.     Problem: Involuntary Admit  Goal: Will cooperate with staff recommendations and doctor's orders and will demonstrate appropriate behavior  Description: INTERVENTIONS:  1. Treat underlying conditions and offer medication as ordered  2. Educate regarding involuntary admission procedures and rules  3. Contain excessive/inappropriate behavior per unit and hospital policies  Outcome: Adequate for Discharge  Flowsheets (Taken 10/8/2024 1606)  Will cooperate with staff recommendations and doctor's orders and will demonstrate appropriate behavior:   Educate regarding involuntary admission procedures and rules   Contain excessive/inappropriate behavior per unit and hospital policies  Note: Patient pleasant, cooperative and appropriate.     Problem: Respiratory - Adult  Goal: Achieves optimal ventilation and oxygenation  10/9/2024 1834 by Clare Valdez RN  Outcome: Progressing  10/9/2024 0525 by Deepali Boles RCP  Outcome: Progressing  Goal: Clear lung sounds  10/9/2024 1034 by Maren Wiggins RCP  Outcome: Progressing  Note: Patient had clear and diminished breath sounds throughout all lung fields. Continue taking inhalers as ordered to improve aeration.     Problem: Pain  Goal: Verbalizes/displays adequate comfort level or baseline comfort level  Outcome: Progressing  Flowsheets (Taken 10/9/2024 1834)  Verbalizes/displays adequate comfort level or baseline comfort level:   Encourage patient to monitor pain and request assistance   Administer analgesics based on type and severity of pain and evaluate response   Implement non-pharmacological measures as appropriate and evaluate response  Note: Pain denied at this time.     Problem: Metabolic/Fluid and Electrolytes - Adult  Goal: Glucose maintained within prescribed

## 2024-10-10 NOTE — DISCHARGE SUMMARY
oxide (MAG-OX) 400 (240 Mg) MG tablet Take 1 tablet by mouth daily, Disp-90 tablet, R-3Normal      Blood Glucose Monitoring Suppl (PRODIGY AUTOCODE BLOOD GLUCOSE) w/Device KIT Starting Tue 11/26/2019, Historical Med           STOP taking these medications       doxycycline hyclate (VIBRA-TABS) 100 MG tablet Comments:   Reason for Stopping:         insulin lispro, 1 Unit Dial, (HUMALOG KWIKPEN) 100 UNIT/ML SOPN Comments:   Reason for Stopping:         blood glucose test strips (PRODIGY NO CODING BLOOD GLUC) strip Comments:   Reason for Stopping:         nitroGLYCERIN (NITROSTAT) 0.4 MG SL tablet Comments:   Reason for Stopping:                Core Measures statement:   Not applicable    Discharge Exam:  Level of consciousness:  Within normal limits  Appearance: Street clothes, seated, with good grooming  Behavior/Motor: No abnormalities noted  Attitude toward examiner:  Cooperative, attentive, good eye contact  Speech:  spontaneous, normal rate, normal volume and well articulated  Mood:  euthymic  Affect:  Full range  Thought processes:  linear, goal directed and coherent  Thought content:  denies homicidal ideation  Suicidal Ideation:  denies suicidal ideation  Delusions:  no evidence of delusions  Perceptual Disturbance:  denies any perceptual disturbance  Cognition:  Intact  Memory: age appropriate  Insight & Judgement: fair  Medication side effects: denies     Disposition: home    Patient Instructions:   Activity: activity as tolerated  1. Patient instructed to take medications regularly and follow up with outpatient appointments.     Follow-up as scheduled with [facility]       Signed:    Electronically signed by Octavio Sanabria MD on 10/10/24 at 5:40 PM EDT    Time Spent on discharge is more than 33 minutes in the examination, evaluation, counseling and review of medications and discharge plan.

## 2024-10-10 NOTE — DISCHARGE INSTRUCTIONS
Keep all follow-up appointments and take medications as directed.     Call the hope line if needed at :  [place] [phone].  [phone].  [phone].  [place] [phone].  [phone].  [facility] [phone]    Symptoms to report to your Doctor:  Depression  Inability to eat, sleep, or have a bowel movement  Increased sleepiness and lethargy  Voices in your head  Any thoughts of harming self or others    Things to avoid:  Caffeine  Alcohol  No street drugs  Over the counter medications unless Ok'd by your physician or pharmacist.  Driving or operating machinery until full effects of your medications are known.  Driving or operating machinery if dizzy or drowsy from medications.  Use journal as directed.    Education:  Illness and medication teaching was completed.    Discharge Disposition: Patient was discharged to *** and was transported by***. Patient was accompanied by***.      Information sent to next level of care:    ____Admission orders to Long Term Care    x____Discharge instructions    ____Behavioral Services Assessment    ____Hand off Summary    ____History and Physical    ____Last dose MAR    ____Patient transfer form    ____Other     [facility] Hotline:  [phone]    Crisis phone numbers:  [place] [phone].  [place] [phone]  [phone].  [place] [phone].  [phone].  [facility] [phone].    [facility]  [address] [place], [place] [id number] [phone]    [facility]  [address] [place], [place] [address] [place], [place]

## 2024-10-10 NOTE — PROGRESS NOTES
Group Therapy Note    Date: 10/8/24  Start Time: 0930  End Time:  1000  Number of Participants: 10    Type of Group: Psychotherapy    Notes:  Patient was present in group. Group members identified certain masks they wear to hide their true feelings to others. Members identified why they cover up their feelings while discussing societal and generational standards and gender bias. Members discussed ways to express themselves and open up to others more.     Status After Intervention:  Improved    Participation Level: Active Listener and Interactive    Participation Quality: Appropriate, Attentive, Sharing, and Supportive      Speech:  normal      Thought Process/Content: Logical  Linear      Affective Functioning: Congruent      Mood: euthymic      Level of consciousness:  Alert, Oriented x4, and Attentive      Response to Learning: Able to verbalize current knowledge/experience, Able to verbalize/acknowledge new learning, Able to retain information, Capable of insight, Able to change behavior, and Progressing to goal      Endings: None Reported    Modes of Intervention: Education, Support, Socialization, Exploration, Clarifying, and Problem-solving      Discipline Responsible: /Counselor      Signature:  TING Abdi  
                                                                    Group Therapy Note    Date: 10/9/2024  Start Time: 1000  End Time:  1100  Number of Participants: 11    Type of Group: Psychotherapy    Notes:  Patient was present in group. Group identified the definition of coping skills and the effects of coping skills in our routine that contributes to our symptoms of mental health. Members discussed mindset in relation to the topic. Members identified and discussed changes that they need to make once discharged.     Status After Intervention:  Improved    Participation Level: Active Listener and Interactive    Participation Quality: Appropriate, Attentive, Sharing, and Supportive      Speech:  normal      Thought Process/Content: Logical  Linear      Affective Functioning: Congruent      Mood: euthymic      Level of consciousness:  Alert, Oriented x4, and Attentive      Response to Learning: Able to verbalize current knowledge/experience, Able to verbalize/acknowledge new learning, Able to retain information, Capable of insight, Able to change behavior, and Progressing to goal      Endings: None Reported    Modes of Intervention: Education, Support, Socialization, Exploration, Clarifying, and Problem-solving      Discipline Responsible: /Counselor      Signature:  TING Abdi  
                                                           Psychosocial Assessment    Current Level of Psychosocial Functioning     Independent         XXX  Dependent    Minimal Assist     Comments:      Psychosocial High Risk Factors (check all that apply)    Unable to obtain meds   Chronic illness/pain    Substance abuse        XXX  Lack of Family Support   Financial stress   Isolation   Inadequate Community Resources  Suicide attempt(s)  Not taking medications   Victim of crime   Developmental Delay  Unable to manage personal needs    Age 65 or older   Homeless  No transportation   Readmission within 30 days  Unemployment  Traumatic Event    Family/Supports identified: Three sons and dogs    Sexual Orientation:  Heterosexual    Patient Strengths: Stable housing, knowledgeable regarding resources, motivation for treatment    Patient Barriers: Long history of alcoholism    Safety plan: Contracts for safety    [facility]/ history: Patient denies previous inpatient psychiatric admissions or suicide attempts.     Plan of Care:  medication management, group/individual therapies, family meetings, psycho -education, treatment team meetings to assist with stabilization    Initial Discharge Plan:  Patient states he is going to return home with his mother momentarily. Patient states he is then going to go to [facility] inpatient rehab.     Clinical Summary: Patient is a 64 year old male that was admitted to the unit from the ED on a EMC. Patient states he was admitted due to drinking lots of teliqula. Patient states he called [phone] while intoxicated. Patient states he remembers having 6 or 8 shots half gallon and a few beers. Patient states he has many shots of tequila a day, to the point where he blackouts for a couple of years. Patient denies suicidal thoughts. Patient identifies family stress with younger brother, states he doesn't get addiction. Pt states his brother has accussed pt \"of being lose with their mother's money\". 
      Behavioral Services  Medicare Certification Upon Admission    I certify that this patient's inpatient psychiatric hospital admission is medically necessary for:    [x] (1) Treatment which could reasonably be expected to improve this patient's condition,       [x] (2) Or for diagnostic study;     AND     [x](2) The inpatient psychiatric services are provided while the individual is under the care of a physician and are included in the individualized plan of care.    Estimated length of stay/service 3-5 days    Plan for post-hospital care hc    Electronically signed by Octavio Sanabria MD on 10/8/2024 at 11:31 AM      
24 HOUR CHART REVIEW COMPLETED  
Behavioral Health   Discharge Note    Pt discharged with followings belongings:   Dental Appliances: None  Vision - Corrective Lenses: Eyeglasses  Hearing Aid: None  Jewelry: None  Body Piercings Removed: No  Clothing: Shirt, Pants, Footwear  Other Valuables: Other (Comment) (collected by staff)   Valuables retrieved from safe, security envelope number:  NA and returned to patient.  Patient left department with writer via ambulation.  Discharged to home. \"An Important Message from Medicare About Your Rights\" (IMM) form photocopy original from admission and provided to pt at least 4 hours prior to discharge yes. \"An Important Message from [employer] About Your Rights\" (IMM) form photocopy original from admission. N/A. If pt left within 4 hours of receiving 2nd delivery of IMM, this is because pt was agreeable with hospital discharge.  Patient/guardian education on aftercare instructions: Yes  Bridge appointment completed:  yes.  Reviewed Discharge Instructions with patient/family/nursing facility.  Patient/family verbalizes understanding and agreement with the discharge plan using the teachback method.   Information faxed to NA by ANDREW Patient/family verbalize understanding of AVS:Yes    Status EXAM upon discharge:  Mental Status and Behavioral Exam  Normal: Yes  Level of Assistance: Independent/Self  Facial Expression: Brightened  Affect: Blunt  Level of Consciousness: Alert  Frequency of Checks: 4 times per hour, close  Mood:Normal: Yes  Mood: Depressed, Anxious  Motor Activity:Normal: Yes  Eye Contact: Good  Observed Behavior: Cooperative  Sexual Misconduct History: Current - no  Preception: [place]st to person, [place] to time, [place] to place, [place] to situation  Attention:Normal: Yes  Thought Processes: Unremarkable  Thought Content:Normal: Yes  Depression Symptoms: No problems reported or observed. (Patient denies this shift.)  Anxiety Symptoms: No problems reported or observed. (Patient denies this 
Case management- Provided patient with phone number to [facility] rehab for NOLAN treatment. Patient also states that his sons removed the guns, his vape pen, and all alcohol from patient's home. [facility]   
Daily Progress Note  Octavio Sanabira MD  10/10/2024  CHIEF COMPLAINT:  Alcohol Use Disorder and MDD recurrent severe without psychosis    Reviewed patient's current plan of care and vital signs with nursing staff.  Sleep:  9 hours last night  Attending groups: Yes    SUBJECTIVE:    Andrei Munoz is a 65 yo male who was admitted to the psych inpatient unit on 10/7/24 for alcohol use disorder and depressive thoughts with suicidal ideations. Patient is doing well today stating that he slept 9 hours last night. He states his mood is a 9/10 with 10 being the best. He has been attending group therapy and has been doing well with social interactions. Patient [facility] [place] [place] [facility]. He was told it would take about a week to approve the application. Patient states that he has learned good coping strategies while in the unit and plans to utilize them when discharged. Patient plans to attend more AA meetings and to reach out to his sponsor when feeling overwhelmed or when having the urge to drink alcohol. Overall, the patient is doing better.    Mental Status Exam  Level of consciousness:  Within normal limits  Appearance: Hospital attire, seated in chair, with good grooming and hygiene   Behavior/Motor: No abnormalities noted  Attitude toward examiner:  Cooperative, attentive, good eye contact  Speech:  spontaneous, normal rate, normal volume and well articulated  Mood:  9/10 with 10 being the best  Affect: normal  Thought processes:  linear, goal directed and coherent  Thought content:  denies homicidal ideation  Suicidal Ideation: no suicidal ideation  Delusions:  no evidence of delusions  Perceptual Disturbance:  denies any perceptual disturbance  Cognition:  Oriented to self, location, time, and situation  Memory: age appropriate  Insight & Judgement: improving  Medication side effects:  denies         Data   height is 1.803 m (5' 10.98\") 
Home medication containing 1 vial of nitro found in cabinet post patient discharge. Writer calls patient on the phone at this time. Patient gives writer permission to dispose of medication. States \"I've never needed it and [facility] whether I need it or not. Throw it away\". Medication disposed in drug disposal container in med room.   
Patient is a [age] year old male escorted to [facility] under EMC status.        
Spiritual Support Group Note    Number of Participants in Group: 7                                Goal:   The spirituality group focused on utilizing a day of rest in one's weekly mental health routine. This lesson focused on sabbath as a day of reflection, journaling, community, and meditation. Sabbath as a day was also approached for the purpose of teaching boundaries in relation to time. Sabbath is also a boundary on unhealthy habits that inhibit mental health. The goal in the group is to establish a day every week to sabbath. This intentional creation of a day of reflection assists with creating a space to evaluate one's development and mental health care progress by using rest. Sabbathing was encouraged as a way to see ones stay in the hospital as well. The stay in behavioral health is a form of forced sabbath.    Topic:  [x] Spiritual Wellness and Self Care                  [] Hope                     [x] Connecting with Divine/Others        [] Thankfulness and Gratitude               []  Meaningfulness and Purpose               [] Forgiveness               [] Peace               [x] Connect to Community     [] Other:    Participation Level:   [x] Active Listener   [] Minimal   [] Monopolizing   [x] Interactive   [] No Participation   []  Other:     Attention:   [x] Alert   [] Distractible   [] Drowsy   [] Poor   [] Other:    Manner:   [x] Cooperative   [] Suspicious   [] Withdrawn   [] Guarded   [] Irritable   [] Inhospitable   [] Other:     Others Comments from Group:   The patient actively participated and 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      10/08/24 1537   Encounter Summary   Encounter Overview/Reason Behavioral Health   Service Provided For Patient   Last Encounter  10/08/24   Complexity of Encounter High   Begin Time 0200   End Time  0315   Total Time Calculated 75 min   Behavioral Health    Type  Spirituality Group     The time was 0689-6977  
Writer verified if patient takes two anticoagulant medications. Patient reports that he only takes Xarelto and that the Brilinta was discontinued. Writer spoke with pharmacist Amita to ensure that medications being taken together is safe. Upon chart review, it was found that patient is prescribed both medications currently. Patient notified and verbalized understanding.   
Michael in reference to a suicidal male. Officers spoke to Andrei and he stated he wanted to kill himself. Andrei stated he wasn't happy and he wanted to go. Andrei admitted to drinking . Officers located a pistol where he was standing and it had a spent round in it.      Patient denies suicidal thoughts stating I'm just really tired of the fu.... world'. Patient is retired from the [employer]. Patient reports ongoing struggles with alcohol. Patient is considered disabled. Patient has three sons. 'They must have been worried about me they were all coming home'. Patient reports his children removed most of his firearms from the home. Patient resides alone. Patient reports he had been  over forty years and his wife ended the marriage.No delusions/hallucinations are noted. No current legal problems are reported.   Provider Recommendation Information  Level of Care Disposition:      Consulted with Dr. Gomez concerning the mental status of patient. Consulted with Dr. Sanabria concerning the mental status of patient. Patient is accepted to the care of Dr. Sanabria for mental health/substanc use treatment. Patient remains cooperative. ER staff updated on plan of care. 7E will call LG if additional information is needed.       
admitted with followings belongings:  Dental Appliances: None  Vision - Corrective Lenses: Eyeglasses  Hearing Aid: None  Jewelry: None  Body Piercings Removed: No  Clothing: Shirt, Pants, Footwear  Other Valuables: Other (Comment) (collected by staff)     Admission order obtained Yes  Belongings locked in patient locker.   Valuables placed in safe in security envelope, number:  NA. Patient's home medications were NA.    Patient oriented to surroundings and program expectations and copy of patient rights given.   Received admission packet:  No    Consents reviewed, signed No.   Outcomes Questionnaire completed {Responses; yes/refused/notapproropriate:Yes.   If \"Refused\" or \"Not appropriate\" provide additional details. NA      If Questionnaire is completed just type \"NA\". yes    \"An Important Message from Medicare About Your Rights\" form reviewed, signed: no .    \"An Important Message from [employer] About Your Rights\" form reviewed, signed: N/A    Patient verbalize understanding: Yes.    Patient informed of 15 minute safety monitoring: YES/NO/NA: yes          Patient screened positive for suicide risk on CSSR-S (\"yes\" to question #4, 5, OR 6)  No.    Physician notified of risk score No    Constant Observer Orders received N/A .   2 person skin assessment completed upon admission Declined.    Explained patients right to have family, representative or physician notified of their admission.    Patient has Declined for physician to be notified.    Patient has Declined for family/representative to be notified.    Provided pt with [employer] handout entitled \"Quitting Smoking.\"  Reviewed handout with pt addressing dangers of smoking, developing coping skills, and providing basic information about quitting.  Pt response to counseling:  Patient does not smoke    Admission summary: Patient arrived to unit in wheelchair accompanied by campus police. Patient is pleasant and cooperative. Patient states he wants to 
improving  Medications risks, benefits and alternatives were discussed with the patient  Attempt to develop insight  Psycho-education conducted.  Supportive Therapy conducted.  Probable discharge is tbd  Follow-up TBD    Electronically signed by Octavio Sanabria MD on 10/9/24 at 8:55 PM EDT

## 2024-10-21 ENCOUNTER — OFFICE VISIT (OUTPATIENT)
Dept: FAMILY MEDICINE CLINIC | Age: 64
End: 2024-10-21

## 2024-10-21 VITALS
DIASTOLIC BLOOD PRESSURE: 84 MMHG | HEIGHT: 71 IN | RESPIRATION RATE: 18 BRPM | BODY MASS INDEX: 34.32 KG/M2 | WEIGHT: 245.13 LBS | SYSTOLIC BLOOD PRESSURE: 130 MMHG | HEART RATE: 72 BPM

## 2024-10-21 DIAGNOSIS — E11.65 UNCONTROLLED TYPE 2 DIABETES MELLITUS WITH HYPERGLYCEMIA (HCC): Primary | ICD-10-CM

## 2024-10-21 DIAGNOSIS — E78.5 HYPERLIPIDEMIA, UNSPECIFIED HYPERLIPIDEMIA TYPE: ICD-10-CM

## 2024-10-21 DIAGNOSIS — F33.1 MAJOR DEPRESSIVE DISORDER, RECURRENT EPISODE, MODERATE (HCC): ICD-10-CM

## 2024-10-21 DIAGNOSIS — I10 PRIMARY HYPERTENSION: ICD-10-CM

## 2024-10-21 DIAGNOSIS — K21.9 GASTROESOPHAGEAL REFLUX DISEASE, UNSPECIFIED WHETHER ESOPHAGITIS PRESENT: ICD-10-CM

## 2024-10-21 DIAGNOSIS — Z78.9 ALCOHOL USE: ICD-10-CM

## 2024-10-21 LAB
CHP ED QC CHECK: ABNORMAL
GLUCOSE BLD-MCNC: 178 MG/DL
HBA1C MFR BLD: 9.1 %

## 2024-10-21 PROCEDURE — 99214 OFFICE O/P EST MOD 30 MIN: CPT | Performed by: FAMILY MEDICINE

## 2024-10-21 PROCEDURE — 1123F ACP DISCUSS/DSCN MKR DOCD: CPT | Performed by: FAMILY MEDICINE

## 2024-10-21 PROCEDURE — 83036 HEMOGLOBIN GLYCOSYLATED A1C: CPT | Performed by: FAMILY MEDICINE

## 2024-10-21 PROCEDURE — 82962 GLUCOSE BLOOD TEST: CPT | Performed by: FAMILY MEDICINE

## 2024-10-21 RX ORDER — ESCITALOPRAM OXALATE 5 MG/1
5 TABLET ORAL DAILY
Qty: 90 TABLET | Refills: 0 | Status: SHIPPED | OUTPATIENT
Start: 2024-10-21

## 2024-10-21 ASSESSMENT — ENCOUNTER SYMPTOMS
NAUSEA: 0
CONSTIPATION: 0
SHORTNESS OF BREATH: 0
BLOOD IN STOOL: 0
DIARRHEA: 0
ABDOMINAL PAIN: 0
EYES NEGATIVE: 1
CHEST TIGHTNESS: 0
COUGH: 0
VOMITING: 0

## 2024-10-21 NOTE — PROGRESS NOTES
Date: 10/21/2024    Andrei Munoz is a 64 y.o. male who presents today for:  Chief Complaint   Patient presents with    Diabetes    Hypertension  He is going to [facility] inpatient treatment for alcohol        Current regimen: diet, insulin injection, and oral agent (dual therapy) he does not always remember his PM dose  Current monitoring regimen: home blood tests - 1  Home blood sugar trends: AM -210  Any episodes of hypoglycemia? No  Current exercise: walks he tries to get 5,000 to 8,000 steps a day.   Compliance with treatment: Fair  Eye exam current (within one year): Yes  Any history of foot problems? Yes  Last foot exam: 4/24  Cardiovascular risk factors: advanced age (older than 55 for men, 65 for women), diabetes mellitus, dyslipidemia, hypertension, male gender, and obesity (BMI >= 30 kg/m2).   Immunizations up to date: Flu No   Pneumonia Yes  Taking ASA: No   If not why?     HPI:     Diabetes  Pertinent negatives for hypoglycemia include no dizziness or headaches. Pertinent negatives for diabetes include no chest pain and no weakness.   Hypertension  Pertinent negatives include no chest pain, headaches, palpitations or shortness of breath.       has a current medication list which includes the following prescription(s): escitalopram, xarelto, amiodarone, folic acid, thiamine, albuterol sulfate hfa, ticagrelor, metformin, lantus solostar, droplet pen needles, potassium chloride, buspirone, jardiance, arnuity ellipta, isosorbide mononitrate, pramipexole, allopurinol, fluticasone, omeprazole, trazodone, atorvastatin, valsartan, metoprolol succinate, bumetanide, tamsulosin, anoro ellipta, multiple vitamin, magnesium oxide, and prodigy autocode blood glucose.    Allergies   Allergen Reactions    Zoloft [Sertraline Hcl] Other (See Comments)     Headaches, sweats, bad dreams       Social History     Tobacco Use    Smoking status: Former     Current packs/day: 0.00     Average packs/day: 2.0 packs/day

## 2024-10-24 DIAGNOSIS — F33.1 MAJOR DEPRESSIVE DISORDER, RECURRENT EPISODE, MODERATE (HCC): ICD-10-CM

## 2024-10-24 DIAGNOSIS — G25.81 RLS (RESTLESS LEGS SYNDROME): ICD-10-CM

## 2024-10-25 RX ORDER — TRAZODONE HYDROCHLORIDE 100 MG/1
TABLET ORAL
Qty: 30 TABLET | Refills: 0 | Status: SHIPPED | OUTPATIENT
Start: 2024-10-25

## 2024-10-25 RX ORDER — PRAMIPEXOLE DIHYDROCHLORIDE 0.12 MG/1
TABLET ORAL
Qty: 30 TABLET | Refills: 0 | Status: SHIPPED | OUTPATIENT
Start: 2024-10-25

## 2024-10-25 RX ORDER — FLUTICASONE PROPIONATE 50 MCG
SPRAY, SUSPENSION (ML) NASAL
Qty: 16 G | Refills: 0 | Status: SHIPPED | OUTPATIENT
Start: 2024-10-25

## 2024-10-25 RX ORDER — ISOSORBIDE MONONITRATE 30 MG/1
TABLET, EXTENDED RELEASE ORAL
Qty: 30 TABLET | Refills: 0 | Status: SHIPPED | OUTPATIENT
Start: 2024-10-25

## 2024-10-25 RX ORDER — ALLOPURINOL 300 MG/1
TABLET ORAL
Qty: 30 TABLET | Refills: 0 | Status: SHIPPED | OUTPATIENT
Start: 2024-10-25

## 2024-10-25 RX ORDER — ATORVASTATIN CALCIUM 40 MG/1
TABLET, FILM COATED ORAL
Qty: 30 TABLET | Refills: 0 | Status: SHIPPED | OUTPATIENT
Start: 2024-10-25

## 2024-10-25 RX ORDER — METOPROLOL SUCCINATE 25 MG/1
TABLET, EXTENDED RELEASE ORAL
Qty: 60 TABLET | Refills: 0 | Status: SHIPPED | OUTPATIENT
Start: 2024-10-25

## 2024-10-25 RX ORDER — BUMETANIDE 2 MG/1
TABLET ORAL
Qty: 60 TABLET | Refills: 0 | Status: SHIPPED | OUTPATIENT
Start: 2024-10-25

## 2024-10-25 RX ORDER — VALSARTAN 80 MG/1
80 TABLET ORAL DAILY
Qty: 30 TABLET | Refills: 0 | Status: SHIPPED | OUTPATIENT
Start: 2024-10-25

## 2024-10-25 NOTE — TELEPHONE ENCOUNTER
Date of last visit:  10/21/2024  Date of next visit:  1/27/2025    Requested Prescriptions     Pending Prescriptions Disp Refills    metoprolol succinate (TOPROL XL) 25 MG extended release tablet [Pharmacy Med Name: METOPROLOL SUC ER 25MG TAB 25 Tablet] 60 tablet 0     Sig: TAKE 2 TABLETS BY MOUTH ONCE DAILY    bumetanide (BUMEX) 2 MG tablet [Pharmacy Med Name: BUMETANIDE 2 MG TABS 2 Tablet] 60 tablet 0     Sig: TAKE 1 TABLET BY MOUTH TWICE DAILY IN THE MORNING AND BEFORE BEDTIME    valsartan (DIOVAN) 80 MG tablet [Pharmacy Med Name: VALSARTAN 80 MG TABLET 80 Tablet] 30 tablet 0     Sig: TAKE 1 TABLET BY MOUTH DAILY    omeprazole (PRILOSEC) 20 MG delayed release capsule [Pharmacy Med Name: OMEPRAZOLE DR 20 MG CAPSULE 20 Capsule] 30 capsule 0     Sig: TAKE 1 CAPSULE BY MOUTH ONCE DAILY    fluticasone (FLONASE) 50 MCG/ACT nasal spray [Pharmacy Med Name: FLUTICASONE 50MCG NASAL 16 50 KINGSLEY] 16 g 0     Sig: INSTILL TWO (2) SPRAYS IN EACH NOSTRIL ONCE DAILY AS NEEDED FOR RHINITIS OR ALLERGIES.    allopurinol (ZYLOPRIM) 300 MG tablet [Pharmacy Med Name: ALLOPURINOL 300 MG TABS 300 Tablet] 30 tablet 0     Sig: TAKE 1 TABLET BY MOUTH ONCE DAILY    isosorbide mononitrate (IMDUR) 30 MG extended release tablet [Pharmacy Med Name: ISOSORBIDE MN ER 30MG TAB 30 Tablet] 30 tablet 0     Sig: TAKE 1 TABLET BY MOUTH EVERY DAY    pramipexole (MIRAPEX) 0.125 MG tablet [Pharmacy Med Name: PRAMIPEXOLE 0.125 MG TABLET 0.125 Tablet] 30 tablet 0     Sig: TAKE 1 TABLET BY MOUTH EACH EVENING    atorvastatin (LIPITOR) 40 MG tablet [Pharmacy Med Name: ATORVASTATIN 40MG TABLET 40 Tablet] 30 tablet 0     Sig: TAKE 1 TABLET BY MOUTH EVERY DAY    traZODone (DESYREL) 100 MG tablet [Pharmacy Med Name: TRAZODONE 100 MG TABS 100 Tablet] 30 tablet 0     Sig: TAKE 1 TABLET BY MOUTH EACH EVENING AS NEEDED FOR SLEEP

## 2024-10-27 PROBLEM — E86.0 DEHYDRATION: Status: RESOLVED | Noted: 2024-09-27 | Resolved: 2024-10-27

## 2024-11-12 NOTE — PROGRESS NOTES
Physician Progress Note      PATIENT:               LOPEZ CALVILLO  CSN #:                  [id number]  :                       1960  ADMIT DATE:       10/2/2024 2:59 PM  DISCH DATE:        10/5/2024 2:28 PM  RESPONDING  PROVIDER #:        Tony Hernandez MD          QUERY TEXT:    Pt admitted with paroxysmal atrial fibrillation. Discharge summary comment   notes, \"THE PT WAS NOT DIAGNOSED AS NSTEMI\" and 10/10 progress note reports,   \"This patient has clinically significant MI.\" Please document in progress   notes and discharge summary:    The medical record reflects the following:  Risk Factors: 64-year-old male with past medical history of COPD, CHF, CAD s/p   stenting and CABG, hyperlipidemia, who presented to the ED for evaluation of   shortness of breath and chest pain.  Patient reports a constant substernal   pressure that is nonradiating.  Clinical Indicators: Per notes \"Stress test performed and was positive for   myocardial ischemia and infarction, LV perfusion abnormal, evidence of   inducible ischemia.\"  In EKG report 10/03/2024 states that, Possible Lateral infarct  Discharge summary, \"THE PT WAS NOT DIAGNOSED AS NSTEMI.\"  10/10 progress note, \"This patient has clinically significant MI.\"  Treatment: Cardiology consult, Heparin gtt, EKG, Stress test, Left heart cath    Thank you,  [email or web address]  Options provided:  -- MI ruled out  -- Other - I will add my own diagnosis  -- Disagree - Not applicable / Not valid  -- Disagree - Clinically unable to determine / Unknown  -- Refer to Clinical Documentation Reviewer    PROVIDER RESPONSE TEXT:    After study, MI ruled out.    Query created by: Maylin Guzman on 2024 10:49 AM      Electronically signed by:  Tony Hernandez MD 2024 5:16 AM

## 2024-11-22 DIAGNOSIS — J43.2 CENTRILOBULAR EMPHYSEMA (HCC): ICD-10-CM

## 2024-11-22 DIAGNOSIS — J44.9 STAGE 3 SEVERE COPD BY GOLD CLASSIFICATION (HCC): ICD-10-CM

## 2024-11-22 DIAGNOSIS — G25.81 RLS (RESTLESS LEGS SYNDROME): ICD-10-CM

## 2024-11-22 DIAGNOSIS — F33.1 MAJOR DEPRESSIVE DISORDER, RECURRENT EPISODE, MODERATE (HCC): ICD-10-CM

## 2024-11-25 RX ORDER — ISOSORBIDE MONONITRATE 30 MG/1
TABLET, EXTENDED RELEASE ORAL
Qty: 30 TABLET | Refills: 2 | Status: SHIPPED | OUTPATIENT
Start: 2024-11-25

## 2024-11-25 RX ORDER — PRAMIPEXOLE DIHYDROCHLORIDE 0.12 MG/1
TABLET ORAL
Qty: 30 TABLET | Refills: 2 | Status: SHIPPED | OUTPATIENT
Start: 2024-11-25

## 2024-11-25 RX ORDER — ALLOPURINOL 300 MG/1
TABLET ORAL
Qty: 30 TABLET | Refills: 2 | Status: SHIPPED | OUTPATIENT
Start: 2024-11-25

## 2024-11-25 RX ORDER — FLUTICASONE PROPIONATE 50 MCG
SPRAY, SUSPENSION (ML) NASAL
Qty: 16 G | Refills: 2 | Status: SHIPPED | OUTPATIENT
Start: 2024-11-25

## 2024-11-25 RX ORDER — TRAZODONE HYDROCHLORIDE 100 MG/1
TABLET ORAL
Qty: 30 TABLET | Refills: 0 | Status: SHIPPED | OUTPATIENT
Start: 2024-11-25

## 2024-11-25 RX ORDER — ATORVASTATIN CALCIUM 40 MG/1
TABLET, FILM COATED ORAL
Qty: 30 TABLET | Refills: 2 | Status: SHIPPED | OUTPATIENT
Start: 2024-11-25

## 2024-11-25 RX ORDER — VALSARTAN 80 MG/1
80 TABLET ORAL DAILY
Qty: 30 TABLET | Refills: 5 | Status: SHIPPED | OUTPATIENT
Start: 2024-11-25

## 2024-11-25 RX ORDER — UMECLIDINIUM BROMIDE AND VILANTEROL TRIFENATATE 62.5; 25 UG/1; UG/1
POWDER RESPIRATORY (INHALATION)
Qty: 60 EACH | Refills: 0 | Status: SHIPPED | OUTPATIENT
Start: 2024-11-25 | End: 2024-12-28

## 2024-11-25 RX ORDER — BUMETANIDE 2 MG/1
TABLET ORAL
Qty: 60 TABLET | Refills: 2 | Status: SHIPPED | OUTPATIENT
Start: 2024-11-25

## 2024-11-25 RX ORDER — METOPROLOL SUCCINATE 25 MG/1
TABLET, EXTENDED RELEASE ORAL
Qty: 60 TABLET | Refills: 2 | Status: SHIPPED | OUTPATIENT
Start: 2024-11-25

## 2024-11-25 NOTE — TELEPHONE ENCOUNTER
The pharmacy is  requesting a refill of the below medication which has been pended for you:     Requested Prescriptions     Pending Prescriptions Disp Refills    traZODone (DESYREL) 100 MG tablet [Pharmacy Med Name: TRAZODONE 100 MG TABS 100 Tablet] 30 tablet 1     Sig: TAKE 1 TABLET BY MOUTH EACH EVENING AS NEEDED FOR SLEEP    valsartan (DIOVAN) 80 MG tablet [Pharmacy Med Name: VALSARTAN 80 MG TABLET 80 Tablet] 30 tablet 5     Sig: TAKE 1 TABLET BY MOUTH DAILY    bumetanide (BUMEX) 2 MG tablet [Pharmacy Med Name: BUMETANIDE 2 MG TABS 2 Tablet] 60 tablet 5     Sig: TAKE ONE (1) TABLET BY MOUTH TWICE DAILY IN THE MORNING AND BEFORE BEDTIME    omeprazole (PRILOSEC) 20 MG delayed release capsule [Pharmacy Med Name: OMEPRAZOLE DR 20 MG CAPSULE 20 Capsule] 30 capsule 5     Sig: TAKE 1 CAPSULE BY MOUTH ONCE DAILY    metoprolol succinate (TOPROL XL) 25 MG extended release tablet [Pharmacy Med Name: METOPROLOL SUC ER 25MG TAB 25 Tablet] 60 tablet 5     Sig: TAKE 2 TABLETS BY MOUTH ONCE DAILY    atorvastatin (LIPITOR) 40 MG tablet [Pharmacy Med Name: ATORVASTATIN 40MG TABLET 40 Tablet] 30 tablet 5     Sig: TAKE 1 TABLET BY MOUTH EVERY DAY    isosorbide mononitrate (IMDUR) 30 MG extended release tablet [Pharmacy Med Name: ISOSORBIDE MN ER 30MG TAB 30 Tablet] 30 tablet 5     Sig: TAKE 1 TABLET BY MOUTH EVERY DAY    pramipexole (MIRAPEX) 0.125 MG tablet [Pharmacy Med Name: PRAMIPEXOLE 0.125 MG TABLET 0.125 Tablet] 30 tablet 5     Sig: TAKE 1 TABLET BY MOUTH EACH EVENING    fluticasone (FLONASE) 50 MCG/ACT nasal spray [Pharmacy Med Name: FLUTICASONE 50MCG NASAL 16 50 KINGSLEY] 16 g 5     Sig: INSTILL 2 SPRAYS IN EACH NOSTRIL ONCE DAILY AS NEEDED FOR RHINITIS OR ALLERGIES    allopurinol (ZYLOPRIM) 300 MG tablet [Pharmacy Med Name: ALLOPURINOL 300 MG TABS 300 Tablet] 30 tablet 5     Sig: TAKE 1 TABLET BY MOUTH ONCE DAILY       Last Appointment Date: Visit date not found  Next Appointment Date: Visit date not found    Allergies

## 2024-12-11 ENCOUNTER — TELEPHONE (OUTPATIENT)
Dept: CARDIOLOGY CLINIC | Age: 64
End: 2024-12-11

## 2024-12-24 DIAGNOSIS — E11.65 UNCONTROLLED TYPE 2 DIABETES MELLITUS WITH HYPERGLYCEMIA (HCC): ICD-10-CM

## 2024-12-24 DIAGNOSIS — F33.1 MAJOR DEPRESSIVE DISORDER, RECURRENT EPISODE, MODERATE (HCC): ICD-10-CM

## 2024-12-24 DIAGNOSIS — J43.2 CENTRILOBULAR EMPHYSEMA (HCC): ICD-10-CM

## 2024-12-24 DIAGNOSIS — J44.9 STAGE 3 SEVERE COPD BY GOLD CLASSIFICATION (HCC): ICD-10-CM

## 2024-12-26 RX ORDER — EMPAGLIFLOZIN 25 MG/1
25 TABLET, FILM COATED ORAL DAILY
Qty: 30 TABLET | Refills: 0 | Status: SHIPPED | OUTPATIENT
Start: 2024-12-26

## 2024-12-26 RX ORDER — TRAZODONE HYDROCHLORIDE 100 MG/1
TABLET ORAL
Qty: 30 TABLET | Refills: 0 | Status: SHIPPED | OUTPATIENT
Start: 2024-12-26

## 2024-12-26 NOTE — TELEPHONE ENCOUNTER
Date of last visit:  10/21/2024  Date of next visit:  1/27/2025    Requested Prescriptions     Pending Prescriptions Disp Refills    metFORMIN (GLUCOPHAGE) 500 MG tablet [Pharmacy Med Name: METFORMIN 500 MG TABS 500 Tablet] 120 tablet 0     Sig: TAKE 2 TABLETS BY MOUTH TWICE DAILY WITH MEALS    ticagrelor (BRILINTA) 90 MG TABS tablet [Pharmacy Med Name: BRILINTA 90 MG TABS 90 Tablet] 60 tablet 0     Sig: TAKE 1 TABLET BY MOUTH TWICE DAILY    JARDIANCE 25 MG tablet [Pharmacy Med Name: JARDIANCE 25 MG TABLET 25 Tablet] 30 tablet 0     Sig: TAKE 1 TABLET BY MOUTH DAILY    traZODone (DESYREL) 100 MG tablet [Pharmacy Med Name: TRAZODONE 100 MG TABS 100 Tablet] 30 tablet 0     Sig: TAKE 1 TABLET BY MOUTH EACH EVENING AS NEEDED FOR SLEEP

## 2024-12-28 RX ORDER — UMECLIDINIUM BROMIDE AND VILANTEROL TRIFENATATE 62.5; 25 UG/1; UG/1
POWDER RESPIRATORY (INHALATION)
Qty: 60 EACH | Refills: 11 | Status: SHIPPED | OUTPATIENT
Start: 2024-12-28

## 2025-01-09 RX ORDER — TAMSULOSIN HYDROCHLORIDE 0.4 MG/1
CAPSULE ORAL
Qty: 30 CAPSULE | Refills: 10 | Status: SHIPPED | OUTPATIENT
Start: 2025-01-09

## 2025-01-09 NOTE — TELEPHONE ENCOUNTER
Andrei Munoz called requesting a refill on the following medications:  Requested Prescriptions     Pending Prescriptions Disp Refills    tamsulosin (FLOMAX) 0.4 MG capsule [Pharmacy Med Name: TAMSULOSIN 0.4 MG CAPS 0.4 Capsule] 30 capsule 10     Sig: TAKE 1 CAPSULE BY MOUTH DAILY TO FACILITATE PASSAGE OF URETERAL STONE     Pharmacy verified:  [email or web address]      Date of last visit: 01/08/2024  Date of next visit (if applicable): 1/13/2025

## 2025-01-10 DIAGNOSIS — R97.20 ELEVATED PSA: Primary | ICD-10-CM

## 2025-01-20 ENCOUNTER — TELEPHONE (OUTPATIENT)
Dept: CARDIOLOGY CLINIC | Age: 65
End: 2025-01-20

## 2025-01-20 NOTE — TELEPHONE ENCOUNTER
Pre op Risk Assessment    Procedure Excision for a Cyst on the Left Posterior Neck  Physician Dr. Cesar Lewis  Date of surgery/procedure 01/30/2025    Last OV 11/16/2023  Last Stress 10/03/2024  Last Echo 10/03/2024  Last Cath 10/04/2024  Last Stent 01/15/2018  Is patient on blood thinners Xarelto and Brilinta  Hold Meds/how many days Brilinta for 5 days        Fax: [phone]  Phone: [phone]

## 2025-01-24 DIAGNOSIS — F33.1 MAJOR DEPRESSIVE DISORDER, RECURRENT EPISODE, MODERATE (HCC): ICD-10-CM

## 2025-01-24 DIAGNOSIS — E11.65 UNCONTROLLED TYPE 2 DIABETES MELLITUS WITH HYPERGLYCEMIA (HCC): ICD-10-CM

## 2025-01-27 ENCOUNTER — OFFICE VISIT (OUTPATIENT)
Dept: FAMILY MEDICINE CLINIC | Age: 65
End: 2025-01-27

## 2025-01-27 VITALS
WEIGHT: 245.4 LBS | BODY MASS INDEX: 34.35 KG/M2 | HEART RATE: 72 BPM | RESPIRATION RATE: 16 BRPM | SYSTOLIC BLOOD PRESSURE: 132 MMHG | DIASTOLIC BLOOD PRESSURE: 76 MMHG | HEIGHT: 71 IN

## 2025-01-27 DIAGNOSIS — F33.1 MAJOR DEPRESSIVE DISORDER, RECURRENT EPISODE, MODERATE (HCC): ICD-10-CM

## 2025-01-27 DIAGNOSIS — I10 PRIMARY HYPERTENSION: ICD-10-CM

## 2025-01-27 DIAGNOSIS — E78.5 HYPERLIPIDEMIA, UNSPECIFIED HYPERLIPIDEMIA TYPE: ICD-10-CM

## 2025-01-27 DIAGNOSIS — F10.21 ALCOHOLISM IN REMISSION (HCC): ICD-10-CM

## 2025-01-27 DIAGNOSIS — I25.10 CORONARY ARTERY DISEASE INVOLVING NATIVE CORONARY ARTERY OF NATIVE HEART WITHOUT ANGINA PECTORIS: ICD-10-CM

## 2025-01-27 DIAGNOSIS — E11.8 DIABETES MELLITUS TYPE 2 WITH COMPLICATIONS (HCC): Primary | ICD-10-CM

## 2025-01-27 DIAGNOSIS — I50.32 CHF (CONGESTIVE HEART FAILURE), NYHA CLASS II, CHRONIC, DIASTOLIC (HCC): ICD-10-CM

## 2025-01-27 LAB
CHP ED QC CHECK: ABNORMAL
GLUCOSE BLD-MCNC: 185 MG/DL
HBA1C MFR BLD: 7 %

## 2025-01-27 RX ORDER — TRAZODONE HYDROCHLORIDE 100 MG/1
TABLET ORAL
Qty: 30 TABLET | Refills: 5 | Status: SHIPPED | OUTPATIENT
Start: 2025-01-27

## 2025-01-27 RX ORDER — EMPAGLIFLOZIN 25 MG/1
25 TABLET, FILM COATED ORAL DAILY
Qty: 30 TABLET | Refills: 2 | Status: SHIPPED | OUTPATIENT
Start: 2025-01-27

## 2025-01-27 ASSESSMENT — PATIENT HEALTH QUESTIONNAIRE - PHQ9
10. IF YOU CHECKED OFF ANY PROBLEMS, HOW DIFFICULT HAVE THESE PROBLEMS MADE IT FOR YOU TO DO YOUR WORK, TAKE CARE OF THINGS AT HOME, OR GET ALONG WITH OTHER PEOPLE: NOT DIFFICULT AT ALL
SUM OF ALL RESPONSES TO PHQ QUESTIONS 1-9: 0
2. FEELING DOWN, DEPRESSED OR HOPELESS: NOT AT ALL
SUM OF ALL RESPONSES TO PHQ9 QUESTIONS 1 & 2: 0
3. TROUBLE FALLING OR STAYING ASLEEP: NOT AT ALL
7. TROUBLE CONCENTRATING ON THINGS, SUCH AS READING THE NEWSPAPER OR WATCHING TELEVISION: NOT AT ALL
4. FEELING TIRED OR HAVING LITTLE ENERGY: NOT AT ALL
1. LITTLE INTEREST OR PLEASURE IN DOING THINGS: NOT AT ALL
9. THOUGHTS THAT YOU WOULD BE BETTER OFF DEAD, OR OF HURTING YOURSELF: NOT AT ALL
8. MOVING OR SPEAKING SO SLOWLY THAT OTHER PEOPLE COULD HAVE NOTICED. OR THE OPPOSITE, BEING SO FIGETY OR RESTLESS THAT YOU HAVE BEEN MOVING AROUND A LOT MORE THAN USUAL: NOT AT ALL
6. FEELING BAD ABOUT YOURSELF - OR THAT YOU ARE A FAILURE OR HAVE LET YOURSELF OR YOUR FAMILY DOWN: NOT AT ALL
SUM OF ALL RESPONSES TO PHQ QUESTIONS 1-9: 0
5. POOR APPETITE OR OVEREATING: NOT AT ALL

## 2025-01-27 NOTE — PROGRESS NOTES
Future     Standing Expiration Date:   1/27/2026    POCT glycosylated hemoglobin (Hb A1C)    POCT Glucose     Lab Results   Component Value Date    LABA1C 7.0 01/27/2025    LABA1C 9.1 (A) 10/21/2024    LABA1C 9.1 (H) 07/29/2024     No components found for: \"LABMICR\", \"[id number]\"  Results for orders placed or performed in visit on 01/27/25   POCT glycosylated hemoglobin (Hb A1C)   Result Value Ref Range    Hemoglobin A1C 7.0 %   POCT Glucose   Result Value Ref Range    POC Glucose 185     QC OK?         Continue to monitor blood sugars 3 times a day. Keep log of blood sugars and bring with you to the next appointment.     Discussed use, benefit, and side effects of prescribed medications.  Allpatient questions answered.  Pt voiced understanding.  Instructed to continue currentmedications, diet and exercise.  Patient agreed with treatment plan.     Return in about 3 months (around 4/27/2025), or if symptoms worsen or fail to improve, for DM.    Allergies, Problem List, Medications, Medical History, Family History, Surgical History and Tobacco History reviewed by provider.

## 2025-01-27 NOTE — TELEPHONE ENCOUNTER
The pharmacy is requesting a refill of the below medication which has been pended for you:     Requested Prescriptions     Pending Prescriptions Disp Refills    ticagrelor (BRILINTA) 90 MG TABS tablet [Pharmacy Med Name: BRILINTA 90 MG TABS 90 Tablet] 60 tablet 5     Sig: TAKE 1 TABLET BY MOUTH TWICE DAILY    JARDIANCE 25 MG tablet [Pharmacy Med Name: JARDIANCE 25 MG TABLET 25 Tablet] 30 tablet 5     Sig: TAKE 1 TABLET BY MOUTH DAILY    traZODone (DESYREL) 100 MG tablet [Pharmacy Med Name: TRAZODONE 100 MG TABS 100 Tablet] 30 tablet 5     Sig: TAKE 1 TABLET BY MOUTH EACH EVENING AS NEEDED FOR SLEEP    metFORMIN (GLUCOPHAGE) 500 MG tablet [Pharmacy Med Name: METFORMIN 500 MG TABS 500 Tablet] 120 tablet 5     Sig: TAKE 2 TABLETS BY MOUTH TWICE DAILY WITH MEALS       Last Appointment Date: Visit date not found  Next Appointment Date: Visit date not found    Allergies   Allergen Reactions    Zoloft [Sertraline Hcl] Other (See Comments)     Headaches, sweats, bad dreams

## 2025-01-30 DIAGNOSIS — E11.65 UNCONTROLLED TYPE 2 DIABETES MELLITUS WITH HYPERGLYCEMIA (HCC): ICD-10-CM

## 2025-01-30 DIAGNOSIS — F33.1 MAJOR DEPRESSIVE DISORDER, RECURRENT EPISODE, MODERATE (HCC): ICD-10-CM

## 2025-01-31 RX ORDER — EMPAGLIFLOZIN 25 MG/1
25 TABLET, FILM COATED ORAL DAILY
Qty: 90 TABLET | Refills: 1 | OUTPATIENT
Start: 2025-01-31

## 2025-01-31 RX ORDER — TRAZODONE HYDROCHLORIDE 100 MG/1
TABLET ORAL
Qty: 30 TABLET | Refills: 5 | OUTPATIENT
Start: 2025-01-31

## 2025-02-12 ENCOUNTER — HOSPITAL ENCOUNTER (OUTPATIENT)
Age: 65
Discharge: HOME | End: 2025-02-12
Payer: MEDICARE

## 2025-02-12 ENCOUNTER — TELEPHONE (OUTPATIENT)
Dept: UROLOGY | Age: 65
End: 2025-02-12

## 2025-02-12 ENCOUNTER — OFFICE VISIT (OUTPATIENT)
Dept: CARDIOLOGY CLINIC | Age: 65
End: 2025-02-12
Payer: MEDICARE

## 2025-02-12 VITALS
SYSTOLIC BLOOD PRESSURE: 128 MMHG | HEART RATE: 68 BPM | BODY MASS INDEX: 34.3 KG/M2 | WEIGHT: 245 LBS | DIASTOLIC BLOOD PRESSURE: 58 MMHG | HEIGHT: 71 IN

## 2025-02-12 DIAGNOSIS — I25.810 CORONARY ARTERY DISEASE INVOLVING CORONARY BYPASS GRAFT OF NATIVE HEART WITHOUT ANGINA PECTORIS: Primary | ICD-10-CM

## 2025-02-12 DIAGNOSIS — R97.20 ELEVATED PSA: ICD-10-CM

## 2025-02-12 DIAGNOSIS — E78.5 HYPERLIPIDEMIA LDL GOAL <50: ICD-10-CM

## 2025-02-12 DIAGNOSIS — I50.32 CHRONIC DIASTOLIC CONGESTIVE HEART FAILURE, NYHA CLASS 2 (HCC): ICD-10-CM

## 2025-02-12 DIAGNOSIS — I10 PRIMARY HYPERTENSION: ICD-10-CM

## 2025-02-12 LAB — PSA SERPL-MCNC: 3.58 NG/ML (ref 0–1)

## 2025-02-12 PROCEDURE — 36415 COLL VENOUS BLD VENIPUNCTURE: CPT

## 2025-02-12 PROCEDURE — 84153 ASSAY OF PSA TOTAL: CPT

## 2025-02-12 PROCEDURE — 3078F DIAST BP <80 MM HG: CPT | Performed by: NUCLEAR MEDICINE

## 2025-02-12 PROCEDURE — 1123F ACP DISCUSS/DSCN MKR DOCD: CPT | Performed by: NUCLEAR MEDICINE

## 2025-02-12 PROCEDURE — 3074F SYST BP LT 130 MM HG: CPT | Performed by: NUCLEAR MEDICINE

## 2025-02-12 PROCEDURE — 99214 OFFICE O/P EST MOD 30 MIN: CPT | Performed by: NUCLEAR MEDICINE

## 2025-02-12 PROCEDURE — 1159F MED LIST DOCD IN RCRD: CPT | Performed by: NUCLEAR MEDICINE

## 2025-02-12 RX ORDER — DOXYCYCLINE 100 MG/1
100 CAPSULE ORAL 2 TIMES DAILY
COMMUNITY
Start: 2025-01-30

## 2025-02-12 NOTE — PROGRESS NOTES
[facility] [place] [facility]  [facility]  [address]  [address]  [place] [address]  Dept: [phone]  Dept Fax: [phone]  Loc: [phone]    Visit Date: 2/12/2025    Andrei Munoz is a 64 y.o. male who presents todayfor:  Chief Complaint   Patient presents with    Follow-up     HPI:  64-year-old male with PMH pertinent for CAD s/p CABG, CHF, HTN, HLD and COPD who presents for 1 year follow-up.  Patient was last seen 11/16/23.  Patient denies any acute concerns since then.  Patient denies any chest pain, chest pressure, SOB, difficulty breathing, new onset orthopnea, shortness of breath or pain with exertion as well as no swelling of lower extremities.  Patient has been compliant with his medications.  Patient did have a recent left heart cath performed by Dr. Cotter on 10//24 which showed native vessel CAD with patent lima to LAD graft.  There is no prior occlusion of the SVG to OM1 graft and EF at that time seem to be 50%.  No stenting was placed and recommendations of continued medical management with lipid-lowering therapy as well aggressive wrist mass medication that time.  Today he denies fever, chills, shortness of breath, difficulty breathing, chest pain abdominal pain, palpitations, swelling of lower extremities, no no notice of increasing overall weight as well as no difficulty or shortness of breath with sleeping.    Past Medical History:   Diagnosis Date    CHF (congestive heart failure) ([facility])     COPD (chronic obstructive pulmonary disease) ([facility])     Coronary artery disease involving native coronary artery of native heart without angina pectoris     Depression     Diabetes mellitus type 2, diet-controlled ([facility]) 6/1/2018    GERD (gastroesophageal reflux disease) 3/21/2012    Hernia     Hx of blood clots 1990's    right knee due to injury    Hx of cocaine abuse ([facility])     Hyperglycemia 09/09    Hyperlipidemia     Hypertension     FAISAL on CPAP

## 2025-02-12 NOTE — PROGRESS NOTES
Patient here for follow up.  Patient didn't bring medication list or bottles today.  Patient states medication list hasn't changed since last reviewed by PCP office on 1-27-25.

## 2025-02-12 NOTE — TELEPHONE ENCOUNTER
PSA completed by patient, missed appt 2/10 with Dr. Brown. Please reach out to patient to have 1 year follow up scheduled with Dr. Brown or RAYMON

## 2025-02-13 ASSESSMENT — ENCOUNTER SYMPTOMS
WATER BRASH: 0
WHEEZING: 0
ORTHOPNEA: 0
BLURRED VISION: 0

## 2025-02-19 DIAGNOSIS — E11.65 UNCONTROLLED TYPE 2 DIABETES MELLITUS WITH HYPERGLYCEMIA (HCC): ICD-10-CM

## 2025-02-19 NOTE — TELEPHONE ENCOUNTER
Pharmacy called req ref of Jardiance.    Date of last visit:  1/27/2025  Date of next visit:  5/2/2025    Requested Prescriptions     Pending Prescriptions Disp Refills    empagliflozin (JARDIANCE) 25 MG tablet 30 tablet 2     Sig: Take 1 tablet by mouth daily

## 2025-02-24 DIAGNOSIS — G25.81 RLS (RESTLESS LEGS SYNDROME): ICD-10-CM

## 2025-02-25 NOTE — TELEPHONE ENCOUNTER
The pharmacy is  requesting a refill of the below medication which has been pended for you:     Requested Prescriptions     Pending Prescriptions Disp Refills    isosorbide mononitrate (IMDUR) 30 MG extended release tablet [Pharmacy Med Name: ISOSORBIDE MN ER 30MG TAB 30 Tablet] 30 tablet 5     Sig: TAKE 1 TABLET BY MOUTH ONCE DAILY    pramipexole (MIRAPEX) 0.125 MG tablet [Pharmacy Med Name: PRAMIPEXOLE 0.125 MG TABLET 0.125 Tablet] 30 tablet 5     Sig: TAKE 1 TABLET BY MOUTH IN THE EVENING    bumetanide (BUMEX) 2 MG tablet [Pharmacy Med Name: BUMETANIDE 2 MG TABS 2 Tablet] 60 tablet 5     Sig: TAKE ONE (1) TABLET BY MOUTH TWICE DAILY IN THE MORNING AND BEFORE BEDTIME    omeprazole (PRILOSEC) 20 MG delayed release capsule [Pharmacy Med Name: OMEPRAZOLE DR 20 MG CAPSULE 20 Capsule] 30 capsule 5     Sig: TAKE 1 CAPSULE BY MOUTH ONCE DAILY    allopurinol (ZYLOPRIM) 300 MG tablet [Pharmacy Med Name: ALLOPURINOL 300 MG TABS 300 Tablet] 30 tablet 5     Sig: TAKE 1 TABLET BY MOUTH ONCE DAILY    fluticasone (FLONASE) 50 MCG/ACT nasal spray [Pharmacy Med Name: FLUTICASONE 50MCG NASAL 16 50 KINGSLEY] 16 g 5     Sig: INSTILL 2 SPRAYS IN EACH NOSTRIL ONCE DAILY AS NEEDED FOR RHINITIS OR FOR ALLERGIES    albuterol sulfate HFA (PROVENTIL;VENTOLIN;PROAIR) 108 (90 Base) MCG/ACT inhaler [Pharmacy Med Name: ALBUTEROL HFA *PROA* 90MCG 108 (90 BAS Aerosol] 8.5 g 5     Sig: INHALE 2 PUFFS BY MOUTH EVERY 6 HOURS AS NEEDED FOR WHEEZING    atorvastatin (LIPITOR) 40 MG tablet [Pharmacy Med Name: ATORVASTATIN 40MG TABLET 40 Tablet] 30 tablet 5     Sig: TAKE 1 TABLET BY MOUTH ONCE DAILY       Last Appointment Date: 1/27/2025  Next Appointment Date: 5/2/2025    Allergies   Allergen Reactions    Zoloft [Sertraline Hcl] Other (See Comments)     Headaches, sweats, bad dreams

## 2025-02-26 RX ORDER — ALBUTEROL SULFATE 90 UG/1
INHALANT RESPIRATORY (INHALATION)
Qty: 8.5 G | Refills: 5 | Status: SHIPPED | OUTPATIENT
Start: 2025-02-26

## 2025-02-26 RX ORDER — BUMETANIDE 2 MG/1
TABLET ORAL
Qty: 60 TABLET | Refills: 5 | Status: SHIPPED | OUTPATIENT
Start: 2025-02-26

## 2025-02-26 RX ORDER — FLUTICASONE PROPIONATE 50 MCG
SPRAY, SUSPENSION (ML) NASAL
Qty: 16 G | Refills: 5 | Status: SHIPPED | OUTPATIENT
Start: 2025-02-26

## 2025-02-26 RX ORDER — OMEPRAZOLE 20 MG/1
CAPSULE, DELAYED RELEASE ORAL
Qty: 30 CAPSULE | Refills: 5 | Status: SHIPPED | OUTPATIENT
Start: 2025-02-26

## 2025-02-26 RX ORDER — ISOSORBIDE MONONITRATE 30 MG/1
TABLET, EXTENDED RELEASE ORAL
Qty: 30 TABLET | Refills: 2 | Status: SHIPPED | OUTPATIENT
Start: 2025-02-26

## 2025-02-26 RX ORDER — PRAMIPEXOLE DIHYDROCHLORIDE 0.12 MG/1
TABLET ORAL
Qty: 30 TABLET | Refills: 5 | Status: SHIPPED | OUTPATIENT
Start: 2025-02-26

## 2025-02-26 RX ORDER — ALLOPURINOL 300 MG/1
TABLET ORAL
Qty: 30 TABLET | Refills: 5 | Status: SHIPPED | OUTPATIENT
Start: 2025-02-26

## 2025-02-26 RX ORDER — ATORVASTATIN CALCIUM 40 MG/1
TABLET, FILM COATED ORAL
Qty: 30 TABLET | Refills: 5 | Status: SHIPPED | OUTPATIENT
Start: 2025-02-26

## 2025-03-10 ENCOUNTER — OFFICE VISIT (OUTPATIENT)
Dept: PULMONOLOGY | Age: 65
End: 2025-03-10
Payer: MEDICARE

## 2025-03-10 ENCOUNTER — OFFICE VISIT (OUTPATIENT)
Dept: UROLOGY | Age: 65
End: 2025-03-10
Payer: MEDICARE

## 2025-03-10 VITALS
TEMPERATURE: 99.1 F | HEART RATE: 78 BPM | BODY MASS INDEX: 33.32 KG/M2 | WEIGHT: 238 LBS | HEIGHT: 71 IN | SYSTOLIC BLOOD PRESSURE: 118 MMHG | OXYGEN SATURATION: 93 % | DIASTOLIC BLOOD PRESSURE: 68 MMHG

## 2025-03-10 VITALS
HEIGHT: 71 IN | DIASTOLIC BLOOD PRESSURE: 64 MMHG | BODY MASS INDEX: 33.46 KG/M2 | SYSTOLIC BLOOD PRESSURE: 118 MMHG | WEIGHT: 239 LBS

## 2025-03-10 DIAGNOSIS — G47.33 OSA ON CPAP: Primary | ICD-10-CM

## 2025-03-10 DIAGNOSIS — R33.8 BENIGN PROSTATIC HYPERPLASIA WITH URINARY RETENTION: Primary | ICD-10-CM

## 2025-03-10 DIAGNOSIS — R97.20 ELEVATED PSA: ICD-10-CM

## 2025-03-10 DIAGNOSIS — N40.1 BENIGN PROSTATIC HYPERPLASIA WITH URINARY RETENTION: Primary | ICD-10-CM

## 2025-03-10 DIAGNOSIS — E66.01 SEVERE OBESITY (BMI 35.0-39.9) WITH COMORBIDITY: ICD-10-CM

## 2025-03-10 PROCEDURE — 3074F SYST BP LT 130 MM HG: CPT | Performed by: UROLOGY

## 2025-03-10 PROCEDURE — 3078F DIAST BP <80 MM HG: CPT | Performed by: UROLOGY

## 2025-03-10 PROCEDURE — 99213 OFFICE O/P EST LOW 20 MIN: CPT | Performed by: UROLOGY

## 2025-03-10 PROCEDURE — 3074F SYST BP LT 130 MM HG: CPT

## 2025-03-10 PROCEDURE — 1159F MED LIST DOCD IN RCRD: CPT

## 2025-03-10 PROCEDURE — 1123F ACP DISCUSS/DSCN MKR DOCD: CPT

## 2025-03-10 PROCEDURE — 1160F RVW MEDS BY RX/DR IN RCRD: CPT

## 2025-03-10 PROCEDURE — 99214 OFFICE O/P EST MOD 30 MIN: CPT

## 2025-03-10 PROCEDURE — 1159F MED LIST DOCD IN RCRD: CPT | Performed by: UROLOGY

## 2025-03-10 PROCEDURE — 3078F DIAST BP <80 MM HG: CPT

## 2025-03-10 PROCEDURE — 1123F ACP DISCUSS/DSCN MKR DOCD: CPT | Performed by: UROLOGY

## 2025-03-10 RX ORDER — TAMSULOSIN HYDROCHLORIDE 0.4 MG/1
0.4 CAPSULE ORAL DAILY
Qty: 90 CAPSULE | Refills: 3 | Status: SHIPPED | OUTPATIENT
Start: 2025-03-10

## 2025-03-10 ASSESSMENT — ENCOUNTER SYMPTOMS
SORE THROAT: 0
SHORTNESS OF BREATH: 0
COUGH: 1
CHEST TIGHTNESS: 0
RHINORRHEA: 0

## 2025-03-10 NOTE — PROGRESS NOTES
and atraumatic.      Nose: Nose normal.      Mouth/Throat:      Mouth: Mucous membranes are dry.      Pharynx: Oropharynx is clear. Posterior oropharyngeal erythema present. No oropharyngeal exudate.   Eyes:      Extraocular Movements: Extraocular movements intact.      Conjunctiva/sclera: Conjunctivae normal.      Pupils: Pupils are equal, round, and reactive to light.   Cardiovascular:      Rate and Rhythm: Normal rate and regular rhythm.      Heart sounds: Normal heart sounds. No murmur heard.     No gallop.   Pulmonary:      Effort: Pulmonary effort is normal. No respiratory distress.      Breath sounds: Normal breath sounds. No stridor. No wheezing, rhonchi or rales.   Musculoskeletal:         General: Normal range of motion.      Cervical back: Normal range of motion and neck supple.      Right lower leg: No edema.      Left lower leg: No edema.   Skin:     General: Skin is warm and dry.   Neurological:      General: No focal deficit present.      Mental Status: He is alert.   Psychiatric:         Mood and Affect: Mood normal.         Behavior: Behavior normal.         Thought Content: Thought content normal.          Test results/ Reviewed DATA    Most recent download:              Assessment/ Plan   FAISAL: On CPAP with a set pressure of 11 cm H2O, most recent download shows good compliance, AHI 1.2. Mask fit appropriate,  pressures appropriate. Will maintain current settings. Order for new PAP supplies placed. Follow up in 1 year.   Obesity: BMI 33.19. Working on weight loss- dietary changes; Encouraged pt's efforts.       Educated on good sleep hygiene practices   Recommend 7-9 hours of sleep nightly   Will see Andrei Badillo in: 1 year or sooner if needed.     Lakshmi Reid PA-C   [facility]  3/10/2025   (Please note that portions of this note may have been with a voice recognition program.  Efforts were made to edit the dictation but occasionally words are

## 2025-03-10 NOTE — PROGRESS NOTES
Dr. Elias Brown Jr, MD MD  [facility] Consultation / Follow up Visit    Patient:  Andrei Munoz  YOB: 1960  Date: 3/10/2025  Consult requested from Malka Solis MD     HISTORY OF PRESENT ILLNESS:   The patient is a 65 y.o. male who presents today for follow-up for the following problem(s):  Bladder mass, hematuria.  Overall the problem(s) : are worsening.  Associated Symptoms: No dysuria, gross hematuria.   Pain Severity:      Today visit:   3/10/25   BPH with LUTs - is controlled on Flomax.  AUASS: 9/2.   - good candidate for Urolift  PSA: 3.09      6/14/21   Has history of gross hematuria with clots, and was found to have a possible bladder mass on CT scan from ER visit.  He has unfortunately developed acute urinary retention and has catheter in place.    BPH with LUTs - is found to be in retention.     Summary of old records:   (Patient's old records, notes and chart reviewed and summarized above.)    Last several PSA's:  Lab Results   Component Value Date    PSA 3.58 (H) 02/12/2025    PSA 3.71 (H) 07/29/2024    PSA 3.09 (H) 01/13/2023       Last total testosterone:  No results found for: \"TESTOSTERONE\"    Urinalysis today:  No results found for this visit on 03/10/25.        Last BUN and creatinine:  Lab Results   Component Value Date    BUN 15 10/09/2024     Lab Results   Component Value Date    CREATININE 1.0 10/09/2024       Imaging Reviewed during this Office Visit:   (results were independently reviewed by physician and radiology report verified)    PAST MEDICAL, FAMILY AND SOCIAL HISTORY:  Past Medical History:   Diagnosis Date    CHF (congestive heart failure) ([facility])     COPD (chronic obstructive pulmonary disease) ([facility])     Coronary artery disease involving native coronary artery of native heart without angina pectoris     Depression     Diabetes mellitus type 2, diet-controlled ([facility]) 6/1/2018    GERD (gastroesophageal reflux disease) 3/21/2012    Hernia

## 2025-03-11 ENCOUNTER — TELEPHONE (OUTPATIENT)
Dept: FAMILY MEDICINE CLINIC | Age: 65
End: 2025-03-11

## 2025-03-11 NOTE — TELEPHONE ENCOUNTER
[facility], and list of [facility] medication. Office notes are faxed [facility] along with [facility] original [facility].

## 2025-04-08 ENCOUNTER — TELEPHONE (OUTPATIENT)
Dept: SLEEP CENTER | Age: 65
End: 2025-04-08

## 2025-04-08 ENCOUNTER — TELEPHONE (OUTPATIENT)
Dept: CARDIOLOGY CLINIC | Age: 65
End: 2025-04-08

## 2025-04-08 NOTE — TELEPHONE ENCOUNTER
Called Jaciel. He is wanting [facility] at fax # [phone]. I did fax them for him. Thank you

## 2025-04-08 NOTE — TELEPHONE ENCOUNTER
Patient LMOM needing to cancel chf appt on 4/9.  Called back to patient, no answer. LM to call office.  Appt cancelled.

## 2025-04-08 NOTE — TELEPHONE ENCOUNTER
Please return a call to pt.  He left a vm here at the sleep lab requesting a cb.        Thank you,  Nisha

## 2025-04-22 NOTE — CARE COORDINATION
Radha returned  a call from Brenna.   Recommend OV for 6 MWT to eval for O2 needs. If symptoms worsen before visit, please have patient evaluated in ED. Thanks!

## 2025-05-05 ENCOUNTER — OFFICE VISIT (OUTPATIENT)
Dept: FAMILY MEDICINE CLINIC | Age: 65
End: 2025-05-05

## 2025-05-05 VITALS
RESPIRATION RATE: 16 BRPM | DIASTOLIC BLOOD PRESSURE: 74 MMHG | BODY MASS INDEX: 32.52 KG/M2 | HEIGHT: 71 IN | HEART RATE: 68 BPM | WEIGHT: 232.25 LBS | SYSTOLIC BLOOD PRESSURE: 132 MMHG

## 2025-05-05 DIAGNOSIS — I10 PRIMARY HYPERTENSION: ICD-10-CM

## 2025-05-05 DIAGNOSIS — K21.9 GASTROESOPHAGEAL REFLUX DISEASE, UNSPECIFIED WHETHER ESOPHAGITIS PRESENT: ICD-10-CM

## 2025-05-05 DIAGNOSIS — M17.11 OSTEOARTHRITIS OF RIGHT KNEE, UNSPECIFIED OSTEOARTHRITIS TYPE: ICD-10-CM

## 2025-05-05 DIAGNOSIS — F10.20 ALCOHOLISM (HCC): ICD-10-CM

## 2025-05-05 DIAGNOSIS — G47.33 OBSTRUCTIVE SLEEP APNEA ON CPAP: ICD-10-CM

## 2025-05-05 DIAGNOSIS — E78.5 HYPERLIPIDEMIA, UNSPECIFIED HYPERLIPIDEMIA TYPE: ICD-10-CM

## 2025-05-05 DIAGNOSIS — E11.65 UNCONTROLLED TYPE 2 DIABETES MELLITUS WITH HYPERGLYCEMIA (HCC): Primary | ICD-10-CM

## 2025-05-05 DIAGNOSIS — F33.1 MAJOR DEPRESSIVE DISORDER, RECURRENT EPISODE, MODERATE (HCC): ICD-10-CM

## 2025-05-05 PROBLEM — J96.01 ACUTE RESPIRATORY FAILURE WITH HYPOXIA (HCC): Status: RESOLVED | Noted: 2024-03-25 | Resolved: 2025-05-05

## 2025-05-05 LAB
CHP ED QC CHECK: ABNORMAL
GLUCOSE BLD-MCNC: 152 MG/DL
HBA1C MFR BLD: 7.7 %

## 2025-05-05 PROCEDURE — 82962 GLUCOSE BLOOD TEST: CPT | Performed by: FAMILY MEDICINE

## 2025-05-05 PROCEDURE — 83036 HEMOGLOBIN GLYCOSYLATED A1C: CPT | Performed by: FAMILY MEDICINE

## 2025-05-05 PROCEDURE — 99214 OFFICE O/P EST MOD 30 MIN: CPT | Performed by: FAMILY MEDICINE

## 2025-05-05 PROCEDURE — 1123F ACP DISCUSS/DSCN MKR DOCD: CPT | Performed by: FAMILY MEDICINE

## 2025-05-05 ASSESSMENT — ENCOUNTER SYMPTOMS
SHORTNESS OF BREATH: 0
ABDOMINAL PAIN: 0
DIARRHEA: 0
EYES NEGATIVE: 1
VOMITING: 0
COUGH: 0
BLOOD IN STOOL: 0
CONSTIPATION: 0
NAUSEA: 0
CHEST TIGHTNESS: 0

## 2025-05-06 NOTE — PROGRESS NOTES
Referral, demo, office note and insurance card faxed to [facility] for the pt. They will contact pt to schedule.   
DAILY 30 tablet 5    metoprolol succinate (TOPROL XL) 25 MG extended release tablet TAKE 2 TABLETS BY MOUTH ONCE DAILY 60 tablet 2    escitalopram (LEXAPRO) 5 MG tablet Take 1 tablet by mouth daily 90 tablet 0    rivaroxaban (XARELTO) 10 MG TABS tablet Take 2 tablets by mouth daily (with breakfast) 30 tablet 0    folic acid (FOLVITE) 1 MG tablet Take 1 tablet by mouth daily 30 tablet 0    thiamine 100 MG tablet Take 1 tablet by mouth daily 30 tablet 0    insulin glargine (LANTUS SOLOSTAR) 100 UNIT/ML injection pen Inject 10 Units into the skin nightly 1 Adjustable Dose Pre-filled Pen Syringe 3    DROPLET PEN NEEDLES 32G X 4 MM MISC Use to inject insulin three times a day DX E11.65 300 each 0    potassium chloride (KLOR-CON M) 20 MEQ extended release tablet Take 1 tablet by mouth daily 90 tablet 3    fluticasone (ARNUITY ELLIPTA) 100 MCG/ACT AEPB INHALE 1 PUFF BY MOUTH INTO THE LUNGS DAILY 30 each 11    Multiple Vitamin (MULTIVITAMIN ADULT PO) Take by mouth      magnesium oxide (MAG-OX) 400 (240 Mg) MG tablet Take 1 tablet by mouth daily 90 tablet 3    Blood Glucose Monitoring Suppl (PRODIGY AUTOCODE BLOOD GLUCOSE) w/Device KIT       amiodarone (CORDARONE) 200 MG tablet Take 1 tablet by mouth daily 30 tablet 0     No current facility-administered medications for this visit.     Orders Placed This Encounter   Procedures    XR KNEE RIGHT (3 VIEWS)     Standing Status:   Future     Expected Date:   6/4/2025     Expiration Date:   5/6/2026    [facility] - Enrique Hidalgo MD, Orthopedic Surgery, [place]     Referral Priority:   Routine     Referral Type:   Eval and Treat     Referral Reason:   Specialty Services Required     Referred to Provider:   Enrique Hidalgo MD     Requested Specialty:   Orthopedic Surgery     Number of Visits Requested:   1    POCT Glucose    POCT glycosylated hemoglobin (Hb A1C)     Lab Results   Component Value Date    LABA1C 7.7 (A) 05/05/2025    LABA1C 7.0 01/27/2025    LABA1C 9.1 (A) 10/21/2024

## 2025-05-07 ENCOUNTER — TELEPHONE (OUTPATIENT)
Dept: FAMILY MEDICINE CLINIC | Age: 65
End: 2025-05-07

## 2025-05-07 NOTE — TELEPHONE ENCOUNTER
Please let them know that we do not have access to this information.   Can they please fax it to us?

## 2025-05-07 NOTE — TELEPHONE ENCOUNTER
Ale with [employer] called stating that patient has completed a HRA and Updated Plan of Care. It has been uploaded to the Over 40 Females Provider Portal for review. She said that you are able to make recommendations through the portal also.    Ale may be reached at [phone]

## 2025-05-19 ENCOUNTER — OFFICE VISIT (OUTPATIENT)
Dept: CARDIOLOGY CLINIC | Age: 65
End: 2025-05-19
Payer: MEDICARE

## 2025-05-19 VITALS
HEART RATE: 90 BPM | HEIGHT: 71 IN | DIASTOLIC BLOOD PRESSURE: 60 MMHG | SYSTOLIC BLOOD PRESSURE: 116 MMHG | OXYGEN SATURATION: 91 % | BODY MASS INDEX: 33.26 KG/M2 | WEIGHT: 237.6 LBS

## 2025-05-19 DIAGNOSIS — I48.0 PAROXYSMAL A-FIB (HCC): ICD-10-CM

## 2025-05-19 DIAGNOSIS — R60.0 EDEMA OF BOTH LOWER LEGS: ICD-10-CM

## 2025-05-19 DIAGNOSIS — I50.32 CHRONIC DIASTOLIC CONGESTIVE HEART FAILURE, NYHA CLASS 2 (HCC): Primary | ICD-10-CM

## 2025-05-19 PROCEDURE — 99214 OFFICE O/P EST MOD 30 MIN: CPT | Performed by: NURSE PRACTITIONER

## 2025-05-19 PROCEDURE — 3078F DIAST BP <80 MM HG: CPT | Performed by: NURSE PRACTITIONER

## 2025-05-19 PROCEDURE — 1160F RVW MEDS BY RX/DR IN RCRD: CPT | Performed by: NURSE PRACTITIONER

## 2025-05-19 PROCEDURE — 1159F MED LIST DOCD IN RCRD: CPT | Performed by: NURSE PRACTITIONER

## 2025-05-19 PROCEDURE — 3074F SYST BP LT 130 MM HG: CPT | Performed by: NURSE PRACTITIONER

## 2025-05-19 PROCEDURE — 1123F ACP DISCUSS/DSCN MKR DOCD: CPT | Performed by: NURSE PRACTITIONER

## 2025-05-19 RX ORDER — BUSPIRONE HYDROCHLORIDE 10 MG/1
10 TABLET ORAL 2 TIMES DAILY
COMMUNITY

## 2025-05-19 RX ORDER — ONDANSETRON 4 MG/1
4 TABLET, FILM COATED ORAL
COMMUNITY
Start: 2025-04-01

## 2025-05-19 ASSESSMENT — ENCOUNTER SYMPTOMS
VOMITING: 0
NAUSEA: 0
SHORTNESS OF BREATH: 1
ABDOMINAL DISTENTION: 0
COUGH: 0

## 2025-05-19 NOTE — PATIENT INSTRUCTIONS
You may receive a survey regarding the care you received during your visit.  Your input is valuable to us.  We encourage you to complete and return your survey.  We hope you will choose us in the future for your healthcare needs.    Your nurses today were Megan.  Office hours:   Mon-Thurs 8-4:30  Friday 8-12  Phone: [phone]    Continue:  Continue current medications  Daily weights and record  Fluid restriction of 2 Liters per day  Limit sodium in diet to around 0392-3033 mg/day  Monitor BP  Activity as tolerated     Call the [facility] Failure Clinic for any of the following symptoms:   Weight gain of -3 pounds in 1 day or 5 pounds in 1 week  Increased shortness of breath  Shortness of breath while laying down  Cough  Chest pain  Swelling in feet, ankles or legs  Bloating in abdomen  Fatigue        Blood work today    No medication changes    Continue diet/fluid adherence  Continue daily wts.  F/U w/ Cardiology  F/U in clinic in 6 month

## 2025-05-19 NOTE — PROGRESS NOTES
patient.  Daily weights  Fluid restriction of 2 Liters per day  Limit sodium in diet to around 2999-5831 mg/day  Monitor BP  Activity as tolerated         Patient was instructed to call the Heart Failure Clinic for any changes in symptoms as noted in AVS.      Return in about 6 months (around 11/19/2025). or sooner if needed     Patient given educational materials - see patient instructions.   We discussed the importance of weighing oneself and recording daily. We also discussed the importance of a low sodium diet, higher sodium foods to avoid and better low sodium food options.   Patient verbalizes understanding of plan of care using teach back method, and is agreeable to the treatment plan.       Electronically signed by ASHLEIGH Mckeon CNP on 5/19/2025 at 11:53 AM

## 2025-05-22 DIAGNOSIS — E11.65 UNCONTROLLED TYPE 2 DIABETES MELLITUS WITH HYPERGLYCEMIA (HCC): ICD-10-CM

## 2025-05-23 RX ORDER — EMPAGLIFLOZIN 25 MG/1
25 TABLET, FILM COATED ORAL DAILY
Qty: 30 TABLET | Refills: 5 | Status: SHIPPED | OUTPATIENT
Start: 2025-05-23

## 2025-05-23 RX ORDER — ISOSORBIDE MONONITRATE 30 MG/1
30 TABLET, EXTENDED RELEASE ORAL DAILY
Qty: 30 TABLET | Refills: 0 | Status: SHIPPED | OUTPATIENT
Start: 2025-05-23

## 2025-05-23 RX ORDER — VALSARTAN 80 MG/1
80 TABLET ORAL DAILY
Qty: 30 TABLET | Refills: 5 | Status: SHIPPED | OUTPATIENT
Start: 2025-05-23

## 2025-05-23 RX ORDER — METOPROLOL SUCCINATE 25 MG/1
50 TABLET, EXTENDED RELEASE ORAL DAILY
Qty: 60 TABLET | Refills: 5 | Status: SHIPPED | OUTPATIENT
Start: 2025-05-23

## 2025-05-23 NOTE — TELEPHONE ENCOUNTER
The pharmacy is requesting a refill of the below medication which has been pended for you:     Requested Prescriptions     Pending Prescriptions Disp Refills    empagliflozin (JARDIANCE) 25 MG tablet [Pharmacy Med Name: JARDIANCE 25 MG TABS 25 Tablet] 30 tablet 5     Sig: TAKE 1 TABLET BY MOUTH DAILY       Last Appointment Date: Visit date not found  Next Appointment Date: Visit date not found    Allergies   Allergen Reactions    Zoloft [Sertraline Hcl] Other (See Comments)     Headaches, sweats, bad dreams

## 2025-05-23 NOTE — TELEPHONE ENCOUNTER
The pharmacy is requesting a refill of the below medication which has been pended for you:     Requested Prescriptions     Pending Prescriptions Disp Refills    metoprolol succinate (TOPROL XL) 25 MG extended release tablet [Pharmacy Med Name: METOPROLOL SUC ER 25MG TAB 25 Tablet] 60 tablet 5     Sig: TAKE 2 TABLETS BY MOUTH ONCE DAILY    isosorbide mononitrate (IMDUR) 30 MG extended release tablet [Pharmacy Med Name: ISOSORBIDE MN ER 30MG TAB 30 Tablet] 30 tablet 5     Sig: TAKE 1 TABLET BY MOUTH DAILY    valsartan (DIOVAN) 80 MG tablet [Pharmacy Med Name: VALSARTAN 80 MG TABLET 80 Tablet] 30 tablet 5     Sig: TAKE 1 TABLET BY MOUTH DAILY    ticagrelor (BRILINTA) 90 MG TABS tablet [Pharmacy Med Name: TICAGRELOR 90 MG TABS 90 Tablet] 60 tablet 5     Sig: TAKE 1 TABLET BY MOUTH TWICE DAILY       Last Appointment Date: 5/5/2025  Next Appointment Date: 8/11/2025    Allergies   Allergen Reactions    Zoloft [Sertraline Hcl] Other (See Comments)     Headaches, sweats, bad dreams

## 2025-06-16 ENCOUNTER — TELEPHONE (OUTPATIENT)
Dept: FAMILY MEDICINE CLINIC | Age: 65
End: 2025-06-16

## 2025-06-23 DIAGNOSIS — J44.9 STAGE 3 SEVERE COPD BY GOLD CLASSIFICATION (HCC): ICD-10-CM

## 2025-06-23 DIAGNOSIS — J43.2 CENTRILOBULAR EMPHYSEMA (HCC): ICD-10-CM

## 2025-06-24 RX ORDER — ISOSORBIDE MONONITRATE 30 MG/1
30 TABLET, EXTENDED RELEASE ORAL DAILY
Qty: 30 TABLET | Refills: 5 | Status: SHIPPED | OUTPATIENT
Start: 2025-06-24

## 2025-06-24 NOTE — TELEPHONE ENCOUNTER
The pharmacy is requesting a refill of the below medication which has been pended for you:     Requested Prescriptions     Pending Prescriptions Disp Refills    isosorbide mononitrate (IMDUR) 30 MG extended release tablet [Pharmacy Med Name: ISOSORBIDE MN ER 30MG TAB 30 Tablet] 30 tablet 5     Sig: TAKE 1 TABLET BY MOUTH DAILY       Last Appointment Date: 5/5/2025  Next Appointment Date: 8/11/2025    Allergies   Allergen Reactions    Zoloft [Sertraline Hcl] Other (See Comments)     Headaches, sweats, bad dreams

## 2025-06-26 ENCOUNTER — OFFICE VISIT (OUTPATIENT)
Dept: FAMILY MEDICINE CLINIC | Age: 65
End: 2025-06-26

## 2025-06-26 VITALS
RESPIRATION RATE: 16 BRPM | HEART RATE: 64 BPM | WEIGHT: 234.6 LBS | DIASTOLIC BLOOD PRESSURE: 60 MMHG | SYSTOLIC BLOOD PRESSURE: 110 MMHG | BODY MASS INDEX: 32.84 KG/M2 | HEIGHT: 71 IN

## 2025-06-26 DIAGNOSIS — E11.42 DIABETIC POLYNEUROPATHY ASSOCIATED WITH TYPE 2 DIABETES MELLITUS (HCC): ICD-10-CM

## 2025-06-26 DIAGNOSIS — E78.5 HYPERLIPIDEMIA, UNSPECIFIED HYPERLIPIDEMIA TYPE: ICD-10-CM

## 2025-06-26 DIAGNOSIS — F10.21 ALCOHOLISM IN REMISSION (HCC): ICD-10-CM

## 2025-06-26 DIAGNOSIS — I10 PRIMARY HYPERTENSION: ICD-10-CM

## 2025-06-26 DIAGNOSIS — Z00.00 ENCOUNTER FOR WELL ADULT EXAM WITHOUT ABNORMAL FINDINGS: Primary | ICD-10-CM

## 2025-06-26 DIAGNOSIS — J44.9 CHRONIC OBSTRUCTIVE PULMONARY DISEASE, UNSPECIFIED COPD TYPE (HCC): ICD-10-CM

## 2025-06-26 DIAGNOSIS — E11.65 UNCONTROLLED TYPE 2 DIABETES MELLITUS WITH HYPERGLYCEMIA (HCC): ICD-10-CM

## 2025-06-26 DIAGNOSIS — F33.1 MAJOR DEPRESSIVE DISORDER, RECURRENT EPISODE, MODERATE (HCC): ICD-10-CM

## 2025-06-26 DIAGNOSIS — G47.33 OBSTRUCTIVE SLEEP APNEA ON CPAP: ICD-10-CM

## 2025-06-26 DIAGNOSIS — F19.10 OTHER PSYCHOACTIVE SUBSTANCE ABUSE, UNCOMPLICATED (HCC): ICD-10-CM

## 2025-06-26 ASSESSMENT — ENCOUNTER SYMPTOMS
ABDOMINAL PAIN: 0
BACK PAIN: 0
WHEEZING: 0
CONSTIPATION: 0
VOICE CHANGE: 0
SORE THROAT: 0
CHEST TIGHTNESS: 0
NAUSEA: 0
TROUBLE SWALLOWING: 0
COUGH: 0
SINUS PRESSURE: 0
DIARRHEA: 0
SHORTNESS OF BREATH: 0
VOMITING: 0
RHINORRHEA: 0

## 2025-06-26 NOTE — PATIENT INSTRUCTIONS
your skin from too much sun, wash your hands, brush your teeth twice a day, and wear a seat belt in the car.   Where can you learn more?  Go to https://www.[email or web address].net/patientEd and enter K859 to learn more about \"Well Visit, Over 65: Care Instructions.\"  Current as of: April 30, 2024  Content Version: 14.5  © 7042-6810 [employer].   Care instructions adapted under license by [employer]. If you have questions about a medical condition or this instruction, always ask your healthcare professional. [employer] disclaims any warranty or liability for your use of this information.

## 2025-06-26 NOTE — PROGRESS NOTES
Date: 6/26/2025    :    Andrei Munoz is a 65 y.o.male who presents today for:  Chief Complaint   Patient presents with    Home Health Orders    Annual Exam         HPI:     HPI    Patient is here for follow-up, states that he has been an alcoholic and been sober for 90 days now  Drink beer 8 can + tequila in the past. Doing out patient therapy in [place].  Patient taking naloxone daily    Fix thing at home of mother, living with her mother    Live with a friend / roommate    [facility]    CurrentHome Medications were reviewed and updated today by this Provider  Current Outpatient Medications   Medication Sig Dispense Refill    isosorbide mononitrate (IMDUR) 30 MG extended release tablet TAKE 1 TABLET BY MOUTH DAILY 30 tablet 5    metoprolol succinate (TOPROL XL) 25 MG extended release tablet TAKE 2 TABLETS BY MOUTH ONCE DAILY 60 tablet 5    valsartan (DIOVAN) 80 MG tablet TAKE 1 TABLET BY MOUTH DAILY 30 tablet 5    ticagrelor (BRILINTA) 90 MG TABS tablet TAKE 1 TABLET BY MOUTH TWICE DAILY 60 tablet 5    JARDIANCE 25 MG tablet TAKE 1 TABLET BY MOUTH DAILY 30 tablet 5    busPIRone (BUSPAR) 10 MG tablet Take 1 tablet by mouth 2 times daily      ondansetron (ZOFRAN) 4 MG tablet Take 1 tablet by mouth      NALTREXONE HCL PO Take by mouth daily      tamsulosin (FLOMAX) 0.4 MG capsule Take 1 capsule by mouth daily 90 capsule 3    pramipexole (MIRAPEX) 0.125 MG tablet TAKE 1 TABLET BY MOUTH IN THE EVENING 30 tablet 5    bumetanide (BUMEX) 2 MG tablet TAKE ONE (1) TABLET BY MOUTH TWICE DAILY IN THE MORNING AND BEFORE BEDTIME 60 tablet 5    omeprazole (PRILOSEC) 20 MG delayed release capsule TAKE 1 CAPSULE BY MOUTH ONCE DAILY 30 capsule 5    allopurinol (ZYLOPRIM) 300 MG tablet TAKE 1 TABLET BY MOUTH ONCE DAILY 30 tablet 5    fluticasone (FLONASE) 50 MCG/ACT nasal spray INSTILL 2 SPRAYS IN EACH NOSTRIL ONCE DAILY AS NEEDED FOR RHINITIS OR FOR ALLERGIES 16 g 5    albuterol sulfate HFA

## 2025-06-26 NOTE — PROCEDURES
PROCEDURE NOTE  Date: 10/3/2024   Name: Andrei Munoz  YOB: 1960    Procedures  EKG completed handed to Dr. Cochran         
Statement Selected

## 2025-06-27 NOTE — TELEPHONE ENCOUNTER
Received refill request for ARNUITY ELLIPTA 100 MCG/ACT AEPB.   Medication was last ordered by Nidhi Sharpe.   Medication was last ordered on 6/27/24 with 11 refills.     Patient was last seen in the office 1/22/24.   Does patient have a scheduled follow up?: yes - 7/1/25.    Medication needs to be sent to [facility] -  [phone] - F [phone]  [address] [place], [place] [address]  Phone: [phone]  Fax: [phone].     Thank you, please advise!    Patient's Allergies:  Allergies   Allergen Reactions    Zoloft [Sertraline Hcl] Other (See Comments)     Headaches, sweats, bad dreams

## 2025-06-29 RX ORDER — FLUTICASONE FUROATE 100 UG/1
POWDER RESPIRATORY (INHALATION)
Qty: 30 EACH | Refills: 11 | Status: SHIPPED | OUTPATIENT
Start: 2025-06-29

## 2025-06-30 NOTE — PROGRESS NOTES
are mis-transcribed) Discussed with the patient the current [employer] guidelines released March 9, 2021 for screening for lung cancer.    For adults aged 50 to 80 years who have a 20 pack-year smoking history and currently smoke or have quit within the past 15 years the grade B recommendation is to:  Screen for lung cancer with low-dose computed tomography (LDCT) every year.  Stop screening once a person has not smoked for 15 years or has a health problem that limits life expectancy or the ability to have lung surgery.    The patient  reports that he has been smoking cigars and cigarettes. He started smoking about 35 years ago. He has a 50 pack-year smoking history. He has never been exposed to tobacco smoke. He has never used smokeless tobacco.. Discussed with patient the risks and benefits of screening, including over-diagnosis, false positive rate, and total radiation exposure.  The patient currently exhibits no signs or symptoms suggestive of lung cancer.  Discussed with patient the importance of compliance with yearly annual lung cancer screenings and willingness to undergo diagnosis and treatment if screening scan is positive.  In addition, the patient was counseled regarding the importance of remaining smoke free and/or total smoking cessation.    Also reviewed the following if the patient has Medicare that as of February 10, 2022, Medicare only covers LDCT screening in patients aged 50-77 with at least a 20 pack-year smoking history who currently smoke or have quit in the last 15 years

## 2025-07-01 ENCOUNTER — HOSPITAL ENCOUNTER (OUTPATIENT)
Dept: GENERAL RADIOLOGY | Age: 65
Discharge: HOME | End: 2025-07-01
Payer: MEDICARE

## 2025-07-01 ENCOUNTER — OFFICE VISIT (OUTPATIENT)
Dept: PULMONOLOGY | Age: 65
End: 2025-07-01
Payer: MEDICARE

## 2025-07-01 ENCOUNTER — HOSPITAL ENCOUNTER (OUTPATIENT)
Age: 65
Discharge: HOME | End: 2025-07-01
Payer: MEDICARE

## 2025-07-01 VITALS
HEIGHT: 71 IN | BODY MASS INDEX: 32.76 KG/M2 | TEMPERATURE: 97.8 F | WEIGHT: 234 LBS | HEART RATE: 73 BPM | DIASTOLIC BLOOD PRESSURE: 70 MMHG | SYSTOLIC BLOOD PRESSURE: 120 MMHG | OXYGEN SATURATION: 93 %

## 2025-07-01 DIAGNOSIS — J44.9 STAGE 3 SEVERE COPD BY GOLD CLASSIFICATION (HCC): Primary | ICD-10-CM

## 2025-07-01 DIAGNOSIS — M17.11 OSTEOARTHRITIS OF RIGHT KNEE, UNSPECIFIED OSTEOARTHRITIS TYPE: ICD-10-CM

## 2025-07-01 DIAGNOSIS — J43.2 CENTRILOBULAR EMPHYSEMA (HCC): ICD-10-CM

## 2025-07-01 DIAGNOSIS — I50.32 CHRONIC DIASTOLIC CONGESTIVE HEART FAILURE, NYHA CLASS 2 (HCC): ICD-10-CM

## 2025-07-01 DIAGNOSIS — Z87.891 PERSONAL HISTORY OF TOBACCO USE: ICD-10-CM

## 2025-07-01 LAB
ALBUMIN SERPL BCG-MCNC: 4.2 G/DL (ref 3.4–4.9)
ALP SERPL-CCNC: 104 U/L (ref 40–129)
ALT SERPL W/O P-5'-P-CCNC: 16 U/L (ref 10–50)
ANION GAP SERPL CALC-SCNC: 15 MEQ/L (ref 8–16)
AST SERPL-CCNC: 20 U/L (ref 10–50)
BILIRUB CONJ SERPL-MCNC: 0.3 MG/DL (ref 0–0.2)
BILIRUB SERPL-MCNC: 0.7 MG/DL (ref 0.3–1.2)
BUN SERPL-MCNC: 13 MG/DL (ref 8–23)
CALCIUM SERPL-MCNC: 9.8 MG/DL (ref 8.8–10.2)
CHLORIDE SERPL-SCNC: 97 MEQ/L (ref 98–111)
CO2 SERPL-SCNC: 26 MEQ/L (ref 22–29)
CREAT SERPL-MCNC: 1.3 MG/DL (ref 0.7–1.2)
GFR SERPL CREATININE-BSD FRML MDRD: 60 ML/MIN/1.73M2
GLUCOSE SERPL-MCNC: 106 MG/DL (ref 74–109)
MAGNESIUM SERPL-MCNC: 2.2 MG/DL (ref 1.6–2.6)
NT-PROBNP SERPL IA-MCNC: 325 PG/ML (ref 0–124)
POTASSIUM SERPL-SCNC: 4.6 MEQ/L (ref 3.5–5.2)
PROT SERPL-MCNC: 7.1 G/DL (ref 6.4–8.3)
SODIUM SERPL-SCNC: 138 MEQ/L (ref 135–145)
TSH SERPL DL<=0.05 MIU/L-ACNC: 0.95 UIU/ML (ref 0.27–4.2)

## 2025-07-01 PROCEDURE — 3074F SYST BP LT 130 MM HG: CPT

## 2025-07-01 PROCEDURE — 1160F RVW MEDS BY RX/DR IN RCRD: CPT

## 2025-07-01 PROCEDURE — 84443 ASSAY THYROID STIM HORMONE: CPT

## 2025-07-01 PROCEDURE — 73562 X-RAY EXAM OF KNEE 3: CPT

## 2025-07-01 PROCEDURE — 36415 COLL VENOUS BLD VENIPUNCTURE: CPT

## 2025-07-01 PROCEDURE — 1123F ACP DISCUSS/DSCN MKR DOCD: CPT

## 2025-07-01 PROCEDURE — G0296 VISIT TO DETERM LDCT ELIG: HCPCS

## 2025-07-01 PROCEDURE — 99214 OFFICE O/P EST MOD 30 MIN: CPT

## 2025-07-01 PROCEDURE — 3078F DIAST BP <80 MM HG: CPT

## 2025-07-01 PROCEDURE — 1159F MED LIST DOCD IN RCRD: CPT

## 2025-07-01 PROCEDURE — 83735 ASSAY OF MAGNESIUM: CPT

## 2025-07-01 PROCEDURE — 80053 COMPREHEN METABOLIC PANEL: CPT

## 2025-07-01 PROCEDURE — 83880 ASSAY OF NATRIURETIC PEPTIDE: CPT

## 2025-07-01 PROCEDURE — 82248 BILIRUBIN DIRECT: CPT

## 2025-07-01 RX ORDER — UMECLIDINIUM BROMIDE AND VILANTEROL TRIFENATATE 62.5; 25 UG/1; UG/1
POWDER RESPIRATORY (INHALATION)
Qty: 60 EACH | Refills: 11 | Status: SHIPPED | OUTPATIENT
Start: 2025-07-01

## 2025-07-02 ENCOUNTER — RESULTS FOLLOW-UP (OUTPATIENT)
Dept: CARDIOLOGY CLINIC | Age: 65
End: 2025-07-02

## 2025-07-02 ENCOUNTER — RESULTS FOLLOW-UP (OUTPATIENT)
Dept: FAMILY MEDICINE CLINIC | Age: 65
End: 2025-07-02

## 2025-07-07 DIAGNOSIS — J44.9 STAGE 3 SEVERE COPD BY GOLD CLASSIFICATION (HCC): ICD-10-CM

## 2025-07-07 DIAGNOSIS — J43.2 CENTRILOBULAR EMPHYSEMA (HCC): ICD-10-CM

## 2025-07-09 RX ORDER — FLUTICASONE FUROATE 100 UG/1
POWDER RESPIRATORY (INHALATION)
Refills: 11 | OUTPATIENT
Start: 2025-07-09

## 2025-08-07 DIAGNOSIS — F33.1 MAJOR DEPRESSIVE DISORDER, RECURRENT EPISODE, MODERATE (HCC): ICD-10-CM

## 2025-08-07 DIAGNOSIS — E11.65 UNCONTROLLED TYPE 2 DIABETES MELLITUS WITH HYPERGLYCEMIA (HCC): ICD-10-CM

## 2025-08-07 RX ORDER — TRAZODONE HYDROCHLORIDE 100 MG/1
TABLET ORAL
Qty: 30 TABLET | Refills: 1 | Status: SHIPPED | OUTPATIENT
Start: 2025-08-07

## 2025-08-19 ENCOUNTER — TELEPHONE (OUTPATIENT)
Dept: FAMILY MEDICINE CLINIC | Age: 65
End: 2025-08-19

## 2025-08-19 DIAGNOSIS — E11.65 UNCONTROLLED TYPE 2 DIABETES MELLITUS WITH HYPERGLYCEMIA (HCC): Primary | ICD-10-CM

## 2025-08-21 DIAGNOSIS — G25.81 RLS (RESTLESS LEGS SYNDROME): ICD-10-CM

## 2025-08-22 RX ORDER — ALBUTEROL SULFATE 90 UG/1
2 INHALANT RESPIRATORY (INHALATION) EVERY 6 HOURS PRN
Qty: 8.5 G | Refills: 5 | Status: SHIPPED | OUTPATIENT
Start: 2025-08-22

## 2025-08-22 RX ORDER — FLUTICASONE PROPIONATE 50 MCG
SPRAY, SUSPENSION (ML) NASAL
Qty: 16 G | Refills: 5 | Status: SHIPPED | OUTPATIENT
Start: 2025-08-22

## 2025-08-22 RX ORDER — BUMETANIDE 2 MG/1
TABLET ORAL
Qty: 60 TABLET | Refills: 5 | Status: SHIPPED | OUTPATIENT
Start: 2025-08-22

## 2025-08-22 RX ORDER — PRAMIPEXOLE DIHYDROCHLORIDE 0.12 MG/1
TABLET ORAL
Qty: 30 TABLET | Refills: 5 | Status: SHIPPED | OUTPATIENT
Start: 2025-08-22

## 2025-08-22 RX ORDER — ALLOPURINOL 300 MG/1
300 TABLET ORAL DAILY
Qty: 30 TABLET | Refills: 5 | Status: SHIPPED | OUTPATIENT
Start: 2025-08-22

## 2025-08-22 RX ORDER — OMEPRAZOLE 20 MG/1
20 CAPSULE, DELAYED RELEASE ORAL DAILY
Qty: 30 CAPSULE | Refills: 5 | Status: SHIPPED | OUTPATIENT
Start: 2025-08-22

## 2025-08-22 RX ORDER — ATORVASTATIN CALCIUM 40 MG/1
40 TABLET, FILM COATED ORAL DAILY
Qty: 30 TABLET | Refills: 3 | Status: SHIPPED | OUTPATIENT
Start: 2025-08-22

## 2025-08-25 ENCOUNTER — HOSPITAL ENCOUNTER (INPATIENT)
Age: 65
LOS: 2 days | Discharge: HOME | DRG: 190 | End: 2025-08-27
Attending: EMERGENCY MEDICINE | Admitting: STUDENT IN AN ORGANIZED HEALTH CARE EDUCATION/TRAINING PROGRAM
Payer: MEDICARE

## 2025-08-25 ENCOUNTER — APPOINTMENT (OUTPATIENT)
Dept: CT IMAGING | Age: 65
DRG: 190 | End: 2025-08-25
Payer: MEDICARE

## 2025-08-25 ENCOUNTER — APPOINTMENT (OUTPATIENT)
Dept: GENERAL RADIOLOGY | Age: 65
DRG: 190 | End: 2025-08-25
Payer: MEDICARE

## 2025-08-25 DIAGNOSIS — J96.02 ACUTE RESPIRATORY FAILURE WITH HYPERCAPNIA (HCC): Primary | ICD-10-CM

## 2025-08-25 DIAGNOSIS — J44.1 COPD EXACERBATION (HCC): ICD-10-CM

## 2025-08-25 PROBLEM — J96.01 ACUTE HYPOXIC ON CHRONIC HYPERCAPNIC RESPIRATORY FAILURE (HCC): Status: ACTIVE | Noted: 2025-08-25

## 2025-08-25 PROBLEM — J96.12 ACUTE HYPOXIC ON CHRONIC HYPERCAPNIC RESPIRATORY FAILURE (HCC): Status: ACTIVE | Noted: 2025-08-25

## 2025-08-25 LAB
ALBUMIN SERPL BCG-MCNC: 4.1 G/DL (ref 3.4–4.9)
ALP SERPL-CCNC: 108 U/L (ref 40–129)
ALT SERPL W/O P-5'-P-CCNC: 18 U/L (ref 10–50)
ANION GAP SERPL CALC-SCNC: 16 MEQ/L (ref 8–16)
ARTERIAL PATENCY WRIST A: POSITIVE
AST SERPL-CCNC: 30 U/L (ref 10–50)
BACTERIA URNS QL MICRO: ABNORMAL /HPF
BASE EXCESS BLDA CALC-SCNC: 3.6 MMOL/L (ref -2.5–2.5)
BASE EXCESS BLDA CALC-SCNC: 5 MMOL/L (ref -2.5–2.5)
BDY SITE: ABNORMAL
BDY SITE: ABNORMAL
BILIRUB SERPL-MCNC: 0.4 MG/DL (ref 0.3–1.2)
BILIRUB UR QL STRIP.AUTO: NEGATIVE
BUN SERPL-MCNC: 7 MG/DL (ref 8–23)
CALCIUM SERPL-MCNC: 9.1 MG/DL (ref 8.5–10.5)
CASTS #/AREA URNS LPF: ABNORMAL /LPF
CASTS 2: ABNORMAL /LPF
CHARACTER UR: CLEAR
CHLORIDE SERPL-SCNC: 94 MEQ/L (ref 98–111)
CO2 SERPL-SCNC: 29 MEQ/L (ref 22–29)
COLLECTED BY:: ABNORMAL
COLLECTED BY:: ABNORMAL
COLOR, UA: YELLOW
CREAT SERPL-MCNC: 0.9 MG/DL (ref 0.7–1.2)
CRP SERPL-MCNC: 0.59 MG/DL (ref 0–0.5)
CRYSTALS URNS MICRO: ABNORMAL
DEVICE: ABNORMAL
DEVICE: ABNORMAL
EKG ATRIAL RATE: 67 BPM
EKG P AXIS: -13 DEGREES
EKG P-R INTERVAL: 228 MS
EKG Q-T INTERVAL: 438 MS
EKG QRS DURATION: 104 MS
EKG QTC CALCULATION (BAZETT): 462 MS
EKG R AXIS: -22 DEGREES
EKG T AXIS: 34 DEGREES
EKG VENTRICULAR RATE: 67 BPM
EPITHELIAL CELLS, UA: ABNORMAL /HPF
ETHANOL SERPL-MCNC: 0.06 % (ref 0–0.08)
FIO2 ON VENT O2 ANALYZER: 4 %
FIO2 ON VENT O2 ANALYZER: 5 %
GFR SERPL CREATININE-BSD FRML MDRD: > 90 ML/MIN/1.73M2
GLUCOSE SERPL-MCNC: 177 MG/DL (ref 74–109)
GLUCOSE UR QL STRIP.AUTO: 500 MG/DL
HCO3 BLDA-SCNC: 32 MMOL/L (ref 23–28)
HCO3 BLDA-SCNC: 35 MMOL/L (ref 23–28)
HGB UR QL STRIP.AUTO: NEGATIVE
KETONES UR QL STRIP.AUTO: NEGATIVE
MAGNESIUM SERPL-MCNC: 2.1 MG/DL (ref 1.6–2.6)
MISCELLANEOUS 2: ABNORMAL
NITRITE UR QL STRIP: NEGATIVE
NT-PROBNP SERPL IA-MCNC: 557 PG/ML (ref 0–124)
OSMOLALITY SERPL CALC.SUM OF ELEC: 279.9 MOSMOL/KG (ref 275–300)
PCO2 BLDA: 62 MMHG (ref 35–45)
PCO2 BLDA: 67 MMHG (ref 35–45)
PH BLDA: 7.32 [PH] (ref 7.35–7.45)
PH BLDA: 7.33 [PH] (ref 7.35–7.45)
PH UR STRIP.AUTO: 6.5 [PH] (ref 5–9)
PO2 BLDA: 142 MMHG (ref 71–104)
PO2 BLDA: 84 MMHG (ref 71–104)
POTASSIUM SERPL-SCNC: 3.3 MEQ/L (ref 3.5–5.2)
PROT SERPL-MCNC: 7 G/DL (ref 6.4–8.3)
PROT UR STRIP.AUTO-MCNC: ABNORMAL MG/DL
RBC URINE: ABNORMAL /HPF
RENAL EPI CELLS #/AREA URNS HPF: ABNORMAL /[HPF]
SAO2 % BLDA: 95 %
SAO2 % BLDA: 99 %
SODIUM SERPL-SCNC: 139 MEQ/L (ref 135–145)
SP GR UR REFRACT.AUTO: > 1.03 (ref 1–1.03)
TROPONIN, HIGH SENSITIVITY: 28 NG/L (ref 0–12)
UROBILINOGEN, URINE: 0.2 EU/DL (ref 0–1)
VENTILATION MODE VENT: ABNORMAL
VENTILATION MODE VENT: ABNORMAL
WBC #/AREA URNS HPF: ABNORMAL /HPF
WBC #/AREA URNS HPF: NEGATIVE /[HPF]
YEAST LIKE FUNGI URNS QL MICRO: ABNORMAL

## 2025-08-25 PROCEDURE — 83880 ASSAY OF NATRIURETIC PEPTIDE: CPT

## 2025-08-25 PROCEDURE — 99285 EMERGENCY DEPT VISIT HI MDM: CPT

## 2025-08-25 PROCEDURE — 85025 COMPLETE CBC W/AUTO DIFF WBC: CPT

## 2025-08-25 PROCEDURE — 87040 BLOOD CULTURE FOR BACTERIA: CPT

## 2025-08-25 PROCEDURE — 96375 TX/PRO/DX INJ NEW DRUG ADDON: CPT

## 2025-08-25 PROCEDURE — 80053 COMPREHEN METABOLIC PANEL: CPT

## 2025-08-25 PROCEDURE — 6360000004 HC RX CONTRAST MEDICATION: Performed by: EMERGENCY MEDICINE

## 2025-08-25 PROCEDURE — 6370000000 HC RX 637 (ALT 250 FOR IP): Performed by: EMERGENCY MEDICINE

## 2025-08-25 PROCEDURE — 82803 BLOOD GASES ANY COMBINATION: CPT

## 2025-08-25 PROCEDURE — 6370000000 HC RX 637 (ALT 250 FOR IP)

## 2025-08-25 PROCEDURE — 93010 ELECTROCARDIOGRAM REPORT: CPT | Performed by: INTERNAL MEDICINE

## 2025-08-25 PROCEDURE — 36600 WITHDRAWAL OF ARTERIAL BLOOD: CPT

## 2025-08-25 PROCEDURE — 96374 THER/PROPH/DIAG INJ IV PUSH: CPT

## 2025-08-25 PROCEDURE — 2700000000 HC OXYGEN THERAPY PER DAY

## 2025-08-25 PROCEDURE — 2500000003 HC RX 250 WO HCPCS: Performed by: EMERGENCY MEDICINE

## 2025-08-25 PROCEDURE — 94761 N-INVAS EAR/PLS OXIMETRY MLT: CPT

## 2025-08-25 PROCEDURE — 36415 COLL VENOUS BLD VENIPUNCTURE: CPT

## 2025-08-25 PROCEDURE — 86140 C-REACTIVE PROTEIN: CPT

## 2025-08-25 PROCEDURE — 71045 X-RAY EXAM CHEST 1 VIEW: CPT

## 2025-08-25 PROCEDURE — 94640 AIRWAY INHALATION TREATMENT: CPT

## 2025-08-25 PROCEDURE — 2060000000 HC ICU INTERMEDIATE R&B

## 2025-08-25 PROCEDURE — 93005 ELECTROCARDIOGRAM TRACING: CPT | Performed by: EMERGENCY MEDICINE

## 2025-08-25 PROCEDURE — 84484 ASSAY OF TROPONIN QUANT: CPT

## 2025-08-25 PROCEDURE — 6360000002 HC RX W HCPCS: Performed by: EMERGENCY MEDICINE

## 2025-08-25 PROCEDURE — 81001 URINALYSIS AUTO W/SCOPE: CPT

## 2025-08-25 PROCEDURE — 94660 CPAP INITIATION&MGMT: CPT

## 2025-08-25 PROCEDURE — 2580000003 HC RX 258: Performed by: EMERGENCY MEDICINE

## 2025-08-25 PROCEDURE — 5A09357 ASSISTANCE WITH RESPIRATORY VENTILATION, LESS THAN 24 CONSECUTIVE HOURS, CONTINUOUS POSITIVE AIRWAY PRESSURE: ICD-10-PCS | Performed by: EMERGENCY MEDICINE

## 2025-08-25 PROCEDURE — 82077 ASSAY SPEC XCP UR&BREATH IA: CPT

## 2025-08-25 PROCEDURE — 83735 ASSAY OF MAGNESIUM: CPT

## 2025-08-25 PROCEDURE — 71275 CT ANGIOGRAPHY CHEST: CPT

## 2025-08-25 RX ORDER — PREDNISONE 20 MG/1
40 TABLET ORAL DAILY
Status: DISCONTINUED | OUTPATIENT
Start: 2025-08-26 | End: 2025-08-27 | Stop reason: HOSPADM

## 2025-08-25 RX ORDER — ACETAMINOPHEN 650 MG/1
650 SUPPOSITORY RECTAL EVERY 6 HOURS PRN
Status: DISCONTINUED | OUTPATIENT
Start: 2025-08-25 | End: 2025-08-27 | Stop reason: HOSPADM

## 2025-08-25 RX ORDER — ACETAMINOPHEN 325 MG/1
650 TABLET ORAL EVERY 6 HOURS PRN
Status: DISCONTINUED | OUTPATIENT
Start: 2025-08-25 | End: 2025-08-27 | Stop reason: HOSPADM

## 2025-08-25 RX ORDER — PREDNISONE 20 MG/1
40 TABLET ORAL DAILY
Status: DISCONTINUED | OUTPATIENT
Start: 2025-08-26 | End: 2025-08-25

## 2025-08-25 RX ORDER — ENOXAPARIN SODIUM 100 MG/ML
40 INJECTION SUBCUTANEOUS DAILY
Status: DISCONTINUED | OUTPATIENT
Start: 2025-08-26 | End: 2025-08-26

## 2025-08-25 RX ORDER — ONDANSETRON 2 MG/ML
4 INJECTION INTRAMUSCULAR; INTRAVENOUS EVERY 6 HOURS PRN
Status: DISCONTINUED | OUTPATIENT
Start: 2025-08-25 | End: 2025-08-27 | Stop reason: HOSPADM

## 2025-08-25 RX ORDER — ONDANSETRON 4 MG/1
4 TABLET, ORALLY DISINTEGRATING ORAL EVERY 8 HOURS PRN
Status: DISCONTINUED | OUTPATIENT
Start: 2025-08-25 | End: 2025-08-27 | Stop reason: HOSPADM

## 2025-08-25 RX ORDER — IPRATROPIUM BROMIDE AND ALBUTEROL SULFATE 2.5; .5 MG/3ML; MG/3ML
1 SOLUTION RESPIRATORY (INHALATION)
Status: DISCONTINUED | OUTPATIENT
Start: 2025-08-26 | End: 2025-08-27 | Stop reason: HOSPADM

## 2025-08-25 RX ORDER — POLYETHYLENE GLYCOL 3350 17 G/17G
17 POWDER, FOR SOLUTION ORAL DAILY PRN
Status: DISCONTINUED | OUTPATIENT
Start: 2025-08-25 | End: 2025-08-27 | Stop reason: HOSPADM

## 2025-08-25 RX ORDER — IPRATROPIUM BROMIDE AND ALBUTEROL SULFATE 2.5; .5 MG/3ML; MG/3ML
1 SOLUTION RESPIRATORY (INHALATION)
Status: COMPLETED | OUTPATIENT
Start: 2025-08-25 | End: 2025-08-25

## 2025-08-25 RX ORDER — IOPAMIDOL 755 MG/ML
80 INJECTION, SOLUTION INTRAVASCULAR
Status: COMPLETED | OUTPATIENT
Start: 2025-08-25 | End: 2025-08-25

## 2025-08-25 RX ORDER — SODIUM CHLORIDE 9 MG/ML
INJECTION, SOLUTION INTRAVENOUS PRN
Status: DISCONTINUED | OUTPATIENT
Start: 2025-08-25 | End: 2025-08-27 | Stop reason: HOSPADM

## 2025-08-25 RX ORDER — IPRATROPIUM BROMIDE AND ALBUTEROL SULFATE 2.5; .5 MG/3ML; MG/3ML
1 SOLUTION RESPIRATORY (INHALATION) ONCE
Status: COMPLETED | OUTPATIENT
Start: 2025-08-25 | End: 2025-08-25

## 2025-08-25 RX ORDER — SODIUM CHLORIDE 0.9 % (FLUSH) 0.9 %
5-40 SYRINGE (ML) INJECTION PRN
Status: DISCONTINUED | OUTPATIENT
Start: 2025-08-25 | End: 2025-08-27 | Stop reason: HOSPADM

## 2025-08-25 RX ORDER — SODIUM CHLORIDE 0.9 % (FLUSH) 0.9 %
5-40 SYRINGE (ML) INJECTION EVERY 12 HOURS SCHEDULED
Status: DISCONTINUED | OUTPATIENT
Start: 2025-08-25 | End: 2025-08-27 | Stop reason: HOSPADM

## 2025-08-25 RX ADMIN — WATER 125 MG: 1 INJECTION INTRAMUSCULAR; INTRAVENOUS; SUBCUTANEOUS at 21:44

## 2025-08-25 RX ADMIN — IOPAMIDOL 80 ML: 755 INJECTION, SOLUTION INTRAVENOUS at 20:38

## 2025-08-25 RX ADMIN — IPRATROPIUM BROMIDE AND ALBUTEROL SULFATE 1 DOSE: .5; 3 SOLUTION RESPIRATORY (INHALATION) at 19:45

## 2025-08-25 RX ADMIN — IPRATROPIUM BROMIDE AND ALBUTEROL SULFATE 1 DOSE: .5; 3 SOLUTION RESPIRATORY (INHALATION) at 22:20

## 2025-08-25 RX ADMIN — AZITHROMYCIN MONOHYDRATE 500 MG: 500 INJECTION, POWDER, LYOPHILIZED, FOR SOLUTION INTRAVENOUS at 21:50

## 2025-08-26 PROBLEM — J96.02 ACUTE RESPIRATORY FAILURE WITH HYPERCAPNIA (HCC): Status: ACTIVE | Noted: 2024-03-25

## 2025-08-26 PROBLEM — J44.1 ACUTE EXACERBATION OF CHRONIC OBSTRUCTIVE PULMONARY DISEASE (COPD) (HCC): Status: ACTIVE | Noted: 2018-05-21

## 2025-08-26 PROBLEM — J44.1 COPD EXACERBATION (HCC): Status: ACTIVE | Noted: 2025-08-26

## 2025-08-26 LAB
ANION GAP SERPL CALC-SCNC: 13 MEQ/L (ref 8–16)
ARTERIAL PATENCY WRIST A: POSITIVE
BASE EXCESS BLDA CALC-SCNC: 8.5 MMOL/L (ref -2.5–2.5)
BASOPHILS ABSOLUTE: 0 THOU/MM3 (ref 0–0.1)
BASOPHILS ABSOLUTE: 0.1 THOU/MM3 (ref 0–0.1)
BASOPHILS NFR BLD AUTO: 0.4 %
BASOPHILS NFR BLD AUTO: 0.9 %
BDY SITE: ABNORMAL
BUN SERPL-MCNC: 9 MG/DL (ref 8–23)
CALCIUM SERPL-MCNC: 9.4 MG/DL (ref 8.5–10.5)
CHLORIDE SERPL-SCNC: 96 MEQ/L (ref 98–111)
CO2 SERPL-SCNC: 30 MEQ/L (ref 22–29)
COLLECTED BY:: ABNORMAL
CREAT SERPL-MCNC: 0.8 MG/DL (ref 0.7–1.2)
DEPRECATED RDW RBC AUTO: 53.8 FL (ref 35–45)
DEPRECATED RDW RBC AUTO: 54.3 FL (ref 35–45)
DEVICE: ABNORMAL
EOSINOPHIL NFR BLD AUTO: 0 %
EOSINOPHIL NFR BLD AUTO: 0.2 %
EOSINOPHILS ABSOLUTE: 0 THOU/MM3 (ref 0–0.4)
EOSINOPHILS ABSOLUTE: 0 THOU/MM3 (ref 0–0.4)
ERYTHROCYTE [DISTWIDTH] IN BLOOD BY AUTOMATED COUNT: 15.3 % (ref 11.5–14.5)
ERYTHROCYTE [DISTWIDTH] IN BLOOD BY AUTOMATED COUNT: 15.4 % (ref 11.5–14.5)
FIO2 ON VENT O2 ANALYZER: 40 %
GFR SERPL CREATININE-BSD FRML MDRD: > 90 ML/MIN/1.73M2
GLUCOSE BLD STRIP.AUTO-MCNC: 219 MG/DL (ref 70–108)
GLUCOSE BLD STRIP.AUTO-MCNC: 277 MG/DL (ref 70–108)
GLUCOSE BLD STRIP.AUTO-MCNC: 279 MG/DL (ref 70–108)
GLUCOSE BLD STRIP.AUTO-MCNC: 297 MG/DL (ref 70–108)
GLUCOSE SERPL-MCNC: 218 MG/DL (ref 74–109)
HCO3 BLDA-SCNC: 37 MMOL/L (ref 23–28)
HCT VFR BLD AUTO: 56.1 % (ref 42–52)
HCT VFR BLD AUTO: 56.1 % (ref 42–52)
HGB BLD-MCNC: 18.5 GM/DL (ref 14–18)
HGB BLD-MCNC: 18.6 GM/DL (ref 14–18)
IMM GRANULOCYTES # BLD AUTO: 0.04 THOU/MM3 (ref 0–0.07)
IMM GRANULOCYTES # BLD AUTO: 0.06 THOU/MM3 (ref 0–0.07)
IMM GRANULOCYTES NFR BLD AUTO: 0.5 %
IMM GRANULOCYTES NFR BLD AUTO: 0.5 %
LYMPHOCYTES ABSOLUTE: 0.6 THOU/MM3 (ref 1–4.8)
LYMPHOCYTES ABSOLUTE: 1.9 THOU/MM3 (ref 1–4.8)
LYMPHOCYTES NFR BLD AUTO: 16.7 %
LYMPHOCYTES NFR BLD AUTO: 7.1 %
MAGNESIUM SERPL-MCNC: 2.4 MG/DL (ref 1.6–2.6)
MCH RBC QN AUTO: 31.7 PG (ref 26–33)
MCH RBC QN AUTO: 32.2 PG (ref 26–33)
MCHC RBC AUTO-ENTMCNC: 33 GM/DL (ref 32.2–35.5)
MCHC RBC AUTO-ENTMCNC: 33.2 GM/DL (ref 32.2–35.5)
MCV RBC AUTO: 96.2 FL (ref 80–94)
MCV RBC AUTO: 97.2 FL (ref 80–94)
MONOCYTES ABSOLUTE: 0.1 THOU/MM3 (ref 0.4–1.3)
MONOCYTES ABSOLUTE: 1.2 THOU/MM3 (ref 0.4–1.3)
MONOCYTES NFR BLD AUTO: 1.4 %
MONOCYTES NFR BLD AUTO: 10.6 %
NEUTROPHILS ABSOLUTE: 7.2 THOU/MM3 (ref 1.8–7.7)
NEUTROPHILS ABSOLUTE: 8 THOU/MM3 (ref 1.8–7.7)
NEUTROPHILS NFR BLD AUTO: 71.1 %
NEUTROPHILS NFR BLD AUTO: 90.6 %
NRBC BLD AUTO-RTO: 0 /100 WBC
NRBC BLD AUTO-RTO: 0 /100 WBC
OSMOLALITY SERPL CALC.SUM OF ELEC: 282.9 MOSMOL/KG (ref 275–300)
PATHOLOGIST REVIEW: ABNORMAL
PCO2 BLDA: 61 MMHG (ref 35–45)
PEEP SETTING VENT: 6 MMHG
PH BLDA: 7.39 [PH] (ref 7.35–7.45)
PLATELET # BLD AUTO: 246 THOU/MM3 (ref 130–400)
PLATELET # BLD AUTO: 294 THOU/MM3 (ref 130–400)
PMV BLD AUTO: 10.2 FL (ref 9.4–12.4)
PMV BLD AUTO: 10.3 FL (ref 9.4–12.4)
PO2 BLDA: 86 MMHG (ref 71–104)
POTASSIUM SERPL-SCNC: 4.4 MEQ/L (ref 3.5–5.2)
PRESSURE SUPPORT SETTING VENT: 12 CMH2O
RBC # BLD AUTO: 5.77 MILL/MM3 (ref 4.7–6.1)
RBC # BLD AUTO: 5.83 MILL/MM3 (ref 4.7–6.1)
SAO2 % BLDA: 96 %
SODIUM SERPL-SCNC: 139 MEQ/L (ref 135–145)
VENTILATION MODE VENT: ABNORMAL
WBC # BLD AUTO: 11.3 THOU/MM3 (ref 4.8–10.8)
WBC # BLD AUTO: 7.9 THOU/MM3 (ref 4.8–10.8)

## 2025-08-26 PROCEDURE — 6360000002 HC RX W HCPCS

## 2025-08-26 PROCEDURE — 94669 MECHANICAL CHEST WALL OSCILL: CPT

## 2025-08-26 PROCEDURE — 2500000003 HC RX 250 WO HCPCS

## 2025-08-26 PROCEDURE — 6370000000 HC RX 637 (ALT 250 FOR IP): Performed by: INTERNAL MEDICINE

## 2025-08-26 PROCEDURE — 99223 1ST HOSP IP/OBS HIGH 75: CPT | Performed by: INTERNAL MEDICINE

## 2025-08-26 PROCEDURE — 36415 COLL VENOUS BLD VENIPUNCTURE: CPT

## 2025-08-26 PROCEDURE — 94640 AIRWAY INHALATION TREATMENT: CPT

## 2025-08-26 PROCEDURE — 6370000000 HC RX 637 (ALT 250 FOR IP)

## 2025-08-26 PROCEDURE — 2060000000 HC ICU INTERMEDIATE R&B

## 2025-08-26 PROCEDURE — 82803 BLOOD GASES ANY COMBINATION: CPT

## 2025-08-26 PROCEDURE — 80048 BASIC METABOLIC PNL TOTAL CA: CPT

## 2025-08-26 PROCEDURE — 94761 N-INVAS EAR/PLS OXIMETRY MLT: CPT

## 2025-08-26 PROCEDURE — 36600 WITHDRAWAL OF ARTERIAL BLOOD: CPT

## 2025-08-26 PROCEDURE — 99222 1ST HOSP IP/OBS MODERATE 55: CPT | Performed by: INTERNAL MEDICINE

## 2025-08-26 PROCEDURE — 82948 REAGENT STRIP/BLOOD GLUCOSE: CPT

## 2025-08-26 PROCEDURE — 85025 COMPLETE CBC W/AUTO DIFF WBC: CPT

## 2025-08-26 PROCEDURE — 2700000000 HC OXYGEN THERAPY PER DAY

## 2025-08-26 PROCEDURE — 94660 CPAP INITIATION&MGMT: CPT

## 2025-08-26 PROCEDURE — 83735 ASSAY OF MAGNESIUM: CPT

## 2025-08-26 RX ORDER — DEXTROSE MONOHYDRATE 100 MG/ML
INJECTION, SOLUTION INTRAVENOUS CONTINUOUS PRN
Status: DISCONTINUED | OUTPATIENT
Start: 2025-08-26 | End: 2025-08-27 | Stop reason: HOSPADM

## 2025-08-26 RX ORDER — ENOXAPARIN SODIUM 100 MG/ML
30 INJECTION SUBCUTANEOUS 2 TIMES DAILY
Status: DISCONTINUED | OUTPATIENT
Start: 2025-08-26 | End: 2025-08-27 | Stop reason: HOSPADM

## 2025-08-26 RX ORDER — POTASSIUM CHLORIDE 1500 MG/1
20 TABLET, EXTENDED RELEASE ORAL ONCE
Status: COMPLETED | OUTPATIENT
Start: 2025-08-26 | End: 2025-08-26

## 2025-08-26 RX ORDER — VALSARTAN 80 MG/1
80 TABLET ORAL DAILY
Status: DISCONTINUED | OUTPATIENT
Start: 2025-08-26 | End: 2025-08-27 | Stop reason: HOSPADM

## 2025-08-26 RX ORDER — PANTOPRAZOLE SODIUM 40 MG/1
40 TABLET, DELAYED RELEASE ORAL
Status: DISCONTINUED | OUTPATIENT
Start: 2025-08-26 | End: 2025-08-27 | Stop reason: HOSPADM

## 2025-08-26 RX ORDER — GLUCAGON 1 MG/ML
1 KIT INJECTION PRN
Status: DISCONTINUED | OUTPATIENT
Start: 2025-08-26 | End: 2025-08-27 | Stop reason: HOSPADM

## 2025-08-26 RX ORDER — BUSPIRONE HYDROCHLORIDE 5 MG/1
5 TABLET ORAL 2 TIMES DAILY
Status: DISCONTINUED | OUTPATIENT
Start: 2025-08-26 | End: 2025-08-27 | Stop reason: HOSPADM

## 2025-08-26 RX ORDER — ESCITALOPRAM OXALATE 10 MG/1
5 TABLET ORAL DAILY
Status: DISCONTINUED | OUTPATIENT
Start: 2025-08-26 | End: 2025-08-27 | Stop reason: HOSPADM

## 2025-08-26 RX ORDER — TAMSULOSIN HYDROCHLORIDE 0.4 MG/1
0.4 CAPSULE ORAL DAILY
Status: DISCONTINUED | OUTPATIENT
Start: 2025-08-26 | End: 2025-08-27 | Stop reason: HOSPADM

## 2025-08-26 RX ORDER — ATORVASTATIN CALCIUM 40 MG/1
40 TABLET, FILM COATED ORAL DAILY
Status: DISCONTINUED | OUTPATIENT
Start: 2025-08-26 | End: 2025-08-27 | Stop reason: HOSPADM

## 2025-08-26 RX ORDER — ISOSORBIDE MONONITRATE 30 MG/1
30 TABLET, EXTENDED RELEASE ORAL DAILY
Status: DISCONTINUED | OUTPATIENT
Start: 2025-08-26 | End: 2025-08-27 | Stop reason: HOSPADM

## 2025-08-26 RX ORDER — TICAGRELOR 90 MG/1
90 TABLET, FILM COATED ORAL 2 TIMES DAILY
Status: DISCONTINUED | OUTPATIENT
Start: 2025-08-26 | End: 2025-08-27 | Stop reason: HOSPADM

## 2025-08-26 RX ORDER — NALTREXONE HYDROCHLORIDE 50 MG/1
50 TABLET, FILM COATED ORAL DAILY
Status: DISCONTINUED | OUTPATIENT
Start: 2025-08-26 | End: 2025-08-27 | Stop reason: HOSPADM

## 2025-08-26 RX ORDER — BUSPIRONE HYDROCHLORIDE 10 MG/1
10 TABLET ORAL 2 TIMES DAILY
Status: DISCONTINUED | OUTPATIENT
Start: 2025-08-26 | End: 2025-08-26 | Stop reason: DRUGHIGH

## 2025-08-26 RX ORDER — POTASSIUM CHLORIDE 7.45 MG/ML
10 INJECTION INTRAVENOUS
Status: ACTIVE | OUTPATIENT
Start: 2025-08-26 | End: 2025-08-26

## 2025-08-26 RX ORDER — PRAMIPEXOLE DIHYDROCHLORIDE 0.25 MG/1
0.12 TABLET ORAL NIGHTLY
Status: DISCONTINUED | OUTPATIENT
Start: 2025-08-26 | End: 2025-08-27 | Stop reason: HOSPADM

## 2025-08-26 RX ORDER — METOPROLOL SUCCINATE 50 MG/1
50 TABLET, EXTENDED RELEASE ORAL DAILY
Status: DISCONTINUED | OUTPATIENT
Start: 2025-08-26 | End: 2025-08-27 | Stop reason: HOSPADM

## 2025-08-26 RX ORDER — TRAZODONE HYDROCHLORIDE 100 MG/1
100 TABLET ORAL NIGHTLY PRN
Status: DISCONTINUED | OUTPATIENT
Start: 2025-08-26 | End: 2025-08-27 | Stop reason: HOSPADM

## 2025-08-26 RX ORDER — METOLAZONE 5 MG/1
2.5 TABLET ORAL ONCE
Status: COMPLETED | OUTPATIENT
Start: 2025-08-26 | End: 2025-08-26

## 2025-08-26 RX ORDER — INSULIN GLARGINE 100 [IU]/ML
10 INJECTION, SOLUTION SUBCUTANEOUS NIGHTLY
Status: DISCONTINUED | OUTPATIENT
Start: 2025-08-26 | End: 2025-08-27 | Stop reason: HOSPADM

## 2025-08-26 RX ORDER — POTASSIUM CHLORIDE 1500 MG/1
20 TABLET, EXTENDED RELEASE ORAL DAILY
Status: DISCONTINUED | OUTPATIENT
Start: 2025-08-26 | End: 2025-08-27 | Stop reason: HOSPADM

## 2025-08-26 RX ORDER — AMIODARONE HYDROCHLORIDE 200 MG/1
200 TABLET ORAL DAILY
Status: DISCONTINUED | OUTPATIENT
Start: 2025-08-26 | End: 2025-08-26

## 2025-08-26 RX ORDER — BUMETANIDE 1 MG/1
2 TABLET ORAL DAILY
Status: DISCONTINUED | OUTPATIENT
Start: 2025-08-26 | End: 2025-08-27 | Stop reason: HOSPADM

## 2025-08-26 RX ORDER — AZITHROMYCIN 250 MG/1
500 TABLET, FILM COATED ORAL DAILY
Status: DISCONTINUED | OUTPATIENT
Start: 2025-08-26 | End: 2025-08-27 | Stop reason: HOSPADM

## 2025-08-26 RX ORDER — INSULIN LISPRO 100 [IU]/ML
0-4 INJECTION, SOLUTION INTRAVENOUS; SUBCUTANEOUS
Status: DISCONTINUED | OUTPATIENT
Start: 2025-08-26 | End: 2025-08-27 | Stop reason: HOSPADM

## 2025-08-26 RX ORDER — ALLOPURINOL 300 MG/1
300 TABLET ORAL DAILY
Status: DISCONTINUED | OUTPATIENT
Start: 2025-08-26 | End: 2025-08-27 | Stop reason: HOSPADM

## 2025-08-26 RX ADMIN — ESCITALOPRAM OXALATE 5 MG: 10 TABLET ORAL at 11:25

## 2025-08-26 RX ADMIN — WATER 2000 MG: 1 INJECTION INTRAMUSCULAR; INTRAVENOUS; SUBCUTANEOUS at 06:25

## 2025-08-26 RX ADMIN — PREDNISONE 40 MG: 20 TABLET ORAL at 11:25

## 2025-08-26 RX ADMIN — TICAGRELOR 90 MG: 90 TABLET ORAL at 21:37

## 2025-08-26 RX ADMIN — BUSPIRONE HYDROCHLORIDE 5 MG: 5 TABLET ORAL at 13:09

## 2025-08-26 RX ADMIN — PRAMIPEXOLE DIHYDROCHLORIDE 0.12 MG: 0.25 TABLET ORAL at 21:37

## 2025-08-26 RX ADMIN — INSULIN LISPRO 1 UNITS: 100 INJECTION, SOLUTION INTRAVENOUS; SUBCUTANEOUS at 09:24

## 2025-08-26 RX ADMIN — INSULIN LISPRO 2 UNITS: 100 INJECTION, SOLUTION INTRAVENOUS; SUBCUTANEOUS at 16:54

## 2025-08-26 RX ADMIN — AZITHROMYCIN DIHYDRATE 500 MG: 250 TABLET ORAL at 21:37

## 2025-08-26 RX ADMIN — TICAGRELOR 90 MG: 90 TABLET ORAL at 09:24

## 2025-08-26 RX ADMIN — SODIUM CHLORIDE, PRESERVATIVE FREE 10 ML: 5 INJECTION INTRAVENOUS at 21:38

## 2025-08-26 RX ADMIN — IPRATROPIUM BROMIDE AND ALBUTEROL SULFATE 1 DOSE: .5; 3 SOLUTION RESPIRATORY (INHALATION) at 16:04

## 2025-08-26 RX ADMIN — INSULIN LISPRO 1 UNITS: 100 INJECTION, SOLUTION INTRAVENOUS; SUBCUTANEOUS at 13:09

## 2025-08-26 RX ADMIN — BUMETANIDE 2 MG: 1 TABLET ORAL at 09:25

## 2025-08-26 RX ADMIN — PANTOPRAZOLE SODIUM 40 MG: 40 TABLET, DELAYED RELEASE ORAL at 09:32

## 2025-08-26 RX ADMIN — METOLAZONE 2.5 MG: 5 TABLET ORAL at 14:41

## 2025-08-26 RX ADMIN — ALLOPURINOL 300 MG: 300 TABLET ORAL at 09:25

## 2025-08-26 RX ADMIN — ENOXAPARIN SODIUM 30 MG: 100 INJECTION SUBCUTANEOUS at 21:37

## 2025-08-26 RX ADMIN — POTASSIUM CHLORIDE 20 MEQ: 1500 TABLET, EXTENDED RELEASE ORAL at 09:24

## 2025-08-26 RX ADMIN — BUSPIRONE HYDROCHLORIDE 5 MG: 5 TABLET ORAL at 21:37

## 2025-08-26 RX ADMIN — ENOXAPARIN SODIUM 30 MG: 100 INJECTION SUBCUTANEOUS at 09:24

## 2025-08-26 RX ADMIN — INSULIN LISPRO 2 UNITS: 100 INJECTION, SOLUTION INTRAVENOUS; SUBCUTANEOUS at 21:37

## 2025-08-26 RX ADMIN — IPRATROPIUM BROMIDE AND ALBUTEROL SULFATE 1 DOSE: .5; 3 SOLUTION RESPIRATORY (INHALATION) at 21:10

## 2025-08-26 RX ADMIN — ISOSORBIDE MONONITRATE 30 MG: 30 TABLET, EXTENDED RELEASE ORAL at 09:25

## 2025-08-26 RX ADMIN — ATORVASTATIN CALCIUM 40 MG: 40 TABLET, FILM COATED ORAL at 09:25

## 2025-08-26 RX ADMIN — SODIUM CHLORIDE, PRESERVATIVE FREE 5 ML: 5 INJECTION INTRAVENOUS at 11:28

## 2025-08-26 RX ADMIN — NALTREXONE HYDROCHLORIDE 50 MG: 50 TABLET, FILM COATED ORAL at 11:25

## 2025-08-26 RX ADMIN — INSULIN GLARGINE 10 UNITS: 100 INJECTION, SOLUTION SUBCUTANEOUS at 21:38

## 2025-08-26 RX ADMIN — POTASSIUM CHLORIDE 20 MEQ: 1500 TABLET, EXTENDED RELEASE ORAL at 14:41

## 2025-08-26 RX ADMIN — TAMSULOSIN HYDROCHLORIDE 0.4 MG: 0.4 CAPSULE ORAL at 09:25

## 2025-08-26 RX ADMIN — IPRATROPIUM BROMIDE AND ALBUTEROL SULFATE 1 DOSE: .5; 3 SOLUTION RESPIRATORY (INHALATION) at 11:46

## 2025-08-26 RX ADMIN — VALSARTAN 80 MG: 80 TABLET ORAL at 09:25

## 2025-08-26 RX ADMIN — IPRATROPIUM BROMIDE AND ALBUTEROL SULFATE 1 DOSE: .5; 3 SOLUTION RESPIRATORY (INHALATION) at 07:54

## 2025-08-26 ASSESSMENT — PATIENT HEALTH QUESTIONNAIRE - PHQ9
SUM OF ALL RESPONSES TO PHQ QUESTIONS 1-9: 2
SUM OF ALL RESPONSES TO PHQ QUESTIONS 1-9: 2
1. LITTLE INTEREST OR PLEASURE IN DOING THINGS: SEVERAL DAYS
SUM OF ALL RESPONSES TO PHQ QUESTIONS 1-9: 2
SUM OF ALL RESPONSES TO PHQ QUESTIONS 1-9: 2
2. FEELING DOWN, DEPRESSED OR HOPELESS: SEVERAL DAYS

## 2025-08-26 ASSESSMENT — PAIN SCALES - GENERAL
PAINLEVEL_OUTOF10: 0

## 2025-08-26 ASSESSMENT — LIFESTYLE VARIABLES
HOW MANY STANDARD DRINKS CONTAINING ALCOHOL DO YOU HAVE ON A TYPICAL DAY: 3 OR 4
HOW OFTEN DO YOU HAVE A DRINK CONTAINING ALCOHOL: 2-3 TIMES A WEEK

## 2025-08-27 VITALS
TEMPERATURE: 98.4 F | WEIGHT: 236.4 LBS | HEIGHT: 71 IN | BODY MASS INDEX: 33.1 KG/M2 | HEART RATE: 62 BPM | RESPIRATION RATE: 20 BRPM | SYSTOLIC BLOOD PRESSURE: 93 MMHG | OXYGEN SATURATION: 89 % | DIASTOLIC BLOOD PRESSURE: 62 MMHG

## 2025-08-27 LAB
ANION GAP SERPL CALC-SCNC: 12 MEQ/L (ref 8–16)
ARTERIAL PATENCY WRIST A: POSITIVE
BASE EXCESS BLDA CALC-SCNC: -5.9 MMOL/L (ref -2.5–2.5)
BDY SITE: ABNORMAL
BUN SERPL-MCNC: 20 MG/DL (ref 8–23)
CALCIUM SERPL-MCNC: 9.4 MG/DL (ref 8.5–10.5)
CHLORIDE SERPL-SCNC: 97 MEQ/L (ref 98–111)
CO2 SERPL-SCNC: 27 MEQ/L (ref 22–29)
COLLECTED BY:: ABNORMAL
CREAT SERPL-MCNC: 0.8 MG/DL (ref 0.7–1.2)
DEVICE: ABNORMAL
FIO2 ON VENT O2 ANALYZER: 32 %
GFR SERPL CREATININE-BSD FRML MDRD: > 90 ML/MIN/1.73M2
GLUCOSE BLD STRIP.AUTO-MCNC: 153 MG/DL (ref 70–108)
GLUCOSE BLD STRIP.AUTO-MCNC: 223 MG/DL (ref 70–108)
GLUCOSE BLD STRIP.AUTO-MCNC: 331 MG/DL (ref 70–108)
GLUCOSE SERPL-MCNC: 179 MG/DL (ref 74–109)
HCO3 BLDA-SCNC: 19 MMOL/L (ref 23–28)
PCO2 BLDA: 33 MMHG (ref 35–45)
PH BLDA: 7.38 [PH] (ref 7.35–7.45)
PO2 BLDA: 161 MMHG (ref 71–104)
POTASSIUM SERPL-SCNC: 4.2 MEQ/L (ref 3.5–5.2)
SAO2 % BLDA: 99 %
SODIUM SERPL-SCNC: 136 MEQ/L (ref 135–145)
VENTILATION MODE VENT: ABNORMAL

## 2025-08-27 PROCEDURE — 2T015 HOSPITALIST 2ND TOUCH: CPT | Performed by: INTERNAL MEDICINE

## 2025-08-27 PROCEDURE — 82948 REAGENT STRIP/BLOOD GLUCOSE: CPT

## 2025-08-27 PROCEDURE — 94640 AIRWAY INHALATION TREATMENT: CPT

## 2025-08-27 PROCEDURE — 2700000000 HC OXYGEN THERAPY PER DAY

## 2025-08-27 PROCEDURE — 36415 COLL VENOUS BLD VENIPUNCTURE: CPT

## 2025-08-27 PROCEDURE — 82803 BLOOD GASES ANY COMBINATION: CPT

## 2025-08-27 PROCEDURE — 2500000003 HC RX 250 WO HCPCS

## 2025-08-27 PROCEDURE — 6370000000 HC RX 637 (ALT 250 FOR IP)

## 2025-08-27 PROCEDURE — 6370000000 HC RX 637 (ALT 250 FOR IP): Performed by: INTERNAL MEDICINE

## 2025-08-27 PROCEDURE — 94669 MECHANICAL CHEST WALL OSCILL: CPT

## 2025-08-27 PROCEDURE — 6360000002 HC RX W HCPCS

## 2025-08-27 PROCEDURE — 94761 N-INVAS EAR/PLS OXIMETRY MLT: CPT

## 2025-08-27 PROCEDURE — 36600 WITHDRAWAL OF ARTERIAL BLOOD: CPT

## 2025-08-27 PROCEDURE — 99238 HOSP IP/OBS DSCHRG MGMT 30/<: CPT | Performed by: INTERNAL MEDICINE

## 2025-08-27 PROCEDURE — 80048 BASIC METABOLIC PNL TOTAL CA: CPT

## 2025-08-27 RX ORDER — PREDNISONE 20 MG/1
40 TABLET ORAL DAILY
Qty: 4 TABLET | Refills: 0 | Status: CANCELLED | OUTPATIENT
Start: 2025-08-28 | End: 2025-08-30

## 2025-08-27 RX ORDER — AZITHROMYCIN 500 MG/1
500 TABLET, FILM COATED ORAL DAILY
Qty: 1 TABLET | Refills: 0 | Status: SHIPPED | OUTPATIENT
Start: 2025-08-27 | End: 2025-08-27 | Stop reason: HOSPADM

## 2025-08-27 RX ORDER — NICOTINE 21 MG/24HR
1 PATCH, TRANSDERMAL 24 HOURS TRANSDERMAL DAILY
Status: DISCONTINUED | OUTPATIENT
Start: 2025-08-27 | End: 2025-08-27 | Stop reason: HOSPADM

## 2025-08-27 RX ADMIN — ATORVASTATIN CALCIUM 40 MG: 40 TABLET, FILM COATED ORAL at 09:26

## 2025-08-27 RX ADMIN — IPRATROPIUM BROMIDE AND ALBUTEROL SULFATE 1 DOSE: .5; 3 SOLUTION RESPIRATORY (INHALATION) at 16:59

## 2025-08-27 RX ADMIN — TICAGRELOR 90 MG: 90 TABLET ORAL at 09:30

## 2025-08-27 RX ADMIN — NALTREXONE HYDROCHLORIDE 50 MG: 50 TABLET, FILM COATED ORAL at 09:25

## 2025-08-27 RX ADMIN — IPRATROPIUM BROMIDE AND ALBUTEROL SULFATE 1 DOSE: .5; 3 SOLUTION RESPIRATORY (INHALATION) at 08:12

## 2025-08-27 RX ADMIN — PREDNISONE 40 MG: 20 TABLET ORAL at 09:25

## 2025-08-27 RX ADMIN — IPRATROPIUM BROMIDE AND ALBUTEROL SULFATE 1 DOSE: .5; 3 SOLUTION RESPIRATORY (INHALATION) at 12:32

## 2025-08-27 RX ADMIN — PANTOPRAZOLE SODIUM 40 MG: 40 TABLET, DELAYED RELEASE ORAL at 05:51

## 2025-08-27 RX ADMIN — ALLOPURINOL 300 MG: 300 TABLET ORAL at 09:25

## 2025-08-27 RX ADMIN — ESCITALOPRAM OXALATE 5 MG: 10 TABLET ORAL at 09:25

## 2025-08-27 RX ADMIN — METOPROLOL SUCCINATE 50 MG: 50 TABLET, EXTENDED RELEASE ORAL at 09:26

## 2025-08-27 RX ADMIN — INSULIN LISPRO 3 UNITS: 100 INJECTION, SOLUTION INTRAVENOUS; SUBCUTANEOUS at 17:45

## 2025-08-27 RX ADMIN — POTASSIUM CHLORIDE 20 MEQ: 1500 TABLET, EXTENDED RELEASE ORAL at 09:30

## 2025-08-27 RX ADMIN — SODIUM CHLORIDE, PRESERVATIVE FREE 10 ML: 5 INJECTION INTRAVENOUS at 09:27

## 2025-08-27 RX ADMIN — BUMETANIDE 2 MG: 1 TABLET ORAL at 09:25

## 2025-08-27 RX ADMIN — TAMSULOSIN HYDROCHLORIDE 0.4 MG: 0.4 CAPSULE ORAL at 09:26

## 2025-08-27 RX ADMIN — INSULIN LISPRO 1 UNITS: 100 INJECTION, SOLUTION INTRAVENOUS; SUBCUTANEOUS at 11:49

## 2025-08-27 RX ADMIN — VALSARTAN 80 MG: 80 TABLET ORAL at 09:26

## 2025-08-27 RX ADMIN — ISOSORBIDE MONONITRATE 30 MG: 30 TABLET, EXTENDED RELEASE ORAL at 09:26

## 2025-08-27 RX ADMIN — ENOXAPARIN SODIUM 30 MG: 100 INJECTION SUBCUTANEOUS at 09:30

## 2025-08-27 RX ADMIN — BUSPIRONE HYDROCHLORIDE 5 MG: 5 TABLET ORAL at 09:25

## 2025-08-27 ASSESSMENT — PAIN SCALES - GENERAL
PAINLEVEL_OUTOF10: 0
PAINLEVEL_OUTOF10: 0

## 2025-08-28 ENCOUNTER — CARE COORDINATION (OUTPATIENT)
Dept: CARE COORDINATION | Age: 65
End: 2025-08-28

## 2025-08-28 DIAGNOSIS — J96.02 ACUTE RESPIRATORY FAILURE WITH HYPERCAPNIA (HCC): Primary | ICD-10-CM

## 2025-08-30 ENCOUNTER — TELEPHONE (OUTPATIENT)
Age: 65
End: 2025-08-30

## 2025-08-31 LAB
BACTERIA BLD AEROBE CULT: NORMAL
BACTERIA BLD AEROBE CULT: NORMAL

## 2025-09-03 ENCOUNTER — TELEPHONE (OUTPATIENT)
Age: 65
End: 2025-09-03

## 2025-09-04 ENCOUNTER — CARE COORDINATION (OUTPATIENT)
Dept: CARE COORDINATION | Age: 65
End: 2025-09-04

## (undated) DEVICE — OPEN-END FLEXI-TIP URETERAL CATHETER: Brand: FLEXI-TIP

## (undated) DEVICE — SHEET,DRAPE,3/4,53X77,STERILE: Brand: MEDLINE

## (undated) DEVICE — DEVICE CLSR 5FR BIOABSRB FULL INTEGR RAP HEMSTAS FOR FEM

## (undated) DEVICE — CYSTO PACK: Brand: MEDLINE INDUSTRIES, INC.

## (undated) DEVICE — DRAPE KIT RAMAPR RADIATION SHIELD

## (undated) DEVICE — CATHETER DIAG 5FR L100CM LUMN ID0.047IN JL4 CRV 0 SIDE H

## (undated) DEVICE — SOLUTION IV IRRIG WATER 1000ML POUR BRL 2F7114

## (undated) DEVICE — CATHETER COR DIAG PIGTAILS PIG 145 CRV 5FR 110CM 6 SIDE H

## (undated) DEVICE — Z INACTIVE USE 2635503 SOLUTION IRRIG 3000ML ST H2O USP UROMATIC PLAS CONT

## (undated) DEVICE — PINNACLE INTRODUCER SHEATH: Brand: PINNACLE

## (undated) DEVICE — GUIDEWIRE VASC L150CM DIA0.035IN FLX END L7CM J 3MM PTFE

## (undated) DEVICE — SYRINGE, LUER LOCK, 60ML: Brand: MEDLINE

## (undated) DEVICE — SYRINGE CATH TIP 50ML

## (undated) DEVICE — OFF - ST. RITAS VASC: Brand: MEDLINE INDUSTRIES, INC.

## (undated) DEVICE — 4F (1.0MM ID) X 9CM STIFF4F (1.0 MICRO-STICK®INTRODUCER SE WITH NITINOL GUIDEWIREWITH NITIN WITH RADIOPAQUE TIPWITH RADIOPAQ: Brand: MICRO-STICK SETMICRO-STICK SET

## (undated) DEVICE — CATHETER DIAG 5FR L100CM SPEC 3 DRC CRV SZ DBL BRAID WIRE

## (undated) DEVICE — Device